# Patient Record
Sex: MALE | Race: WHITE | NOT HISPANIC OR LATINO | Employment: OTHER | URBAN - METROPOLITAN AREA
[De-identification: names, ages, dates, MRNs, and addresses within clinical notes are randomized per-mention and may not be internally consistent; named-entity substitution may affect disease eponyms.]

---

## 2017-02-15 DIAGNOSIS — I73.9 PVD (PERIPHERAL VASCULAR DISEASE): ICD-10-CM

## 2017-02-16 DIAGNOSIS — I77.9 CAROTID DISEASE, BILATERAL: Primary | ICD-10-CM

## 2017-02-16 RX ORDER — CILOSTAZOL 100 MG/1
TABLET ORAL
Qty: 60 TABLET | Refills: 11 | Status: SHIPPED | OUTPATIENT
Start: 2017-02-16 | End: 2017-03-28 | Stop reason: SDUPTHER

## 2017-02-16 RX ORDER — CILOSTAZOL 100 MG/1
100 TABLET ORAL 2 TIMES DAILY
Qty: 60 TABLET | Refills: 11 | Status: SHIPPED | OUTPATIENT
Start: 2017-02-16 | End: 2018-06-11 | Stop reason: CLARIF

## 2017-03-28 ENCOUNTER — OFFICE VISIT (OUTPATIENT)
Dept: INTERNAL MEDICINE | Facility: CLINIC | Age: 62
End: 2017-03-28
Payer: MEDICARE

## 2017-03-28 VITALS
RESPIRATION RATE: 20 BRPM | TEMPERATURE: 97 F | HEART RATE: 72 BPM | BODY MASS INDEX: 25.52 KG/M2 | HEIGHT: 72 IN | DIASTOLIC BLOOD PRESSURE: 86 MMHG | WEIGHT: 188.38 LBS | SYSTOLIC BLOOD PRESSURE: 122 MMHG

## 2017-03-28 DIAGNOSIS — E78.5 HYPERLIPIDEMIA, UNSPECIFIED HYPERLIPIDEMIA TYPE: ICD-10-CM

## 2017-03-28 DIAGNOSIS — J44.9 CHRONIC OBSTRUCTIVE PULMONARY DISEASE, UNSPECIFIED COPD TYPE: ICD-10-CM

## 2017-03-28 DIAGNOSIS — I25.10 CORONARY ARTERY DISEASE INVOLVING NATIVE CORONARY ARTERY WITHOUT ANGINA PECTORIS, UNSPECIFIED WHETHER NATIVE OR TRANSPLANTED HEART: ICD-10-CM

## 2017-03-28 DIAGNOSIS — I73.9 PAD (PERIPHERAL ARTERY DISEASE): ICD-10-CM

## 2017-03-28 DIAGNOSIS — N40.0 BENIGN NON-NODULAR PROSTATIC HYPERPLASIA WITHOUT LOWER URINARY TRACT SYMPTOMS: ICD-10-CM

## 2017-03-28 DIAGNOSIS — M54.50 CHRONIC RIGHT-SIDED LOW BACK PAIN WITHOUT SCIATICA: Primary | ICD-10-CM

## 2017-03-28 DIAGNOSIS — G89.29 CHRONIC RIGHT-SIDED LOW BACK PAIN WITHOUT SCIATICA: Primary | ICD-10-CM

## 2017-03-28 DIAGNOSIS — I77.9 BILATERAL CAROTID ARTERY DISEASE: ICD-10-CM

## 2017-03-28 DIAGNOSIS — I10 ESSENTIAL HYPERTENSION: ICD-10-CM

## 2017-03-28 PROCEDURE — 3074F SYST BP LT 130 MM HG: CPT | Mod: S$GLB,,, | Performed by: INTERNAL MEDICINE

## 2017-03-28 PROCEDURE — 1160F RVW MEDS BY RX/DR IN RCRD: CPT | Mod: S$GLB,,, | Performed by: INTERNAL MEDICINE

## 2017-03-28 PROCEDURE — 99499 UNLISTED E&M SERVICE: CPT | Mod: S$PBB,,, | Performed by: INTERNAL MEDICINE

## 2017-03-28 PROCEDURE — 99214 OFFICE O/P EST MOD 30 MIN: CPT | Mod: S$GLB,,, | Performed by: INTERNAL MEDICINE

## 2017-03-28 PROCEDURE — 3079F DIAST BP 80-89 MM HG: CPT | Mod: S$GLB,,, | Performed by: INTERNAL MEDICINE

## 2017-03-28 PROCEDURE — 99999 PR PBB SHADOW E&M-EST. PATIENT-LVL III: CPT | Mod: PBBFAC,,, | Performed by: INTERNAL MEDICINE

## 2017-03-28 RX ORDER — OXYCODONE AND ACETAMINOPHEN 5; 325 MG/1; MG/1
1 TABLET ORAL EVERY 4 HOURS PRN
Qty: 30 TABLET | Refills: 0 | Status: SHIPPED | OUTPATIENT
Start: 2017-03-28 | End: 2017-05-22 | Stop reason: SDUPTHER

## 2017-03-28 RX ORDER — IRBESARTAN 75 MG/1
75 TABLET ORAL DAILY
COMMUNITY
Start: 2017-01-05 | End: 2017-10-10 | Stop reason: SDUPTHER

## 2017-03-28 RX ORDER — PREDNISONE 20 MG/1
TABLET ORAL
Qty: 14 TABLET | Refills: 0 | Status: SHIPPED | OUTPATIENT
Start: 2017-03-28 | End: 2017-05-22 | Stop reason: ALTCHOICE

## 2017-03-28 NOTE — MR AVS SNAPSHOT
[facility] - Internal Medicine  [address] [place], [place] [address], [address]  [place] [address]  Phone: [phone]  Fax: [phone]                  Nimesh Nunn   3/28/2017 10:15 AM   Office Visit    Description:  Male : 1955   Provider:  Nishant Perez MD   Department:  [facility] - Internal Medicine           Reason for Visit     Back Pain           Diagnoses this Visit        Comments    Chronic right-sided low back pain without sciatica    -  Primary     Coronary artery disease involving native coronary artery without angina pectoris, unspecified whether native or transplanted heart         PAD (peripheral artery disease)         Bilateral carotid artery disease         Essential hypertension         Hyperlipidemia, unspecified hyperlipidemia type         Benign non-nodular prostatic hyperplasia without lower urinary tract symptoms         Chronic obstructive pulmonary disease, unspecified COPD type                To Do List           Future Appointments        Provider Department Dept Phone    2017 10:00 AM VASCULAR, CARDIOLOGY [facility] - Vascular Cardiology [phone]    2017 8:40 AM MD [facility]-Interventional Card [phone]      Goals (5 Years of Data)     None      Follow-Up and Disposition     Return in about 2 months (around 2017).       These Medications        Disp Refills Start End    predniSONE (DELTASONE) 20 MG tablet 14 tablet 0 3/28/2017     2 tabs po qd x 7 days    [facility]: [facility] [address] Ph #: [phone]       oxycodone-acetaminophen (PERCOCET) 5-325 mg per tablet 30 tablet 0 3/28/2017     Take 1 tablet by mouth every 4 (four) hours as needed for Pain. - Oral    Pharmacy: [facility] [address] Ph #: [phone]         [facility]     [facility] On Call Nurse Care Line -  Assistance  Registered nurses in the [facility] provide clinical advisement, health  education, appointment booking, and other advisory services.  Call for this free service at [phone].             Medications           Message regarding Medications     Verify the changes and/or additions to your medication regime listed below are the same as discussed with your clinician today.  If any of these changes or additions are incorrect, please notify your healthcare provider.        START taking these NEW medications        Refills    predniSONE (DELTASONE) 20 MG tablet 0    Si tabs po qd x 7 days    Class: Normal    oxycodone-acetaminophen (PERCOCET) 5-325 mg per tablet 0    Sig: Take 1 tablet by mouth every 4 (four) hours as needed for Pain.    Class: Normal    Route: Oral      STOP taking these medications     pramipexole (MIRAPEX) 0.125 MG tablet Take 1 tablet (0.125 mg total) by mouth 3 (three) times daily.           Verify that the below list of medications is an accurate representation of the medications you are currently taking.  If none reported, the list may be blank. If incorrect, please contact your healthcare provider. Carry this list with you in case of emergency.           Current Medications     aspirin (ECOTRIN) 81 MG EC tablet Take 81 mg by mouth once daily.    atorvastatin (LIPITOR) 80 MG tablet Take 1 tablet (80 mg total) by mouth once daily.    cilostazol (PLETAL) 100 MG Tab Take 1 tablet (100 mg total) by mouth 2 (two) times daily.    clopidogrel (PLAVIX) 75 mg tablet Take 1 tablet (75 mg total) by mouth once daily.    finasteride (PROSCAR) 5 mg tablet Take 1 tablet (5 mg total) by mouth once daily.    irbesartan (AVAPRO) 75 MG tablet Take 75 mg by mouth once daily.     metoprolol succinate (TOPROL-XL) 50 MG 24 hr tablet Take 1 tablet (50 mg total) by mouth once daily.    nitroGLYCERIN (NITROSTAT) 0.4 MG SL tablet Place 1 tablet (0.4 mg total) under the tongue every 5 (five) minutes as needed for Chest pain.    PROAIR HFA 90 mcg/actuation inhaler Inhale 2 puffs into the  lungs as needed.     tiotropium bromide (SPIRIVA RESPIMAT) 1.25 mcg/actuation Mist Inhale 2.5 mcg into the lungs once daily.    oxycodone-acetaminophen (PERCOCET) 5-325 mg per tablet Take 1 tablet by mouth every 4 (four) hours as needed for Pain.    predniSONE (DELTASONE) 20 MG tablet 2 tabs po qd x 7 days           Clinical Reference Information           Your Vitals Were     BP Pulse Temp Resp Height Weight    122/86 72 97.3 °F (36.3 °C) (Oral) 20 6' (1.829 m) 85.5 kg (188 lb 6.1 oz)    BMI                25.55 kg/m2          Blood Pressure          Most Recent Value    BP  122/86      Allergies as of 3/28/2017     No Known Allergies      Immunizations Administered on Date of Encounter - 3/28/2017     None      Language Assistance Services     ATTENTION: Language assistance services are available, free of charge. Please call [phone].      ATENCIÓN: Si habla camelia, tiene a shultz disposición servicios gratuitos de asistencia lingüística. Llame al [phone].     SERENA Ý: N?u b?n nói Ti?ng Vi?t, có các d?ch v? h? tr? ngôn ng? mi?n phí dành cho b?n. G?i s? [phone].         [facility] complies with applicable Federal civil rights laws and does not discriminate on the basis of race, color, national origin, age, disability, or sex.

## 2017-03-28 NOTE — PROGRESS NOTES
Subjective:       Patient ID: Nimesh Nunn is a 61 y.o. male.    Chief Complaint: Back Pain (c/o pinch nerve on his back)    HPI  Pt with R LBP x 3 months.  Had radicular pain x 3 days, but this resolved.  Pt denies incontinence, F/C.  States his R leg does feel a little weak.  No CP, SOB, claudication.  Nocturia x 2.  Review of Systems   All other systems reviewed and are negative.      Objective:      Physical Exam   Constitutional: He appears well-developed. No distress.   HENT:   Head: Normocephalic.   Eyes: EOM are normal. No scleral icterus.   Neck: Normal range of motion. No tracheal deviation present.   Cardiovascular: Normal rate, regular rhythm, normal heart sounds and intact distal pulses.    Pulses:       Femoral pulses are 2+ on the right side, and 2+ on the left side.       Popliteal pulses are 1+ on the right side, and 1+ on the left side.        Dorsalis pedis pulses are 1+ on the right side, and 0 on the left side.        Posterior tibial pulses are 1+ on the right side, and 0 on the left side.   Pulmonary/Chest: Effort normal. No respiratory distress.   Poor BSs   Abdominal: He exhibits no distension.   Musculoskeletal: Normal range of motion. He exhibits no edema or tenderness.   R SLR at 30 deg  No pain on r hip rotation   Neurological: He is alert.   Skin: Skin is warm and dry. No rash noted. He is not diaphoretic. No erythema.   Psychiatric: He has a normal mood and affect. His behavior is normal.   Vitals reviewed.      Assessment:       1. Chronic right-sided low back pain without sciatica    2. Coronary artery disease involving native coronary artery without angina pectoris, unspecified whether native or transplanted heart    3. PAD (peripheral artery disease)    4. Bilateral carotid artery disease    5. Essential hypertension    6. Hyperlipidemia, unspecified hyperlipidemia type    7. Benign non-nodular prostatic hyperplasia without lower urinary tract symptoms    8. Chronic obstructive  pulmonary disease, unspecified COPD type        Plan:       Nimesh was seen today for back pain.    Diagnoses and all orders for this visit:    Chronic right-sided low back pain without sciatica  -     predniSONE (DELTASONE) 20 MG tablet; 2 tabs po qd x 7 days  -     oxycodone-acetaminophen (PERCOCET) 5-325 mg per tablet; Take 1 tablet by mouth every 4 (four) hours as needed for Pain.    Coronary artery disease involving native coronary artery without angina pectoris, unspecified whether native or transplanted heart   Quiescent    PAD (peripheral artery disease)   Stable    Bilateral carotid artery disease    Essential hypertension   Well-cont    Hyperlipidemia, unspecified hyperlipidemia type   Cont rx    Benign non-nodular prostatic hyperplasia without lower urinary tract symptoms   Stable    Chronic obstructive pulmonary disease, unspecified COPD type   Cont rx    Return in about 2 months (around 5/28/2017).

## 2017-05-09 ENCOUNTER — CLINICAL SUPPORT (OUTPATIENT)
Dept: CARDIOLOGY | Facility: CLINIC | Age: 62
End: 2017-05-09
Payer: MEDICARE

## 2017-05-09 DIAGNOSIS — I77.9 CAROTID DISEASE, BILATERAL: ICD-10-CM

## 2017-05-09 LAB — INTERNAL CAROTID STENOSIS: NORMAL

## 2017-05-09 PROCEDURE — 93880 EXTRACRANIAL BILAT STUDY: CPT | Mod: S$GLB,,, | Performed by: INTERNAL MEDICINE

## 2017-05-17 ENCOUNTER — LAB VISIT (OUTPATIENT)
Dept: LAB | Facility: HOSPITAL | Age: 62
End: 2017-05-17
Attending: INTERNAL MEDICINE
Payer: MEDICARE

## 2017-05-17 DIAGNOSIS — E78.5 HYPERLIPIDEMIA, UNSPECIFIED HYPERLIPIDEMIA TYPE: ICD-10-CM

## 2017-05-17 DIAGNOSIS — I25.10 CORONARY ARTERY DISEASE INVOLVING NATIVE CORONARY ARTERY WITHOUT ANGINA PECTORIS, UNSPECIFIED WHETHER NATIVE OR TRANSPLANTED HEART: ICD-10-CM

## 2017-05-17 LAB
ALBUMIN SERPL BCP-MCNC: 4.2 G/DL
ALP SERPL-CCNC: 74 IU/L
ALT SERPL W/O P-5'-P-CCNC: 30 IU/L
ANION GAP SERPL CALC-SCNC: 12 MMOL/L
AST SERPL-CCNC: 37 IU/L
BASOPHILS # BLD AUTO: 0.02 K/UL
BASOPHILS NFR BLD: 0.5 %
BILIRUB SERPL-MCNC: 0.5 MG/DL
BUN SERPL-MCNC: 18 MG/DL
CALCIUM SERPL-MCNC: 9.2 MG/DL
CHLORIDE SERPL-SCNC: 103 MMOL/L
CHOLEST/HDLC SERPL: 2.1 {RATIO}
CO2 SERPL-SCNC: 28 MMOL/L
CREAT SERPL-MCNC: 1.02 MG/DL
DIFFERENTIAL METHOD: ABNORMAL
EOSINOPHIL # BLD AUTO: 0.3 K/UL
EOSINOPHIL NFR BLD: 6.3 %
ERYTHROCYTE [DISTWIDTH] IN BLOOD BY AUTOMATED COUNT: 14.6 %
EST. GFR  (AFRICAN AMERICAN): >60 ML/MIN/1.73 M^2
EST. GFR  (NON AFRICAN AMERICAN): >60 ML/MIN/1.73 M^2
GLUCOSE SERPL-MCNC: 83 MG/DL
HCT VFR BLD AUTO: 41.5 %
HDL/CHOLESTEROL RATIO: 46.6 %
HDLC SERPL-MCNC: 146 MG/DL
HDLC SERPL-MCNC: 68 MG/DL
HGB BLD-MCNC: 13.9 G/DL
LDLC SERPL CALC-MCNC: 51.8 MG/DL
LYMPHOCYTES # BLD AUTO: 1.3 K/UL
LYMPHOCYTES NFR BLD: 29.9 %
MCH RBC QN AUTO: 31.4 PG
MCHC RBC AUTO-ENTMCNC: 33.5 %
MCV RBC AUTO: 94 FL
MONOCYTES # BLD AUTO: 0.6 K/UL
MONOCYTES NFR BLD: 13.2 %
NEUTROPHILS # BLD AUTO: 2.2 K/UL
NEUTROPHILS NFR BLD: 49.9 %
NONHDLC SERPL-MCNC: 78 MG/DL
PLATELET # BLD AUTO: 217 K/UL
PMV BLD AUTO: 9.4 FL
POTASSIUM SERPL-SCNC: 4.8 MMOL/L
PROT SERPL-MCNC: 7.2 G/DL
RBC # BLD AUTO: 4.43 M/UL
SODIUM SERPL-SCNC: 143 MMOL/L
TRIGL SERPL-MCNC: 131 MG/DL
WBC # BLD AUTO: 4.32 K/UL

## 2017-05-17 PROCEDURE — 80053 COMPREHEN METABOLIC PANEL: CPT | Mod: PO

## 2017-05-17 PROCEDURE — 85025 COMPLETE CBC W/AUTO DIFF WBC: CPT | Mod: PO

## 2017-05-17 PROCEDURE — 80061 LIPID PANEL: CPT

## 2017-05-17 PROCEDURE — 36415 COLL VENOUS BLD VENIPUNCTURE: CPT | Mod: PO

## 2017-05-22 ENCOUNTER — OFFICE VISIT (OUTPATIENT)
Dept: INTERNAL MEDICINE | Facility: CLINIC | Age: 62
End: 2017-05-22
Payer: MEDICARE

## 2017-05-22 VITALS
SYSTOLIC BLOOD PRESSURE: 110 MMHG | DIASTOLIC BLOOD PRESSURE: 84 MMHG | TEMPERATURE: 98 F | HEART RATE: 100 BPM | RESPIRATION RATE: 12 BRPM | HEIGHT: 72 IN | BODY MASS INDEX: 25.96 KG/M2 | WEIGHT: 191.69 LBS

## 2017-05-22 DIAGNOSIS — N40.0 BENIGN NON-NODULAR PROSTATIC HYPERPLASIA WITHOUT LOWER URINARY TRACT SYMPTOMS: ICD-10-CM

## 2017-05-22 DIAGNOSIS — M17.0 PRIMARY OSTEOARTHRITIS OF BOTH KNEES: ICD-10-CM

## 2017-05-22 DIAGNOSIS — J44.9 CHRONIC OBSTRUCTIVE PULMONARY DISEASE, UNSPECIFIED COPD TYPE: ICD-10-CM

## 2017-05-22 DIAGNOSIS — M54.41 CHRONIC RIGHT-SIDED LOW BACK PAIN WITH RIGHT-SIDED SCIATICA: Primary | ICD-10-CM

## 2017-05-22 DIAGNOSIS — I77.9 BILATERAL CAROTID ARTERY DISEASE: ICD-10-CM

## 2017-05-22 DIAGNOSIS — G89.29 CHRONIC RIGHT-SIDED LOW BACK PAIN WITH RIGHT-SIDED SCIATICA: Primary | ICD-10-CM

## 2017-05-22 DIAGNOSIS — I25.10 CORONARY ARTERY DISEASE INVOLVING NATIVE CORONARY ARTERY WITHOUT ANGINA PECTORIS, UNSPECIFIED WHETHER NATIVE OR TRANSPLANTED HEART: ICD-10-CM

## 2017-05-22 DIAGNOSIS — I73.9 PAD (PERIPHERAL ARTERY DISEASE): ICD-10-CM

## 2017-05-22 DIAGNOSIS — G89.29 CHRONIC RIGHT-SIDED LOW BACK PAIN WITHOUT SCIATICA: ICD-10-CM

## 2017-05-22 DIAGNOSIS — M54.50 CHRONIC RIGHT-SIDED LOW BACK PAIN WITHOUT SCIATICA: ICD-10-CM

## 2017-05-22 DIAGNOSIS — E78.5 HYPERLIPIDEMIA, UNSPECIFIED HYPERLIPIDEMIA TYPE: ICD-10-CM

## 2017-05-22 DIAGNOSIS — I10 ESSENTIAL HYPERTENSION: ICD-10-CM

## 2017-05-22 PROCEDURE — 99499 UNLISTED E&M SERVICE: CPT | Mod: S$PBB,,, | Performed by: INTERNAL MEDICINE

## 2017-05-22 PROCEDURE — 3074F SYST BP LT 130 MM HG: CPT | Mod: S$GLB,,, | Performed by: INTERNAL MEDICINE

## 2017-05-22 PROCEDURE — 99999 PR PBB SHADOW E&M-EST. PATIENT-LVL III: CPT | Mod: PBBFAC,,, | Performed by: INTERNAL MEDICINE

## 2017-05-22 PROCEDURE — 1160F RVW MEDS BY RX/DR IN RCRD: CPT | Mod: S$GLB,,, | Performed by: INTERNAL MEDICINE

## 2017-05-22 PROCEDURE — 3079F DIAST BP 80-89 MM HG: CPT | Mod: S$GLB,,, | Performed by: INTERNAL MEDICINE

## 2017-05-22 PROCEDURE — 99214 OFFICE O/P EST MOD 30 MIN: CPT | Mod: S$GLB,,, | Performed by: INTERNAL MEDICINE

## 2017-05-22 RX ORDER — PREDNISONE 20 MG/1
TABLET ORAL
Qty: 14 TABLET | Refills: 0 | Status: SHIPPED | OUTPATIENT
Start: 2017-05-22 | End: 2017-06-22 | Stop reason: ALTCHOICE

## 2017-05-22 RX ORDER — OXYCODONE AND ACETAMINOPHEN 5; 325 MG/1; MG/1
1 TABLET ORAL EVERY 4 HOURS PRN
Qty: 30 TABLET | Refills: 0 | Status: SHIPPED | OUTPATIENT
Start: 2017-05-22 | End: 2017-06-22 | Stop reason: ALTCHOICE

## 2017-05-22 RX ORDER — GABAPENTIN 300 MG/1
300 CAPSULE ORAL 3 TIMES DAILY
Qty: 90 CAPSULE | Refills: 3 | Status: SHIPPED | OUTPATIENT
Start: 2017-05-22 | End: 2017-08-10

## 2017-05-22 NOTE — PROGRESS NOTES
Subjective:       Patient ID: Nimesh Nunn is a 61 y.o. male.    Chief Complaint: Follow-up; Results; and Low-back Pain (x3 weeks)    HPI  Checkup.  Labs reviewed.  Pt c/o recurrence of R LBP rad to knee.  He had only a 2 week respite with a 1 week course of prednisone.  Pt's least comfortable position is sitting upright.  No incontinence.  Also c/o bilat knee pain, L shoulder pain.  Nocturia x 1-2.  SOB at baseline.  No CP.  No claudication.  Review of Systems   All other systems reviewed and are negative.      Objective:      Physical Exam   Constitutional: He appears well-developed. No distress.   HENT:   Head: Normocephalic.   Eyes: EOM are normal. No scleral icterus.   Neck: Normal range of motion. No tracheal deviation present.   Cardiovascular: Normal rate, regular rhythm, normal heart sounds and intact distal pulses.    Pulses:       Femoral pulses are 1+ on the right side, and 1+ on the left side.       Popliteal pulses are 1+ on the right side, and 1+ on the left side.        Dorsalis pedis pulses are 0 on the right side, and 0 on the left side.        Posterior tibial pulses are 0 on the right side, and 0 on the left side.   Pulmonary/Chest: Effort normal. No respiratory distress.   Poor BSs   Abdominal: Soft. Bowel sounds are normal. He exhibits no distension. There is no tenderness. There is no guarding.   Musculoskeletal: Normal range of motion. He exhibits tenderness (R SI joint). He exhibits no edema.   No pain on R hip rotation  Crepitation knees   Neurological: He is alert.   R SLR + at 30 deg  - L SLR  4/5 strength LLE m groups   Skin: Skin is warm and dry. No rash noted. He is not diaphoretic. No erythema.   Psychiatric: He has a normal mood and affect. His behavior is normal.   Vitals reviewed.      Assessment:       1. Chronic right-sided low back pain with right-sided sciatica    2. Coronary artery disease involving native coronary artery without angina pectoris, unspecified whether native  or transplanted heart    3. PAD (peripheral artery disease)    4. Bilateral carotid artery disease    5. Essential hypertension    6. Hyperlipidemia, unspecified hyperlipidemia type    7. Benign non-nodular prostatic hyperplasia without lower urinary tract symptoms    8. Chronic obstructive pulmonary disease, unspecified COPD type    9. Chronic right-sided low back pain without sciatica    10. Primary osteoarthritis of both knees        Plan:       Nimesh was seen today for follow-up, results and low-back pain.    Diagnoses and all orders for this visit:    Chronic right-sided low back pain with right-sided sciatica  -     gabapentin (NEURONTIN) 300 MG capsule; Take 1 capsule (300 mg total) by mouth 3 (three) times daily.    Coronary artery disease involving native coronary artery without angina pectoris, unspecified whether native or transplanted heart   Quiescent    PAD (peripheral artery disease)   Cont rx    Bilateral carotid artery disease   Await U/S    Essential hypertension   Well-cont    Hyperlipidemia, unspecified hyperlipidemia type   Well-cont    Benign non-nodular prostatic hyperplasia without lower urinary tract symptoms   Cont rx    Chronic obstructive pulmonary disease, unspecified COPD type   Stable    Chronic right-sided low back pain without sciatica  -     predniSONE (DELTASONE) 20 MG tablet; 2 tabs po qd x 7 days  -     oxycodone-acetaminophen (PERCOCET) 5-325 mg per tablet; Take 1 tablet by mouth every 4 (four) hours as needed for Pain.  -     Ambulatory Referral to Physical/Occupational Therapy    Primary osteoarthritis of both knees      Return in about 1 month (around 6/22/2017).

## 2017-05-23 ENCOUNTER — OFFICE VISIT (OUTPATIENT)
Dept: CARDIOLOGY | Facility: CLINIC | Age: 62
End: 2017-05-23
Payer: MEDICARE

## 2017-05-23 VITALS
WEIGHT: 193.31 LBS | HEART RATE: 101 BPM | HEIGHT: 72 IN | SYSTOLIC BLOOD PRESSURE: 152 MMHG | DIASTOLIC BLOOD PRESSURE: 87 MMHG | BODY MASS INDEX: 26.18 KG/M2 | OXYGEN SATURATION: 91 %

## 2017-05-23 DIAGNOSIS — G45.9 TRANSIENT CEREBRAL ISCHEMIA, UNSPECIFIED TYPE: ICD-10-CM

## 2017-05-23 DIAGNOSIS — I77.9 BILATERAL CAROTID ARTERY DISEASE: ICD-10-CM

## 2017-05-23 DIAGNOSIS — I25.10 CORONARY ARTERY DISEASE INVOLVING NATIVE CORONARY ARTERY WITHOUT ANGINA PECTORIS, UNSPECIFIED WHETHER NATIVE OR TRANSPLANTED HEART: ICD-10-CM

## 2017-05-23 DIAGNOSIS — I73.9 CLAUDICATION: Primary | ICD-10-CM

## 2017-05-23 PROCEDURE — 99499 UNLISTED E&M SERVICE: CPT | Mod: S$PBB,,, | Performed by: INTERNAL MEDICINE

## 2017-05-23 PROCEDURE — 3079F DIAST BP 80-89 MM HG: CPT | Mod: S$GLB,,, | Performed by: INTERNAL MEDICINE

## 2017-05-23 PROCEDURE — 3074F SYST BP LT 130 MM HG: CPT | Mod: S$GLB,,, | Performed by: INTERNAL MEDICINE

## 2017-05-23 PROCEDURE — 99999 PR PBB SHADOW E&M-EST. PATIENT-LVL IV: CPT | Mod: PBBFAC,,, | Performed by: INTERNAL MEDICINE

## 2017-05-23 PROCEDURE — 1160F RVW MEDS BY RX/DR IN RCRD: CPT | Mod: S$GLB,,, | Performed by: INTERNAL MEDICINE

## 2017-05-23 PROCEDURE — 99214 OFFICE O/P EST MOD 30 MIN: CPT | Mod: S$GLB,,, | Performed by: INTERNAL MEDICINE

## 2017-05-23 NOTE — PROGRESS NOTES
Subjective:    Patient ID:  Nimesh Nunn is a 61 y.o. male who presents for follow-up of carotid artery disease.    HPI    Patient is a 61 year old male with PMH of cerebrovascular disease, hypertension, hyperlipidemia, former smoker, hepatitis C, CAD s/p PCI (RCA and LAD), PAD (failed revascularization of left EFFIE x 2 ()), and carotid artery disease with known  of left CCA.  We are following him for his carotid artery disease, he has known  of left CCA, and on US 20-39% AMARA stenosis, unchanged from previous years.  Denies any recent TIA, or stroke like symptoms.  Denies dizziness.  Denies arm claudication.  States his left leg worse than right, but does not feel it significantly limits his lifestyle.  No foot ulcer or pain at rest.  No chest pain.  Stable SOB with moderate activity which he attributes to his COPD.  On asp/plavix.      TEST DESCRIPTION   RIGHT  The right Distal Common Carotid Artery is visualized.   The right carotid bulb artery is visualized, associated with dense calcified plaque.   The right Proximal Internal Carotid Artery has 20 - 39% stenosis, associated with dense calcified plaque.   There is acceleration in the right external carotid artery.   The right vertebral artery is visualized, associated with anterograde flow.   The right ICA/CCA ratio is: 1.15  LEFT  The left Distal Common Carotid Artery is occluded, associated with occlusion.   The left carotid bulb artery is visualized.   The left Mid Internal Carotid Artery has 0 - 19% stenosis.   The left external carotid artery is visualized, associated with retrograde flow.   The left vertebral artery is visualized, associated with anterograde flow.       CONCLUSIONS   There is 20 - 39% right Internal Carotid stenosis.  Known  of Left CCA    Review of Systems   Constitution: Negative for chills, fever, malaise/fatigue, weight gain and weight loss.   HENT: Negative for headaches, nosebleeds and odynophagia.    Eyes: Negative  for blurred vision, double vision, vision loss in left eye, vision loss in right eye and visual disturbance.   Cardiovascular: Positive for claudication. Negative for chest pain, cyanosis, dyspnea on exertion, irregular heartbeat, leg swelling, near-syncope, orthopnea and palpitations.   Respiratory: Negative for cough, shortness of breath and sputum production.    Hematologic/Lymphatic: Negative for bleeding problem. Does not bruise/bleed easily.   Skin: Negative for poor wound healing and rash.   Musculoskeletal: Positive for arthritis. Negative for falls, joint swelling, muscle weakness and myalgias.   Gastrointestinal: Negative for abdominal pain, constipation, diarrhea, hematemesis, hematochezia, nausea and vomiting.   Genitourinary: Negative for dysuria and hematuria.         BP (!) 152/87 (BP Location: Right arm, Patient Position: Sitting, BP Method: Automatic)   Pulse 101   Ht 6' (1.829 m)   Wt 87.7 kg (193 lb 5.5 oz)   SpO2 (!) 91%   BMI 26.22 kg/m²     Objective:    Physical Exam   Constitutional: He is oriented to person, place, and time. He appears well-developed and well-nourished.   HENT:   Head: Normocephalic and atraumatic.   Eyes: Conjunctivae and EOM are normal. No scleral icterus.   Neck: No JVD present.   Cardiovascular: Normal rate, regular rhythm, S1 normal and S2 normal.   No extrasystoles are present. PMI is not displaced.  Exam reveals no S3 and no S4.    No murmur heard.  Pulses:       Carotid pulses are 2+ on the right side, and 2+ on the left side.       Radial pulses are 1+ on the right side, and 1+ on the left side.        Femoral pulses are 2+ on the right side, and 2+ on the left side.       Dorsalis pedis pulses are 1+ on the right side, and 0 on the left side.        Posterior tibial pulses are 1+ on the right side, and 0 on the left side.   No carotid bruit   Pulmonary/Chest: Effort normal and breath sounds normal.   Abdominal: Soft. Bowel sounds are normal.   Musculoskeletal:  He exhibits no edema or tenderness.   Neurological: He is alert and oriented to person, place, and time.   Skin: Skin is warm and dry. No erythema.   Psychiatric: He has a normal mood and affect. His behavior is normal.     Current Outpatient Prescriptions on File Prior to Visit   Medication Sig Dispense Refill    aspirin (ECOTRIN) 81 MG EC tablet Take 81 mg by mouth once daily.      atorvastatin (LIPITOR) 80 MG tablet Take 1 tablet (80 mg total) by mouth once daily. 30 tablet 11    cilostazol (PLETAL) 100 MG Tab Take 1 tablet (100 mg total) by mouth 2 (two) times daily. (Patient taking differently: Take 100 mg by mouth once daily. ) 60 tablet 11    clopidogrel (PLAVIX) 75 mg tablet Take 1 tablet (75 mg total) by mouth once daily. 90 tablet 4    finasteride (PROSCAR) 5 mg tablet Take 1 tablet (5 mg total) by mouth once daily. 90 tablet 3    gabapentin (NEURONTIN) 300 MG capsule Take 1 capsule (300 mg total) by mouth 3 (three) times daily. 90 capsule 3    irbesartan (AVAPRO) 75 MG tablet Take 75 mg by mouth once daily.       metoprolol succinate (TOPROL-XL) 50 MG 24 hr tablet Take 1 tablet (50 mg total) by mouth once daily. 30 tablet 11    oxycodone-acetaminophen (PERCOCET) 5-325 mg per tablet Take 1 tablet by mouth every 4 (four) hours as needed for Pain. 30 tablet 0    predniSONE (DELTASONE) 20 MG tablet 2 tabs po qd x 7 days 14 tablet 0    PROAIR HFA 90 mcg/actuation inhaler Inhale 2 puffs into the lungs as needed.       tiotropium bromide (SPIRIVA RESPIMAT) 1.25 mcg/actuation Mist Inhale 2.5 mcg into the lungs once daily. 3 g 3    nitroGLYCERIN (NITROSTAT) 0.4 MG SL tablet Place 1 tablet (0.4 mg total) under the tongue every 5 (five) minutes as needed for Chest pain. 90 tablet 1     No current facility-administered medications on file prior to visit.      Assessment:     Patient Active Problem List   Diagnosis    Bilateral carotid artery disease - known  of left CCA, right carotid stenosis  unchanged.  Continue asp/plavix, high intensity statin.  F/u in 1 year with carotid US.    HTN (hypertension)    Hyperlipidemia    Claudication - PAD with Tivoli III claudication; continue pletal, DAPT, continue walking program.    Subclavian stenosis - BP difference between arms, denies arm claudication    CAD (coronary artery disease)denies angina    PAD (peripheral artery disease)       Plan:       -follow up in 1 year with carotid ultrasound        Lisa Spencer MD  Interventional Cardiology    I have personally taken the history and examined this patient. I have discussed and agree with the resident's findings and plan as documented in the resident's note.  Mekhi Martin

## 2017-05-31 ENCOUNTER — CLINICAL SUPPORT (OUTPATIENT)
Dept: REHABILITATION | Facility: HOSPITAL | Age: 62
End: 2017-05-31
Attending: INTERNAL MEDICINE
Payer: MEDICARE

## 2017-05-31 DIAGNOSIS — M62.9 HAMSTRING TIGHTNESS OF BOTH LOWER EXTREMITIES: ICD-10-CM

## 2017-05-31 DIAGNOSIS — R29.898 HIP TIGHTNESS: ICD-10-CM

## 2017-05-31 DIAGNOSIS — R29.898 WEAKNESS OF BOTH LOWER EXTREMITIES: ICD-10-CM

## 2017-05-31 DIAGNOSIS — M53.86 DECREASED RANGE OF MOTION OF LUMBAR SPINE: ICD-10-CM

## 2017-05-31 PROCEDURE — G8979 MOBILITY GOAL STATUS: HCPCS | Mod: CJ

## 2017-05-31 PROCEDURE — 97162 PT EVAL MOD COMPLEX 30 MIN: CPT

## 2017-05-31 PROCEDURE — 97110 THERAPEUTIC EXERCISES: CPT

## 2017-05-31 PROCEDURE — G8978 MOBILITY CURRENT STATUS: HCPCS | Mod: CK

## 2017-05-31 NOTE — PLAN OF CARE
TIME RECORD    Date: 06/01/2017    Start Time:  8:00  Stop Time:  9:00    PROCEDURES:    TIMED  Procedure Min.   TE 10                     UNTIMED  Procedure Min.   PT eval 50         Total Timed Minutes:  10  Total Timed Units:  1  Total Untimed Units:  1  Charges Billed/# of units:  2    OUTPATIENT PHYSICAL THERAPY   PATIENT EVALUATION  Onset Date: Chronic  Primary Diagnosis:   1. Decreased range of motion of lumbar spine     2. Weakness of both lower extremities     3. Hamstring tightness of both lower extremities     4. Hip tightness       Treatment Diagnosis: decreased lumbar ROM, hip tightness, hamstring tightness, LE weakness, poor core stabilization  Past Medical History:   Diagnosis Date    Arthritis     Blood transfusion     COPD (chronic obstructive pulmonary disease)     Coronary artery disease     Encounter for blood transfusion     Hyperlipidemia 11/7/2013    Hypertension     Occlusion and stenosis of carotid artery without mention of cerebral infarction 1/12/2014    Screening for colon cancer 6/1/2015    Syncope 1/13/2014     Precautions: Standard  Prior Therapy: PT for previously for back  Medications: Nimesh Nunn has a current medication list which includes the following prescription(s): aspirin, atorvastatin, cilostazol, clopidogrel, finasteride, gabapentin, irbesartan, metoprolol succinate, nitroglycerin, oxycodone-acetaminophen, prednisone, proair hfa, and tiotropium bromide.  Nutrition:  Normal  History of Present Illness: Exacerbation of low back pain on 12/25/16  Prior Level of Function: Independent  Social History: Lives by himself  Place of Residence (Steps/Adaptations): mobile home with 4 steps handrails on one side  Functional Deficits Leading to Referral/Nature of Injury: Difficulty with ascending/descending steps, transfers from low surfaces, vehicle transfers, recreational activities, bed mobility, performing usual household duties  Patient Therapy Goals: get  better[    Subjective     Nimesh Nunn states he has been having trouble with his back for years and has had 2 back surgeries in the past, that resulted in some of the bones in his low back being fused. He had a exacerbation of back pain when he woke up this past Chirstmas day. Since that time he is having a little bit of trouble getting up/down the steps in front of his home, having some increased pain with getting out of bed and rolling in bed. He sometimes has increased pain and trouble with transfers from low surfaces, depending on how he moves. He also has difficulty vehicle transfers in his mid sized truck and he is not able to drive long distances due to pain. He does get a little bit of pain with cleaning/cooking and he has difficulty getting something up from the floor. His back pain has stopped him from fishing his preferred recreational activity.        Pain:  Location: back  and R leg to mid thigh   Description: Burning and Sharp  Activities Which Increase Pain: Standing, Bending, Walking, Lifting and Getting out of bed/chair  Activities Which Decrease Pain: pain medication, injection and hot bath  Pain Scale: 6/10 at best 7/10 now  9/10 at worst    Objective     Posture: sacral sitting with R LE extended, decreased lumbar lordosis in standing, increased thoracic kyphosis in standing  Palpation: TTP R SI  Sensation: light touch diminished L ankle  DTRs: +1 knees, absent achilles  Range of Motion/Strength:   Lumbar AROM: Pain/Dysfunction with Movement:   Flexion 55* Pain R side if I go further   Extension 5* Pain R side of low back/no reversal of lumbar curve approximately L3-L5   Right side bending 7* Pain R side of low back/no movement below approximately L3   Left side bending  8* No movement below approximately L3     L/E MMT Right Left Pain/Dysfunction with Movement   Hip Flexion 3-/5 3+/5    Hip Extension 3-/5 3/5    Hip Abduction 3/5 4/5    Knee Flexion 3+/5 4-/5    Knee Extension 3-/5 4/5  Pulling in back   Ankle DF 3+/5 3+/5    Ankle PF 5/5 5/5      Flexibility: SLR L 54*, R 45*  Gait: Without AD  Analysis: Assistance none, decreased pelvic rotation bilateral, wide CHENCHO  Bed Mobility:Independent  Transfers: Independent, sometimes uses walker to push up  Special Tests: SHERRILL positive, hip 90/90 L-45*, R -45*  Other: FOTO lumbar 44, 40%<60%  Treatment: Patient was educated on the plan of care and treatment options. He is in agreement with the plan of care. Patient performed therapeutic exercise 1:1 with PT x 10 minutes of seated HS stretch, TAs, and bridging.    Assessment       Initial Assessment (Pertinent finding, problem list and factors affecting outcome): Mr. Nunn is a 61 year old male, who presents to the clinic with complaints of a recent exacerbation of his chronic low back pain. Due to his limited AROM of his lumbar spine and hamstring tightness he has difficulty maintaining correct posture with ADLs. His LE weakness and poor core stabilization limit his ability to use correct body mechanics with ADLs, ascend/descend steps with a reciprocal gait pattern, and perform transfers without UE leverage. When ambulating he has a wide CHENCHO and decreased pelvis rotation. He tested positive with SHERRILL for SI involvement. His current score on FOTO lumbar places him in the 40%<60% impaired, limited, or restricted category. He would benefit from physical therapy to improve his strength, ROM, flexibility, and core stability to improve his functional mobility.  History  Co-morbidities and personal factors that may impact the plan of care Examination  Body Structures and Functions, activity limitations and participation restrictions that may impact the plan of care Clinical Presentation   Decision Making/ Complexity Score   Co-morbidities:   Previous lumbar surgery, PAD, OA              Personal Factors:   None Body Regions:B LE, lumbar spine    Body Systems: Musculoskeletal - decreased ROM lumbar spine,  tight HS, LE weakness, poor core stabilization; Neuromuscular - increased reliance on UE leverage for transfers, abnormal gait pattern, poor posture; Cardiovascular - N/A; Integumentary - N/A      Activity limitations/Participation Restrictions: Patient's COPD may limit his ability to fully participate in therapy sessions.         evolving with changing clinical characteristics Moderate complexity    FOTO lumbar 44, 40%<60%       Rehab Potiential: fair    Short Term Goals (4 Weeks):   1. This patient will be independent with a basic HEP.  2. This patient will increase lumbar AROM to WFL with no increase in symptoms in order to be able to use correct body mechanics with ADLs.   3. This patient will increase B LE strength by 1 grade in order to be able to perform vehicle transfers with no increase in symptoms.   4. This patient will have a pain rating of 6/10 at worst with ADLs.  5. Patient able to score greater than or equal to 64 on the FOTO Lumbar Survey placing patient in 20%<40% impaired, limited, or restricted category demonstrating overall decreased low back pain with functional activities.   Long Term Goals (8 Weeks):   1. This patient will be independent with an updated HEP.  2. This patient will increase B SLR 20* in order to be able to use correct body mechanics when picking up light objects from the floor.    3. This patient will increase B LE strength to 5/5 in order to be able to ascend/descending steps reciprocally with no increase in symptoms.   4. This patient will have a pain rating of 4/10 at worst with ADLs.  5. Patient able to score greater than or equal to 80 on the FOTO Lumbar Survey placing patient in 20%<40% impaired, limited, or restricted category demonstrating overall decreased low back pain with functional activities.     Plan     Certification Period: 05/31/17 to 07/31/17  Recommended Treatment Plan: 1-2 times per week for 8 weeks: Group Therapy, Manual Therapy, Moist Heat/ Ice,  Neuromuscular Re-ed, Patient Education, Therapeutic Exercise and Other modalities prn. Dry needling with manual therapy techniques to decrease pain, inflammation and swelling, increase circulation and promote healing process  Other Recommendations: None      Therapist: Melissa Sutherland, PT    I CERTIFY THE NEED FOR THESE SERVICES FURNISHED UNDER THIS PLAN OF TREATMENT AND WHILE UNDER MY CARE    Physician's comments: ________________________________________________________________________________________________________________________________________________      Physician's Name: ___________________________________

## 2017-06-01 PROBLEM — R29.898 HIP TIGHTNESS: Status: ACTIVE | Noted: 2017-06-01

## 2017-06-01 PROBLEM — M53.86 DECREASED RANGE OF MOTION OF LUMBAR SPINE: Status: ACTIVE | Noted: 2017-06-01

## 2017-06-01 PROBLEM — M62.9 HAMSTRING TIGHTNESS OF BOTH LOWER EXTREMITIES: Status: ACTIVE | Noted: 2017-06-01

## 2017-06-01 PROBLEM — R29.898 WEAKNESS OF BOTH LOWER EXTREMITIES: Status: ACTIVE | Noted: 2017-06-01

## 2017-06-06 ENCOUNTER — CLINICAL SUPPORT (OUTPATIENT)
Dept: REHABILITATION | Facility: HOSPITAL | Age: 62
End: 2017-06-06
Attending: INTERNAL MEDICINE
Payer: MEDICARE

## 2017-06-06 DIAGNOSIS — R29.898 HIP TIGHTNESS: ICD-10-CM

## 2017-06-06 DIAGNOSIS — M53.86 DECREASED RANGE OF MOTION OF LUMBAR SPINE: ICD-10-CM

## 2017-06-06 DIAGNOSIS — R29.898 WEAKNESS OF BOTH LOWER EXTREMITIES: ICD-10-CM

## 2017-06-06 DIAGNOSIS — M62.9 HAMSTRING TIGHTNESS OF BOTH LOWER EXTREMITIES: ICD-10-CM

## 2017-06-06 PROCEDURE — 97110 THERAPEUTIC EXERCISES: CPT

## 2017-06-06 NOTE — PROGRESS NOTES
"TIME RECORD    Date:  06/06/2017    Start Time:  9:00  Stop Time:  9:55    PROCEDURES:    TIMED  Procedure Min.   TE 55                     UNTIMED  Procedure Min.             Total Timed Minutes:  55  Total Timed Units:  4  Total Untimed Units:  0  Charges Billed/# of units:  4 (TE-4)      Progress/Current Status    Subjective:     Patient ID: Nimesh Nunn is a 61 y.o. male.  Diagnosis:   1. Decreased range of motion of lumbar spine     2. Weakness of both lower extremities     3. Hamstring tightness of both lower extremities     4. Hip tightness       Pain: 6 /10  Patient reports having pain in his right low back.  Patient reports he has a lot of pain in his right knee with knee flexion activities.     Objective:     Patient was educated and performed therapeutic exercises as per log below 1:1 with PTA x 55 minutes to improve ROM, flexibility, strength and posture.  Patient declined cold pack at the end of her session.     Date  06/06/17   VISIT 2/12   G CODE VISIT 2/10   POC EXP. DATE 07/31/17   VISIT AMOUNT    MEDICARE TOTAL $127.92    $235.50   FACE-TO-FACE 06/30/17       Bike  --   TABLE:    HSS w/ strap 10 x 10"   TA's 10 x 3"   Bridge w/TA 1 x 10   LTR w/TA 1 x 10   Marching w/TA 1 x 10   SLR 1 x 10   Hip abduction - supine 1 x 10 RTB   Hip adduction - supine 10 x 3"   Hip extension --   SAQ --   SEATED:    LAQs --   HS curls --   Seated Hip Flex w/TA 1 x 10   STANDING:    Hip Abd --   Hip Flex --   Hip Ext --   HS Curls --   Step Ups --   Heel raises 1 x 10   CP declined       Initials GWA 1/6       Assessment:     Patient requires cues to keep his exercises in a pain free range.  Patient requires cues for core stabilization with his exercises.  Patient attempted to perform piriformis stretch, SAQ, LAQ, standing hip abduction and hip extension but stopped due to pain in his right knee and low back.    Patient Education/Response:     Patient was issued a written copy of today's exercises for his HEP and " instructed to perform twice a day.  Patient verbalized understanding instructions.     Plans and Goals:     Continue with POC and progress toward PT goals as tolerated.    Short Term Goals (4 Weeks):   1. This patient will be independent with a basic HEP.  2. This patient will increase lumbar AROM to WFL with no increase in symptoms in order to be able to use correct body mechanics with ADLs.   3. This patient will increase B LE strength by 1 grade in order to be able to perform vehicle transfers with no increase in symptoms.   4. This patient will have a pain rating of 6/10 at worst with ADLs.  5. Patient able to score greater than or equal to 64 on the FOTO Lumbar Survey placing patient in 20%<40% impaired, limited, or restricted category demonstrating overall decreased low back pain with functional activities.     Long Term Goals (8 Weeks):   1. This patient will be independent with an updated HEP.  2. This patient will increase B SLR 20* in order to be able to use correct body mechanics when picking up light objects from the floor.    3. This patient will increase B LE strength to 5/5 in order to be able to ascend/descending steps reciprocally with no increase in symptoms.   4. This patient will have a pain rating of 4/10 at worst with ADLs.  5. Patient able to score greater than or equal to 80 on the FOTO Lumbar Survey placing patient in 20%<40% impaired, limited, or restricted category demonstrating overall decreased low back pain with functional activities.

## 2017-06-08 ENCOUNTER — CLINICAL SUPPORT (OUTPATIENT)
Dept: REHABILITATION | Facility: HOSPITAL | Age: 62
End: 2017-06-08
Attending: INTERNAL MEDICINE
Payer: MEDICARE

## 2017-06-08 DIAGNOSIS — R29.898 WEAKNESS OF BOTH LOWER EXTREMITIES: ICD-10-CM

## 2017-06-08 DIAGNOSIS — M62.9 HAMSTRING TIGHTNESS OF BOTH LOWER EXTREMITIES: ICD-10-CM

## 2017-06-08 DIAGNOSIS — M53.86 DECREASED RANGE OF MOTION OF LUMBAR SPINE: ICD-10-CM

## 2017-06-08 DIAGNOSIS — R29.898 HIP TIGHTNESS: ICD-10-CM

## 2017-06-08 PROCEDURE — 97110 THERAPEUTIC EXERCISES: CPT

## 2017-06-08 NOTE — PROGRESS NOTES
"TIME RECORD    Date:  06/08/2017    Start Time:  10:00  Stop Time:  10:30    PROCEDURES:    TIMED  Procedure Min.   TE 30                     UNTIMED  Procedure Min.             Total Timed Minutes:  30  Total Timed Units:  2  Total Untimed Units:  0  Charges Billed/# of units:  2 (TE-2)      Progress/Current Status    Subjective:     Patient ID: Nimesh Nunn is a 61 y.o. male.  Diagnosis:   1. Decreased range of motion of lumbar spine     2. Weakness of both lower extremities     3. Hamstring tightness of both lower extremities     4. Hip tightness       Pain: 6 /10  Patient reports he continues with back pain level about the same.    Objective:     Patient was educated and performed therapeutic exercises as per log below, along with new exercises added and today's progressions, 1:1 with PTA x 30 minutes to improve ROM, flexibility, strength and posture.  Patient declined cold pack at the end of her session.     Date  06/08/17 06/06/17   VISIT 3/12 2/12   G CODE VISIT 3/10 2/10   POC EXP. DATE 07/31/17 07/31/17   VISIT AMOUNT    MEDICARE TOTAL $63.96    $299.46 $127.92    $235.50   FACE-TO-FACE 06/30/17 06/30/17        Bike  -- --   TABLE:     HSS w/ strap 10 x 10" 10 x 10"   TA's 10 x 3" 10 x 3"   Bridge w/TA 1 x 15 1 x 10   LTR w/TA 1 x 15 1 x 10   Marching w/TA 1 x 15 1 x 10   SLR 1 x 15 1 x 10   Hip abduction - supine 1 x 15 RTB 1 x 10 RTB   Hip adduction - supine 15 x 3" 10 x 3"   Hip extension -- --   SAQ 1 x 15 --   SEATED:     LAQs 1 x 10 --   HS curls -- --   Seated Hip Flex w/TA 1 x 15 1 x 10   STANDING:     Hip Abd -- --   Hip Flex -- --   Hip Ext -- --   HS Curls -- --   Step Ups -- --   Heel raises 1 x 10 1 x 10   CP declined declined        Initials GWA 2/6 GWA 1/6       Assessment:     Patient continues to require cues for core stabilization with his exercises.  Patient completed his therapy along with new exercises added and today's progressions with no increase in symptoms prior to leaving the " clinic.    Patient Education/Response:     Patient was issued a written copy of today's new exercises for his HEP and instructed to perform twice a day.  Patient verbalized understanding instructions.     Plans and Goals:     Continue with POC and progress toward PT goals as tolerated.    Short Term Goals (4 Weeks):   1. This patient will be independent with a basic HEP.  2. This patient will increase lumbar AROM to WFL with no increase in symptoms in order to be able to use correct body mechanics with ADLs.   3. This patient will increase B LE strength by 1 grade in order to be able to perform vehicle transfers with no increase in symptoms.   4. This patient will have a pain rating of 6/10 at worst with ADLs.  5. Patient able to score greater than or equal to 64 on the FOTO Lumbar Survey placing patient in 20%<40% impaired, limited, or restricted category demonstrating overall decreased low back pain with functional activities.     Long Term Goals (8 Weeks):   1. This patient will be independent with an updated HEP.  2. This patient will increase B SLR 20* in order to be able to use correct body mechanics when picking up light objects from the floor.    3. This patient will increase B LE strength to 5/5 in order to be able to ascend/descending steps reciprocally with no increase in symptoms.   4. This patient will have a pain rating of 4/10 at worst with ADLs.  5. Patient able to score greater than or equal to 80 on the FOTO Lumbar Survey placing patient in 20%<40% impaired, limited, or restricted category demonstrating overall decreased low back pain with functional activities.

## 2017-06-10 ENCOUNTER — PATIENT MESSAGE (OUTPATIENT)
Dept: INTERNAL MEDICINE | Facility: CLINIC | Age: 62
End: 2017-06-10

## 2017-06-10 DIAGNOSIS — I25.10 CORONARY ARTERY DISEASE INVOLVING NATIVE CORONARY ARTERY WITHOUT ANGINA PECTORIS, UNSPECIFIED WHETHER NATIVE OR TRANSPLANTED HEART: ICD-10-CM

## 2017-06-10 DIAGNOSIS — I73.9 PAD (PERIPHERAL ARTERY DISEASE): ICD-10-CM

## 2017-06-10 DIAGNOSIS — I77.9 RIGHT-SIDED CAROTID ARTERY DISEASE: ICD-10-CM

## 2017-06-10 RX ORDER — CLOPIDOGREL BISULFATE 75 MG/1
TABLET ORAL
Qty: 90 TABLET | Refills: 4 | Status: CANCELLED | OUTPATIENT
Start: 2017-06-10

## 2017-06-12 RX ORDER — CLOPIDOGREL BISULFATE 75 MG/1
75 TABLET ORAL DAILY
Qty: 90 TABLET | Refills: 4 | Status: SHIPPED | OUTPATIENT
Start: 2017-06-12 | End: 2018-07-02 | Stop reason: SDUPTHER

## 2017-06-13 ENCOUNTER — CLINICAL SUPPORT (OUTPATIENT)
Dept: REHABILITATION | Facility: HOSPITAL | Age: 62
End: 2017-06-13
Attending: INTERNAL MEDICINE
Payer: MEDICARE

## 2017-06-13 DIAGNOSIS — M62.9 HAMSTRING TIGHTNESS OF BOTH LOWER EXTREMITIES: ICD-10-CM

## 2017-06-13 DIAGNOSIS — R29.898 HIP TIGHTNESS: ICD-10-CM

## 2017-06-13 DIAGNOSIS — R29.898 WEAKNESS OF BOTH LOWER EXTREMITIES: ICD-10-CM

## 2017-06-13 DIAGNOSIS — M53.86 DECREASED RANGE OF MOTION OF LUMBAR SPINE: ICD-10-CM

## 2017-06-13 PROCEDURE — 97110 THERAPEUTIC EXERCISES: CPT

## 2017-06-13 NOTE — PROGRESS NOTES
"TIME RECORD    Date:  06/13/2017    Start Time:  9:00  Stop Time:  9:35    PROCEDURES:    TIMED  Procedure Min.   TE 35                     UNTIMED  Procedure Min.             Total Timed Minutes:  35  Total Timed Units:  2  Total Untimed Units:  0  Charges Billed/# of units:  2 (TE-2)      Progress/Current Status    Subjective:     Patient ID: Nimesh Nunn is a 61 y.o. male.  Diagnosis:   1. Decreased range of motion of lumbar spine     2. Weakness of both lower extremities     3. Hamstring tightness of both lower extremities     4. Hip tightness       Pain: 7 /10  Patient reports his back pain fluctuates, some days are better than others.    Objective:     Patient was educated and performed therapeutic exercises as per log below, along with new exercises added and today's progressions, 1:1 with PTA x 35 minutes to improve ROM, flexibility, strength and posture.  Patient declined cold pack at the end of her session.     Date  06/13/17 06/08/17 06/06/17   VISIT 4/12 3/12 2/12   G CODE VISIT 4/10 3/10 2/10   POC EXP. DATE 07/31/17 07/31/17 07/31/17   VISIT AMOUNT    MEDICARE TOTAL $63.96    $363.42 $63.96    $299.46 $127.92    $235.50   FACE-TO-FACE 06/30/17 06/30/17 06/30/17         Bike  -- -- --   TABLE:      HSS w/ strap 10 x 10" 10 x 10" 10 x 10"   TA's 10 x 5" 10 x 3" 10 x 3"   Bridge w/TA 2 x 10 1 x 15 1 x 10   LTR w/TA 2 x 10 1 x 15 1 x 10   Marching w/TA 2 x 10 1 x 15 1 x 10   SLR 2 x 10 1 x 15 1 x 10   Hip abduction - supine 2 x 10 RTB 1 x 15 RTB 1 x 10 RTB   Hip adduction - supine 20 x 3" 15 x 3" 10 x 3"   Hip extension -- -- --   SAQ 2 x 10 1 x 15 --   SEATED:      LAQs 2 x 10 1 x 10 --   HS curls 1 x 15 RTB -- --   Seated Hip Flex w/TA 2 x 10 L LE 1 x 15 1 x 10   Back arch/ relax 10 x 5"     STANDING:      Hip Abd -- -- --   Hip Flex -- -- --   Hip Ext -- -- --   HS Curls -- -- --   Step Ups -- -- --   Heel raises 1 x 15 1 x 10 1 x 10   CP declined declined declined         Initials GWA 3/6 GWA 2/6 " GWA 1/6       Assessment:     Patient continues to require cues for core stabilization with his exercises.  Patient was not able to perform seated hip flexion on his right hip today due to complaint of increased pain.     Patient Education/Response:     Patient was issued a written copy of today's new exercises for his HEP and instructed to perform twice a day.  Patient verbalized understanding instructions.     Plans and Goals:     Continue with POC and progress toward PT goals as tolerated.    Short Term Goals (4 Weeks):   1. This patient will be independent with a basic HEP.  2. This patient will increase lumbar AROM to WFL with no increase in symptoms in order to be able to use correct body mechanics with ADLs.   3. This patient will increase B LE strength by 1 grade in order to be able to perform vehicle transfers with no increase in symptoms.   4. This patient will have a pain rating of 6/10 at worst with ADLs.  5. Patient able to score greater than or equal to 64 on the FOTO Lumbar Survey placing patient in 20%<40% impaired, limited, or restricted category demonstrating overall decreased low back pain with functional activities.     Long Term Goals (8 Weeks):   1. This patient will be independent with an updated HEP.  2. This patient will increase B SLR 20* in order to be able to use correct body mechanics when picking up light objects from the floor.    3. This patient will increase B LE strength to 5/5 in order to be able to ascend/descending steps reciprocally with no increase in symptoms.   4. This patient will have a pain rating of 4/10 at worst with ADLs.  5. Patient able to score greater than or equal to 80 on the FOTO Lumbar Survey placing patient in 20%<40% impaired, limited, or restricted category demonstrating overall decreased low back pain with functional activities.

## 2017-06-14 ENCOUNTER — PATIENT MESSAGE (OUTPATIENT)
Dept: INTERNAL MEDICINE | Facility: CLINIC | Age: 62
End: 2017-06-14

## 2017-06-14 DIAGNOSIS — I10 ESSENTIAL HYPERTENSION: ICD-10-CM

## 2017-06-14 RX ORDER — METOPROLOL SUCCINATE 50 MG/1
50 TABLET, EXTENDED RELEASE ORAL DAILY
Qty: 30 TABLET | Refills: 11 | Status: SHIPPED | OUTPATIENT
Start: 2017-06-14 | End: 2018-06-30 | Stop reason: SDUPTHER

## 2017-06-15 ENCOUNTER — PATIENT MESSAGE (OUTPATIENT)
Dept: CARDIOLOGY | Facility: CLINIC | Age: 62
End: 2017-06-15

## 2017-06-15 ENCOUNTER — CLINICAL SUPPORT (OUTPATIENT)
Dept: REHABILITATION | Facility: HOSPITAL | Age: 62
End: 2017-06-15
Attending: INTERNAL MEDICINE
Payer: MEDICARE

## 2017-06-15 DIAGNOSIS — R29.898 WEAKNESS OF BOTH LOWER EXTREMITIES: ICD-10-CM

## 2017-06-15 DIAGNOSIS — R29.898 HIP TIGHTNESS: ICD-10-CM

## 2017-06-15 DIAGNOSIS — M62.9 HAMSTRING TIGHTNESS OF BOTH LOWER EXTREMITIES: ICD-10-CM

## 2017-06-15 DIAGNOSIS — M53.86 DECREASED RANGE OF MOTION OF LUMBAR SPINE: ICD-10-CM

## 2017-06-15 PROCEDURE — 97110 THERAPEUTIC EXERCISES: CPT

## 2017-06-15 RX ORDER — METOPROLOL SUCCINATE 50 MG/1
TABLET, EXTENDED RELEASE ORAL
Qty: 30 TABLET | Refills: 11 | Status: SHIPPED | OUTPATIENT
Start: 2017-06-15 | End: 2017-06-22 | Stop reason: SDUPTHER

## 2017-06-15 NOTE — PROGRESS NOTES
"TIME RECORD    Date:  06/15/2017    Start Time:  9:00  Stop Time:  9:55    PROCEDURES:    TIMED  Procedure Min.   TE 25   TE sup 30NC                 UNTIMED  Procedure Min.             Total Timed Minutes:  35  Total Timed Units:  2  Total Untimed Units:  0  Charges Billed/# of units:  2 (TE-2)      Progress/Current Status    Subjective:     Patient ID: Nimesh Nunn is a 61 y.o. male.  Diagnosis:   1. Decreased range of motion of lumbar spine     2. Weakness of both lower extremities     3. Hamstring tightness of both lower extremities     4. Hip tightness       Pain: 7 /10  He reports having pain in the right side of his back, it stays in the same spot.    Objective:     Patient was educated and performed therapeutic exercises as per log below, along with today's progressions, supervised by PT x 30 minutes and 1:1 with PT x 25 minutes to improve ROM, flexibility, strength and posture.  Patient declined cold pack at the end of her session.     Date  06/15/17 06/13/17 06/08/17 06/06/17   VISIT 5/12 4/12 3/12 2/12   G CODE VISIT 5/10 4/10 3/10 2/10   POC EXP. DATE 07/31/17 07/31/17 07/31/17 07/31/17   VISIT AMOUNT    MEDICARE TOTAL $63.96    $427.38 $63.96    $363.42 $63.96    $299.46 $127.92    $235.50   FACE-TO-FACE 06/30/17 06/30/17 06/30/17 06/30/17          Bike  -- -- -- --   TABLE:       HSS w/ strap 10 x 10" 10 x 10" 10 x 10" 10 x 10"   TA's 10 x 5" 10 x 5" 10 x 3" 10 x 3"   Bridge w/TA 1 x 25 2 x 10 1 x 15 1 x 10   LTR w/TA 2 x 10 2 x 10 1 x 15 1 x 10   Marching w/TA 1 x 25 2 x 10 1 x 15 1 x 10   SLR 1 x 25 2 x 10 1 x 15 1 x 10   Hip abduction - supine 2 x 10 GTB 2 x 10 RTB 1 x 15 RTB 1 x 10 RTB   Hip adduction - supine 25 x 3" 20 x 3" 15 x 3" 10 x 3"   Hip extension -- -- -- --   SAQ 2 x 10 x 1# 2 x 10 1 x 15 --   SEATED:       LAQs 1 x 25 2 x 10 1 x 10 --   HS curls 2 x 10 RTB 1 x 15 RTB -- --   Seated Hip Flex w/TA 1 x 25  2 x 10 L LE 1 x 15 1 x 10   Back arch/ relax oot 10 x 5"     STANDING:     "   Hip Abd -- -- -- --   Hip Flex -- -- -- --   Hip Ext 1 x 10 -- -- --   HS Curls -- -- -- --   Step Ups -- -- -- --   Heel raises oot 1 x 15 1 x 10 1 x 10   CP declined declined declined declined          Initials DG GWA 3/6 GWA 2/6 GWA 1/6     FOTO Lumbar Survey 50, 40%<60%  Assessment:     Patient continues to require cues for core stabilization with all of his exercises and to stay in a pain free range with his exercises. He was able to perform all of today's progressions with no increase in symptoms prior to leaving the clinic. His current score on FOTO Lumbar places him in the 40%<60% impaired, limited, or restricted category.    Patient Education/Response:     Patient was instructed to continue to perform his HEP twice a day.  Patient verbalized understanding instructions.     Plans and Goals:     Continue with POC and progress toward PT goals as tolerated.    Short Term Goals (4 Weeks):   1. This patient will be independent with a basic HEP.  2. This patient will increase lumbar AROM to WFL with no increase in symptoms in order to be able to use correct body mechanics with ADLs.   3. This patient will increase B LE strength by 1 grade in order to be able to perform vehicle transfers with no increase in symptoms.   4. This patient will have a pain rating of 6/10 at worst with ADLs.  5. Patient able to score greater than or equal to 64 on the FOTO Lumbar Survey placing patient in 20%<40% impaired, limited, or restricted category demonstrating overall decreased low back pain with functional activities.     Long Term Goals (8 Weeks):   1. This patient will be independent with an updated HEP.  2. This patient will increase B SLR 20* in order to be able to use correct body mechanics when picking up light objects from the floor.    3. This patient will increase B LE strength to 5/5 in order to be able to ascend/descending steps reciprocally with no increase in symptoms.   4. This patient will have a pain rating of  4/10 at worst with ADLs.  5. Patient able to score greater than or equal to 80 on the FOTO Lumbar Survey placing patient in 20%<40% impaired, limited, or restricted category demonstrating overall decreased low back pain with functional activities.

## 2017-06-20 ENCOUNTER — CLINICAL SUPPORT (OUTPATIENT)
Dept: REHABILITATION | Facility: HOSPITAL | Age: 62
End: 2017-06-20
Attending: INTERNAL MEDICINE
Payer: MEDICARE

## 2017-06-20 DIAGNOSIS — R29.898 WEAKNESS OF BOTH LOWER EXTREMITIES: ICD-10-CM

## 2017-06-20 DIAGNOSIS — M53.86 DECREASED RANGE OF MOTION OF LUMBAR SPINE: ICD-10-CM

## 2017-06-20 DIAGNOSIS — R29.898 HIP TIGHTNESS: ICD-10-CM

## 2017-06-20 DIAGNOSIS — M62.9 HAMSTRING TIGHTNESS OF BOTH LOWER EXTREMITIES: ICD-10-CM

## 2017-06-20 PROCEDURE — 97110 THERAPEUTIC EXERCISES: CPT

## 2017-06-20 NOTE — PROGRESS NOTES
"TIME RECORD    Date:  06/20/2017    Start Time:  9:00  Stop Time:  9:50    PROCEDURES:    TIMED  Procedure Min.   TE 25   TE sup 25 NC                 UNTIMED  Procedure Min.             Total Timed Minutes:  25  Total Timed Units:  2  Total Untimed Units:  0  Charges Billed/# of units:  2 f(TE-2)      Progress/Current Status    Subjective:     Patient ID: Nimesh Nunn is a 61 y.o. male.  Diagnosis:   1. Decreased range of motion of lumbar spine     2. Weakness of both lower extremities     3. Hamstring tightness of both lower extremities     4. Hip tightness       Pain: 7 /10  Patient reports having pain and tightness in his low back today.    Objective:     Patient was educated and performed therapeutic exercises as per log below, along with new exercise added and today's progressions, 1:1 with PTA x 25 minutes and supervised by PTA x 25 minutes to improve ROM, flexibility, strength and posture.  Patient declined cold pack at the end of her session.     Date  06/20/17 06/15/17 06/13/17 06/08/17 06/06/17   VISIT 6/12 5/12 4/12 3/12 2/12   G CODE VISIT 6/10 5/10 4/10 3/10 2/10   POC EXP. DATE 07/31/17 07/31/17 07/31/17 07/31/17 07/31/17   VISIT AMOUNT    MEDICARE TOTAL $63.96    $491.34 $63.96    $427.38 $63.96    $363.42 $63.96    $299.46 $127.92    $235.50   FACE-TO-FACE 06/30/17 06/30/17 06/30/17 06/30/17 06/30/17           Bike  -- -- -- -- --   TABLE:        HSS w/ strap 10 x 10" 10 x 10" 10 x 10" 10 x 10" 10 x 10"   TA's 10 x 5" 10 x 5" 10 x 5" 10 x 3" 10 x 3"   Bridge w/TA 3 x 10 1 x 25 2 x 10 1 x 15 1 x 10   LTR w/TA 1 x 25 2 x 10 2 x 10 1 x 15 1 x 10   Marching w/TA 1 x 25 1 x 25 2 x 10 1 x 15 1 x 10   SLR 3 x 10 1 x 25 2 x 10 1 x 15 1 x 10   Hip abduction - supine 1 x 25 GTB 2 x 10 GTB 2 x 10 RTB 1 x 15 RTB 1 x 10 RTB   Hip adduction - supine 25 x 3" 25 x 3" 20 x 3" 15 x 3" 10 x 3"   SAQ 1 x 25 x 1# 2 x 10 x 1# 2 x 10 1 x 15 --   SEATED:        LAQs 2 x 10 x 1# 1 x 25 2 x 10 1 x 10 --   HS curls 1 x 25 " "RTB 2 x 10 RTB 1 x 15 RTB -- --   Seated Hip Flex w/TA 2 x 10 x 1# 1 x 25  2 x 10 L LE 1 x 15 1 x 10   Back arch/ relax 10 x 5" oot 10 x 5"     STANDING:        Hip Abd 1 x 10 -- -- -- --   Hip Flex -- -- -- -- --   Hip Ext 1 x 10 1 x 10 -- -- --   HS Curls -- -- -- -- --   Step Ups -- -- -- -- --   Heel raises 1 x 15 oot 1 x 15 1 x 10 1 x 10   CP declined declined declined declined declined           Initials GWA 1/6 DG GWA 3/6 GWA 2/6 GWA 1/6       Assessment:     Patient continues to require cues for core stabilization with all of his exercises and to stay in a pain free range with his exercises.  Patient required cues for posture with standing exercises.  Patient completed his therapy along with new exercise added and today's progressions with no increase in symptoms prior to leaving the clinic.    Patient Education/Response:     Patient was instructed to continue to perform his HEP twice a day.  Patient verbalized understanding instructions.     Plans and Goals:     Continue with POC and progress toward PT goals as tolerated.    Short Term Goals (4 Weeks):   1. This patient will be independent with a basic HEP.  2. This patient will increase lumbar AROM to WFL with no increase in symptoms in order to be able to use correct body mechanics with ADLs.   3. This patient will increase B LE strength by 1 grade in order to be able to perform vehicle transfers with no increase in symptoms.   4. This patient will have a pain rating of 6/10 at worst with ADLs.  5. Patient able to score greater than or equal to 64 on the FOTO Lumbar Survey placing patient in 20%<40% impaired, limited, or restricted category demonstrating overall decreased low back pain with functional activities.     Long Term Goals (8 Weeks):   1. This patient will be independent with an updated HEP.  2. This patient will increase B SLR 20* in order to be able to use correct body mechanics when picking up light objects from the floor.    3. This patient " will increase B LE strength to 5/5 in order to be able to ascend/descending steps reciprocally with no increase in symptoms.   4. This patient will have a pain rating of 4/10 at worst with ADLs.  5. Patient able to score greater than or equal to 80 on the FOTO Lumbar Survey placing patient in 20%<40% impaired, limited, or restricted category demonstrating overall decreased low back pain with functional activities.

## 2017-06-22 ENCOUNTER — OFFICE VISIT (OUTPATIENT)
Dept: INTERNAL MEDICINE | Facility: CLINIC | Age: 62
End: 2017-06-22
Payer: MEDICARE

## 2017-06-22 ENCOUNTER — CLINICAL SUPPORT (OUTPATIENT)
Dept: REHABILITATION | Facility: HOSPITAL | Age: 62
End: 2017-06-22
Attending: INTERNAL MEDICINE
Payer: MEDICARE

## 2017-06-22 VITALS
HEIGHT: 72 IN | DIASTOLIC BLOOD PRESSURE: 88 MMHG | RESPIRATION RATE: 16 BRPM | WEIGHT: 190.5 LBS | BODY MASS INDEX: 25.8 KG/M2 | TEMPERATURE: 98 F | SYSTOLIC BLOOD PRESSURE: 136 MMHG | HEART RATE: 80 BPM

## 2017-06-22 DIAGNOSIS — M17.0 PRIMARY OSTEOARTHRITIS OF BOTH KNEES: ICD-10-CM

## 2017-06-22 DIAGNOSIS — M54.50 CHRONIC RIGHT-SIDED LOW BACK PAIN WITHOUT SCIATICA: ICD-10-CM

## 2017-06-22 DIAGNOSIS — I77.9 BILATERAL CAROTID ARTERY DISEASE: ICD-10-CM

## 2017-06-22 DIAGNOSIS — R29.898 WEAKNESS OF BOTH LOWER EXTREMITIES: ICD-10-CM

## 2017-06-22 DIAGNOSIS — R29.898 HIP TIGHTNESS: ICD-10-CM

## 2017-06-22 DIAGNOSIS — I10 ESSENTIAL HYPERTENSION: ICD-10-CM

## 2017-06-22 DIAGNOSIS — J44.9 CHRONIC OBSTRUCTIVE PULMONARY DISEASE, UNSPECIFIED COPD TYPE: ICD-10-CM

## 2017-06-22 DIAGNOSIS — M53.86 DECREASED RANGE OF MOTION OF LUMBAR SPINE: ICD-10-CM

## 2017-06-22 DIAGNOSIS — N40.0 BENIGN NON-NODULAR PROSTATIC HYPERPLASIA WITHOUT LOWER URINARY TRACT SYMPTOMS: ICD-10-CM

## 2017-06-22 DIAGNOSIS — I73.9 PAD (PERIPHERAL ARTERY DISEASE): ICD-10-CM

## 2017-06-22 DIAGNOSIS — G89.29 CHRONIC RIGHT-SIDED LOW BACK PAIN WITHOUT SCIATICA: ICD-10-CM

## 2017-06-22 DIAGNOSIS — I25.10 CORONARY ARTERY DISEASE INVOLVING NATIVE CORONARY ARTERY WITHOUT ANGINA PECTORIS, UNSPECIFIED WHETHER NATIVE OR TRANSPLANTED HEART: ICD-10-CM

## 2017-06-22 DIAGNOSIS — E78.5 HYPERLIPIDEMIA, UNSPECIFIED HYPERLIPIDEMIA TYPE: ICD-10-CM

## 2017-06-22 DIAGNOSIS — M62.9 HAMSTRING TIGHTNESS OF BOTH LOWER EXTREMITIES: ICD-10-CM

## 2017-06-22 DIAGNOSIS — M54.41 CHRONIC RIGHT-SIDED LOW BACK PAIN WITH RIGHT-SIDED SCIATICA: Primary | ICD-10-CM

## 2017-06-22 DIAGNOSIS — G89.29 CHRONIC RIGHT-SIDED LOW BACK PAIN WITH RIGHT-SIDED SCIATICA: Primary | ICD-10-CM

## 2017-06-22 PROCEDURE — 99499 UNLISTED E&M SERVICE: CPT | Mod: S$PBB,,, | Performed by: INTERNAL MEDICINE

## 2017-06-22 PROCEDURE — 99215 OFFICE O/P EST HI 40 MIN: CPT | Mod: S$GLB,,, | Performed by: INTERNAL MEDICINE

## 2017-06-22 PROCEDURE — 97110 THERAPEUTIC EXERCISES: CPT

## 2017-06-22 PROCEDURE — 99999 PR PBB SHADOW E&M-EST. PATIENT-LVL III: CPT | Mod: PBBFAC,,, | Performed by: INTERNAL MEDICINE

## 2017-06-22 RX ORDER — OXYCODONE AND ACETAMINOPHEN 5; 325 MG/1; MG/1
1 TABLET ORAL EVERY 4 HOURS PRN
Qty: 50 TABLET | Refills: 0 | Status: SHIPPED | OUTPATIENT
Start: 2017-06-22 | End: 2017-07-05 | Stop reason: SDUPTHER

## 2017-06-22 NOTE — PROGRESS NOTES
"TIME RECORD    Date:  06/22/2017    Start Time:  8:00  Stop Time:  8:44    PROCEDURES:    TIMED  Procedure Min.   TE 44                     UNTIMED  Procedure Min.             Total Timed Minutes:  44  Total Timed Units:  3  Total Untimed Units:  0  Charges Billed/# of units:  3(TE-3)      Progress/Current Status    Subjective:     Patient ID: Nimesh Nunn is a 61 y.o. male.  Diagnosis:   1. Decreased range of motion of lumbar spine     2. Weakness of both lower extremities     3. Hamstring tightness of both lower extremities     4. Hip tightness       Pain: 7 /10  Patient reports having some pain in his back today, he has been doing good with his HEP.    Objective:     Patient was educated and performed therapeutic exercises as per log below, along with new exercise added and today's reassessment, 1:1 with PT x 44 minutes to improve ROM, flexibility, strength and posture.  Patient declined cold pack at the end of her session.     Date  06/22/17 06/20/17 06/15/17 06/13/17 06/08/17 06/06/17   VISIT 7/12 6/12 5/12 4/12 3/12 2/12   G CODE VISIT 7/10 6/10 5/10 4/10 3/10 2/10   POC EXP. DATE 07/31/17 07/31/17 07/31/17 07/31/17 07/31/17 07/31/17   VISIT AMOUNT    MEDICARE TOTAL $95.94    $587.28 $63.96    $491.34 $63.96    $427.38 $63.96    $363.42 $63.96    $299.46 $127.92    $235.50   FACE-TO-FACE 06/30/17 06/30/17 06/30/17 06/30/17 06/30/17 06/30/17            Bike  -- -- -- -- -- --   TABLE:         HSS w/ strap 10 x 10" 10 x 10" 10 x 10" 10 x 10" 10 x 10" 10 x 10"   TA's 10 x 5" 10 x 5" 10 x 5" 10 x 5" 10 x 3" 10 x 3"   Bridge w/TA 3  x10 3 x 10 1 x 25 2 x 10 1 x 15 1 x 10   LTR w/TA 1 x 25 1 x 25 2 x 10 2 x 10 1 x 15 1 x 10   Marching w/TA 1 x 25 1 x 25 1 x 25 2 x 10 1 x 15 1 x 10   SLR 3 x 10 3 x 10 1 x 25 2 x 10 1 x 15 1 x 10   Hip abduction - supine 1 x 30 GTB 1 x 25 GTB 2 x 10 GTB 2 x 10 RTB 1 x 15 RTB 1 x 10 RTB   Hip adduction - supine 25 x 3" 25 x 3" 25 x 3" 20 x 3" 15 x 3" 10 x 3"   SAQ 3 x 10 x 1# 1 x " "25 x 1# 2 x 10 x 1# 2 x 10 1 x 15 --   SEATED:         LAQs 3 x 10 x 1# 2 x 10 x 1# 1 x 25 2 x 10 1 x 10 --   HS curls   1 x 25 RTB 2 x 10 RTB 1 x 15 RTB -- --   Seated Hip Flex w/TA 3 x 10 x 1# 2 x 10 x 1# 1 x 25  2 x 10 L LE 1 x 15 1 x 10   Back arch/ relax oot 10 x 5" oot 10 x 5"     STANDING:         Hip Abd 1 x 10 1 x 10 -- -- -- --   Hip Flex -- -- -- -- -- --   Hip Ext 1 x 10 1 x 10 1 x 10 -- -- --   HS Curls -- -- -- -- -- --   Step Ups -- -- -- -- -- --   Heel raises 1 x 15 1 x 15 oot 1 x 15 1 x 10 1 x 10   CP declined declined declined declined declined declined            Initials DG GWA 1/6 DG GWA 3/6 GWA 2/6 GWA 1/6     Lumbar AROM: Pain/Dysfunction with Movement:   Flexion 65* Pain on way up   Extension 8*    Right side bending 5* "Pain if I go further"   Left side bending 15*        L/E MMT Right Left Pain/Dysfunction with Movement   Hip Flexion 3+/5 3/5    Hip Extension 3-/5 3/5    Hip Abduction 4/5 4+/5    Knee Flexion 4-/5 4/5    Knee Extension 4+/5 4+/5    Ankle DF 5/5 5/5    Ankle PF 5/5 5/5      SLR L 55*, R 60*  Assessment:     Patient continues to require cues to maintain his core stabilization with all exercises and to stay in a pain free range with seated hip flexion. He is able to demonstrate an increase in lumbar AROM and LE strength compared to his initial evaluation. His most recent score on FOTO Lumbar Survey places him in the 40%<60% impaired, limited, or restricted category. He is slowly progressing towards his goals and would benefit from continued physical therapy.     Patient Education/Response:     Patient was instructed to continue to perform his HEP twice a day.  Patient verbalized understanding instructions.     Plans and Goals:     Continue with POC and progress toward PT goals as tolerated.    Short Term Goals (4 Weeks):   1. This patient will be independent with a basic HEP.  2. This patient will increase lumbar AROM to WFL with no increase in symptoms in order to be able to " use correct body mechanics with ADLs.   3. This patient will increase B LE strength by 1 grade in order to be able to perform vehicle transfers with no increase in symptoms.   4. This patient will have a pain rating of 6/10 at worst with ADLs.  5. Patient able to score greater than or equal to 64 on the FOTO Lumbar Survey placing patient in 20%<40% impaired, limited, or restricted category demonstrating overall decreased low back pain with functional activities.     Long Term Goals (8 Weeks):   1. This patient will be independent with an updated HEP.  2. This patient will increase B SLR 20* in order to be able to use correct body mechanics when picking up light objects from the floor.    3. This patient will increase B LE strength to 5/5 in order to be able to ascend/descending steps reciprocally with no increase in symptoms.   4. This patient will have a pain rating of 4/10 at worst with ADLs.  5. Patient able to score greater than or equal to 80 on the FOTO Lumbar Survey placing patient in 20%<40% impaired, limited, or restricted category demonstrating overall decreased low back pain with functional activities.

## 2017-06-22 NOTE — PROGRESS NOTES
Subjective:       Patient ID: Nimesh Nunn is a 61 y.o. male.    Chief Complaint: Follow-up and Back Pain    HPI  Recheck s/p PT.  Pt's LBP is unchanged, although he feels more flexible.  His walking distance w/o pain is unchanged. His knee pain is unchanged. He denies claudication.  No new weakness.  No CP, SOB.  Nocturia x 1-2.  Review of Systems   All other systems reviewed and are negative.      Objective:      Physical Exam   Constitutional: He appears well-developed. No distress.   HENT:   Head: Normocephalic.   Eyes: EOM are normal. No scleral icterus.   Neck: Normal range of motion. No tracheal deviation present.   Cardiovascular: Normal rate, regular rhythm, normal heart sounds and intact distal pulses.    Pulses:       Femoral pulses are 1+ on the right side, and 0 on the left side.       Popliteal pulses are 1+ on the right side, and 1+ on the left side.        Dorsalis pedis pulses are 1+ on the right side, and 1+ on the left side.        Posterior tibial pulses are 0 on the right side, and 0 on the left side.   Pulmonary/Chest: Effort normal. No respiratory distress.   Poor BSs   Abdominal: He exhibits no distension.   Musculoskeletal: He exhibits no edema or tenderness.   Neurological: He is alert. He exhibits abnormal muscle tone (4/5 strenght LLE muscle groups).   R SLR at 45deg   Skin: Skin is warm and dry. No rash noted. He is not diaphoretic. No erythema.   Psychiatric: He has a normal mood and affect. His behavior is normal.   Vitals reviewed.      Assessment:       1. Chronic right-sided low back pain with right-sided sciatica    2. Essential hypertension    3. Coronary artery disease involving native coronary artery without angina pectoris, unspecified whether native or transplanted heart    4. PAD (peripheral artery disease)    5. Bilateral carotid artery disease    6. Hyperlipidemia, unspecified hyperlipidemia type    7. Benign non-nodular prostatic hyperplasia without lower urinary tract  symptoms    8. Chronic obstructive pulmonary disease, unspecified COPD type    9. Primary osteoarthritis of both knees    10. Chronic right-sided low back pain without sciatica        Plan:       Nimesh was seen today for follow-up and back pain.    Diagnoses and all orders for this visit:    Chronic right-sided low back pain with right-sided sciatica  -     MRI Lumbar Spine Without Contrast; Future  -     oxycodone-acetaminophen (PERCOCET) 5-325 mg per tablet; Take 1 tablet by mouth every 4 (four) hours as needed for Pain.    Essential hypertension   Well-cont    Coronary artery disease involving native coronary artery without angina pectoris, unspecified whether native or transplanted heart   Quiescent    PAD (peripheral artery disease)   Stable    Bilateral carotid artery disease    Hyperlipidemia, unspecified hyperlipidemia type   Well-cont    Benign non-nodular prostatic hyperplasia without lower urinary tract symptoms   Stable    Chronic obstructive pulmonary disease, unspecified COPD type   Stable    Primary osteoarthritis of both knees   Stable        Return in about 6 months (around 12/22/2017).

## 2017-06-26 ENCOUNTER — HOSPITAL ENCOUNTER (OUTPATIENT)
Dept: RADIOLOGY | Facility: HOSPITAL | Age: 62
Discharge: HOME | End: 2017-06-26
Attending: INTERNAL MEDICINE
Payer: MEDICARE

## 2017-06-26 DIAGNOSIS — G89.29 CHRONIC RIGHT-SIDED LOW BACK PAIN WITH RIGHT-SIDED SCIATICA: ICD-10-CM

## 2017-06-26 DIAGNOSIS — M54.41 CHRONIC RIGHT-SIDED LOW BACK PAIN WITH RIGHT-SIDED SCIATICA: ICD-10-CM

## 2017-06-26 PROCEDURE — 72148 MRI LUMBAR SPINE W/O DYE: CPT | Mod: TC

## 2017-06-26 PROCEDURE — 72148 MRI LUMBAR SPINE W/O DYE: CPT | Mod: 26,,, | Performed by: RADIOLOGY

## 2017-06-27 ENCOUNTER — PATIENT MESSAGE (OUTPATIENT)
Dept: INTERNAL MEDICINE | Facility: CLINIC | Age: 62
End: 2017-06-27

## 2017-06-27 DIAGNOSIS — E78.5 DYSLIPIDEMIA: ICD-10-CM

## 2017-06-27 RX ORDER — ATORVASTATIN CALCIUM 80 MG/1
80 TABLET, FILM COATED ORAL DAILY
Qty: 30 TABLET | Refills: 11 | Status: SHIPPED | OUTPATIENT
Start: 2017-06-27 | End: 2018-07-12 | Stop reason: SDUPTHER

## 2017-06-27 RX ORDER — ATORVASTATIN CALCIUM 80 MG/1
TABLET, FILM COATED ORAL
Qty: 30 TABLET | Refills: 11 | Status: CANCELLED | OUTPATIENT
Start: 2017-06-27

## 2017-06-29 ENCOUNTER — PATIENT MESSAGE (OUTPATIENT)
Dept: INTERNAL MEDICINE | Facility: CLINIC | Age: 62
End: 2017-06-29

## 2017-06-29 DIAGNOSIS — M54.31 RIGHT SIDED SCIATICA: Primary | ICD-10-CM

## 2017-07-05 DIAGNOSIS — G89.29 CHRONIC RIGHT-SIDED LOW BACK PAIN WITH RIGHT-SIDED SCIATICA: ICD-10-CM

## 2017-07-05 DIAGNOSIS — M54.41 CHRONIC RIGHT-SIDED LOW BACK PAIN WITH RIGHT-SIDED SCIATICA: ICD-10-CM

## 2017-07-05 RX ORDER — OXYCODONE AND ACETAMINOPHEN 5; 325 MG/1; MG/1
1 TABLET ORAL EVERY 4 HOURS PRN
Qty: 50 TABLET | Refills: 0 | Status: SHIPPED | OUTPATIENT
Start: 2017-07-05 | End: 2017-08-10

## 2017-07-13 ENCOUNTER — TELEPHONE (OUTPATIENT)
Dept: PAIN MEDICINE | Facility: CLINIC | Age: 62
End: 2017-07-13

## 2017-07-13 ENCOUNTER — OFFICE VISIT (OUTPATIENT)
Dept: PAIN MEDICINE | Facility: CLINIC | Age: 62
End: 2017-07-13
Payer: MEDICARE

## 2017-07-13 VITALS
SYSTOLIC BLOOD PRESSURE: 146 MMHG | HEART RATE: 95 BPM | DIASTOLIC BLOOD PRESSURE: 88 MMHG | WEIGHT: 186.81 LBS | BODY MASS INDEX: 25.3 KG/M2 | RESPIRATION RATE: 20 BRPM | HEIGHT: 72 IN

## 2017-07-13 DIAGNOSIS — M47.816 LUMBAR FACET ARTHROPATHY: ICD-10-CM

## 2017-07-13 DIAGNOSIS — M51.36 DDD (DEGENERATIVE DISC DISEASE), LUMBAR: ICD-10-CM

## 2017-07-13 DIAGNOSIS — M47.816 LUMBAR SPONDYLOSIS: Primary | ICD-10-CM

## 2017-07-13 DIAGNOSIS — Z79.01 CHRONIC ANTICOAGULATION: ICD-10-CM

## 2017-07-13 DIAGNOSIS — Z98.890 HISTORY OF LUMBAR SURGERY: ICD-10-CM

## 2017-07-13 PROCEDURE — 99204 OFFICE O/P NEW MOD 45 MIN: CPT | Mod: S$GLB,,, | Performed by: ANESTHESIOLOGY

## 2017-07-13 PROCEDURE — 99999 PR PBB SHADOW E&M-EST. PATIENT-LVL III: CPT | Mod: PBBFAC,,, | Performed by: ANESTHESIOLOGY

## 2017-07-13 PROCEDURE — 99499 UNLISTED E&M SERVICE: CPT | Mod: S$PBB,,, | Performed by: ANESTHESIOLOGY

## 2017-07-13 RX ORDER — TIZANIDINE HYDROCHLORIDE 4 MG/1
1 CAPSULE, GELATIN COATED ORAL NIGHTLY PRN
Qty: 30 CAPSULE | Refills: 2 | Status: SHIPPED | OUTPATIENT
Start: 2017-07-13 | End: 2017-08-10

## 2017-07-13 RX ORDER — SODIUM CHLORIDE 9 MG/ML
500 INJECTION, SOLUTION INTRAVENOUS CONTINUOUS
Status: CANCELLED | OUTPATIENT
Start: 2017-07-13

## 2017-07-13 NOTE — PROGRESS NOTES
Chronic Pain - New Consult    Referring Physician: Nishant Perez MD      Chief Complaint   Patient presents with    Low-back Pain        SUBJECTIVE:    Nimesh Nunn is a 60 y/o male with hx of CAD and carotid artery stenosis who presents to the clinic for the evaluation of low back pain. The pain started 7 months ago and symptoms have been persistent. No trauma or inciting event. He has a history of lumbar surgery x 2 (most recent surgery 10 years ago). The pain is located in the right lower lumbar paraspinal area and is non-radiating.  He reports initially having a radicular component on the right although this has resolved. The back pain is described as aching, burning and sharp and is rated as 8/10. The pain is rated with a score of  7/10 on the BEST day and a score of 9/10 on the WORST day.  Symptoms interfere with daily activity and sleeping. The pain is exacerbated by sitting, standing, walking, back extension, twisting, getting out of bed/chair and activity.  The pain is mitigated by medications and rest. The patient reports 5 hours of uninterrupted sleep per night.    Patient denies night fever/night sweats, urinary incontinence, bowel incontinence and significant weight loss. He reports subjective weakness in the legs.    Physical Therapy/Home Exercise: yes      Pain Disability Index Review:  Last 3 PDI Scores 7/13/2017   Pain Disability Index (PDI) 52       Pain Medications:    - Gabapentin 300 mg TID  - Anti-Coagulants: Aspirin and Plavix ( Clopidogrel) 75 mg daily     report:  Reviewed    Pain Procedures: None recently    Imaging:     Lumbar MRI (6/26/2017):    Postsurgical changes from a right-sided laminotomy and L4-5.  There is grade 1 anterolisthesis of L3 on L4 and grade 1 retrolisthesis of L5 on S1. The vertebral body heights are well maintained, with no fracture.  No marrow signal abnormality suspicious for an infiltrative process.  Disc space narrowing is noted at L4-5 and L5-S1.    The  conus is normal in appearance, and terminates at the L1 level.  The adjacent soft tissue structures show no significant abnormalities.      L1-L2: No significant spinal canal or neuroforaminal narrowing.    L2-L3: Mild bilateral facet arthropathy.  No significant spinal canal or neuroforaminal narrowing.    L3-L4: Broad-based disc bulge and moderate bilateral facet arthropathy, resulting in mild spinal canal stenosis and mild left-sided neuroforaminal narrowing.    L4-L5: Moderate facet arthropathy.  No significant spinal canal or neuroforaminal narrowing.    L5-S1: Broad-based disc bulge and bilateral facet arthropathy, resulting in moderate bilateral neuroforaminal narrowing.  No significant spinal canal stenosis.    Past Medical History:   Diagnosis Date    Arthritis     Blood transfusion     COPD (chronic obstructive pulmonary disease)     Coronary artery disease     Encounter for blood transfusion     Hyperlipidemia 11/7/2013    Hypertension     Occlusion and stenosis of carotid artery without mention of cerebral infarction 1/12/2014    Screening for colon cancer 6/1/2015    Syncope 1/13/2014     Past Surgical History:   Procedure Laterality Date    BACK SURGERY      CORONARY ANGIOPLASTY      SKIN BIOPSY       Social History     Social History    Marital status:      Spouse name: N/A    Number of children: N/A    Years of education: N/A     Occupational History    Not on file.     Social History Main Topics    Smoking status: Former Smoker     Quit date: 10/11/2011    Smokeless tobacco: Former User     Quit date: 11/18/2011    Alcohol use 1.8 oz/week     3 Cans of beer per week      Comment: 3 cans beer every day or every other day    Drug use: No    Sexual activity: Not on file     Other Topics Concern    Not on file     Social History Narrative    No narrative on file     Family History   Problem Relation Age of Onset    Heart disease Father     Hypertension Father     Heart  attack Father     Heart failure Father     No Known Problems Mother     No Known Problems Sister     No Known Problems Brother     No Known Problems Maternal Grandmother     No Known Problems Maternal Grandfather     No Known Problems Paternal Grandmother     No Known Problems Paternal Grandfather     No Known Problems Maternal Aunt     No Known Problems Maternal Uncle     No Known Problems Paternal Aunt     No Known Problems Paternal Uncle     Anemia Neg Hx     Arrhythmia Neg Hx     Asthma Neg Hx     Clotting disorder Neg Hx     Fainting Neg Hx     Hyperlipidemia Neg Hx        Review of patient's allergies indicates:  No Known Allergies    Current Outpatient Prescriptions   Medication Sig    aspirin (ECOTRIN) 81 MG EC tablet Take 81 mg by mouth once daily.    atorvastatin (LIPITOR) 80 MG tablet Take 1 tablet (80 mg total) by mouth once daily.    cilostazol (PLETAL) 100 MG Tab Take 1 tablet (100 mg total) by mouth 2 (two) times daily. (Patient taking differently: Take 100 mg by mouth once daily. )    clopidogrel (PLAVIX) 75 mg tablet Take 1 tablet (75 mg total) by mouth once daily.    finasteride (PROSCAR) 5 mg tablet Take 1 tablet (5 mg total) by mouth once daily.    gabapentin (NEURONTIN) 300 MG capsule Take 1 capsule (300 mg total) by mouth 3 (three) times daily.    irbesartan (AVAPRO) 75 MG tablet Take 75 mg by mouth once daily.     metoprolol succinate (TOPROL-XL) 50 MG 24 hr tablet Take 1 tablet (50 mg total) by mouth once daily.    nitroGLYCERIN (NITROSTAT) 0.4 MG SL tablet Place 1 tablet (0.4 mg total) under the tongue every 5 (five) minutes as needed for Chest pain.    oxycodone-acetaminophen (PERCOCET) 5-325 mg per tablet Take 1 tablet by mouth every 4 (four) hours as needed for Pain.    PROAIR HFA 90 mcg/actuation inhaler Inhale 2 puffs into the lungs as needed.     tiotropium bromide (SPIRIVA RESPIMAT) 1.25 mcg/actuation Mist Inhale 2.5 mcg into the lungs once daily.     tizanidine 4 mg Cap Take 1 capsule by mouth nightly as needed.     No current facility-administered medications for this visit.        REVIEW OF SYSTEMS:  GENERAL: No weight loss, malaise or fevers.  HEENT: Negative for frequent or significant headaches.  NECK: Negative for lumps or significant neck swelling.  RESPIRATORY: + COPD  CARDIOVASCULAR: Negative for chest pain or palpitations. + CAD  GI: No blood in stools or black stools or change in bowel habits.  : Negative for kidney stones, urinary tract infections, or incontinence.  MUSCULOSKELETAL: See HPI  SKIN: Negative for rash or itching.  PSYCH: + sleep disturbance  HEMATOLOGY/LYMPHOLOGY: + chronic Plavix therapy  NEURO:  No history of syncope, seizures or tremors.    OBJECTIVE:    BP (!) 146/88 (BP Location: Right arm, Patient Position: Sitting, BP Method: Manual)   Pulse 95   Resp 20   Ht 6' (1.829 m)   Wt 84.7 kg (186 lb 12.8 oz)   BMI 25.33 kg/m²     PHYSICAL EXAMINATION:  GENERAL: Well appearing, in no acute distress.  PSYCH:  Mood and affect is appropriate.  Awake, alert, and oriented x 3.  SKIN: Skin color, texture, turgor normal, no rashes or lesions  HEENT: Normocephalic, atraumatic.  EOM intact.  CV: Radial pulses are 2+.  RESP:  Respirations are unlabored.  GI: Abdomen soft and non-tender.  MSK:  No atrophy or tone abnormalities are noted.      Neck: No obvious deformity or signs of trauma.  Normal cervical spine range of motion.    Back: Straight leg raising in the supine positions is positive for low back pain on the right, no radicular pain. Tendnerness to palpation over the lumbar paraspinous muscles and facets on the right.  Positive for pain with facet loading and back extension/rotation. Decreased range of motion on flexion and extension.    Buttocks:  No pain to palpation over the PSIS. + SHERRILL on the left    Extremities:  Peripheral joint ROM is full and pain free without obvious instability or laxity in all four extremities. No edema  or skin discolorations noted.     Gait:  Gait is normal.    NEUR:  Bilateral lower extremity coordination and muscle stretch reflexes are physiologic and symmetric. No loss of sensation is noted.     Strength testing:    Right hip flexion: 5/5  Left hip flexion: 5/5  Right knee extension: 5/5  Left knee extension: 5/5  Right knee flexion: 5/5  Left knee flexion: 5/5  Right ankle dorsiflexion: 5/5  Left ankle dorsiflexion: 5/5      ASSESSMENT: 61 y.o. male with chronic axial low back pain, consistent with     1. Lumbar spondylosis    2. DDD (degenerative disc disease), lumbar    3. History of lumbar surgery    4. Chronic anticoagulation        PLAN:     - I have stressed the importance of physical activity and a home exercise plan to help with pain and improve health.  - Continue Physical therapy.  - Rx Tizanidine 4 mg QHS as needed.  - Schedule for a Diagnostic/Therapeutic Medial branch block at Right L2,3,4, and L5 to help with axial low back pain and progress with a home exercise program. Patient will need clearance to hold Plavix prior to procedure.  - If positive response will plan for RFA.  - RTC after procedure.    The above plan and management options were discussed at length with patient. Patient is in agreement with the above and verbalized understanding. It will be communicated with the referring physician via electronic record, fax, or mail.    Rodrigo Jiang III  07/13/2017

## 2017-07-13 NOTE — LETTER
July 13, 2017      Nishant Perez MD  [address]  [address]  [place] [address]           [facility]  [address]  [place] [address]  Phone: [phone]  Fax: [phone]          Patient: Nimesh Nunn   MR Number: [id number]   YOB: 1955   Date of Visit: 7/13/2017       Dear Dr. Nishant Perez:    Thank you for referring Nimesh Nunn to me for evaluation. Attached you will find relevant portions of my assessment and plan of care.    If you have questions, please do not hesitate to call me. I look forward to following Nimesh Nunn along with you.    Sincerely,    Rodrigo Jiang III, MD    Enclosure  CC:  No Recipients    If you would like to receive this communication electronically, please contact [email or web address] or [phone] to request more information on [employer] access.    For providers and/or their staff who would like to refer a patient to [facility], please contact us through our one-stop-shop provider referral line, [facility], at [phone].    If you feel you have received this communication in error or would no longer like to receive these types of communications, please e-mail [email or web address]

## 2017-07-13 NOTE — TELEPHONE ENCOUNTER
Called and advised patient we received record of last office visit from Dr. Angel and he may stop his Plavix for 7 days prior to his procedure but he should continue his aspirin.  He verbalized understanding.  Advised that I have scheduled his procedure for 7/24/17 at [facility] and he should arrive at the [facility] at 11:00am for a 1:00pm procedure.  He states he is well familiar with this area and will be there as directed.

## 2017-07-21 ENCOUNTER — TELEPHONE (OUTPATIENT)
Dept: PAIN MEDICINE | Facility: CLINIC | Age: 62
End: 2017-07-21

## 2017-07-21 NOTE — TELEPHONE ENCOUNTER
Called patient and reviewed instructions for procedure on Monday.  Advised to let us know if he becomes ill, starts running a fever, or starts any new medication.  He is aware of where the Short Stay Cardiac Unit is.  I confirmed with him that he is to arrive at 11:00am.  He stopped Plavix on Monday, 7/17/17 and continued aspirin as instructed.

## 2017-07-24 ENCOUNTER — HOSPITAL ENCOUNTER (OUTPATIENT)
Facility: HOSPITAL | Age: 62
Discharge: HOME | End: 2017-07-24
Attending: ANESTHESIOLOGY | Admitting: ANESTHESIOLOGY
Payer: MEDICARE

## 2017-07-24 VITALS
BODY MASS INDEX: 25.73 KG/M2 | WEIGHT: 190 LBS | OXYGEN SATURATION: 94 % | HEART RATE: 79 BPM | SYSTOLIC BLOOD PRESSURE: 130 MMHG | HEIGHT: 72 IN | RESPIRATION RATE: 18 BRPM | DIASTOLIC BLOOD PRESSURE: 72 MMHG | TEMPERATURE: 98 F

## 2017-07-24 DIAGNOSIS — M47.816 LUMBAR FACET ARTHROPATHY: ICD-10-CM

## 2017-07-24 DIAGNOSIS — M47.816 LUMBAR SPONDYLOSIS: Primary | ICD-10-CM

## 2017-07-24 DIAGNOSIS — M51.36 DEGENERATIVE DISC DISEASE, LUMBAR: ICD-10-CM

## 2017-07-24 DIAGNOSIS — M12.88 OTHER SPECIFIC ARTHROPATHIES, NOT ELSEWHERE CLASSIFIED, OTHER SPECIFIED SITE: ICD-10-CM

## 2017-07-24 PROCEDURE — 63600175 PHARM REV CODE 636 W HCPCS

## 2017-07-24 PROCEDURE — 99152 MOD SED SAME PHYS/QHP 5/>YRS: CPT

## 2017-07-24 PROCEDURE — 64494 INJ PARAVERT F JNT L/S 2 LEV: CPT | Mod: RT,,, | Performed by: ANESTHESIOLOGY

## 2017-07-24 PROCEDURE — 64495 INJ PARAVERT F JNT L/S 3 LEV: CPT | Mod: RT,,, | Performed by: ANESTHESIOLOGY

## 2017-07-24 PROCEDURE — 99152 MOD SED SAME PHYS/QHP 5/>YRS: CPT | Mod: ,,, | Performed by: ANESTHESIOLOGY

## 2017-07-24 PROCEDURE — 64493 INJ PARAVERT F JNT L/S 1 LEV: CPT | Mod: RT,,, | Performed by: ANESTHESIOLOGY

## 2017-07-24 PROCEDURE — 25000003 PHARM REV CODE 250

## 2017-07-24 PROCEDURE — 64493 INJ PARAVERT F JNT L/S 1 LEV: CPT | Mod: RT

## 2017-07-24 RX ORDER — SODIUM CHLORIDE 9 MG/ML
500 INJECTION, SOLUTION INTRAVENOUS CONTINUOUS
Status: DISCONTINUED | OUTPATIENT
Start: 2017-07-24 | End: 2017-07-24 | Stop reason: HOSPADM

## 2017-07-24 NOTE — NURSING
Received via stretcher from procedure.  Report received from Aury RODAS.  Awake and alert. No c/o pain.  Hernandez.  resp even and unlabored  Breath sounds clear.  Bands aids to lower back clean dry and intact.  Tolerating fluids. Eating sandwich upon arrival to room.  Will continue to monitor.

## 2017-07-24 NOTE — NURSING
VSS.  Iv d/c'd with cath tip intact.  No drainage noted to bandaids on back. VSS.  Tolerated fluids and sandwich.  Ambulates without difficulty.  trf via wheelchair for d/c to home.

## 2017-07-24 NOTE — DISCHARGE SUMMARY
Discharge Note  Short Stay      SUMMARY     Admit Date: 7/24/2017    Attending Physician: Rodrigo Jiang III      Discharge Physician: Rodrigo Jiang III      Discharge Date: 7/24/2017     Final Diagnosis:   1. Lumbar spondylosis    2. Degenerative disc disease, lumbar    3. Lumbar facet arthropathy    4. Other specific arthropathies, not elsewhere classified, other specified site        Disposition: Home or self care    Patient Instructions:   Current Discharge Medication List      CONTINUE these medications which have NOT CHANGED    Details   aspirin (ECOTRIN) 81 MG EC tablet Take 81 mg by mouth once daily.      atorvastatin (LIPITOR) 80 MG tablet Take 1 tablet (80 mg total) by mouth once daily.  Qty: 30 tablet, Refills: 11    Associated Diagnoses: Dyslipidemia      cilostazol (PLETAL) 100 MG Tab Take 1 tablet (100 mg total) by mouth 2 (two) times daily.  Qty: 60 tablet, Refills: 11    Associated Diagnoses: PVD (peripheral vascular disease)      clopidogrel (PLAVIX) 75 mg tablet Take 1 tablet (75 mg total) by mouth once daily.  Qty: 90 tablet, Refills: 4    Associated Diagnoses: Coronary artery disease involving native coronary artery without angina pectoris, unspecified whether native or transplanted heart; PAD (peripheral artery disease); Right-sided carotid artery disease      finasteride (PROSCAR) 5 mg tablet Take 1 tablet (5 mg total) by mouth once daily.  Qty: 90 tablet, Refills: 3    Associated Diagnoses: Benign non-nodular prostatic hyperplasia without lower urinary tract symptoms      gabapentin (NEURONTIN) 300 MG capsule Take 1 capsule (300 mg total) by mouth 3 (three) times daily.  Qty: 90 capsule, Refills: 3    Associated Diagnoses: Chronic right-sided low back pain with right-sided sciatica      irbesartan (AVAPRO) 75 MG tablet Take 75 mg by mouth once daily.       metoprolol succinate (TOPROL-XL) 50 MG 24 hr tablet Take 1 tablet (50 mg total) by mouth once daily.  Qty: 30 tablet,  Refills: 11    Associated Diagnoses: Essential hypertension      oxycodone-acetaminophen (PERCOCET) 5-325 mg per tablet Take 1 tablet by mouth every 4 (four) hours as needed for Pain.  Qty: 50 tablet, Refills: 0    Associated Diagnoses: Chronic right-sided low back pain with right-sided sciatica      tizanidine 4 mg Cap Take 1 capsule by mouth nightly as needed.  Qty: 30 capsule, Refills: 2    Associated Diagnoses: Lumbar spondylosis; DDD (degenerative disc disease), lumbar      nitroGLYCERIN (NITROSTAT) 0.4 MG SL tablet Place 1 tablet (0.4 mg total) under the tongue every 5 (five) minutes as needed for Chest pain.  Qty: 90 tablet, Refills: 1      PROAIR HFA 90 mcg/actuation inhaler Inhale 2 puffs into the lungs as needed.          STOP taking these medications       tiotropium bromide (SPIRIVA RESPIMAT) 1.25 mcg/actuation Mist Comments:   Reason for Stopping:                   Discharge Diagnosis: Same as Pre and Post Procedure  Condition on Discharge: Stable.  Diet on Discharge: Same as before.  Activity: as per instruction sheet.  Discharge to: Home with a responsible adult.  Follow up: as per Discharge instructions.

## 2017-07-24 NOTE — PROGRESS NOTES
Pt is AAOx4 and complains of chronic pain to lower back 6 out of 10.  He states that he drank coffee with cream and sugar at 0800 with his pills.  Notified Aury RODAS.  VSS.  Admit questions completed.  Will continue to monitor.

## 2017-07-24 NOTE — OP NOTE
"Patient Name: Nimesh Nunn  MRN: [id number]    INFORMED CONSENT: The procedure, risks, benefits and options were discussed with patient. There are no contraindications to the procedure. The patient expressed understanding and agreed to proceed. The personnel performing the procedure was discussed. I verify that I personally obtained the patient's consent prior to the start of the procedure and the signed consent can be found on the patient's chart.    PROCEDURE: RIGHT L2, L3, L4 MEDIAL BRANCH NERVE AND L5 DORSAL RAMUS BLOCK     Procedure Date: 7/24/2017  Surgeon(s) and Role:     * Rodrigo Jiang III, MD - Primary  Anesthesia: Local MAC  Procedure(s) (LRB):  BLOCK-NERVE-MEDIAL BRANCH-LUMBAR Right Lumbar L2, L3, L4, L5 (Right)    Pre Procedure diagnosis: Lumbar facet arthropathy [M12.88]  Post-Procedure diagnosis: Lumbar facet arthropathy [M12.88]    Sedation: Yes - Fentanyl 50 mcg and Midazolam 2 mg    DESCRIPTION OF PROCEDURE:The patient was brought to the procedure room. IV access was obtained prior to the procedure. The patient was positioned prone on the fluoroscopy table. Continuous hemodynamic monitoring was initiated including blood pressure, EKG, and pulse oximetry. The area of the lumbar spine was prepped with Chlorhexidine three times and draped into a sterile field. Fluoroscopy was used to identify the location of the right side L2, L3, L4 medial branch nerves and L5 dorsal ramus at the junctions of the superior articular process and the transverse processes of L3, L4, L5, and the sacral ala respectively. Skin anesthesia was achieved using 8 mL of Lidocaine 1% over the injection sites. A 22 gauge, 3 1/2" spinal needle was slowly inserted at each level using AP and oblique fluoroscopic imaging. Negative aspiration for blood or CSF was confirmed. 1 mL of Bupivacaine 0.5% and Depo-Medrol 10 mg/mL was injected at each site. The needles were removed and bleeding was nil. A sterile dressing was applied. " No specimens collected. Nimesh was taken back to the recovery room for further observation.     Blood loss: Nil

## 2017-07-24 NOTE — INTERVAL H&P NOTE
The patient has been examined and the H&P has been reviewed:    I concur with the findings and no changes have occurred since H&P was written.    Anesthesia/Surgery risks, benefits and alternative options discussed and understood by patient/family.          Active Hospital Problems    Diagnosis  POA    Lumbar facet arthropathy [M12.88]  Yes      Resolved Hospital Problems    Diagnosis Date Resolved POA   No resolved problems to display.

## 2017-07-24 NOTE — DISCHARGE INSTRUCTIONS

## 2017-07-25 ENCOUNTER — PATIENT MESSAGE (OUTPATIENT)
Dept: PAIN MEDICINE | Facility: CLINIC | Age: 62
End: 2017-07-25

## 2017-08-10 ENCOUNTER — OFFICE VISIT (OUTPATIENT)
Dept: PAIN MEDICINE | Facility: CLINIC | Age: 62
End: 2017-08-10
Payer: MEDICARE

## 2017-08-10 ENCOUNTER — HOSPITAL ENCOUNTER (OUTPATIENT)
Dept: RADIOLOGY | Facility: HOSPITAL | Age: 62
Discharge: HOME | End: 2017-08-10
Attending: ANESTHESIOLOGY
Payer: MEDICARE

## 2017-08-10 VITALS
SYSTOLIC BLOOD PRESSURE: 110 MMHG | DIASTOLIC BLOOD PRESSURE: 70 MMHG | BODY MASS INDEX: 25.03 KG/M2 | WEIGHT: 184.81 LBS | HEART RATE: 78 BPM | RESPIRATION RATE: 20 BRPM | HEIGHT: 72 IN

## 2017-08-10 DIAGNOSIS — M43.17 SPONDYLOLISTHESIS OF LUMBOSACRAL REGION: ICD-10-CM

## 2017-08-10 DIAGNOSIS — M47.816 LUMBAR SPONDYLOSIS: ICD-10-CM

## 2017-08-10 DIAGNOSIS — M51.36 DDD (DEGENERATIVE DISC DISEASE), LUMBAR: Primary | ICD-10-CM

## 2017-08-10 DIAGNOSIS — M19.049 HAND ARTHRITIS: ICD-10-CM

## 2017-08-10 DIAGNOSIS — M51.36 DEGENERATIVE DISC DISEASE, LUMBAR: Primary | ICD-10-CM

## 2017-08-10 DIAGNOSIS — M51.36 DDD (DEGENERATIVE DISC DISEASE), LUMBAR: ICD-10-CM

## 2017-08-10 DIAGNOSIS — Z98.890 HISTORY OF LUMBAR SURGERY: ICD-10-CM

## 2017-08-10 DIAGNOSIS — Z79.01 CHRONIC ANTICOAGULATION: ICD-10-CM

## 2017-08-10 PROCEDURE — 99499 UNLISTED E&M SERVICE: CPT | Mod: S$PBB,,, | Performed by: ANESTHESIOLOGY

## 2017-08-10 PROCEDURE — 72120 X-RAY BEND ONLY L-S SPINE: CPT | Mod: TC,PO

## 2017-08-10 PROCEDURE — 3078F DIAST BP <80 MM HG: CPT | Mod: S$GLB,,, | Performed by: ANESTHESIOLOGY

## 2017-08-10 PROCEDURE — 3074F SYST BP LT 130 MM HG: CPT | Mod: S$GLB,,, | Performed by: ANESTHESIOLOGY

## 2017-08-10 PROCEDURE — 99999 PR PBB SHADOW E&M-EST. PATIENT-LVL III: CPT | Mod: PBBFAC,,, | Performed by: ANESTHESIOLOGY

## 2017-08-10 PROCEDURE — 99214 OFFICE O/P EST MOD 30 MIN: CPT | Mod: S$GLB,,, | Performed by: ANESTHESIOLOGY

## 2017-08-10 RX ORDER — DULOXETIN HYDROCHLORIDE 30 MG/1
30 CAPSULE, DELAYED RELEASE ORAL DAILY
Qty: 60 CAPSULE | Refills: 11 | Status: SHIPPED | OUTPATIENT
Start: 2017-08-10 | End: 2017-10-11 | Stop reason: ALTCHOICE

## 2017-08-10 RX ORDER — SODIUM CHLORIDE 9 MG/ML
500 INJECTION, SOLUTION INTRAVENOUS CONTINUOUS
Status: CANCELLED | OUTPATIENT
Start: 2017-08-10

## 2017-08-10 RX ORDER — DICLOFENAC SODIUM 10 MG/G
2 GEL TOPICAL 4 TIMES DAILY
Qty: 3 TUBE | Refills: 5 | Status: SHIPPED | OUTPATIENT
Start: 2017-08-10 | End: 2017-12-08

## 2017-08-10 NOTE — PROGRESS NOTES
Chronic patient Established Note (Follow up visit)      SUBJECTIVE:    Nimesh Nunn presents to the clinic for a follow-up appointment for low back. Since the last visit, Nimesh Nunn states the pain has been persistent. Current pain intensity is 7/10. He reports 30-40% improvement of his low back pain after lumbar MBB. He did not have the sharp pain in his back for 4-5 days after the procedure. The pain remains along the right lower lumbar paraspinal area. The pain is exacerbated by sitting, standing, walking, back extension, twisting, getting out of bed/chair and activity.      He also complains today of chronic pain of the bilateral hands and wrists which he attributes to arthritis.    Pain Disability Index Review:  Last 3 PDI Scores 8/10/2017 7/13/2017   Pain Disability Index (PDI) 50 52       Pain Medications:    Aspirin 81mg, 1 tablet daily  Plavix 75mg, 1 tablet daily      Opioid Contract: no     report:  Reviewed and consistent with medication use as prescribed.    Pain Procedures:   BLOCK-NERVE-MEDIAL BRANCH-LUMBAR Right Lumbar L2, L3, L4, L5  (07/24/2017)    Physical Therapy/Home Exercise: No    Imaging:     MRI OF THE LUMBAR SPINE WITHOUT CONTRAST (6/22/17):    Technique:  Multiplanar multisequence MR images of the lumbar spine obtained without contrast.     Comparison: None.     Findings:    Postsurgical changes from a right-sided laminotomy and L4-5.  There is grade 1 anterolisthesis of L3 on L4 and grade 1 retrolisthesis of L5 on S1. The vertebral body heights are well maintained, with no fracture.  No marrow signal abnormality suspicious for an infiltrative process.  Disc space narrowing is noted at L4-5 and L5-S1.    The conus is normal in appearance, and terminates at the L1 level.  The adjacent soft tissue structures show no significant abnormalities.      L1-L2: No significant spinal canal or neuroforaminal narrowing.    L2-L3: Mild bilateral facet arthropathy.  No significant spinal  canal or neuroforaminal narrowing.    L3-L4: Broad-based disc bulge and moderate bilateral facet arthropathy, resulting in mild spinal canal stenosis and mild left-sided neuroforaminal narrowing.    L4-L5: Moderate facet arthropathy.  No significant spinal canal or neuroforaminal narrowing.    L5-S1: Broad-based disc bulge and bilateral facet arthropathy, resulting in moderate bilateral neuroforaminal narrowing.  No significant spinal canal stenosis.    Allergies: Review of patient's allergies indicates:  No Known Allergies    Current Medications:   Current Outpatient Prescriptions   Medication Sig Dispense Refill    aspirin (ECOTRIN) 81 MG EC tablet Take 81 mg by mouth once daily.      atorvastatin (LIPITOR) 80 MG tablet Take 1 tablet (80 mg total) by mouth once daily. 30 tablet 11    cilostazol (PLETAL) 100 MG Tab Take 1 tablet (100 mg total) by mouth 2 (two) times daily. (Patient taking differently: Take 100 mg by mouth once daily. ) 60 tablet 11    clopidogrel (PLAVIX) 75 mg tablet Take 1 tablet (75 mg total) by mouth once daily. 90 tablet 4    finasteride (PROSCAR) 5 mg tablet Take 1 tablet (5 mg total) by mouth once daily. 90 tablet 3    irbesartan (AVAPRO) 75 MG tablet Take 75 mg by mouth once daily.       metoprolol succinate (TOPROL-XL) 50 MG 24 hr tablet Take 1 tablet (50 mg total) by mouth once daily. 30 tablet 11    nitroGLYCERIN (NITROSTAT) 0.4 MG SL tablet Place 1 tablet (0.4 mg total) under the tongue every 5 (five) minutes as needed for Chest pain. 90 tablet 1    PROAIR HFA 90 mcg/actuation inhaler Inhale 2 puffs into the lungs as needed.       diclofenac sodium 1 % Gel Apply 2 g topically 4 (four) times daily. 3 Tube 5    duloxetine (CYMBALTA) 30 MG capsule Take 1 capsule (30 mg total) by mouth once daily. Take 1 cap daily x 3 days. After 3 days increase to 1 cap BID. 60 capsule 11     No current facility-administered medications for this visit.        REVIEW OF SYSTEMS:    GENERAL: No  weight loss, malaise or fevers.  HEENT: Negative for frequent or significant headaches.  NECK: Negative for lumps or significant neck swelling.  RESPIRATORY: + COPD  CARDIOVASCULAR: Negative for chest pain or palpitations. + CAD  GI: No blood in stools or black stools or change in bowel habits.  : Negative for kidney stones, urinary tract infections, or incontinence.  MUSCULOSKELETAL: See HPI  SKIN: Negative for rash or itching.  PSYCH: + sleep disturbance  HEMATOLOGY/LYMPHOLOGY: + chronic Plavix therapy  NEURO:  No history of syncope, seizures or tremors.    Past Medical History:  Past Medical History:   Diagnosis Date    Arthritis     Blood transfusion     COPD (chronic obstructive pulmonary disease)     Coronary artery disease     Encounter for blood transfusion     Hyperlipidemia 11/7/2013    Hypertension     Occlusion and stenosis of carotid artery without mention of cerebral infarction 1/12/2014    Screening for colon cancer 6/1/2015    Syncope 1/13/2014       Past Surgical History:  Past Surgical History:   Procedure Laterality Date    BACK SURGERY      carotid endarectomy      CORONARY ANGIOPLASTY      SKIN BIOPSY         Family History:  Family History   Problem Relation Age of Onset    Heart disease Father     Hypertension Father     Heart attack Father     Heart failure Father     No Known Problems Mother     No Known Problems Sister     No Known Problems Brother     No Known Problems Maternal Grandmother     No Known Problems Maternal Grandfather     No Known Problems Paternal Grandmother     No Known Problems Paternal Grandfather     No Known Problems Maternal Aunt     No Known Problems Maternal Uncle     No Known Problems Paternal Aunt     No Known Problems Paternal Uncle     Anemia Neg Hx     Arrhythmia Neg Hx     Asthma Neg Hx     Clotting disorder Neg Hx     Fainting Neg Hx     Hyperlipidemia Neg Hx        Social History:  Social History     Social History     Marital status:      Spouse name: N/A    Number of children: N/A    Years of education: N/A     Social History Main Topics    Smoking status: Former Smoker     Quit date: 10/11/2011    Smokeless tobacco: Former User     Quit date: 11/18/2011    Alcohol use 1.8 oz/week     3 Cans of beer per week      Comment: 3 cans beer every day or every other day    Drug use: No    Sexual activity: Not Asked     Other Topics Concern    None     Social History Narrative    None       OBJECTIVE:    /70 (BP Location: Right arm, Patient Position: Sitting, BP Method: Large (Manual))   Pulse 78   Resp 20   Ht 6' (1.829 m)   Wt 83.8 kg (184 lb 12.8 oz)   BMI 25.06 kg/m²     PHYSICAL EXAMINATION:    General appearance: Well appearing, in no acute distress, alert and oriented x3.  Psych:  Mood and affect appropriate.  Skin: Skin color, texture, turgor normal, no rashes or lesions, in both upper and lower body.  Head/face:  Atraumatic, normocephalic.   Cor: Radial pulses 2+  Pulm: Breathing unlabored.  GI: Abdomen soft and non-tender.  Back:  Tenderness to palpation over the lumbar spine and paraspinous muscles on the right. Decreased range of motion on flexion and extension with pain reproduction.  Extremities: Peripheral joint ROM is full and pain free without obvious instability or laxity in all four extremities. No deformities, edema, or skin discoloration.   Musculoskeletal:  No atrophy or tone abnormalities are noted.  Neuro: Bilateral upper and lower extremity strength is normal and symmetric.  No loss of sensation is noted.  Gait: Antalgic.      ASSESSMENT: 61 y.o.  male with chronic right-sided low back pain, consistent with discogenic pain. Also a component of facet arthropathy.     1. DDD (degenerative disc disease), lumbar    2. Lumbar spondylosis    3. Spondylolisthesis of lumbosacral region    4. History of lumbar surgery    5. Chronic anticoagulation    6. Hand arthritis        PLAN:     - I have  stressed the importance of physical activity and a home exercise plan to help with pain and improve health.  - Order Flexion-Extension X-rays of the Lumbar spine to rule out any instability.  - Start Cymbalta 30mg gradually to twice a day to help with patient's pain.   - Start Voltaren gel 1% for arthritis of hands.  - Schedule for a Right Transforaminal epidural steroid injection at L4 and L5 to help with his pain and progress with a home exercise plan. Patient will need to hold Plavix x 7 days prior to procedure.  - RTC after procedure.  - Counseled patient regarding the importance of activity modification and physical therapy.    The above plan and management options were discussed at length with patient. Patient is in agreement with the above and verbalized understanding.    Rodrigo Jiang III  08/10/2017

## 2017-08-17 RX ORDER — SODIUM CHLORIDE 9 MG/ML
500 INJECTION, SOLUTION INTRAVENOUS CONTINUOUS
Status: CANCELLED | OUTPATIENT
Start: 2017-08-17

## 2017-08-21 ENCOUNTER — HOSPITAL ENCOUNTER (OUTPATIENT)
Facility: HOSPITAL | Age: 62
Discharge: HOME | End: 2017-08-21
Attending: ANESTHESIOLOGY | Admitting: ANESTHESIOLOGY
Payer: MEDICARE

## 2017-08-21 VITALS
SYSTOLIC BLOOD PRESSURE: 136 MMHG | TEMPERATURE: 99 F | DIASTOLIC BLOOD PRESSURE: 84 MMHG | HEART RATE: 95 BPM | WEIGHT: 186 LBS | BODY MASS INDEX: 25.19 KG/M2 | HEIGHT: 72 IN | RESPIRATION RATE: 18 BRPM | OXYGEN SATURATION: 93 %

## 2017-08-21 DIAGNOSIS — M51.36 DEGENERATIVE DISC DISEASE, LUMBAR: Primary | ICD-10-CM

## 2017-08-21 DIAGNOSIS — M47.816 LUMBAR SPONDYLOSIS: ICD-10-CM

## 2017-08-21 DIAGNOSIS — I71.40 ABDOMINAL AORTIC ANEURYSM (AAA) WITHOUT RUPTURE: Primary | ICD-10-CM

## 2017-08-21 PROCEDURE — 64484 NJX AA&/STRD TFRM EPI L/S EA: CPT | Mod: RT

## 2017-08-21 PROCEDURE — 64483 NJX AA&/STRD TFRM EPI L/S 1: CPT | Mod: RT,,, | Performed by: ANESTHESIOLOGY

## 2017-08-21 PROCEDURE — 99152 MOD SED SAME PHYS/QHP 5/>YRS: CPT

## 2017-08-21 PROCEDURE — 25000003 PHARM REV CODE 250

## 2017-08-21 PROCEDURE — 99152 MOD SED SAME PHYS/QHP 5/>YRS: CPT | Mod: ,,, | Performed by: ANESTHESIOLOGY

## 2017-08-21 PROCEDURE — 64484 NJX AA&/STRD TFRM EPI L/S EA: CPT | Mod: RT,,, | Performed by: ANESTHESIOLOGY

## 2017-08-21 PROCEDURE — 63600175 PHARM REV CODE 636 W HCPCS

## 2017-08-21 RX ORDER — SODIUM CHLORIDE 9 MG/ML
500 INJECTION, SOLUTION INTRAVENOUS CONTINUOUS
Status: DISCONTINUED | OUTPATIENT
Start: 2017-08-21 | End: 2017-08-21 | Stop reason: HOSPADM

## 2017-08-21 NOTE — PROGRESS NOTES
Pt is AAOx3 and in no apparent distress.  Provided a copy of discharge instructions.  Teaching performed.  Pt verbalized understanding and denied any questions.  PIV d/c catheter tip intact.  2x2 applied and no active bleeding noted.  Escorted pt to front of hospital for  by family member.

## 2017-08-21 NOTE — INTERVAL H&P NOTE
The patient has been examined and the H&P has been reviewed:    I concur with the findings and no changes have occurred since H&P was written.    Anesthesia/Surgery risks, benefits and alternative options discussed and understood by patient/family.          Active Hospital Problems    Diagnosis  POA    Degenerative disc disease, lumbar [M51.36]  Yes      Resolved Hospital Problems    Diagnosis Date Resolved POA   No resolved problems to display.

## 2017-08-21 NOTE — DISCHARGE SUMMARY
Discharge Note  Short Stay      SUMMARY     Admit Date: 8/21/2017    Attending Physician: Rodrigo Jiang III      Discharge Physician: Rodrigo Jiang III      Discharge Date: 8/21/2017     Final Diagnosis:   1. Degenerative disc disease, lumbar    2. Lumbar spondylosis        Disposition: Home or self care    Patient Instructions:   Current Discharge Medication List      CONTINUE these medications which have NOT CHANGED    Details   aspirin (ECOTRIN) 81 MG EC tablet Take 81 mg by mouth once daily.      atorvastatin (LIPITOR) 80 MG tablet Take 1 tablet (80 mg total) by mouth once daily.  Qty: 30 tablet, Refills: 11    Associated Diagnoses: Dyslipidemia      cilostazol (PLETAL) 100 MG Tab Take 1 tablet (100 mg total) by mouth 2 (two) times daily.  Qty: 60 tablet, Refills: 11    Associated Diagnoses: PVD (peripheral vascular disease)      clopidogrel (PLAVIX) 75 mg tablet Take 1 tablet (75 mg total) by mouth once daily.  Qty: 90 tablet, Refills: 4    Associated Diagnoses: Coronary artery disease involving native coronary artery without angina pectoris, unspecified whether native or transplanted heart; PAD (peripheral artery disease); Right-sided carotid artery disease      duloxetine (CYMBALTA) 30 MG capsule Take 1 capsule (30 mg total) by mouth once daily. Take 1 cap daily x 3 days. After 3 days increase to 1 cap BID.  Qty: 60 capsule, Refills: 11    Associated Diagnoses: DDD (degenerative disc disease), lumbar; Lumbar spondylosis; Spondylolisthesis of lumbosacral region; History of lumbar surgery      finasteride (PROSCAR) 5 mg tablet Take 1 tablet (5 mg total) by mouth once daily.  Qty: 90 tablet, Refills: 3    Associated Diagnoses: Benign non-nodular prostatic hyperplasia without lower urinary tract symptoms      irbesartan (AVAPRO) 75 MG tablet Take 75 mg by mouth once daily.       metoprolol succinate (TOPROL-XL) 50 MG 24 hr tablet Take 1 tablet (50 mg total) by mouth once daily.  Qty: 30 tablet,  Refills: 11    Associated Diagnoses: Essential hypertension      PROAIR HFA 90 mcg/actuation inhaler Inhale 2 puffs into the lungs as needed.       diclofenac sodium 1 % Gel Apply 2 g topically 4 (four) times daily.  Qty: 3 Tube, Refills: 5    Associated Diagnoses: Hand arthritis      nitroGLYCERIN (NITROSTAT) 0.4 MG SL tablet Place 1 tablet (0.4 mg total) under the tongue every 5 (five) minutes as needed for Chest pain.  Qty: 90 tablet, Refills: 1                 Discharge Diagnosis: Same as Pre and Post Procedure  Condition on Discharge: Stable.  Diet on Discharge: Same as before.  Activity: as per instruction sheet.  Discharge to: Home with a responsible adult.  Follow up: as per Discharge instructions.

## 2017-08-21 NOTE — DISCHARGE INSTRUCTIONS

## 2017-08-21 NOTE — PROGRESS NOTES
Pt returned to room AAOx4 and denies pain.  VSS.  Band aid to lower back is c/d/i and soft.  Will continue to monitor.

## 2017-08-21 NOTE — OP NOTE
Patient Name: Nimesh Nunn  MRN: [id number]    INFORMED CONSENT: The procedure, risks, benefits and options were discussed with patient. There are no contraindications to the procedure. The patient expressed understanding and agreed to proceed. The personnel performing the procedure was discussed. I verify that I personally obtained the patient's consent prior to the start of the procedure and the signed consent can be found on the patient's chart.    PROCEDURE: Right L4 and L5 TRANSFORAMINAL EPIDURAL STEROID INJECTION    Procedure Date: 8/21/2017  Surgeon(s) and Role:     * Rodrigo Jiang III, MD - Primary  Anesthesia: Local MAC  Procedure(s) (LRB):  INJECTION-STEROID-EPIDURAL-TRANSFORAMINAL Right L4 & L5 (Right)    Pre Procedure diagnosis: Degenerative disc disease, lumbar [M51.36]  Post-Procedure diagnosis: Degenerative disc disease, lumbar [M51.36]    Sedation: Yes - Fentanyl 50 mcg and Midazolam 2 mg    DESCRIPTION OF PROCEDURE: The patient was brought to the procedure room. IV access was obtained prior to the procedure. The patient was positioned prone on the fluoroscopy table. Continuous hemodynamic monitoring was initiated including blood pressure, EKG, and pulse oximetry.  The skin was prepped with chlorhexidine three times and draped in a sterile fashion. Skin anesthesia was achieved using 5 mL of lidocaine 1% over the respective injection sites.     An oblique fluoroscopic view was obtained, with the superior articular process of the inferior vertebral body aligned with the pedicle. The tip of a 23-gauge 3.5-inch Quincke-type spinal needle was advanced toward the 6 oclock position of the pedicle under intermittent fluoroscopic guidance. Confirmation of proper needle position was made with AP, oblique, and lateral fluoroscopic views. Negative aspiration for blood or CSF was confirmed. 0.5 mL of Omnipaque 300 was injected at each level. Fluoroscopic imaging revealed a clear outline of the spinal  nerve with proximal spread of agent through the neural foramen into the anterior epidural space. 2.5 mL of Lidocaine 0.5% and 40 mg Depo-Medrol was injected at each level. The needles were removed and bleeding was nil.  A sterile dressing was applied. Nimesh was taken back to the recovery room for further observation.     Blood Loss: Nil

## 2017-08-28 DIAGNOSIS — N40.0 BENIGN NON-NODULAR PROSTATIC HYPERPLASIA WITHOUT LOWER URINARY TRACT SYMPTOMS: ICD-10-CM

## 2017-08-28 RX ORDER — FINASTERIDE 5 MG/1
5 TABLET, FILM COATED ORAL DAILY
Qty: 90 TABLET | Refills: 3 | Status: SHIPPED | OUTPATIENT
Start: 2017-08-28 | End: 2017-12-08 | Stop reason: SDUPTHER

## 2017-09-07 ENCOUNTER — OFFICE VISIT (OUTPATIENT)
Dept: PAIN MEDICINE | Facility: CLINIC | Age: 62
End: 2017-09-07
Payer: MEDICARE

## 2017-09-07 VITALS
HEART RATE: 70 BPM | WEIGHT: 185 LBS | SYSTOLIC BLOOD PRESSURE: 120 MMHG | BODY MASS INDEX: 25.06 KG/M2 | RESPIRATION RATE: 18 BRPM | HEIGHT: 72 IN | DIASTOLIC BLOOD PRESSURE: 78 MMHG

## 2017-09-07 DIAGNOSIS — M47.816 LUMBAR SPONDYLOSIS: ICD-10-CM

## 2017-09-07 DIAGNOSIS — Z79.01 CHRONIC ANTICOAGULATION: ICD-10-CM

## 2017-09-07 DIAGNOSIS — M51.36 DEGENERATIVE DISC DISEASE, LUMBAR: Primary | ICD-10-CM

## 2017-09-07 DIAGNOSIS — Z98.890 HISTORY OF LUMBAR SURGERY: ICD-10-CM

## 2017-09-07 PROCEDURE — 3078F DIAST BP <80 MM HG: CPT | Mod: S$GLB,,, | Performed by: ANESTHESIOLOGY

## 2017-09-07 PROCEDURE — 3074F SYST BP LT 130 MM HG: CPT | Mod: S$GLB,,, | Performed by: ANESTHESIOLOGY

## 2017-09-07 PROCEDURE — 99213 OFFICE O/P EST LOW 20 MIN: CPT | Mod: S$GLB,,, | Performed by: ANESTHESIOLOGY

## 2017-09-07 PROCEDURE — 99499 UNLISTED E&M SERVICE: CPT | Mod: S$PBB,,, | Performed by: ANESTHESIOLOGY

## 2017-09-07 PROCEDURE — 99999 PR PBB SHADOW E&M-EST. PATIENT-LVL III: CPT | Mod: PBBFAC,,, | Performed by: ANESTHESIOLOGY

## 2017-09-07 PROCEDURE — 3008F BODY MASS INDEX DOCD: CPT | Mod: S$GLB,,, | Performed by: ANESTHESIOLOGY

## 2017-09-07 RX ORDER — TIZANIDINE 4 MG/1
4 TABLET ORAL NIGHTLY PRN
Refills: 2 | COMMUNITY
Start: 2017-07-13 | End: 2017-10-11

## 2017-09-07 NOTE — PROGRESS NOTES
Chronic patient Established Note (Follow up visit)      SUBJECTIVE:    Nimesh Nunn presents to the clinic for a follow-up appointment for low back pain. Since the last visit, Nimesh Nunn reports mild improvement. Current pain intensity is 5/10. He reports 25% improvement of his low back pain after right L4 and L5 transforaminal SABINE. The pain is located along the right lower lumbar paraspinal area. The pain is exacerbated by sitting, standing, walking, back extension, twisting, getting out of bed/chair and activity. His cardiologist is aware of his AAA.      Pain Disability Index Review:  Last 3 PDI Scores 9/7/2017 8/10/2017 7/13/2017   Pain Disability Index (PDI) 36 50 52       Pain Medications:    - Cymbalta 30 mg BID  - Aspirin 81mg, 1 tablet daily  - Plavix 75mg, 1 tablet daily  - Voltaren gel 1% as needed    Opioid Contract: no     report:  Reviewed and consistent with medication use as prescribed.    Pain Procedures:   Right L4 and L5 TESI (8/21/2017)  BLOCK-NERVE-MEDIAL BRANCH-LUMBAR Right Lumbar L2, L3, L4, L5  (07/24/2017)    Physical Therapy/Home Exercise: Tried in the past    Imaging:     MRI OF THE LUMBAR SPINE WITHOUT CONTRAST (6/22/17):    Technique:  Multiplanar multisequence MR images of the lumbar spine obtained without contrast.     Comparison: None.     Findings:    Postsurgical changes from a right-sided laminotomy and L4-5.  There is grade 1 anterolisthesis of L3 on L4 and grade 1 retrolisthesis of L5 on S1. The vertebral body heights are well maintained, with no fracture.  No marrow signal abnormality suspicious for an infiltrative process.  Disc space narrowing is noted at L4-5 and L5-S1.    The conus is normal in appearance, and terminates at the L1 level.  The adjacent soft tissue structures show no significant abnormalities.      L1-L2: No significant spinal canal or neuroforaminal narrowing.    L2-L3: Mild bilateral facet arthropathy.  No significant spinal canal or  neuroforaminal narrowing.    L3-L4: Broad-based disc bulge and moderate bilateral facet arthropathy, resulting in mild spinal canal stenosis and mild left-sided neuroforaminal narrowing.    L4-L5: Moderate facet arthropathy.  No significant spinal canal or neuroforaminal narrowing.    L5-S1: Broad-based disc bulge and bilateral facet arthropathy, resulting in moderate bilateral neuroforaminal narrowing.  No significant spinal canal stenosis.    Allergies: Review of patient's allergies indicates:  No Known Allergies    Current Medications:   Current Outpatient Prescriptions   Medication Sig Dispense Refill    aspirin (ECOTRIN) 81 MG EC tablet Take 81 mg by mouth once daily.      atorvastatin (LIPITOR) 80 MG tablet Take 1 tablet (80 mg total) by mouth once daily. 30 tablet 11    cilostazol (PLETAL) 100 MG Tab Take 1 tablet (100 mg total) by mouth 2 (two) times daily. (Patient taking differently: Take 100 mg by mouth once daily. ) 60 tablet 11    clopidogrel (PLAVIX) 75 mg tablet Take 1 tablet (75 mg total) by mouth once daily. 90 tablet 4    duloxetine (CYMBALTA) 30 MG capsule Take 1 capsule (30 mg total) by mouth once daily. Take 1 cap daily x 3 days. After 3 days increase to 1 cap BID. 60 capsule 11    finasteride (PROSCAR) 5 mg tablet Take 1 tablet (5 mg total) by mouth once daily. 90 tablet 3    irbesartan (AVAPRO) 75 MG tablet Take 75 mg by mouth once daily.       metoprolol succinate (TOPROL-XL) 50 MG 24 hr tablet Take 1 tablet (50 mg total) by mouth once daily. 30 tablet 11    PROAIR HFA 90 mcg/actuation inhaler Inhale 2 puffs into the lungs as needed.       diclofenac sodium 1 % Gel Apply 2 g topically 4 (four) times daily. 3 Tube 5    nitroGLYCERIN (NITROSTAT) 0.4 MG SL tablet Place 1 tablet (0.4 mg total) under the tongue every 5 (five) minutes as needed for Chest pain. 90 tablet 1    tizanidine (ZANAFLEX) 4 MG tablet Take 4 mg by mouth nightly as needed.  2     No current facility-administered  medications for this visit.        REVIEW OF SYSTEMS:    GENERAL: No weight loss, malaise or fevers.  HEENT: Negative for frequent or significant headaches.  NECK: Negative for lumps or significant neck swelling.  RESPIRATORY: + COPD  CARDIOVASCULAR: Negative for chest pain or palpitations. + CAD  GI: No blood in stools or black stools or change in bowel habits.  : Negative for kidney stones, urinary tract infections, or incontinence.  MUSCULOSKELETAL: See HPI  SKIN: Negative for rash or itching.  PSYCH: + sleep disturbance  HEMATOLOGY/LYMPHOLOGY: + chronic Plavix therapy  NEURO:  No history of syncope, seizures or tremors.    Past Medical History:  Past Medical History:   Diagnosis Date    Arthritis     Blood transfusion     COPD (chronic obstructive pulmonary disease)     Coronary artery disease     Encounter for blood transfusion     Hyperlipidemia 11/7/2013    Hypertension     Occlusion and stenosis of carotid artery without mention of cerebral infarction 1/12/2014    Screening for colon cancer 6/1/2015    Syncope 1/13/2014       Past Surgical History:  Past Surgical History:   Procedure Laterality Date    BACK SURGERY      carotid endarectomy      CORONARY ANGIOPLASTY      SKIN BIOPSY         Family History:  Family History   Problem Relation Age of Onset    Heart disease Father     Hypertension Father     Heart attack Father     Heart failure Father     No Known Problems Mother     No Known Problems Sister     No Known Problems Brother     No Known Problems Maternal Grandmother     No Known Problems Maternal Grandfather     No Known Problems Paternal Grandmother     No Known Problems Paternal Grandfather     No Known Problems Maternal Aunt     No Known Problems Maternal Uncle     No Known Problems Paternal Aunt     No Known Problems Paternal Uncle     Anemia Neg Hx     Arrhythmia Neg Hx     Asthma Neg Hx     Clotting disorder Neg Hx     Fainting Neg Hx     Hyperlipidemia  Neg Hx        Social History:  Social History     Social History    Marital status:      Spouse name: N/A    Number of children: N/A    Years of education: N/A     Social History Main Topics    Smoking status: Former Smoker     Quit date: 10/11/2011    Smokeless tobacco: Former User     Quit date: 11/18/2011    Alcohol use 1.8 oz/week     3 Cans of beer per week      Comment: 3 cans beer every day or every other day    Drug use: No    Sexual activity: Not Asked     Other Topics Concern    None     Social History Narrative    None       OBJECTIVE:    /78 (BP Location: Right arm, Patient Position: Sitting, BP Method: Large (Manual))   Pulse 70   Resp 18   Ht 6' (1.829 m)   Wt 83.9 kg (185 lb)   BMI 25.09 kg/m²     PHYSICAL EXAMINATION:    General appearance: Well appearing, in no acute distress, alert and oriented x3.  Psych:  Mood and affect appropriate.  Skin: Skin color, texture, turgor normal, no rashes or lesions, in both upper and lower body.  Head/face:  Atraumatic, normocephalic.   Cor: Radial pulses 2+  Pulm: Breathing unlabored.  GI: Abdomen soft and non-tender.  Back:  No tenderness to palpation over the lumbar spine and paraspinous muscles. Decreased range of motion on flexion and extension with pain reproduction.  Extremities: Peripheral joint ROM is full and pain free without obvious instability or laxity in all four extremities. No deformities, edema, or skin discoloration.   Musculoskeletal:  No atrophy or tone abnormalities are noted.  Neuro: Bilateral upper and lower extremity strength is normal and symmetric.  No loss of sensation is noted.  Gait: Antalgic.      ASSESSMENT: 61 y.o.  male with chronic right-sided low back pain, consistent with discogenic pain. Also a component of facet arthropathy.     1. Degenerative disc disease, lumbar    2. Lumbar spondylosis    3. History of lumbar surgery    4. Chronic anticoagulation        PLAN:     - I have stressed the  importance of physical activity and a home exercise plan to help with pain and improve health.  - Continue Cymbalta 30mg twice a day.   - Continue Voltaren gel 1% for arthritis of hands.  - I believe the patient would benefit from repeating Right Transforaminal epidural steroid injection at L4 and L5. The patient is not interested at this time due to cost.  - He will contact office if he elects to proceed with procedure.    The above plan and management options were discussed at length with patient. Patient is in agreement with the above and verbalized understanding.    Rodrigo Jiang III  09/07/2017

## 2017-09-29 ENCOUNTER — PATIENT MESSAGE (OUTPATIENT)
Dept: CARDIOLOGY | Facility: CLINIC | Age: 62
End: 2017-09-29

## 2017-10-04 DIAGNOSIS — I71.40 ABDOMINAL AORTIC ANEURYSM (AAA) WITHOUT RUPTURE: Primary | ICD-10-CM

## 2017-10-09 ENCOUNTER — CLINICAL SUPPORT (OUTPATIENT)
Dept: CARDIOLOGY | Facility: CLINIC | Age: 62
End: 2017-10-09
Payer: MEDICARE

## 2017-10-09 DIAGNOSIS — I71.40 ABDOMINAL AORTIC ANEURYSM (AAA) WITHOUT RUPTURE: ICD-10-CM

## 2017-10-09 LAB — VASCULAR ABDOMINAL AORTIC ANEURYSM (AAA): 3.3

## 2017-10-09 PROCEDURE — 93978 VASCULAR STUDY: CPT | Mod: S$GLB,,, | Performed by: INTERNAL MEDICINE

## 2017-10-10 ENCOUNTER — PATIENT MESSAGE (OUTPATIENT)
Dept: INTERNAL MEDICINE | Facility: CLINIC | Age: 62
End: 2017-10-10

## 2017-10-10 RX ORDER — IRBESARTAN 75 MG/1
75 TABLET ORAL DAILY
Qty: 90 TABLET | Refills: 3 | Status: ON HOLD | OUTPATIENT
Start: 2017-10-10 | End: 2019-03-25

## 2017-10-11 ENCOUNTER — OFFICE VISIT (OUTPATIENT)
Dept: INTERNAL MEDICINE | Facility: CLINIC | Age: 62
End: 2017-10-11
Payer: MEDICARE

## 2017-10-11 ENCOUNTER — HOSPITAL ENCOUNTER (OUTPATIENT)
Dept: RADIOLOGY | Facility: HOSPITAL | Age: 62
Discharge: HOME | End: 2017-10-11
Attending: INTERNAL MEDICINE
Payer: MEDICARE

## 2017-10-11 VITALS
DIASTOLIC BLOOD PRESSURE: 88 MMHG | TEMPERATURE: 97 F | SYSTOLIC BLOOD PRESSURE: 133 MMHG | RESPIRATION RATE: 16 BRPM | BODY MASS INDEX: 25.35 KG/M2 | HEART RATE: 104 BPM | WEIGHT: 187.19 LBS | HEIGHT: 72 IN

## 2017-10-11 DIAGNOSIS — M19.032 PRIMARY OSTEOARTHRITIS OF LEFT WRIST: ICD-10-CM

## 2017-10-11 DIAGNOSIS — Z23 NEED FOR PROPHYLACTIC VACCINATION AND INOCULATION AGAINST INFLUENZA: ICD-10-CM

## 2017-10-11 DIAGNOSIS — I71.40 ABDOMINAL AORTIC ANEURYSM (AAA) WITHOUT RUPTURE: ICD-10-CM

## 2017-10-11 DIAGNOSIS — M65.331 TRIGGER MIDDLE FINGER OF RIGHT HAND: Primary | ICD-10-CM

## 2017-10-11 PROBLEM — M53.86 DECREASED RANGE OF MOTION OF LUMBAR SPINE: Status: RESOLVED | Noted: 2017-06-01 | Resolved: 2017-10-11

## 2017-10-11 PROCEDURE — 90686 IIV4 VACC NO PRSV 0.5 ML IM: CPT | Mod: S$GLB,,, | Performed by: INTERNAL MEDICINE

## 2017-10-11 PROCEDURE — 99999 PR PBB SHADOW E&M-EST. PATIENT-LVL III: CPT | Mod: PBBFAC,,, | Performed by: INTERNAL MEDICINE

## 2017-10-11 PROCEDURE — 90471 IMMUNIZATION ADMIN: CPT | Mod: S$GLB,,, | Performed by: INTERNAL MEDICINE

## 2017-10-11 PROCEDURE — 20550 NJX 1 TENDON SHEATH/LIGAMENT: CPT | Mod: RT,S$GLB,, | Performed by: INTERNAL MEDICINE

## 2017-10-11 PROCEDURE — 99213 OFFICE O/P EST LOW 20 MIN: CPT | Mod: 25,S$GLB,, | Performed by: INTERNAL MEDICINE

## 2017-10-11 PROCEDURE — 99499 UNLISTED E&M SERVICE: CPT | Mod: S$PBB,,, | Performed by: INTERNAL MEDICINE

## 2017-10-11 PROCEDURE — 73100 X-RAY EXAM OF WRIST: CPT | Mod: TC,PO,LT

## 2017-10-11 RX ORDER — MELOXICAM 15 MG/1
15 TABLET ORAL DAILY
Qty: 90 TABLET | Refills: 3 | Status: SHIPPED | OUTPATIENT
Start: 2017-10-11 | End: 2018-07-20 | Stop reason: SDUPTHER

## 2017-10-11 NOTE — PROGRESS NOTES
Subjective:       Patient ID: Nimesh Nunn is a 61 y.o. male.    Chief Complaint: Wrist Pain (left); Hand Pain (trigger finger to right hand); Back Pain; and Flu Vaccine    HPI  Pt c/o chronic, worsening pain in L wrist.  Describes a numbness, but denies numbness in fingers.  Also c/o 2-3 weeks of painful, catching R 3rd finger.  LBP w/o change.  Was seen to have a 3.6 cm AAA on a back Xray.  Review of Systems    Objective:      Physical Exam   Constitutional: He appears well-developed. No distress.   HENT:   Head: Normocephalic.   Eyes: EOM are normal. No scleral icterus.   Neck: Normal range of motion. No tracheal deviation present.   Cardiovascular: Normal rate, regular rhythm and intact distal pulses.    Pulmonary/Chest: Effort normal. No respiratory distress.   Abdominal: He exhibits no distension.   Musculoskeletal: He exhibits no edema.   L wrist tender over distal radius with crepitation  R 3rd finger catching   Neurological: He is alert.   -Phalen's and Tinel's signs   Skin: Skin is warm and dry. No rash noted. He is not diaphoretic. No erythema.   Psychiatric: He has a normal mood and affect. His behavior is normal.   Vitals reviewed.      Assessment:       1. Trigger middle finger of right hand    2. Primary osteoarthritis of left wrist    3. Abdominal aortic aneurysm (AAA) without rupture    4. Need for prophylactic vaccination and inoculation against influenza        Plan:       Nimesh was seen today for wrist pain, hand pain, back pain and flu vaccine.    Diagnoses and all orders for this visit:    Trigger middle finger of right hand   Injected 1 cc Kenalog into R 3rd finger flexor tendon sheath NDC # 6700-0101-15    Primary osteoarthritis of left wrist  -     meloxicam (MOBIC) 15 MG tablet; Take 1 tablet (15 mg total) by mouth once daily.  -     X-Ray Wrist 2 View Left; Future   Wrist splint    Abdominal aortic aneurysm (AAA) without rupture   Await U/S report    Need for prophylactic vaccination  and inoculation against influenza  -     Influenza - Quadrivalent (3 years & older) (PF)      Return if symptoms worsen or fail to improve.

## 2017-10-13 ENCOUNTER — HOSPITAL ENCOUNTER (EMERGENCY)
Facility: HOSPITAL | Age: 62
Discharge: HOME | End: 2017-10-13
Attending: EMERGENCY MEDICINE
Payer: MEDICARE

## 2017-10-13 VITALS
TEMPERATURE: 98 F | DIASTOLIC BLOOD PRESSURE: 90 MMHG | RESPIRATION RATE: 18 BRPM | SYSTOLIC BLOOD PRESSURE: 178 MMHG | HEART RATE: 97 BPM | OXYGEN SATURATION: 98 %

## 2017-10-13 DIAGNOSIS — S01.01XA LACERATION OF SCALP, INITIAL ENCOUNTER: ICD-10-CM
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
## 2017-10-13 DIAGNOSIS — H11.31 CONJUNCTIVAL HEMORRHAGE OF RIGHT EYE: ICD-10-CM
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
## 2017-10-13 DIAGNOSIS — S00.03XA HEMATOMA OF SCALP, INITIAL ENCOUNTER: Primary | ICD-10-CM

## 2017-10-13 LAB
ALBUMIN SERPL BCP-MCNC: 3.4 G/DL
ALLENS TEST: NORMAL
ALP SERPL-CCNC: 80 U/L
ALT SERPL W/O P-5'-P-CCNC: 33 U/L
ANION GAP SERPL CALC-SCNC: 18 MMOL/L
AST SERPL-CCNC: 35 U/L
BASOPHILS # BLD AUTO: 0.01 K/UL
BASOPHILS NFR BLD: 0.1 %
BILIRUB SERPL-MCNC: 0.3 MG/DL
BUN SERPL-MCNC: 20 MG/DL
CALCIUM SERPL-MCNC: 9.3 MG/DL
CHLORIDE SERPL-SCNC: 106 MMOL/L
CO2 SERPL-SCNC: 14 MMOL/L
CREAT SERPL-MCNC: 1.2 MG/DL
DIFFERENTIAL METHOD: ABNORMAL
EOSINOPHIL # BLD AUTO: 0.2 K/UL
EOSINOPHIL NFR BLD: 1.5 %
ERYTHROCYTE [DISTWIDTH] IN BLOOD BY AUTOMATED COUNT: 14.5 %
EST. GFR  (AFRICAN AMERICAN): >60 ML/MIN/1.73 M^2
EST. GFR  (NON AFRICAN AMERICAN): >60 ML/MIN/1.73 M^2
GLUCOSE SERPL-MCNC: 123 MG/DL
HCT VFR BLD AUTO: 39.6 %
HGB BLD-MCNC: 13.3 G/DL
INR PPP: 0.9
LACTATE SERPL-SCNC: 3.8 MMOL/L
LDH SERPL L TO P-CCNC: 1.36 MMOL/L (ref 0.5–2.2)
LYMPHOCYTES # BLD AUTO: 1.1 K/UL
LYMPHOCYTES NFR BLD: 11.5 %
MCH RBC QN AUTO: 31.8 PG
MCHC RBC AUTO-ENTMCNC: 33.6 G/DL
MCV RBC AUTO: 95 FL
MONOCYTES # BLD AUTO: 0.8 K/UL
MONOCYTES NFR BLD: 8.3 %
NEUTROPHILS # BLD AUTO: 7.7 K/UL
NEUTROPHILS NFR BLD: 78.2 %
PLATELET # BLD AUTO: 199 K/UL
PMV BLD AUTO: 9.1 FL
POTASSIUM SERPL-SCNC: 4.1 MMOL/L
PROT SERPL-MCNC: 7.1 G/DL
PROTHROMBIN TIME: 9.6 SEC
RBC # BLD AUTO: 4.18 M/UL
SAMPLE: NORMAL
SITE: NORMAL
SODIUM SERPL-SCNC: 138 MMOL/L
WBC # BLD AUTO: 9.8 K/UL

## 2017-10-13 PROCEDURE — 99900035 HC TECH TIME PER 15 MIN (STAT)

## 2017-10-13 PROCEDURE — 83605 ASSAY OF LACTIC ACID: CPT

## 2017-10-13 PROCEDURE — 85025 COMPLETE CBC W/AUTO DIFF WBC: CPT

## 2017-10-13 PROCEDURE — 63600175 PHARM REV CODE 636 W HCPCS: Performed by: GENERAL PRACTICE

## 2017-10-13 PROCEDURE — 12002 RPR S/N/AX/GEN/TRNK2.6-7.5CM: CPT | Mod: ,,, | Performed by: EMERGENCY MEDICINE

## 2017-10-13 PROCEDURE — 85610 PROTHROMBIN TIME: CPT

## 2017-10-13 PROCEDURE — 80053 COMPREHEN METABOLIC PANEL: CPT

## 2017-10-13 PROCEDURE — 96361 HYDRATE IV INFUSION ADD-ON: CPT

## 2017-10-13 PROCEDURE — 90715 TDAP VACCINE 7 YRS/> IM: CPT | Performed by: GENERAL PRACTICE

## 2017-10-13 PROCEDURE — 12032 INTMD RPR S/A/T/EXT 2.6-7.5: CPT

## 2017-10-13 PROCEDURE — 12052 INTMD RPR FACE/MM 2.6-5.0 CM: CPT

## 2017-10-13 PROCEDURE — 25000003 PHARM REV CODE 250: Performed by: GENERAL PRACTICE

## 2017-10-13 PROCEDURE — 96360 HYDRATION IV INFUSION INIT: CPT

## 2017-10-13 PROCEDURE — 90471 IMMUNIZATION ADMIN: CPT | Performed by: GENERAL PRACTICE

## 2017-10-13 PROCEDURE — 99284 EMERGENCY DEPT VISIT MOD MDM: CPT | Mod: 25,,, | Performed by: EMERGENCY MEDICINE

## 2017-10-13 PROCEDURE — 99285 EMERGENCY DEPT VISIT HI MDM: CPT | Mod: 25

## 2017-10-13 RX ORDER — ERYTHROMYCIN 5 MG/G
OINTMENT OPHTHALMIC
Qty: 1 TUBE | Refills: 0 | Status: SHIPPED | OUTPATIENT
Start: 2017-10-13 | End: 2017-12-08

## 2017-10-13 RX ORDER — HYDROCODONE BITARTRATE AND ACETAMINOPHEN 5; 325 MG/1; MG/1
1 TABLET ORAL EVERY 4 HOURS PRN
Qty: 10 TABLET | Refills: 0 | Status: SHIPPED | OUTPATIENT
Start: 2017-10-13 | End: 2017-10-17 | Stop reason: SDUPTHER

## 2017-10-13 RX ORDER — HYDROCODONE BITARTRATE AND ACETAMINOPHEN 5; 325 MG/1; MG/1
1 TABLET ORAL
Status: COMPLETED | OUTPATIENT
Start: 2017-10-13 | End: 2017-10-13

## 2017-10-13 RX ORDER — LIDOCAINE HYDROCHLORIDE 10 MG/ML
1 INJECTION, SOLUTION EPIDURAL; INFILTRATION; INTRACAUDAL; PERINEURAL ONCE
Status: COMPLETED | OUTPATIENT
Start: 2017-10-13 | End: 2017-10-13

## 2017-10-13 RX ADMIN — SODIUM CHLORIDE 1000 ML: 0.9 INJECTION, SOLUTION INTRAVENOUS at 04:10

## 2017-10-13 RX ADMIN — BACITRACIN ZINC NEOMYCIN SULFATE POLYMYXIN B SULFATE 15 G: 400; 3.5; 5 OINTMENT TOPICAL at 06:10

## 2017-10-13 RX ADMIN — CLOSTRIDIUM TETANI TOXOID ANTIGEN (FORMALDEHYDE INACTIVATED), CORYNEBACTERIUM DIPHTHERIAE TOXOID ANTIGEN (FORMALDEHYDE INACTIVATED), BORDETELLA PERTUSSIS TOXOID ANTIGEN (GLUTARALDEHYDE INACTIVATED), BORDETELLA PERTUSSIS FILAMENTOUS HEMAGGLUTININ ANTIGEN (FORMALDEHYDE INACTIVATED), BORDETELLA PERTUSSIS PERTACTIN ANTIGEN, AND BORDETELLA PERTUSSIS FIMBRIAE 2/3 ANTIGEN 0.5 ML: 5; 2; 2.5; 5; 3; 5 INJECTION, SUSPENSION INTRAMUSCULAR at 02:10

## 2017-10-13 RX ADMIN — SODIUM CHLORIDE 1000 ML: 0.9 INJECTION, SOLUTION INTRAVENOUS at 03:10

## 2017-10-13 RX ADMIN — HYDROCODONE BITARTRATE AND ACETAMINOPHEN 1 TABLET: 5; 325 TABLET ORAL at 04:10

## 2017-10-13 RX ADMIN — LIDOCAINE HYDROCHLORIDE 10 MG: 10 INJECTION, SOLUTION EPIDURAL; INFILTRATION; INTRACAUDAL; PERINEURAL at 04:10
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
## 2017-10-13 NOTE — ED NOTES
The patient is awake, alert and cooperative with a calm affect, patient is aware of environment. Airway is open and patent, respirations are spontaneous, normal respiratory effort and rate noted, skin warm and dry, moves all extremities well, appearance: no apparent distress noted. Side rails x 2. Call bell within reach. Will continue to monitor.

## 2017-10-16 ENCOUNTER — OFFICE VISIT (OUTPATIENT)
Dept: OPHTHALMOLOGY | Facility: CLINIC | Age: 62
End: 2017-10-16
Payer: MEDICARE

## 2017-10-16 ENCOUNTER — PATIENT MESSAGE (OUTPATIENT)
Dept: INTERNAL MEDICINE | Facility: CLINIC | Age: 62
End: 2017-10-16

## 2017-10-16 DIAGNOSIS — H11.31 SUBCONJUNCTIVAL HEMORRHAGE OF RIGHT EYE: ICD-10-CM

## 2017-10-16 DIAGNOSIS — R07.9 CHEST PAIN, UNSPECIFIED TYPE: Primary | ICD-10-CM

## 2017-10-16 DIAGNOSIS — T14.90XA: Primary | ICD-10-CM

## 2017-10-16 DIAGNOSIS — S00.10XA: ICD-10-CM

## 2017-10-16 PROCEDURE — 92285 EXTERNAL OCULAR PHOTOGRAPHY: CPT | Mod: S$GLB,,, | Performed by: OPHTHALMOLOGY

## 2017-10-16 PROCEDURE — 99999 PR PBB SHADOW E&M-EST. PATIENT-LVL III: CPT | Mod: PBBFAC,,, | Performed by: OPHTHALMOLOGY

## 2017-10-16 PROCEDURE — 92004 COMPRE OPH EXAM NEW PT 1/>: CPT | Mod: S$GLB,,, | Performed by: OPHTHALMOLOGY
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
## 2017-10-17 RX ORDER — HYDROCODONE BITARTRATE AND ACETAMINOPHEN 5; 325 MG/1; MG/1
1 TABLET ORAL EVERY 4 HOURS PRN
Qty: 20 TABLET | Refills: 0 | Status: SHIPPED | OUTPATIENT
Start: 2017-10-17 | End: 2017-10-20 | Stop reason: ALTCHOICE

## 2017-10-20 ENCOUNTER — OFFICE VISIT (OUTPATIENT)
Dept: INTERNAL MEDICINE | Facility: CLINIC | Age: 62
End: 2017-10-20
Payer: MEDICARE

## 2017-10-20 VITALS
DIASTOLIC BLOOD PRESSURE: 88 MMHG | BODY MASS INDEX: 25.95 KG/M2 | TEMPERATURE: 97 F | WEIGHT: 191.56 LBS | SYSTOLIC BLOOD PRESSURE: 138 MMHG | RESPIRATION RATE: 16 BRPM | HEIGHT: 72 IN | HEART RATE: 96 BPM

## 2017-10-20 DIAGNOSIS — S01.01XD LACERATION OF SCALP, SUBSEQUENT ENCOUNTER: Primary | ICD-10-CM

## 2017-10-20 DIAGNOSIS — I10 ESSENTIAL HYPERTENSION: ICD-10-CM

## 2017-10-20 PROCEDURE — 99499 UNLISTED E&M SERVICE: CPT | Mod: S$PBB,,, | Performed by: INTERNAL MEDICINE

## 2017-10-20 PROCEDURE — 99024 POSTOP FOLLOW-UP VISIT: CPT | Mod: S$GLB,,, | Performed by: INTERNAL MEDICINE

## 2017-10-20 PROCEDURE — 99999 PR PBB SHADOW E&M-EST. PATIENT-LVL III: CPT | Mod: PBBFAC,,, | Performed by: INTERNAL MEDICINE

## 2017-10-20 PROCEDURE — 99213 OFFICE O/P EST LOW 20 MIN: CPT | Mod: S$GLB,,, | Performed by: INTERNAL MEDICINE
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
## 2017-12-08 ENCOUNTER — OFFICE VISIT (OUTPATIENT)
Dept: INTERNAL MEDICINE | Facility: CLINIC | Age: 62
End: 2017-12-08
Payer: MEDICARE

## 2017-12-08 VITALS
HEART RATE: 88 BPM | BODY MASS INDEX: 25.62 KG/M2 | WEIGHT: 188.94 LBS | RESPIRATION RATE: 16 BRPM | TEMPERATURE: 97 F | SYSTOLIC BLOOD PRESSURE: 160 MMHG | DIASTOLIC BLOOD PRESSURE: 88 MMHG

## 2017-12-08 DIAGNOSIS — I25.10 CORONARY ARTERY DISEASE INVOLVING NATIVE CORONARY ARTERY WITHOUT ANGINA PECTORIS, UNSPECIFIED WHETHER NATIVE OR TRANSPLANTED HEART: ICD-10-CM

## 2017-12-08 DIAGNOSIS — J44.9 CHRONIC OBSTRUCTIVE PULMONARY DISEASE, UNSPECIFIED COPD TYPE: ICD-10-CM

## 2017-12-08 DIAGNOSIS — R73.9 HYPERGLYCEMIA: ICD-10-CM

## 2017-12-08 DIAGNOSIS — N40.0 BENIGN NON-NODULAR PROSTATIC HYPERPLASIA WITHOUT LOWER URINARY TRACT SYMPTOMS: ICD-10-CM

## 2017-12-08 DIAGNOSIS — I71.40 ABDOMINAL AORTIC ANEURYSM (AAA) WITHOUT RUPTURE: ICD-10-CM

## 2017-12-08 DIAGNOSIS — I73.9 PAD (PERIPHERAL ARTERY DISEASE): ICD-10-CM

## 2017-12-08 DIAGNOSIS — N40.0 BENIGN PROSTATIC HYPERPLASIA WITHOUT LOWER URINARY TRACT SYMPTOMS: ICD-10-CM

## 2017-12-08 DIAGNOSIS — E78.00 PURE HYPERCHOLESTEROLEMIA: ICD-10-CM

## 2017-12-08 DIAGNOSIS — I10 ESSENTIAL HYPERTENSION: Primary | ICD-10-CM

## 2017-12-08 PROCEDURE — 99214 OFFICE O/P EST MOD 30 MIN: CPT | Mod: S$GLB,,, | Performed by: INTERNAL MEDICINE

## 2017-12-08 PROCEDURE — 99499 UNLISTED E&M SERVICE: CPT | Mod: S$PBB,,, | Performed by: INTERNAL MEDICINE

## 2017-12-08 PROCEDURE — 99999 PR PBB SHADOW E&M-EST. PATIENT-LVL III: CPT | Mod: PBBFAC,,, | Performed by: INTERNAL MEDICINE

## 2017-12-08 RX ORDER — FINASTERIDE 5 MG/1
5 TABLET, FILM COATED ORAL DAILY
Qty: 90 TABLET | Refills: 3 | Status: SHIPPED | OUTPATIENT
Start: 2017-12-08 | End: 2018-12-20 | Stop reason: SDUPTHER

## 2017-12-08 NOTE — PROGRESS NOTES
Subjective:       Patient ID: Nimesh Nunn is a 62 y.o. male.    Chief Complaint: Follow-up    HPI   Checkup.  SOB at baseline, worse in AM, has an occasional cough.  No CP, no claudication.  No abd pain.  C/O new painless mass near R great toe.  Review of Systems   All other systems reviewed and are negative.      Objective:      Physical Exam   Constitutional: He appears well-developed. No distress.   HENT:   Head: Normocephalic.   Eyes: EOM are normal. No scleral icterus.   Neck: Normal range of motion. No tracheal deviation present.   Cardiovascular: Normal rate, regular rhythm, normal heart sounds and intact distal pulses.    Pulmonary/Chest: Effort normal. No respiratory distress. He has no wheezes.   Poor BSs   Abdominal: Soft. Bowel sounds are normal. He exhibits no distension and no mass. There is no tenderness.   Musculoskeletal: Normal range of motion. He exhibits no edema.   Synovial cyst L 1st MTP   Neurological: He is alert.   Skin: Skin is warm and dry. No rash noted. He is not diaphoretic. No erythema.   Psychiatric: He has a normal mood and affect. His behavior is normal.   Vitals reviewed.      Assessment:       1. Essential hypertension    2. Benign non-nodular prostatic hyperplasia without lower urinary tract symptoms    3. Coronary artery disease involving native coronary artery without angina pectoris, unspecified whether native or transplanted heart    4. PAD (peripheral artery disease)    5. Chronic obstructive pulmonary disease, unspecified COPD type    6. Abdominal aortic aneurysm (AAA) without rupture    7. Benign prostatic hyperplasia without lower urinary tract symptoms    8. Hyperglycemia    9. Pure hypercholesterolemia        Plan:       Nimesh was seen today for follow-up.    Diagnoses and all orders for this visit:    Essential hypertension   Raise metoprolol reed to 100 mg qd    Benign non-nodular prostatic hyperplasia without lower urinary tract symptoms  -     finasteride  (PROSCAR) 5 mg tablet; Take 1 tablet (5 mg total) by mouth once daily.  -     Prostate Specific Antigen, Diagnostic; Future    Coronary artery disease involving native coronary artery without angina pectoris, unspecified whether native or transplanted heart  -     CBC auto differential; Future  -     Comprehensive metabolic panel; Future    PAD (peripheral artery disease)   Quiescent    Chronic obstructive pulmonary disease, unspecified COPD type   Stable    Abdominal aortic aneurysm (AAA) without rupture    Benign prostatic hyperplasia without lower urinary tract symptoms   Stable    Hyperglycemia  -     Hemoglobin A1c; Future    Pure hypercholesterolemia  -     Lipid panel; Future      Return in about 6 months (around 6/8/2018).

## 2017-12-11 ENCOUNTER — LAB VISIT (OUTPATIENT)
Dept: LAB | Facility: HOSPITAL | Age: 62
End: 2017-12-11
Attending: INTERNAL MEDICINE
Payer: MEDICARE

## 2017-12-11 DIAGNOSIS — I25.10 CORONARY ARTERY DISEASE INVOLVING NATIVE CORONARY ARTERY WITHOUT ANGINA PECTORIS, UNSPECIFIED WHETHER NATIVE OR TRANSPLANTED HEART: ICD-10-CM

## 2017-12-11 DIAGNOSIS — E78.00 PURE HYPERCHOLESTEROLEMIA: ICD-10-CM

## 2017-12-11 DIAGNOSIS — N40.0 BENIGN NON-NODULAR PROSTATIC HYPERPLASIA WITHOUT LOWER URINARY TRACT SYMPTOMS: ICD-10-CM

## 2017-12-11 DIAGNOSIS — R73.9 HYPERGLYCEMIA: ICD-10-CM

## 2017-12-11 LAB
ALBUMIN SERPL BCP-MCNC: 4.4 G/DL
ALP SERPL-CCNC: 77 U/L
ALT SERPL W/O P-5'-P-CCNC: 43 U/L
ANION GAP SERPL CALC-SCNC: 11 MMOL/L
AST SERPL-CCNC: 44 U/L
BASOPHILS # BLD AUTO: 0.03 K/UL
BASOPHILS NFR BLD: 0.5 %
BILIRUB SERPL-MCNC: 0.7 MG/DL
BUN SERPL-MCNC: 23 MG/DL
CALCIUM SERPL-MCNC: 9.7 MG/DL
CHLORIDE SERPL-SCNC: 98 MMOL/L
CHOLEST SERPL-MCNC: 196 MG/DL
CHOLEST/HDLC SERPL: 2.3 {RATIO}
CO2 SERPL-SCNC: 30 MMOL/L
COMPLEXED PSA SERPL-MCNC: 0.97 NG/ML
CREAT SERPL-MCNC: 0.97 MG/DL
DIFFERENTIAL METHOD: ABNORMAL
EOSINOPHIL # BLD AUTO: 0.3 K/UL
EOSINOPHIL NFR BLD: 5.5 %
ERYTHROCYTE [DISTWIDTH] IN BLOOD BY AUTOMATED COUNT: 13.1 %
EST. GFR  (AFRICAN AMERICAN): >60 ML/MIN/1.73 M^2
EST. GFR  (NON AFRICAN AMERICAN): >60 ML/MIN/1.73 M^2
ESTIMATED AVG GLUCOSE: 94 MG/DL
GLUCOSE SERPL-MCNC: 96 MG/DL
HBA1C MFR BLD HPLC: 4.9 %
HCT VFR BLD AUTO: 43.2 %
HDLC SERPL-MCNC: 85 MG/DL
HDLC SERPL: 43.4 %
HGB BLD-MCNC: 14.2 G/DL
LDLC SERPL CALC-MCNC: 94.2 MG/DL
LYMPHOCYTES # BLD AUTO: 1.1 K/UL
LYMPHOCYTES NFR BLD: 17.3 %
MCH RBC QN AUTO: 31.4 PG
MCHC RBC AUTO-ENTMCNC: 32.9 G/DL
MCV RBC AUTO: 96 FL
MONOCYTES # BLD AUTO: 0.7 K/UL
MONOCYTES NFR BLD: 11.7 %
NEUTROPHILS # BLD AUTO: 4 K/UL
NEUTROPHILS NFR BLD: 64.7 %
NONHDLC SERPL-MCNC: 111 MG/DL
PLATELET # BLD AUTO: 208 K/UL
PMV BLD AUTO: 9.3 FL
POTASSIUM SERPL-SCNC: 5.1 MMOL/L
PROT SERPL-MCNC: 7.6 G/DL
RBC # BLD AUTO: 4.52 M/UL
SODIUM SERPL-SCNC: 139 MMOL/L
TRIGL SERPL-MCNC: 84 MG/DL
WBC # BLD AUTO: 6.23 K/UL

## 2017-12-11 PROCEDURE — 80053 COMPREHEN METABOLIC PANEL: CPT | Mod: PO

## 2017-12-11 PROCEDURE — 80061 LIPID PANEL: CPT

## 2017-12-11 PROCEDURE — 85025 COMPLETE CBC W/AUTO DIFF WBC: CPT | Mod: PO

## 2017-12-11 PROCEDURE — 83036 HEMOGLOBIN GLYCOSYLATED A1C: CPT | Mod: PO

## 2017-12-11 PROCEDURE — 36415 COLL VENOUS BLD VENIPUNCTURE: CPT | Mod: PO

## 2017-12-11 PROCEDURE — 84153 ASSAY OF PSA TOTAL: CPT

## 2018-01-03 ENCOUNTER — PATIENT MESSAGE (OUTPATIENT)
Dept: CARDIOLOGY | Facility: CLINIC | Age: 63
End: 2018-01-03

## 2018-02-28 ENCOUNTER — PATIENT MESSAGE (OUTPATIENT)
Dept: CARDIOLOGY | Facility: CLINIC | Age: 63
End: 2018-02-28

## 2018-02-28 ENCOUNTER — DOCUMENTATION ONLY (OUTPATIENT)
Dept: CARDIOLOGY | Facility: CLINIC | Age: 63
End: 2018-02-28

## 2018-02-28 NOTE — PROGRESS NOTES
Patient sent message in basket message. Left foot swollen, purple, blue and cold to touch. Called patient discussed situation. Foot not very painful. Recommended he present to ED for evaluation. All questions answered.

## 2018-03-01 ENCOUNTER — HOSPITAL ENCOUNTER (EMERGENCY)
Facility: HOSPITAL | Age: 63
Discharge: HOME | End: 2018-03-01
Attending: EMERGENCY MEDICINE
Payer: MEDICARE

## 2018-03-01 VITALS
SYSTOLIC BLOOD PRESSURE: 106 MMHG | HEART RATE: 87 BPM | BODY MASS INDEX: 25.73 KG/M2 | TEMPERATURE: 97 F | OXYGEN SATURATION: 95 % | HEIGHT: 72 IN | WEIGHT: 190 LBS | RESPIRATION RATE: 17 BRPM | DIASTOLIC BLOOD PRESSURE: 71 MMHG

## 2018-03-01 DIAGNOSIS — R58 ECCHYMOSIS: Primary | ICD-10-CM

## 2018-03-01 DIAGNOSIS — I71.40 ABDOMINAL AORTIC ANEURYSM (AAA) WITHOUT RUPTURE: ICD-10-CM

## 2018-03-01 DIAGNOSIS — I73.9 PERIPHERAL VASCULAR DISEASE: ICD-10-CM

## 2018-03-01 PROCEDURE — 99283 EMERGENCY DEPT VISIT LOW MDM: CPT

## 2018-03-01 NOTE — ED PROVIDER NOTES
Encounter Date: 3/1/2018       History     Chief Complaint   Patient presents with    Foot Pain     Few days ago my foot started hurting and  turning purple and it is painful i know i have a blockage in my left leg and they tried to stent it but couldnt.     This patient is a 62-year-old male.  Presents to the emergency department complaining of a purple discoloration of his foot.  It is not painful, no fevers, has had some slight swelling.  He is taking Plavix, does not remember bumping or injuring the foot, did not fall or twist his ankle.  He does have other areas of ecchymosis, particularly on his left arm, there is a large patch of ecchymosis.  He said that whenever he bumps do something, he developed an area of bleeding.    He also has a history of peripheral vascular disease.  He said that they attempted to place a stent into his left leg, but were unsuccessful.  I do not have a record of that procedure and the computer that I can locate.  He does have an ultrasound that showed VIV on the right. 67 on the left .32          Review of patient's allergies indicates:  No Known Allergies  Past Medical History:   Diagnosis Date    Arthritis     Blood transfusion     COPD (chronic obstructive pulmonary disease)     Coronary artery disease     Encounter for blood transfusion     Hyperlipidemia 11/7/2013    Hypertension     Occlusion and stenosis of carotid artery without mention of cerebral infarction 1/12/2014    Screening for colon cancer 6/1/2015    Syncope 1/13/2014     Past Surgical History:   Procedure Laterality Date    BACK SURGERY      carotid endarectomy      CORONARY ANGIOPLASTY      SKIN BIOPSY       Family History   Problem Relation Age of Onset    Heart disease Father     Hypertension Father     Heart attack Father     Heart failure Father     No Known Problems Mother     No Known Problems Sister     No Known Problems Brother     No Known Problems Maternal Grandmother     No  Known Problems Maternal Grandfather     No Known Problems Paternal Grandmother     No Known Problems Paternal Grandfather     No Known Problems Maternal Aunt     No Known Problems Maternal Uncle     No Known Problems Paternal Aunt     No Known Problems Paternal Uncle     Anemia Neg Hx     Arrhythmia Neg Hx     Asthma Neg Hx     Clotting disorder Neg Hx     Fainting Neg Hx     Hyperlipidemia Neg Hx      Social History   Substance Use Topics    Smoking status: Former Smoker     Quit date: 10/11/2011    Smokeless tobacco: Former User     Quit date: 11/18/2011    Alcohol use 1.8 oz/week     3 Cans of beer per week      Comment: 3 cans beer every day or every other day     Review of Systems   Constitutional: Negative for chills and fever.   Musculoskeletal: Negative for arthralgias and myalgias.   Hematological: Bruises/bleeds easily.       Physical Exam     Initial Vitals [03/01/18 0946]   BP Pulse Resp Temp SpO2   135/71 96 15 97.4 °F (36.3 °C) 95 %      MAP       92.33         Physical Exam    Nursing note and vitals reviewed.  Constitutional: He appears well-developed and well-nourished. He is not diaphoretic. No distress.   Musculoskeletal: He exhibits edema. He exhibits no tenderness.   On the left foot, there appears to be some dependent ecchymosis.  On the medial aspect of the foot, just below the medial malleolus, there is some subcutaneous ecchymosis that is purple in discoloration, so visible at the base of the third fourth and fifth digits, and the dorsal aspect of the foot.  There is minimal swelling.  There is no tenderness.  I cannot feel pulses.  The foot is not cool to touch.  The toes themselves have normal capillary refill, there is no necrosis of the toes.   Skin: Skin is warm and dry.   Ecchymosis as noted above         ED Course   Procedures  Labs Reviewed - No data to display     Imaging Results          US Lower Extremity Arteries Bilateral (Final result)  Result time 03/01/18  12:31:14   Procedure changed from US Lower Extrem Arteries Bilat with VIV (xpd)     Final result by RADHA Ni Sr., MD (03/01/18 12:31:14)                 Impression:          1. Severe peripheral vascular disease in the right lower extremity. The distal aspect of the right posterior tibial artery is occluded.  2. Moderate peripheral vascular disease in the left lower extremity.  3. The left external iliac artery is occluded.  4. The distal aspect of the aorta has an aneurysm that measures 3.2 cm in AP dimension.      Electronically signed by: RADHA NI MD  Date:     03/01/18  Time:    12:31              Narrative:    Arterial ultrasound examination of the lower extremities with color flow and spectral Doppler imaging    History:     Swelling of the lower extremities; ecchymosis of the lower extremities    Technique: Multiple static images are submitted for interpretation with color flow and spectral Doppler imaging.    Findings:     The following pertains to the right lower extremity. The common femoral, profunda femoral, superficial femoral, and peroneal arteries have monophasic arterial waveforms. The common femoral artery has a peak systolic velocity of 240 cm/s. The popliteal, anterior tibial, and dorsalis pedis arteries have biphasic arterial waveforms. The distal aspect of the posterior tibial artery is occluded.    The following pertains to the left lower extremity. The common femoral, profunda femoral, superficial femoral, popliteal, posterior tibial, peroneal, anterior tibial, and dorsalis pedis arteries have monophasic arterial waveforms.    The distal aspect of the aorta has an aneurysm that measures 3.2 cm in AP dimension. The left external iliac artery is occluded.                               Medical Decision Making:   Initial Assessment:   It looks like the patient has some subcutaneous ecchymosis, this does not look like discoloration due to necrosis.  I suspect that he probably bumped his  foot into something, or had some subcutaneous hemorrhage, causing the ecchymosis.  He has other areas of senile purpura on his arms.  However, he does have peripheral vascular disease, and I do not feel pulses in the foot.  We will obtain an ultrasound arterial study.    12:36 - radiologist interpretation of the arterial ultrasound noted.  He has moderate disease on the left side, and severe disease on the right side.  He does however have flow distally, monophasic and decreased, but there is flow.  He also has a distal abdominal aortic aneurysm demonstrated by ultrasound.  I will refer him to vascular surgery.  He should also follow-up with his primary care physician, Dr. Perez.                      Clinical Impression:   The primary encounter diagnosis was Ecchymosis. Diagnoses of Peripheral vascular disease and Abdominal aortic aneurysm (AAA) without rupture were also pertinent to this visit.    Disposition:   Disposition: Discharged  Condition: Stable                        Demetrius Becerra MD  03/01/18 3396

## 2018-03-01 NOTE — ED NOTES
Pt stated he has issues with his blood he takes plavix. Left foot has minimal red/purple  Discoloration with numbness that is intermittent. Dopplar used to top of left foot no pulse noted. Lateral meadilus + but week (both sides). He started he called his cardiologist and was instructed to come to the ER for an ultrasound.

## 2018-03-01 NOTE — DISCHARGE INSTRUCTIONS
Return for fevers, spreading redness on the leg or foot, increased swelling, pain, or any other problems

## 2018-03-01 NOTE — ED NOTES
Pt foot has minimal discoloration( dark brown)  to top of foot closest to toes at this time. Pt ambulates steady and without difficultly.

## 2018-03-05 ENCOUNTER — OFFICE VISIT (OUTPATIENT)
Dept: INTERNAL MEDICINE | Facility: CLINIC | Age: 63
End: 2018-03-05
Payer: MEDICARE

## 2018-03-05 ENCOUNTER — TELEPHONE (OUTPATIENT)
Dept: INTERNAL MEDICINE | Facility: CLINIC | Age: 63
End: 2018-03-05

## 2018-03-05 VITALS
HEART RATE: 80 BPM | BODY MASS INDEX: 24.95 KG/M2 | WEIGHT: 184.19 LBS | TEMPERATURE: 97 F | DIASTOLIC BLOOD PRESSURE: 70 MMHG | RESPIRATION RATE: 20 BRPM | SYSTOLIC BLOOD PRESSURE: 134 MMHG | HEIGHT: 72 IN

## 2018-03-05 DIAGNOSIS — R07.89 CHEST WALL PAIN: ICD-10-CM

## 2018-03-05 DIAGNOSIS — J44.9 CHRONIC OBSTRUCTIVE PULMONARY DISEASE, UNSPECIFIED COPD TYPE: ICD-10-CM

## 2018-03-05 DIAGNOSIS — I73.9 PAD (PERIPHERAL ARTERY DISEASE): Primary | ICD-10-CM

## 2018-03-05 DIAGNOSIS — I71.40 ABDOMINAL AORTIC ANEURYSM (AAA) WITHOUT RUPTURE: ICD-10-CM

## 2018-03-05 DIAGNOSIS — N52.9 IMPOTENCE: ICD-10-CM

## 2018-03-05 DIAGNOSIS — I25.10 CORONARY ARTERY DISEASE INVOLVING NATIVE CORONARY ARTERY WITHOUT ANGINA PECTORIS, UNSPECIFIED WHETHER NATIVE OR TRANSPLANTED HEART: ICD-10-CM

## 2018-03-05 DIAGNOSIS — I10 ESSENTIAL HYPERTENSION: ICD-10-CM

## 2018-03-05 DIAGNOSIS — E78.00 PURE HYPERCHOLESTEROLEMIA: ICD-10-CM

## 2018-03-05 DIAGNOSIS — N40.0 BENIGN NON-NODULAR PROSTATIC HYPERPLASIA WITHOUT LOWER URINARY TRACT SYMPTOMS: ICD-10-CM

## 2018-03-05 DIAGNOSIS — D69.2 SENILE PURPURA: ICD-10-CM

## 2018-03-05 PROBLEM — R29.898 HIP TIGHTNESS: Status: RESOLVED | Noted: 2017-06-01 | Resolved: 2018-03-05

## 2018-03-05 PROBLEM — M62.9 HAMSTRING TIGHTNESS OF BOTH LOWER EXTREMITIES: Status: RESOLVED | Noted: 2017-06-01 | Resolved: 2018-03-05

## 2018-03-05 PROCEDURE — 99499 UNLISTED E&M SERVICE: CPT | Mod: S$GLB,,, | Performed by: INTERNAL MEDICINE

## 2018-03-05 PROCEDURE — 3075F SYST BP GE 130 - 139MM HG: CPT | Mod: S$GLB,,, | Performed by: INTERNAL MEDICINE

## 2018-03-05 PROCEDURE — 3078F DIAST BP <80 MM HG: CPT | Mod: S$GLB,,, | Performed by: INTERNAL MEDICINE

## 2018-03-05 PROCEDURE — 99999 PR PBB SHADOW E&M-EST. PATIENT-LVL V: CPT | Mod: PBBFAC,,, | Performed by: INTERNAL MEDICINE

## 2018-03-05 PROCEDURE — 99214 OFFICE O/P EST MOD 30 MIN: CPT | Mod: S$GLB,,, | Performed by: INTERNAL MEDICINE

## 2018-03-05 RX ORDER — SILDENAFIL 100 MG/1
100 TABLET, FILM COATED ORAL DAILY PRN
Qty: 12 TABLET | Refills: 6 | Status: SHIPPED | OUTPATIENT
Start: 2018-03-05 | End: 2018-06-11

## 2018-03-05 NOTE — TELEPHONE ENCOUNTER
----- Message from Alma Rosa Yarbrough sent at 3/5/2018  3:26 PM CST -----  Contact: Harleen Palacios [phone]  Pharmacy would like to speak with you about getting an authorization on a prescription sildenafil (VIAGRA) 100 MG tablet. Change to 20 MG to get a cheaper price. Please call and advise.

## 2018-03-05 NOTE — PROGRESS NOTES
Subjective:       Patient ID: Nimesh Nunn is a 62 y.o. male.    Chief Complaint: Chest Pain (mild x 2 days); Follow-up (recent E.R visit); and Results    HPI  Recheck from ER.  Records reviewed. Severe PVD noted, as well as a distal AAA. Pt now with less purpura in his L foot than last week.  Pt denies claudication, but does c/o qhs leg cramps.  Having some L sided CP with inspiration.  Hasn't used NTG in years.  Denies SOB.  C/O impotence.  No abd pain.  Review of Systems   Constitutional: Negative for activity change and unexpected weight change.   HENT: Negative for hearing loss, rhinorrhea and trouble swallowing.    Eyes: Negative for discharge and visual disturbance.   Respiratory: Negative for chest tightness and wheezing.    Cardiovascular: Negative for chest pain and palpitations.   Gastrointestinal: Negative for blood in stool, constipation, diarrhea and vomiting.   Endocrine: Negative for polydipsia and polyuria.   Genitourinary: Negative for difficulty urinating, hematuria and urgency.   Musculoskeletal: Negative for arthralgias, joint swelling and neck pain.   Neurological: Negative for weakness and headaches.   Psychiatric/Behavioral: Negative for confusion and dysphoric mood.   All other systems reviewed and are negative.      Objective:      Physical Exam   Constitutional: He appears well-developed. No distress.   HENT:   Head: Normocephalic.   Eyes: EOM are normal. No scleral icterus.   Neck: Normal range of motion. No tracheal deviation present.   Cardiovascular: Normal rate, regular rhythm and normal heart sounds.    Pulmonary/Chest: Effort normal. No respiratory distress. He has wheezes (scattered). He exhibits tenderness (L chest wall).   Abdominal: He exhibits no distension.   Musculoskeletal: Normal range of motion. He exhibits no edema.   Neurological: He is alert.   Skin: Skin is warm and dry. No rash noted. He is not diaphoretic. No erythema.   Purpura arms, feet   Psychiatric: He has a  normal mood and affect. His behavior is normal.   Vitals reviewed.      Assessment:       1. PAD (peripheral artery disease)    2. Abdominal aortic aneurysm (AAA) without rupture    3. Essential hypertension    4. Chronic obstructive pulmonary disease, unspecified COPD type    5. Benign non-nodular prostatic hyperplasia without lower urinary tract symptoms    6. Impotence    7. Pure hypercholesterolemia    8. Chest wall pain    9. Senile purpura    10. Coronary artery disease involving native coronary artery without angina pectoris, unspecified whether native or transplanted heart        Plan:       Nimesh was seen today for chest pain, follow-up and results.    Diagnoses and all orders for this visit:    PAD (peripheral artery disease)  -     Ambulatory referral to Vascular Surgery    Abdominal aortic aneurysm (AAA) without rupture   Observe    Essential hypertension   Well-cont    Chronic obstructive pulmonary disease, unspecified COPD type   Stable    Benign non-nodular prostatic hyperplasia without lower urinary tract symptoms   Cont rx    Impotence  -     sildenafil (VIAGRA) 100 MG tablet; Take 1 tablet (100 mg total) by mouth daily as needed for Erectile Dysfunction.    Pure hypercholesterolemia   Cont rx    Chest wall pain   Reassurance    Senile purpura   Observe    Coronary artery disease involving native coronary artery without angina pectoris, unspecified whether native or transplanted heart   quiescent    Follow-up if symptoms worsen or fail to improve.

## 2018-03-08 DIAGNOSIS — I73.9 PAD (PERIPHERAL ARTERY DISEASE): Primary | ICD-10-CM

## 2018-03-22 ENCOUNTER — OFFICE VISIT (OUTPATIENT)
Dept: VASCULAR SURGERY | Facility: CLINIC | Age: 63
End: 2018-03-22
Payer: MEDICARE

## 2018-03-22 VITALS
WEIGHT: 183 LBS | SYSTOLIC BLOOD PRESSURE: 148 MMHG | HEART RATE: 81 BPM | HEIGHT: 72 IN | BODY MASS INDEX: 24.79 KG/M2 | TEMPERATURE: 98 F | DIASTOLIC BLOOD PRESSURE: 82 MMHG

## 2018-03-22 DIAGNOSIS — I10 ESSENTIAL HYPERTENSION: ICD-10-CM

## 2018-03-22 DIAGNOSIS — I73.9 PAD (PERIPHERAL ARTERY DISEASE): Primary | ICD-10-CM

## 2018-03-22 PROCEDURE — 3077F SYST BP >= 140 MM HG: CPT | Mod: CPTII,S$GLB,, | Performed by: SURGERY

## 2018-03-22 PROCEDURE — 3079F DIAST BP 80-89 MM HG: CPT | Mod: CPTII,S$GLB,, | Performed by: SURGERY

## 2018-03-22 PROCEDURE — 99999 PR PBB SHADOW E&M-EST. PATIENT-LVL III: CPT | Mod: PBBFAC,,, | Performed by: SURGERY

## 2018-03-22 PROCEDURE — 99499 UNLISTED E&M SERVICE: CPT | Mod: S$GLB,,, | Performed by: SURGERY

## 2018-03-22 PROCEDURE — 99205 OFFICE O/P NEW HI 60 MIN: CPT | Mod: S$GLB,,, | Performed by: SURGERY

## 2018-03-22 NOTE — LETTER
March 22, 2018      Nishant Perez MD  [address]  [address]  [place] [address]           [facility] - Vascular Surgery  [address]  [place] [address]  Phone: [phone]  Fax: [phone]          Patient: Nimesh Nunn   MR Number: [id number]   YOB: 1955   Date of Visit: 3/22/2018       Dear Dr. Nishant Perez:    Thank you for referring Nimesh Nunn to me for evaluation. Attached you will find relevant portions of my assessment and plan of care.    If you have questions, please do not hesitate to call me. I look forward to following Nimesh Nunn along with you.    Sincerely,    Nick Martinez MD    Enclosure  CC:  No Recipients    If you would like to receive this communication electronically, please contact [email or web address] or [phone] to request more information on [facility] access.    For providers and/or their staff who would like to refer a patient to [facility], please contact us through our one-stop-shop provider referral line, [facility], at [phone].    If you feel you have received this communication in error or would no longer like to receive these types of communications, please e-mail [email or web address]

## 2018-03-22 NOTE — PROGRESS NOTES
Nimesh Edouard  03/22/2018    HPI:  Patient is a 62 y.o. male with a h/o HTN, COPD (not on home O2), spine disease (on disability now), hyperilipidemia, known L carotid occlusion, known L subclavian stenosis, ED (on viagra), former tobacco use who is here today at advice of his PMD, Dr Nishant Perez  I saw him last in 2014 for  L thigh leg pain - after ambulating ~ at the time ambulating ~ 1/2 - 1 block.  Now, you can walk ~ 10 mins or ~ 1 block L > R calf claudication  No ischemic rest pain.      Has had several treatments in past by Dr Jl Martin.    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    Past Medical History:   Diagnosis Date    Abdominal aortic aneurysm (AAA) without rupture 3/5/2018    Arthritis     Blood transfusion     COPD (chronic obstructive pulmonary disease)     Coronary artery disease     Encounter for blood transfusion     Hyperlipidemia 11/7/2013    Hypertension     Occlusion and stenosis of carotid artery without mention of cerebral infarction 1/12/2014    Screening for colon cancer 6/1/2015    Syncope 1/13/2014     Past Surgical History:   Procedure Laterality Date    BACK SURGERY      carotid endarectomy      CORONARY ANGIOPLASTY      SKIN BIOPSY       Family History   Problem Relation Age of Onset    Heart disease Father     Hypertension Father     Heart attack Father     Heart failure Father     No Known Problems Mother     No Known Problems Sister     No Known Problems Brother     No Known Problems Maternal Grandmother     No Known Problems Maternal Grandfather     No Known Problems Paternal Grandmother     No Known Problems Paternal  Grandfather     No Known Problems Maternal Aunt     No Known Problems Maternal Uncle     No Known Problems Paternal Aunt     No Known Problems Paternal Uncle     Anemia Neg Hx     Arrhythmia Neg Hx     Asthma Neg Hx     Clotting disorder Neg Hx     Fainting Neg Hx     Hyperlipidemia Neg Hx      Social History     Social History    Marital status:      Spouse name: N/A    Number of children: N/A    Years of education: N/A     Occupational History    Not on file.     Social History Main Topics    Smoking status: Former Smoker     Quit date: 10/11/2011    Smokeless tobacco: Former User     Quit date: 11/18/2011    Alcohol use 1.8 oz/week     3 Cans of beer per week      Comment: 3 cans beer every day or every other day    Drug use: No    Sexual activity: Not on file     Other Topics Concern    Not on file     Social History Narrative    No narrative on file       Current Outpatient Prescriptions:     aspirin (ECOTRIN) 81 MG EC tablet, Take 81 mg by mouth once daily., Disp: , Rfl:     atorvastatin (LIPITOR) 80 MG tablet, Take 1 tablet (80 mg total) by mouth once daily., Disp: 30 tablet, Rfl: 11    cilostazol (PLETAL) 100 MG Tab, Take 1 tablet (100 mg total) by mouth 2 (two) times daily. (Patient taking differently: Take 100 mg by mouth once daily. ), Disp: 60 tablet, Rfl: 11    clopidogrel (PLAVIX) 75 mg tablet, Take 1 tablet (75 mg total) by mouth once daily., Disp: 90 tablet, Rfl: 4    finasteride (PROSCAR) 5 mg tablet, Take 1 tablet (5 mg total) by mouth once daily., Disp: 90 tablet, Rfl: 3    irbesartan (AVAPRO) 75 MG tablet, Take 1 tablet (75 mg total) by mouth once daily., Disp: 90 tablet, Rfl: 3    meloxicam (MOBIC) 15 MG tablet, Take 1 tablet (15 mg total) by mouth once daily., Disp: 90 tablet, Rfl: 3    metoprolol succinate (TOPROL-XL) 50 MG 24 hr tablet, Take 1 tablet (50 mg total) by mouth once daily., Disp: 30 tablet, Rfl: 11    PROAIR HFA 90 mcg/actuation inhaler,  Inhale 2 puffs into the lungs as needed. , Disp: , Rfl:     sildenafil (VIAGRA) 100 MG tablet, Take 1 tablet (100 mg total) by mouth daily as needed for Erectile Dysfunction., Disp: 12 tablet, Rfl: 6    nitroGLYCERIN (NITROSTAT) 0.4 MG SL tablet, Place 1 tablet (0.4 mg total) under the tongue every 5 (five) minutes as needed for Chest pain., Disp: 90 tablet, Rfl: 1    REVIEW OF SYSTEMS:  General: negative; ENT: negative; Allergy and Immunology: negative; Hematological and Lymphatic: Negative; Endocrine: negative; Respiratory: no cough, shortness of breath, or wheezing; Cardiovascular: no chest pain or dyspnea on exertion; Gastrointestinal: no abdominal pain/back, change in bowel habits, or bloody stools; Genito-Urinary: no dysuria, trouble voiding, or hematuria; Musculoskeletal:  Positive for leg pain - cramping in nature - with activity; Neurological: no TIA or stroke symptoms    PHYSICAL EXAM:   Right Arm BP - Sittin/82 (18 1328)  Left Arm BP - Sittin/88 (18 1328)  Pulse: 81  Temp: 97.7 °F (36.5 °C)    General appearance:  Alert, well-appearing, and in no distress.  Oriented to person, place, and time   Neurological: Normal speech, no focal findings noted; CN II - XII grossly intact           Musculoskeletal: Digits/nail without cyanosis/clubbing.  Normal muscle strength/tone.                 Neck: Supple, no significant adenopathy; thyroid is not enlarged                  No carotid bruits can be auscultated                Chest:  Clear to auscultation, no wheezes, rales or rhonchi, symmetric air entry     No use of accessory muscles             Cardiac: Normal rate and regular rhythm, S1 and S2 normal; PMI non-displaced          Abdomen: Soft, nontender, nondistended, no masses or organomegaly     No rebound tenderness noted; bowel sounds normal     Pulsatile aortic mass is not palpable.      Extremities:   Weak 1+ R femoral, non-palpable L femoral pulse     Pedal pulses are  absent     Tissue loss: none     No pre-tibial edema     No cyanosis    LAB RESULTS:  Lab Results   Component Value Date    K 5.1 12/11/2017    K 4.1 10/13/2017    K 4.8 05/17/2017    CREATININE 0.97 12/11/2017    CREATININE 1.2 10/13/2017    CREATININE 1.02 05/17/2017     Lab Results   Component Value Date    WBC 6.23 12/11/2017    WBC 9.80 10/13/2017    WBC 4.32 05/17/2017    HCT 43.2 12/11/2017    HCT 39.6 (L) 10/13/2017    HCT 41.5 05/17/2017     12/11/2017     10/13/2017     05/17/2017     Lab Results   Component Value Date    HGBA1C 4.9 12/11/2017    HGBA1C 5.7 01/12/2014     IMAGING:    Leg arterial u/s March 2018:  1. Severe peripheral vascular disease in the right lower extremity. The distal aspect of the right posterior tibial artery is occluded.  2. Moderate peripheral vascular disease in the left lower extremity.  3. The left external iliac artery is occluded.  4. The distal aspect of the aorta has an aneurysm that measures 3.2 cm in AP dimension.    Aortic u/s: 3.3 cm    Previous:  2014:  PVRs/ABIs  R 0.63: biphasic waveforms  L 0.32: weak biphasic      Carotid u/s:R ICA 40-59% stenosis:  cm/s  L CCA: chronic occluded - ECA fills ICA retrograde. No L ICA stenosis  Normal antegrade vert flow     AAA u/s: 2.4 cm aorta  L EIA has dampened flow, consistent with L EFFIE      Previous:  MRI/MRA brain Jan 2014: Stenosis of R ICA and  L CCA     IMP/PLAN:  62 y.o. male with h/o HTN, COPD (not on home O2), spine disease (on disability now), hyperilipidemia, known L carotid occlusion, known L subclavian stenosis, ED (on viagra), former tobacco use   He has stable [place] chronic limb ischemia class III: stable disabling claudication L thigh.      Has known L EFFIE/EIA : now ambulating 1/2 - 1 block prior to claudication symptoms.  No rest pain  Angio from March 2014: ?evidence for AAA; L EFFIE  - filled L CFA thru [facility].  Seeing Dr VALE Martin for his CAD as well as  BP management and Dr Onofre.    For now, I would cont full medical management / pletal  Long d/w patient to avoid any cuts to L foot; any ulcerations will not heal as he has L EFFIE/EIA CTOs.    Cont ASA, plavix, statin; pletal  * he is more interested in viagra related Qs than PAD, it seems     Small AAA : 3.3 cm; re-assured that this does not need treatment until it is 5.5 cm or if there is rapid growth.  His aorta was 2.4 cm in 2014; re-check in 1 yr w AAA u/s  Check PVRs/ABIs, and bilateral leg arterial u/s in 3 months    Nick Martinez MD FACS  Vascular/Endovascular Surgery

## 2018-03-23 DIAGNOSIS — I73.9 PVD (PERIPHERAL VASCULAR DISEASE): ICD-10-CM

## 2018-03-26 RX ORDER — CILOSTAZOL 100 MG/1
TABLET ORAL
Qty: 60 TABLET | Refills: 11 | Status: ON HOLD | OUTPATIENT
Start: 2018-03-26 | End: 2019-05-13 | Stop reason: SDUPTHER

## 2018-04-03 ENCOUNTER — PATIENT MESSAGE (OUTPATIENT)
Dept: CARDIOLOGY | Facility: CLINIC | Age: 63
End: 2018-04-03

## 2018-04-03 DIAGNOSIS — I71.40 ABDOMINAL AORTIC ANEURYSM (AAA) WITHOUT RUPTURE: Primary | ICD-10-CM

## 2018-04-09 DIAGNOSIS — N40.0 BENIGN NON-NODULAR PROSTATIC HYPERPLASIA WITHOUT LOWER URINARY TRACT SYMPTOMS: ICD-10-CM

## 2018-04-09 RX ORDER — FINASTERIDE 5 MG/1
5 TABLET, FILM COATED ORAL DAILY
Qty: 90 TABLET | Refills: 3 | Status: CANCELLED | OUTPATIENT
Start: 2018-04-09 | End: 2019-04-09

## 2018-05-24 ENCOUNTER — PATIENT MESSAGE (OUTPATIENT)
Dept: CARDIOLOGY | Facility: CLINIC | Age: 63
End: 2018-05-24

## 2018-05-24 DIAGNOSIS — I65.23 ATHEROSCLEROSIS OF BOTH CAROTID ARTERIES: Primary | ICD-10-CM

## 2018-06-09 ENCOUNTER — HOSPITAL ENCOUNTER (EMERGENCY)
Facility: HOSPITAL | Age: 63
Discharge: HOME | End: 2018-06-10
Attending: FAMILY MEDICINE
Payer: MEDICARE

## 2018-06-09 DIAGNOSIS — J98.59 MEDIASTINAL MASS: Primary | ICD-10-CM

## 2018-06-09 DIAGNOSIS — R07.9 CHEST PAIN: ICD-10-CM

## 2018-06-09 LAB
ALBUMIN SERPL BCP-MCNC: 4.7 G/DL
ALP SERPL-CCNC: 72 U/L
ALT SERPL W/O P-5'-P-CCNC: 26 U/L
ANION GAP SERPL CALC-SCNC: 14 MMOL/L
AST SERPL-CCNC: 41 U/L
BASOPHILS # BLD AUTO: 0.04 K/UL
BASOPHILS NFR BLD: 0.6 %
BILIRUB SERPL-MCNC: 0.7 MG/DL
BUN SERPL-MCNC: 45 MG/DL
CALCIUM SERPL-MCNC: 9.4 MG/DL
CHLORIDE SERPL-SCNC: 101 MMOL/L
CO2 SERPL-SCNC: 27 MMOL/L
CREAT SERPL-MCNC: 1.58 MG/DL
DIFFERENTIAL METHOD: ABNORMAL
EOSINOPHIL # BLD AUTO: 0.5 K/UL
EOSINOPHIL NFR BLD: 8.3 %
ERYTHROCYTE [DISTWIDTH] IN BLOOD BY AUTOMATED COUNT: 14.8 %
EST. GFR  (AFRICAN AMERICAN): 52.7 ML/MIN/1.73 M^2
EST. GFR  (NON AFRICAN AMERICAN): 45.5 ML/MIN/1.73 M^2
GLUCOSE SERPL-MCNC: 92 MG/DL
HCT VFR BLD AUTO: 40.6 %
HGB BLD-MCNC: 13.5 G/DL
LYMPHOCYTES # BLD AUTO: 1.5 K/UL
LYMPHOCYTES NFR BLD: 23.1 %
MCH RBC QN AUTO: 30.8 PG
MCHC RBC AUTO-ENTMCNC: 33.3 G/DL
MCV RBC AUTO: 93 FL
MONOCYTES # BLD AUTO: 0.7 K/UL
MONOCYTES NFR BLD: 10.5 %
NEUTROPHILS # BLD AUTO: 3.6 K/UL
NEUTROPHILS NFR BLD: 57.3 %
NT-PROBNP: 64 PG/ML
PLATELET # BLD AUTO: 234 K/UL
PMV BLD AUTO: 8.8 FL
POTASSIUM SERPL-SCNC: 4.3 MMOL/L
PROT SERPL-MCNC: 7.6 G/DL
RBC # BLD AUTO: 4.39 M/UL
SODIUM SERPL-SCNC: 142 MMOL/L
TROPONIN I SERPL DL<=0.01 NG/ML-MCNC: <0.012 NG/ML
WBC # BLD AUTO: 6.27 K/UL

## 2018-06-09 PROCEDURE — 93010 ELECTROCARDIOGRAM REPORT: CPT | Mod: ,,, | Performed by: INTERNAL MEDICINE

## 2018-06-09 PROCEDURE — 94760 N-INVAS EAR/PLS OXIMETRY 1: CPT

## 2018-06-09 PROCEDURE — 25000003 PHARM REV CODE 250: Performed by: FAMILY MEDICINE

## 2018-06-09 PROCEDURE — 93005 ELECTROCARDIOGRAM TRACING: CPT

## 2018-06-09 PROCEDURE — 96365 THER/PROPH/DIAG IV INF INIT: CPT

## 2018-06-09 PROCEDURE — 27000221 HC OXYGEN, UP TO 24 HOURS

## 2018-06-09 PROCEDURE — 85379 FIBRIN DEGRADATION QUANT: CPT

## 2018-06-09 PROCEDURE — 99285 EMERGENCY DEPT VISIT HI MDM: CPT | Mod: 25

## 2018-06-09 PROCEDURE — 85610 PROTHROMBIN TIME: CPT

## 2018-06-09 PROCEDURE — 80053 COMPREHEN METABOLIC PANEL: CPT

## 2018-06-09 PROCEDURE — 96361 HYDRATE IV INFUSION ADD-ON: CPT

## 2018-06-09 PROCEDURE — 83880 ASSAY OF NATRIURETIC PEPTIDE: CPT

## 2018-06-09 PROCEDURE — 85025 COMPLETE CBC W/AUTO DIFF WBC: CPT

## 2018-06-09 PROCEDURE — 84484 ASSAY OF TROPONIN QUANT: CPT

## 2018-06-09 RX ADMIN — SODIUM CHLORIDE 500 ML: 0.9 INJECTION, SOLUTION INTRAVENOUS at 10:06

## 2018-06-10 VITALS
HEART RATE: 87 BPM | TEMPERATURE: 98 F | WEIGHT: 190 LBS | RESPIRATION RATE: 20 BRPM | HEIGHT: 72 IN | BODY MASS INDEX: 25.73 KG/M2 | DIASTOLIC BLOOD PRESSURE: 79 MMHG | SYSTOLIC BLOOD PRESSURE: 154 MMHG | OXYGEN SATURATION: 100 %

## 2018-06-10 LAB
BILIRUB UR QL STRIP: NEGATIVE
CLARITY UR REFRACT.AUTO: CLEAR
COLOR UR AUTO: YELLOW
D DIMER PPP FEU-MCNC: 1996 NG/ML
GLUCOSE UR QL STRIP: NEGATIVE
HGB UR QL STRIP: NEGATIVE
INR PPP: 1
KETONES UR QL STRIP: ABNORMAL
LACTATE SERPL-SCNC: 2 MMOL/L
LEUKOCYTE ESTERASE UR QL STRIP: NEGATIVE
NITRITE UR QL STRIP: NEGATIVE
PH UR STRIP: 5 [PH] (ref 5–8)
PROT UR QL STRIP: ABNORMAL
PROTHROMBIN TIME: 10.6 SEC
SP GR UR STRIP: 1.02 (ref 1–1.03)
TROPONIN I SERPL DL<=0.01 NG/ML-MCNC: <0.012 NG/ML
URN SPEC COLLECT METH UR: ABNORMAL
UROBILINOGEN UR STRIP-ACNC: NEGATIVE EU/DL

## 2018-06-10 PROCEDURE — 63600175 PHARM REV CODE 636 W HCPCS: Performed by: FAMILY MEDICINE

## 2018-06-10 PROCEDURE — 25000003 PHARM REV CODE 250: Performed by: FAMILY MEDICINE

## 2018-06-10 PROCEDURE — 25500020 PHARM REV CODE 255: Performed by: FAMILY MEDICINE

## 2018-06-10 PROCEDURE — 84484 ASSAY OF TROPONIN QUANT: CPT

## 2018-06-10 PROCEDURE — 83605 ASSAY OF LACTIC ACID: CPT

## 2018-06-10 PROCEDURE — 81003 URINALYSIS AUTO W/O SCOPE: CPT

## 2018-06-10 PROCEDURE — 87040 BLOOD CULTURE FOR BACTERIA: CPT | Mod: 59

## 2018-06-10 RX ORDER — AMOXICILLIN AND CLAVULANATE POTASSIUM 875; 125 MG/1; MG/1
1 TABLET, FILM COATED ORAL 2 TIMES DAILY
Qty: 14 TABLET | Refills: 0 | Status: SHIPPED | OUTPATIENT
Start: 2018-06-10 | End: 2018-07-18 | Stop reason: ALTCHOICE

## 2018-06-10 RX ADMIN — SODIUM CHLORIDE 500 ML: 0.9 INJECTION, SOLUTION INTRAVENOUS at 12:06

## 2018-06-10 RX ADMIN — SODIUM CHLORIDE 1000 ML: 0.9 INJECTION, SOLUTION INTRAVENOUS at 02:06

## 2018-06-10 RX ADMIN — PIPERACILLIN SODIUM AND TAZOBACTAM SODIUM 4.5 G: 4; .5 INJECTION, POWDER, FOR SOLUTION INTRAVENOUS at 02:06

## 2018-06-10 RX ADMIN — IOHEXOL 100 ML: 350 INJECTION, SOLUTION INTRAVENOUS at 01:06

## 2018-06-10 NOTE — ED PROVIDER NOTES
"Encounter Date: 6/9/2018       History     Chief Complaint   Patient presents with    Chest Pain     substernal chest pain that started this AM, described as "pressure" with nausea; pain is non radiating and worsens with deep inspiration     Patient complains of retrosternal chest tightness since this morning.  As the tightness was not getting better he came to the ER for evaluation.  Denies any acute respiratory distress. No cough, cold of fever.  Patient has significant coronary artery disease with history of stent placement.  Patient notifies that As he had significant carotid stenosis he could not go for cardiac surgery.      The history is provided by the patient.     Review of patient's allergies indicates:  No Known Allergies  Past Medical History:   Diagnosis Date    Abdominal aortic aneurysm (AAA) without rupture 3/5/2018    Arthritis     Blood transfusion     COPD (chronic obstructive pulmonary disease)     Coronary artery disease     Encounter for blood transfusion     Hyperlipidemia 11/7/2013    Hypertension     Occlusion and stenosis of carotid artery without mention of cerebral infarction 1/12/2014    Screening for colon cancer 6/1/2015    Syncope 1/13/2014     Past Surgical History:   Procedure Laterality Date    BACK SURGERY      carotid endarectomy      CORONARY ANGIOPLASTY      SKIN BIOPSY       Family History   Problem Relation Age of Onset    Heart disease Father     Hypertension Father     Heart attack Father     Heart failure Father     No Known Problems Mother     No Known Problems Sister     No Known Problems Brother     No Known Problems Maternal Grandmother     No Known Problems Maternal Grandfather     No Known Problems Paternal Grandmother     No Known Problems Paternal Grandfather     No Known Problems Maternal Aunt     No Known Problems Maternal Uncle     No Known Problems Paternal Aunt     No Known Problems Paternal Uncle     Anemia Neg Hx     " Arrhythmia Neg Hx     Asthma Neg Hx     Clotting disorder Neg Hx     Fainting Neg Hx     Hyperlipidemia Neg Hx      Social History   Substance Use Topics    Smoking status: Former Smoker     Quit date: 10/11/2011    Smokeless tobacco: Former User     Quit date: 11/18/2011    Alcohol use 1.8 oz/week     3 Cans of beer per week      Comment: 3 cans beer every day or every other day     Review of Systems   Constitutional: Negative for activity change, appetite change and fever.   HENT: Negative for congestion, ear discharge, ear pain, rhinorrhea, sinus pressure and sore throat.    Eyes: Negative for pain, discharge, itching and visual disturbance.   Respiratory: Positive for chest tightness. Negative for cough, shortness of breath and wheezing.    Cardiovascular: Negative for chest pain and palpitations.   Gastrointestinal: Negative for abdominal distention, abdominal pain, diarrhea, nausea and vomiting.   Genitourinary: Negative for dysuria, flank pain and frequency.   Musculoskeletal: Negative for back pain, neck pain and neck stiffness.   Skin: Negative for rash.   Neurological: Negative for dizziness, speech difficulty, weakness, light-headedness, numbness and headaches.   Psychiatric/Behavioral: Negative for confusion and sleep disturbance. The patient is not nervous/anxious.    All other systems reviewed and are negative.      Physical Exam     Initial Vitals [06/09/18 2126]   BP Pulse Resp Temp SpO2   112/73 82 20 97.8 °F (36.6 °C) (!) 94 %      MAP       86         Physical Exam    Nursing note and vitals reviewed.  Constitutional: He appears well-developed and well-nourished.   HENT:   Head: Normocephalic.   Right Ear: External ear normal.   Left Ear: External ear normal.   Nose: Nose normal.   Mouth/Throat: Oropharynx is clear and moist.   Eyes: Conjunctivae and EOM are normal. Pupils are equal, round, and reactive to light.   Neck: Normal range of motion. Neck supple.   Cardiovascular: Normal rate,  regular rhythm, normal heart sounds and intact distal pulses.   Pulmonary/Chest: Breath sounds normal. No respiratory distress. He has no wheezes. He has no rales. He exhibits no tenderness.   Abdominal: Bowel sounds are normal. He exhibits no distension. There is no tenderness. There is no guarding.   Musculoskeletal: Normal range of motion.   Neurological: He is alert and oriented to person, place, and time. He has normal strength and normal reflexes. He displays normal reflexes. No cranial nerve deficit.   Skin: Skin is warm. Capillary refill takes less than 2 seconds. No erythema.   Psychiatric: He has a normal mood and affect.         ED Course   Procedures  Labs Reviewed   CBC W/ AUTO DIFFERENTIAL   COMPREHENSIVE METABOLIC PANEL   PROTIME-INR   TROPONIN I   URINALYSIS     EKG Readings: (Independently Interpreted)   Rhythm: Normal Sinus Rhythm. Heart Rate: 71. Ectopy: No Ectopy. Conduction: Normal. ST Segments: Normal ST Segments. T Waves: Normal. Clinical Impression: Normal Sinus Rhythm       No orders to display        Medical Decision Making:   Initial Assessment:   62-year-old male presents to ER with mild pain in his left chest.  No shortness of breath no cough.  No fever.  He had previous history of coronary artery disease and could not undergo surgery because of his peripheral vascular disease and carotid stenosis.  Differential Diagnosis:   Angina, pneumonia, pleurisy, musculoskeletal chest pain, pulmonary embolism,  Clinical Tests:   Lab Tests: Reviewed and Ordered  ED Management:  Patient runs a low blood pressure in his left arm.  And his D-dimer was elevated hence a CT of chest was done to rule out pulmonary embolism.  Patient was negative on PE but had a large mass in his left hilar area.  Which is compromising the bronchus segment with air entry and circulation.  Patient blood pressure is normal in his right hand.  He has been notified about the findings and we discuss these findings with  - CT surgeon at [facility].  He thinks these are chronic symptoms and can follow up as an outpatient with CT or pulmonary.  Other:   I have discussed this case with another health care provider.       <> Summary of the Discussion: CT surgeon on call  advise him to follow up with outpatient appointment.  Patient is advised to follow up with his primary care physician Dr. Perez and make an appointment with either CT surgery are pulmonary for  further evaluation of mass in his lung.                      Clinical Impression:   The primary encounter diagnosis was Mediastinal mass. A diagnosis of Chest pain was also pertinent to this visit.      Disposition:   Disposition: Discharged  Condition: Dorinda Kwon MD  06/10/18 0705

## 2018-06-10 NOTE — ED NOTES
Spoke with Darya at [facility] for transfer to [facility].

## 2018-06-11 ENCOUNTER — PES CALL (OUTPATIENT)
Dept: ADMINISTRATIVE | Facility: CLINIC | Age: 63
End: 2018-06-11

## 2018-06-11 ENCOUNTER — OFFICE VISIT (OUTPATIENT)
Dept: INTERNAL MEDICINE | Facility: CLINIC | Age: 63
End: 2018-06-11
Payer: MEDICARE

## 2018-06-11 VITALS
BODY MASS INDEX: 25.75 KG/M2 | HEART RATE: 100 BPM | WEIGHT: 190.13 LBS | RESPIRATION RATE: 12 BRPM | SYSTOLIC BLOOD PRESSURE: 124 MMHG | TEMPERATURE: 98 F | DIASTOLIC BLOOD PRESSURE: 80 MMHG | HEIGHT: 72 IN

## 2018-06-11 DIAGNOSIS — J44.9 CHRONIC OBSTRUCTIVE PULMONARY DISEASE, UNSPECIFIED COPD TYPE: ICD-10-CM

## 2018-06-11 DIAGNOSIS — I25.10 CORONARY ARTERY DISEASE INVOLVING NATIVE CORONARY ARTERY WITHOUT ANGINA PECTORIS, UNSPECIFIED WHETHER NATIVE OR TRANSPLANTED HEART: ICD-10-CM

## 2018-06-11 DIAGNOSIS — R91.8 MASS OF LOWER LOBE OF LEFT LUNG: Primary | ICD-10-CM

## 2018-06-11 DIAGNOSIS — M47.816 LUMBAR FACET ARTHROPATHY: ICD-10-CM

## 2018-06-11 DIAGNOSIS — I10 ESSENTIAL HYPERTENSION: ICD-10-CM

## 2018-06-11 PROCEDURE — 99214 OFFICE O/P EST MOD 30 MIN: CPT | Mod: S$GLB,,, | Performed by: INTERNAL MEDICINE

## 2018-06-11 PROCEDURE — 3074F SYST BP LT 130 MM HG: CPT | Mod: CPTII,S$GLB,, | Performed by: INTERNAL MEDICINE

## 2018-06-11 PROCEDURE — 3079F DIAST BP 80-89 MM HG: CPT | Mod: CPTII,S$GLB,, | Performed by: INTERNAL MEDICINE

## 2018-06-11 PROCEDURE — 3008F BODY MASS INDEX DOCD: CPT | Mod: CPTII,S$GLB,, | Performed by: INTERNAL MEDICINE

## 2018-06-11 PROCEDURE — 99999 PR PBB SHADOW E&M-EST. PATIENT-LVL IV: CPT | Mod: PBBFAC,,, | Performed by: INTERNAL MEDICINE

## 2018-06-11 PROCEDURE — 99499 UNLISTED E&M SERVICE: CPT | Mod: S$GLB,,, | Performed by: INTERNAL MEDICINE

## 2018-06-11 RX ORDER — ISOSORBIDE MONONITRATE 60 MG/1
60 TABLET, EXTENDED RELEASE ORAL DAILY
Qty: 90 TABLET | Refills: 3 | Status: SHIPPED | OUTPATIENT
Start: 2018-06-11 | End: 2018-11-12

## 2018-06-11 RX ORDER — ALBUTEROL SULFATE 90 UG/1
2 AEROSOL, METERED RESPIRATORY (INHALATION)
Qty: 18 G | Refills: 6 | Status: ON HOLD | OUTPATIENT
Start: 2018-06-11 | End: 2019-05-13 | Stop reason: SDUPTHER

## 2018-06-11 NOTE — PROGRESS NOTES
Subjective:       Patient ID: Nimesh Nunn is a 62 y.o. male.    Chief Complaint: Follow-up    HPI  Pt went to the ER yesterday with [facility] and was found to have a large LLL mass on a CT angiogram of his lungs.  Pt has a chronic cough nad has POWER.  Denies hemoptysis.  No weight loss.  Review of Systems   All other systems reviewed and are negative.      Objective:      Physical Exam   Constitutional: He appears well-developed. No distress.   HENT:   Head: Normocephalic.   Eyes: EOM are normal. No scleral icterus.   Neck: Normal range of motion. No tracheal deviation present.   Cardiovascular: Normal rate, regular rhythm and normal heart sounds.    Pulmonary/Chest: Effort normal. No respiratory distress.   Poor BSs   Abdominal: He exhibits no distension.   Musculoskeletal: Normal range of motion. He exhibits no edema.   Neurological: He is alert.   Skin: Skin is warm and dry. No rash noted. He is not diaphoretic. No erythema.   Psychiatric: He has a normal mood and affect. His behavior is normal.   Vitals reviewed.      Assessment:       1. Mass of lower lobe of left lung    2. Chronic obstructive pulmonary disease, unspecified COPD type    3. Essential hypertension    4. Lumbar facet arthropathy    5. Coronary artery disease involving native coronary artery without angina pectoris, unspecified whether native or transplanted heart        Plan:       Nimesh was seen today for follow-up.    Diagnoses and all orders for this visit:    Mass of lower lobe of left lung  -     Ambulatory referral to Cardiothoracic Surgery    Chronic obstructive pulmonary disease, unspecified COPD type  -     PROAIR HFA 90 mcg/actuation inhaler; Inhale 2 puffs into the lungs as needed.    Essential hypertension   Well-cont    Lumbar facet arthropathy   Cont rx    Coronary artery disease involving native coronary artery without angina pectoris, unspecified whether native or transplanted heart   Imdur 60 mg qd    Other orders  -      isosorbide mononitrate (IMDUR) 60 MG 24 hr tablet; Take 1 tablet (60 mg total) by mouth once daily.      Follow-up if symptoms worsen or fail to improve.

## 2018-06-12 ENCOUNTER — TELEPHONE (OUTPATIENT)
Dept: INTERNAL MEDICINE | Facility: CLINIC | Age: 63
End: 2018-06-12

## 2018-06-12 ENCOUNTER — PATIENT MESSAGE (OUTPATIENT)
Dept: INTERNAL MEDICINE | Facility: CLINIC | Age: 63
End: 2018-06-12

## 2018-06-12 ENCOUNTER — TELEPHONE (OUTPATIENT)
Dept: CARDIOTHORACIC SURGERY | Facility: CLINIC | Age: 63
End: 2018-06-12

## 2018-06-12 DIAGNOSIS — R91.1 PULMONARY NODULE: Primary | ICD-10-CM

## 2018-06-12 NOTE — TELEPHONE ENCOUNTER
Received referral from Dr. Perez for pt with newly diagnosed left lower lung mass that was seen on CT 6/10/2018.  Spoke with pt and arranged for pt to come in for PFT's before seeing Dr. Poole on 6/13.  Agreeable with dates and appointment times.

## 2018-06-12 NOTE — TELEPHONE ENCOUNTER
----- Message from Fernanda Rodas sent at 6/12/2018 10:33 AM CDT -----  Contact: Pt  Pt is calling to schedule biopsy and can be reached at [phone].    Thank you

## 2018-06-13 ENCOUNTER — HOSPITAL ENCOUNTER (OUTPATIENT)
Dept: PULMONOLOGY | Facility: CLINIC | Age: 63
Discharge: HOME | End: 2018-06-13
Payer: MEDICARE

## 2018-06-13 ENCOUNTER — OFFICE VISIT (OUTPATIENT)
Dept: CARDIOTHORACIC SURGERY | Facility: CLINIC | Age: 63
End: 2018-06-13
Payer: MEDICARE

## 2018-06-13 VITALS
SYSTOLIC BLOOD PRESSURE: 149 MMHG | BODY MASS INDEX: 25.87 KG/M2 | DIASTOLIC BLOOD PRESSURE: 80 MMHG | HEIGHT: 72 IN | OXYGEN SATURATION: 91 % | WEIGHT: 191 LBS | HEART RATE: 111 BPM

## 2018-06-13 DIAGNOSIS — R91.1 PULMONARY NODULE: ICD-10-CM

## 2018-06-13 DIAGNOSIS — R91.8 LUNG MASS: Primary | ICD-10-CM

## 2018-06-13 DIAGNOSIS — R91.8 MASS OF LOWER LOBE OF LEFT LUNG: Primary | ICD-10-CM

## 2018-06-13 DIAGNOSIS — Z87.891 FORMER SMOKER: ICD-10-CM

## 2018-06-13 LAB
POST FEV1 FVC: 0.59
POST FEV1: 1.95
POST FVC: 3.31
PRE FEV1 FVC: 60
PRE FEV1: 1.86
PRE FVC: 3.08
PREDICTED FEV1 FVC: 79
PREDICTED FEV1: 3.62
PREDICTED FVC: 4.52

## 2018-06-13 PROCEDURE — 94729 DIFFUSING CAPACITY: CPT | Mod: S$GLB,,, | Performed by: INTERNAL MEDICINE

## 2018-06-13 PROCEDURE — 99204 OFFICE O/P NEW MOD 45 MIN: CPT | Mod: S$GLB,,, | Performed by: THORACIC SURGERY (CARDIOTHORACIC VASCULAR SURGERY)

## 2018-06-13 PROCEDURE — 3008F BODY MASS INDEX DOCD: CPT | Mod: CPTII,S$GLB,, | Performed by: THORACIC SURGERY (CARDIOTHORACIC VASCULAR SURGERY)

## 2018-06-13 PROCEDURE — 3079F DIAST BP 80-89 MM HG: CPT | Mod: CPTII,S$GLB,, | Performed by: THORACIC SURGERY (CARDIOTHORACIC VASCULAR SURGERY)

## 2018-06-13 PROCEDURE — 99999 PR PBB SHADOW E&M-EST. PATIENT-LVL III: CPT | Mod: PBBFAC,,, | Performed by: THORACIC SURGERY (CARDIOTHORACIC VASCULAR SURGERY)

## 2018-06-13 PROCEDURE — 94010 BREATHING CAPACITY TEST: CPT | Mod: S$GLB,,, | Performed by: INTERNAL MEDICINE

## 2018-06-13 PROCEDURE — 3077F SYST BP >= 140 MM HG: CPT | Mod: CPTII,S$GLB,, | Performed by: THORACIC SURGERY (CARDIOTHORACIC VASCULAR SURGERY)

## 2018-06-13 PROCEDURE — 94727 GAS DIL/WSHOT DETER LNG VOL: CPT | Mod: S$GLB,,, | Performed by: INTERNAL MEDICINE

## 2018-06-13 RX ORDER — CLOPIDOGREL BISULFATE 75 MG/1
75 TABLET ORAL DAILY
COMMUNITY
End: 2018-07-02

## 2018-06-13 NOTE — PROGRESS NOTES
History & Physical    SUBJECTIVE:     History of Present Illness:    Patient is a 62 y.o. male former smoker with MI s/p stent x 4 (most recently 4 years ago), AAA, chronic occulusion of left CCA, PAD, PVD, COPD, HTN, HLD and left hilar and lower lobe lung mass recently found on Chest CTA for w/u of substernal chest pain in the ER. Only w/u to date is a CTA. Poor functional status. C/o POWER with walking 1 isle in [facility]. Exercise tolerance is also limited by leg pain (known PAD/PVD). He has a morning productive cough with clear sputum. Denies hemoptysis. Follows with cardiology and vascular. Denies fever, chills, CP, SOB, nausea, vomiting, appetite or weight changes. On Plavix and pletal.     Former smoker. 70 pack years. Drinks 6 pack of beer most days of the week.   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 14 tablet 0    aspirin (ECOTRIN) 81 MG EC tablet Take 81 mg by mouth once daily.      atorvastatin (LIPITOR) 80 MG tablet Take 1 tablet (80 mg total) by mouth once daily. 30 tablet 11    cilostazol (PLETAL) 100 MG Tab TAKE 1 TABLET BY MOUTH TWICE DAILY 60 tablet 11    clopidogrel (PLAVIX) 75 mg tablet Take 75 mg by mouth once daily.      finasteride (PROSCAR) 5 mg tablet Take 1 tablet (5 mg total) by mouth once daily. 90 tablet 3    irbesartan (AVAPRO) 75 MG tablet Take 1 tablet (75 mg total) by mouth once daily. 90 tablet 3    isosorbide mononitrate (IMDUR) 60 MG 24 hr tablet Take 1 tablet (60 mg total) by mouth once daily. 90 tablet 3    meloxicam (MOBIC) 15 MG tablet Take 1 tablet (15 mg total) by mouth once daily. 90 tablet 3    metoprolol  succinate (TOPROL-XL) 50 MG 24 hr tablet Take 1 tablet (50 mg total) by mouth once daily. 30 tablet 11    PROAIR HFA 90 mcg/actuation inhaler Inhale 2 puffs into the lungs as needed. 18 g 6    nitroGLYCERIN (NITROSTAT) 0.4 MG SL tablet Place 1 tablet (0.4 mg total) under the tongue every 5 (five) minutes as needed for Chest pain. 90 tablet 1     No current facility-administered medications for this visit.        Past Medical History:   Diagnosis Date    Abdominal aortic aneurysm (AAA) without rupture 3/5/2018    Arthritis     Blood transfusion     COPD (chronic obstructive pulmonary disease)     Coronary artery disease     Encounter for blood transfusion     Hyperlipidemia 11/7/2013    Hypertension     Occlusion and stenosis of carotid artery without mention of cerebral infarction 1/12/2014    Screening for colon cancer 6/1/2015    Syncope 1/13/2014     Past Surgical History:   Procedure Laterality Date    BACK SURGERY      carotid endarectomy      CORONARY ANGIOPLASTY      SKIN BIOPSY       Family History   Problem Relation Age of Onset    Heart disease Father     Hypertension Father     Heart attack Father     Heart failure Father     No Known Problems Mother     No Known Problems Sister     No Known Problems Brother     No Known Problems Maternal Grandmother     No Known Problems Maternal Grandfather     No Known Problems Paternal Grandmother     No Known Problems Paternal Grandfather     No Known Problems Maternal Aunt     No Known Problems Maternal Uncle     No Known Problems Paternal Aunt     No Known Problems Paternal Uncle     Anemia Neg Hx     Arrhythmia Neg Hx     Asthma Neg Hx     Clotting disorder Neg Hx     Fainting Neg Hx     Hyperlipidemia Neg Hx      Social History   Substance Use Topics    Smoking status: Former Smoker     Quit date: 10/11/2011    Smokeless tobacco: Former User     Quit date: 11/18/2011    Alcohol use 1.8 oz/week     3 Cans of beer per week       Comment: 3 cans beer every day or every other day        Review of Systems:  Review of Systems   Constitutional: Negative for activity change, appetite change, fatigue and fever.   HENT: Negative for congestion.    Eyes: Negative for pain.   Respiratory: Negative for shortness of breath.    Cardiovascular: Negative for chest pain, palpitations and leg swelling.        +leg pain with exertion    Gastrointestinal: Negative for abdominal pain, nausea and vomiting.   Genitourinary: Negative for difficulty urinating.   Musculoskeletal: Positive for arthralgias and back pain.   Skin: Negative for color change and rash.   Neurological: Negative for dizziness.   Psychiatric/Behavioral: Negative for agitation. The patient is not nervous/anxious.        OBJECTIVE:     Vital Signs (Most Recent)  Pulse: (!) 102 (06/13/18 0924)  BP: (!) 149/80 (06/13/18 0924)  SpO2: (!) 91 % (06/13/18 0924)  6' (1.829 m)  86.6 kg (191 lb)     Physical Exam:  Physical Exam   Constitutional: He is oriented to person, place, and time. He appears well-developed and well-nourished.   HENT:   Head: Normocephalic and atraumatic.   Poor dentition    Eyes: EOM are normal.   Neck: Neck supple. No tracheal deviation present.   Cardiovascular: Normal rate and regular rhythm.    Pulmonary/Chest: Effort normal and breath sounds normal. He has no wheezes. He has no rales.   Abdominal: Soft.   Musculoskeletal: Normal range of motion.   Lymphadenopathy:     He has no cervical adenopathy.   Neurological: He is alert and oriented to person, place, and time.   Skin: Skin is warm and dry.   Psychiatric: He has a normal mood and affect. Thought content normal.   Vitals reviewed.        Carotid US 5/19/17:   There is 20 - 39% right Internal Carotid stenosis.  Known  of Left CCA    Chest CTA 6/10/18:  1. No pulmonary embolus.  2.  4.6 x 6.2 x 7.4 cm masslike opacity in the superior segment of the left lower lobe posterior to the left hilum accounting for the  radiographic abnormality highly suspicious for bronchogenic cancer.  The mass a abruptly occludes the superior segmental bronchus and pulmonary artery.  Left hilar adenopathy.  Recommend a bronchoscopy with biopsy.    PFTs:  FEV1 - 1.86L  52%  DLCO - 24.4   93%      ASSESSMENT/PLAN:     Patient is a 62 y.o. male former smoker with MI s/p stent x 4 (most recently 4 years ago), AAA, chronic occulusion of left CCA, PAD, PVD, COPD, HTN, HLD and left hilar and lower lobe lung mass highly suspicious for primary lung cancer.     PLAN:Plan     Will schedule CT guided biopsy and PET scan to further w/u newly found lung mass.   Will need to hold both pletal and plavix for biopsy   Based on the location of the mass, surgically this would likely require a pneumonectomy. Also concern for the mass relationship to the descending thoracic aorta. If no other sites of disease would still require more extensive work-up given his comorbidities and PFTs.    Will bring the patient back to clinic once the biopsy and PET completed to develop future treatment plan.    ATTENDING ATTESTATION:    I evaluated the patient and I agree with the assessment and plan.  I have a very strong suspicion that the patient may have a locoregionally advanced primary lung cancer.  A tissue diagnosis is essential.  I will arrange a core needle biopsy and a PET scan.  If the biopsy is positive for a primary lung cancer, a brain MRI will also be needed.  The CT scan findings suggest the potential need for left pneumonectomy.  I have concerns about the patient's baseline lung function and his other medical co-morbidities including CAD and CVD.  I will see him in the office once the needle biopsy and PET scan have been completed.  I will also present his case at multidisciplinary lung conference.

## 2018-06-13 NOTE — LETTER
[facility] - Thoracic Surgery  [address]  [facility] [address]  Phone: [phone]  Fax: [phone] June 13, 2018        Nishant Perez MD  [address] [place] [address]  [address]  [facility] [address]    Patient: Nimesh Nunn   MR Number: [id number]   YOB: 1955   Date of Visit: 6/13/2018       Dear Dr. Perez:    Thank you for referring Nimesh Nunn to me for evaluation. Below are the relevant portions of my assessment and plan of care.  He presents with a moderate sized left lower lobe lung mass and hilar adenopathy.  Of note, he has a long smoking history and quit in 2012. I have reviewed his medical records.    I recommend a core needle biopsy and PET scan.  If the biopsy is positive for a primary lung cancer, a brain MRI will also be needed.  The CT scan findings suggest the potential need for left pneumonectomy.  I do not see a plane between the mass and the descending thoracic aorta. I have concerns about the patient's baseline lung function and his other medical co-morbidities including CAD and CVD. I will see him in the office once the needle biopsy and PET scan have been completed.  I will also present his case at multidisciplinary lung conference.    If you have questions, please do not hesitate to call me. I look forward to following Nimesh along with you.    Sincerely,      Alberto Poole MD, IRINEO, FACS  Department of Surgery  Section of Cardiothoracic Surgery

## 2018-06-14 DIAGNOSIS — R91.8 MASS OF LOWER LOBE OF LEFT LUNG: Primary | ICD-10-CM

## 2018-06-15 ENCOUNTER — HOSPITAL ENCOUNTER (OUTPATIENT)
Dept: RADIOLOGY | Facility: HOSPITAL | Age: 63
Discharge: HOME | End: 2018-06-15
Attending: THORACIC SURGERY (CARDIOTHORACIC VASCULAR SURGERY)
Payer: MEDICARE

## 2018-06-15 DIAGNOSIS — R91.8 LUNG MASS: ICD-10-CM

## 2018-06-15 DIAGNOSIS — Z87.891 FORMER SMOKER: ICD-10-CM

## 2018-06-15 PROCEDURE — 78815 PET IMAGE W/CT SKULL-THIGH: CPT | Mod: 26,PI,, | Performed by: RADIOLOGY

## 2018-06-15 PROCEDURE — A9552 F18 FDG: HCPCS

## 2018-06-15 PROCEDURE — 78815 PET IMAGE W/CT SKULL-THIGH: CPT | Mod: TC,PI

## 2018-06-16 LAB
BACTERIA BLD CULT: NORMAL
BACTERIA BLD CULT: NORMAL

## 2018-06-20 ENCOUNTER — DOCUMENTATION ONLY (OUTPATIENT)
Dept: CARDIOTHORACIC SURGERY | Facility: HOSPITAL | Age: 63
End: 2018-06-20

## 2018-06-20 ENCOUNTER — TELEPHONE (OUTPATIENT)
Dept: CARDIOTHORACIC SURGERY | Facility: CLINIC | Age: 63
End: 2018-06-20

## 2018-06-20 DIAGNOSIS — R91.8 LUNG MASS: Primary | ICD-10-CM

## 2018-06-20 DIAGNOSIS — R93.7 ABNORMAL X-RAY OF THORACIC SPINE: ICD-10-CM

## 2018-06-20 DIAGNOSIS — R91.1 LUNG NODULE: Primary | ICD-10-CM

## 2018-06-20 NOTE — PATIENT CARE CONFERENCE
[facility]      THORACIC MULTIDISCIPLINARY TUMOR BOARD  PATIENT REVIEW FORM  ________________________________________________________________________    CLINIC #: [id number]  DATE: 06/20/2018    DIAGNOSIS: Lung Mass      PRESENTER: Dr. Poole     PATIENT SUMMARY: 61 yo male former smoker with PMH of MI s/p stent x 4 (most recently 4 years ago, AAA, chronic occulusion of left CCA, PAD, PVD, COPD, HTN, HLD, and left lower lobe mass recently found on CTA for w/u of substernal chest pain. 70 pack year history. Recent PET scan concerning for metastatic disease with uptake in left hilum, and L1 vertebrae.      BOARD RECOMMENDATIONS: Recommend EBUS for diagnosis and mediastinal staging. If positive for malignancy, patient is not a surgical candidate as tumor abuts descending thoracic aorta and there does not appear to be a tissue plane.  In addition, there appears to be perivascular soft tissue around the upper lobe PA branches in addition to the primary tumor site within the   left lower lobe vessels.  Lastly, there is some PET suggestion that there is a lumbar spine metastasis. Recommend concurrent chemoradiation. Will order brain MRI and thoracic spine MRI for continued staging.     CONSULT NEEDED:     [] Surgery    [] Hem/Onc    [] Rad/Onc    [] Dietary                 [] Social Service    [] Psychology       [x] Pulmonology    Clinical Stage: Tumor- T3  Node(s)- N2  Metastasis- M1b    Pathologic Stage: Tumor  Node(s)  Metastasis     # of nodes removed  stations sampled     Estrogen receptor (ER)  Progesterone receptor (AL)  Ki 67      EGFR status   EML/ALK  Thyroid transcription factor (TTF)  CK7      Other     GROUP STAGE:     [] O    [] 1A    [] IB    [] IIA    [] IIB     [] IIIA     [] IIIB     [] IIIC    [x] IV                               [] Local recurrence     [] Regional recurrence     [] Distant recurrence                   [] NSCLC     [] SCLC     Tumor type-     Unstageable:      [] Yes     []  No  Metastatic site(s):         [x] [facility] Treatment Guidelines reviewed and care planned is consistent with guidelines.         (i.e., NCCN, NCI, PD, ACO, AUA, etc.)    PRESENTATION AT CANCER CONFERENCE:         [] Prospective    [] Retrospective     [] Follow-Up          [] Eligible for clinical trial

## 2018-06-21 ENCOUNTER — OFFICE VISIT (OUTPATIENT)
Dept: PULMONOLOGY | Facility: CLINIC | Age: 63
End: 2018-06-21
Payer: MEDICARE

## 2018-06-21 ENCOUNTER — TELEPHONE (OUTPATIENT)
Dept: PULMONOLOGY | Facility: CLINIC | Age: 63
End: 2018-06-21

## 2018-06-21 VITALS
HEIGHT: 72 IN | DIASTOLIC BLOOD PRESSURE: 68 MMHG | WEIGHT: 190.94 LBS | HEART RATE: 93 BPM | SYSTOLIC BLOOD PRESSURE: 112 MMHG | BODY MASS INDEX: 25.86 KG/M2 | OXYGEN SATURATION: 93 %

## 2018-06-21 DIAGNOSIS — R91.8 LUNG MASS: Primary | ICD-10-CM

## 2018-06-21 DIAGNOSIS — J44.9 CHRONIC OBSTRUCTIVE PULMONARY DISEASE, UNSPECIFIED COPD TYPE: Primary | ICD-10-CM

## 2018-06-21 DIAGNOSIS — R91.8 MASS OF LOWER LOBE OF LEFT LUNG: ICD-10-CM

## 2018-06-21 PROCEDURE — 3074F SYST BP LT 130 MM HG: CPT | Mod: CPTII,S$GLB,, | Performed by: INTERNAL MEDICINE

## 2018-06-21 PROCEDURE — 3078F DIAST BP <80 MM HG: CPT | Mod: CPTII,S$GLB,, | Performed by: INTERNAL MEDICINE

## 2018-06-21 PROCEDURE — 3008F BODY MASS INDEX DOCD: CPT | Mod: CPTII,S$GLB,, | Performed by: INTERNAL MEDICINE

## 2018-06-21 PROCEDURE — 99499 UNLISTED E&M SERVICE: CPT | Mod: S$GLB,,, | Performed by: INTERNAL MEDICINE

## 2018-06-21 PROCEDURE — 99204 OFFICE O/P NEW MOD 45 MIN: CPT | Mod: S$GLB,,, | Performed by: INTERNAL MEDICINE

## 2018-06-21 PROCEDURE — 99999 PR PBB SHADOW E&M-EST. PATIENT-LVL III: CPT | Mod: PBBFAC,,, | Performed by: INTERNAL MEDICINE

## 2018-06-21 RX ORDER — FLUTICASONE PROPIONATE 50 MCG
2 SPRAY, SUSPENSION (ML) NASAL DAILY
Qty: 16 G | Refills: 6 | Status: SHIPPED | OUTPATIENT
Start: 2018-06-21 | End: 2018-07-21

## 2018-06-21 NOTE — LETTER
June 21, 2018      Alberto Poole MD  [address]  [facility] [address]           [facility]  [address]  [facility] [address]  Phone: [phone]          Patient: Nimesh Nunn   MR Number: [id number]   YOB: 1955   Date of Visit: 6/21/2018       Dear Dr. Alberto Poole:    Thank you for referring Nimesh Nunn to me for evaluation. Attached you will find relevant portions of my assessment and plan of care.    If you have questions, please do not hesitate to call me. I look forward to following Nimesh Nunn along with you.    Sincerely,    Liana Serrano MD    Enclosure  CC:  No Recipients    If you would like to receive this communication electronically, please contact [email or web address] or [phone] to request more information on e[employer] access.    For providers and/or their staff who would like to refer a patient to [facility], please contact us through our one-stop-shop provider referral line, [facility], at [phone].    If you feel you have received this communication in error or would no longer like to receive these types of communications, please e-mail [email or web address]

## 2018-06-21 NOTE — PROGRESS NOTES
Subjective:       Patient ID: Nimesh Nunn is a 62 y.o. male.    Chief Complaint: Ebus consult    62 year old with a history of CAD, PVD who presented to ED with chest pressure and chest tightness. CXR revealed suspicious lung mass. Presented at our Multi-D conference and decsion for diagnostic and staging bronchoscopy.  May need MRI of the L spine. No new back pain.  On ASA and Plavix.  Last PCI was three three to four years ago.    Quit smoking 8 years ago.  Review of Systems   Constitutional: Negative for weight loss.   HENT: Negative for trouble swallowing.    Eyes: Negative for itching.   Respiratory: Positive for hemoptysis and dyspnea on extertion (improves with ventolin). Negative for chest tightness and wheezing.    Cardiovascular: Negative for chest pain and leg swelling.   Genitourinary: Negative for difficulty urinating.   Endocrine: Negative for cold intolerance and heat intolerance.    Musculoskeletal: Negative for arthralgias.   Gastrointestinal: Negative for acid reflux.   Neurological: Negative for headaches.   Hematological: Negative for adenopathy.   Psychiatric/Behavioral: Negative for confusion.       Past medical and surgical history reviewed.  Social and family history reviewed.  Allergies and medications reviewed.  On Plavix  Objective:       Vitals:    06/21/18 1444   BP: 112/68   BP Location: Right arm   Patient Position: Sitting   Pulse: 93   SpO2: (!) 93%   Weight: 86.6 kg (190 lb 14.7 oz)   Height: 6' (1.829 m)     Physical Exam   Constitutional: He is oriented to person, place, and time. He appears well-developed and well-nourished. No distress.   HENT:   Head: Normocephalic.   Right Ear: External ear normal.   Left Ear: External ear normal.   Nose: Nose normal.   Mouth/Throat: Oropharynx is clear and moist.   Neck: Normal range of motion. Neck supple. No tracheal deviation present. No thyromegaly present.   Cardiovascular: Normal rate, regular rhythm, normal heart sounds and  intact distal pulses.    Pulmonary/Chest: Normal expansion and symmetric chest wall expansion. He has no wheezes. He has no rales. He exhibits no tenderness.   Abdominal: Soft. Bowel sounds are normal. He exhibits no distension and no mass. There is no hepatosplenomegaly. There is no tenderness.   Musculoskeletal: Normal range of motion.   Lymphadenopathy: No supraclavicular adenopathy is present.     He has no cervical adenopathy.   Neurological: He is alert and oriented to person, place, and time. No cranial nerve deficit.   Psychiatric: He has a normal mood and affect.     Personal Diagnostic Review  PET/CT of chest performed on   revealed LLL hypermetabolic mass with prevascular lymph nodes.  Pulmonary function tests: FEV1: 1.86  (51 % predicted), FVC:  3.08 (68 % predicted), FEV1/FVC:  60, DLCO:  (100 % predicted), Moderate obstruction, normal diffusion.  No flowsheet data found.      Assessment:       1. Chronic obstructive pulmonary disease, unspecified COPD type    2. Mass of lower lobe of left lung        Outpatient Encounter Prescriptions as of 6/21/2018   Medication Sig Dispense Refill    aspirin (ECOTRIN) 81 MG EC tablet Take 81 mg by mouth once daily.      atorvastatin (LIPITOR) 80 MG tablet Take 1 tablet (80 mg total) by mouth once daily. 30 tablet 11    cilostazol (PLETAL) 100 MG Tab TAKE 1 TABLET BY MOUTH TWICE DAILY 60 tablet 11    clopidogrel (PLAVIX) 75 mg tablet Take 75 mg by mouth once daily.      finasteride (PROSCAR) 5 mg tablet Take 1 tablet (5 mg total) by mouth once daily. 90 tablet 3    irbesartan (AVAPRO) 75 MG tablet Take 1 tablet (75 mg total) by mouth once daily. 90 tablet 3    isosorbide mononitrate (IMDUR) 60 MG 24 hr tablet Take 1 tablet (60 mg total) by mouth once daily. 90 tablet 3    meloxicam (MOBIC) 15 MG tablet Take 1 tablet (15 mg total) by mouth once daily. 90 tablet 3    metoprolol succinate (TOPROL-XL) 50 MG 24 hr tablet Take 1 tablet (50 mg total) by mouth once  daily. 30 tablet 11    PROAIR HFA 90 mcg/actuation inhaler Inhale 2 puffs into the lungs as needed. 18 g 6    amoxicillin-clavulanate 875-125mg (AUGMENTIN) 875-125 mg per tablet Take 1 tablet by mouth 2 (two) times daily. 14 tablet 0    fluticasone (FLONASE) 50 mcg/actuation nasal spray 2 sprays (100 mcg total) by Each Nare route once daily. 16 g 6    nitroGLYCERIN (NITROSTAT) 0.4 MG SL tablet Place 1 tablet (0.4 mg total) under the tongue every 5 (five) minutes as needed for Chest pain. 90 tablet 1    umeclidinium-vilanterol (ANORO ELLIPTA) 62.5-25 mcg/actuation DsDv Inhale 1 puff into the lungs once daily. Controller 1 each 5     No facility-administered encounter medications on file as of 6/21/2018.      No orders of the defined types were placed in this encounter.    Plan:     Problem List Items Addressed This Visit     Chronic obstructive pulmonary disease - Primary    Relevant Medications    umeclidinium-vilanterol (ANORO ELLIPTA) 62.5-25 mcg/actuation DsDv    Mass of lower lobe of left lung    Current Assessment & Plan     Mediastinal staging and diagnostic bronchoscopy with EBUS June 26  Patient oriented to procedure and patient verbalized an understanding.  All questions were answered to patient's satisfaction.  Written consent was signed and will be placed on chart.

## 2018-06-21 NOTE — ASSESSMENT & PLAN NOTE
Mediastinal staging and diagnostic bronchoscopy with EBUS June 26  Patient oriented to procedure and patient verbalized an understanding.  All questions were answered to patient's satisfaction.  Written consent was signed and will be placed on chart.

## 2018-06-21 NOTE — PATIENT INSTRUCTIONS
Diagnosing and Staging Lung Cancer     A bronchoscope provides a direct view of the windpipe and bronchial tubes.     If your healthcare provider thinks you may have lung cancer, he or she will most likely order a number of tests. These tests can diagnose lung cancer and reveal the type of cancer, where its located, and if, or how much, it has spread. Test results may also help your healthcare provider plan treatment.  What do the tests show?  Each test result for lung cancer offers a new piece of information. Taken as a whole, the results reveal precise details about your cancer and how advanced it is. For instance, do you have non-small-cell or small-cell lung cancer? How large is the tumor? Where is the tumor located? Are the lymph nodes involved? Has the cancer spread? With these details, you and your healthcare provider can start to plan treatment.  Biopsy  In a biopsy, a small sample of tissue is removed. It can be taken from a tumor in the lung or from other parts of your body. That sample is then studied under a microscope to learn more about your cancer. The following tests can be done to obtain a biopsy.  · During bronchoscopy, a thin, lighted, flexible tube (bronchoscope) goes into the nose and down the windpipe. The healthcare provider then has a direct view of the windpipe and bronchial tubes. Tissue samples may be taken. The walls of the windpipe and bronchial tubes may also be brushed and rinsed. This loosens cells. The cells can then be removed and studied in a lab.  · For an endobronchial ultrasound (EBUS), a bronchoscope is used with ultrasound (image made from sound waves) to look at the lymph nodes and other structures between the lungs. In a similar test known as endoscopic esophageal ultrasound, a scope is passed down the esophagus to look at these structures.   · In a fine-needle aspiration (FNA), a very thin needle is used to remove cells from a tumor. This test is done under local  anesthesia to help prevent pain. During the FNA, you may have a CT scan. This helps the healthcare provider position the needle exactly where it needs to be.  Imaging tests  Pictures from different imaging tests can provide details about your lungs and any tumors they may have. These pictures can also show a tumors location and size. A chest X-ray is one of the most common imaging tests.  Other imaging tests will likely be done. A CT scan is a computer-enhanced X-ray image. A PET scan (positron emission tomography) uses a slightly radioactive liquid (tracer) to find areas where cancer cells are in the body. A PET scan is often done along with a CT scan. It can detect a tumor that may not appear on a chest X-ray. Less often, an MRI may be done. This test uses strong magnets and computers to help form a highly detailed image. You may also have a bone scan or other imaging tests to learn whether the cancer has spread. Your healthcare provider will tell you how to prepare for any tests.  Staging of lung cancer  Staging is a process to measure how advanced the cancer is. Stage 1 is the least advanced. Stage 4 is the most advanced. The following three factors are considered:  · The tumor. How large is it? Has it reached other nearby structures?  · The nearby lymph nodes. Have they been affected? If so, which lymph nodes--the lymph nodes near the tumor, or the lymph nodes in the center of the chest (mediastinal lymph nodes)? If the mediastinal lymph nodes are affected, are both sides affected, or are they only affected on the same side as the tumor?   · Metastasis. Has the cancer spread to other parts of the body?  When planning treatment for small cell lung cancer, healthcare providers are usually more concerned about whether radiation therapy can be used to treat the cancer. These cancers are typically just divided into limited stage disease (which can be treated with radiation) and extensive stage disease (which has  spread too far to be treated with radiation).    Date Last Reviewed: 1/3/2016  © 7320-7273 [employer], [employer]. [address] [place] [address], [place] [address]. All rights reserved. This information is not intended as a substitute for professional medical care. Always follow your healthcare professional's instructions.

## 2018-06-26 ENCOUNTER — SURGERY (OUTPATIENT)
Age: 63
End: 2018-06-26

## 2018-06-26 ENCOUNTER — HOSPITAL ENCOUNTER (OUTPATIENT)
Facility: HOSPITAL | Age: 63
Discharge: HOME | End: 2018-06-26
Attending: INTERNAL MEDICINE | Admitting: INTERNAL MEDICINE
Payer: MEDICARE

## 2018-06-26 ENCOUNTER — ANESTHESIA EVENT (OUTPATIENT)
Dept: SURGERY | Facility: HOSPITAL | Age: 63
End: 2018-06-26
Payer: MEDICARE

## 2018-06-26 ENCOUNTER — ANESTHESIA (OUTPATIENT)
Dept: SURGERY | Facility: HOSPITAL | Age: 63
End: 2018-06-26
Payer: MEDICARE

## 2018-06-26 VITALS
BODY MASS INDEX: 25.86 KG/M2 | DIASTOLIC BLOOD PRESSURE: 67 MMHG | HEIGHT: 72 IN | TEMPERATURE: 98 F | SYSTOLIC BLOOD PRESSURE: 129 MMHG | RESPIRATION RATE: 17 BRPM | OXYGEN SATURATION: 97 % | HEART RATE: 82 BPM | WEIGHT: 190.94 LBS

## 2018-06-26 DIAGNOSIS — R91.8 MASS OF LOWER LOBE OF LEFT LUNG: ICD-10-CM

## 2018-06-26 DIAGNOSIS — J44.9 CHRONIC OBSTRUCTIVE PULMONARY DISEASE, UNSPECIFIED COPD TYPE: ICD-10-CM

## 2018-06-26 PROCEDURE — 71000015 HC POSTOP RECOV 1ST HR: Performed by: INTERNAL MEDICINE

## 2018-06-26 PROCEDURE — 37000009 HC ANESTHESIA EA ADD 15 MINS: Performed by: INTERNAL MEDICINE

## 2018-06-26 PROCEDURE — 31654 BRONCH EBUS IVNTJ PERPH LES: CPT | Mod: ,,, | Performed by: INTERNAL MEDICINE

## 2018-06-26 PROCEDURE — 88305 TISSUE EXAM BY PATHOLOGIST: CPT | Mod: 59 | Performed by: PATHOLOGY

## 2018-06-26 PROCEDURE — 88305 TISSUE EXAM BY PATHOLOGIST: CPT | Mod: 26,,, | Performed by: PATHOLOGY

## 2018-06-26 PROCEDURE — 36000706: Performed by: INTERNAL MEDICINE

## 2018-06-26 PROCEDURE — 88172 CYTP DX EVAL FNA 1ST EA SITE: CPT | Mod: 26,,, | Performed by: PATHOLOGY

## 2018-06-26 PROCEDURE — D9220A PRA ANESTHESIA: Mod: CRNA,ICN,, | Performed by: NURSE ANESTHETIST, CERTIFIED REGISTERED

## 2018-06-26 PROCEDURE — 71000044 HC DOSC ROUTINE RECOVERY FIRST HOUR: Performed by: INTERNAL MEDICINE

## 2018-06-26 PROCEDURE — 25000003 PHARM REV CODE 250: Performed by: NURSE ANESTHETIST, CERTIFIED REGISTERED

## 2018-06-26 PROCEDURE — 25000003 PHARM REV CODE 250: Performed by: INTERNAL MEDICINE

## 2018-06-26 PROCEDURE — 37000008 HC ANESTHESIA 1ST 15 MINUTES: Performed by: INTERNAL MEDICINE

## 2018-06-26 PROCEDURE — 63600175 PHARM REV CODE 636 W HCPCS: Performed by: NURSE ANESTHETIST, CERTIFIED REGISTERED

## 2018-06-26 PROCEDURE — 31653 BRONCH EBUS SAMPLNG 3/> NODE: CPT | Mod: ,,, | Performed by: INTERNAL MEDICINE

## 2018-06-26 PROCEDURE — 88173 CYTOPATH EVAL FNA REPORT: CPT | Mod: 26,,, | Performed by: PATHOLOGY

## 2018-06-26 PROCEDURE — 31629 BRONCHOSCOPY/NEEDLE BX EACH: CPT | Mod: 59,LT,, | Performed by: INTERNAL MEDICINE

## 2018-06-26 PROCEDURE — D9220A PRA ANESTHESIA: Mod: ANES,ICN,, | Performed by: ANESTHESIOLOGY

## 2018-06-26 PROCEDURE — 88342 IMHCHEM/IMCYTCHM 1ST ANTB: CPT | Mod: 26,,, | Performed by: PATHOLOGY

## 2018-06-26 PROCEDURE — 88341 IMHCHEM/IMCYTCHM EA ADD ANTB: CPT | Mod: 26,,, | Performed by: PATHOLOGY

## 2018-06-26 PROCEDURE — 27201423 OPTIME MED/SURG SUP & DEVICES STERILE SUPPLY: Performed by: INTERNAL MEDICINE

## 2018-06-26 PROCEDURE — 36000707: Performed by: INTERNAL MEDICINE

## 2018-06-26 PROCEDURE — 27200651 HC AIRWAY, LMA: Performed by: NURSE ANESTHETIST, CERTIFIED REGISTERED

## 2018-06-26 PROCEDURE — 27800903 OPTIME MED/SURG SUP & DEVICES OTHER IMPLANTS: Performed by: INTERNAL MEDICINE

## 2018-06-26 RX ORDER — GLYCOPYRROLATE 0.2 MG/ML
INJECTION INTRAMUSCULAR; INTRAVENOUS
Status: DISCONTINUED | OUTPATIENT
Start: 2018-06-26 | End: 2018-06-26

## 2018-06-26 RX ORDER — MIDAZOLAM HYDROCHLORIDE 1 MG/ML
INJECTION, SOLUTION INTRAMUSCULAR; INTRAVENOUS
Status: DISCONTINUED | OUTPATIENT
Start: 2018-06-26 | End: 2018-06-26

## 2018-06-26 RX ORDER — PHENYLEPHRINE HYDROCHLORIDE 10 MG/ML
INJECTION INTRAVENOUS
Status: DISCONTINUED | OUTPATIENT
Start: 2018-06-26 | End: 2018-06-26

## 2018-06-26 RX ORDER — SODIUM CHLORIDE 9 MG/ML
INJECTION, SOLUTION INTRAVENOUS CONTINUOUS
Status: DISCONTINUED | OUTPATIENT
Start: 2018-06-26 | End: 2018-06-26

## 2018-06-26 RX ORDER — SODIUM CHLORIDE 9 MG/ML
INJECTION, SOLUTION INTRAVENOUS CONTINUOUS
Status: DISCONTINUED | OUTPATIENT
Start: 2018-06-26 | End: 2018-06-26 | Stop reason: HOSPADM

## 2018-06-26 RX ORDER — LIDOCAINE HCL/PF 100 MG/5ML
SYRINGE (ML) INTRAVENOUS
Status: DISCONTINUED | OUTPATIENT
Start: 2018-06-26 | End: 2018-06-26

## 2018-06-26 RX ORDER — PROPOFOL 10 MG/ML
VIAL (ML) INTRAVENOUS CONTINUOUS PRN
Status: DISCONTINUED | OUTPATIENT
Start: 2018-06-26 | End: 2018-06-26

## 2018-06-26 RX ORDER — SODIUM CHLORIDE 0.9 % (FLUSH) 0.9 %
3 SYRINGE (ML) INJECTION
Status: DISCONTINUED | OUTPATIENT
Start: 2018-06-26 | End: 2018-06-26 | Stop reason: HOSPADM

## 2018-06-26 RX ORDER — LIDOCAINE HYDROCHLORIDE 10 MG/ML
INJECTION, SOLUTION EPIDURAL; INFILTRATION; INTRACAUDAL; PERINEURAL
Status: DISCONTINUED | OUTPATIENT
Start: 2018-06-26 | End: 2018-06-26 | Stop reason: HOSPADM

## 2018-06-26 RX ORDER — LIDOCAINE HYDROCHLORIDE 10 MG/ML
1 INJECTION, SOLUTION EPIDURAL; INFILTRATION; INTRACAUDAL; PERINEURAL ONCE
Status: COMPLETED | OUTPATIENT
Start: 2018-06-26 | End: 2018-06-26

## 2018-06-26 RX ORDER — LIDOCAINE HYDROCHLORIDE 10 MG/ML
1 INJECTION, SOLUTION EPIDURAL; INFILTRATION; INTRACAUDAL; PERINEURAL ONCE
Status: DISCONTINUED | OUTPATIENT
Start: 2018-06-26 | End: 2018-06-26 | Stop reason: HOSPADM

## 2018-06-26 RX ORDER — FENTANYL CITRATE 50 UG/ML
25 INJECTION, SOLUTION INTRAMUSCULAR; INTRAVENOUS EVERY 5 MIN PRN
Status: DISCONTINUED | OUTPATIENT
Start: 2018-06-26 | End: 2018-06-26 | Stop reason: HOSPADM

## 2018-06-26 RX ORDER — PROPOFOL 10 MG/ML
VIAL (ML) INTRAVENOUS
Status: DISCONTINUED | OUTPATIENT
Start: 2018-06-26 | End: 2018-06-26

## 2018-06-26 RX ADMIN — PHENYLEPHRINE HYDROCHLORIDE 100 MCG: 10 INJECTION INTRAVENOUS at 07:06

## 2018-06-26 RX ADMIN — PROPOFOL 80 MG: 10 INJECTION, EMULSION INTRAVENOUS at 07:06

## 2018-06-26 RX ADMIN — LIDOCAINE HYDROCHLORIDE 60 MG: 20 INJECTION, SOLUTION INTRAVENOUS at 07:06

## 2018-06-26 RX ADMIN — GLYCOPYRROLATE 0.2 MG: 0.2 INJECTION, SOLUTION INTRAMUSCULAR; INTRAVENOUS at 07:06

## 2018-06-26 RX ADMIN — MIDAZOLAM HYDROCHLORIDE 2 MG: 1 INJECTION, SOLUTION INTRAMUSCULAR; INTRAVENOUS at 07:06

## 2018-06-26 RX ADMIN — PHENYLEPHRINE HYDROCHLORIDE 100 MCG: 10 INJECTION INTRAVENOUS at 08:06

## 2018-06-26 RX ADMIN — SODIUM CHLORIDE: 0.9 INJECTION, SOLUTION INTRAVENOUS at 06:06

## 2018-06-26 RX ADMIN — LIDOCAINE HYDROCHLORIDE 0.1 MG: 10 INJECTION, SOLUTION EPIDURAL; INFILTRATION; INTRACAUDAL; PERINEURAL at 06:06

## 2018-06-26 RX ADMIN — PROPOFOL 200 MCG/KG/MIN: 10 INJECTION, EMULSION INTRAVENOUS at 07:06

## 2018-06-26 RX ADMIN — LIDOCAINE HYDROCHLORIDE 6 ML: 10 INJECTION, SOLUTION EPIDURAL; INFILTRATION; INTRACAUDAL; PERINEURAL at 07:06

## 2018-06-26 NOTE — INTERVAL H&P NOTE
The patient has been examined and the H&P has been reviewed:    I concur with the findings and no changes have occurred since H&P was written.    Anesthesia/Surgery risks, benefits and alternative options discussed and understood by patient/family.          Active Hospital Problems    Diagnosis  POA    *Mass of lower lobe of left lung [R91.8]  Yes    Chronic obstructive pulmonary disease [J44.9]  Yes      Resolved Hospital Problems    Diagnosis Date Resolved POA   No resolved problems to display.     Diagnostic and staging EBUS today  I have explained the risks, benefits and alternatives of the procedure in detail.  The patient voices understanding and all questions have been answered.  The patient agrees to proceed as planned.

## 2018-06-26 NOTE — DISCHARGE SUMMARY
[facility]  Pulmonology  Discharge Summary      Patient Name: Nimesh Nunn  MRN: [id number]  Admission Date: 6/26/2018  Hospital Length of Stay: 0 days  Discharge Date and Time:  06/26/2018 10:35 AM  Attending Physician: No att. providers found   Discharging Provider: Liana Serrano MD  Primary Care Provider: Nishant Perez MD    HPI: Lung mass, need for bronchoscopy    Procedure(s) (LRB):  ULTRASOUND, ENDOBRONCHIAL (N/A)  BRONCHOSCOPY,FIBEROPTIC (N/A)    Indwelling Lines/Drains at Time of Discharge:   Lines/Drains/Airways          No matching active lines, drains, or airways          Hospital Course: Bronchoscopy and EBUS complete        Significant Labs:  All pertinent labs within the past 24 hours have been reviewed.    Significant Imaging:  I have reviewed all pertinent imaging results/findings within the past 24 hours.    Pending Diagnostic Studies:     None        Final Active Diagnoses:    Diagnosis Date Noted POA    PRINCIPAL PROBLEM:  Mass of lower lobe of left lung [R91.8] 06/11/2018 Yes    Chronic obstructive pulmonary disease [J44.9] 05/16/2016 Yes      Problems Resolved During this Admission:    Diagnosis Date Noted Date Resolved POA       Discharged Condition: stable    Disposition: Home or Self Care    Follow Up:  Follow-up Information     Call in 1 week to follow up.               Patient Instructions:     Diet general       Medications:  Reconciled Home Medications:      Medication List      CONTINUE taking these medications    aspirin 81 MG EC tablet  Commonly known as:  ECOTRIN  Take 81 mg by mouth once daily.     atorvastatin 80 MG tablet  Commonly known as:  LIPITOR  Take 1 tablet (80 mg total) by mouth once daily.     cilostazol 100 MG Tab  Commonly known as:  PLETAL  TAKE 1 TABLET BY MOUTH TWICE DAILY     clopidogrel 75 mg tablet  Commonly known as:  PLAVIX  Take 75 mg by mouth once daily.     finasteride 5 mg tablet  Commonly known as:  PROSCAR  Take 1 tablet (5 mg  total) by mouth once daily.     fluticasone 50 mcg/actuation nasal spray  Commonly known as:  FLONASE  2 sprays (100 mcg total) by Each Nare route once daily.     irbesartan 75 MG tablet  Commonly known as:  AVAPRO  Take 1 tablet (75 mg total) by mouth once daily.     isosorbide mononitrate 60 MG 24 hr tablet  Commonly known as:  IMDUR  Take 1 tablet (60 mg total) by mouth once daily.     meloxicam 15 MG tablet  Commonly known as:  MOBIC  Take 1 tablet (15 mg total) by mouth once daily.     metoprolol succinate 50 MG 24 hr tablet  Commonly known as:  TOPROL-XL  Take 1 tablet (50 mg total) by mouth once daily.     nitroGLYCERIN 0.4 MG SL tablet  Commonly known as:  NITROSTAT  Place 1 tablet (0.4 mg total) under the tongue every 5 (five) minutes as needed for Chest pain.     PROAIR HFA 90 mcg/actuation inhaler  Generic drug:  albuterol  Inhale 2 puffs into the lungs as needed.     umeclidinium-vilanterol 62.5-25 mcg/actuation Dsdv  Commonly known as:  ANORO ELLIPTA  Inhale 1 puff into the lungs once daily. Controller        ASK your doctor about these medications    amoxicillin-clavulanate 875-125mg 875-125 mg per tablet  Commonly known as:  AUGMENTIN  Take 1 tablet by mouth 2 (two) times daily.            Liana Serrano MD  Pulmonology  [facility]

## 2018-06-26 NOTE — ANESTHESIA POSTPROCEDURE EVALUATION
Anesthesia Post Evaluation    Patient: Nimesh Nunn    Procedure(s) Performed: Procedure(s) (LRB):  ULTRASOUND, ENDOBRONCHIAL (N/A)  BRONCHOSCOPY,FIBEROPTIC (N/A)    Final Anesthesia Type: general  Patient location during evaluation: PACU  Patient participation: Yes- Able to Participate  Level of consciousness: awake and alert  Post-procedure vital signs: reviewed and stable  Pain management: adequate  Airway patency: patent  PONV status at discharge: No PONV  Anesthetic complications: no      Cardiovascular status: hemodynamically stable and blood pressure returned to baseline  Respiratory status: unassisted and spontaneous ventilation  Hydration status: euvolemic  Follow-up not needed.        Visit Vitals  /67   Pulse 82   Temp 36.4 °C (97.5 °F) (Temporal)   Resp 17   Ht 6' (1.829 m)   Wt 86.6 kg (190 lb 14.7 oz)   SpO2 97%   BMI 25.89 kg/m²       Pain/Jamila Score: Pain Assessment Performed: Yes (6/26/2018  9:45 AM)  Presence of Pain: denies (6/26/2018  9:45 AM)  Jamila Score: 10 (6/26/2018  9:45 AM)

## 2018-06-26 NOTE — TRANSFER OF CARE
Anesthesia Transfer of Care Note    Patient: Nimesh Nunn    Procedure(s) Performed: Procedure(s) (LRB):  ULTRASOUND, ENDOBRONCHIAL (N/A)  BRONCHOSCOPY,FIBEROPTIC (N/A)    Patient location: PACU    Anesthesia Type: general    Transport from OR: Transported from OR on 6-10 L/min O2 by face mask with adequate spontaneous ventilation    Post pain: adequate analgesia    Post assessment: no apparent anesthetic complications    Post vital signs: stable    Level of consciousness: sedated    Nausea/Vomiting: no nausea/vomiting    Complications: none    Transfer of care protocol was followed      Last vitals:   Visit Vitals  /78   Pulse 81   Temp 36.4 °C (97.5 °F) (Temporal)   Resp 18   Ht 6' (1.829 m)   Wt 86.6 kg (190 lb 14.7 oz)   SpO2 100%   BMI 25.89 kg/m²

## 2018-06-26 NOTE — ANESTHESIA RELEASE NOTE
Anesthesia Release from PACU Note    Anesthesia Release from PACU note    Patient: Nimesh Nunn    Procedure(s) Performed: Procedure(s) (LRB):  ULTRASOUND, ENDOBRONCHIAL (N/A)  BRONCHOSCOPY,FIBEROPTIC (N/A)    Anesthesia type: general    Post pain: Adequate analgesia    Post assessment: no apparent anesthetic complications, tolerated procedure well and no evidence of recall    Last Vitals:   Vitals:    06/26/18 0945   BP: 129/67   Pulse: 82   Resp: 17   Temp:    SpO2: 97%       Post vital signs: stable    Level of consciousness: awake, alert  and oriented    Nausea/Vomiting: no nausea/no vomiting    Complications: none    Airway Patency: patent    Respiratory: unassisted    Cardiovascular: stable and blood pressure at baseline    Hydration: euvolemic

## 2018-06-26 NOTE — ANESTHESIA PREPROCEDURE EVALUATION
06/26/2018  Nimesh Nunn is a 62 y.o., male.    Anesthesia Evaluation         Review of Systems  Anesthesia Hx:  No problems with previous Anesthesia   Social:  Former Smoker, Social Alcohol Use    Cardiovascular:   Hypertension CAD   PVD hyperlipidemia ECG has been reviewed. Stress Test 2016 no indication of ischemia  Plavix   Pulmonary:   COPD Lung Mass   Musculoskeletal:   Arthritis     Neurological:   TIA,        Physical Exam  General:  Well nourished, Large Beard    Airway/Jaw/Neck:  Airway Findings: Mouth Opening: Normal Tongue: Normal  General Airway Assessment: Adult  Mallampati: III  Improves to II with phonation.  TM Distance: Normal, at least 6 cm  Jaw/Neck Findings:     Neck ROM: Normal ROM      Dental:  Dental Findings: In tact   Chest/Lungs:  Chest/Lungs Findings: Clear to auscultation, Normal Respiratory Rate     Heart/Vascular:  Heart Findings: Rate: Normal  Rhythm: Regular Rhythm  Sounds: Normal        Mental Status:  Mental Status Findings:  Cooperative, Alert and Oriented         Anesthesia Plan  Type of Anesthesia, risks & benefits discussed:  Anesthesia Type:  general  Patient's Preference:   Intra-op Monitoring Plan: standard ASA monitors  Intra-op Monitoring Plan Comments:   Post Op Pain Control Plan: multimodal analgesia, per primary service following discharge from PACU and IV/PO Opioids PRN  Post Op Pain Control Plan Comments:   Induction:   IV  Beta Blocker:  Patient is not currently on a Beta-Blocker (No further documentation required).       Informed Consent: Patient understands risks and agrees with Anesthesia plan.  Questions answered. Anesthesia consent signed with patient.  ASA Score: 3     Day of Surgery Review of History & Physical:            Ready For Surgery From Anesthesia Perspective.

## 2018-06-26 NOTE — DISCHARGE INSTRUCTIONS
Endoscopic Diagnosis of Chest, Lung Problems  Youve been told you need an endoscopic procedure to diagnose a problem in your chest or lung. This procedure allows your healthcare provider to view the airway of your lungs and take a tissue sample (biopsy) or treat a lung condition, if needed.     With bronchoscopy, a flexible scope allows the healthcare provider to view and biopsy the airway.   Bronchoscopy  A bronchoscopy allows the healthcare provider to look directly into the airways in your lungs. This is done using a bronchoscope. A bronchoscope is a thin, flexible, hollow, lighted tube that lets the doctor see inside the lung. Tools can be passed down the middle of the scope.  Transbronchial biopsy  Transbronchial biopsy (TBB) is a procedure used mainly to take samples of tissue near the airway. This is done using a bronchoscope and tiny forceps. The forceps are passed through the scope into the airway, and a sample is taken.  Endobronchial ultrasound  Endobronchial ultrasound (EBUS) is a type of bronchoscopy. With EBUS, the lungs and the space between the lungs (mediastinum) are looked at using a flexible bronchoscope and ultrasound (images created using sound waves). Ultrasound guides the healthcare provider and allows him or her to see through the airway walls.  Preparing for the procedure  Before your procedure, do the following:  · Follow your healthcare providers instructions about eating and drinking.  · Tell your healthcare provider about the medicines you take. You may need to stop taking some of them before the procedure, especially aspirin, Coumadin, or other blood thinners.  · Talk with your healthcare provider about any allergies and health problems you have.  · Tell your healthcare provider if you are pregnant.  During the procedure  You will get medicine through an intravenous (IV) line to help you relax and sleep during the procedure. You may also receive local anesthesia (numbing medicine)  with a needle. Then a special spray is used to numb your throat and nose or mouth. This is to help keep you comfortable and prevent coughing during the procedure.  After the procedure  You are sent to the recovery room until the sedation wears off. This takes about 1 to 2 hours. Once you are fully awake, you can go home. You will need an adult family member or friend to drive you home. Your throat will be sore for a day or two. At first, there may be a small amount of blood in your sputum. This is normal. It should go away after the second day.  Risks and complications  · Bleeding  · Infection  · Injury to vocal cords  · Pneumothorax (collapsed lung)   When to call your healthcare provider  · Large amounts of blood in sputum  · Blood in sputum after 2 days  · Shortness of breath  · Chest pain  · Fever of 100.4ºF (38°C) or higher, or as directed by your healthcare provider  · Hoarseness that wont go away   Date Last Reviewed: 11/1/2016  © 7361-6327 [employer]. [address] [place] [address], [place] [address]. All rights reserved. This information is not intended as a substitute for professional medical care. Always follow your healthcare professional's instructions.

## 2018-06-27 ENCOUNTER — HOSPITAL ENCOUNTER (OUTPATIENT)
Dept: RADIOLOGY | Facility: HOSPITAL | Age: 63
Discharge: HOME | End: 2018-06-27
Attending: PHYSICIAN ASSISTANT
Payer: MEDICARE

## 2018-06-27 ENCOUNTER — HOSPITAL ENCOUNTER (OUTPATIENT)
Dept: RADIOLOGY | Facility: HOSPITAL | Age: 63
Discharge: HOME | End: 2018-06-27
Attending: THORACIC SURGERY (CARDIOTHORACIC VASCULAR SURGERY)
Payer: MEDICARE

## 2018-06-27 DIAGNOSIS — R91.1 LUNG NODULE: ICD-10-CM

## 2018-06-27 DIAGNOSIS — R91.8 LUNG MASS: ICD-10-CM

## 2018-06-27 PROCEDURE — 70553 MRI BRAIN STEM W/O & W/DYE: CPT | Mod: TC

## 2018-06-27 PROCEDURE — 25500020 PHARM REV CODE 255: Performed by: PHYSICIAN ASSISTANT

## 2018-06-27 PROCEDURE — A9585 GADOBUTROL INJECTION: HCPCS | Performed by: PHYSICIAN ASSISTANT

## 2018-06-27 PROCEDURE — 70553 MRI BRAIN STEM W/O & W/DYE: CPT | Mod: 26,,, | Performed by: RADIOLOGY

## 2018-06-27 RX ORDER — GADOBUTROL 604.72 MG/ML
9 INJECTION INTRAVENOUS
Status: COMPLETED | OUTPATIENT
Start: 2018-06-27 | End: 2018-06-27

## 2018-06-27 RX ADMIN — GADOBUTROL 9 ML: 604.72 INJECTION INTRAVENOUS at 02:06

## 2018-06-30 DIAGNOSIS — I10 ESSENTIAL HYPERTENSION: ICD-10-CM

## 2018-07-02 ENCOUNTER — PATIENT MESSAGE (OUTPATIENT)
Dept: INTERNAL MEDICINE | Facility: CLINIC | Age: 63
End: 2018-07-02

## 2018-07-02 ENCOUNTER — PATIENT MESSAGE (OUTPATIENT)
Dept: RADIATION ONCOLOGY | Facility: CLINIC | Age: 63
End: 2018-07-02

## 2018-07-02 DIAGNOSIS — C80.1 ADENOCARCINOMA: Primary | ICD-10-CM

## 2018-07-02 DIAGNOSIS — I73.9 PAD (PERIPHERAL ARTERY DISEASE): ICD-10-CM

## 2018-07-02 DIAGNOSIS — I25.10 CORONARY ARTERY DISEASE INVOLVING NATIVE CORONARY ARTERY WITHOUT ANGINA PECTORIS, UNSPECIFIED WHETHER NATIVE OR TRANSPLANTED HEART: ICD-10-CM

## 2018-07-02 DIAGNOSIS — I77.9 RIGHT-SIDED CAROTID ARTERY DISEASE: ICD-10-CM

## 2018-07-02 RX ORDER — METOPROLOL SUCCINATE 50 MG/1
50 TABLET, EXTENDED RELEASE ORAL DAILY
Qty: 90 TABLET | Refills: 3 | Status: SHIPPED | OUTPATIENT
Start: 2018-07-02 | End: 2018-07-18 | Stop reason: SDUPTHER

## 2018-07-02 RX ORDER — CLOPIDOGREL BISULFATE 75 MG/1
TABLET ORAL
Qty: 90 TABLET | Refills: 4 | Status: ON HOLD | OUTPATIENT
Start: 2018-07-02 | End: 2019-05-13 | Stop reason: SDUPTHER

## 2018-07-03 ENCOUNTER — PATIENT MESSAGE (OUTPATIENT)
Dept: PULMONOLOGY | Facility: CLINIC | Age: 63
End: 2018-07-03

## 2018-07-03 ENCOUNTER — TELEPHONE (OUTPATIENT)
Dept: PULMONOLOGY | Facility: CLINIC | Age: 63
End: 2018-07-03

## 2018-07-03 RX ORDER — METOPROLOL SUCCINATE 50 MG/1
TABLET, EXTENDED RELEASE ORAL
Qty: 30 TABLET | Refills: 11 | Status: SHIPPED | OUTPATIENT
Start: 2018-07-03 | End: 2018-07-18

## 2018-07-03 NOTE — TELEPHONE ENCOUNTER
Sent results via [facility] per patient request and left a voicemail to contact us with further questions.      ----- Message from DIANNA Boateng sent at 7/2/2018 11:10 AM CDT -----  Have you spoken to this quang about pathology? I'm working on getting him an oncologist. I'm happy to discuss path with him as well if you'd like.

## 2018-07-06 ENCOUNTER — INITIAL CONSULT (OUTPATIENT)
Dept: RADIATION ONCOLOGY | Facility: CLINIC | Age: 63
End: 2018-07-06
Payer: MEDICARE

## 2018-07-06 VITALS
HEART RATE: 85 BPM | BODY MASS INDEX: 25.48 KG/M2 | RESPIRATION RATE: 19 BRPM | SYSTOLIC BLOOD PRESSURE: 126 MMHG | HEIGHT: 72 IN | DIASTOLIC BLOOD PRESSURE: 68 MMHG | TEMPERATURE: 98 F | WEIGHT: 188.13 LBS

## 2018-07-06 DIAGNOSIS — C34.92 ADENOCARCINOMA OF LEFT LUNG, STAGE 2: ICD-10-CM

## 2018-07-06 PROCEDURE — 99999 PR PBB SHADOW E&M-EST. PATIENT-LVL IV: CPT | Mod: PBBFAC,,, | Performed by: RADIOLOGY

## 2018-07-06 PROCEDURE — 3008F BODY MASS INDEX DOCD: CPT | Mod: CPTII,S$GLB,, | Performed by: RADIOLOGY

## 2018-07-06 PROCEDURE — 3074F SYST BP LT 130 MM HG: CPT | Mod: CPTII,S$GLB,, | Performed by: RADIOLOGY

## 2018-07-06 PROCEDURE — 99205 OFFICE O/P NEW HI 60 MIN: CPT | Mod: S$GLB,,, | Performed by: RADIOLOGY

## 2018-07-06 PROCEDURE — 3078F DIAST BP <80 MM HG: CPT | Mod: CPTII,S$GLB,, | Performed by: RADIOLOGY

## 2018-07-06 NOTE — PROGRESS NOTES
REFERRING PHYSICIAN: Dr. 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      HISTORY OF PRESENT ILLNESS:   Mr. Nunn is a 62 y.o. male former smoker with MI s/p stent x 4 (most recently 4 years ago), AAA, chronic occulusion of left CCA, PAD, PVD, COPD, HTN, HLD and left hilar and lower lobe lung mass recently found on Chest CTA for w/u of substernal chest pain in the ER.  C/o POWER with walking 1 isle in [facility]. Exercise tolerance is also limited by leg pain (known PAD/PVD). He has a morning productive cough with clear sputum. Denies hemoptysis. Follows with cardiology and vascular. Denies fever, chills, CP, SOB, nausea, vomiting, appetite or weight changes. On Plavix and pletal.   He had a PET/CT showing the following:  A mid posteromedial left lung mass SUV max 13.76.  A left hilar metastatic lymph node SUV max 3.26.  An L1 vertebral metastasis SUV max 3.96.  There is a right upper lobe lung nodule low-grade possibly metastatic as well.    MRI brain was negative.    He underwent bronchoscopy/EBUS with biopsies with Dr. Serrano on 18.  An 11L LN was positive for non small cell carcinoma.  The left lower lobe tumor was positive for poorly differentiated adenoCA.  TTF1 was positive.  Molecular studies are pending.      He is not a surgical candidate, and has been referred for nonsurgical management.  He does not have an appt with med onc.    Today, he says he feels ok. He quit smoking in .  Weight is stable, appetite good.  No pain.  Chronic SOB due to COPD.  Possible occasional blood tinged sputum.  He lives [place] [facility] and is asking about treatment options closer to home or assistance.  He is on disability.  Has chronic back pain, nothing new.  He has not had an L spine MRI yet.    ECO  ECOG SCORE         REVIEW OF SYSTEMS:   As above.  In addition, patient denies headaches, visual problems, dizziness, chest pain, nausea, vomiting,  diarrhea, or any new bony pains.  Patient also denies easy bruising, skin rashes, or numbness or tingling.      PAST MEDICAL HISTORY:  Past Medical History:   Diagnosis Date    Abdominal aortic aneurysm (AAA) without rupture 3/5/2018    Arthritis     Blood transfusion     COPD (chronic obstructive pulmonary disease)     Coronary artery disease     Encounter for blood transfusion     Hyperlipidemia 11/7/2013    Hypertension     Occlusion and stenosis of carotid artery without mention of cerebral infarction 1/12/2014    Screening for colon cancer 6/1/2015    Syncope 1/13/2014       PAST SURGICAL HISTORY:  Past Surgical History:   Procedure Laterality Date    BACK SURGERY      carotid endarectomy      CORONARY ANGIOPLASTY      ENDOBRONCHIAL ULTRASOUND N/A 6/26/2018    Procedure: ULTRASOUND, ENDOBRONCHIAL;  Surgeon: Liana Serrano MD;  Location[facility] OR [address] [place] [address];  Service: Cardiothoracic;  Laterality: N/A;    SKIN BIOPSY         ALLERGIES:   Review of patient's allergies indicates:  No Known Allergies    MEDICATIONS:  Current Outpatient Prescriptions   Medication Sig    amoxicillin-clavulanate 875-125mg (AUGMENTIN) 875-125 mg per tablet Take 1 tablet by mouth 2 (two) times daily.    aspirin (ECOTRIN) 81 MG EC tablet Take 81 mg by mouth once daily.    atorvastatin (LIPITOR) 80 MG tablet Take 1 tablet (80 mg total) by mouth once daily.    cilostazol (PLETAL) 100 MG Tab TAKE 1 TABLET BY MOUTH TWICE DAILY    clopidogrel (PLAVIX) 75 mg tablet TAKE 1 TABLET BY MOUTH EVERY DAY    finasteride (PROSCAR) 5 mg tablet Take 1 tablet (5 mg total) by mouth once daily.    fluticasone (FLONASE) 50 mcg/actuation nasal spray 2 sprays (100 mcg total) by Each Nare route once daily.    irbesartan (AVAPRO) 75 MG tablet Take 1 tablet (75 mg total) by mouth once daily.    isosorbide mononitrate (IMDUR) 60 MG 24 hr tablet Take 1 tablet (60 mg total) by mouth once daily.    meloxicam (MOBIC) 15 MG tablet Take 1 tablet (15  mg total) by mouth once daily.    metoprolol succinate (TOPROL-XL) 50 MG 24 hr tablet TAKE 1 TABLET BY MOUTH EVERY DAY    metoprolol succinate (TOPROL-XL) 50 MG 24 hr tablet Take 1 tablet (50 mg total) by mouth once daily.    PROAIR HFA 90 mcg/actuation inhaler Inhale 2 puffs into the lungs as needed.    umeclidinium-vilanterol (ANORO ELLIPTA) 62.5-25 mcg/actuation DsDv Inhale 1 puff into the lungs once daily. Controller    nitroGLYCERIN (NITROSTAT) 0.4 MG SL tablet Place 1 tablet (0.4 mg total) under the tongue every 5 (five) minutes as needed for Chest pain.     No current facility-administered medications for this visit.        SOCIAL HISTORY:  Social History     Social History    Marital status:      Spouse name: N/A    Number of children: N/A    Years of education: N/A     Occupational History    Not on file.     Social History Main Topics    Smoking status: Former Smoker     Quit date: 10/11/2011    Smokeless tobacco: Former User     Quit date: 11/18/2011    Alcohol use 1.8 oz/week     3 Cans of beer per week      Comment: 3 cans beer every day or every other day    Drug use: Yes     Types: Marijuana    Sexual activity: Not on file     Other Topics Concern    Not on file     Social History Narrative    No narrative on file       FAMILY HISTORY:  Family History   Problem Relation Age of Onset    Heart disease Father     Hypertension Father     Heart attack Father     Heart failure Father     No Known Problems Mother     No Known Problems Sister     No Known Problems Brother     No Known Problems Maternal Grandmother     No Known Problems Maternal Grandfather     No Known Problems Paternal Grandmother     No Known Problems Paternal Grandfather     No Known Problems Maternal Aunt     No Known Problems Maternal Uncle     No Known Problems Paternal Aunt     No Known Problems Paternal Uncle     Anemia Neg Hx     Arrhythmia Neg Hx     Asthma Neg Hx     Clotting disorder Neg  Hx     Fainting Neg Hx     Hyperlipidemia Neg Hx          PHYSICAL EXAMINATION:  /68 (BP Location: Right arm, Patient Position: Sitting)   Pulse 85   Temp 97.6 °F (36.4 °C) (Oral)   Resp 19   Ht 6' (1.829 m)   Wt 85.3 kg (188 lb 1.6 oz)   BMI 25.51 kg/m²   GENERAL: Patient is alert and oriented, in no acute distress.  HEENT:Extraocular muscles are intact.  Oropharynx is clear without lesions.    LYMPH: There is no cervical or supraclavicular lymphadenopathy palpated. No axillary LAD.    HEART: Regular rate and rhythm.  LUNGS: Clear to auscultation bilaterally.  ABDOMEN:Soft, nontender, nondistended, without hepatosplenomegaly.  Normoactive bowel sounds.  EXTREMITIES: No clubbing, cyanosis, or edema.  MSK: Spine is nontender.  NEUROLOGICAL: Cranial nerve II through XII grossly intact.  Sensation is intact.  Strength is 5 out of 5 in the upper and lower extremities bilaterally.  Gait is normal.    ASSESSMENT:  [id number] vs M1 (? L1 met) adenocarcinoma of the left lung, stage IIIA vs IV.    PLAN:  I will order an L spine MRI to be done in [place] for staging.  Referred to med onc for consideration of definitive chemoradiotherapy.  Will refer to Karen in  for any options with transportation or lodging.  We discussed treatment at [facility] which may be quicker for him but I don't think they have chemo services there, making logistics difficult.  I will follow up with him in ~2 weeks, after his scans and consultation, to coordinate a treatment plan.    I spent approximately 60 minutes reviewing the available records and evaluating the patient, out of which over 50% of the time was spent face to face with the patient in counseling and coordinating this patient's care.    Distress Screening Results: Psychosocial Distress screening score of Distress Score: 6 noted and reviewed. No intervention indicated.

## 2018-07-06 NOTE — LETTER
July 6, 2018      Clemente Chen MD  [address] [facility] [address]           [facility]  [address]  [facility] [address]  Phone: [phone]          Patient: Nimesh Nunn   MR Number: [id number]   YOB: 1955   Date of Visit: 7/6/2018       Dear Dr. Clemente Chen:    Thank you for referring Nimesh Nunn to me for evaluation. Attached you will find relevant portions of my assessment and plan of care.    If you have questions, please do not hesitate to call me. I look forward to following Nimesh Nunn along with you.    Sincerely,    Jam Perea MD    Enclosure  CC:  No Recipients    If you would like to receive this communication electronically, please contact [email or web address] or [phone] to request more information on [facility] access.    For providers and/or their staff who would like to refer a patient to [facility], please contact us through our one-stop-shop provider referral line, [facility], at [phone].    If you feel you have received this communication in error or would no longer like to receive these types of communications, please e-mail [email or web address]

## 2018-07-10 ENCOUNTER — HOSPITAL ENCOUNTER (OUTPATIENT)
Dept: RADIOLOGY | Facility: HOSPITAL | Age: 63
Discharge: HOME | End: 2018-07-10
Attending: RADIOLOGY
Payer: MEDICARE

## 2018-07-10 DIAGNOSIS — C34.92 ADENOCARCINOMA OF LEFT LUNG, STAGE 2: ICD-10-CM

## 2018-07-10 PROCEDURE — A9585 GADOBUTROL INJECTION: HCPCS | Mod: PO | Performed by: RADIOLOGY

## 2018-07-10 PROCEDURE — 25500020 PHARM REV CODE 255: Mod: PO | Performed by: RADIOLOGY

## 2018-07-10 PROCEDURE — 72158 MRI LUMBAR SPINE W/O & W/DYE: CPT | Mod: TC,PO

## 2018-07-10 RX ORDER — GADOBUTROL 604.72 MG/ML
10 INJECTION INTRAVENOUS
Status: COMPLETED | OUTPATIENT
Start: 2018-07-10 | End: 2018-07-10

## 2018-07-10 RX ADMIN — GADOBUTROL 10 ML: 604.72 INJECTION INTRAVENOUS at 09:07

## 2018-07-11 ENCOUNTER — TELEPHONE (OUTPATIENT)
Dept: CARDIOTHORACIC SURGERY | Facility: CLINIC | Age: 63
End: 2018-07-11

## 2018-07-11 NOTE — TELEPHONE ENCOUNTER
Called and confirmed scheduling appointment to see Dr. Infante on 7/18.  Pt verbalized agreement with date and time and will have transportation provided. Appointment slip mailed.

## 2018-07-12 ENCOUNTER — PATIENT MESSAGE (OUTPATIENT)
Dept: RADIATION ONCOLOGY | Facility: CLINIC | Age: 63
End: 2018-07-12

## 2018-07-12 DIAGNOSIS — E78.5 DYSLIPIDEMIA: ICD-10-CM

## 2018-07-12 RX ORDER — ATORVASTATIN CALCIUM 80 MG/1
TABLET, FILM COATED ORAL
Qty: 30 TABLET | Refills: 11 | Status: SHIPPED | OUTPATIENT
Start: 2018-07-12 | End: 2019-04-01 | Stop reason: SDUPTHER

## 2018-07-18 ENCOUNTER — INITIAL CONSULT (OUTPATIENT)
Dept: HEMATOLOGY/ONCOLOGY | Facility: CLINIC | Age: 63
End: 2018-07-18
Payer: MEDICARE

## 2018-07-18 ENCOUNTER — LAB VISIT (OUTPATIENT)
Dept: LAB | Facility: HOSPITAL | Age: 63
End: 2018-07-18
Attending: INTERNAL MEDICINE
Payer: MEDICARE

## 2018-07-18 VITALS
WEIGHT: 190.5 LBS | RESPIRATION RATE: 16 BRPM | HEIGHT: 72 IN | DIASTOLIC BLOOD PRESSURE: 73 MMHG | OXYGEN SATURATION: 97 % | TEMPERATURE: 98 F | SYSTOLIC BLOOD PRESSURE: 137 MMHG | HEART RATE: 84 BPM | BODY MASS INDEX: 25.8 KG/M2

## 2018-07-18 DIAGNOSIS — C79.51 LUNG CANCER METASTATIC TO BONE: ICD-10-CM

## 2018-07-18 DIAGNOSIS — C34.92 PRIMARY MALIGNANT NEOPLASM OF LEFT LUNG: ICD-10-CM

## 2018-07-18 DIAGNOSIS — C34.90 LUNG CANCER METASTATIC TO BONE: ICD-10-CM

## 2018-07-18 LAB
ALBUMIN SERPL BCP-MCNC: 4 G/DL
ALP SERPL-CCNC: 85 U/L
ALT SERPL W/O P-5'-P-CCNC: 34 U/L
ANION GAP SERPL CALC-SCNC: 8 MMOL/L
AST SERPL-CCNC: 31 U/L
BILIRUB SERPL-MCNC: 0.9 MG/DL
BUN SERPL-MCNC: 37 MG/DL
CALCIUM SERPL-MCNC: 9.7 MG/DL
CHLORIDE SERPL-SCNC: 102 MMOL/L
CO2 SERPL-SCNC: 27 MMOL/L
CREAT SERPL-MCNC: 1.3 MG/DL
ERYTHROCYTE [DISTWIDTH] IN BLOOD BY AUTOMATED COUNT: 14.1 %
EST. GFR  (AFRICAN AMERICAN): >60 ML/MIN/1.73 M^2
EST. GFR  (NON AFRICAN AMERICAN): 57.7 ML/MIN/1.73 M^2
GLUCOSE SERPL-MCNC: 111 MG/DL
HCT VFR BLD AUTO: 40.6 %
HGB BLD-MCNC: 13.7 G/DL
IMM GRANULOCYTES # BLD AUTO: 0.03 K/UL
MAGNESIUM SERPL-MCNC: 3.4 MG/DL
MCH RBC QN AUTO: 31.7 PG
MCHC RBC AUTO-ENTMCNC: 33.7 G/DL
MCV RBC AUTO: 94 FL
NEUTROPHILS # BLD AUTO: 4.3 K/UL
PLATELET # BLD AUTO: 178 K/UL
PMV BLD AUTO: 8.8 FL
POTASSIUM SERPL-SCNC: 4.6 MMOL/L
PROT SERPL-MCNC: 7.6 G/DL
RBC # BLD AUTO: 4.32 M/UL
SODIUM SERPL-SCNC: 137 MMOL/L
WBC # BLD AUTO: 6.58 K/UL

## 2018-07-18 PROCEDURE — 80053 COMPREHEN METABOLIC PANEL: CPT

## 2018-07-18 PROCEDURE — 3075F SYST BP GE 130 - 139MM HG: CPT | Mod: CPTII,S$GLB,, | Performed by: INTERNAL MEDICINE

## 2018-07-18 PROCEDURE — 85027 COMPLETE CBC AUTOMATED: CPT

## 2018-07-18 PROCEDURE — 83735 ASSAY OF MAGNESIUM: CPT

## 2018-07-18 PROCEDURE — 99205 OFFICE O/P NEW HI 60 MIN: CPT | Mod: 25,S$GLB,, | Performed by: INTERNAL MEDICINE

## 2018-07-18 PROCEDURE — 3078F DIAST BP <80 MM HG: CPT | Mod: CPTII,S$GLB,, | Performed by: INTERNAL MEDICINE

## 2018-07-18 PROCEDURE — 99499 UNLISTED E&M SERVICE: CPT | Mod: S$GLB,,, | Performed by: INTERNAL MEDICINE

## 2018-07-18 PROCEDURE — 3008F BODY MASS INDEX DOCD: CPT | Mod: CPTII,S$GLB,, | Performed by: INTERNAL MEDICINE

## 2018-07-18 PROCEDURE — 36415 COLL VENOUS BLD VENIPUNCTURE: CPT

## 2018-07-18 PROCEDURE — 99999 PR PBB SHADOW E&M-EST. PATIENT-LVL IV: CPT | Mod: PBBFAC,,, | Performed by: INTERNAL MEDICINE

## 2018-07-18 PROCEDURE — 96372 THER/PROPH/DIAG INJ SC/IM: CPT | Mod: S$GLB,,, | Performed by: INTERNAL MEDICINE

## 2018-07-18 RX ORDER — ONDANSETRON 8 MG/1
8 TABLET, ORALLY DISINTEGRATING ORAL EVERY 12 HOURS PRN
Qty: 30 TABLET | Refills: 1 | Status: ON HOLD | OUTPATIENT
Start: 2018-07-18 | End: 2019-05-13 | Stop reason: SDUPTHER

## 2018-07-18 RX ORDER — FOLIC ACID 0.4 MG
400 TABLET ORAL DAILY
Qty: 100 TABLET | Refills: 3 | Status: ON HOLD | OUTPATIENT
Start: 2018-07-18 | End: 2019-03-26 | Stop reason: HOSPADM

## 2018-07-18 RX ORDER — SODIUM CHLORIDE 0.9 % (FLUSH) 0.9 %
10 SYRINGE (ML) INJECTION
Status: CANCELLED | OUTPATIENT
Start: 2018-07-30

## 2018-07-18 RX ORDER — CYANOCOBALAMIN 1000 UG/ML
1000 INJECTION, SOLUTION INTRAMUSCULAR; SUBCUTANEOUS
Status: COMPLETED | OUTPATIENT
Start: 2018-07-18 | End: 2018-07-18

## 2018-07-18 RX ORDER — DEXAMETHASONE 4 MG/1
4 TABLET ORAL EVERY 12 HOURS
Qty: 24 TABLET | Refills: 1 | Status: SHIPPED | OUTPATIENT
Start: 2018-07-18 | End: 2018-11-12

## 2018-07-18 RX ORDER — HEPARIN 100 UNIT/ML
500 SYRINGE INTRAVENOUS
Status: CANCELLED | OUTPATIENT
Start: 2018-07-30

## 2018-07-18 RX ORDER — HEPARIN 100 UNIT/ML
500 SYRINGE INTRAVENOUS
Status: CANCELLED | OUTPATIENT
Start: 2018-08-09

## 2018-07-18 RX ORDER — SODIUM CHLORIDE 0.9 % (FLUSH) 0.9 %
10 SYRINGE (ML) INJECTION
Status: CANCELLED | OUTPATIENT
Start: 2018-08-09

## 2018-07-18 RX ADMIN — CYANOCOBALAMIN 1000 MCG: 1000 INJECTION, SOLUTION INTRAMUSCULAR; SUBCUTANEOUS at 03:07

## 2018-07-18 NOTE — Clinical Note
Schedule chemo to coincide with radiation He will get either carboplatin or cisplatin and Alimta every 3 weeks I am awaiting renal function from labs today B12 today Cell free DNA See back with labs and cycle to 3 weeks after cycle 1 He lives in [place] so everything can be done in 1 day

## 2018-07-18 NOTE — PROGRESS NOTES
PATIENT: Nimesh Nunn  MRN: [id number]  DATE: 7/18/2018      Diagnosis:   1. Primary malignant neoplasm of left lung    2. Lung cancer metastatic to bone        Chief Complaint: Lung Cancer      Oncologic History:      Oncologic History Non-small cell lung cancer, left diagnosed 06/2018  Metastatic disease to bone at presentation    Oncologic Treatment     Pathology Poorly differentiated adenocarcinoma, EGFR wild type, No ALK, ROS-1 rearrangements, PD-L1 TPS 90%          Subjective:    Interval History: Mr. Nunn is a 62 y.o. male who is seen as an initial visit for lung cancer.  His history dates to 06/2018 when he sought medical attention for chest pain.  A CT of the chest was performed showing a left lower lobe mass measuring 7.4 cm.  There were also enlarged left hilar lymph nodes. He underwent PET-CT which showed uptake in this mass and also in L1.  He underwent bronchoscopy and biopsy with pathology showing poorly differentiated adenocarcinoma.  Molecular studies showed EGFR wild-type and he did not harbor any ALK or ROS-1 rearrangements.  PD L1 tumor proportions core was 90%.    He states that he has ongoing back pain which has been present for years and he has undergone several back surgeries for this.  He has occasional shortness of breath along with cough including hemoptysis.  His hemoptysis is present mainly in the morning and has been present for the last 2 weeks.  He denies any weight loss. He does have occasional headaches and occasional abdominal pain. He is without other complaints.    Past Medical History:   Past Medical History:   Diagnosis Date    Abdominal aortic aneurysm (AAA) without rupture 3/5/2018    Arthritis     Blood transfusion     COPD (chronic obstructive pulmonary disease)     Coronary artery disease     Encounter for blood transfusion     Hyperlipidemia 11/7/2013    Hypertension     Lung cancer metastatic to bone 7/18/2018    Occlusion and stenosis of carotid  artery without mention of cerebral infarction 1/12/2014    Primary malignant neoplasm of left lung 7/18/2018    Screening for colon cancer 6/1/2015    Syncope 1/13/2014       Past Surgical HIstory:   Past Surgical History:   Procedure Laterality Date    BACK SURGERY      carotid endarectomy      CORONARY ANGIOPLASTY      CORONARY ARTERY BYPASS GRAFT      ENDOBRONCHIAL ULTRASOUND N/A 6/26/2018    Procedure: ULTRASOUND, ENDOBRONCHIAL;  Surgeon: Liana Serrano MD;  Location: [facility] OR [address] [place] [address];  Service: Cardiothoracic;  Laterality: N/A;    SKIN BIOPSY         Family History:   Family History   Problem Relation Age of Onset    Heart disease Father     Hypertension Father     Heart attack Father     Heart failure Father     Cancer Mother     Breast cancer Mother     No Known Problems Sister     No Known Problems Brother     No Known Problems Maternal Grandmother     No Known Problems Maternal Grandfather     No Known Problems Paternal Grandmother     No Known Problems Paternal Grandfather     No Known Problems Maternal Aunt     No Known Problems Maternal Uncle     No Known Problems Paternal Aunt     No Known Problems Paternal Uncle     Anemia Neg Hx     Arrhythmia Neg Hx     Asthma Neg Hx     Clotting disorder Neg Hx     Fainting Neg Hx     Hyperlipidemia Neg Hx        Social History:  reports that he quit smoking about 6 years ago. He has a 60.00 pack-year smoking history. He quit smokeless tobacco use about 6 years ago. He reports that he drinks about 1.8 oz of alcohol per week . He reports that he uses drugs, including Marijuana.    Allergies:  Review of patient's allergies indicates:  No Known Allergies    Medications:  Current Outpatient Prescriptions   Medication Sig Dispense Refill    aspirin (ECOTRIN) 81 MG EC tablet Take 81 mg by mouth once daily.      atorvastatin (LIPITOR) 80 MG tablet TAKE 1 TABLET BY MOUTH EVERY DAY 30 tablet 11    cilostazol (PLETAL) 100 MG Tab TAKE 1 TABLET  BY MOUTH TWICE DAILY 60 tablet 11    clopidogrel (PLAVIX) 75 mg tablet TAKE 1 TABLET BY MOUTH EVERY DAY 90 tablet 4    finasteride (PROSCAR) 5 mg tablet Take 1 tablet (5 mg total) by mouth once daily. 90 tablet 3    fluticasone (FLONASE) 50 mcg/actuation nasal spray 2 sprays (100 mcg total) by Each Nare route once daily. 16 g 6    INV metoprolol tartrate (LOPRESSOR) 50 MG Tab Take 50 mg by mouth 2 (two) times daily. FOR INVESTIGATIONAL USE ONLY      irbesartan (AVAPRO) 75 MG tablet Take 1 tablet (75 mg total) by mouth once daily. 90 tablet 3    isosorbide mononitrate (IMDUR) 60 MG 24 hr tablet Take 1 tablet (60 mg total) by mouth once daily. 90 tablet 3    meloxicam (MOBIC) 15 MG tablet Take 1 tablet (15 mg total) by mouth once daily. 90 tablet 3    nitroGLYCERIN (NITROSTAT) 0.4 MG SL tablet Place 1 tablet (0.4 mg total) under the tongue every 5 (five) minutes as needed for Chest pain. 90 tablet 1    PROAIR HFA 90 mcg/actuation inhaler Inhale 2 puffs into the lungs as needed. 18 g 6    umeclidinium-vilanterol (ANORO ELLIPTA) 62.5-25 mcg/actuation DsDv Inhale 1 puff into the lungs once daily. Controller 1 each 5    dexamethasone (DECADRON) 4 MG Tab Take 1 tablet (4 mg total) by mouth every 12 (twelve) hours. Take 1 tab twice daily starting the day prior to chemotherapy for 3 days. 24 tablet 1    folic acid (FOLVITE) 400 MCG tablet Take 1 tablet (400 mcg total) by mouth once daily. 100 tablet 3    ondansetron (ZOFRAN-ODT) 8 MG TbDL Take 1 tablet (8 mg total) by mouth every 12 (twelve) hours as needed. 30 tablet 1     Current Facility-Administered Medications   Medication Dose Route Frequency Provider Last Rate Last Dose    cyanocobalamin injection 1,000 mcg  1,000 mcg Intramuscular 1 time in Clinic/HOD Demetrius Infante DO, FACP           Review of Systems   Constitutional: Negative for appetite change, chills, fatigue, fever and unexpected weight change.   HENT: Negative for dental problem, sinus  pressure and sneezing.    Eyes: Negative for visual disturbance.   Respiratory: Positive for cough and shortness of breath. Negative for choking and chest tightness.    Cardiovascular: Positive for chest pain. Negative for leg swelling.   Gastrointestinal: Positive for abdominal pain. Negative for blood in stool, constipation, diarrhea and nausea.   Genitourinary: Positive for frequency. Negative for difficulty urinating and dysuria.   Musculoskeletal: Positive for back pain. Negative for arthralgias.   Skin: Negative for rash and wound.   Neurological: Positive for headaches. Negative for dizziness and light-headedness.   Hematological: Negative for adenopathy. Does not bruise/bleed easily.   Psychiatric/Behavioral: Negative for sleep disturbance. The patient is nervous/anxious.        ECOG Performance Status:   ECOG SCORE    1 - Restricted in strenuous activity-ambulatory and able to carry out work of a light nature         Objective:      Vitals:   Vitals:    07/18/18 0812   BP: 137/73   Pulse: 84   Resp: 16   Temp: 97.7 °F (36.5 °C)   TempSrc: Oral   SpO2: 97%   Weight: 86.4 kg (190 lb 7.6 oz)   Height: 6' (1.829 m)     BMI: Body mass index is 25.83 kg/m².    Physical Exam   Constitutional: He is oriented to person, place, and time. He appears well-developed and well-nourished.   HENT:   Head: Normocephalic and atraumatic.   Eyes: Pupils are equal, round, and reactive to light.   Neck: Normal range of motion. Neck supple.   Cardiovascular: Normal rate and regular rhythm.    Pulmonary/Chest: Effort normal. No respiratory distress. He has wheezes.   Abdominal: Soft. He exhibits no distension. There is tenderness.   Musculoskeletal: He exhibits no edema or tenderness.   Lymphadenopathy:     He has no cervical adenopathy.   Neurological: He is alert and oriented to person, place, and time. No cranial nerve deficit.   Skin: Skin is warm and dry.   Psychiatric: He has a normal mood and affect. His behavior is normal.        Laboratory Data:  No visits with results within 1 Week(s) from this visit.   Latest known visit with results is:   Admission on 06/09/2018, Discharged on 06/10/2018   Component Date Value Ref Range Status    WBC 06/09/2018 6.27  3.90 - 12.70 K/uL Final    RBC 06/09/2018 4.39* 4.60 - 6.20 M/uL Final    Hemoglobin 06/09/2018 13.5* 14.0 - 18.0 g/dL Final    Hematocrit 06/09/2018 40.6  40.0 - 54.0 % Final    MCV 06/09/2018 93  82 - 98 fL Final    MCH 06/09/2018 30.8  27.0 - 31.0 pg Final    MCHC 06/09/2018 33.3  32.0 - 36.0 g/dL Final    RDW 06/09/2018 14.8* 11.5 - 14.5 % Final    Platelets 06/09/2018 234  150 - 350 K/uL Final    MPV 06/09/2018 8.8* 9.2 - 12.9 fL Final    Gran # (ANC) 06/09/2018 3.6  1.8 - 7.7 K/uL Final    Lymph # 06/09/2018 1.5  1.0 - 4.8 K/uL Final    Mono # 06/09/2018 0.7  0.3 - 1.0 K/uL Final    Eos # 06/09/2018 0.5  0.0 - 0.5 K/uL Final    Baso # 06/09/2018 0.04  0.00 - 0.20 K/uL Final    Gran% 06/09/2018 57.3  38.0 - 73.0 % Final    Lymph% 06/09/2018 23.1  18.0 - 48.0 % Final    Mono% 06/09/2018 10.5  4.0 - 15.0 % Final    Eosinophil% 06/09/2018 8.3* 0.0 - 8.0 % Final    Basophil% 06/09/2018 0.6  0.0 - 1.9 % Final    Differential Method 06/09/2018 Automated   Final    Sodium 06/09/2018 142  136 - 145 mmol/L Final    Potassium 06/09/2018 4.3  3.5 - 5.1 mmol/L Final    Chloride 06/09/2018 101  95 - 110 mmol/L Final    CO2 06/09/2018 27  23 - 29 mmol/L Final    Glucose 06/09/2018 92  70 - 110 mg/dL Final    BUN, Bld 06/09/2018 45* 2 - 20 mg/dL Final    Creatinine 06/09/2018 1.58* 0.50 - 1.40 mg/dL Final    Calcium 06/09/2018 9.4  8.7 - 10.5 mg/dL Final    Total Protein 06/09/2018 7.6  6.0 - 8.4 g/dL Final    Albumin 06/09/2018 4.7  3.5 - 5.2 g/dL Final    Total Bilirubin 06/09/2018 0.7  0.1 - 1.0 mg/dL Final    Comment: For infants and newborns, interpretation of results should be based  on gestational age, weight and in agreement with  clinical  observations.  Premature Infant recommended reference ranges:  Up to 24 hours.............<8.0 mg/dL  Up to 48 hours............<12.0 mg/dL  3-5 days..................<15.0 mg/dL  6-29 days.................<15.0 mg/dL      Alkaline Phosphatase 06/09/2018 72  38 - 126 U/L Final    AST 06/09/2018 41  15 - 46 U/L Final    ALT 06/09/2018 26  10 - 44 U/L Final    Anion Gap 06/09/2018 14  8 - 16 mmol/L Final    eGFR if  06/09/2018 52.7* >60 mL/min/1.73 m^2 Final    eGFR if non African American 06/09/2018 45.5* >60 mL/min/1.73 m^2 Final    Comment: Calculation used to obtain the estimated glomerular filtration  rate (eGFR) is the CKD-EPI equation.       Prothrombin Time 06/09/2018 10.6  9.0 - 12.5 sec Final    INR 06/09/2018 1.0  0.8 - 1.2 Final    Comment: Coumadin Therapy:  2.0 - 3.0 for INR for all indicators except mechanical heart valves  and antiphospholipid syndromes which should use 2.5 - 3.5.      Troponin I 06/09/2018 <0.012  0.012 - 0.034 ng/mL Final    Specimen UA 06/10/2018 Urine, Clean Catch   Final    Color, UA 06/10/2018 Yellow  Yellow, Straw, Michelle Final    Appearance, UA 06/10/2018 Clear  Clear Final    pH, UA 06/10/2018 5.0  5.0 - 8.0 Final    Specific Gravity, UA 06/10/2018 1.020  1.005 - 1.030 Final    Protein, UA 06/10/2018 Trace* Negative Final    Comment: Recommend a 24 hour urine protein or a urine   protein/creatinine ratio if globulin induced proteinuria is  clinically suspected.      Glucose, UA 06/10/2018 Negative  Negative Final    Ketones, UA 06/10/2018 1+* Negative Final    Bilirubin (UA) 06/10/2018 Negative  Negative Final    Occult Blood UA 06/10/2018 Negative  Negative Final    Nitrite, UA 06/10/2018 Negative  Negative Final    Urobilinogen, UA 06/10/2018 Negative  <2.0 EU/dL Final    Leukocytes, UA 06/10/2018 Negative  Negative Final    NT-proBNP 06/09/2018 64  5 - 900 pg/mL Final    D-Dimer 06/09/2018 1996* <230 ng/mL Final    Comment:  Note: Reference cutoff assigned to aid in exclusion of  DVT/PE in ER patients. Patients with clinically significant   DIC typically have much higher levels.      Troponin I 06/10/2018 <0.012  0.012 - 0.034 ng/mL Final    Blood Culture, Routine 06/10/2018 No growth after 5 days.   Final    Blood Culture, Routine 06/10/2018 No growth after 5 days.   Final    Lactate (Lactic Acid) 06/10/2018 2.0  0.5 - 2.2 mmol/L Final         Imaging:   PET-CT 06/15/2018  EXAMINATION:  NM PET CT ROUTINE    CLINICAL HISTORY:  Lung Mass;  Other nonspecific abnormal finding of lung field    FINDINGS:  Patient was administered 13.05 millicuries of FDG intravenously.    There is a mid posteromedial left lung mass SUV max 13.76.    There is a left hilar metastatic lymph node SUV max 3.26.    There is L1 vertebral metastasis SUV max 3.96.    There is a right upper lobe lung nodule low-grade possibly metastatic as well.    There is physiologic intracranial, head, and neck activity.  Heart mediastinum show nothing unusual.  There are coronary calcifications.  There is physiologic liver, spleen, GI and  activity.  There is a small gallstone in the gallbladder.  There is aortic plaque and DJD.  The adrenal glands are normal.  There is diverticulosis.   Impression       See above    Left lung malignancy with metastatic disease involving the left hilum, L1 vertebral body, and possibly right upper lobe lung metastasis.    Index lesions:    Primary lung mass SUV max 13.76.    Left hilar lymph node SUV max 3.26.    L1 vertebral body SUV max 3.96.            Assessment:       1. Primary malignant neoplasm of left lung    2. Lung cancer metastatic to bone           Plan:     Had a long discussion with Mr. Nunn concerning his disease he has a non-small cell lung cancer, adenocarcinoma, T3 N1 M1b.  He was seen by radiation oncology and also discussed in our multidisciplinary thoracic tumor board.  Since he has what is felt to be  oligometastatic disease and he has preserved performance status the discussion revolved around whether or not to offer him definitive chemo radiation versus systemic therapy alone.  His PD L1 is 90% and therefore he would be a candidate for upfront pembrolizumab single agent.  This was discussed the patient in detail and he wishes to be aggressive and would opt for concurrent chemo radiation.  I have recommended initiation either carboplatin or cisplatin depending his renal function along with pemetrexed.  I have discussed the rationale, alternatives and potential side effects of this treatment with him.  I have given him written information on this medication.  He is agreeable to proceed and has signed an informed consent.  He will receive a B12 injection today.  Will start on folic acid today.  I will send a cell free DNA analysis on him as I am missing several molecular studies.  He will need to be on folic acid for at least 1 week prior to initiation of chemotherapy.  He will need subsequent radiation to his spine.  Additionally, it is noted he has a right upper lobe nodule which we will also need to monitor through the course of his treatment.  Multiple questions were answered and he is agreeable with this plan.      Demetrius Infante DO, FACP  Hematology & Oncology  [facility] [place] [address]  ph. [phone]  Fax. [phone]    60 minutes were spent in coordination of patient's care, record review and counseling.  More than 50% of the time was face-to-face.

## 2018-07-18 NOTE — PATIENT INSTRUCTIONS
Cisplatin injection  What is this medicine?  CISPLATIN (SIS miko tin) is a chemotherapy drug. It targets fast dividing cells, like cancer cells, and causes these cells to die. This medicine is used to treat many types of cancer like bladder, ovarian, and testicular cancers.  How should I use this medicine?  This drug is given as an infusion into a vein. It is administered in a hospital or clinic by a specially trained health care professional.  Talk to your pediatrician regarding the use of this medicine in children. Special care may be needed.  What side effects may I notice from receiving this medicine?  Side effects that you should report to your doctor or health care professional as soon as possible:  · allergic reactions like skin rash, itching or hives, swelling of the face, lips, or tongue  · signs of infection - fever or chills, cough, sore throat, pain or difficulty passing urine  · signs of decreased platelets or bleeding - bruising, pinpoint red spots on the skin, black, tarry stools, nosebleeds  · signs of decreased red blood cells - unusually weak or tired, fainting spells, lightheadedness  · breathing problems  · changes in hearing  · gout pain  · low blood counts - This drug may decrease the number of white blood cells, red blood cells and platelets. You may be at increased risk for infections and bleeding.  · nausea and vomiting  · pain, swelling, redness or irritation at the injection site  · pain, tingling, numbness in the hands or feet  · problems with balance, movement  · trouble passing urine or change in the amount of urine  Side effects that usually do not require medical attention (report to your doctor or health care professional if they continue or are bothersome):  · changes in vision  · loss of appetite  · metallic taste in the mouth or changes in taste  What may interact with this medicine?  · dofetilide  · foscarnet  · medicines for seizures  · medicines to increase blood counts like  filgrastim, pegfilgrastim, sargramostim  · probenecid  · pyridoxine used with altretamine  · rituximab  · some antibiotics like amikacin, gentamicin, neomycin, polymyxin B, streptomycin, tobramycin  · sulfinpyrazone  · vaccines  · zalcitabine  Talk to your doctor or health care professional before taking any of these medicines:  · acetaminophen  · aspirin  · ibuprofen  · ketoprofen  · naproxen  What if I miss a dose?  It is important not to miss a dose. Call your doctor or health care professional if you are unable to keep an appointment.  Where should I keep my medicine?  This drug is given in a hospital or clinic and will not be stored at home.  What should I tell my health care provider before I take this medicine?  They need to know if you have any of these conditions:  · blood disorders  · hearing problems  · kidney disease  · recent or ongoing radiation therapy  · an unusual or allergic reaction to cisplatin, carboplatin, other chemotherapy, other medicines, foods, dyes, or preservatives  · pregnant or trying to get pregnant  · breast-feeding  What should I watch for while using this medicine?  Your condition will be monitored carefully while you are receiving this medicine. You will need important blood work done while you are taking this medicine.  This drug may make you feel generally unwell. This is not uncommon, as chemotherapy can affect healthy cells as well as cancer cells. Report any side effects. Continue your course of treatment even though you feel ill unless your doctor tells you to stop.  In some cases, you may be given additional medicines to help with side effects. Follow all directions for their use.  Call your doctor or health care professional for advice if you get a fever, chills or sore throat, or other symptoms of a cold or flu. Do not treat yourself. This drug decreases your body's ability to fight infections. Try to avoid being around people who are sick.  This medicine may increase  your risk to bruise or bleed. Call your doctor or health care professional if you notice any unusual bleeding.  Be careful brushing and flossing your teeth or using a toothpick because you may get an infection or bleed more easily. If you have any dental work done, tell your dentist you are receiving this medicine.  Avoid taking products that contain aspirin, acetaminophen, ibuprofen, naproxen, or ketoprofen unless instructed by your doctor. These medicines may hide a fever.  Do not become pregnant while taking this medicine. Women should inform their doctor if they wish to become pregnant or think they might be pregnant. There is a potential for serious side effects to an unborn child. Talk to your health care professional or pharmacist for more information. Do not breast-feed an infant while taking this medicine.  Drink fluids as directed while you are taking this medicine. This will help protect your kidneys.  Call your doctor or health care professional if you get diarrhea. Do not treat yourself.  NOTE:This sheet is a summary. It may not cover all possible information. If you have questions about this medicine, talk to your doctor, pharmacist, or health care provider. Copyright© 2017 [employer]        Carboplatin injection  What is this medicine?  CARBOPLATIN (BEATA bradley miko tin) is a chemotherapy drug. It targets fast dividing cells, like cancer cells, and causes these cells to die. This medicine is used to treat ovarian cancer and many other cancers.  How should I use this medicine?  This drug is usually given as an infusion into a vein. It is administered in a hospital or clinic by a specially trained health care professional.  Talk to your pediatrician regarding the use of this medicine in children. Special care may be needed.  What side effects may I notice from receiving this medicine?  Side effects that you should report to your doctor or health care professional as soon as possible:  · allergic reactions  like skin rash, itching or hives, swelling of the face, lips, or tongue  · signs of infection - fever or chills, cough, sore throat, pain or difficulty passing urine  · signs of decreased platelets or bleeding - bruising, pinpoint red spots on the skin, black, tarry stools, nosebleeds  · signs of decreased red blood cells - unusually weak or tired, fainting spells, lightheadedness  · breathing problems  · changes in hearing  · changes in vision  · chest pain  · high blood pressure  · low blood counts - This drug may decrease the number of white blood cells, red blood cells and platelets. You may be at increased risk for infections and bleeding.  · nausea and vomiting  · pain, swelling, redness or irritation at the injection site  · pain, tingling, numbness in the hands or feet  · problems with balance, talking, walking  · trouble passing urine or change in the amount of urine  Side effects that usually do not require medical attention (report to your doctor or health care professional if they continue or are bothersome):  · hair loss  · loss of appetite  · metallic taste in the mouth or changes in taste  What may interact with this medicine?  · medicines for seizures  · medicines to increase blood counts like filgrastim, pegfilgrastim, sargramostim  · some antibiotics like amikacin, gentamicin, neomycin, streptomycin, tobramycin  · vaccines  Talk to your doctor or health care professional before taking any of these medicines:  · acetaminophen  · aspirin  · ibuprofen  · ketoprofen  · naproxen  What if I miss a dose?  It is important not to miss a dose. Call your doctor or health care professional if you are unable to keep an appointment.  Where should I keep my medicine?  This drug is given in a hospital or clinic and will not be stored at home.  What should I tell my health care provider before I take this medicine?  They need to know if you have any of these conditions:  · blood disorders  · hearing  problems  · kidney disease  · recent or ongoing radiation therapy  · an unusual or allergic reaction to carboplatin, cisplatin, other chemotherapy, other medicines, foods, dyes, or preservatives  · pregnant or trying to get pregnant  · breast-feeding  What should I watch for while using this medicine?  Your condition will be monitored carefully while you are receiving this medicine. You will need important blood work done while you are taking this medicine.  This drug may make you feel generally unwell. This is not uncommon, as chemotherapy can affect healthy cells as well as cancer cells. Report any side effects. Continue your course of treatment even though you feel ill unless your doctor tells you to stop.  In some cases, you may be given additional medicines to help with side effects. Follow all directions for their use.  Call your doctor or health care professional for advice if you get a fever, chills or sore throat, or other symptoms of a cold or flu. Do not treat yourself. This drug decreases your body's ability to fight infections. Try to avoid being around people who are sick.  This medicine may increase your risk to bruise or bleed. Call your doctor or health care professional if you notice any unusual bleeding.  Be careful brushing and flossing your teeth or using a toothpick because you may get an infection or bleed more easily. If you have any dental work done, tell your dentist you are receiving this medicine.  Avoid taking products that contain aspirin, acetaminophen, ibuprofen, naproxen, or ketoprofen unless instructed by your doctor. These medicines may hide a fever.  Do not become pregnant while taking this medicine. Women should inform their doctor if they wish to become pregnant or think they might be pregnant. There is a potential for serious side effects to an unborn child. Talk to your health care professional or pharmacist for more information. Do not breast-feed an infant while taking this  medicine.  NOTE:This sheet is a summary. It may not cover all possible information. If you have questions about this medicine, talk to your doctor, pharmacist, or health care provider. Copyright© 2017 [employer]        Pemetrexed injection  What is this medicine?  PEMETREXED (PEM e TREX ed) is a chemotherapy drug. This medicine affects cells that are rapidly growing, such as cancer cells and cells in your mouth and stomach. It is usually used to treat lung cancers like non-small cell lung cancer and mesothelioma. It may also be used to treat other cancers.  How should I use this medicine?  This drug is given as an infusion into a vein. It is administered in a hospital or clinic by a specially trained health care professional.  Talk to your pediatrician regarding the use of this medicine in children. Special care may be needed.  What side effects may I notice from receiving this medicine?  Side effects that you should report to your doctor or health care professional as soon as possible:  · allergic reactions like skin rash, itching or hives, swelling of the face, lips, or tongue  · low blood counts - this medicine may decrease the number of white blood cells, red blood cells and platelets. You may be at increased risk for infections and bleeding.  · signs of infection - fever or chills, cough, sore throat, pain or difficulty passing urine  · signs of decreased platelets or bleeding - bruising, pinpoint red spots on the skin, black, tarry stools, blood in the urine  · signs of decreased red blood cells - unusually weak or tired, fainting spells, lightheadedness  · breathing problems, like a dry cough  · changes in emotions or moods  · chest pain  · confusion  · diarrhea  · high blood pressure  · mouth or throat sores or ulcers  · pain, swelling, warmth in the leg  · pain on swallowing  · swelling of the ankles, feet, hands  · trouble passing urine or change in the amount of urine  · vomiting  · yellowing of the  eyes or skin  Side effects that usually do not require medical attention (report to your doctor or health care professional if they continue or are bothersome):  · hair loss  · loss of appetite  · nausea  · stomach upset  What may interact with this medicine?  · aspirin and aspirin-like medicines  · medicines to increase blood counts like filgrastim, pegfilgrastim, sargramostim  · methotrexate  · NSAIDS, medicines for pain and inflammation, like ibuprofen or naproxen  · probenecid  · pyrimethamine  · vaccines  Talk to your doctor or health care professional before taking any of these medicines:  · acetaminophen  · aspirin  · ibuprofen  · ketoprofen  · naproxen  What if I miss a dose?  It is important not to miss your dose. Call your doctor or health care professional if you are unable to keep an appointment.  Where should I keep my medicine?  This drug is given in a hospital or clinic and will not be stored at home.  What should I tell my health care provider before I take this medicine?  They need to know if you have any of these conditions:  · if you frequently drink alcohol containing beverages  · infection (especially a virus infection such as chickenpox, cold sores, or herpes)  · kidney disease  · liver disease  · low blood counts, like low platelets, red bloods, or white blood cells  · an unusual or allergic reaction to pemetrexed, mannitol, other medicines, foods, dyes, or preservatives  · pregnant or trying to get pregnant  · breast-feeding  What should I watch for while using this medicine?  Visit your doctor for checks on your progress. This drug may make you feel generally unwell. This is not uncommon, as chemotherapy can affect healthy cells as well as cancer cells. Report any side effects. Continue your course of treatment even though you feel ill unless your doctor tells you to stop.  In some cases, you may be given additional medicines to help with side effects. Follow all directions for their  use.  Call your doctor or health care professional for advice if you get a fever, chills or sore throat, or other symptoms of a cold or flu. Do not treat yourself. This drug decreases your body's ability to fight infections. Try to avoid being around people who are sick.  This medicine may increase your risk to bruise or bleed. Call your doctor or health care professional if you notice any unusual bleeding.  Be careful brushing and flossing your teeth or using a toothpick because you may get an infection or bleed more easily. If you have any dental work done, tell your dentist you are receiving this medicine.  Avoid taking products that contain aspirin, acetaminophen, ibuprofen, naproxen, or ketoprofen unless instructed by your doctor. These medicines may hide a fever.  Call your doctor or health care professional if you get diarrhea or mouth sores. Do not treat yourself.  To protect your kidneys, drink water or other fluids as directed while you are taking this medicine.  Men and women must use effective birth control while taking this medicine. You may also need to continue using effective birth control for a time after stopping this medicine. Do not become pregnant while taking this medicine. Tell your doctor right away if you think that you or your partner might be pregnant. There is a potential for serious side effects to an unborn child. Talk to your health care professional or pharmacist for more information. Do not breast-feed an infant while taking this medicine. This medicine may lower sperm counts.  NOTE:This sheet is a summary. It may not cover all possible information. If you have questions about this medicine, talk to your doctor, pharmacist, or health care provider. Copyright© 2017 [employer]

## 2018-07-18 NOTE — LETTER
July 18, 2018      Jam Perea MD  [address]  [facility] [address]           [facility]  [address]  [facility] [address]  Phone: [phone]          Patient: Nimesh Nunn   MR Number: [id number]   YOB: 1955   Date of Visit: 7/18/2018       Dear Dr. Jam Perea:    Thank you for referring Nimesh Nunn to me for evaluation. Attached you will find relevant portions of my assessment and plan of care.    If you have questions, please do not hesitate to call me. I look forward to following Nimesh Nunn along with you.    Sincerely,    Demetrius Infante, , FACP    Enclosure  CC:  No Recipients    If you would like to receive this communication electronically, please contact [email or web address] or [phone] to request more information on [employer] access.    For providers and/or their staff who would like to refer a patient to [facility], please contact us through our one-stop-shop provider referral line, [facility], at [phone].    If you feel you have received this communication in error or would no longer like to receive these types of communications, please e-mail [email or web address]

## 2018-07-20 ENCOUNTER — OFFICE VISIT (OUTPATIENT)
Dept: RADIATION ONCOLOGY | Facility: CLINIC | Age: 63
End: 2018-07-20
Payer: MEDICARE

## 2018-07-20 VITALS
WEIGHT: 192.88 LBS | BODY MASS INDEX: 26.12 KG/M2 | HEART RATE: 87 BPM | RESPIRATION RATE: 18 BRPM | SYSTOLIC BLOOD PRESSURE: 138 MMHG | HEIGHT: 72 IN | DIASTOLIC BLOOD PRESSURE: 85 MMHG | TEMPERATURE: 98 F

## 2018-07-20 DIAGNOSIS — M19.032 PRIMARY OSTEOARTHRITIS OF LEFT WRIST: ICD-10-CM

## 2018-07-20 PROCEDURE — 99999 PR PBB SHADOW E&M-EST. PATIENT-LVL III: CPT | Mod: PBBFAC,,, | Performed by: RADIOLOGY

## 2018-07-20 PROCEDURE — 99215 OFFICE O/P EST HI 40 MIN: CPT | Mod: S$GLB,,, | Performed by: RADIOLOGY

## 2018-07-20 PROCEDURE — 3075F SYST BP GE 130 - 139MM HG: CPT | Mod: CPTII,S$GLB,, | Performed by: RADIOLOGY

## 2018-07-20 PROCEDURE — 3008F BODY MASS INDEX DOCD: CPT | Mod: CPTII,S$GLB,, | Performed by: RADIOLOGY

## 2018-07-20 PROCEDURE — 3079F DIAST BP 80-89 MM HG: CPT | Mod: CPTII,S$GLB,, | Performed by: RADIOLOGY

## 2018-07-20 RX ORDER — MELOXICAM 15 MG/1
15 TABLET ORAL DAILY
Qty: 90 TABLET | Refills: 3 | Status: ON HOLD | OUTPATIENT
Start: 2018-07-20 | End: 2018-08-17 | Stop reason: HOSPADM

## 2018-07-20 NOTE — PROGRESS NOTES
"REFERRING PHYSICIAN: Dr. Chen     DIAGNOSIS: [id number] oligometastatic adenocarcinoma of the left lung    INTERVAL HISTORY: Mr. Nunn returns in follow up after seeing Dr. Infante in consultation and after undergoing MRI of the L spine.  MRI showed a new focus of abnormal marrow signal and enhancement involving the posterior, inferior aspect of the L1 vertebral body.  The demonstrates slight epidural component and is concerning for a metastatic deposit.  There is no spinal canal compromise in relation to this finding.    I discussed the case with Dr. Infante.  The patient has oligometastatic NSCLC (single bone met) and wishes to be aggressive.  The plan is for definitive chemoradiation and likely SRS to his L1 met.  He is here to coordinate a plan of care.   He feels the same.  Has "all over pain" presumably from his arthritis.  Doesn't take anything for it, says ibuprofen and tylenol don't help.  No new pain.  Has a rx for Mobic in Epic dating back to 10/2017 but says he doesn't remember if it helps and doesn't have any at home.      PHYSICAL EXAMINATION:  VITAL SIGNS: /85 (BP Location: Right arm, Patient Position: Sitting)   Pulse 87   Temp 97.8 °F (36.6 °C) (Oral)   Resp 18   Ht 6' (1.829 m)   Wt 87.5 kg (192 lb 14.4 oz)   BMI 26.16 kg/m²   GENERAL: Patient is alert and oriented, in no acute distress.  HEENT: Extraocular muscles are intact.  Oropharynx is clear without lesions.    LYMPH: There is no cervical or supraclavicular adenopathy palpated.    EXTREMITIES: No clubbing, cyanosis, edema.  MSK: mild right lumbar paraspinal tenderness.   SKIN: two areas of bruising over right iliac crest and RUQ.  Denies any recent trauma.  NEUROLOGIC: Cranial nerves II through XII are grossly intact.  Sensation is intact.  Strength is 5 out of 5 in the upper and lower extremities bilaterally. Gait is normal.    ASSESSMENT:   [id number] adenocarcinoma of the left lung with solitary L1 vertebral body met.  Stage " JOHN,    PLAN:   I had a long discussion with the patient regarding treatment options, mainly either up front immunotherapy vs SRS to the spine and definitive chemoradiation.  The patient wishes to pursue the more aggressive and potentially curable course for his presumed oligometastatic disease.  We discussed the rationale for treatment, logistics, risks v benefits, and side effects and complications of both spine SRS and lung chemoradation.  We discussed the small but real risk of paralysis with spine SRS.  We discussed esophagitis and pneumonitis, as well as dermatitis, as potential lung side effects, among others.  The patient expressed understanding and wishes to proceed.  He has a friend who can offer him a place to stay a couple of times per week in [place] to minimize his transportation issues.  Consent form was signed and simulation scheduled.    I gave him a new rx for Mobic for his arthritic pain.  40 minutes were spent in follow up, of which >50% was spent in face to face counseling.

## 2018-07-25 ENCOUNTER — HOSPITAL ENCOUNTER (OUTPATIENT)
Dept: RADIATION THERAPY | Facility: HOSPITAL | Age: 63
Discharge: HOME | End: 2018-07-25
Attending: RADIOLOGY
Payer: MEDICARE

## 2018-07-25 PROCEDURE — 77290 THER RAD SIMULAJ FIELD CPLX: CPT | Mod: TC | Performed by: RADIOLOGY

## 2018-07-25 PROCEDURE — 77290 THER RAD SIMULAJ FIELD CPLX: CPT | Mod: 26,,, | Performed by: RADIOLOGY

## 2018-07-25 PROCEDURE — 77014 HC CT GUIDANCE RADIATION THERAPY FLDS PLACEMENT: CPT | Mod: TC | Performed by: RADIOLOGY

## 2018-07-25 PROCEDURE — 77334 RADIATION TREATMENT AID(S): CPT | Mod: TC | Performed by: RADIOLOGY

## 2018-07-25 PROCEDURE — 77263 THER RADIOLOGY TX PLNG CPLX: CPT | Mod: ,,, | Performed by: RADIOLOGY

## 2018-07-25 PROCEDURE — 77334 RADIATION TREATMENT AID(S): CPT | Mod: 26,,, | Performed by: RADIOLOGY

## 2018-07-27 PROCEDURE — 77301 RADIOTHERAPY DOSE PLAN IMRT: CPT | Mod: 26,,, | Performed by: RADIOLOGY

## 2018-07-27 PROCEDURE — 77301 RADIOTHERAPY DOSE PLAN IMRT: CPT | Mod: TC | Performed by: RADIOLOGY

## 2018-07-30 PROCEDURE — 77470 SPECIAL RADIATION TREATMENT: CPT | Mod: 59,TC | Performed by: RADIOLOGY

## 2018-07-30 PROCEDURE — 77300 RADIATION THERAPY DOSE PLAN: CPT | Mod: 26,,, | Performed by: RADIOLOGY

## 2018-07-30 PROCEDURE — 77300 RADIATION THERAPY DOSE PLAN: CPT | Mod: TC | Performed by: RADIOLOGY

## 2018-07-30 PROCEDURE — 77338 DESIGN MLC DEVICE FOR IMRT: CPT | Mod: 26,,, | Performed by: RADIOLOGY

## 2018-07-30 PROCEDURE — 77470 SPECIAL RADIATION TREATMENT: CPT | Mod: 26,59,, | Performed by: RADIOLOGY

## 2018-07-30 PROCEDURE — 77338 DESIGN MLC DEVICE FOR IMRT: CPT | Mod: TC | Performed by: RADIOLOGY

## 2018-07-30 PROCEDURE — 77370 RADIATION PHYSICS CONSULT: CPT | Performed by: RADIOLOGY

## 2018-08-01 ENCOUNTER — PATIENT MESSAGE (OUTPATIENT)
Dept: HEMATOLOGY/ONCOLOGY | Facility: CLINIC | Age: 63
End: 2018-08-01

## 2018-08-01 ENCOUNTER — HOSPITAL ENCOUNTER (OUTPATIENT)
Dept: RADIATION THERAPY | Facility: HOSPITAL | Age: 63
Discharge: HOME | End: 2018-08-01
Attending: RADIOLOGY
Payer: MEDICARE

## 2018-08-02 DIAGNOSIS — C34.92 ADENOCARCINOMA OF LEFT LUNG, STAGE 2: Primary | ICD-10-CM

## 2018-08-02 PROCEDURE — 77014 HC CT GUIDANCE RADIATION THERAPY FLDS PLACEMENT: CPT | Mod: TC | Performed by: RADIOLOGY

## 2018-08-02 PROCEDURE — 77372 SRS LINEAR BASED: CPT | Performed by: RADIOLOGY

## 2018-08-02 RX ORDER — BENZONATATE 100 MG/1
100 CAPSULE ORAL 3 TIMES DAILY PRN
Qty: 90 CAPSULE | Refills: 0 | Status: SHIPPED | OUTPATIENT
Start: 2018-08-02 | End: 2018-08-12

## 2018-08-03 ENCOUNTER — TELEPHONE (OUTPATIENT)
Dept: HEMATOLOGY/ONCOLOGY | Facility: CLINIC | Age: 63
End: 2018-08-03

## 2018-08-03 ENCOUNTER — TELEPHONE (OUTPATIENT)
Dept: RADIATION ONCOLOGY | Facility: CLINIC | Age: 63
End: 2018-08-03

## 2018-08-03 NOTE — TELEPHONE ENCOUNTER
Called and spoke with patient and assisted with scheduling his chemo and labs to start on Wednesday

## 2018-08-03 NOTE — TELEPHONE ENCOUNTER
----- Message from MiguelA ngel Hollingsworth RN sent at 8/3/2018  9:29 AM CDT -----  Regarding: FW: Plan for lung radiation done  He will need a MG as well  ----- Message -----  From: Glendy Izaguirre  Sent: 8/3/2018   9:25 AM  To: Miguel Angel Hollingsworth RN  Subject: RE: Plan for lung radiation done                 I believe he is to get Pemetrexed and cisplatin    ----- Message -----  From: Miguel Angel Hollingsworth RN  Sent: 8/3/2018   9:18 AM  To: Glendy Izaguirre  Subject: FW: Plan for lung radiation done                 Yes he will need labs CBC, CMP, I will check to see what treatment he is to receive to see if he needs more   ----- Message -----  From: Glendy Izaguirre  Sent: 8/3/2018   9:17 AM  To: Naresh Romo [facility]  Subject: FW: Plan for lung radiation done                 I am thinking we should start Wednesday since Dr. Infante is here on Wednesdays and the patient will eventually need a f/u.   Does he need labs ?    ----- Message -----  From: Kristi Watters RN  Sent: 8/3/2018   8:58 AM  To: Glendy Izaguirre  Subject: RE: Plan for lung radiation done                 Glendy  We are ready for radiation. I spoke with the machines and this Tuesday, 8/7  Or Wednesday 8/8 are good days to start.  Let me know which one you book so I can let the [employer] machine know to pt the pt on.  Kristi  ----- Message -----  From: Glendy Izaguirre  Sent: 8/3/2018   8:34 AM  To: Kristi Watters RN  Subject: RE: Plan for lung radiation done                 Good morning, Dr. Infante'a nurse is off today but yesterday she was messaging someone in regards to when ya'll will start radiation on his lungs as he is needing to get his chemo the same day. Do you know what day next week he will begin radiation to his lungs?    ----- Message -----  From: Jessica He  Sent: 8/3/2018   8:33 AM  To: Glendy Izaguirre  Subject: FW: Plan for lung radiation done                 Rerouting this message Naresh knowlesThanks!  ----- Message -----  From: Kristi Watters RN  Sent:  8/3/2018   8:16 AM  To: Jessica He  Subject: Plan for lung radiation done                     Radiation is planned for Mr Nunn's lung. I see chemo is approved. Let us know when you schedule chemo so we can set up radiation.  Thanks  Kristi

## 2018-08-03 NOTE — TELEPHONE ENCOUNTER
----- Message from Glendy Izaguirre sent at 8/3/2018  9:40 AM CDT -----  Regarding: FW: Plan for lung radiation done  I spoke with the patient we got him scheduled on Wednesday 8am for labs and chemo at 9am. He asked that someone call him regarding his radiation.     ----- Message -----  From: Miguel Angel Hollingsworth RN  Sent: 8/3/2018   9:29 AM  To: Glendy Izaguirre  Subject: FW: Plan for lung radiation done                 He will need a MG as well  ----- Message -----  From: Glendy Izaguirre  Sent: 8/3/2018   9:25 AM  To: Miguel Angel Hollingsworth RN  Subject: RE: Plan for lung radiation done                 I believe he is to get Pemetrexed and cisplatin    ----- Message -----  From: Miguel Angel Hollingsworth RN  Sent: 8/3/2018   9:18 AM  To: Glendy Izaguirre  Subject: FW: Plan for lung radiation done                 Yes he will need labs CBC, CMP, I will check to see what treatment he is to receive to see if he needs more   ----- Message -----  From: Glendy Izaguirre  Sent: 8/3/2018   9:17 AM  To: Naresh Romo [facility]  Subject: FW: Plan for lung radiation done                 I am thinking we should start Wednesday since Dr. Infante is here on Wednesdays and the patient will eventually need a f/u.   Does he need labs ?    ----- Message -----  From: Kristi Watters RN  Sent: 8/3/2018   8:58 AM  To: Glendy Izaguirre  Subject: RE: Plan for lung radiation done                 Glendy  We are ready for radiation. I spoke with the machines and this Tuesday, 8/7  Or Wednesday 8/8 are good days to start.  Let me know which one you book so I can let the [employer] machine know to pt the pt on.  Kristi  ----- Message -----  From: Glendy Izaguirre  Sent: 8/3/2018   8:34 AM  To: Kristi Watters RN  Subject: RE: Plan for lung radiation done                 Good morning, Dr. Infante'a nurse is off today but yesterday she was messaging someone in regards to when ya'll will start radiation on his lungs as he is needing to get his chemo the same day. Do you know what day next  week he will begin radiation to his lungs?    ----- Message -----  From: Jessica He  Sent: 8/3/2018   8:33 AM  To: Glendy Izaguirre  Subject: FW: Plan for lung radiation done                 Rerouting this message Naresh ramires.Thanks!  ----- Message -----  From: Kristi Watters RN  Sent: 8/3/2018   8:16 AM  To: Jessica He  Subject: Plan for lung radiation done                     Radiation is planned for Mr Nunn's lung. I see chemo is approved. Let us know when you schedule chemo so we can set up radiation.  Thanks  Kristi

## 2018-08-03 NOTE — TELEPHONE ENCOUNTER
----- Message from Glendy Izaguirre sent at 8/3/2018  9:40 AM CDT -----  Regarding: FW: Plan for lung radiation done  I spoke with the patient we got him scheduled on Wednesday 8am for labs and chemo at 9am. He asked that someone call him regarding his radiation.     ----- Message -----  From: Miguel Angel Hollingsworth RN  Sent: 8/3/2018   9:29 AM  To: Glendy Izaguirre  Subject: FW: Plan for lung radiation done                 He will need a MG as well  ----- Message -----  From: Glendy Izaguirre  Sent: 8/3/2018   9:25 AM  To: Miguel Angel Hollingsworth RN  Subject: RE: Plan for lung radiation done                 I believe he is to get Pemetrexed and cisplatin    ----- Message -----  From: Miguel Angel Hollingsworth RN  Sent: 8/3/2018   9:18 AM  To: Glendy Izaguirre  Subject: FW: Plan for lung radiation done                 Yes he will need labs CBC, CMP, I will check to see what treatment he is to receive to see if he needs more   ----- Message -----  From: Glendy Izaguirre  Sent: 8/3/2018   9:17 AM  To: Naresh Romo [facility]  Subject: FW: Plan for lung radiation done                 I am thinking we should start Wednesday since Dr. Infante is here on Wednesdays and the patient will eventually need a f/u.   Does he need labs ?    ----- Message -----  From: Kristi Watters RN  Sent: 8/3/2018   8:58 AM  To: Glendy Izaguirre  Subject: RE: Plan for lung radiation done                 Glendy  We are ready for radiation. I spoke with the machines and this Tuesday, 8/7  Or Wednesday 8/8 are good days to start.  Let me know which one you book so I can let the [employer] machine know to pt the pt on.  Kristi  ----- Message -----  From: Glendy Izaguirre  Sent: 8/3/2018   8:34 AM  To: Kristi Watters RN  Subject: RE: Plan for lung radiation done                 Good morning, Dr. Infante'a nurse is off today but yesterday she was messaging someone in regards to when ya'll will start radiation on his lungs as he is needing to get his chemo the same day. Do you know what day next  week he will begin radiation to his lungs?    ----- Message -----  From: Jessica He  Sent: 8/3/2018   8:33 AM  To: Glendy Izaguirre  Subject: FW: Plan for lung radiation done                 Rerouting this message Naresh ramires.Thanks!  ----- Message -----  From: Kristi Watters RN  Sent: 8/3/2018   8:16 AM  To: Jessica He  Subject: Plan for lung radiation done                     Radiation is planned for Mr Nunn's lung. I see chemo is approved. Let us know when you schedule chemo so we can set up radiation.  Thanks  Kristi

## 2018-08-03 NOTE — TELEPHONE ENCOUNTER
Spoke with pt and he states he will go to Radiation after chemo next Wednesday 8/8. Verbalized understanding. ----- Message from Glendy Izaguirre sent at 8/3/2018  9:40 AM CDT -----  Regarding: FW: Plan for lung radiation done  I spoke with the patient we got him scheduled on Wednesday 8am for labs and chemo at 9am. He asked that someone call him regarding his radiation.     ----- Message -----  From: Miguel Angel Hollingsworth RN  Sent: 8/3/2018   9:29 AM  To: Glendy Izaguirre  Subject: FW: Plan for lung radiation done                 He will need a MG as well  ----- Message -----  From: Glendy Izaguirre  Sent: 8/3/2018   9:25 AM  To: Miguel Angel Hollingsworth RN  Subject: RE: Plan for lung radiation done                 I believe he is to get Pemetrexed and cisplatin    ----- Message -----  From: Miguel Angel Hollingsworth RN  Sent: 8/3/2018   9:18 AM  To: Glendy Izaguirre  Subject: FW: Plan for lung radiation done                 Yes he will need labs CBC, CMP, I will check to see what treatment he is to receive to see if he needs more   ----- Message -----  From: Glendy Izaguirre  Sent: 8/3/2018   9:17 AM  To: Naresh Romo [facility]  Subject: FW: Plan for lung radiation done                 I am thinking we should start Wednesday since Dr. Infante is here on Wednesdays and the patient will eventually need a f/u.   Does he need labs ?    ----- Message -----  From: Kristi Watters RN  Sent: 8/3/2018   8:58 AM  To: Glendy Izaguirre  Subject: RE: Plan for lung radiation done                 Glendy  We are ready for radiation. I spoke with the machines and this Tuesday, 8/7  Or Wednesday 8/8 are good days to start.  Let me know which one you book so I can let the [employer] machine know to pt the pt on.  Kristi  ----- Message -----  From: Glendy Izaguirre  Sent: 8/3/2018   8:34 AM  To: Kristi Morvant, RN  Subject: RE: Plan for lung radiation done                 Good morning, Dr. Infante'a nurse is off today but yesterday she was messaging someone in regards to when  ya'll will start radiation on his lungs as he is needing to get his chemo the same day. Do you know what day next week he will begin radiation to his lungs?    ----- Message -----  From: Jessica He  Sent: 8/3/2018   8:33 AM  To: Glendy Izaguirre  Subject: FW: Plan for lung radiation done                 Rerouting this message Naresh ramires.Thanks!  ----- Message -----  From: Kristi Watters RN  Sent: 8/3/2018   8:16 AM  To: Jessica He  Subject: Plan for lung radiation done                     Radiation is planned for Mr Nunn's lung. I see chemo is approved. Let us know when you schedule chemo so we can set up radiation.  Thanks  Kristi

## 2018-08-06 PROCEDURE — 77300 RADIATION THERAPY DOSE PLAN: CPT | Mod: 26,,, | Performed by: RADIOLOGY

## 2018-08-06 PROCEDURE — 77301 RADIOTHERAPY DOSE PLAN IMRT: CPT | Mod: 26,,, | Performed by: RADIOLOGY

## 2018-08-06 PROCEDURE — 77301 RADIOTHERAPY DOSE PLAN IMRT: CPT | Mod: TC | Performed by: RADIOLOGY

## 2018-08-06 PROCEDURE — 77300 RADIATION THERAPY DOSE PLAN: CPT | Mod: TC | Performed by: RADIOLOGY

## 2018-08-06 PROCEDURE — 77470 SPECIAL RADIATION TREATMENT: CPT | Mod: 59,TC | Performed by: RADIOLOGY

## 2018-08-06 PROCEDURE — 77338 DESIGN MLC DEVICE FOR IMRT: CPT | Mod: TC | Performed by: RADIOLOGY

## 2018-08-06 PROCEDURE — 77338 DESIGN MLC DEVICE FOR IMRT: CPT | Mod: 26,,, | Performed by: RADIOLOGY

## 2018-08-07 ENCOUNTER — CLINICAL SUPPORT (OUTPATIENT)
Dept: CARDIOLOGY | Facility: CLINIC | Age: 63
End: 2018-08-07
Attending: INTERNAL MEDICINE
Payer: MEDICARE

## 2018-08-07 ENCOUNTER — OFFICE VISIT (OUTPATIENT)
Dept: CARDIOLOGY | Facility: CLINIC | Age: 63
End: 2018-08-07
Payer: MEDICARE

## 2018-08-07 VITALS
OXYGEN SATURATION: 96 % | BODY MASS INDEX: 26.43 KG/M2 | DIASTOLIC BLOOD PRESSURE: 90 MMHG | SYSTOLIC BLOOD PRESSURE: 185 MMHG | HEIGHT: 72 IN | HEART RATE: 76 BPM | WEIGHT: 195.13 LBS

## 2018-08-07 DIAGNOSIS — I71.40 ABDOMINAL AORTIC ANEURYSM (AAA) WITHOUT RUPTURE: Primary | ICD-10-CM

## 2018-08-07 DIAGNOSIS — I77.9 BILATERAL CAROTID ARTERY DISEASE: ICD-10-CM

## 2018-08-07 DIAGNOSIS — I73.9 PAD (PERIPHERAL ARTERY DISEASE): ICD-10-CM

## 2018-08-07 DIAGNOSIS — I25.10 CORONARY ARTERY DISEASE INVOLVING NATIVE CORONARY ARTERY WITHOUT ANGINA PECTORIS, UNSPECIFIED WHETHER NATIVE OR TRANSPLANTED HEART: ICD-10-CM

## 2018-08-07 DIAGNOSIS — I65.23 ATHEROSCLEROSIS OF BOTH CAROTID ARTERIES: ICD-10-CM

## 2018-08-07 DIAGNOSIS — I10 ESSENTIAL HYPERTENSION: ICD-10-CM

## 2018-08-07 DIAGNOSIS — E78.5 HYPERLIPIDEMIA, UNSPECIFIED HYPERLIPIDEMIA TYPE: ICD-10-CM

## 2018-08-07 DIAGNOSIS — I65.23 BILATERAL CAROTID ARTERY STENOSIS: ICD-10-CM

## 2018-08-07 DIAGNOSIS — I71.40 ABDOMINAL AORTIC ANEURYSM (AAA) WITHOUT RUPTURE: ICD-10-CM

## 2018-08-07 LAB
AORTIC ATHEROMA: YES
INTERNAL CAROTID STENOSIS: ABNORMAL
VASCULAR ABDOMINAL AORTIC ANEURYSM (AAA): 3.51

## 2018-08-07 PROCEDURE — 3008F BODY MASS INDEX DOCD: CPT | Mod: CPTII,S$GLB,, | Performed by: INTERNAL MEDICINE

## 2018-08-07 PROCEDURE — 99215 OFFICE O/P EST HI 40 MIN: CPT | Mod: S$GLB,,, | Performed by: INTERNAL MEDICINE

## 2018-08-07 PROCEDURE — 3078F DIAST BP <80 MM HG: CPT | Mod: CPTII,S$GLB,, | Performed by: INTERNAL MEDICINE

## 2018-08-07 PROCEDURE — 93880 EXTRACRANIAL BILAT STUDY: CPT | Mod: S$GLB,,, | Performed by: INTERNAL MEDICINE

## 2018-08-07 PROCEDURE — 99999 PR PBB SHADOW E&M-EST. PATIENT-LVL III: CPT | Mod: PBBFAC,,, | Performed by: INTERNAL MEDICINE

## 2018-08-07 PROCEDURE — 93978 VASCULAR STUDY: CPT | Mod: S$GLB,,, | Performed by: INTERNAL MEDICINE

## 2018-08-07 PROCEDURE — 3077F SYST BP >= 140 MM HG: CPT | Mod: CPTII,S$GLB,, | Performed by: INTERNAL MEDICINE

## 2018-08-08 ENCOUNTER — INFUSION (OUTPATIENT)
Dept: INFUSION THERAPY | Facility: HOSPITAL | Age: 63
End: 2018-08-08
Attending: INTERNAL MEDICINE
Payer: MEDICARE

## 2018-08-08 VITALS
BODY MASS INDEX: 26.43 KG/M2 | HEIGHT: 72 IN | HEART RATE: 86 BPM | RESPIRATION RATE: 18 BRPM | SYSTOLIC BLOOD PRESSURE: 129 MMHG | TEMPERATURE: 98 F | DIASTOLIC BLOOD PRESSURE: 79 MMHG | WEIGHT: 195.13 LBS

## 2018-08-08 DIAGNOSIS — C34.92 PRIMARY MALIGNANT NEOPLASM OF LEFT LUNG: Primary | ICD-10-CM

## 2018-08-08 PROCEDURE — G6002 STEREOSCOPIC X-RAY GUIDANCE: HCPCS | Mod: 26,,, | Performed by: RADIOLOGY

## 2018-08-08 PROCEDURE — A4216 STERILE WATER/SALINE, 10 ML: HCPCS | Performed by: INTERNAL MEDICINE

## 2018-08-08 PROCEDURE — 77417 THER RADIOLOGY PORT IMAGE(S): CPT | Performed by: RADIOLOGY

## 2018-08-08 PROCEDURE — 96367 TX/PROPH/DG ADDL SEQ IV INF: CPT

## 2018-08-08 PROCEDURE — 96411 CHEMO IV PUSH ADDL DRUG: CPT

## 2018-08-08 PROCEDURE — 77386 HC IMRT, COMPLEX: CPT | Performed by: RADIOLOGY

## 2018-08-08 PROCEDURE — 63600175 PHARM REV CODE 636 W HCPCS: Mod: JG | Performed by: INTERNAL MEDICINE

## 2018-08-08 PROCEDURE — 96413 CHEMO IV INFUSION 1 HR: CPT

## 2018-08-08 PROCEDURE — 96361 HYDRATE IV INFUSION ADD-ON: CPT

## 2018-08-08 PROCEDURE — 25000003 PHARM REV CODE 250: Performed by: INTERNAL MEDICINE

## 2018-08-08 RX ORDER — HEPARIN 100 UNIT/ML
500 SYRINGE INTRAVENOUS
Status: DISCONTINUED | OUTPATIENT
Start: 2018-08-08 | End: 2018-08-08 | Stop reason: HOSPADM

## 2018-08-08 RX ORDER — SODIUM CHLORIDE 0.9 % (FLUSH) 0.9 %
10 SYRINGE (ML) INJECTION
Status: DISCONTINUED | OUTPATIENT
Start: 2018-08-08 | End: 2018-08-08 | Stop reason: HOSPADM

## 2018-08-08 RX ADMIN — MAGNESIUM SULFATE HEPTAHYDRATE: 500 INJECTION, SOLUTION INTRAMUSCULAR; INTRAVENOUS at 12:08

## 2018-08-08 RX ADMIN — CISPLATIN 158 MG: 100 INJECTION, SOLUTION INTRAVENOUS at 12:08

## 2018-08-08 RX ADMIN — SODIUM CHLORIDE 1050 MG: 9 INJECTION, SOLUTION INTRAVENOUS at 11:08

## 2018-08-08 RX ADMIN — SODIUM CHLORIDE 150 MG: 0.9 INJECTION, SOLUTION INTRAVENOUS at 11:08

## 2018-08-08 RX ADMIN — DEXAMETHASONE SODIUM PHOSPHATE 10 MG: 4 INJECTION, SOLUTION INTRA-ARTICULAR; INTRALESIONAL; INTRAMUSCULAR; INTRAVENOUS; SOFT TISSUE at 10:08

## 2018-08-08 RX ADMIN — Medication 10 ML: at 01:08

## 2018-08-08 RX ADMIN — MAGNESIUM SULFATE HEPTAHYDRATE: 500 INJECTION, SOLUTION INTRAMUSCULAR; INTRAVENOUS at 09:08

## 2018-08-08 RX ADMIN — PALONOSETRON HYDROCHLORIDE 0.25 MG: 0.25 INJECTION, SOLUTION INTRAVENOUS at 10:08

## 2018-08-08 NOTE — PLAN OF CARE
Problem: Patient Care Overview  Goal: Plan of Care Review  Outcome: Ongoing (interventions implemented as appropriate)  Pt tolerated D1C1 Alimta/cisplatin without complications. (K 5.3- removed from fluids per MD orders). VSS. No s/s of reaction. Education and handouts given to patient on new medication. Instructed to contact MD with any questions. PIV removed and AVS given to patient.

## 2018-08-09 ENCOUNTER — DOCUMENTATION ONLY (OUTPATIENT)
Dept: RADIATION ONCOLOGY | Facility: CLINIC | Age: 63
End: 2018-08-09

## 2018-08-09 PROCEDURE — G6002 STEREOSCOPIC X-RAY GUIDANCE: HCPCS | Mod: 26,,, | Performed by: RADIOLOGY

## 2018-08-09 PROCEDURE — 77386 HC IMRT, COMPLEX: CPT | Performed by: RADIOLOGY

## 2018-08-09 NOTE — PLAN OF CARE
Problem: Patient Care Overview  Goal: Plan of Care Review  Outcome: Ongoing (interventions implemented as appropriate)  First day of XRT to Left lung. Nursing education done and handout given.

## 2018-08-10 PROCEDURE — 77386 HC IMRT, COMPLEX: CPT | Performed by: RADIOLOGY

## 2018-08-10 PROCEDURE — G6002 STEREOSCOPIC X-RAY GUIDANCE: HCPCS | Mod: 26,,, | Performed by: RADIOLOGY

## 2018-08-13 PROCEDURE — 77386 HC IMRT, COMPLEX: CPT | Performed by: RADIOLOGY

## 2018-08-13 PROCEDURE — G6002 STEREOSCOPIC X-RAY GUIDANCE: HCPCS | Mod: 26,,, | Performed by: RADIOLOGY

## 2018-08-14 PROCEDURE — 77386 HC IMRT, COMPLEX: CPT | Performed by: RADIOLOGY

## 2018-08-14 PROCEDURE — G6002 STEREOSCOPIC X-RAY GUIDANCE: HCPCS | Mod: 26,,, | Performed by: RADIOLOGY

## 2018-08-15 ENCOUNTER — HOSPITAL ENCOUNTER (INPATIENT)
Facility: HOSPITAL | Age: 63
LOS: 2 days | Discharge: HOME | DRG: 377 | End: 2018-08-17
Attending: EMERGENCY MEDICINE | Admitting: INTERNAL MEDICINE
Payer: MEDICARE

## 2018-08-15 ENCOUNTER — DOCUMENTATION ONLY (OUTPATIENT)
Dept: RADIATION ONCOLOGY | Facility: CLINIC | Age: 63
End: 2018-08-15

## 2018-08-15 DIAGNOSIS — E86.0 DEHYDRATION: ICD-10-CM

## 2018-08-15 DIAGNOSIS — R19.5 GUAIAC POSITIVE STOOLS: Primary | ICD-10-CM

## 2018-08-15 DIAGNOSIS — C34.90 LUNG CANCER METASTATIC TO BONE: ICD-10-CM

## 2018-08-15 DIAGNOSIS — R06.02 SHORTNESS OF BREATH: ICD-10-CM

## 2018-08-15 DIAGNOSIS — C79.51 LUNG CANCER METASTATIC TO BONE: ICD-10-CM

## 2018-08-15 DIAGNOSIS — K92.1 MELENA: ICD-10-CM

## 2018-08-15 PROBLEM — R11.2 NAUSEA & VOMITING: Status: ACTIVE | Noted: 2018-08-15

## 2018-08-15 PROBLEM — D61.810 ANTINEOPLASTIC CHEMOTHERAPY INDUCED PANCYTOPENIA: Status: ACTIVE | Noted: 2018-08-15

## 2018-08-15 PROBLEM — I95.9 HYPOTENSION: Status: ACTIVE | Noted: 2018-08-15

## 2018-08-15 PROBLEM — N18.9 CKD (CHRONIC KIDNEY DISEASE): Status: ACTIVE | Noted: 2018-08-15

## 2018-08-15 PROBLEM — T45.1X5A ANTINEOPLASTIC CHEMOTHERAPY INDUCED PANCYTOPENIA: Status: ACTIVE | Noted: 2018-08-15

## 2018-08-15 LAB
ABO + RH BLD: NORMAL
ALBUMIN SERPL BCP-MCNC: 3 G/DL
ALP SERPL-CCNC: 81 U/L
ALT SERPL W/O P-5'-P-CCNC: 16 U/L
ANION GAP SERPL CALC-SCNC: 7 MMOL/L
APTT BLDCRRT: <21 SEC
AST SERPL-CCNC: 18 U/L
BASOPHILS # BLD AUTO: 0 K/UL
BASOPHILS # BLD AUTO: ABNORMAL K/UL
BASOPHILS NFR BLD: 0 %
BASOPHILS NFR BLD: 0 %
BILIRUB SERPL-MCNC: 0.5 MG/DL
BILIRUB UR QL STRIP: NEGATIVE
BLD GP AB SCN CELLS X3 SERPL QL: NORMAL
BUN SERPL-MCNC: 46 MG/DL
CALCIUM SERPL-MCNC: 8.1 MG/DL
CHLORIDE SERPL-SCNC: 93 MMOL/L
CLARITY UR REFRACT.AUTO: ABNORMAL
CO2 SERPL-SCNC: 29 MMOL/L
COLOR UR AUTO: YELLOW
CREAT SERPL-MCNC: 1.4 MG/DL
DIFFERENTIAL METHOD: ABNORMAL
DIFFERENTIAL METHOD: ABNORMAL
EOSINOPHIL # BLD AUTO: 0.1 K/UL
EOSINOPHIL # BLD AUTO: ABNORMAL K/UL
EOSINOPHIL NFR BLD: 5 %
EOSINOPHIL NFR BLD: 5.1 %
ERYTHROCYTE [DISTWIDTH] IN BLOOD BY AUTOMATED COUNT: 12.9 %
ERYTHROCYTE [DISTWIDTH] IN BLOOD BY AUTOMATED COUNT: 13 %
EST. GFR  (AFRICAN AMERICAN): >60 ML/MIN/1.73 M^2
EST. GFR  (NON AFRICAN AMERICAN): 52.7 ML/MIN/1.73 M^2
GLUCOSE SERPL-MCNC: 106 MG/DL
GLUCOSE UR QL STRIP: ABNORMAL
HCT VFR BLD AUTO: 23.5 %
HCT VFR BLD AUTO: 26.8 %
HGB BLD-MCNC: 8 G/DL
HGB BLD-MCNC: 9.3 G/DL
HGB UR QL STRIP: ABNORMAL
HYPOCHROMIA BLD QL SMEAR: ABNORMAL
IMM GRANULOCYTES # BLD AUTO: 0.01 K/UL
IMM GRANULOCYTES # BLD AUTO: ABNORMAL K/UL
IMM GRANULOCYTES NFR BLD AUTO: 0.4 %
IMM GRANULOCYTES NFR BLD AUTO: ABNORMAL %
INR PPP: 0.9
KETONES UR QL STRIP: NEGATIVE
LACTATE SERPL-SCNC: 1.7 MMOL/L
LEUKOCYTE ESTERASE UR QL STRIP: NEGATIVE
LIPASE SERPL-CCNC: 28 U/L
LYMPHOCYTES # BLD AUTO: 0.2 K/UL
LYMPHOCYTES # BLD AUTO: ABNORMAL K/UL
LYMPHOCYTES NFR BLD: 13 %
LYMPHOCYTES NFR BLD: 6.5 %
MCH RBC QN AUTO: 31.7 PG
MCH RBC QN AUTO: 32.4 PG
MCHC RBC AUTO-ENTMCNC: 34 G/DL
MCHC RBC AUTO-ENTMCNC: 34.7 G/DL
MCV RBC AUTO: 93 FL
MCV RBC AUTO: 93 FL
MONOCYTES # BLD AUTO: 0 K/UL
MONOCYTES # BLD AUTO: ABNORMAL K/UL
MONOCYTES NFR BLD: 1 %
MONOCYTES NFR BLD: 1.5 %
NEUTROPHILS # BLD AUTO: 2.4 K/UL
NEUTROPHILS # BLD AUTO: ABNORMAL K/UL
NEUTROPHILS NFR BLD: 81 %
NEUTROPHILS NFR BLD: 86.5 %
NITRITE UR QL STRIP: NEGATIVE
NRBC BLD-RTO: 0 /100 WBC
NRBC BLD-RTO: 0 /100 WBC
PH UR STRIP: 5 [PH] (ref 5–8)
PLATELET # BLD AUTO: 104 K/UL
PLATELET # BLD AUTO: 95 K/UL
PMV BLD AUTO: 10.7 FL
PMV BLD AUTO: 9 FL
POTASSIUM SERPL-SCNC: 3.8 MMOL/L
PROCALCITONIN SERPL IA-MCNC: 0.1 NG/ML
PROT SERPL-MCNC: 5.7 G/DL
PROT UR QL STRIP: NEGATIVE
PROTHROMBIN TIME: 9.4 SEC
RBC # BLD AUTO: 2.52 M/UL
RBC # BLD AUTO: 2.87 M/UL
SODIUM SERPL-SCNC: 129 MMOL/L
SP GR UR STRIP: 1.02 (ref 1–1.03)
URN SPEC COLLECT METH UR: ABNORMAL
UROBILINOGEN UR STRIP-ACNC: 1 EU/DL
WBC # BLD AUTO: 1.67 K/UL
WBC # BLD AUTO: 2.75 K/UL

## 2018-08-15 PROCEDURE — 77386 HC IMRT, COMPLEX: CPT | Performed by: RADIOLOGY

## 2018-08-15 PROCEDURE — 99222 1ST HOSP IP/OBS MODERATE 55: CPT | Mod: AI,GC,, | Performed by: INTERNAL MEDICINE

## 2018-08-15 PROCEDURE — 83605 ASSAY OF LACTIC ACID: CPT

## 2018-08-15 PROCEDURE — 77417 THER RADIOLOGY PORT IMAGE(S): CPT | Performed by: RADIOLOGY

## 2018-08-15 PROCEDURE — C9113 INJ PANTOPRAZOLE SODIUM, VIA: HCPCS | Performed by: STUDENT IN AN ORGANIZED HEALTH CARE EDUCATION/TRAINING PROGRAM

## 2018-08-15 PROCEDURE — 25000003 PHARM REV CODE 250: Performed by: STUDENT IN AN ORGANIZED HEALTH CARE EDUCATION/TRAINING PROGRAM

## 2018-08-15 PROCEDURE — 83690 ASSAY OF LIPASE: CPT

## 2018-08-15 PROCEDURE — 63600175 PHARM REV CODE 636 W HCPCS: Performed by: STUDENT IN AN ORGANIZED HEALTH CARE EDUCATION/TRAINING PROGRAM

## 2018-08-15 PROCEDURE — 99285 EMERGENCY DEPT VISIT HI MDM: CPT | Mod: 25

## 2018-08-15 PROCEDURE — C9113 INJ PANTOPRAZOLE SODIUM, VIA: HCPCS | Performed by: EMERGENCY MEDICINE

## 2018-08-15 PROCEDURE — 80053 COMPREHEN METABOLIC PANEL: CPT

## 2018-08-15 PROCEDURE — 85027 COMPLETE CBC AUTOMATED: CPT

## 2018-08-15 PROCEDURE — 87040 BLOOD CULTURE FOR BACTERIA: CPT | Mod: 59

## 2018-08-15 PROCEDURE — 96361 HYDRATE IV INFUSION ADD-ON: CPT

## 2018-08-15 PROCEDURE — 94761 N-INVAS EAR/PLS OXIMETRY MLT: CPT

## 2018-08-15 PROCEDURE — 20600001 HC STEP DOWN PRIVATE ROOM

## 2018-08-15 PROCEDURE — 25000003 PHARM REV CODE 250: Performed by: PHYSICIAN ASSISTANT

## 2018-08-15 PROCEDURE — 81003 URINALYSIS AUTO W/O SCOPE: CPT

## 2018-08-15 PROCEDURE — 25000242 PHARM REV CODE 250 ALT 637 W/ HCPCS: Performed by: PHYSICIAN ASSISTANT

## 2018-08-15 PROCEDURE — 94640 AIRWAY INHALATION TREATMENT: CPT

## 2018-08-15 PROCEDURE — G6002 STEREOSCOPIC X-RAY GUIDANCE: HCPCS | Mod: 26,,, | Performed by: RADIOLOGY

## 2018-08-15 PROCEDURE — 85610 PROTHROMBIN TIME: CPT

## 2018-08-15 PROCEDURE — 25000003 PHARM REV CODE 250: Performed by: EMERGENCY MEDICINE

## 2018-08-15 PROCEDURE — 96375 TX/PRO/DX INJ NEW DRUG ADDON: CPT

## 2018-08-15 PROCEDURE — 77336 RADIATION PHYSICS CONSULT: CPT | Performed by: RADIOLOGY

## 2018-08-15 PROCEDURE — 96374 THER/PROPH/DIAG INJ IV PUSH: CPT

## 2018-08-15 PROCEDURE — 63600175 PHARM REV CODE 636 W HCPCS: Performed by: PHYSICIAN ASSISTANT

## 2018-08-15 PROCEDURE — 63600175 PHARM REV CODE 636 W HCPCS: Performed by: EMERGENCY MEDICINE

## 2018-08-15 PROCEDURE — 85730 THROMBOPLASTIN TIME PARTIAL: CPT

## 2018-08-15 PROCEDURE — 99285 EMERGENCY DEPT VISIT HI MDM: CPT | Mod: ,,, | Performed by: PHYSICIAN ASSISTANT

## 2018-08-15 PROCEDURE — 25000242 PHARM REV CODE 250 ALT 637 W/ HCPCS

## 2018-08-15 PROCEDURE — 84145 PROCALCITONIN (PCT): CPT

## 2018-08-15 PROCEDURE — 85025 COMPLETE CBC W/AUTO DIFF WBC: CPT

## 2018-08-15 PROCEDURE — 86901 BLOOD TYPING SEROLOGIC RH(D): CPT

## 2018-08-15 PROCEDURE — 85007 BL SMEAR W/DIFF WBC COUNT: CPT

## 2018-08-15 RX ORDER — OXYCODONE HYDROCHLORIDE 5 MG/1
5 TABLET ORAL EVERY 4 HOURS PRN
Status: DISCONTINUED | OUTPATIENT
Start: 2018-08-15 | End: 2018-08-17 | Stop reason: HOSPADM

## 2018-08-15 RX ORDER — FINASTERIDE 5 MG/1
5 TABLET, FILM COATED ORAL DAILY
Status: DISCONTINUED | OUTPATIENT
Start: 2018-08-16 | End: 2018-08-17 | Stop reason: HOSPADM

## 2018-08-15 RX ORDER — IPRATROPIUM BROMIDE AND ALBUTEROL SULFATE 2.5; .5 MG/3ML; MG/3ML
3 SOLUTION RESPIRATORY (INHALATION)
Status: COMPLETED | OUTPATIENT
Start: 2018-08-15 | End: 2018-08-15

## 2018-08-15 RX ORDER — SODIUM CHLORIDE 9 MG/ML
INJECTION, SOLUTION INTRAVENOUS CONTINUOUS
Status: ACTIVE | OUTPATIENT
Start: 2018-08-15 | End: 2018-08-16

## 2018-08-15 RX ORDER — FOLIC ACID 1 MG/1
1000 TABLET ORAL DAILY
Status: DISCONTINUED | OUTPATIENT
Start: 2018-08-15 | End: 2018-08-17 | Stop reason: HOSPADM

## 2018-08-15 RX ORDER — ONDANSETRON 8 MG/1
8 TABLET, ORALLY DISINTEGRATING ORAL EVERY 8 HOURS PRN
Status: DISCONTINUED | OUTPATIENT
Start: 2018-08-15 | End: 2018-08-17 | Stop reason: HOSPADM

## 2018-08-15 RX ORDER — ATORVASTATIN CALCIUM 20 MG/1
80 TABLET, FILM COATED ORAL DAILY
Status: DISCONTINUED | OUTPATIENT
Start: 2018-08-16 | End: 2018-08-17 | Stop reason: HOSPADM

## 2018-08-15 RX ORDER — IBUPROFEN 200 MG
24 TABLET ORAL
Status: DISCONTINUED | OUTPATIENT
Start: 2018-08-15 | End: 2018-08-17 | Stop reason: HOSPADM

## 2018-08-15 RX ORDER — FLUTICASONE FUROATE AND VILANTEROL 200; 25 UG/1; UG/1
1 POWDER RESPIRATORY (INHALATION) DAILY
Status: DISCONTINUED | OUTPATIENT
Start: 2018-08-16 | End: 2018-08-17 | Stop reason: HOSPADM

## 2018-08-15 RX ORDER — SODIUM CHLORIDE 0.9 % (FLUSH) 0.9 %
5 SYRINGE (ML) INJECTION
Status: DISCONTINUED | OUTPATIENT
Start: 2018-08-15 | End: 2018-08-17 | Stop reason: HOSPADM

## 2018-08-15 RX ORDER — GLUCAGON 1 MG
1 KIT INJECTION
Status: DISCONTINUED | OUTPATIENT
Start: 2018-08-15 | End: 2018-08-17 | Stop reason: HOSPADM

## 2018-08-15 RX ORDER — ONDANSETRON 2 MG/ML
8 INJECTION INTRAMUSCULAR; INTRAVENOUS
Status: COMPLETED | OUTPATIENT
Start: 2018-08-15 | End: 2018-08-15

## 2018-08-15 RX ORDER — PROMETHAZINE HYDROCHLORIDE 25 MG/1
25 TABLET ORAL EVERY 6 HOURS PRN
Status: DISCONTINUED | OUTPATIENT
Start: 2018-08-15 | End: 2018-08-17 | Stop reason: HOSPADM

## 2018-08-15 RX ORDER — IBUPROFEN 200 MG
16 TABLET ORAL
Status: DISCONTINUED | OUTPATIENT
Start: 2018-08-15 | End: 2018-08-17 | Stop reason: HOSPADM

## 2018-08-15 RX ORDER — IPRATROPIUM BROMIDE AND ALBUTEROL SULFATE 2.5; .5 MG/3ML; MG/3ML
SOLUTION RESPIRATORY (INHALATION)
Status: COMPLETED
Start: 2018-08-15 | End: 2018-08-15

## 2018-08-15 RX ORDER — PANTOPRAZOLE SODIUM 40 MG/10ML
80 INJECTION, POWDER, LYOPHILIZED, FOR SOLUTION INTRAVENOUS
Status: DISCONTINUED | OUTPATIENT
Start: 2018-08-15 | End: 2018-08-15

## 2018-08-15 RX ORDER — IPRATROPIUM BROMIDE AND ALBUTEROL SULFATE 2.5; .5 MG/3ML; MG/3ML
3 SOLUTION RESPIRATORY (INHALATION) EVERY 4 HOURS PRN
Status: DISCONTINUED | OUTPATIENT
Start: 2018-08-15 | End: 2018-08-17 | Stop reason: HOSPADM

## 2018-08-15 RX ORDER — PANTOPRAZOLE SODIUM 40 MG/10ML
40 INJECTION, POWDER, LYOPHILIZED, FOR SOLUTION INTRAVENOUS EVERY 12 HOURS
Status: DISCONTINUED | OUTPATIENT
Start: 2018-08-15 | End: 2018-08-15

## 2018-08-15 RX ADMIN — IPRATROPIUM BROMIDE AND ALBUTEROL SULFATE 3 ML: .5; 3 SOLUTION RESPIRATORY (INHALATION) at 11:08

## 2018-08-15 RX ADMIN — SODIUM CHLORIDE 1000 ML: 0.9 INJECTION, SOLUTION INTRAVENOUS at 10:08

## 2018-08-15 RX ADMIN — ONDANSETRON 8 MG: 2 INJECTION, SOLUTION INTRAMUSCULAR; INTRAVENOUS at 11:08

## 2018-08-15 RX ADMIN — DEXTROSE 40 MG: 50 INJECTION, SOLUTION INTRAVENOUS at 09:08

## 2018-08-15 RX ADMIN — OXYCODONE HYDROCHLORIDE 5 MG: 5 TABLET ORAL at 06:08

## 2018-08-15 RX ADMIN — OXYCODONE HYDROCHLORIDE 5 MG: 5 TABLET ORAL at 11:08

## 2018-08-15 RX ADMIN — FOLIC ACID 1000 MCG: 1 TABLET ORAL at 03:08

## 2018-08-15 RX ADMIN — SODIUM CHLORIDE: 0.9 INJECTION, SOLUTION INTRAVENOUS at 03:08

## 2018-08-15 RX ADMIN — DEXTROSE 80 MG: 50 INJECTION, SOLUTION INTRAVENOUS at 12:08

## 2018-08-15 NOTE — ASSESSMENT & PLAN NOTE
- related to n/v with associated hypotension noted  - BP improved with 1L NS in the ED  - nausea improved with zofran in ED  - continue IVFs at 100ml/hr for today  - encourage po intake  - consult dietary  - zofran and phenergan prn

## 2018-08-15 NOTE — ED NOTES
Pt returned from x-ray; replaced on continuous cardiac and pulse ox monitoring with blood pressure to cycle every 15 minutes.  NSR noted; VS stable.  Bed locked in lowest position; side rails up and locked x 2; call light, bedside table, and personal belongings within reach.  Pt updated on wait for lab and radiology results.  Reminded of need for urine specimen; urinal provided.  Pt reporting that he is still nauseated; will medicate.  Pt denies additional needs or complaints at this time; will continue to monitor pt.

## 2018-08-15 NOTE — ED NOTES
Pt resting quietly on stretcher; remains on continuous cardiac and pulse ox monitoring with non-invasive blood pressure to cycle every 30 minutes.  VS stable; NSR noted.  Pt medicated as documented; updated on wait for lab results. Bed locked in lowest position; side rails up and locked x 2; call light, bedside table, and personal belongings within reach.  Pt denies needs or complaints at this time; will continue to monitor pt.

## 2018-08-15 NOTE — H&P
[facility]  Hematology/Oncology  H&P    Patient Name: Nimesh Nunn  MRN: [id number]  Admission Date: 8/15/2018  Code Status: Full Code   Attending Provider: Montrell Ivory, *  Primary Care Physician: Nishant Perez MD  Principal Problem:Dehydration    Subjective:     HPI: Patient is a 63yo M with multiple medical co-morbidities including CVA, HTN, HLD, CAD (s/p stents on DAPT with ASA and Plavix), PAD (failed revascularization), and NSCLC (undergoing concurrent chemoRT with cis/alimta) who presented from Radiation Oncology for hypotension and n/v. Patient reports mild n/v yesterday but was able to tolerate breakfast today. However, after his XRT he became nauseated and had an episode of non-bloody emesis. During that time, he also had a dark BM and was noted to be hypotensive with SBP in the 90s. He noted associated symptom of light-headedness and fatigue. After arrival to the ED, he was given IVFs and zofran with resolution n/v and hypotension. He remains with some fatigue. On further history, he has been having dark stools for about a week, usually once a day associated with abdominal cramping. LBM today with dark stools but rectal exam by ED showed brown stool. Guaiac positive. He reports taking ASA, Plavix, and Mobic, but he is not on a PPI.  Heme/Onc asked to admit for dehydration and to work up potential GI bleed.      Oncology Treatment Plan:   OP NSCLC PEMETREXED + CISPLATIN    Medications:  Continuous Infusions:  Scheduled Meds:   [START ON 8/16/2018] atorvastatin  80 mg Oral Daily    [START ON 8/16/2018] finasteride  5 mg Oral Daily    folic acid  1,000 mcg Oral Daily     PRN Meds:ondansetron     Review of patient's allergies indicates:  No Known Allergies     Past Medical History:   Diagnosis Date    Abdominal aortic aneurysm (AAA) without rupture 3/5/2018    Arthritis     Blood transfusion     COPD (chronic obstructive pulmonary disease)     Coronary artery disease      Encounter for blood transfusion     Hyperlipidemia 2013    Hypertension     Lung cancer metastatic to bone 2018    Occlusion and stenosis of carotid artery without mention of cerebral infarction 2014    Primary malignant neoplasm of left lung 2018    Screening for colon cancer 2015    Syncope 2014     Past Surgical History:   Procedure Laterality Date    BACK SURGERY      carotid endarectomy      CORONARY ANGIOPLASTY      CORONARY ARTERY BYPASS GRAFT      SKIN BIOPSY       Family History     Problem Relation (Age of Onset)    Breast cancer Mother    Cancer Mother    Heart attack Father    Heart disease Father    Heart failure Father    Hypertension Father    No Known Problems Sister, Brother, Maternal Grandmother, Maternal Grandfather, Paternal Grandmother, Paternal Grandfather, Maternal Aunt, Maternal Uncle, Paternal Aunt, Paternal Uncle        Tobacco Use    Smoking status: Former Smoker     Packs/day: 1.50     Years: 40.00     Pack years: 60.00     Last attempt to quit: 10/11/2011     Years since quittin.8    Smokeless tobacco: Never Used   Substance and Sexual Activity    Alcohol use: Yes     Alcohol/week: 1.8 oz     Types: 3 Cans of beer per week     Comment: 3 cans beer every day or every other day    Drug use: Yes     Types: Marijuana    Sexual activity: Not on file       Review of Systems   Constitutional: Positive for appetite change and fatigue. Negative for chills and fever.   HENT: Negative for sore throat and trouble swallowing.    Respiratory: Negative for cough and shortness of breath.    Cardiovascular: Negative for chest pain and palpitations.   Gastrointestinal: Positive for abdominal pain (cramps with BMs), nausea and vomiting. Negative for constipation and diarrhea.        +dark stools   Genitourinary: Negative for dysuria and hematuria.   Musculoskeletal: Negative for arthralgias and myalgias.   Skin: Negative for rash and wound.    Neurological: Positive for light-headedness. Negative for seizures.   Psychiatric/Behavioral: Negative for agitation and confusion.     Objective:     Vital Signs (Most Recent):  Temp: 98 °F (36.7 °C) (08/15/18 1202)  Pulse: 83 (08/15/18 1317)  Resp: 17 (08/15/18 1317)  BP: 136/70 (08/15/18 1317)  SpO2: 99 % (08/15/18 1317) Vital Signs (24h Range):  Temp:  [97.1 °F (36.2 °C)-98 °F (36.7 °C)] 98 °F (36.7 °C)  Pulse:  [] 83  Resp:  [13-21] 17  SpO2:  [91 %-100 %] 99 %  BP: (103-136)/(56-70) 136/70     Weight: 83.9 kg (185 lb)  Body mass index is 25.09 kg/m².  Body surface area is 2.06 meters squared.      Intake/Output Summary (Last 24 hours) at 8/15/2018 1401  Last data filed at 8/15/2018 1233  Gross per 24 hour   Intake 1100 ml   Output --   Net 1100 ml       Physical Exam   Constitutional: He is oriented to person, place, and time. He appears well-developed and well-nourished. No distress.   HENT:   Head: Normocephalic and atraumatic.   Right Ear: External ear normal.   Left Ear: External ear normal.   Eyes: EOM are normal. Pupils are equal, round, and reactive to light. Right eye exhibits no discharge. Left eye exhibits no discharge.   Neck: Normal range of motion. Neck supple. No tracheal deviation present.   Cardiovascular: Normal rate and regular rhythm. Exam reveals no friction rub.   Pulmonary/Chest: Effort normal and breath sounds normal. No respiratory distress.   Abdominal: Soft. Bowel sounds are normal. He exhibits no distension. There is no tenderness. There is no guarding.   Musculoskeletal: Normal range of motion. He exhibits no edema or deformity.   Neurological: He is alert and oriented to person, place, and time.   Skin: Skin is warm and dry. He is not diaphoretic.   Psychiatric: He has a normal mood and affect. His behavior is normal.       Significant Labs:   CBC:   Recent Labs   Lab  08/15/18   1045   WBC  2.75*   HGB  9.3*   HCT  26.8*   PLT  95*    and CMP:   Recent Labs   Lab   08/15/18   1045   NA  129*   K  3.8   CL  93*   CO2  29   GLU  106   BUN  46*   CREATININE  1.4   CALCIUM  8.1*   PROT  5.7*   ALBUMIN  3.0*   BILITOT  0.5   ALKPHOS  81   AST  18   ALT  16   ANIONGAP  7*   EGFRNONAA  52.7*       Diagnostic Results:  I have reviewed all pertinent imaging results/findings within the past 24 hours.     CXR 8/15/18:  Continued demonstration of a mass opacity within the medial aspect of the superior segment of the left lower lobe, which evidently represents a known adenocarcinoma of the lung according to information obtained from the electronic medical record.  No significant detrimental change in the appearance of the chest since 06/09/2018 is appreciated.    Assessment/Plan:     * Dehydration    - related to n/v with associated hypotension noted  - BP improved with 1L NS in the ED  - nausea improved with zofran in ED  - continue IVFs today especially NPO status as outlined below  - encourage po intake once cleared by GI  - consult dietary, appreciate recs  - zofran and phenergan prn        Guaiac positive stools    - hgb dropped from 13 to 9.3 today  - h/o dark stools and guaiac positive examination in ED  - was taking ASA, Plavix, and Mobic at home   - given loading dose of IV protonix in the ED  - will continue IV protonix bid  - continue IVFs    - monitor H/H and transfuse pRBCs as indicated  - continue NPO  - GI consulted, appreciate recs        Adenocarcinoma of left lung     - followed by Dr. Infante in clinic  - dx's 06/2018 with oligometastatic disease  - discussed in multidisciplinary tumor board with plans to provide definitive treatment with chemoRT   - C1D1 of cis/alimta given 8/8/18  - XRT per radiation oncology  - will need to follow up with primary oncologist after discharge   - continue folic acid        Antineoplastic chemotherapy induced pancytopenia    - WBC 2.75, ANC 2378, Hgb 9.3, PLT 93  - due to chemotherapy   - unclear if anemia solely due to chemotherapy or if  it is compounded by an undiagnosed GIB  - hold antiplatelet therapy/NSAIDs and ask for GI evaluation as above  - continue to monitor counts and transfuse for hgb <7, plt <50 (plt <10 once bleed is ruled out)        Coronary artery disease involving native coronary artery without angina pectoris    - hold DAPT in setting of possible GIB  - hold ACEi/ARB, beta blocker, imdur in setting of hypotension/GIB  - continue statin  - restart medical management once cleared by GI and BP improves        Chronic obstructive pulmonary disease    - continue inhaled corticosteroids (breo while inpatient)  - duonebs prn        Benign non-nodular prostatic hyperplasia without lower urinary tract symptoms    - continue finasteride        CKD    - creatinine 1.4 today (baseline creatinine 1.1-1.6 in 2018)  - continue IVFs  - strict I/Os  - avoid nephrotoxic drugs, renally dose meds  - continue to monitor        HTN (hypertension)    - hold BP meds given presenting hypotension/possible GIB  - restart as tolerated  - improved, will continue to monitor        DISPO: pending clinical stability and GI evaluation    Jagdish Jacob MD  Hematology/Oncology  [facility]      Attending Addendum:  The patient was seen, examined, and discussed on rounds with the team.  I agree with the assessment and plan as outlined for Nimesh Nunn.  Patient of [facility] with newly diagnosed lung cancer on treatment with cisplatin/pemetrexed with concurrent radiation admitted yesterday with symptomatic anemia and found to have GI bleed.  Hemodynamically stable and hemoglobin has been stable overnight.  Plans for EGD today.    Demetrius Infante DO, FACP  Hematology & Oncology  [address] [place] [address]  ph. [phone]  Fax. [phone]

## 2018-08-15 NOTE — ASSESSMENT & PLAN NOTE
- followed by Dr. Infante in clinic  - dx's 06/2018 with oligometastatic disease  - discussed in multidisciplinary tumor board with plans to provide definitive treatment with chemoRT  - C1D1 of cis/alimta given 8/8/18  - XRT per radiation oncology  - will need to follow up with primary oncologist after discharge   - continue folic acid

## 2018-08-15 NOTE — HPI
Patient is a 63yo M with multiple medical co-morbidities including CVA, HTN, HLD, CAD (s/p stents on DAPT with ASA and Plavix), PAD (failed revascularization), and NSCLC (undergoing concurrent chemoRT with cis/alimta) who presented from Radiation Oncology for hypotension and n/v. Patient reports mild n/v yesterday but was able to tolerate breakfast today. However, after his XRT he became nauseated and had an episode of non-bloody emesis. During that time, he also had a dark BM and was noted to be hypotensive with SBP in the 90s. He noted associated symptom of light-headedness and fatigue. After arrival to the ED, he was given IVFs and zofran with resolution n/v and hypotension. He remains with some fatigue. On further history, he has been having dark stools for about a week, usually once a day associated with abdominal cramping. LBM today with dark stools but rectal exam by ED showed brown stool. Guaiac positive. He reports taking ASA, Plavix, and Mobic, but he is not on a PPI.  Heme/Onc asked to admit for dehydration and to work up potential GI bleed.

## 2018-08-15 NOTE — ED PROVIDER NOTES
Encounter Date: 8/15/2018    SCRIBE #1 NOTE: I, Danna Isaac, am scribing for, and in the presence of,  Dr. Ivory. I have scribed the following portions of the note - the APC attestation.       History     Chief Complaint   Patient presents with    Hypotension     Pt brought from radiation oncology for low BP-90's, nausea and wt loss.  Pt had 11lb wt loss in a week.  Pt on chemo & radiation for lung cancer     62-year-old male with hypertension, CAD, recently diagnosed lung adenocarcinoma presents from radiation clinic for hypotension.  Patient presented to clinic with BP 94/57 and was actively vomiting therefore sent to the ED.  Patient complaining of generalized weakness for several days and nausea vomiting since yesterday.  Attempted therapy with home Zofran without relief.  Reports associated abdominal cramping, worsening shortness of breath, cough productive of mucus and black stools over the past month.  Reports approximately 5 black stools over the past week.  Denies diarrhea, fever, chest pain, headache, numbness or focal weakness. Last chemotherapy 8/8.  Taking aspirin and Plavix.          Review of patient's allergies indicates:  No Known Allergies  Past Medical History:   Diagnosis Date    Abdominal aortic aneurysm (AAA) without rupture 3/5/2018    Arthritis     Blood transfusion     COPD (chronic obstructive pulmonary disease)     Coronary artery disease     Encounter for blood transfusion     Hyperlipidemia 11/7/2013    Hypertension     Lung cancer metastatic to bone 7/18/2018    Occlusion and stenosis of carotid artery without mention of cerebral infarction 1/12/2014    Primary malignant neoplasm of left lung 7/18/2018    Screening for colon cancer 6/1/2015    Syncope 1/13/2014     Past Surgical History:   Procedure Laterality Date    BACK SURGERY      carotid endarectomy      CORONARY ANGIOPLASTY      CORONARY ARTERY BYPASS GRAFT      SKIN BIOPSY       Social History      Tobacco Use    Smoking status: Former Smoker     Packs/day: 1.50     Years: 40.00     Pack years: 60.00     Last attempt to quit: 10/11/2011     Years since quittin.8    Smokeless tobacco: Never Used   Substance Use Topics    Alcohol use: Yes     Alcohol/week: 1.8 oz     Types: 3 Cans of beer per week     Comment: 3 cans beer every day or every other day    Drug use: Yes     Types: Marijuana     Review of Systems   Constitutional: Positive for fatigue. Negative for appetite change, chills and fever.   HENT: Positive for congestion. Negative for sore throat.    Respiratory: Positive for cough and shortness of breath. Negative for chest tightness.    Cardiovascular: Negative for chest pain, palpitations and leg swelling.   Gastrointestinal: Positive for abdominal pain, nausea and vomiting. Negative for anal bleeding, blood in stool, constipation, diarrhea and rectal pain.   Genitourinary: Negative for difficulty urinating, dysuria, flank pain, frequency, hematuria and urgency.   Musculoskeletal: Negative for back pain, gait problem and joint swelling.   Skin: Negative for color change.   Neurological: Positive for weakness. Negative for seizures, syncope, speech difficulty, light-headedness, numbness and headaches.   Hematological: Bruises/bleeds easily.       Physical Exam     Initial Vitals [08/15/18 0919]   BP Pulse Resp Temp SpO2   103/63 107 16 97.1 °F (36.2 °C) 97 %      MAP       --         Physical Exam    Nursing note and vitals reviewed.  Constitutional: He is not diaphoretic. He appears ill. No distress.   HENT:   Head: Normocephalic and atraumatic.   Mouth/Throat: Abnormal dentition.   Eyes: EOM are normal. Pupils are equal, round, and reactive to light.   Neck: Normal range of motion. Neck supple.   Cardiovascular: Normal rate, regular rhythm, normal heart sounds and intact distal pulses. Exam reveals no gallop and no friction rub.    No murmur heard.  Pulmonary/Chest: No respiratory distress.  He has wheezes. He has rhonchi. He has no rales. He exhibits no tenderness.   Abdominal: Soft. Bowel sounds are normal. He exhibits no distension and no mass. There is tenderness. There is no rebound and no guarding.   Mild diffuse tenderness to palpation.   Genitourinary: Rectal exam shows guaiac positive stool. Rectal exam shows no external hemorrhoid, no fissure and anal tone normal. Guaiac positive stool. : Acceptable.  Genitourinary Comments: RN Angelito present as chaperone for rectal exam. Brown stool noted in rectal vault, guaiac positive   Musculoskeletal: Normal range of motion. He exhibits no edema or tenderness.   Neurological: He is alert and oriented to person, place, and time.   Skin: Skin is warm and dry.   Psychiatric: He has a normal mood and affect.         ED Course   Procedures  Labs Reviewed   CBC W/ AUTO DIFFERENTIAL - Abnormal; Notable for the following components:       Result Value    WBC 2.75 (*)     RBC 2.87 (*)     Hemoglobin 9.3 (*)     Hematocrit 26.8 (*)     MCH 32.4 (*)     Platelets 95 (*)     MPV 9.0 (*)     Lymph # 0.2 (*)     Mono # 0.0 (*)     Gran% 86.5 (*)     Lymph% 6.5 (*)     Mono% 1.5 (*)     All other components within normal limits    Narrative:     red used as gold top   COMPREHENSIVE METABOLIC PANEL - Abnormal; Notable for the following components:    Sodium 129 (*)     Chloride 93 (*)     BUN, Bld 46 (*)     Calcium 8.1 (*)     Total Protein 5.7 (*)     Albumin 3.0 (*)     Anion Gap 7 (*)     eGFR if non  52.7 (*)     All other components within normal limits    Narrative:     red used as gold top   URINALYSIS, REFLEX TO URINE CULTURE - Abnormal; Notable for the following components:    Appearance, UA Hazy (*)     Glucose, UA 1+ (*)     Occult Blood UA Trace (*)     All other components within normal limits    Narrative:     Preferred Collection Type->Urine, Clean Catch   APTT    Narrative:     red used as gold top   LIPASE    Narrative:      red used as gold top   PROTIME-INR    Narrative:     red used as gold top   LACTIC ACID, PLASMA    Narrative:     red used as gold top   PROCALCITONIN    Narrative:     red used as gold top   TYPE & SCREEN          Imaging Results          X-Ray Chest PA And Lateral (Final result)  Result time 08/15/18 11:29:39    Final result by Yohannes Singh MD (08/15/18 11:29:39)                 Impression:      Continued demonstration of a mass opacity within the medial aspect of the superior segment of the left lower lobe, which evidently represents a known adenocarcinoma of the lung according to information obtained from the electronic medical record.  No significant detrimental change in the appearance of the chest since 06/09/2018 is appreciated.      Electronically signed by: Yohannes Singh MD  Date:    08/15/2018  Time:    11:29             Narrative:    EXAMINATION:  XR CHEST PA AND LATERAL    TECHNIQUE:  Two views of the chest were obtained, with PA and lateral projections submitted.    COMPARISON:  Comparison is made to the most recent prior chest radiograph, dated 06/09/2018, with reference also made to a thoracic CT exam of 06/10/18 and a PET/CT study of 06/15/2018.    FINDINGS:  Once again identified is a mass opacity in the medial aspect of the superior segment of the left lower lobe, posterior to the left hilum.  This has shown little interval change since the most recent prior chest radiograph, allowing for some differences in patient positioning.  Heart size is normal, as is the appearance of the pulmonary vascularity.  Lung zones elsewhere are clear, with no significant airspace consolidation or volume loss evident.  No pleural fluid.  No pneumothorax.                                 Medical Decision Making:   History:   Old Medical Records: I decided to obtain old medical records.  Clinical Tests:   Lab Tests: Ordered and Reviewed  Radiological Study: Ordered and Reviewed  Medical Tests: Ordered and  Reviewed  Other:   I have discussed this case with another health care provider.       APC / Resident Notes:   62-year-old male with lung adenocarcinoma on active chemo and radiation presents from clinic for hypertension nausea and vomiting. Tachycardic on ED arrival at 107, borderline hypotensive at 103/63. Afebrile.  Abdomen is soft with normoactive bowel sounds, diffusely tender to palpation. Brown stool in rectal vault, guaiac positive. Wheezes and rhonchi heard in all lung fields.  Ddx includes dehydration, gastroenteritis secondary to chemotherapy, electrolyte derangement, GI bleed, pneumonia, colitis.  Will check labs, give 1 L fluids, Zofran, Protonix, do chest x-ray and reassess.    Labs notable for pancytopenia, hyponatremia, elevated BUN/creatinine.  Chest x-ray stable from previous. Patient seen and evaluated by Oncology team, will admit to their service for dehydration and anemia.  I discussed this patient with my supervising physician.    Tanvi Alonzo PA-C         Scribe Attestation:   Scribe #1: I performed the above scribed service and the documentation accurately describes the services I performed. I attest to the accuracy of the note.    Attending Attestation:     Physician Attestation Statement for NP/PA:   I discussed this assessment and plan of this patient with the NP/PA, but I did not personally examine the patient. The face to face encounter was performed by the NP/PA.                     Clinical Impression:   The primary encounter diagnosis was Guaiac positive stools. Diagnoses of Shortness of breath, Lung cancer metastatic to bone, Dehydration, and Melena were also pertinent to this visit.      Disposition:   Disposition: Admitted  Condition: Serious                        Tanvi Alonzo PA-C  08/15/18 2976       Montrell Ivory MD  08/19/18 1107

## 2018-08-15 NOTE — ED TRIAGE NOTES
Pt was undergoing radiation for lung and bone cancer today. Pt became hypotensive and weak. Pt states he also vomited this morning and has been having black stools.

## 2018-08-15 NOTE — ED NOTES
Pt resting quietly on stretcher; remains on continuous cardiac and pulse ox monitoring with non-invasive blood pressure to cycle every 30 minutes.  VS stable; NSR noted. Bed locked in lowest position; side rails up and locked x 2; call light, bedside table, and personal belongings within reach.  Pt updated on wait for provider to discuss results and plan of care; denies needs or complaints at this time; will continue to monitor pt.

## 2018-08-15 NOTE — ED NOTES
Pt leaves ED via stretcher,escorted by transport tech. Pt is stable and has all personal belongings.

## 2018-08-15 NOTE — ASSESSMENT & PLAN NOTE
- WBC 2.75, ANC 2378, Hgb 9.3, PLT 93  - due to chemotherapy   - unclear if anemia solely due to chemotherapy or if it is compounded by an undiagnosed GIB  - hold antiplatelet therapy/NSAIDs and GI evaluation as above  - continue to monitor counts and transfuse for hgb <7, plt <50 (plt <10 once bleed is ruled out)

## 2018-08-15 NOTE — ED NOTES
Pt resting quietly on stretcher; remains on continuous cardiac and pulse ox monitoring with non-invasive blood pressure to cycle every 30 minutes.  VS stable; NSR noted. Bed locked in lowest position; side rails up and locked x 2; call light, bedside table, and personal belongings within reach.  Pt awaiting bed assignment; requesting to eat; updated on NPO status.  Pt denies needs or complaints at this time; will continue to monitor pt.

## 2018-08-15 NOTE — HPI
Nimesh Nunn is a 62 y.o. male with multiple medical co-morbidities including CVA, HTN, HLD, CAD (s/p stents on DAPT with ASA and Plavix), PAD (failed revascularization), and NSCLC (undergoing concurrent chemoRT with cis/alimta) who presented from Radiation Oncology for hypotension, nausea vomiting and reported a week history of dark stools. In the ED, Hgb was found to have 7.9 which was down from baseline 13. Denies any hematemesis. Last emesis yesterday after eating. No abdominal pain. Mild dysphagia to solids after radiation. No abdominal pain. GI consulted for further evaluation.

## 2018-08-15 NOTE — ED NOTES
Pt resting quietly on stretcher; remains on continuous cardiac and pulse ox monitoring with non-invasive blood pressure to cycle every 15 minutes.  VS stable; NSR noted.  Pt medicated as documented; IVF hung.  Bed locked in lowest position; side rails up and locked x 2; call light, bedside table, and personal belongings within reach.  Pt awaiting bed assignment; denies needs or complaints at this time; will continue to monitor pt.

## 2018-08-15 NOTE — ASSESSMENT & PLAN NOTE
- hold BP meds given presenting hypotension  - restart as tolerated  - improved, will continue to monitor

## 2018-08-15 NOTE — SUBJECTIVE & OBJECTIVE
Oncology Treatment Plan:   OP NSCLC PEMETREXED + CISPLATIN    Medications:  Continuous Infusions:  Scheduled Meds:   [START ON 2018] atorvastatin  80 mg Oral Daily    [START ON 2018] finasteride  5 mg Oral Daily    folic acid  1,000 mcg Oral Daily     PRN Meds:ondansetron     Review of patient's allergies indicates:  No Known Allergies     Past Medical History:   Diagnosis Date    Abdominal aortic aneurysm (AAA) without rupture 3/5/2018    Arthritis     Blood transfusion     COPD (chronic obstructive pulmonary disease)     Coronary artery disease     Encounter for blood transfusion     Hyperlipidemia 2013    Hypertension     Lung cancer metastatic to bone 2018    Occlusion and stenosis of carotid artery without mention of cerebral infarction 2014    Primary malignant neoplasm of left lung 2018    Screening for colon cancer 2015    Syncope 2014     Past Surgical History:   Procedure Laterality Date    BACK SURGERY      carotid endarectomy      CORONARY ANGIOPLASTY      CORONARY ARTERY BYPASS GRAFT      SKIN BIOPSY       Family History     Problem Relation (Age of Onset)    Breast cancer Mother    Cancer Mother    Heart attack Father    Heart disease Father    Heart failure Father    Hypertension Father    No Known Problems Sister, Brother, Maternal Grandmother, Maternal Grandfather, Paternal Grandmother, Paternal Grandfather, Maternal Aunt, Maternal Uncle, Paternal Aunt, Paternal Uncle        Tobacco Use    Smoking status: Former Smoker     Packs/day: 1.50     Years: 40.00     Pack years: 60.00     Last attempt to quit: 10/11/2011     Years since quittin.8    Smokeless tobacco: Never Used   Substance and Sexual Activity    Alcohol use: Yes     Alcohol/week: 1.8 oz     Types: 3 Cans of beer per week     Comment: 3 cans beer every day or every other day    Drug use: Yes     Types: Marijuana    Sexual activity: Not on file       Review of Systems    Constitutional: Positive for appetite change and fatigue. Negative for chills and fever.   HENT: Negative for sore throat and trouble swallowing.    Respiratory: Negative for cough and shortness of breath.    Cardiovascular: Negative for chest pain and palpitations.   Gastrointestinal: Positive for abdominal pain (cramps with BMs), nausea and vomiting. Negative for constipation and diarrhea.        +dark stools   Genitourinary: Negative for dysuria and hematuria.   Musculoskeletal: Negative for arthralgias and myalgias.   Skin: Negative for rash and wound.   Neurological: Positive for light-headedness. Negative for seizures.   Psychiatric/Behavioral: Negative for agitation and confusion.     Objective:     Vital Signs (Most Recent):  Temp: 98 °F (36.7 °C) (08/15/18 1202)  Pulse: 83 (08/15/18 1317)  Resp: 17 (08/15/18 1317)  BP: 136/70 (08/15/18 1317)  SpO2: 99 % (08/15/18 1317) Vital Signs (24h Range):  Temp:  [97.1 °F (36.2 °C)-98 °F (36.7 °C)] 98 °F (36.7 °C)  Pulse:  [] 83  Resp:  [13-21] 17  SpO2:  [91 %-100 %] 99 %  BP: (103-136)/(56-70) 136/70     Weight: 83.9 kg (185 lb)  Body mass index is 25.09 kg/m².  Body surface area is 2.06 meters squared.      Intake/Output Summary (Last 24 hours) at 8/15/2018 1401  Last data filed at 8/15/2018 1233  Gross per 24 hour   Intake 1100 ml   Output --   Net 1100 ml       Physical Exam   Constitutional: He is oriented to person, place, and time. He appears well-developed and well-nourished. No distress.   HENT:   Head: Normocephalic and atraumatic.   Right Ear: External ear normal.   Left Ear: External ear normal.   Eyes: EOM are normal. Pupils are equal, round, and reactive to light. Right eye exhibits no discharge. Left eye exhibits no discharge.   Neck: Normal range of motion. Neck supple. No tracheal deviation present.   Cardiovascular: Normal rate and regular rhythm. Exam reveals no friction rub.   Pulmonary/Chest: Effort normal and breath sounds normal. No  respiratory distress.   Abdominal: Soft. Bowel sounds are normal. He exhibits no distension. There is no tenderness. There is no guarding.   Musculoskeletal: Normal range of motion. He exhibits no edema or deformity.   Neurological: He is alert and oriented to person, place, and time.   Skin: Skin is warm and dry. He is not diaphoretic.   Psychiatric: He has a normal mood and affect. His behavior is normal.       Significant Labs:   CBC:   Recent Labs   Lab  08/15/18   1045   WBC  2.75*   HGB  9.3*   HCT  26.8*   PLT  95*    and CMP:   Recent Labs   Lab  08/15/18   1045   NA  129*   K  3.8   CL  93*   CO2  29   GLU  106   BUN  46*   CREATININE  1.4   CALCIUM  8.1*   PROT  5.7*   ALBUMIN  3.0*   BILITOT  0.5   ALKPHOS  81   AST  18   ALT  16   ANIONGAP  7*   EGFRNONAA  52.7*       Diagnostic Results:  I have reviewed all pertinent imaging results/findings within the past 24 hours.     CXR 8/15/18:  Continued demonstration of a mass opacity within the medial aspect of the superior segment of the left lower lobe, which evidently represents a known adenocarcinoma of the lung according to information obtained from the electronic medical record.  No significant detrimental change in the appearance of the chest since 06/09/2018 is appreciated.

## 2018-08-15 NOTE — ED NOTES
Pt's first and last name and birthday confirmed.   LOC: The patient is awake, alert and aware of environment with a depressed affect, the patient is oriented x 3 and speaking appropriately.  APPEARANCE: Patient resting comfortably and in no acute distress, patient is clean and well groomed.  SKIN: The skin is warm and dry, patient has normal skin turgor and moist mucus membranes, skin intact,  brusing noted on the upper and lower extremities. Pt is pale.   MUSKULOSKELETAL: Patient moving all extremities well, no obvious swelling or deformities noted. Pt reports generalized weakness.   RESPIRATORY: Airway is open and patent, respirations are spontaneous, patient has a normal effort and rate. Breath sounds are coarse bilaterally,. CARDIAC: Normal heart sounds. No peripheral edema.  ABDOMEN: Soft and non tender to palpation, no distention noted. Bowel sounds present. Pt reports mild nausea.   NEURO: No neuro deficits, hand grasp equal, no drift noted, no facial droop noted. Speech is clear.

## 2018-08-15 NOTE — ASSESSMENT & PLAN NOTE
- hgb dropped from 13 to 9.3 today  - h/o dark stools and guaiac positive examination in ED  - was taking ASA, Plavix, and Mobic at home   - given loading dose of IV protonix in the ED  - will continue IV protonix bid  - continue IVFs as above  - monitor H/H and transfuse pRBCs as indicated  - continue NPO  - GI consulted, appreciate recs

## 2018-08-16 ENCOUNTER — ANESTHESIA (OUTPATIENT)
Dept: ENDOSCOPY | Facility: HOSPITAL | Age: 63
DRG: 377 | End: 2018-08-16
Payer: MEDICARE

## 2018-08-16 ENCOUNTER — DOCUMENTATION ONLY (OUTPATIENT)
Dept: RADIATION ONCOLOGY | Facility: CLINIC | Age: 63
End: 2018-08-16

## 2018-08-16 ENCOUNTER — ANESTHESIA EVENT (OUTPATIENT)
Dept: ENDOSCOPY | Facility: HOSPITAL | Age: 63
DRG: 377 | End: 2018-08-16
Payer: MEDICARE

## 2018-08-16 DIAGNOSIS — K25.9 GASTRIC ULCER, UNSPECIFIED CHRONICITY, UNSPECIFIED WHETHER GASTRIC ULCER HEMORRHAGE OR PERFORATION PRESENT: ICD-10-CM

## 2018-08-16 PROBLEM — R06.02 SHORTNESS OF BREATH: Status: ACTIVE | Noted: 2018-08-16

## 2018-08-16 PROBLEM — D62 ACUTE BLOOD LOSS ANEMIA: Status: ACTIVE | Noted: 2018-08-16

## 2018-08-16 LAB
ALBUMIN SERPL BCP-MCNC: 2.6 G/DL
ALP SERPL-CCNC: 74 U/L
ALT SERPL W/O P-5'-P-CCNC: 17 U/L
ANION GAP SERPL CALC-SCNC: 9 MMOL/L
AST SERPL-CCNC: 18 U/L
BASOPHILS # BLD AUTO: 0 K/UL
BASOPHILS # BLD AUTO: 0 K/UL
BASOPHILS NFR BLD: 0 %
BASOPHILS NFR BLD: 0 %
BILIRUB SERPL-MCNC: 0.6 MG/DL
BUN SERPL-MCNC: 28 MG/DL
CALCIUM SERPL-MCNC: 7.5 MG/DL
CHLORIDE SERPL-SCNC: 97 MMOL/L
CO2 SERPL-SCNC: 24 MMOL/L
CREAT SERPL-MCNC: 1.2 MG/DL
DIFFERENTIAL METHOD: ABNORMAL
DIFFERENTIAL METHOD: ABNORMAL
EOSINOPHIL # BLD AUTO: 0.1 K/UL
EOSINOPHIL # BLD AUTO: 0.1 K/UL
EOSINOPHIL NFR BLD: 10.3 %
EOSINOPHIL NFR BLD: 7.8 %
ERYTHROCYTE [DISTWIDTH] IN BLOOD BY AUTOMATED COUNT: 12.9 %
ERYTHROCYTE [DISTWIDTH] IN BLOOD BY AUTOMATED COUNT: 13 %
EST. GFR  (AFRICAN AMERICAN): >60 ML/MIN/1.73 M^2
EST. GFR  (NON AFRICAN AMERICAN): >60 ML/MIN/1.73 M^2
ESTIMATED AVG GLUCOSE: 103 MG/DL
FERRITIN SERPL-MCNC: 322 NG/ML
FOLATE SERPL-MCNC: 11.4 NG/ML
GLUCOSE SERPL-MCNC: 88 MG/DL
HBA1C MFR BLD HPLC: 5.2 %
HCT VFR BLD AUTO: 23.8 %
HCT VFR BLD AUTO: 26 %
HGB BLD-MCNC: 7.9 G/DL
HGB BLD-MCNC: 8.6 G/DL
HYPOCHROMIA BLD QL SMEAR: ABNORMAL
IMM GRANULOCYTES # BLD AUTO: 0.01 K/UL
IMM GRANULOCYTES # BLD AUTO: 0.02 K/UL
IMM GRANULOCYTES NFR BLD AUTO: 0.7 %
IMM GRANULOCYTES NFR BLD AUTO: 1.3 %
IRON SERPL-MCNC: 27 UG/DL
LYMPHOCYTES # BLD AUTO: 0.2 K/UL
LYMPHOCYTES # BLD AUTO: 0.3 K/UL
LYMPHOCYTES NFR BLD: 13.2 %
LYMPHOCYTES NFR BLD: 16.2 %
MAGNESIUM SERPL-MCNC: 1.5 MG/DL
MCH RBC QN AUTO: 31.1 PG
MCH RBC QN AUTO: 31.4 PG
MCHC RBC AUTO-ENTMCNC: 33.1 G/DL
MCHC RBC AUTO-ENTMCNC: 33.2 G/DL
MCV RBC AUTO: 94 FL
MCV RBC AUTO: 95 FL
MONOCYTES # BLD AUTO: 0.1 K/UL
MONOCYTES # BLD AUTO: 0.1 K/UL
MONOCYTES NFR BLD: 3.2 %
MONOCYTES NFR BLD: 5.9 %
NEUTROPHILS # BLD AUTO: 1 K/UL
NEUTROPHILS # BLD AUTO: 1.1 K/UL
NEUTROPHILS NFR BLD: 69.9 %
NEUTROPHILS NFR BLD: 71.5 %
NRBC BLD-RTO: 0 /100 WBC
NRBC BLD-RTO: 0 /100 WBC
PHOSPHATE SERPL-MCNC: 2.9 MG/DL
PLATELET # BLD AUTO: 79 K/UL
PLATELET # BLD AUTO: 88 K/UL
PLATELET BLD QL SMEAR: ABNORMAL
PMV BLD AUTO: 9.1 FL
PMV BLD AUTO: 9.3 FL
POTASSIUM SERPL-SCNC: 3.5 MMOL/L
PROT SERPL-MCNC: 5 G/DL
RBC # BLD AUTO: 2.54 M/UL
RBC # BLD AUTO: 2.74 M/UL
SATURATED IRON: 8 %
SODIUM SERPL-SCNC: 130 MMOL/L
TOTAL IRON BINDING CAPACITY: 342 UG/DL
TRANSFERRIN SERPL-MCNC: 231 MG/DL
TSH SERPL DL<=0.005 MIU/L-ACNC: 1.24 UIU/ML
VIT B12 SERPL-MCNC: 458 PG/ML
WBC # BLD AUTO: 1.36 K/UL
WBC # BLD AUTO: 1.54 K/UL

## 2018-08-16 PROCEDURE — 43239 EGD BIOPSY SINGLE/MULTIPLE: CPT | Mod: ,,, | Performed by: INTERNAL MEDICINE

## 2018-08-16 PROCEDURE — 43239 EGD BIOPSY SINGLE/MULTIPLE: CPT | Performed by: INTERNAL MEDICINE

## 2018-08-16 PROCEDURE — 82728 ASSAY OF FERRITIN: CPT

## 2018-08-16 PROCEDURE — D9220A PRA ANESTHESIA: Mod: ANES,,, | Performed by: ANESTHESIOLOGY

## 2018-08-16 PROCEDURE — 25000242 PHARM REV CODE 250 ALT 637 W/ HCPCS: Performed by: STUDENT IN AN ORGANIZED HEALTH CARE EDUCATION/TRAINING PROGRAM

## 2018-08-16 PROCEDURE — 80053 COMPREHEN METABOLIC PANEL: CPT

## 2018-08-16 PROCEDURE — G8987 SELF CARE CURRENT STATUS: HCPCS | Mod: CH

## 2018-08-16 PROCEDURE — 36415 COLL VENOUS BLD VENIPUNCTURE: CPT

## 2018-08-16 PROCEDURE — 63600175 PHARM REV CODE 636 W HCPCS: Performed by: NURSE ANESTHETIST, CERTIFIED REGISTERED

## 2018-08-16 PROCEDURE — 82607 VITAMIN B-12: CPT

## 2018-08-16 PROCEDURE — 37000009 HC ANESTHESIA EA ADD 15 MINS: Performed by: INTERNAL MEDICINE

## 2018-08-16 PROCEDURE — 77386 HC IMRT, COMPLEX: CPT | Performed by: RADIOLOGY

## 2018-08-16 PROCEDURE — C9113 INJ PANTOPRAZOLE SODIUM, VIA: HCPCS | Performed by: STUDENT IN AN ORGANIZED HEALTH CARE EDUCATION/TRAINING PROGRAM

## 2018-08-16 PROCEDURE — 83540 ASSAY OF IRON: CPT

## 2018-08-16 PROCEDURE — G6002 STEREOSCOPIC X-RAY GUIDANCE: HCPCS | Mod: 26,,, | Performed by: RADIOLOGY

## 2018-08-16 PROCEDURE — 37000008 HC ANESTHESIA 1ST 15 MINUTES: Performed by: INTERNAL MEDICINE

## 2018-08-16 PROCEDURE — 84100 ASSAY OF PHOSPHORUS: CPT

## 2018-08-16 PROCEDURE — 83735 ASSAY OF MAGNESIUM: CPT

## 2018-08-16 PROCEDURE — 27201012 HC FORCEPS, HOT/COLD, DISP: Performed by: INTERNAL MEDICINE

## 2018-08-16 PROCEDURE — D9220A PRA ANESTHESIA: Mod: CRNA,,, | Performed by: NURSE ANESTHETIST, CERTIFIED REGISTERED

## 2018-08-16 PROCEDURE — 82746 ASSAY OF FOLIC ACID SERUM: CPT

## 2018-08-16 PROCEDURE — 97802 MEDICAL NUTRITION INDIV IN: CPT

## 2018-08-16 PROCEDURE — 97161 PT EVAL LOW COMPLEX 20 MIN: CPT

## 2018-08-16 PROCEDURE — 25000003 PHARM REV CODE 250: Performed by: NURSE ANESTHETIST, CERTIFIED REGISTERED

## 2018-08-16 PROCEDURE — 63600175 PHARM REV CODE 636 W HCPCS: Performed by: STUDENT IN AN ORGANIZED HEALTH CARE EDUCATION/TRAINING PROGRAM

## 2018-08-16 PROCEDURE — 84443 ASSAY THYROID STIM HORMONE: CPT

## 2018-08-16 PROCEDURE — 97165 OT EVAL LOW COMPLEX 30 MIN: CPT

## 2018-08-16 PROCEDURE — 20600001 HC STEP DOWN PRIVATE ROOM

## 2018-08-16 PROCEDURE — 99223 1ST HOSP IP/OBS HIGH 75: CPT | Mod: GC,,, | Performed by: INTERNAL MEDICINE

## 2018-08-16 PROCEDURE — 83036 HEMOGLOBIN GLYCOSYLATED A1C: CPT

## 2018-08-16 PROCEDURE — 88305 TISSUE EXAM BY PATHOLOGIST: CPT | Performed by: PATHOLOGY

## 2018-08-16 PROCEDURE — 88305 TISSUE EXAM BY PATHOLOGIST: CPT | Mod: 26,,, | Performed by: PATHOLOGY

## 2018-08-16 PROCEDURE — 25000003 PHARM REV CODE 250: Performed by: STUDENT IN AN ORGANIZED HEALTH CARE EDUCATION/TRAINING PROGRAM

## 2018-08-16 PROCEDURE — 85025 COMPLETE CBC W/AUTO DIFF WBC: CPT | Mod: 91

## 2018-08-16 PROCEDURE — 0DB68ZX EXCISION OF STOMACH, VIA NATURAL OR ARTIFICIAL OPENING ENDOSCOPIC, DIAGNOSTIC: ICD-10-PCS | Performed by: INTERNAL MEDICINE

## 2018-08-16 PROCEDURE — 77387 GUIDANCE FOR RADJ TX DLVR: CPT | Mod: TC | Performed by: RADIOLOGY

## 2018-08-16 RX ORDER — POTASSIUM CHLORIDE 750 MG/1
40 CAPSULE, EXTENDED RELEASE ORAL ONCE
Status: COMPLETED | OUTPATIENT
Start: 2018-08-16 | End: 2018-08-16

## 2018-08-16 RX ORDER — SODIUM CHLORIDE 0.9 % (FLUSH) 0.9 %
3 SYRINGE (ML) INJECTION
Status: DISCONTINUED | OUTPATIENT
Start: 2018-08-16 | End: 2018-08-16 | Stop reason: HOSPADM

## 2018-08-16 RX ORDER — MAGNESIUM SULFATE HEPTAHYDRATE 40 MG/ML
2 INJECTION, SOLUTION INTRAVENOUS
Status: COMPLETED | OUTPATIENT
Start: 2018-08-16 | End: 2018-08-16

## 2018-08-16 RX ORDER — PANTOPRAZOLE SODIUM 40 MG/1
40 TABLET, DELAYED RELEASE ORAL
Qty: 90 TABLET | Refills: 3 | Status: SHIPPED | OUTPATIENT
Start: 2018-08-16 | End: 2018-08-17 | Stop reason: HOSPADM

## 2018-08-16 RX ORDER — LIDOCAINE HCL/PF 100 MG/5ML
SYRINGE (ML) INTRAVENOUS
Status: DISCONTINUED | OUTPATIENT
Start: 2018-08-16 | End: 2018-08-16

## 2018-08-16 RX ORDER — DIPHENHYDRAMINE HYDROCHLORIDE 50 MG/ML
25 INJECTION INTRAMUSCULAR; INTRAVENOUS EVERY 6 HOURS PRN
Status: DISCONTINUED | OUTPATIENT
Start: 2018-08-16 | End: 2018-08-16 | Stop reason: HOSPADM

## 2018-08-16 RX ORDER — SODIUM CHLORIDE 9 MG/ML
INJECTION, SOLUTION INTRAVENOUS CONTINUOUS PRN
Status: DISCONTINUED | OUTPATIENT
Start: 2018-08-16 | End: 2018-08-16

## 2018-08-16 RX ORDER — PROPOFOL 10 MG/ML
VIAL (ML) INTRAVENOUS
Status: DISCONTINUED | OUTPATIENT
Start: 2018-08-16 | End: 2018-08-16

## 2018-08-16 RX ADMIN — DEXTROSE 40 MG: 50 INJECTION, SOLUTION INTRAVENOUS at 08:08

## 2018-08-16 RX ADMIN — ATORVASTATIN CALCIUM 80 MG: 20 TABLET, FILM COATED ORAL at 09:08

## 2018-08-16 RX ADMIN — PROPOFOL 20 MG: 10 INJECTION, EMULSION INTRAVENOUS at 02:08

## 2018-08-16 RX ADMIN — FINASTERIDE 5 MG: 5 TABLET, FILM COATED ORAL at 09:08

## 2018-08-16 RX ADMIN — OXYCODONE HYDROCHLORIDE 5 MG: 5 TABLET ORAL at 07:08

## 2018-08-16 RX ADMIN — ONDANSETRON 8 MG: 8 TABLET, ORALLY DISINTEGRATING ORAL at 10:08

## 2018-08-16 RX ADMIN — LIDOCAINE HYDROCHLORIDE 100 MG: 20 INJECTION, SOLUTION INTRAVENOUS at 02:08

## 2018-08-16 RX ADMIN — OXYCODONE HYDROCHLORIDE 5 MG: 5 TABLET ORAL at 10:08

## 2018-08-16 RX ADMIN — PROPOFOL 100 MG: 10 INJECTION, EMULSION INTRAVENOUS at 02:08

## 2018-08-16 RX ADMIN — DEXTROSE 40 MG: 50 INJECTION, SOLUTION INTRAVENOUS at 09:08

## 2018-08-16 RX ADMIN — FLUTICASONE FUROATE AND VILANTEROL TRIFENATATE 1 PUFF: 200; 25 POWDER RESPIRATORY (INHALATION) at 01:08

## 2018-08-16 RX ADMIN — MAGNESIUM SULFATE IN WATER 2 G: 40 INJECTION, SOLUTION INTRAVENOUS at 01:08

## 2018-08-16 RX ADMIN — MAGNESIUM SULFATE IN WATER 2 G: 40 INJECTION, SOLUTION INTRAVENOUS at 06:08

## 2018-08-16 RX ADMIN — FOLIC ACID 1000 MCG: 1 TABLET ORAL at 09:08

## 2018-08-16 RX ADMIN — POTASSIUM CHLORIDE 40 MEQ: 750 CAPSULE, EXTENDED RELEASE ORAL at 09:08

## 2018-08-16 RX ADMIN — SODIUM CHLORIDE: 0.9 INJECTION, SOLUTION INTRAVENOUS at 02:08

## 2018-08-16 NOTE — CONSULTS
[facility]  Gastroenterology  Consult Note    Patient Name: Nimesh Nunn  MRN: [id number]  Admission Date: 8/15/2018  Hospital Length of Stay: 1 days  Code Status: Full Code   Attending Provider: Demetrius Infante DO F*   Consulting Provider: Aimee Zavaleta MD  Primary Care Physician: Nishant Perez MD  Principal Problem:Dehydration    Inpatient consult to Gastroenterology  Consult performed by: Aimee Zavaleta MD  Consult ordered by: Jagdish Jacob MD  Reason for consult: anemia        Subjective:     HPI:  Nimesh Nunn is a 62 y.o. male with multiple medical co-morbidities including CVA, HTN, HLD, CAD (s/p stents on DAPT with ASA and Plavix), PAD (failed revascularization), and NSCLC (undergoing concurrent chemoRT with cis/alimta) who presented from Radiation [facility]cology for hypotension, nausea vomiting and reported a week history of dark stools. In the ED, Hgb was found to have 7.9 which was down from baseline 13. Denies any hematemesis. Last emesis yesterday after eating. No abdominal pain. Mild dysphagia to solids after radiation. No abdominal pain. GI consulted for further evaluation. No prior EGD. Last colon 2015. Prior polyps.         atorvastatin  80 mg Oral Daily    finasteride  5 mg Oral Daily    fluticasone-vilanterol  1 puff Inhalation Daily    folic acid  1,000 mcg Oral Daily    magnesium sulfate IVPB  2 g Intravenous Q2H    pantoprozole (PROTONIX) 40 mg/100 mL D5W IVPB  40 mg Intravenous Q12H     Review of patient's allergies indicates:  No Known Allergies     Past Medical History:   Diagnosis Date    Abdominal aortic aneurysm (AAA) without rupture 3/5/2018    Arthritis     Blood transfusion     COPD (chronic obstructive pulmonary disease)     Coronary artery disease     Encounter for blood transfusion     Hyperlipidemia 11/7/2013    Hypertension     Lung cancer metastatic to bone 7/18/2018    Occlusion and stenosis of carotid artery without  mention of cerebral infarction 2014    Primary malignant neoplasm of left lung 2018    Screening for colon cancer 2015    Syncope 2014     Past Surgical History:   Procedure Laterality Date    BACK SURGERY      carotid endarectomy      CORONARY ANGIOPLASTY      CORONARY ARTERY BYPASS GRAFT      SKIN BIOPSY       Family History     Problem Relation (Age of Onset)    Breast cancer Mother    Cancer Mother    Heart attack Father    Heart disease Father    Heart failure Father    Hypertension Father    No Known Problems Sister, Brother, Maternal Grandmother, Maternal Grandfather, Paternal Grandmother, Paternal Grandfather, Maternal Aunt, Maternal Uncle, Paternal Aunt, Paternal Uncle        Tobacco Use    Smoking status: Former Smoker     Packs/day: 1.50     Years: 40.00     Pack years: 60.00     Last attempt to quit: 10/11/2011     Years since quittin.8    Smokeless tobacco: Never Used   Substance and Sexual Activity    Alcohol use: Yes     Alcohol/week: 1.8 oz     Types: 3 Cans of beer per week     Comment: 3 cans beer every day or every other day    Drug use: Yes     Types: Marijuana    Sexual activity: Not on file       Review of Systems   Constitutional: Positive for activity change, appetite change and fatigue.   HENT: Positive for trouble swallowing. Negative for facial swelling and sore throat.    Eyes: Negative for discharge and itching.   Respiratory: Positive for shortness of breath. Negative for stridor.    Cardiovascular: Positive for leg swelling. Negative for palpitations.   Gastrointestinal: Positive for blood in stool (dark stools), nausea and vomiting. Negative for abdominal distention, abdominal pain, anal bleeding, constipation, diarrhea and rectal pain.   Endocrine: Negative for polyphagia and polyuria.   Genitourinary: Negative for difficulty urinating and dysuria.   Musculoskeletal: Negative for arthralgias.   Skin: Negative for color change and pallor.    Allergic/Immunologic: Positive for immunocompromised state.   Neurological: Positive for weakness.     Objective:     Vital Signs (Most Recent):  Temp: 98.5 °F (36.9 °C) (08/16/18 0802)  Pulse: 78 (08/16/18 0802)  Resp: 17 (08/16/18 0802)  BP: 124/75 (08/16/18 0802)  SpO2: (!) 92 % (08/16/18 0802) Vital Signs (24h Range):  Temp:  [98 °F (36.7 °C)-98.5 °F (36.9 °C)] 98.5 °F (36.9 °C)  Pulse:  [69-95] 78  Resp:  [13-22] 17  SpO2:  [91 %-100 %] 92 %  BP: (114-153)/(56-87) 124/75     Weight: 83.9 kg (184 lb 15.5 oz)  Body mass index is 25.09 kg/m².  Body surface area is 2.06 meters squared.      Intake/Output Summary (Last 24 hours) at 8/16/2018 1124  Last data filed at 8/16/2018 0637  Gross per 24 hour   Intake 2755 ml   Output 1375 ml   Net 1380 ml       Physical Exam   Constitutional: He is oriented to person, place, and time. He appears well-developed.   HENT:   Head: Normocephalic and atraumatic.   Eyes: No scleral icterus.   Neck: Neck supple.   Cardiovascular: Normal rate. Exam reveals no gallop and no friction rub.   Pulmonary/Chest: Effort normal and breath sounds normal. No respiratory distress.   Abdominal: Soft. Bowel sounds are normal. He exhibits no distension and no mass. There is no tenderness. There is no rebound and no guarding. No hernia.   Musculoskeletal: He exhibits no edema or tenderness.   Neurological: He is alert and oriented to person, place, and time.   Skin: Skin is warm.   Psychiatric: He has a normal mood and affect.   Vitals reviewed.    Lab Results   Component Value Date    WBC 1.54 (LL) 08/16/2018    HGB 7.9 (L) 08/16/2018    HCT 23.8 (L) 08/16/2018    MCV 94 08/16/2018    PLT 79 (L) 08/16/2018     Lab Results   Component Value Date    INR 0.9 08/15/2018     Lab Results   Component Value Date     (L) 08/16/2018    K 3.5 08/16/2018    CREATININE 1.2 08/16/2018     Lab Results   Component Value Date    ALBUMIN 2.6 (L) 08/16/2018    ALT 17 08/16/2018    AST 18 08/16/2018    GGT 81  (H) 03/25/2015    ALKPHOS 74 08/16/2018    BILITOT 0.6 08/16/2018     No results found for: AFP  Lab Results   Component Value Date    LIPASE 28 08/15/2018     No results found for: TACROLIMUS    Imaging:  Reviewed and as noted in HPI.         Assessment/Plan:     Acute blood loss anemia    Nimehs Nunn is a 62 y.o. male with anemia and dark stools.   Takes aspirin, plavix and Mobic at home with no PPI.   Dysphagia - ? Radiation esophagitis.   DDx esophagitis, ulcer, avm    Plan:  Protonix IV BID  Intravascular resuscitation/support with IVFs   Serial H/H's and pRBCs transfusion as indicated  Maintain IV access with 2 large bore IVs  NPO  Plan for EGD today   Please notify GI team if there is significant change in patient's clinical status              Thank you for your consult. I will follow-up with patient. Please contact us if you have any additional questions.    Aimee Zavaleta MD  Gastroenterology  [facility]

## 2018-08-16 NOTE — TREATMENT PLAN
GI Progress note    EGD done - please see full report for details.     Clean based gastric ulcer. Biopsied.     Recommend:  Continue PPI PO once daily.   Repeat EGD in 8-12 weeks.  Resume diet and advance as tolerated  Please call GI with questions/updates.    Aimee Zavaleta MD  Gastroenterology & Hepatology Fellow  Pager: [phone]

## 2018-08-16 NOTE — [employer] PATIENT INSTRUCTIONS
Discharge Summary/Instructions after an Endoscopic Procedure  Patient Name: Nimesh Nunn  Patient MRN: [id number]  Patient YOB: 1955 Thursday, August 16, 2018  Yohannes Osuna MD  RESTRICTIONS:  During your procedure today, you received medications for sedation.  These   medications may affect your judgment, balance and coordination.  Therefore,   for 24 hours, you have the following restrictions:   - DO NOT drive a car, operate machinery, make legal/financial decisions,   sign important papers or drink alcohol.    ACTIVITY:  Today: no heavy lifting, straining or running due to procedural   sedation/anesthesia.  The following day: return to full activity including work.  DIET:  Eat and drink normally unless instructed otherwise.     TREATMENT FOR COMMON SIDE EFFECTS:  - Mild abdominal pain, nausea, belching, bloating or excessive gas:  rest,   eat lightly and use a heating pad.  - Sore Throat: treat with throat lozenges and/or gargle with warm salt   water.  - Because air was used during the procedure, expelling large amounts of air   from your rectum or belching is normal.  - If a bowel prep was taken, you may not have a bowel movement for 1-3 days.    This is normal.  SYMPTOMS TO WATCH FOR AND REPORT TO YOUR PHYSICIAN:  1. Abdominal pain or bloating, other than gas cramps.  2. Chest pain.  3. Back pain.  4. Signs of infection such as: chills or fever occurring within 24 hours   after the procedure.  5. Rectal bleeding, which would show as bright red, maroon, or black stools.   (A tablespoon of blood from the rectum is not serious, especially if   hemorrhoids are present.)  6. Vomiting.  7. Weakness or dizziness.  GO DIRECTLY TO THE NEAREST EMERGENCY ROOM IF YOU HAVE ANY OF THE FOLLOWING:      Difficulty breathing              Chills and/or fever over 101 F   Persistent vomiting and/or vomiting blood   Severe abdominal pain   Severe chest pain   Black, tarry stools   Bleeding- more than one  tablespoon   Any other symptom or condition that you feel may need urgent attention  Your doctor recommends these additional instructions:  If any biopsies were taken, your doctors clinic will contact you in 1 to 2   weeks with any results.  - Await pathology results.   - Return patient to hospital anders for ongoing care.   - Clear liquid diet.   - Continue present medications. Can change IV PPI to oral  For questions, problems or results please call your physician - Yohannes Osuna MD at Work:  [phone].  [facility], EMERGENCY ROOM PHONE NUMBER: [phone]  IF A COMPLICATION OR EMERGENCY SITUATION ARISES AND YOU ARE UNABLE TO REACH   YOUR PHYSICIAN - GO DIRECTLY TO THE EMERGENCY ROOM.  Yohannes Osuna MD  8/16/2018 2:49:42 PM  This report has been verified and signed electronically.  [employer]

## 2018-08-16 NOTE — PLAN OF CARE
Problem: Patient Care Overview  Goal: Plan of Care Review  Outcome: Ongoing (interventions implemented as appropriate)  Day 6 of XRT to the left lung. Nauseated with stomach cramps. BP 94/51/ States he is short winded, tired and weak. Chemo next Thursday. Pt brought to ER per Dr. Perea.

## 2018-08-16 NOTE — PT/OT/SLP EVAL
"Physical Therapy Evaluation and Discharge Note    Patient Name:  Nimesh Nunn   MRN:  [id number]    Recommendations:     Discharge Recommendations:  home   Discharge Equipment Recommendations: none   Barriers to discharge: stairs to enter home, but pt reporting no difficulty with ascending/descending stairs    Assessment:     Nimesh Nunn is a 62 y.o. male admitted with a medical diagnosis of Dehydration. Pt able to perform functional mobility without physical assist or use of DME. Ambulated greater than household distance with no SOB or LOB noted. At this time, patient is functioning at their prior level of function and does not require further acute PT services.     Recent Surgery: Procedure(s) (LRB):  ESOPHAGOGASTRODUODENOSCOPY (EGD) (N/A)      Plan:     During this hospitalization, patient does not require further acute PT services.  Please re-consult if situation changes.      Subjective     Chief Complaint: none noted   Patient/Family Comments/goals: "I'm hoping to go home today."  Pain/Comfort:  · Pain Rating 1: 0/10    Patients cultural, spiritual, Amish conflicts given the current situation: none noted     Living Environment:  Pt lives alone in a 1SH with 4 RADHA, R handrail. Pt reports that PTA he was (I) with ambulation and ADLs, driving.  Prior to admission, patients level of function was indep.  Equipment used at home: cane, straight, rollator(not using DME PTA).  Upon discharge, patient will have assistance from family.    Objective:     Communicated with RN prior to session.  Patient found supine upon PT entry to room found with: (no lines connected during session)     General Precautions: Standard, fall   Orthopedic Precautions:N/A   Braces: N/A     Exams:  · Cognitive Exam:  Patient is oriented to Person, Place, Time and Situation  · Sensation:    · -       Intact  · RLE ROM: WFL  · RLE Strength: WFL  · LLE ROM: WFL  · LLE Strength: WFL    Functional Mobility:  · Bed Mobility:   "   · Supine to Sit: modified independence  · Sit to Supine: modified independence  · Transfers:     · Sit to Stand:  independence with no AD  · Gait: ~200 ft. with supervision and no AD  · Decreased gail   · Pt reporting weakness during gait, but attributing to NPO status    AM-PAC 6 CLICK MOBILITY  Total Score:24       Therapeutic Activities and Exercises:   Pt educated on role of PT and PT POC including plan to d/c IP PT services at this time. Pt instructed to contact the medical team if therapy needs arise during hospital admission. Pt verbalized understanding.   Pt educated on the importance of OOB activity and encouraged to continue daily ambulation with staff or family assistance as needed. Pt v/u.     AM-PAC 6 CLICK MOBILITY  Total Score:24     Patient left supine with all lines intact, call button in reach and RN notified.    GOALS:   Multidisciplinary Problems     Physical Therapy Goals     Not on file          Multidisciplinary Problems (Resolved)        Problem: Physical Therapy Goal    Goal Priority Disciplines Outcome Goal Variances Interventions   Physical Therapy Goal   (Resolved)     PT, PT/OT Outcome(s) achieved                     History:     Past Medical History:   Diagnosis Date    Abdominal aortic aneurysm (AAA) without rupture 3/5/2018    Arthritis     Blood transfusion     COPD (chronic obstructive pulmonary disease)     Coronary artery disease     Encounter for blood transfusion     Hyperlipidemia 11/7/2013    Hypertension     Lung cancer metastatic to bone 7/18/2018    Occlusion and stenosis of carotid artery without mention of cerebral infarction 1/12/2014    Primary malignant neoplasm of left lung 7/18/2018    Screening for colon cancer 6/1/2015    Syncope 1/13/2014       Past Surgical History:   Procedure Laterality Date    BACK SURGERY      carotid endarectomy      CORONARY ANGIOPLASTY      CORONARY ARTERY BYPASS GRAFT      SKIN BIOPSY         Clinical Decision  Making:     History  Co-morbidities and personal factors that may impact the plan of care Examination  Body Structures and Functions, activity limitations and participation restrictions that may impact the plan of care Clinical Presentation   Decision Making/ Complexity Score   Co-morbidities:   [x] Time since onset of injury / illness / exacerbation  [] Status of current condition  []Patient's cognitive status and safety concerns    [x] Multiple Medical Problems (see med hx)  Personal Factors:   [] Patient's age  [] Prior Level of function   [x] Patient's home situation (environment and family support)  [] Patient's level of motivation  [] Expected progression of patient      HISTORY:(criteria)    [] 70269 - no personal factors/history    [] 86462 - has 1-2 personal factor/comorbidity     [x] 53604 - has >3 personal factor/comorbidity     Body Regions:  [] Objective examination findings  [] Head     []  Neck  [] Trunk   [] Upper Extremity  [] Lower Extremity    Body Systems:  [] For communication ability, affect, cognition, language, and learning style: the assessment of the ability to make needs known, consciousness, orientation (person, place, and time), expected emotional /behavioral responses, and learning preferences (eg, learning barriers, education  needs)  [x] For the neuromuscular system: a general assessment of gross coordinated movement (eg, balance, gait, locomotion, transfers, and transitions) and motor function  (motor control and motor learning)  [x] For the musculoskeletal system: the assessment of gross symmetry, gross range of motion, gross strength, height, and weight  [] For the integumentary system: the assessment of pliability(texture), presence of scar formation, skin color, and skin integrity  [] For cardiovascular/pulmonary system: the assessment of heart rate, respiratory rate, blood pressure, and edema     Activity limitations:    [] Patient's cognitive status and saf ety concerns           [] Status of current condition      [] Weight bearing restriction  [] Cardiopulmunary Restriction    Participation Restrictions:   [] Goals and goal agreement with the patient     [] Rehab potential (prognosis) and probable outcome      Examination of Body System: (criteria)    [x] 73126 - addressing 1-2 elements    [] 45209 - addressing a total of 3 or more elements     [] 05076 -  Addressing a total of 4 or more elements         Clinical Presentation: (criteria)  Stable - 05056     On examination of body system using standardized tests and measures patient presents with 1-2 elements from any of the following: body structures and functions, activity limitations, and/or participation restrictions.  Leading to a clinical presentation that is considered stable and/or uncomplicated                              Clinical Decision Making  (Eval Complexity):  Low- 48315     Time Tracking:     PT Received On: 08/16/18  PT Start Time: 1034     PT Stop Time: 1048  PT Total Time (min): 14 min     Billable Minutes: Evaluation 14  (co-eval with OT)    Becki Narayanan, PT, DPT   8/16/2018  [phone]

## 2018-08-16 NOTE — NURSING TRANSFER
Nursing Transfer Note      8/16/2018     Transfer 823    Transfer via stretcher    Transported by PCT Crystal    Chart send with patient: yes    Patient reassessed at: 8/16/2018 at 1526

## 2018-08-16 NOTE — ANESTHESIA PREPROCEDURE EVALUATION
08/16/2018  Nimesh Nunn is a 62 y.o., male.    Anesthesia Evaluation    I have reviewed the Patient Summary Reports.    I have reviewed the Nursing Notes.   I have reviewed the Medications.     Review of Systems  Anesthesia Hx:  No problems with previous Anesthesia  History of prior surgery of interest to airway management or planning: Previous anesthesia: General Denies Family Hx of Anesthesia complications.   Denies Personal Hx of Anesthesia complications.   Social:  Former Smoker, Social Alcohol Use Quit smoking cigarettes 8 years ago.  Smoked 40 years.  Currently smokes marijuana.   Hematology/Oncology:         -- Anemia: Current/Recent Cancer. Oncology Comments: Lung CA with bone mets    EENT/Dental:EENT/Dental Normal   Cardiovascular:  Cardiovascular Normal Exercise tolerance: good Hypertension CAD    PVD hyperlipidemia ECG has been reviewed. Stress Test 2016 no indication of ischemia  Plavix   Pulmonary:  Pulmonary Normal COPD, mild Lung CA   Renal/:   Chronic Renal Disease, CRI    Hepatic/GI:  Hepatic/GI Normal    Musculoskeletal:   Arthritis   Spine Disorders: lumbar    Neurological:   TIA, Neuromuscular Disease,   Chronic Pain Syndrome   Endocrine:  Endocrine Normal    Dermatological:  Skin Normal    Psych:  Psychiatric Normal           Physical Exam  General:  Well nourished    Airway/Jaw/Neck:  Airway Findings: Mouth Opening: Small, but > 3cm Tongue: Normal  General Airway Assessment: Adult, Average  Mallampati: III  Improves to II with phonation.  TM Distance: 4 - 6 cm        Eyes/Ears/Nose:  EYES/EARS/NOSE FINDINGS: Normal   Dental:  Dental Findings: Edentulous   Chest/Lungs:  Chest/Lungs Findings: Normal Respiratory Rate, Decreased Breath Sounds Bilateral     Heart/Vascular:  Heart Findings: Rate: Normal  Rhythm: Regular Rhythm  Sounds: Normal  Heart murmur: negative Vascular Findings:  Normal    Abdomen:  Abdomen Findings: Normal    Musculoskeletal:  Musculoskeletal Findings: Normal   Skin:  Skin Findings: Normal    Mental Status:  Mental Status Findings:  Cooperative, Alert and Oriented         Anesthesia Plan  Type of Anesthesia, risks & benefits discussed:  Anesthesia Type:  general  Patient's Preference:   Intra-op Monitoring Plan: standard ASA monitors  Intra-op Monitoring Plan Comments:   Post Op Pain Control Plan:   Post Op Pain Control Plan Comments:   Induction:   IV  Beta Blocker:  Patient is on a Beta-Blocker and has received one dose within the past 24 hours (No further documentation required).       Informed Consent: Patient understands risks and agrees with Anesthesia plan.  Questions answered. Anesthesia consent signed with patient.  ASA Score: 3     Day of Surgery Review of History & Physical:            Ready For Surgery From Anesthesia Perspective.

## 2018-08-16 NOTE — TRANSFER OF CARE
Anesthesia Transfer of Care Note    Patient: Nimesh Nunn    Procedure(s) Performed: Procedure(s) (LRB):  ESOPHAGOGASTRODUODENOSCOPY (EGD) (N/A)    Patient location: OPS    Anesthesia Type: general    Transport from OR: Transported from OR on room air with adequate spontaneous ventilation    Post pain: adequate analgesia    Post assessment: no apparent anesthetic complications    Post vital signs: stable    Level of consciousness: awake    Nausea/Vomiting: no nausea/vomiting    Complications: none    Transfer of care protocol was followed      Last vitals:   Visit Vitals  /76 (BP Location: Left arm, Patient Position: Lying)   Pulse 79   Temp 36.7 °C (98.1 °F) (Temporal)   Resp 20   Ht 6' (1.829 m)   Wt 83.9 kg (184 lb 15.5 oz)   SpO2 98%   BMI 25.09 kg/m²

## 2018-08-16 NOTE — PROGRESS NOTES
[facility]  Hematology/Oncology  Progress Note    Patient Name: Nimesh Nunn  Admission Date: 8/15/2018  Hospital Length of Stay: 1 days  Code Status: Full Code     Subjective:     HPI:  Patient is a 61yo M with multiple medical co-morbidities including CVA, HTN, HLD, CAD (s/p stents on DAPT with ASA and Plavix), PAD (failed revascularization), and NSCLC (undergoing concurrent chemoRT with cis/alimta) who presented from Radiation Oncology for hypotension and n/v. Patient reports mild n/v yesterday but was able to tolerate breakfast today. However, after his XRT he became nauseated and had an episode of non-bloody emesis. During that time, he also had a dark BM and was noted to be hypotensive with SBP in the 90s. He noted associated symptom of light-headedness and fatigue. After arrival to the ED, he was given IVFs and zofran with resolution n/v and hypotension. He remains with some fatigue. On further history, he has been having dark stools for about a week, usually once a day associated with abdominal cramping. LBM today with dark stools but rectal exam by ED showed brown stool. Guaiac positive. He reports taking ASA, Plavix, and Mobic, but he is not on a PPI.  Heme/Onc asked to admit for dehydration and to work up potential GI bleed.     INTERVAL HISTORY:  Patient seen and examined this AM. Patient reports NAEON; remained AFVSS. No more BMs overnight; LBM yesterday AM at Radiation Oncology clinic. Notes improvement in fatigue and resolution of light-headedness. No further episodes of n/v or abdominal pain. Notes mild SOB. Denies chest pain, cough, palpitations, dysuria. No other complaints.    Oncology Treatment Plan:   OP NSCLC PEMETREXED + CISPLATIN    Medications:  Continuous Infusions:   sodium chloride 0.9% 100 mL/hr at 08/15/18 1504     Scheduled Meds:   atorvastatin  80 mg Oral Daily    finasteride  5 mg Oral Daily    fluticasone-vilanterol  1 puff Inhalation Daily    folic  acid  1,000 mcg Oral Daily    magnesium sulfate IVPB  2 g Intravenous Q2H    pantoprozole (PROTONIX) 40 mg/100 mL D5W IVPB  40 mg Intravenous Q12H    potassium chloride  40 mEq Oral Once     PRN Meds:albuterol-ipratropium, dextrose 50%, dextrose 50%, glucagon (human recombinant), glucose, glucose, ondansetron, oxyCODONE, promethazine, sodium chloride 0.9%     Review of patient's allergies indicates:  No Known Allergies     Past Medical History:   Diagnosis Date    Abdominal aortic aneurysm (AAA) without rupture 3/5/2018    Arthritis     Blood transfusion     COPD (chronic obstructive pulmonary disease)     Coronary artery disease     Encounter for blood transfusion     Hyperlipidemia 2013    Hypertension     Lung cancer metastatic to bone 2018    Occlusion and stenosis of carotid artery without mention of cerebral infarction 2014    Primary malignant neoplasm of left lung 2018    Screening for colon cancer 2015    Syncope 2014     Past Surgical History:   Procedure Laterality Date    BACK SURGERY      carotid endarectomy      CORONARY ANGIOPLASTY      CORONARY ARTERY BYPASS GRAFT      SKIN BIOPSY       Family History     Problem Relation (Age of Onset)    Breast cancer Mother    Cancer Mother    Heart attack Father    Heart disease Father    Heart failure Father    Hypertension Father    No Known Problems Sister, Brother, Maternal Grandmother, Maternal Grandfather, Paternal Grandmother, Paternal Grandfather, Maternal Aunt, Maternal Uncle, Paternal Aunt, Paternal Uncle        Tobacco Use    Smoking status: Former Smoker     Packs/day: 1.50     Years: 40.00     Pack years: 60.00     Last attempt to quit: 10/11/2011     Years since quittin.8    Smokeless tobacco: Never Used   Substance and Sexual Activity    Alcohol use: Yes     Alcohol/week: 1.8 oz     Types: 3 Cans of beer per week     Comment: 3 cans beer every day or every other day    Drug use: Yes      Types: Marijuana    Sexual activity: Not on file       Review of Systems   Constitutional: Positive for fatigue. Negative for chills and fever.   HENT: Negative for sore throat and trouble swallowing.    Respiratory: Positive for wheezing. Negative for cough.    Cardiovascular: Negative for chest pain and palpitations.   Gastrointestinal: Positive for abdominal pain (cramps with BMs). Negative for constipation, diarrhea and vomiting.        +dark stools   Genitourinary: Negative for dysuria and hematuria.   Musculoskeletal: Negative for arthralgias and myalgias.   Skin: Negative for rash and wound.   Neurological: Negative for seizures and light-headedness.   Psychiatric/Behavioral: Negative for agitation and confusion.     Objective:     Vital Signs (Most Recent):  Temp: 98.3 °F (36.8 °C) (08/16/18 0317)  Pulse: 85 (08/16/18 0317)  Resp: 18 (08/16/18 0317)  BP: 137/74 (08/16/18 0317)  SpO2: 96 % (08/16/18 0317) Vital Signs (24h Range):  Temp:  [97.1 °F (36.2 °C)-98.4 °F (36.9 °C)] 98.3 °F (36.8 °C)  Pulse:  [] 85  Resp:  [13-22] 18  SpO2:  [91 %-100 %] 96 %  BP: (103-153)/(56-87) 137/74     Weight: 83.9 kg (184 lb 15.5 oz)  Body mass index is 25.09 kg/m².  Body surface area is 2.06 meters squared.      Intake/Output Summary (Last 24 hours) at 8/16/2018 0719  Last data filed at 8/16/2018 0637  Gross per 24 hour   Intake 2755 ml   Output 1375 ml   Net 1380 ml       Physical Exam   Constitutional: He is oriented to person, place, and time. He appears well-developed and well-nourished. No distress.   HENT:   Head: Normocephalic and atraumatic.   Right Ear: External ear normal.   Left Ear: External ear normal.   Eyes: EOM are normal. Pupils are equal, round, and reactive to light. Right eye exhibits no discharge. Left eye exhibits no discharge.   Neck: Normal range of motion. Neck supple. No tracheal deviation present.   Cardiovascular: Normal rate and regular rhythm. Exam reveals no friction rub.    Pulmonary/Chest: Effort normal. No respiratory distress. He has wheezes.   Abdominal: Soft. Bowel sounds are normal. He exhibits no distension. There is no tenderness. There is no guarding.   Musculoskeletal: Normal range of motion. He exhibits no edema or deformity.   Neurological: He is alert and oriented to person, place, and time.   Skin: Skin is warm and dry. He is not diaphoretic.   Psychiatric: He has a normal mood and affect. His behavior is normal.       Significant Labs:   CBC:   Recent Labs   Lab  08/15/18   1045  08/15/18   2211  08/16/18   0317   WBC  2.75*  1.67*  1.54*   HGB  9.3*  8.0*  7.9*   HCT  26.8*  23.5*  23.8*   PLT  95*  104*  79*    and CMP:   Recent Labs   Lab  08/15/18   1045  08/16/18   0317   NA  129*  130*   K  3.8  3.5   CL  93*  97   CO2  29  24   GLU  106  88   BUN  46*  28*   CREATININE  1.4  1.2   CALCIUM  8.1*  7.5*   PROT  5.7*  5.0*   ALBUMIN  3.0*  2.6*   BILITOT  0.5  0.6   ALKPHOS  81  74   AST  18  18   ALT  16  17   ANIONGAP  7*  9   EGFRNONAA  52.7*  >60.0       Diagnostic Results:  I have reviewed all pertinent imaging results/findings within the past 24 hours.     CXR 8/15/18:  Continued demonstration of a mass opacity within the medial aspect of the superior segment of the left lower lobe, which evidently represents a known adenocarcinoma of the lung according to information obtained from the electronic medical record.  No significant detrimental change in the appearance of the chest since 06/09/2018 is appreciated.    Assessment/Plan:     * Dehydration    - related to n/v with associated hypotension noted  - BP improved with 1L NS in the ED  - nausea improved with zofran in ED  - continue IVFs at 100ml/hr   - encourage po intake once cleared by GI  - consult dietary  - zofran and phenergan prn        Guaiac positive stools    - hgb dropped from 13 to 9.3 on presentation  - hgb 7.9 today  - h/o dark stools and guaiac positive examination in ED  - was taking ASA,  Plavix, and Mobic at home   - given loading dose of IV protonix in the ED  - continue IV protonix bid  - continue IVFs as above  - monitor H/H and transfuse pRBCs as indicated  - continue NPO  - GI consulted, appreciate recs        Adenocarcinoma of left lung, stage 2    - followed by Dr. Infante in clinic  - dx's 06/2018 with oligometastatic disease  - discussed in multidisciplinary tumor board with plans to provide definitive treatment with chemoRT  - C1D1 of cis/alimta given 8/8/18  - XRT per radiation oncology  - will need to follow up with primary oncologist after discharge   - continue folic acid        CKD (chronic kidney disease)    - creatinine 1.4 on presentation (baseline creatinine 1.1-1.6 in 2018)  - creatinine 1.2 today  - continue IVFs  - strict I/Os  - avoid nephrotoxic drugs, renally dose meds  - continue to monitor        Antineoplastic chemotherapy induced pancytopenia    - WBC 2.75, ANC 2378, Hgb 9.3, PLT 93 on presentation  - WBC 1.54, ANC 1101, Hgb 7.9, PLT 79 today  - due to chemotherapy   - unclear if anemia solely due to chemotherapy or if it is compounded by an undiagnosed GIB  - hold antiplatelet therapy/NSAIDs and GI evaluation as above  - continue to monitor counts and transfuse for hgb <7, plt <50 (plt <10 once bleed is ruled out)        Coronary artery disease involving native coronary artery without angina pectoris    - hold DAPT in setting of possible GIB  - hold ACEi/ARB, beta blocker, imdur in setting of hypotension/GIB  - continue statin  - restart medical management once cleared by GI and as BP tolerates        Chronic obstructive pulmonary disease    - continue inhaled corticosteroids (breo while inpatient)  - duonebs prn        Benign non-nodular prostatic hyperplasia without lower urinary tract symptoms    - continue finasteride        Hyperlipidemia    - continue home lipitor        HTN (hypertension)    - hold BP meds given presenting hypotension  - restart as tolerated  -  improved, will continue to monitor          DISPO: pending GI recommendations    Jagdish Jacob MD  Hematology/Oncology  [facility]    Attending Addendum:  The patient was seen, examined, and discussed on rounds with the team.  I agree with the assessment and plan as outlined for Nimesh Nunn.  See H&P.    Demetrius Infante DO, FACP  Hematology & Oncology  [facility] [place] [address]  ph. [phone]  Fax. [phone]

## 2018-08-16 NOTE — ANESTHESIA POSTPROCEDURE EVALUATION
Anesthesia Post Evaluation    Patient: Nimesh Nunn    Procedure(s) Performed: Procedure(s) (LRB):  ESOPHAGOGASTRODUODENOSCOPY (EGD) (N/A)    Final Anesthesia Type: general  Patient location during evaluation: PACU  Patient participation: Yes- Able to Participate  Level of consciousness: awake and alert and oriented  Post-procedure vital signs: reviewed and stable  Pain management: adequate  Airway patency: patent  PONV status at discharge: No PONV  Anesthetic complications: no      Cardiovascular status: hemodynamically stable  Respiratory status: unassisted, spontaneous ventilation and room air  Hydration status: euvolemic  Follow-up not needed.        Visit Vitals  BP (!) 147/85 (BP Location: Right arm, Patient Position: Lying)   Pulse 75   Temp 36.9 °C (98.5 °F) (Oral)   Resp 17   Ht 6' (1.829 m)   Wt 83.9 kg (184 lb 15.5 oz)   SpO2 (!) 94%   BMI 25.09 kg/m²       Pain/Jamila Score: Pain Assessment Performed: Yes (8/16/2018  5:00 PM)  Presence of Pain: denies (8/16/2018  5:00 PM)  Pain Rating Prior to Med Admin: 6 (8/16/2018 10:01 AM)  Pain Rating Post Med Admin: 2 (8/15/2018 11:47 PM)  Jamila Score: 10 (8/16/2018  3:15 PM)

## 2018-08-16 NOTE — CONSULTS
[facility]  Adult Nutrition  Consult Note    SUMMARY     Recommendations    Recommendation/Intervention:   1. Adv as medically able to Regular chopped diet.   2.Encourage PO intake >/= 75% EEN/EPN   3. If PO intake is <50% encourage HVP w/ each meal, small frequent meal, snacks, ONS, cold/non aromatic foods.  4.RD to follow  Goals: nutrient intake >/= 85% EEN/EPN   Nutrition Goal Status: new  Communication of RD Recs: reviewed with physician    Reason for Assessment    Reason for Assessment: consult  Diagnosis: (Dehydration)  Relevant Medical History: COPD, HDL, HTN, Lung Cancer  Interdisciplinary Rounds: attended  General Information Comments: pt states poor po following his 1 chemo treatment. endorses N/V Day 1-3 s/p chemo, but now resolved. able to tolerate soft foods (grits, yogurt, soup. pt c/o difficulty swallowing w/o choking and is missing most teeth. Edu pt on eating strategies while on chemo and provided handout for high kcal/high pro foods as well as coping with taste changes.  Nutrition Discharge Planning: Regular diet    Nutrition Risk Screen    Nutrition Risk Screen: no indicators present    Nutrition/Diet History    Patient Reported Diet/Restrictions/Preferences: general  Typical Food/Fluid Intake: Adequate prior to chemo  Food Preferences: none  Do you have any cultural, spiritual, Evangelical conflicts, given your current situation?: no  Food Allergies: NKFA  Factors Affecting Nutritional Intake: nausea/vomiting, NPO    Anthropometrics    Temp: 98.3 °F (36.8 °C)  Height: 6' (182.9 cm)  Height (inches): 72 in  Weight Method: Bed Scale  Weight: 83.9 kg (184 lb 15.5 oz)  Weight (lb): 184.97 lb  Ideal Body Weight (IBW), Male: 178 lb  % Ideal Body Weight, Male (lb): 103.92 lb  BMI (Calculated): 25.1  BMI Grade: 25 - 29.9 - overweight  Weight Loss: unintentional  Usual Body Weight (UBW), k.2 kg  Weight Change Amount: 5 lb 1.1 oz  % Usual Body Weight: 97.54  % Weight Change From  Usual Weight: -2.67 %       Lab/Procedures/Meds    Pertinent Labs Reviewed: reviewed  Pertinent Labs Comments: WBC-1.54, H/H-7.9/23.8, Ferritin-322, Mg-1.5, Na-130, BUN-28, CA-7.5, Alb-2.6  Pertinent Medications Reviewed: reviewed  Pertinent Medications Comments: IVF (100ml/hr), statin, folic acid, Mg, pantoprazole    Physical Findings/Assessment    Overall Physical Appearance: nourished, shortness of breath  Oral/Mouth Cavity: tooth/teeth missing  Skin: intact    Estimated/Assessed Needs    Weight Used For Calorie Calculations: 83.9 kg (184 lb 15.5 oz)  Energy Calorie Requirements (kcal): 2097-2517kcal/d  Energy Need Method: Kcal/kg(25-30kcal/kg)  Protein Requirements: 101-118g/d 1.2-1.4g/kg  Weight Used For Protein Calculations: 83.9 kg (184 lb 15.5 oz)     Fluid Need Method: RDA Method(1ml/per kcal or per MD)  RDA Method (mL): 2097         Nutrition Prescription Ordered    Current Diet Order: NPO    Evaluation of Received Nutrient/Fluid Intake    IV Fluid (mL): 2400  I/O: +630ml since admit  Energy Calories Required: not meeting needs  Protein Required: not meeting needs  Fluid Required: not meeting needs  Comments: LBM:8/15/18  % Intake of Estimated Energy Needs: 0 - 25 %  % Meal Intake: NPO    Nutrition Risk    Level of Risk/Frequency of Follow-up: high     Assessment and Plan    Nutrition Problem  Inadequate energy intake    Related to (etiology):   NPO w/ no other means of nutritional intake    Signs and Symptoms (as evidenced by):   PO intake 0% since chemo treatment  N/V- now resolved    Interventions/Recommendations (treatment strategy):  1. Adv as medically able to Regular chopped diet.   2.Encourage PO intake >/= 75% EEN/EPN   3. If PO intake is <50% encourage HVP w/ each meal, small frequent meal, snacks, ONS, cold/non aromatic foods.  4.RD to follow    Nutrition Diagnosis Status:   New      Monitor and Evaluation    Food and Nutrient Intake: energy intake, food and beverage intake  Food and Nutrient  Administration: diet order  Physical Activity and Function: nutrition-related ADLs and IADLs  Anthropometric Measurements: weight, weight change  Biochemical Data, Medical Tests and Procedures: (All labs)  Nutrition-Focused Physical Findings: overall appearance     Nutrition Follow-Up    RD Follow-up?: Yes

## 2018-08-16 NOTE — ASSESSMENT & PLAN NOTE
- WBC 2.75, ANC 2378, Hgb 9.3, PLT 93 on presentation  - WBC 1.54, ANC 1101, Hgb 7.9, PLT 79 today  - due to chemotherapy   - unclear if anemia solely due to chemotherapy or if it is compounded by an undiagnosed GIB  - hold antiplatelet therapy/NSAIDs and GI evaluation as above  - continue to monitor counts and transfuse for hgb <7, plt <50 (plt <10 once bleed is ruled out)

## 2018-08-16 NOTE — PLAN OF CARE
Problem: Physical Therapy Goal  Goal: Physical Therapy Goal  Outcome: Outcome(s) achieved Date Met: 08/16/18  PT evaluation complete. No goals established as pt is baseline with mobility and has no acute PT needs at this time. D/C from PT services.    Becki Narayanan, PT, DPT   8/16/2018  [phone]

## 2018-08-16 NOTE — PLAN OF CARE
Problem: Patient Care Overview  Goal: Plan of Care Review  Outcome: Ongoing (interventions implemented as appropriate)  Plan of care reviewed with patient . Patient admitted to room 823. Admission completed. Medication for pain given and documented. Fluids infusing per order. Patients bed in low locked position call light within reach, patient oriented to room.  Report given to PM nurse. Patient left in care

## 2018-08-16 NOTE — PLAN OF CARE
Problem: Patient Care Overview  Goal: Plan of Care Review  Outcome: Ongoing (interventions implemented as appropriate)  Patient remained free from falls throughout shift, call bell within reach. Patient admitted late afternoon yesterday for dehydration and possible GI bleed. Patient NPO overnight. Awaiting GI consult. Protonix BID. No BM's overnight. IVF infusing as ordered. Vitals stable, will continue to monitor.

## 2018-08-16 NOTE — PT/OT/SLP EVAL
Occupational Therapy   Evaluation and Discharge Note    Name: Nimesh Nunn  MRN: [id number]  Admitting Diagnosis:  Dehydration      Recommendations:     Discharge Recommendations: home  Discharge Equipment Recommendations:  none  Barriers to discharge:  None    History:     Occupational Profile:  Living Environment: Pt lives alone; 1SH with 4STE ( R railing); bathroom contains tub-shower combo with no DME.   Previous level of function: PTA, pt reports being independent with ADL and functional mobility.   Roles and Routines: Drives, community dweller  Equipment Owned:  cane, straight, rollator(Does not use DME)    Past Medical History:   Diagnosis Date    Abdominal aortic aneurysm (AAA) without rupture 3/5/2018    Arthritis     Blood transfusion     COPD (chronic obstructive pulmonary disease)     Coronary artery disease     Encounter for blood transfusion     Hyperlipidemia 11/7/2013    Hypertension     Lung cancer metastatic to bone 7/18/2018    Occlusion and stenosis of carotid artery without mention of cerebral infarction 1/12/2014    Primary malignant neoplasm of left lung 7/18/2018    Screening for colon cancer 6/1/2015    Syncope 1/13/2014       Past Surgical History:   Procedure Laterality Date    BACK SURGERY      carotid endarectomy      CORONARY ANGIOPLASTY      CORONARY ARTERY BYPASS GRAFT      SKIN BIOPSY         Subjective     Chief Complaint: No complaints  Patient/Family stated goals: Return home  Communicated with: RN prior to session.  Pt agreeable to participate in evaluation; no family present     Pain/Comfort:  · Pain Rating 1: 0/10  · Pain Rating Post-Intervention 2: 0/10    Objective:     Patient found with: peripheral IV    General Precautions: Standard,     Orthopedic Precautions:N/A   Braces: N/A     Occupational Performance:    Bed Mobility:    · Patient completed Rolling/Turning to Right with independence  · Patient completed Supine to Sit with independence  · Patient  completed Sit to Supine with independence    Functional Mobility/Transfers:  · Patient completed Sit <> Stand Transfer with independence  with  no assistive device   · Functional Mobility: Pt completed functional mobility house hold distance with supervision and no AD. He tolerated well with no LOB or SOB.     Activities of Daily Living:  · Upper Body Dressing: independence to eliz gown like jacket while standing  · Lower Body Dressing: independence to eliz B socks while seated EOB    Cognitive/Visual Perceptual:  Cognitive/Psychosocial Skills:     -       Oriented to: Person, Place, Time and Situation   -       Follows Commands/attention:Follows multistep  commands  -       Communication: clear/fluent  -       Memory: No Deficits noted  -       Safety awareness/insight to disability: intact   -       Mood/Affect/Coping skills/emotional control: Appropriate to situation  Visual/Perceptual:      -Intact     Physical Exam:  Postural examination/scapula alignment:    -       Rounded shoulders  Skin integrity: Visible skin intact  Edema:  None noted  Sensation:    -       Intact   Motor Planning:    -       Intact  Dominant hand:    -       RUE  Upper Extremity Range of Motion:     -       Right Upper Extremity: WFL   -       Left Upper Extremity: WFL   Upper Extremity Strength:    -       Right Upper Extremity: WFL  -       Left Upper Extremity: WFL    Strength:    -       Right Upper Extremity: WFL   -       Left Upper Extremity: WFL   Fine Motor Coordination:    -       Intact  Gross motor coordination:   WFL    Patient left supine with all lines intact, call button in reach and RN notified    AMPA 6 Click:  AMPA Total Score: 24    Treatment & Education:  -Pt edu on OT role/POC  -Importance of OOB activity with staff assistance ( UIC during each meal)  -Safety during functional t/f and mobility  - White board updated  - All questions/concerns answered within OT scope of practice  Education:    Assessment:  "    Nimesh Nunn is a 62 y.o. male with a medical diagnosis of Dehydration. At this time, patient is functioning at their prior level of function and does not require further acute OT services. Pt d/c home with no therapy or DME needs.    Clinical Decision Makin.  OT Low:  "Pt evaluation falls under low complexity for evaluation coding due to performance deficits noted in 1-3 areas as stated above and 0 co-morbities affecting current functional status. Data obtained from problem focused assessments. No modifications or assistance was required for completion of evaluation. Only brief occupational profile and history review completed."     Plan:     During this hospitalization, patient does not require further acute OT services.  Please re-consult if situation changes.    · Plan of Care Reviewed with: patient    This Plan of care has been discussed with the patient who was involved in its development and understands and is in agreement with the identified goals and treatment plan    GOALS:   Multidisciplinary Problems     Occupational Therapy Goals     Not on file          Multidisciplinary Problems (Resolved)        Problem: Occupational Therapy Goal    Goal Priority Disciplines Outcome Interventions   Occupational Therapy Goal   (Resolved)     OT, PT/OT Outcome(s) achieved    Description:  Pt is currently performing ADLs, functional mobility & t/fs at baseline and displays age-appropriate strength, endurance & balance. OT services are not recommended at this time.                     Time Tracking:     OT Date of Treatment: 18  OT Start Time: 1033  OT Stop Time: 1049  OT Total Time (min): 16 min    Billable Minutes:Evaluation 16    Renae santamaria OT  2018    "

## 2018-08-16 NOTE — ASSESSMENT & PLAN NOTE
- related to n/v with associated hypotension noted  - BP improved with 1L NS in the ED  - nausea improved with zofran in ED  - continue IVFs at 100ml/hr   - encourage po intake once cleared by GI  - consult dietary  - zofran and phenergan prn

## 2018-08-16 NOTE — PLAN OF CARE
Problem: Occupational Therapy Goal  Goal: Occupational Therapy Goal  Pt is currently performing ADLs, functional mobility & t/fs at baseline and displays age-appropriate strength, endurance & balance. OT services are not recommended at this time.   Outcome: Outcome(s) achieved Date Met: 08/16/18  Pt at functional baseline at this time. D/c home with no therapy needs.   Renae santamaria, OT  8/16/2018

## 2018-08-16 NOTE — SUBJECTIVE & OBJECTIVE
 atorvastatin  80 mg Oral Daily    finasteride  5 mg Oral Daily    fluticasone-vilanterol  1 puff Inhalation Daily    folic acid  1,000 mcg Oral Daily    magnesium sulfate IVPB  2 g Intravenous Q2H    pantoprozole (PROTONIX) 40 mg/100 mL D5W IVPB  40 mg Intravenous Q12H     Review of patient's allergies indicates:  No Known Allergies     Past Medical History:   Diagnosis Date    Abdominal aortic aneurysm (AAA) without rupture 3/5/2018    Arthritis     Blood transfusion     COPD (chronic obstructive pulmonary disease)     Coronary artery disease     Encounter for blood transfusion     Hyperlipidemia 2013    Hypertension     Lung cancer metastatic to bone 2018    Occlusion and stenosis of carotid artery without mention of cerebral infarction 2014    Primary malignant neoplasm of left lung 2018    Screening for colon cancer 2015    Syncope 2014     Past Surgical History:   Procedure Laterality Date    BACK SURGERY      carotid endarectomy      CORONARY ANGIOPLASTY      CORONARY ARTERY BYPASS GRAFT      SKIN BIOPSY       Family History     Problem Relation (Age of Onset)    Breast cancer Mother    Cancer Mother    Heart attack Father    Heart disease Father    Heart failure Father    Hypertension Father    No Known Problems Sister, Brother, Maternal Grandmother, Maternal Grandfather, Paternal Grandmother, Paternal Grandfather, Maternal Aunt, Maternal Uncle, Paternal Aunt, Paternal Uncle        Tobacco Use    Smoking status: Former Smoker     Packs/day: 1.50     Years: 40.00     Pack years: 60.00     Last attempt to quit: 10/11/2011     Years since quittin.8    Smokeless tobacco: Never Used   Substance and Sexual Activity    Alcohol use: Yes     Alcohol/week: 1.8 oz     Types: 3 Cans of beer per week     Comment: 3 cans beer every day or every other day    Drug use: Yes     Types: Marijuana    Sexual activity: Not on file       Review of Systems    Constitutional: Positive for activity change, appetite change and fatigue.   HENT: Positive for trouble swallowing. Negative for facial swelling and sore throat.    Eyes: Negative for discharge and itching.   Respiratory: Positive for shortness of breath. Negative for stridor.    Cardiovascular: Positive for leg swelling. Negative for palpitations.   Gastrointestinal: Positive for blood in stool (dark stools), nausea and vomiting. Negative for abdominal distention, abdominal pain, anal bleeding, constipation, diarrhea and rectal pain.   Endocrine: Negative for polyphagia and polyuria.   Genitourinary: Negative for difficulty urinating and dysuria.   Musculoskeletal: Negative for arthralgias.   Skin: Negative for color change and pallor.   Allergic/Immunologic: Positive for immunocompromised state.   Neurological: Positive for weakness.     Objective:     Vital Signs (Most Recent):  Temp: 98.5 °F (36.9 °C) (08/16/18 0802)  Pulse: 78 (08/16/18 0802)  Resp: 17 (08/16/18 0802)  BP: 124/75 (08/16/18 0802)  SpO2: (!) 92 % (08/16/18 0802) Vital Signs (24h Range):  Temp:  [98 °F (36.7 °C)-98.5 °F (36.9 °C)] 98.5 °F (36.9 °C)  Pulse:  [69-95] 78  Resp:  [13-22] 17  SpO2:  [91 %-100 %] 92 %  BP: (114-153)/(56-87) 124/75     Weight: 83.9 kg (184 lb 15.5 oz)  Body mass index is 25.09 kg/m².  Body surface area is 2.06 meters squared.      Intake/Output Summary (Last 24 hours) at 8/16/2018 1124  Last data filed at 8/16/2018 0637  Gross per 24 hour   Intake 2755 ml   Output 1375 ml   Net 1380 ml       Physical Exam   Constitutional: He is oriented to person, place, and time. He appears well-developed.   HENT:   Head: Normocephalic and atraumatic.   Eyes: No scleral icterus.   Neck: Neck supple.   Cardiovascular: Normal rate. Exam reveals no gallop and no friction rub.   Pulmonary/Chest: Effort normal and breath sounds normal. No respiratory distress.   Abdominal: Soft. Bowel sounds are normal. He exhibits no distension and no  mass. There is no tenderness. There is no rebound and no guarding. No hernia.   Musculoskeletal: He exhibits no edema or tenderness.   Neurological: He is alert and oriented to person, place, and time.   Skin: Skin is warm.   Psychiatric: He has a normal mood and affect.   Vitals reviewed.    Lab Results   Component Value Date    WBC 1.54 (LL) 08/16/2018    HGB 7.9 (L) 08/16/2018    HCT 23.8 (L) 08/16/2018    MCV 94 08/16/2018    PLT 79 (L) 08/16/2018     Lab Results   Component Value Date    INR 0.9 08/15/2018     Lab Results   Component Value Date     (L) 08/16/2018    K 3.5 08/16/2018    CREATININE 1.2 08/16/2018     Lab Results   Component Value Date    ALBUMIN 2.6 (L) 08/16/2018    ALT 17 08/16/2018    AST 18 08/16/2018    GGT 81 (H) 03/25/2015    ALKPHOS 74 08/16/2018    BILITOT 0.6 08/16/2018     No results found for: AFP  Lab Results   Component Value Date    LIPASE 28 08/15/2018     No results found for: TACROLIMUS    Imaging:  Reviewed and as noted in HPI.

## 2018-08-16 NOTE — SUBJECTIVE & OBJECTIVE
INTERVAL HISTORY:  Patient seen and examined this AM. Patient reports NAEON; remained AFVSS. No more BMs overnight; LBM yesterday AM at Radiation Oncology clinic. Notes improvement in fatigue and resolution of light-headedness. No further episodes of n/v or abdominal pain. Notes mild SOB. Denies chest pain, cough, palpitations, dysuria. No other complaints.    Oncology Treatment Plan:   OP NSCLC PEMETREXED + CISPLATIN    Medications:  Continuous Infusions:   sodium chloride 0.9% 100 mL/hr at 08/15/18 1504     Scheduled Meds:   atorvastatin  80 mg Oral Daily    finasteride  5 mg Oral Daily    fluticasone-vilanterol  1 puff Inhalation Daily    folic acid  1,000 mcg Oral Daily    magnesium sulfate IVPB  2 g Intravenous Q2H    pantoprozole (PROTONIX) 40 mg/100 mL D5W IVPB  40 mg Intravenous Q12H    potassium chloride  40 mEq Oral Once     PRN Meds:albuterol-ipratropium, dextrose 50%, dextrose 50%, glucagon (human recombinant), glucose, glucose, ondansetron, oxyCODONE, promethazine, sodium chloride 0.9%     Review of patient's allergies indicates:  No Known Allergies     Past Medical History:   Diagnosis Date    Abdominal aortic aneurysm (AAA) without rupture 3/5/2018    Arthritis     Blood transfusion     COPD (chronic obstructive pulmonary disease)     Coronary artery disease     Encounter for blood transfusion     Hyperlipidemia 11/7/2013    Hypertension     Lung cancer metastatic to bone 7/18/2018    Occlusion and stenosis of carotid artery without mention of cerebral infarction 1/12/2014    Primary malignant neoplasm of left lung 7/18/2018    Screening for colon cancer 6/1/2015    Syncope 1/13/2014     Past Surgical History:   Procedure Laterality Date    BACK SURGERY      carotid endarectomy      CORONARY ANGIOPLASTY      CORONARY ARTERY BYPASS GRAFT      SKIN BIOPSY       Family History     Problem Relation (Age of Onset)    Breast cancer Mother    Cancer Mother    Heart attack Father     Heart disease Father    Heart failure Father    Hypertension Father    No Known Problems Sister, Brother, Maternal Grandmother, Maternal Grandfather, Paternal Grandmother, Paternal Grandfather, Maternal Aunt, Maternal Uncle, Paternal Aunt, Paternal Uncle        Tobacco Use    Smoking status: Former Smoker     Packs/day: 1.50     Years: 40.00     Pack years: 60.00     Last attempt to quit: 10/11/2011     Years since quittin.8    Smokeless tobacco: Never Used   Substance and Sexual Activity    Alcohol use: Yes     Alcohol/week: 1.8 oz     Types: 3 Cans of beer per week     Comment: 3 cans beer every day or every other day    Drug use: Yes     Types: Marijuana    Sexual activity: Not on file       Review of Systems   Constitutional: Positive for fatigue. Negative for chills and fever.   HENT: Negative for sore throat and trouble swallowing.    Respiratory: Positive for wheezing. Negative for cough.    Cardiovascular: Negative for chest pain and palpitations.   Gastrointestinal: Positive for abdominal pain (cramps with BMs). Negative for constipation, diarrhea and vomiting.        +dark stools   Genitourinary: Negative for dysuria and hematuria.   Musculoskeletal: Negative for arthralgias and myalgias.   Skin: Negative for rash and wound.   Neurological: Negative for seizures and light-headedness.   Psychiatric/Behavioral: Negative for agitation and confusion.     Objective:     Vital Signs (Most Recent):  Temp: 98.3 °F (36.8 °C) (18)  Pulse: 85 (18)  Resp: 18 (18)  BP: 137/74 (18)  SpO2: 96 % (18) Vital Signs (24h Range):  Temp:  [97.1 °F (36.2 °C)-98.4 °F (36.9 °C)] 98.3 °F (36.8 °C)  Pulse:  [] 85  Resp:  [13-22] 18  SpO2:  [91 %-100 %] 96 %  BP: (103-153)/(56-87) 137/74     Weight: 83.9 kg (184 lb 15.5 oz)  Body mass index is 25.09 kg/m².  Body surface area is 2.06 meters squared.      Intake/Output Summary (Last 24 hours) at 2018 0719  Last  data filed at 8/16/2018 0637  Gross per 24 hour   Intake 2755 ml   Output 1375 ml   Net 1380 ml       Physical Exam   Constitutional: He is oriented to person, place, and time. He appears well-developed and well-nourished. No distress.   HENT:   Head: Normocephalic and atraumatic.   Right Ear: External ear normal.   Left Ear: External ear normal.   Eyes: EOM are normal. Pupils are equal, round, and reactive to light. Right eye exhibits no discharge. Left eye exhibits no discharge.   Neck: Normal range of motion. Neck supple. No tracheal deviation present.   Cardiovascular: Normal rate and regular rhythm. Exam reveals no friction rub.   Pulmonary/Chest: Effort normal. No respiratory distress. He has wheezes.   Abdominal: Soft. Bowel sounds are normal. He exhibits no distension. There is no tenderness. There is no guarding.   Musculoskeletal: Normal range of motion. He exhibits no edema or deformity.   Neurological: He is alert and oriented to person, place, and time.   Skin: Skin is warm and dry. He is not diaphoretic.   Psychiatric: He has a normal mood and affect. His behavior is normal.       Significant Labs:   CBC:   Recent Labs   Lab  08/15/18   1045  08/15/18   2211  08/16/18   0317   WBC  2.75*  1.67*  1.54*   HGB  9.3*  8.0*  7.9*   HCT  26.8*  23.5*  23.8*   PLT  95*  104*  79*    and CMP:   Recent Labs   Lab  08/15/18   1045  08/16/18   0317   NA  129*  130*   K  3.8  3.5   CL  93*  97   CO2  29  24   GLU  106  88   BUN  46*  28*   CREATININE  1.4  1.2   CALCIUM  8.1*  7.5*   PROT  5.7*  5.0*   ALBUMIN  3.0*  2.6*   BILITOT  0.5  0.6   ALKPHOS  81  74   AST  18  18   ALT  16  17   ANIONGAP  7*  9   EGFRNONAA  52.7*  >60.0       Diagnostic Results:  I have reviewed all pertinent imaging results/findings within the past 24 hours.     CXR 8/15/18:  Continued demonstration of a mass opacity within the medial aspect of the superior segment of the left lower lobe, which evidently represents a known  adenocarcinoma of the lung according to information obtained from the electronic medical record.  No significant detrimental change in the appearance of the chest since 06/09/2018 is appreciated.

## 2018-08-16 NOTE — ASSESSMENT & PLAN NOTE
Nimesh Nunn is a 62 y.o. male with anemia and dark stools.   Takes aspirin, plavix and Mobic at home with no PPI.   DDx esophagitis, ulcer, avm    Plan:  Protonix IV BID  Intravascular resuscitation/support with IVFs   Serial H/H's and pRBCs transfusion as indicated  Maintain IV access with 2 large bore IVs  NPO  Plan for EGD today   Please notify GI team if there is significant change in patient's clinical status

## 2018-08-16 NOTE — PLAN OF CARE
Problem: Patient Care Overview  Goal: Plan of Care Review    Recommendations     Recommendation/Intervention:   1. Adv as medically able to Regular chopped diet.   2.Encourage PO intake >/= 75% EEN/EPN   3. If PO intake is <50% encourage HVP w/ each meal, small frequent meal, snacks, ONS, cold/non aromatic foods.  4.RD to follow  Goals: nutrient intake >/= 85% EEN/EPN   Nutrition Goal Status: new  Communication of RD Recs: reviewed with physician    Assessment and Plan     Nutrition Problem  Inadequate energy intake     Related to (etiology):   NPO w/ no other means of nutritional intake     Signs and Symptoms (as evidenced by):   PO intake 0% since chemo treatment  N/V- now resolved     Interventions/Recommendations (treatment strategy):  1. Adv as medically able to Regular chopped diet.   2.Encourage PO intake >/= 75% EEN/EPN   3. If PO intake is <50% encourage HVP w/ each meal, small frequent meal, snacks, ONS, cold/non aromatic foods.  4.RD to follow     Nutrition Diagnosis Status:   New

## 2018-08-16 NOTE — PLAN OF CARE
Problem: Patient Care Overview  Goal: Plan of Care Review  Pt involved in plan of care and communicating needs throughout shift.  Up in room independently. Pt is NPO for EGD, voiding without difficulty.  PRN meds admin for back pain.  All VSS; no acute events so far this shift.  Pt remaining free from falls or injury throughout shift; bed in lowest position; call light within reach.  Pt instructed to call for assistance as needed.  Q1H rounding done on pt.

## 2018-08-16 NOTE — ASSESSMENT & PLAN NOTE
- creatinine 1.4 on presentation (baseline creatinine 1.1-1.6 in 2018)  - creatinine 1.2 today  - continue IVFs  - strict I/Os  - avoid nephrotoxic drugs, renally dose meds  - continue to monitor

## 2018-08-16 NOTE — ASSESSMENT & PLAN NOTE
- hgb dropped from 13 to 9.3 on presentation  - hgb 7.9 today  - h/o dark stools and guaiac positive examination in ED  - was taking ASA, Plavix, and Mobic at home   - given loading dose of IV protonix in the ED  - continue IV protonix bid  - continue IVFs as above  - monitor H/H and transfuse pRBCs as indicated  - continue NPO  - GI consulted, appreciate recs

## 2018-08-17 ENCOUNTER — DOCUMENTATION ONLY (OUTPATIENT)
Dept: RADIATION ONCOLOGY | Facility: CLINIC | Age: 63
End: 2018-08-17

## 2018-08-17 VITALS
DIASTOLIC BLOOD PRESSURE: 72 MMHG | RESPIRATION RATE: 18 BRPM | WEIGHT: 187.38 LBS | TEMPERATURE: 97 F | SYSTOLIC BLOOD PRESSURE: 123 MMHG | OXYGEN SATURATION: 98 % | HEART RATE: 81 BPM | HEIGHT: 72 IN | BODY MASS INDEX: 25.38 KG/M2

## 2018-08-17 LAB
ALBUMIN SERPL BCP-MCNC: 2.7 G/DL
ALP SERPL-CCNC: 85 U/L
ALT SERPL W/O P-5'-P-CCNC: 22 U/L
ANION GAP SERPL CALC-SCNC: 7 MMOL/L
AST SERPL-CCNC: 21 U/L
BASOPHILS # BLD AUTO: 0 K/UL
BASOPHILS NFR BLD: 0 %
BILIRUB SERPL-MCNC: 0.4 MG/DL
BUN SERPL-MCNC: 22 MG/DL
CALCIUM SERPL-MCNC: 7.9 MG/DL
CHLORIDE SERPL-SCNC: 100 MMOL/L
CO2 SERPL-SCNC: 25 MMOL/L
CREAT SERPL-MCNC: 1.2 MG/DL
DIFFERENTIAL METHOD: ABNORMAL
EOSINOPHIL # BLD AUTO: 0.2 K/UL
EOSINOPHIL NFR BLD: 11.5 %
ERYTHROCYTE [DISTWIDTH] IN BLOOD BY AUTOMATED COUNT: 12.8 %
EST. GFR  (AFRICAN AMERICAN): >60 ML/MIN/1.73 M^2
EST. GFR  (NON AFRICAN AMERICAN): >60 ML/MIN/1.73 M^2
GLUCOSE SERPL-MCNC: 109 MG/DL
HCT VFR BLD AUTO: 27.1 %
HGB BLD-MCNC: 9.2 G/DL
IMM GRANULOCYTES # BLD AUTO: 0.01 K/UL
IMM GRANULOCYTES NFR BLD AUTO: 0.8 %
LYMPHOCYTES # BLD AUTO: 0.2 K/UL
LYMPHOCYTES NFR BLD: 16.8 %
MAGNESIUM SERPL-MCNC: 2.4 MG/DL
MCH RBC QN AUTO: 31.6 PG
MCHC RBC AUTO-ENTMCNC: 33.9 G/DL
MCV RBC AUTO: 93 FL
MONOCYTES # BLD AUTO: 0.1 K/UL
MONOCYTES NFR BLD: 6.9 %
NEUTROPHILS # BLD AUTO: 0.8 K/UL
NEUTROPHILS NFR BLD: 64 %
NRBC BLD-RTO: 0 /100 WBC
PHOSPHATE SERPL-MCNC: 2.4 MG/DL
PLATELET # BLD AUTO: 88 K/UL
PMV BLD AUTO: 9.7 FL
POTASSIUM SERPL-SCNC: 4 MMOL/L
PROT SERPL-MCNC: 5.7 G/DL
RBC # BLD AUTO: 2.91 M/UL
SODIUM SERPL-SCNC: 132 MMOL/L
WBC # BLD AUTO: 1.31 K/UL

## 2018-08-17 PROCEDURE — 99239 HOSP IP/OBS DSCHRG MGMT >30: CPT | Mod: GC,,, | Performed by: INTERNAL MEDICINE

## 2018-08-17 PROCEDURE — G6002 STEREOSCOPIC X-RAY GUIDANCE: HCPCS | Mod: 26,,, | Performed by: RADIOLOGY

## 2018-08-17 PROCEDURE — 80053 COMPREHEN METABOLIC PANEL: CPT

## 2018-08-17 PROCEDURE — 25000003 PHARM REV CODE 250: Performed by: STUDENT IN AN ORGANIZED HEALTH CARE EDUCATION/TRAINING PROGRAM

## 2018-08-17 PROCEDURE — 77387 GUIDANCE FOR RADJ TX DLVR: CPT | Mod: TC | Performed by: RADIOLOGY

## 2018-08-17 PROCEDURE — 84100 ASSAY OF PHOSPHORUS: CPT

## 2018-08-17 PROCEDURE — 77386 HC IMRT, COMPLEX: CPT | Performed by: RADIOLOGY

## 2018-08-17 PROCEDURE — 85025 COMPLETE CBC W/AUTO DIFF WBC: CPT

## 2018-08-17 PROCEDURE — 83735 ASSAY OF MAGNESIUM: CPT

## 2018-08-17 PROCEDURE — 36415 COLL VENOUS BLD VENIPUNCTURE: CPT

## 2018-08-17 RX ORDER — METOPROLOL TARTRATE 50 MG/1
50 TABLET ORAL 2 TIMES DAILY
COMMUNITY
End: 2018-11-12

## 2018-08-17 RX ORDER — PANTOPRAZOLE SODIUM 40 MG/1
40 TABLET, DELAYED RELEASE ORAL 2 TIMES DAILY
Qty: 90 TABLET | Refills: 3 | Status: CANCELLED | OUTPATIENT
Start: 2018-08-17

## 2018-08-17 RX ORDER — PANTOPRAZOLE SODIUM 40 MG/1
40 TABLET, DELAYED RELEASE ORAL 2 TIMES DAILY
Status: DISCONTINUED | OUTPATIENT
Start: 2018-08-17 | End: 2018-08-17 | Stop reason: HOSPADM

## 2018-08-17 RX ORDER — PANTOPRAZOLE SODIUM 40 MG/1
40 TABLET, DELAYED RELEASE ORAL 2 TIMES DAILY
Qty: 60 TABLET | Refills: 0 | Status: SHIPPED | OUTPATIENT
Start: 2018-08-17 | End: 2018-09-05 | Stop reason: SDUPTHER

## 2018-08-17 RX ORDER — PANTOPRAZOLE SODIUM 40 MG/1
40 TABLET, DELAYED RELEASE ORAL 2 TIMES DAILY
Qty: 60 TABLET | Refills: 0 | Status: SHIPPED | OUTPATIENT
Start: 2018-08-17 | End: 2018-08-17

## 2018-08-17 RX ADMIN — PANTOPRAZOLE SODIUM 40 MG: 40 TABLET, DELAYED RELEASE ORAL at 09:08

## 2018-08-17 RX ADMIN — FOLIC ACID 1000 MCG: 1 TABLET ORAL at 08:08

## 2018-08-17 RX ADMIN — Medication 10 ML: at 09:08

## 2018-08-17 RX ADMIN — Medication 10 ML: at 02:08

## 2018-08-17 RX ADMIN — FINASTERIDE 5 MG: 5 TABLET, FILM COATED ORAL at 08:08

## 2018-08-17 RX ADMIN — ATORVASTATIN CALCIUM 80 MG: 20 TABLET, FILM COATED ORAL at 08:08

## 2018-08-17 NOTE — NURSING
ROADTEST  O2- Pt on room air sating   Activity-Pt ambulates independently  Devices- Pt not being sent home on any devices.   Tolerating-Pt tolerating PO diet and medication.  Elimination-Pt voiding and having bowel movements independently.  Self Care- Pt able to do personal hygiene independently  Teaching- Pt instructed on when to take home meds.     Pt's peripheral IV removed. Cath tip intact. Pt tolerated well. AVS and prescriptions given to pt. All questions answered. Pt verbalized understanding. Pt awaiting transport at this time.

## 2018-08-17 NOTE — PROGRESS NOTES
This Oncology Social Worker is covering today in the absence of patient's primary Oncology Social Worker, Beatris Carrasquillo LCSW. Attended rounds on patient this morning with Dr. Vasquez and the Medical Oncology team. Patient is confirmed for discharge home today after his radiation treatment this afternoon. MDs not ordering anything for patient's discharge that requires Oncology Social Worker's intervention. Patient stated agreement with discharge plans. Patient will be returning home via family car. No discharge needs indicated at this time.

## 2018-08-17 NOTE — DISCHARGE SUMMARY
[facility]  Hematology/Oncology  Discharge Summary      Patient Name: Nimesh Nunn  MRN: [id number]  Admission Date: 8/15/2018  Hospital Length of Stay: 2 days  Discharge Date and Time:  08/17/2018 3:07 PM  Attending Physician: Desirae Vasquez MD   Discharging Provider: Antonino Smith MD  Primary Care Provider: Nishant Perez MD    HPI: Patient is a 63yo M with multiple medical co-morbidities including CVA, HTN, HLD, CAD (s/p stents on DAPT with ASA and Plavix), PAD (failed revascularization), and NSCLC (undergoing concurrent chemoRT with cis/alimta) who presented from Radiation Oncology for hypotension and n/v. Patient reports mild n/v yesterday but was able to tolerate breakfast today. However, after his XRT he became nauseated and had an episode of non-bloody emesis. During that time, he also had a dark BM and was noted to be hypotensive with SBP in the 90s. He noted associated symptom of light-headedness and fatigue. After arrival to the ED, he was given IVFs and zofran with resolution n/v and hypotension. He remains with some fatigue. On further history, he has been having dark stools for about a week, usually once a day associated with abdominal cramping. LBM today with dark stools but rectal exam by ED showed brown stool. Guaiac positive. He reports taking ASA, Plavix, and Mobic, but he is not on a PPI.  Heme/Onc asked to admit for dehydration and to work up potential GI bleed.     Procedure(s) (LRB):  ESOPHAGOGASTRODUODENOSCOPY (EGD) (N/A)     Hospital Course:   - Pt presented to the ED post-radiation therapy, C/O hypotension, blood in stools, nausea and vomiting. During his ED visits he was given 1 L fluids, Zofran, Protonix, and chest x-ray was done which showed no changes from previous studies. He got admitted to Oncology service for anemia. In addition, post admission pt Hb level dropped from 13 to 9.3, GI were consulted and they reccommended to stop pt ASA, Plavix, anti-platelet  therapy (for CAD) and recommended an EGD and IV protonix.  - EGD results showed LA Grade A reflux esophagitis, and Gastric erosions without bleeding.  - Pt is stable with good condition. His latest Hb level 9.2, and he got switched from IV Protonix to PO.  - 8/17/2018 he is for discharge.      Consults:   Consults (From admission, onward)        Status Ordering Provider     Inpatient consult to Gastroenterology  Once     Provider:  (Not yet assigned)    Completed MYLENE IBARRA     Inpatient consult to Hematology/Oncology  Once     Provider:  (Not yet assigned)    Completed THONY CHO     Inpatient consult to Registered Dietitian/Nutritionist  Once     Provider:  (Not yet assigned)    Completed MYLENE IBARRA     Inpatient consult to Social Work/Case Management  Once     Provider:  (Not yet assigned)    Completed SIMONE DOLAN     Inpatient consult to Spiritual Care  Once     Provider:  (Not yet assigned)    Completed SIMONE DOLAN          Significant Diagnostic Studies: Labs:   BMP:   Recent Labs   Lab  08/16/18 0317 08/17/18   0359   GLU  88  109   NA  130*  132*   K  3.5  4.0   CL  97  100   CO2  24  25   BUN  28*  22   CREATININE  1.2  1.2   CALCIUM  7.5*  7.9*   MG  1.5*  2.4   , CMP   Recent Labs   Lab  08/16/18 0317 08/17/18   0359   NA  130*  132*   K  3.5  4.0   CL  97  100   CO2  24  25   GLU  88  109   BUN  28*  22   CREATININE  1.2  1.2   CALCIUM  7.5*  7.9*   PROT  5.0*  5.7*   ALBUMIN  2.6*  2.7*   BILITOT  0.6  0.4   ALKPHOS  74  85   AST  18  21   ALT  17  22   ANIONGAP  9  7*   ESTGFRAFRICA  >60.0  >60.0   EGFRNONAA  >60.0  >60.0    and CBC   Recent Labs   Lab  08/16/18 0317 08/16/18 2028 08/17/18   0359   WBC  1.54*  1.36*  1.31*   HGB  7.9*  8.6*  9.2*   HCT  23.8*  26.0*  27.1*   PLT  79*  88*  88*     Microbiology:   Blood Culture   Lab Results   Component Value Date    LABBLOO No Growth to date 08/15/2018    LABBLOO No Growth to date 08/15/2018    LABBLOO No Growth  to date 08/15/2018       Pending Diagnostic Studies:     Procedure Component Value Units Date/Time    CBC auto differential [[id number]] Collected:  08/17/18 0400    Order Status:  Sent Lab Status:  No result     Specimen:  Blood     Comprehensive metabolic panel [[id number]] Collected:  08/17/18 0400    Order Status:  Sent Lab Status:  No result     Specimen:  Blood     Magnesium [[id number]] Collected:  08/17/18 0400    Order Status:  Sent Lab Status:  No result     Specimen:  Blood     Phosphorus [[id number]] Collected:  08/17/18 0400    Order Status:  Sent Lab Status:  No result     Specimen:  Blood         Final Active Diagnoses:    Diagnosis Date Noted POA    PRINCIPAL PROBLEM:  Dehydration [E86.0] 08/15/2018 Unknown    Acute blood loss anemia [D62] 08/16/2018 Unknown    Shortness of breath [R06.02] 08/16/2018 Yes    Antineoplastic chemotherapy induced pancytopenia [D61.810, T45.1X5A] 08/15/2018 Unknown    Guaiac positive stools [R19.5] 08/15/2018 Unknown    CKD (chronic kidney disease) [N18.9] 08/15/2018 Yes    Adenocarcinoma of left lung, stage 2 [C34.92] 07/06/2018 Yes    Coronary artery disease involving native coronary artery without angina pectoris [I25.10] 11/16/2016 Yes    Chronic obstructive pulmonary disease [J44.9] 05/16/2016 Yes    Benign non-nodular prostatic hyperplasia without lower urinary tract symptoms [N40.0] 04/15/2016 Yes    PAD (peripheral artery disease) [I73.9] 02/18/2014 Yes    HTN (hypertension) [I10] 11/07/2013 Yes    Hyperlipidemia [E78.5] 11/07/2013 Yes      Problems Resolved During this Admission:      Discharged Condition: good    Disposition: Home or Self Care    Follow Up:  Follow-up Information     Demetrius Infante DO, FACP.    Specialty:  Hematology and Oncology  Why:  as scheduled by clinic  Contact information:  [address]  [facility] [id number] [phone]                 Patient Instructions:      Notify your health care provider if you experience any  of the following:  temperature >100.4     Notify your health care provider if you experience any of the following:  persistent nausea and vomiting or diarrhea     Notify your health care provider if you experience any of the following:  severe uncontrolled pain     Notify your health care provider if you experience any of the following:  redness, tenderness, or signs of infection (pain, swelling, redness, odor or green/yellow discharge around incision site)     Notify your health care provider if you experience any of the following:  difficulty breathing or increased cough     Notify your health care provider if you experience any of the following:  severe persistent headache     Notify your health care provider if you experience any of the following:  worsening rash     Notify your health care provider if you experience any of the following:  persistent dizziness, light-headedness, or visual disturbances     Notify your health care provider if you experience any of the following:  increased confusion or weakness     Medications:  Reconciled Home Medications:      Medication List      START taking these medications    pantoprazole 40 MG tablet  Commonly known as:  PROTONIX  Take 1 tablet (40 mg total) by mouth 2 (two) times daily.        CONTINUE taking these medications    aspirin 81 MG EC tablet  Commonly known as:  ECOTRIN  Take 81 mg by mouth once daily.     atorvastatin 80 MG tablet  Commonly known as:  LIPITOR  TAKE 1 TABLET BY MOUTH EVERY DAY     cilostazol 100 MG Tab  Commonly known as:  PLETAL  TAKE 1 TABLET BY MOUTH TWICE DAILY     clopidogrel 75 mg tablet  Commonly known as:  PLAVIX  TAKE 1 TABLET BY MOUTH EVERY DAY     dexamethasone 4 MG Tab  Commonly known as:  DECADRON  Take 1 tablet (4 mg total) by mouth every 12 (twelve) hours. Take 1 tab twice daily starting the day prior to chemotherapy for 3 days.     finasteride 5 mg tablet  Commonly known as:  PROSCAR  Take 1 tablet (5 mg total) by mouth once  daily.     folic acid 400 MCG tablet  Commonly known as:  FOLVITE  Take 1 tablet (400 mcg total) by mouth once daily.     irbesartan 75 MG tablet  Commonly known as:  AVAPRO  Take 1 tablet (75 mg total) by mouth once daily.     isosorbide mononitrate 60 MG 24 hr tablet  Commonly known as:  IMDUR  Take 1 tablet (60 mg total) by mouth once daily.     metoprolol tartrate 50 MG tablet  Commonly known as:  LOPRESSOR  Take 50 mg by mouth 2 (two) times daily.     nitroGLYCERIN 0.4 MG SL tablet  Commonly known as:  NITROSTAT  Place 1 tablet (0.4 mg total) under the tongue every 5 (five) minutes as needed for Chest pain.     ondansetron 8 MG Tbdl  Commonly known as:  ZOFRAN-ODT  Take 1 tablet (8 mg total) by mouth every 12 (twelve) hours as needed.     PROAIR HFA 90 mcg/actuation inhaler  Generic drug:  albuterol  Inhale 2 puffs into the lungs as needed.     umeclidinium-vilanterol 62.5-25 mcg/actuation Dsdv  Commonly known as:  ANORO ELLIPTA  Inhale 1 puff into the lungs once daily. Controller        STOP taking these medications    meloxicam 15 MG tablet  Commonly known as:  YASMIN Smith MD  Hematology/Oncology  [facility]

## 2018-08-17 NOTE — PLAN OF CARE
Problem: Patient Care Overview  Goal: Plan of Care Review  Outcome: Ongoing (interventions implemented as appropriate)  Mr. Nunn is AAO x 4. Calm and cooperative with plan of care. No bowel movements reported during this shift. Oxycodone IR 5mg administered once for lower back pain. Bed in lowest position, wheels locked, rails up x 2, call bell within reach.

## 2018-08-20 ENCOUNTER — PATIENT MESSAGE (OUTPATIENT)
Dept: GASTROENTEROLOGY | Facility: HOSPITAL | Age: 63
End: 2018-08-20

## 2018-08-20 ENCOUNTER — PATIENT MESSAGE (OUTPATIENT)
Dept: INTERNAL MEDICINE | Facility: CLINIC | Age: 63
End: 2018-08-20

## 2018-08-20 ENCOUNTER — TELEPHONE (OUTPATIENT)
Dept: PHARMACY | Facility: CLINIC | Age: 63
End: 2018-08-20

## 2018-08-20 LAB
BACTERIA BLD CULT: NORMAL
BACTERIA BLD CULT: NORMAL

## 2018-08-20 PROCEDURE — 77386 HC IMRT, COMPLEX: CPT | Performed by: RADIOLOGY

## 2018-08-20 PROCEDURE — G6002 STEREOSCOPIC X-RAY GUIDANCE: HCPCS | Mod: 26,,, | Performed by: RADIOLOGY

## 2018-08-20 RX ORDER — AMOXICILLIN 500 MG/1
1000 CAPSULE ORAL 2 TIMES DAILY
Qty: 56 CAPSULE | Refills: 0 | Status: SHIPPED | OUTPATIENT
Start: 2018-08-20 | End: 2018-09-03

## 2018-08-20 RX ORDER — CLARITHROMYCIN 500 MG/1
500 TABLET, FILM COATED ORAL 2 TIMES DAILY
Qty: 28 TABLET | Refills: 0 | Status: SHIPPED | OUTPATIENT
Start: 2018-08-20 | End: 2018-09-03

## 2018-08-21 PROCEDURE — 77386 HC IMRT, COMPLEX: CPT | Performed by: RADIOLOGY

## 2018-08-21 PROCEDURE — G6002 STEREOSCOPIC X-RAY GUIDANCE: HCPCS | Mod: 26,,, | Performed by: RADIOLOGY

## 2018-08-22 PROCEDURE — 77336 RADIATION PHYSICS CONSULT: CPT | Performed by: RADIOLOGY

## 2018-08-22 PROCEDURE — 77386 HC IMRT, COMPLEX: CPT | Performed by: RADIOLOGY

## 2018-08-22 PROCEDURE — 77417 THER RADIOLOGY PORT IMAGE(S): CPT | Performed by: RADIOLOGY

## 2018-08-22 PROCEDURE — G6002 STEREOSCOPIC X-RAY GUIDANCE: HCPCS | Mod: 26,,, | Performed by: RADIOLOGY

## 2018-08-23 ENCOUNTER — DOCUMENTATION ONLY (OUTPATIENT)
Dept: RADIATION ONCOLOGY | Facility: CLINIC | Age: 63
End: 2018-08-23

## 2018-08-23 PROCEDURE — G6002 STEREOSCOPIC X-RAY GUIDANCE: HCPCS | Mod: 26,,, | Performed by: RADIOLOGY

## 2018-08-23 PROCEDURE — 77386 HC IMRT, COMPLEX: CPT | Performed by: RADIOLOGY

## 2018-08-23 NOTE — PLAN OF CARE
Problem: Patient Care Overview  Goal: Plan of Care Review  Outcome: Ongoing (interventions implemented as appropriate)  DAY 10 of XRT to bone. C/o gas and cramps. Slight headache but taking nothing for it. Denies trouble swallowing and eating. Denies Diarrhea and constipation. Body aches all over.

## 2018-08-24 PROCEDURE — G6002 STEREOSCOPIC X-RAY GUIDANCE: HCPCS | Mod: 26,,, | Performed by: RADIOLOGY

## 2018-08-24 PROCEDURE — 77386 HC IMRT, COMPLEX: CPT | Performed by: RADIOLOGY

## 2018-08-27 PROCEDURE — G6002 STEREOSCOPIC X-RAY GUIDANCE: HCPCS | Mod: 26,,, | Performed by: RADIOLOGY

## 2018-08-27 PROCEDURE — 77386 HC IMRT, COMPLEX: CPT | Performed by: RADIOLOGY

## 2018-08-27 NOTE — PHYSICIAN QUERY
PT Name: Nimesh Nunn  MR #: [id number]     Physician Query Form - Etiology of Condition Clarification      Rafa Lubin RN CDS    Contact Information: [phone]    This form is a permanent document in the medical record.     Query Date: August 27, 2018    By submitting this query, we are merely seeking further clarification of documentation.  Please utilize your independent clinical judgment when addressing the question(s) below.     The medical record contains the following:    Findings Supporting Clinical Information Location in Medical Record   Melena Diagnoses of Shortness of breath, Lung cancer metastatic to bone, Dehydration, and Melena were also pertinent to this visit.    undiagnosed GIB   - hold antiplatelet therapy/NSAIDs and GI evaluation as above   - continue to monitor counts and transfuse for hgb <7, plt <50 (plt <10 once bleed is ruled out)     One non-bleeding superficial gastric ulcer with no stigmata of   bleeding was found in the prepyloric region of the stomach. The   lesion was 10 mm in largest dimension. Biopsies were taken with a   cold forceps for histology.     reflux esophagitis.   - Non-bleeding gastric ulcer with no stigmata of   bleeding. Biopsied.   - Gastric erosions without bleeding  Multiple non-bleeding dispersed erosions were found in the gastric   fundus and in the gastric body. There were no stigmata of recent   bleeding.    8/15 ED Prov note        8/16 Hematology and Oncology PN            8/16 Upper GI endoscopy        Please document your best medical opinion regarding the etiology of __Melena_____ for which the primary focus of treatment is/was directed.         [ x ] Acute gastric Ulcer due to Drugs (antiplatelet therapy/NSAIDs)    [x  ] Reflux esophagitis    [  ] Other GI bleeding (Specify)_________________________              [    ]  Clinically Undetermined    Please document in your progress notes daily for the duration of treatment, until resolved, and include  in your discharge summary.

## 2018-08-28 PROCEDURE — G6002 STEREOSCOPIC X-RAY GUIDANCE: HCPCS | Mod: 26,,, | Performed by: RADIOLOGY

## 2018-08-28 PROCEDURE — 77386 HC IMRT, COMPLEX: CPT | Performed by: RADIOLOGY

## 2018-08-29 ENCOUNTER — INFUSION (OUTPATIENT)
Dept: INFUSION THERAPY | Facility: HOSPITAL | Age: 63
End: 2018-08-29
Attending: INTERNAL MEDICINE
Payer: MEDICARE

## 2018-08-29 ENCOUNTER — OFFICE VISIT (OUTPATIENT)
Dept: HEMATOLOGY/ONCOLOGY | Facility: CLINIC | Age: 63
End: 2018-08-29
Payer: MEDICARE

## 2018-08-29 VITALS
HEART RATE: 116 BPM | TEMPERATURE: 98 F | SYSTOLIC BLOOD PRESSURE: 90 MMHG | OXYGEN SATURATION: 95 % | DIASTOLIC BLOOD PRESSURE: 54 MMHG | HEIGHT: 72 IN | WEIGHT: 182.13 LBS | RESPIRATION RATE: 16 BRPM | BODY MASS INDEX: 24.67 KG/M2

## 2018-08-29 VITALS
TEMPERATURE: 99 F | SYSTOLIC BLOOD PRESSURE: 109 MMHG | DIASTOLIC BLOOD PRESSURE: 59 MMHG | HEART RATE: 102 BPM | RESPIRATION RATE: 18 BRPM

## 2018-08-29 DIAGNOSIS — C34.90 LUNG CANCER METASTATIC TO BONE: ICD-10-CM

## 2018-08-29 DIAGNOSIS — C34.92 PRIMARY MALIGNANT NEOPLASM OF LEFT LUNG: Primary | ICD-10-CM

## 2018-08-29 DIAGNOSIS — C79.51 LUNG CANCER METASTATIC TO BONE: ICD-10-CM

## 2018-08-29 DIAGNOSIS — E83.42 HYPOMAGNESEMIA: ICD-10-CM

## 2018-08-29 DIAGNOSIS — E86.0 DEHYDRATION: ICD-10-CM

## 2018-08-29 DIAGNOSIS — C34.92 PRIMARY MALIGNANT NEOPLASM OF LEFT LUNG: ICD-10-CM

## 2018-08-29 DIAGNOSIS — E86.0 DEHYDRATION: Primary | ICD-10-CM

## 2018-08-29 PROCEDURE — 3008F BODY MASS INDEX DOCD: CPT | Mod: CPTII,S$GLB,, | Performed by: INTERNAL MEDICINE

## 2018-08-29 PROCEDURE — 3074F SYST BP LT 130 MM HG: CPT | Mod: CPTII,S$GLB,, | Performed by: INTERNAL MEDICINE

## 2018-08-29 PROCEDURE — 77417 THER RADIOLOGY PORT IMAGE(S): CPT | Performed by: RADIOLOGY

## 2018-08-29 PROCEDURE — 99999 PR PBB SHADOW E&M-EST. PATIENT-LVL III: CPT | Mod: PBBFAC,,, | Performed by: INTERNAL MEDICINE

## 2018-08-29 PROCEDURE — 77386 HC IMRT, COMPLEX: CPT | Performed by: RADIOLOGY

## 2018-08-29 PROCEDURE — 99214 OFFICE O/P EST MOD 30 MIN: CPT | Mod: S$GLB,,, | Performed by: INTERNAL MEDICINE

## 2018-08-29 PROCEDURE — G6002 STEREOSCOPIC X-RAY GUIDANCE: HCPCS | Mod: 26,,, | Performed by: RADIOLOGY

## 2018-08-29 PROCEDURE — 96365 THER/PROPH/DIAG IV INF INIT: CPT

## 2018-08-29 PROCEDURE — 96361 HYDRATE IV INFUSION ADD-ON: CPT

## 2018-08-29 PROCEDURE — 3078F DIAST BP <80 MM HG: CPT | Mod: CPTII,S$GLB,, | Performed by: INTERNAL MEDICINE

## 2018-08-29 PROCEDURE — 25000003 PHARM REV CODE 250: Performed by: INTERNAL MEDICINE

## 2018-08-29 PROCEDURE — 77336 RADIATION PHYSICS CONSULT: CPT | Performed by: RADIOLOGY

## 2018-08-29 RX ORDER — LORAZEPAM/0.9% SODIUM CHLORIDE 100MG/0.1L
2 PLASTIC BAG, INJECTION (ML) INTRAVENOUS
Status: DISCONTINUED | OUTPATIENT
Start: 2018-08-29 | End: 2018-08-29 | Stop reason: HOSPADM

## 2018-08-29 RX ORDER — LORAZEPAM/0.9% SODIUM CHLORIDE 100MG/0.1L
2 PLASTIC BAG, INJECTION (ML) INTRAVENOUS
Status: CANCELLED | OUTPATIENT
Start: 2018-08-29 | End: 2018-08-29

## 2018-08-29 RX ADMIN — SODIUM CHLORIDE: 0.9 INJECTION, SOLUTION INTRAVENOUS at 10:08

## 2018-08-29 RX ADMIN — MAGNESIUM SULFATE HEPTAHYDRATE 2 G: 40 INJECTION, SOLUTION INTRAVENOUS at 10:08

## 2018-08-29 NOTE — Clinical Note
Hold chemotherapy todayGive 1 L normal saline along with 2 g magnesium todayCheck CBC, CMP following IV fluidsMay be able to get back on chemo schedule depending on labs for later this weekSee me in 3 weeks

## 2018-08-29 NOTE — PLAN OF CARE
Problem: Patient Care Overview  Goal: Plan of Care Review  Outcome: Ongoing (interventions implemented as appropriate)  Pt received 1L NS and 2g IV Mag. Chemo canceled per Dr. Peralta. Pt with low BP and increased creatinine. BP up after treatment. Pt to have labs redrawn this afternoon. VSS and NAD. Pt instructed to call MD with any concerns. Pt discharged independently to lab appt.

## 2018-08-29 NOTE — PROGRESS NOTES
PATIENT: Nimesh Nunn  MRN: [id number]  DATE: 8/29/2018      Diagnosis:   1. Primary malignant neoplasm of left lung    2. Lung cancer metastatic to bone    3. Hypomagnesemia    4. Dehydration        Chief Complaint: Lung Cancer      Oncologic History:      Oncologic History Non-small cell lung cancer, left diagnosed 06/2018  Metastatic disease to bone at presentation    Oncologic Treatment Stereotactic radiation to spinal lesion  Cisplatin/pemetrexed with concurrent radiation 08/08/2018    Pathology Poorly differentiated adenocarcinoma, EGFR wild type, No ALK, ROS-1 rearrangements, PD-L1 TPS 90%          Subjective:    Interval History: Mr. Nunn is a 62 y.o. male who is seen in follow-up for lung cancer.  Since I had seen him last he completed radiation to his spinal lesion and was also started on concurrent chemotherapy and radiation on 08/08/2018 with cisplatin and pemetrexed.  He was hospitalized following this with possible GI bleeding and had an EGD which revealed gastric ulcers which were not bleeding and esophagitis.  His hemoglobin had remained stable.  He states that he generally feels well but has noted some lower extremity weakness periodically which requires him to sit down.  This is associated with pain in his legs.  This resolved spontaneously.  He denies any numbness, bowel or bladder dysfunction.  He has no other new complaints.    His history dates to 06/2018 when he sought medical attention for chest pain.  A CT of the chest was performed showing a left lower lobe mass measuring 7.4 cm.  There were also enlarged left hilar lymph nodes. He underwent PET-CT which showed uptake in this mass and also in L1.  He underwent bronchoscopy and biopsy with pathology showing poorly differentiated adenocarcinoma.  Molecular studies showed EGFR wild-type and he did not harbor any ALK or ROS-1 rearrangements.  PD L1 tumor proportions core was 90%.  He underwent radiation to the spinal lesion in  08/2018 and started on concurrent chemo and radiation with cisplatin and pemetrexed in 08/2018.    Past Medical History:   Past Medical History:   Diagnosis Date    Abdominal aortic aneurysm (AAA) without rupture 3/5/2018    Arthritis     Blood transfusion     COPD (chronic obstructive pulmonary disease)     Coronary artery disease     Dehydration 8/15/2018    Encounter for blood transfusion     Hyperlipidemia 11/7/2013    Hypertension     Lung cancer metastatic to bone 7/18/2018    Occlusion and stenosis of carotid artery without mention of cerebral infarction 1/12/2014    Primary malignant neoplasm of left lung 7/18/2018    Screening for colon cancer 6/1/2015    Syncope 1/13/2014       Past Surgical HIstory:   Past Surgical History:   Procedure Laterality Date    BACK SURGERY      carotid endarectomy      CORONARY ANGIOPLASTY      CORONARY ARTERY BYPASS GRAFT      SKIN BIOPSY         Family History:   Family History   Problem Relation Age of Onset    Heart disease Father     Hypertension Father     Heart attack Father     Heart failure Father     Cancer Mother     Breast cancer Mother     No Known Problems Sister     No Known Problems Brother     No Known Problems Maternal Grandmother     No Known Problems Maternal Grandfather     No Known Problems Paternal Grandmother     No Known Problems Paternal Grandfather     No Known Problems Maternal Aunt     No Known Problems Maternal Uncle     No Known Problems Paternal Aunt     No Known Problems Paternal Uncle     Anemia Neg Hx     Arrhythmia Neg Hx     Asthma Neg Hx     Clotting disorder Neg Hx     Fainting Neg Hx     Hyperlipidemia Neg Hx        Social History:  reports that he quit smoking about 6 years ago. He has a 60.00 pack-year smoking history. he has never used smokeless tobacco. He reports that he drinks about 1.8 oz of alcohol per week. He reports that he uses drugs. Drug: Marijuana.    Allergies:  Review of patient's  allergies indicates:  No Known Allergies    Medications:  Current Outpatient Medications   Medication Sig Dispense Refill    amoxicillin (AMOXIL) 500 MG capsule Take 2 capsules (1,000 mg total) by mouth 2 (two) times daily. for 14 days 56 capsule 0    aspirin (ECOTRIN) 81 MG EC tablet Take 81 mg by mouth once daily.      atorvastatin (LIPITOR) 80 MG tablet TAKE 1 TABLET BY MOUTH EVERY DAY 30 tablet 11    cilostazol (PLETAL) 100 MG Tab TAKE 1 TABLET BY MOUTH TWICE DAILY 60 tablet 11    clarithromycin (BIAXIN) 500 MG tablet Take 1 tablet (500 mg total) by mouth 2 (two) times daily. for 14 days 28 tablet 0    clopidogrel (PLAVIX) 75 mg tablet TAKE 1 TABLET BY MOUTH EVERY DAY 90 tablet 4    dexamethasone (DECADRON) 4 MG Tab Take 1 tablet (4 mg total) by mouth every 12 (twelve) hours. Take 1 tab twice daily starting the day prior to chemotherapy for 3 days. 24 tablet 1    finasteride (PROSCAR) 5 mg tablet Take 1 tablet (5 mg total) by mouth once daily. 90 tablet 3    folic acid (FOLVITE) 400 MCG tablet Take 1 tablet (400 mcg total) by mouth once daily. 100 tablet 3    irbesartan (AVAPRO) 75 MG tablet Take 1 tablet (75 mg total) by mouth once daily. 90 tablet 3    isosorbide mononitrate (IMDUR) 60 MG 24 hr tablet Take 1 tablet (60 mg total) by mouth once daily. 90 tablet 3    metoprolol tartrate (LOPRESSOR) 50 MG tablet Take 50 mg by mouth 2 (two) times daily.      nitroGLYCERIN (NITROSTAT) 0.4 MG SL tablet Place 1 tablet (0.4 mg total) under the tongue every 5 (five) minutes as needed for Chest pain. 90 tablet 1    ondansetron (ZOFRAN-ODT) 8 MG TbDL Take 1 tablet (8 mg total) by mouth every 12 (twelve) hours as needed. 30 tablet 1    pantoprazole (PROTONIX) 40 MG tablet Take 1 tablet (40 mg total) by mouth 2 (two) times daily. 60 tablet 0    PROAIR HFA 90 mcg/actuation inhaler Inhale 2 puffs into the lungs as needed. 18 g 6    umeclidinium-vilanterol (ANORO ELLIPTA) 62.5-25 mcg/actuation DsDv Inhale 1  puff into the lungs once daily. Controller 1 each 5     No current facility-administered medications for this visit.        Review of Systems   Constitutional: Negative for appetite change, chills, fatigue, fever and unexpected weight change.   HENT: Negative for dental problem, sinus pressure and sneezing.    Eyes: Negative for visual disturbance.   Respiratory: Positive for cough and shortness of breath. Negative for choking and chest tightness.    Cardiovascular: Positive for chest pain. Negative for leg swelling.   Gastrointestinal: Negative for abdominal pain, blood in stool, constipation, diarrhea and nausea.   Genitourinary: Positive for frequency. Negative for difficulty urinating and dysuria.   Musculoskeletal: Positive for back pain. Negative for arthralgias.   Skin: Negative for rash and wound.   Neurological: Positive for weakness. Negative for dizziness, light-headedness and headaches.   Hematological: Negative for adenopathy. Does not bruise/bleed easily.   Psychiatric/Behavioral: Negative for sleep disturbance. The patient is nervous/anxious.        ECOG Performance Status:   ECOG SCORE    1 - Restricted in strenuous activity-ambulatory and able to carry out work of a light nature         Objective:      Vitals:   Vitals:    08/29/18 0943   BP: (!) 90/54   Pulse: (!) 116   Resp: 16   Temp: 97.6 °F (36.4 °C)   SpO2: 95%   Weight: 82.6 kg (182 lb 1.6 oz)   Height: 6' (1.829 m)     BMI: Body mass index is 24.7 kg/m².    Physical Exam   Constitutional: He is oriented to person, place, and time. He appears well-developed and well-nourished.   HENT:   Head: Normocephalic and atraumatic.   Eyes: Pupils are equal, round, and reactive to light.   Neck: Normal range of motion. Neck supple.   Cardiovascular: Normal rate and regular rhythm.   Pulmonary/Chest: Effort normal. No respiratory distress. He has no wheezes.   Abdominal: Soft. He exhibits no distension. There is no tenderness.   Musculoskeletal: He  exhibits no edema or tenderness.   Lymphadenopathy:     He has no cervical adenopathy.   Neurological: He is alert and oriented to person, place, and time. No cranial nerve deficit or sensory deficit. He exhibits normal muscle tone. Coordination normal.   Skin: Skin is warm and dry.   Psychiatric: He has a normal mood and affect. His behavior is normal.       Laboratory Data:  Lab Visit on 08/29/2018   Component Date Value Ref Range Status    WBC 08/29/2018 2.84* 3.90 - 12.70 K/uL Final    RBC 08/29/2018 2.63* 4.60 - 6.20 M/uL Final    Hemoglobin 08/29/2018 8.1* 14.0 - 18.0 g/dL Final    Hematocrit 08/29/2018 24.9* 40.0 - 54.0 % Final    MCV 08/29/2018 95  82 - 98 fL Final    MCH 08/29/2018 30.8  27.0 - 31.0 pg Final    MCHC 08/29/2018 32.5  32.0 - 36.0 g/dL Final    RDW 08/29/2018 14.1  11.5 - 14.5 % Final    Platelets 08/29/2018 267  150 - 350 K/uL Final    MPV 08/29/2018 7.9* 9.2 - 12.9 fL Final    Gran # (ANC) 08/29/2018 2.4  1.8 - 7.7 K/uL Final    Comment: The ANC is based on a white cell differential from an   automated cell counter. It has not been microscopically   reviewed for the presence of abnormal cells. Clinical   correlation is required.      Immature Grans (Abs) 08/29/2018 0.04  0.00 - 0.04 K/uL Final    Comment: Mild elevation in immature granulocytes is non specific and   can be seen in a variety of conditions including stress response,   acute inflammation, trauma and pregnancy. Correlation with other   laboratory and clinical findings is essential.      Magnesium 08/29/2018 1.4* 1.6 - 2.6 mg/dL Final    Sodium 08/29/2018 135* 136 - 145 mmol/L Final    Potassium 08/29/2018 4.7  3.5 - 5.1 mmol/L Final    Chloride 08/29/2018 99  95 - 110 mmol/L Final    CO2 08/29/2018 23  23 - 29 mmol/L Final    Glucose 08/29/2018 162* 70 - 110 mg/dL Final    BUN, Bld 08/29/2018 29* 8 - 23 mg/dL Final    Creatinine 08/29/2018 1.5* 0.5 - 1.4 mg/dL Final    Calcium 08/29/2018 9.7  8.7 - 10.5  mg/dL Final    Total Protein 08/29/2018 7.3  6.0 - 8.4 g/dL Final    Albumin 08/29/2018 3.4* 3.5 - 5.2 g/dL Final    Total Bilirubin 08/29/2018 0.4  0.1 - 1.0 mg/dL Final    Comment: For infants and newborns, interpretation of results should be based  on gestational age, weight and in agreement with clinical  observations.  Premature Infant recommended reference ranges:  Up to 24 hours.............<8.0 mg/dL  Up to 48 hours............<12.0 mg/dL  3-5 days..................<15.0 mg/dL  6-29 days.................<15.0 mg/dL      Alkaline Phosphatase 08/29/2018 135  55 - 135 U/L Final    AST 08/29/2018 15  10 - 40 U/L Final    ALT 08/29/2018 14  10 - 44 U/L Final    Anion Gap 08/29/2018 13  8 - 16 mmol/L Final    eGFR if  08/29/2018 56.1* >60 mL/min/1.73 m^2 Final    eGFR if non African American 08/29/2018 48.5* >60 mL/min/1.73 m^2 Final    Comment: Calculation used to obtain the estimated glomerular filtration  rate (eGFR) is the CKD-EPI equation.            Imaging:   PET-CT 06/15/2018  EXAMINATION:  NM PET CT ROUTINE    CLINICAL HISTORY:  Lung Mass;  Other nonspecific abnormal finding of lung field    FINDINGS:  Patient was administered 13.05 millicuries of FDG intravenously.    There is a mid posteromedial left lung mass SUV max 13.76.    There is a left hilar metastatic lymph node SUV max 3.26.    There is L1 vertebral metastasis SUV max 3.96.    There is a right upper lobe lung nodule low-grade possibly metastatic as well.    There is physiologic intracranial, head, and neck activity.  Heart mediastinum show nothing unusual.  There are coronary calcifications.  There is physiologic liver, spleen, GI and  activity.  There is a small gallstone in the gallbladder.  There is aortic plaque and DJD.  The adrenal glands are normal.  There is diverticulosis.   Impression       See above    Left lung malignancy with metastatic disease involving the left hilum, L1 vertebral body, and possibly  right upper lobe lung metastasis.    Index lesions:    Primary lung mass SUV max 13.76.    Left hilar lymph node SUV max 3.26.    L1 vertebral body SUV max 3.96.            Assessment:       1. Primary malignant neoplasm of left lung    2. Lung cancer metastatic to bone    3. Hypomagnesemia    4. Dehydration           Plan:     Mr. Nunn labs have been reviewed and his creatinine has risen and magnesium decline.  He does appear dry.  We will hold chemotherapy today and give IV fluids and magnesium replacement.  We will check lab work on him following this.  If his kidney function has returned to baseline then we will get him back on the schedule for cisplatin and pemetrexed.  If, however, kidney function has not improved we may need to switch treatment.  I will also discuss with Dr. Perea some of his other symptoms of weakness.      Demetrius Infante DO, FACP  Hematology & Oncology  [facility] [place] [address]  ph. [phone]  Fax. [phone]    25 minutes were spent in coordination of patient's care, record review and counseling.  More than 50% of the time was face-to-face.

## 2018-08-30 ENCOUNTER — TELEPHONE (OUTPATIENT)
Dept: HEMATOLOGY/ONCOLOGY | Facility: CLINIC | Age: 63
End: 2018-08-30

## 2018-08-30 PROCEDURE — 77386 HC IMRT, COMPLEX: CPT | Performed by: RADIOLOGY

## 2018-08-30 PROCEDURE — G6002 STEREOSCOPIC X-RAY GUIDANCE: HCPCS | Mod: 26,,, | Performed by: RADIOLOGY

## 2018-08-30 NOTE — TELEPHONE ENCOUNTER
----- Message from Demetrius Infante DO, FACP sent at 8/30/2018  8:30 AM CDT -----  He should be ok to get back on chemo schedule.  Will need labs within a few days following and may need IVF.

## 2018-08-31 ENCOUNTER — INFUSION (OUTPATIENT)
Dept: INFUSION THERAPY | Facility: HOSPITAL | Age: 63
End: 2018-08-31
Attending: INTERNAL MEDICINE
Payer: MEDICARE

## 2018-08-31 ENCOUNTER — HOSPITAL ENCOUNTER (EMERGENCY)
Facility: HOSPITAL | Age: 63
Discharge: LEFT BEFORE DISCHARGE | End: 2018-09-01
Attending: EMERGENCY MEDICINE
Payer: MEDICARE

## 2018-08-31 VITALS
HEIGHT: 72 IN | TEMPERATURE: 98 F | HEART RATE: 107 BPM | BODY MASS INDEX: 24.67 KG/M2 | DIASTOLIC BLOOD PRESSURE: 69 MMHG | SYSTOLIC BLOOD PRESSURE: 114 MMHG | WEIGHT: 182.13 LBS | RESPIRATION RATE: 16 BRPM

## 2018-08-31 DIAGNOSIS — D64.9 ANEMIA, UNSPECIFIED TYPE: Primary | ICD-10-CM

## 2018-08-31 DIAGNOSIS — R07.9 CHEST PAIN: ICD-10-CM

## 2018-08-31 DIAGNOSIS — C34.92 PRIMARY MALIGNANT NEOPLASM OF LEFT LUNG: Primary | ICD-10-CM

## 2018-08-31 PROCEDURE — 80053 COMPREHEN METABOLIC PANEL: CPT

## 2018-08-31 PROCEDURE — G6002 STEREOSCOPIC X-RAY GUIDANCE: HCPCS | Mod: 26,,, | Performed by: RADIOLOGY

## 2018-08-31 PROCEDURE — 25000003 PHARM REV CODE 250: Performed by: EMERGENCY MEDICINE

## 2018-08-31 PROCEDURE — 85025 COMPLETE CBC W/AUTO DIFF WBC: CPT

## 2018-08-31 PROCEDURE — 99285 EMERGENCY DEPT VISIT HI MDM: CPT | Mod: 25

## 2018-08-31 PROCEDURE — 93010 ELECTROCARDIOGRAM REPORT: CPT | Mod: ,,, | Performed by: INTERNAL MEDICINE

## 2018-08-31 PROCEDURE — 96411 CHEMO IV PUSH ADDL DRUG: CPT

## 2018-08-31 PROCEDURE — 96413 CHEMO IV INFUSION 1 HR: CPT

## 2018-08-31 PROCEDURE — 94760 N-INVAS EAR/PLS OXIMETRY 1: CPT

## 2018-08-31 PROCEDURE — 27000221 HC OXYGEN, UP TO 24 HOURS

## 2018-08-31 PROCEDURE — 77386 HC IMRT, COMPLEX: CPT | Performed by: RADIOLOGY

## 2018-08-31 PROCEDURE — 93005 ELECTROCARDIOGRAM TRACING: CPT

## 2018-08-31 PROCEDURE — 84484 ASSAY OF TROPONIN QUANT: CPT

## 2018-08-31 PROCEDURE — 83880 ASSAY OF NATRIURETIC PEPTIDE: CPT

## 2018-08-31 PROCEDURE — 96367 TX/PROPH/DG ADDL SEQ IV INF: CPT

## 2018-08-31 PROCEDURE — 63600175 PHARM REV CODE 636 W HCPCS: Mod: JG | Performed by: INTERNAL MEDICINE

## 2018-08-31 PROCEDURE — 96366 THER/PROPH/DIAG IV INF ADDON: CPT

## 2018-08-31 PROCEDURE — 25000003 PHARM REV CODE 250: Performed by: INTERNAL MEDICINE

## 2018-08-31 RX ORDER — SODIUM CHLORIDE 0.9 % (FLUSH) 0.9 %
10 SYRINGE (ML) INJECTION
Status: CANCELLED | OUTPATIENT
Start: 2018-08-31

## 2018-08-31 RX ORDER — ASPIRIN 325 MG
325 TABLET ORAL
Status: COMPLETED | OUTPATIENT
Start: 2018-08-31 | End: 2018-08-31

## 2018-08-31 RX ORDER — HEPARIN 100 UNIT/ML
500 SYRINGE INTRAVENOUS
Status: CANCELLED | OUTPATIENT
Start: 2018-09-01

## 2018-08-31 RX ORDER — HEPARIN 100 UNIT/ML
500 SYRINGE INTRAVENOUS
Status: CANCELLED | OUTPATIENT
Start: 2018-08-31

## 2018-08-31 RX ORDER — SODIUM CHLORIDE 0.9 % (FLUSH) 0.9 %
10 SYRINGE (ML) INJECTION
Status: CANCELLED | OUTPATIENT
Start: 2018-09-01

## 2018-08-31 RX ADMIN — SODIUM CHLORIDE 150 MG: 0.9 INJECTION, SOLUTION INTRAVENOUS at 12:08

## 2018-08-31 RX ADMIN — SODIUM CHLORIDE 1050 MG: 9 INJECTION, SOLUTION INTRAVENOUS at 12:08

## 2018-08-31 RX ADMIN — MAGNESIUM SULFATE HEPTAHYDRATE: 500 INJECTION, SOLUTION INTRAMUSCULAR; INTRAVENOUS at 02:08

## 2018-08-31 RX ADMIN — NITROGLYCERIN 1 INCH: 20 OINTMENT TOPICAL at 11:08

## 2018-08-31 RX ADMIN — CISPLATIN 158 MG: 1 INJECTION INTRAVENOUS at 01:08

## 2018-08-31 RX ADMIN — ASPIRIN 325 MG ORAL TABLET 325 MG: 325 PILL ORAL at 11:08

## 2018-08-31 RX ADMIN — PALONOSETRON HYDROCHLORIDE 0.25 MG: 0.25 INJECTION, SOLUTION INTRAVENOUS at 11:08

## 2018-08-31 RX ADMIN — DEXAMETHASONE SODIUM PHOSPHATE 10 MG: 4 INJECTION, SOLUTION INTRA-ARTICULAR; INTRALESIONAL; INTRAMUSCULAR; INTRAVENOUS; SOFT TISSUE at 11:08

## 2018-08-31 RX ADMIN — MAGNESIUM SULFATE: 500 INJECTION, SOLUTION INTRAMUSCULAR; INTRAVENOUS at 10:08

## 2018-08-31 NOTE — PLAN OF CARE
Problem: Chemotherapy Effects (Adult)  Goal: Signs and Symptoms of Listed Potential Problems Will be Absent, Minimized or Managed (Chemotherapy Effects)  Signs and symptoms of listed potential problems will be absent, minimized or managed by discharge/transition of care (reference Chemotherapy Effects (Adult) CPG).   Outcome: Ongoing (interventions implemented as appropriate)  Patient here for Alimta/Cisplatin and IVF with 1 gram Mg.  Assessment complete and labs reviewed.  Patient endorses taking home Decadron and Folate.  VSS.  No needs expressed at this time.  Will continue to monitor.

## 2018-09-01 VITALS
WEIGHT: 180 LBS | SYSTOLIC BLOOD PRESSURE: 109 MMHG | TEMPERATURE: 98 F | BODY MASS INDEX: 24.41 KG/M2 | DIASTOLIC BLOOD PRESSURE: 68 MMHG | HEART RATE: 101 BPM | OXYGEN SATURATION: 97 % | RESPIRATION RATE: 16 BRPM

## 2018-09-01 LAB
ALBUMIN SERPL BCP-MCNC: 3.4 G/DL
ALP SERPL-CCNC: 86 U/L
ALT SERPL W/O P-5'-P-CCNC: 19 U/L
ANION GAP SERPL CALC-SCNC: 12 MMOL/L
AST SERPL-CCNC: 22 U/L
BASOPHILS # BLD AUTO: 0 K/UL
BASOPHILS NFR BLD: 0 %
BILIRUB SERPL-MCNC: 0.1 MG/DL
BUN SERPL-MCNC: 23 MG/DL
CALCIUM SERPL-MCNC: 7.5 MG/DL
CHLORIDE SERPL-SCNC: 101 MMOL/L
CO2 SERPL-SCNC: 23 MMOL/L
CREAT SERPL-MCNC: 1.02 MG/DL
DIFFERENTIAL METHOD: ABNORMAL
EOSINOPHIL # BLD AUTO: 0 K/UL
EOSINOPHIL NFR BLD: 0 %
ERYTHROCYTE [DISTWIDTH] IN BLOOD BY AUTOMATED COUNT: 14.8 %
EST. GFR  (AFRICAN AMERICAN): >60 ML/MIN/1.73 M^2
EST. GFR  (NON AFRICAN AMERICAN): >60 ML/MIN/1.73 M^2
GLUCOSE SERPL-MCNC: 203 MG/DL
HCT VFR BLD AUTO: 22.4 %
HGB BLD-MCNC: 7.4 G/DL
LYMPHOCYTES # BLD AUTO: 0.1 K/UL
LYMPHOCYTES NFR BLD: 2.4 %
MCH RBC QN AUTO: 31.8 PG
MCHC RBC AUTO-ENTMCNC: 33 G/DL
MCV RBC AUTO: 96 FL
MONOCYTES # BLD AUTO: 0.4 K/UL
MONOCYTES NFR BLD: 9.5 %
NEUTROPHILS # BLD AUTO: 3.9 K/UL
NEUTROPHILS NFR BLD: 86.6 %
NT-PROBNP: 1310 PG/ML
PLATELET # BLD AUTO: 254 K/UL
PMV BLD AUTO: 8 FL
POTASSIUM SERPL-SCNC: 3.3 MMOL/L
PROT SERPL-MCNC: 6.2 G/DL
RBC # BLD AUTO: 2.33 M/UL
SODIUM SERPL-SCNC: 136 MMOL/L
TROPONIN I SERPL DL<=0.01 NG/ML-MCNC: 0.01 NG/ML
WBC # BLD AUTO: 4.52 K/UL

## 2018-09-01 RX ORDER — NITROGLYCERIN 0.4 MG/1
0.4 TABLET SUBLINGUAL EVERY 5 MIN PRN
Qty: 90 TABLET | Refills: 1 | Status: ON HOLD | OUTPATIENT
Start: 2018-09-01 | End: 2019-05-06

## 2018-09-01 NOTE — DISCHARGE INSTRUCTIONS
Although you are leaving against medical advice please return if you wish to complete your workup and admission or have any other concerns. Please follow up with your doctor and cardiologist as soon as possible.

## 2018-09-01 NOTE — ED PROVIDER NOTES
"Encounter Date: 8/31/2018       History     Chief Complaint   Patient presents with    Chest Pain     intermittent midsternal chest pain x "a couple of days." hx of stent placement x 3. Pt states that he recently finished chemo. Denies radiation.    Shortness of Breath     Pt is a 61 yo man with a h/o lung CA with mets to the spine, MI x 3 s/p stenting, GI bleed (EGD 8/18 EGD results showed LA Grade A reflux esophagitis, and Gastric erosions without bleeding)., Left CEA, COPD, HTN, back surgery who comes to the ED with chest pain. Pain is 10/10 and described as a tightness since 4 pm. Pain increases with exertion. He denies shortness of breath, hemoptysis or cough. No abdominal pain or back pain. Pt is currently undergoing chemotherapy and RT.            Review of patient's allergies indicates:  No Known Allergies  Past Medical History:   Diagnosis Date    Abdominal aortic aneurysm (AAA) without rupture 3/5/2018    Arthritis     Blood transfusion     COPD (chronic obstructive pulmonary disease)     Coronary artery disease     Dehydration 8/15/2018    Encounter for blood transfusion     Hyperlipidemia 11/7/2013    Hypertension     Lung cancer metastatic to bone 7/18/2018    Occlusion and stenosis of carotid artery without mention of cerebral infarction 1/12/2014    Primary malignant neoplasm of left lung 7/18/2018    Screening for colon cancer 6/1/2015    Syncope 1/13/2014     Past Surgical History:   Procedure Laterality Date    BACK SURGERY      carotid endarectomy      CORONARY ANGIOPLASTY      CORONARY ARTERY BYPASS GRAFT      SKIN BIOPSY       Family History   Problem Relation Age of Onset    Heart disease Father     Hypertension Father     Heart attack Father     Heart failure Father     Cancer Mother     Breast cancer Mother     No Known Problems Sister     No Known Problems Brother     No Known Problems Maternal Grandmother     No Known Problems Maternal Grandfather     No " Known Problems Paternal Grandmother     No Known Problems Paternal Grandfather     No Known Problems Maternal Aunt     No Known Problems Maternal Uncle     No Known Problems Paternal Aunt     No Known Problems Paternal Uncle     Anemia Neg Hx     Arrhythmia Neg Hx     Asthma Neg Hx     Clotting disorder Neg Hx     Fainting Neg Hx     Hyperlipidemia Neg Hx      Social History     Tobacco Use    Smoking status: Former Smoker     Packs/day: 1.50     Years: 40.00     Pack years: 60.00     Last attempt to quit: 10/11/2011     Years since quittin.8    Smokeless tobacco: Never Used   Substance Use Topics    Alcohol use: Yes     Alcohol/week: 1.8 oz     Types: 3 Cans of beer per week     Comment: 3 cans beer every day or every other day    Drug use: Yes     Types: Marijuana     Review of Systems   Constitutional: Negative for fever.   HENT: Negative for congestion and sore throat.    Respiratory: Positive for chest tightness. Negative for apnea, cough, choking, shortness of breath and stridor.    Cardiovascular: Positive for chest pain. Negative for palpitations and leg swelling.   Gastrointestinal: Negative for abdominal distention, abdominal pain, nausea and vomiting.   Genitourinary: Negative for dysuria.   Musculoskeletal: Negative for back pain, neck pain and neck stiffness.   Skin: Negative for rash.   Neurological: Negative for weakness.   Hematological: Does not bruise/bleed easily.   All other systems reviewed and are negative.      Physical Exam     Initial Vitals [18 2309]   BP Pulse Resp Temp SpO2   (!) 143/88 (!) 112 19 97.7 °F (36.5 °C) 100 %      MAP       --         Physical Exam    ED Course   Procedures  Labs Reviewed   COMPREHENSIVE METABOLIC PANEL - Abnormal; Notable for the following components:       Result Value    Potassium 3.3 (*)     Glucose 203 (*)     BUN, Bld 23 (*)     Calcium 7.5 (*)     Albumin 3.4 (*)     All other components within normal limits   CBC W/ AUTO  DIFFERENTIAL   TROPONIN I   NT-PRO NATRIURETIC PEPTIDE     EKG Readings: (Independently Interpreted)   Initial Reading: No STEMI. Rhythm: Sinus Tachycardia. Heart Rate: 116. Ectopy: No Ectopy. ST Segments: Non-Specific ST Segment Depression. T Waves: Normal. Axis: Normal.       Imaging Results    None       X-Rays:   Independently Interpreted Readings:   Chest X-Ray: Normal heart size.  No infiltrates.         Pt now pain free after NTG. Pt refused guiac test for low H/H as well as admission to hospital for chest pain workup. Risks including death were discussed and understood by patient. Pt chooses to sign out AMA.                    Clinical Impression:   The encounter diagnosis was Chest pain.                             Bassem Royal MD  09/01/18 0144

## 2018-09-01 NOTE — ED TRIAGE NOTES
"intermittent midsternal chest pain x "a couple of days." hx of stent placement x 3. Pt states that he recently finished chemo. Denies radiation of pain.  "

## 2018-09-04 ENCOUNTER — DOCUMENTATION ONLY (OUTPATIENT)
Dept: RADIATION ONCOLOGY | Facility: CLINIC | Age: 63
End: 2018-09-04

## 2018-09-04 ENCOUNTER — HOSPITAL ENCOUNTER (OUTPATIENT)
Dept: RADIATION THERAPY | Facility: HOSPITAL | Age: 63
Discharge: HOME | End: 2018-09-04
Attending: RADIOLOGY
Payer: MEDICARE

## 2018-09-04 PROCEDURE — G6002 STEREOSCOPIC X-RAY GUIDANCE: HCPCS | Mod: 26,,, | Performed by: RADIOLOGY

## 2018-09-04 PROCEDURE — 77386 HC IMRT, COMPLEX: CPT | Performed by: RADIOLOGY

## 2018-09-04 NOTE — PLAN OF CARE
Problem: Patient Care Overview  Goal: Plan of Care Review  Outcome: Ongoing (interventions implemented as appropriate)  Day 19 of XRT to  Left lung. Feeling good. Denies headache and N&V. Chemo tomorrow.

## 2018-09-05 ENCOUNTER — INFUSION (OUTPATIENT)
Dept: INFUSION THERAPY | Facility: HOSPITAL | Age: 63
End: 2018-09-05
Attending: INTERNAL MEDICINE
Payer: MEDICARE

## 2018-09-05 VITALS
SYSTOLIC BLOOD PRESSURE: 99 MMHG | RESPIRATION RATE: 18 BRPM | DIASTOLIC BLOOD PRESSURE: 57 MMHG | HEART RATE: 117 BPM | TEMPERATURE: 98 F

## 2018-09-05 DIAGNOSIS — C34.92 PRIMARY MALIGNANT NEOPLASM OF LEFT LUNG: Primary | ICD-10-CM

## 2018-09-05 DIAGNOSIS — C79.51 LUNG CANCER METASTATIC TO BONE: ICD-10-CM

## 2018-09-05 DIAGNOSIS — C34.92 PRIMARY MALIGNANT NEOPLASM OF LEFT LUNG: ICD-10-CM

## 2018-09-05 DIAGNOSIS — C34.90 LUNG CANCER METASTATIC TO BONE: ICD-10-CM

## 2018-09-05 PROCEDURE — 96360 HYDRATION IV INFUSION INIT: CPT

## 2018-09-05 PROCEDURE — 25000003 PHARM REV CODE 250: Performed by: INTERNAL MEDICINE

## 2018-09-05 PROCEDURE — G6002 STEREOSCOPIC X-RAY GUIDANCE: HCPCS | Mod: 26,,, | Performed by: RADIOLOGY

## 2018-09-05 PROCEDURE — 77386 HC IMRT, COMPLEX: CPT | Performed by: RADIOLOGY

## 2018-09-05 PROCEDURE — 96361 HYDRATE IV INFUSION ADD-ON: CPT

## 2018-09-05 RX ORDER — SODIUM CHLORIDE 0.9 % (FLUSH) 0.9 %
10 SYRINGE (ML) INJECTION
Status: CANCELLED | OUTPATIENT
Start: 2018-09-21

## 2018-09-05 RX ORDER — FAMOTIDINE 10 MG/ML
20 INJECTION INTRAVENOUS
Status: CANCELLED
Start: 2018-09-21 | End: 2018-09-21

## 2018-09-05 RX ORDER — SODIUM CHLORIDE 0.9 % (FLUSH) 0.9 %
10 SYRINGE (ML) INJECTION
Status: CANCELLED | OUTPATIENT
Start: 2018-09-22

## 2018-09-05 RX ORDER — HEPARIN 100 UNIT/ML
500 SYRINGE INTRAVENOUS
Status: DISCONTINUED | OUTPATIENT
Start: 2018-09-05 | End: 2018-09-05 | Stop reason: HOSPADM

## 2018-09-05 RX ORDER — HEPARIN 100 UNIT/ML
500 SYRINGE INTRAVENOUS
Status: CANCELLED | OUTPATIENT
Start: 2018-09-21

## 2018-09-05 RX ORDER — CYANOCOBALAMIN 1000 UG/ML
1000 INJECTION, SOLUTION INTRAMUSCULAR; SUBCUTANEOUS
Status: CANCELLED | OUTPATIENT
Start: 2018-09-21

## 2018-09-05 RX ORDER — PANTOPRAZOLE SODIUM 40 MG/1
40 TABLET, DELAYED RELEASE ORAL 2 TIMES DAILY
Qty: 60 TABLET | Refills: 0 | Status: SHIPPED | OUTPATIENT
Start: 2018-09-05 | End: 2018-11-12 | Stop reason: ALTCHOICE

## 2018-09-05 RX ORDER — HEPARIN 100 UNIT/ML
500 SYRINGE INTRAVENOUS
Status: CANCELLED | OUTPATIENT
Start: 2018-09-22

## 2018-09-05 RX ORDER — SODIUM CHLORIDE 0.9 % (FLUSH) 0.9 %
10 SYRINGE (ML) INJECTION
Status: DISCONTINUED | OUTPATIENT
Start: 2018-09-05 | End: 2018-09-05 | Stop reason: HOSPADM

## 2018-09-05 RX ADMIN — SODIUM CHLORIDE: 0.9 INJECTION, SOLUTION INTRAVENOUS at 09:09

## 2018-09-05 NOTE — TELEPHONE ENCOUNTER
Per chemo infusion nurse: He is c/o of indigestion/heartburn has not tried anything at home. W#ould like something- he states he can not eat or drink anyhing due to pain with swallowing    Will ask for something for throat pain and indigestion.

## 2018-09-05 NOTE — PLAN OF CARE
Problem: Patient Care Overview  Goal: Plan of Care Review  Outcome: Ongoing (interventions implemented as appropriate)  Pt tolerated 1L IVF without adverse effects. VSS. Verbalized understanding of RTC date. DC ambulating independently. Pt verbalized understanding of picking up protonix/Otoe at pharmacy & how to take.

## 2018-09-05 NOTE — PLAN OF CARE
Problem: Chemotherapy Effects (Adult)  Goal: Signs and Symptoms of Listed Potential Problems Will be Absent, Minimized or Managed (Chemotherapy Effects)  Signs and symptoms of listed potential problems will be absent, minimized or managed by discharge/transition of care (reference Chemotherapy Effects (Adult) CPG).   Outcome: Ongoing (interventions implemented as appropriate)  Labs , hx, and medications reviewed. Assessment completed. Pt c/o of pain 8/10 with swallowing & heartburn (radiation esophigitis- don Connell RN) Meds called in to [facility] & pending auth on pepcid IVP.  Discussed plan of care with patient. Patient in agreement. VSS.  Chair reclined and warm blanket and snack offered. Will cont to monitor

## 2018-09-06 PROCEDURE — 77386 HC IMRT, COMPLEX: CPT | Performed by: RADIOLOGY

## 2018-09-06 PROCEDURE — 77417 THER RADIOLOGY PORT IMAGE(S): CPT | Performed by: RADIOLOGY

## 2018-09-06 PROCEDURE — G6002 STEREOSCOPIC X-RAY GUIDANCE: HCPCS | Mod: 26,,, | Performed by: RADIOLOGY

## 2018-09-07 PROCEDURE — 77386 HC IMRT, COMPLEX: CPT | Performed by: RADIOLOGY

## 2018-09-07 PROCEDURE — G6002 STEREOSCOPIC X-RAY GUIDANCE: HCPCS | Mod: 26,,, | Performed by: RADIOLOGY

## 2018-09-07 PROCEDURE — 77336 RADIATION PHYSICS CONSULT: CPT | Performed by: RADIOLOGY

## 2018-09-10 PROCEDURE — 77386 HC IMRT, COMPLEX: CPT | Performed by: RADIOLOGY

## 2018-09-10 PROCEDURE — G6002 STEREOSCOPIC X-RAY GUIDANCE: HCPCS | Mod: 26,,, | Performed by: RADIOLOGY

## 2018-09-11 ENCOUNTER — DOCUMENTATION ONLY (OUTPATIENT)
Dept: RADIATION ONCOLOGY | Facility: CLINIC | Age: 63
End: 2018-09-11

## 2018-09-11 ENCOUNTER — OFFICE VISIT (OUTPATIENT)
Dept: HEMATOLOGY/ONCOLOGY | Facility: CLINIC | Age: 63
End: 2018-09-11
Payer: MEDICARE

## 2018-09-11 ENCOUNTER — INFUSION (OUTPATIENT)
Dept: INFUSION THERAPY | Facility: HOSPITAL | Age: 63
End: 2018-09-11
Attending: INTERNAL MEDICINE
Payer: MEDICARE

## 2018-09-11 VITALS
OXYGEN SATURATION: 97 % | HEIGHT: 72 IN | DIASTOLIC BLOOD PRESSURE: 68 MMHG | TEMPERATURE: 98 F | BODY MASS INDEX: 23.32 KG/M2 | WEIGHT: 172.19 LBS | RESPIRATION RATE: 20 BRPM | SYSTOLIC BLOOD PRESSURE: 118 MMHG | HEART RATE: 96 BPM

## 2018-09-11 VITALS
TEMPERATURE: 98 F | SYSTOLIC BLOOD PRESSURE: 102 MMHG | DIASTOLIC BLOOD PRESSURE: 66 MMHG | RESPIRATION RATE: 18 BRPM | HEART RATE: 85 BPM

## 2018-09-11 DIAGNOSIS — T45.1X5A CHEMOTHERAPY INDUCED NEUTROPENIA: ICD-10-CM

## 2018-09-11 DIAGNOSIS — D69.59 CHEMOTHERAPY-INDUCED THROMBOCYTOPENIA: ICD-10-CM

## 2018-09-11 DIAGNOSIS — C34.92 PRIMARY MALIGNANT NEOPLASM OF LEFT LUNG: ICD-10-CM

## 2018-09-11 DIAGNOSIS — E53.8 VITAMIN B12 DEFICIENCY: ICD-10-CM

## 2018-09-11 DIAGNOSIS — R13.19 ESOPHAGEAL DYSPHAGIA: ICD-10-CM

## 2018-09-11 DIAGNOSIS — T45.1X5A CHEMOTHERAPY-INDUCED THROMBOCYTOPENIA: ICD-10-CM

## 2018-09-11 DIAGNOSIS — D63.0 ANEMIA IN NEOPLASTIC DISEASE: ICD-10-CM

## 2018-09-11 DIAGNOSIS — C34.90 LUNG CANCER METASTATIC TO BONE: ICD-10-CM

## 2018-09-11 DIAGNOSIS — R13.10 ODYNOPHAGIA: ICD-10-CM

## 2018-09-11 DIAGNOSIS — D70.1 CHEMOTHERAPY INDUCED NEUTROPENIA: ICD-10-CM

## 2018-09-11 DIAGNOSIS — E86.0 DEHYDRATION: Primary | ICD-10-CM

## 2018-09-11 DIAGNOSIS — C79.51 LUNG CANCER METASTATIC TO BONE: ICD-10-CM

## 2018-09-11 PROCEDURE — 3074F SYST BP LT 130 MM HG: CPT | Mod: CPTII,,, | Performed by: INTERNAL MEDICINE

## 2018-09-11 PROCEDURE — 99499 UNLISTED E&M SERVICE: CPT | Mod: S$GLB,,, | Performed by: NURSE PRACTITIONER

## 2018-09-11 PROCEDURE — 77386 HC IMRT, COMPLEX: CPT | Performed by: RADIOLOGY

## 2018-09-11 PROCEDURE — 99215 OFFICE O/P EST HI 40 MIN: CPT | Mod: S$PBB,,, | Performed by: INTERNAL MEDICINE

## 2018-09-11 PROCEDURE — 96361 HYDRATE IV INFUSION ADD-ON: CPT

## 2018-09-11 PROCEDURE — 99999 PR PBB SHADOW E&M-EST. PATIENT-LVL IV: CPT | Mod: PBBFAC,,,

## 2018-09-11 PROCEDURE — 3008F BODY MASS INDEX DOCD: CPT | Mod: CPTII,,, | Performed by: INTERNAL MEDICINE

## 2018-09-11 PROCEDURE — 3078F DIAST BP <80 MM HG: CPT | Mod: CPTII,,, | Performed by: INTERNAL MEDICINE

## 2018-09-11 PROCEDURE — 96360 HYDRATION IV INFUSION INIT: CPT

## 2018-09-11 PROCEDURE — 99214 OFFICE O/P EST MOD 30 MIN: CPT | Mod: PBBFAC,25

## 2018-09-11 PROCEDURE — G6002 STEREOSCOPIC X-RAY GUIDANCE: HCPCS | Mod: 26,,, | Performed by: RADIOLOGY

## 2018-09-11 PROCEDURE — 25000003 PHARM REV CODE 250: Performed by: NURSE PRACTITIONER

## 2018-09-11 RX ORDER — OXYCODONE AND ACETAMINOPHEN 5; 325 MG/1; MG/1
1-2 TABLET ORAL EVERY 6 HOURS PRN
Qty: 120 TABLET | Refills: 0 | Status: SHIPPED | OUTPATIENT
Start: 2018-09-11 | End: 2018-10-17

## 2018-09-11 RX ADMIN — SODIUM CHLORIDE: 0.9 INJECTION, SOLUTION INTRAVENOUS at 04:09

## 2018-09-11 RX ADMIN — SODIUM CHLORIDE: 0.9 INJECTION, SOLUTION INTRAVENOUS at 05:09

## 2018-09-11 NOTE — PLAN OF CARE
Problem: Patient Care Overview  Goal: Plan of Care Review  Outcome: Ongoing (interventions implemented as appropriate)  Day 24 of XRT to the lung. Denies headache & N&V. Cough worse. Diarrhea yesterday. Pain to the esophagus and states he has not eaten in 4 days. Weight down 9 pounds this week. /70, , T 97.6, R18, O2 96% room air. Pt transported to urgent care via wheel chair and checked into labs.

## 2018-09-11 NOTE — PLAN OF CARE
Problem: Patient Care Overview  Goal: Plan of Care Review  Outcome: Ongoing (interventions implemented as appropriate)  Pt tolerated 2L IVF without complication. Instructed pt to notify MD of worsening symptoms. PIV removed. VSS; NAD. Discharging unassisted.

## 2018-09-11 NOTE — PROGRESS NOTES
PATIENT: Nimesh Nunn  MRN: [id number]  DATE: 9/12/2018      Diagnosis:   1. Dehydration    2. Anemia in neoplastic disease    3. Chemotherapy induced neutropenia    4. Chemotherapy-induced thrombocytopenia    5. Primary malignant neoplasm of left lung    6. Lung cancer metastatic to bone    7. Odynophagia    8. Esophageal dysphagia    9. Vitamin B12 deficiency        Chief Complaint: Follow-up      Oncologic History:      Oncologic History Non-small cell lung cancer, left diagnosed 06/2018  Metastatic disease to bone at presentation    Oncologic Treatment Stereotactic radiation to spinal lesion  Cisplatin/pemetrexed with concurrent radiation 08/08/2018    Pathology Poorly differentiated adenocarcinoma, EGFR wild type, No ALK, ROS-1 rearrangements, PD-L1 TPS 90%          Subjective:    Interval History: Mr. Nunn is a 62 y.o. male  with lung cancer being seen in urgent care. He was sent from Dr. Perea's office to receive IVF's. Patient reports feeling weak and not being able to eat for 4 days. He has difficulty swallowing due to pain. He has lost 8 lbs since he was here last. He has severe throat and chest pain when attempting to swallow food. He has no chest pain at this time.  He also reports diarrhea yesterday. No blood or black tarry stools noted. No hemoptysis. He continues to have SOB on exertion which is no worse than usual. No dizziness. No swelling. No palpitations. No exertional CP. No bleeding or easy bruising.   He does not feel he needs hospital admission.   He has not been taking Sauk solution and received a prescription today from Dr. Perea. He has not taken anything for the diarrhea.   He denies h/o CHF.     Of note, he went to the ED on 8/31/18 with chest pain 10/10. He was pain free after NTG. Pt refused guiac test for low H/H as well as admission to hospital for chest pain workup.  Pt choose to sign out AMA.     Of note,  he completed radiation to his spinal lesion and was also started on  concurrent chemotherapy and radiation on 08/08/2018 with cisplatin and pemetrexed.  He was hospitalized following this with possible GI bleeding and had an EGD which revealed gastric ulcers which were not bleeding and esophagitis.  His hemoglobin had remained stable.      His history dates to 06/2018 when he sought medical attention for chest pain.  A CT of the chest was performed showing a left lower lobe mass measuring 7.4 cm.  There were also enlarged left hilar lymph nodes. He underwent PET-CT which showed uptake in this mass and also in L1.  He underwent bronchoscopy and biopsy with pathology showing poorly differentiated adenocarcinoma.  Molecular studies showed EGFR wild-type and he did not harbor any ALK or ROS-1 rearrangements.  PD L1 tumor proportions core was 90%.  He underwent radiation to the spinal lesion in 08/2018 and started on concurrent chemo and radiation with cisplatin and pemetrexed in 08/2018.    Past Medical History:   Past Medical History:   Diagnosis Date    Abdominal aortic aneurysm (AAA) without rupture 3/5/2018    Arthritis     Blood transfusion     COPD (chronic obstructive pulmonary disease)     Coronary artery disease     Dehydration 8/15/2018    Encounter for blood transfusion     Hyperlipidemia 11/7/2013    Hypertension     Lung cancer metastatic to bone 7/18/2018    Occlusion and stenosis of carotid artery without mention of cerebral infarction 1/12/2014    Primary malignant neoplasm of left lung 7/18/2018    Screening for colon cancer 6/1/2015    Syncope 1/13/2014       Past Surgical HIstory:   Past Surgical History:   Procedure Laterality Date    ARCH & 4 VESSEL STUDY Bilateral 5/27/2014    Performed by [facility] Martin [facility] at [facility] CATH LAB    BACK SURGERY      BRONCHOSCOPY,FIBEROPTIC N/A 6/26/2018    Performed by Liana Serrano MD at [facility] OR 2ND FLR    carotid endarectomy      CATHETERIZATION, HEART, LEFT Left 11/8/2013    Performed by [facility]hi Martin MD at  [facility] CATH LAB    COLONOSCOPY N/A 6/1/2015    Per[facility] by [facility] at [facility] ENDO    CORONARY ANGIOPLASTY      CORONARY ARTERY BYPASS GRAFT      ENDOBRONCHIAL ULTRASOUND N/A 6/26/2018    Procedure: ULTRASOUND, ENDOBRONCHIAL;  Surgeon: Liana Serrano MD;  Location: [facility] OR [address] [place] [address];  Service: Cardiothoracic;  Laterality: N/A;    ESOPHAGOGASTRODUODENOSCOPY N/A 8/16/2018    Procedure: ESOPHAGOGASTRODUODENOSCOPY (EGD);  Surgeon: Yohannes Osuna MD;  Location: [facility] (2ND FLR);  Service: Endoscopy;  Laterality: N/A;    ESOPHAGOGASTRODUODENOSCOPY (EGD) N/A 8/16/2018    Performed by Y[facility]annes [facility]una MD at [facility] (2ND FLR)    HEART CATH-LEFT N/A 5/27/2014    Performed by [facility]hi Martin MD at [facility] CATH LAB    INSERTION, STENT, CORONARY ARTERY N/A 12/20/2013    Performed by Mekhi Martin MD at [facility] CATH LAB    PTA, PERIPHERAL VESSEL Left 3/11/2014    Performed by Mekhi Martin MD at [facility] CATH LAB    PTA, PERIPHERAL VESSEL N/A 2/18/2014    Performed by Mekhi Martin MD at [facility] CATH LAB    SKIN BIOPSY      ULTRASOUND, ENDOBRONCHIAL N/A 6/26/2018    [facility]       Family History:   Family History   Problem Relation Age of Onset    Heart disease Father     Hypertension Father     Heart attack Father     Heart failure Father     Cancer Mother     Breast cancer Mother     No Known Problems Sister     No Known Problems Brother     No Known Problems Maternal Grandmother     No Known Problems Maternal Grandfather     No Known Problems Paternal Grandmother     No Known Problems Paternal Grandfather     No Known Problems Maternal Aunt     No Known Problems Maternal Uncle     No Known Problems Paternal Aunt     No Known Problems Paternal Uncle     Anemia Neg Hx     Arrhythmia Neg Hx     Asthma Neg Hx     Clotting disorder Neg Hx     Fainting Neg Hx     Hyperlipidemia Neg Hx        Social History:  reports that he quit smoking about 6  years ago. He has a 60.00 pack-year smoking history. he has never used smokeless tobacco. He reports that he drinks about 1.8 oz of alcohol per week. He reports that he uses drugs. Drug: Marijuana.    Allergies:  Review of patient's allergies indicates:  No Known Allergies    Medications:  Current Outpatient Medications   Medication Sig Dispense Refill    aspirin (ECOTRIN) 81 MG EC tablet Take 81 mg by mouth once daily.      atorvastatin (LIPITOR) 80 MG tablet TAKE 1 TABLET BY MOUTH EVERY DAY 30 tablet 11    cilostazol (PLETAL) 100 MG Tab TAKE 1 TABLET BY MOUTH TWICE DAILY 60 tablet 11    clopidogrel (PLAVIX) 75 mg tablet TAKE 1 TABLET BY MOUTH EVERY DAY 90 tablet 4    dexamethasone (DECADRON) 4 MG Tab Take 1 tablet (4 mg total) by mouth every 12 (twelve) hours. Take 1 tab twice daily starting the day prior to chemotherapy for 3 days. 24 tablet 1    finasteride (PROSCAR) 5 mg tablet Take 1 tablet (5 mg total) by mouth once daily. 90 tablet 3    folic acid (FOLVITE) 400 MCG tablet Take 1 tablet (400 mcg total) by mouth once daily. 100 tablet 3    irbesartan (AVAPRO) 75 MG tablet Take 1 tablet (75 mg total) by mouth once daily. 90 tablet 3    isosorbide mononitrate (IMDUR) 60 MG 24 hr tablet Take 1 tablet (60 mg total) by mouth once daily. 90 tablet 3    metoprolol tartrate (LOPRESSOR) 50 MG tablet Take 50 mg by mouth 2 (two) times daily.      nitroGLYCERIN (NITROSTAT) 0.4 MG SL tablet Place 1 tablet (0.4 mg total) under the tongue every 5 (five) minutes as needed for Chest pain. 90 tablet 1    ondansetron (ZOFRAN-ODT) 8 MG TbDL Take 1 tablet (8 mg total) by mouth every 12 (twelve) hours as needed. 30 tablet 1    oxyCODONE-acetaminophen (PERCOCET) 5-325 mg per tablet Take 1-2 tablets by mouth every 6 (six) hours as needed for Pain. 120 tablet 0    pantoprazole (PROTONIX) 40 MG tablet Take 1 tablet (40 mg total) by mouth 2 (two) times daily. 60 tablet 0    PROAIR HFA 90 mcg/actuation inhaler Inhale 2  puffs into the lungs as needed. 18 g 6    umeclidinium-vilanterol (ANORO ELLIPTA) 62.5-25 mcg/actuation DsDv Inhale 1 puff into the lungs once daily. Controller 1 each 5    magic mouthwash diphen/antac/lidoc/nysta Take 2 teaspoonful (10 ml) by mouth 5 minutes before meals and every night at bedtime 480 mL 1     No current facility-administered medications for this visit.        Review of Systems   Constitutional: Positive for appetite change, fatigue and unexpected weight change. Negative for chills and fever.   HENT: Negative for dental problem, sinus pressure and sneezing.    Eyes: Negative for visual disturbance.   Respiratory: Positive for cough (no worse than usual) and shortness of breath (mild and no worse than usual. ). Negative for choking and chest tightness.    Cardiovascular: Positive for chest pain (with swallowing food/liquids). Negative for leg swelling.   Gastrointestinal: Positive for diarrhea. Negative for abdominal pain, blood in stool, constipation and nausea.   Genitourinary: Negative for difficulty urinating, dysuria and frequency.   Musculoskeletal: Positive for back pain. Negative for arthralgias.   Skin: Negative for rash and wound.   Neurological: Positive for weakness. Negative for dizziness, light-headedness and headaches.   Hematological: Negative for adenopathy. Does not bruise/bleed easily.   Psychiatric/Behavioral: Negative for sleep disturbance. The patient is nervous/anxious.        ECOG Performance Status:   ECOG SCORE    1 - Restricted in strenuous activity-ambulatory and able to carry out work of a light nature         Objective:      Vitals:   Vitals:    09/11/18 1543   BP: 118/68   BP Location: Right arm   Patient Position: Sitting   Pulse: 96   Resp: 20   Temp: 97.5 °F (36.4 °C)   TempSrc: Oral   SpO2: 97%   Weight: 78.1 kg (172 lb 2.9 oz)   Height: 6' (1.829 m)     BMI: Body mass index is 23.35 kg/m².    Physical Exam   Constitutional: He is oriented to person, place, and  time. He appears well-developed.   presents alone in    HENT:   Head: Normocephalic and atraumatic.   Eyes: Pupils are equal, round, and reactive to light.   Neck: Normal range of motion. Neck supple.   Cardiovascular: Normal rate and regular rhythm.   Pulmonary/Chest: Effort normal. No respiratory distress. He has no wheezes.   Abdominal: Soft. He exhibits no distension. There is no tenderness.   Musculoskeletal: He exhibits no edema or tenderness.   Lymphadenopathy:     He has no cervical adenopathy.   Neurological: He is alert and oriented to person, place, and time. No cranial nerve deficit or sensory deficit. He exhibits normal muscle tone. Coordination normal.   Skin: Skin is warm and dry. There is pallor.   Poor turgor   Psychiatric: He has a normal mood and affect. His behavior is normal.       Laboratory Data:  Lab Visit on 09/11/2018   Component Date Value Ref Range Status    WBC 09/11/2018 2.38* 3.90 - 12.70 K/uL Final    RBC 09/11/2018 2.81* 4.60 - 6.20 M/uL Final    Hemoglobin 09/11/2018 8.7* 14.0 - 18.0 g/dL Final    Hematocrit 09/11/2018 26.2* 40.0 - 54.0 % Final    MCV 09/11/2018 93  82 - 98 fL Final    MCH 09/11/2018 31.0  27.0 - 31.0 pg Final    MCHC 09/11/2018 33.2  32.0 - 36.0 g/dL Final    RDW 09/11/2018 14.1  11.5 - 14.5 % Final    Platelets 09/11/2018 41* 150 - 350 K/uL Final    MPV 09/11/2018 9.8  9.2 - 12.9 fL Final    Gran # (ANC) 09/11/2018 1.7* 1.8 - 7.7 K/uL Final    Comment: The ANC is based on a white cell differential from an   automated cell counter. It has not been microscopically   reviewed for the presence of abnormal cells. Clinical   correlation is required.      Immature Grans (Abs) 09/11/2018 0.01  0.00 - 0.04 K/uL Final    Comment: Mild elevation in immature granulocytes is non specific and   can be seen in a variety of conditions including stress response,   acute inflammation, trauma and pregnancy. Correlation with other   laboratory and clinical findings is  essential.      Magnesium 09/11/2018 1.5* 1.6 - 2.6 mg/dL Final    Sodium 09/11/2018 133* 136 - 145 mmol/L Final    Potassium 09/11/2018 3.8  3.5 - 5.1 mmol/L Final    Chloride 09/11/2018 93* 95 - 110 mmol/L Final    CO2 09/11/2018 29  23 - 29 mmol/L Final    Glucose 09/11/2018 96  70 - 110 mg/dL Final    BUN, Bld 09/11/2018 20  8 - 23 mg/dL Final    Creatinine 09/11/2018 1.1  0.5 - 1.4 mg/dL Final    Calcium 09/11/2018 8.8  8.7 - 10.5 mg/dL Final    Total Protein 09/11/2018 6.8  6.0 - 8.4 g/dL Final    Albumin 09/11/2018 3.0* 3.5 - 5.2 g/dL Final    Total Bilirubin 09/11/2018 0.6  0.1 - 1.0 mg/dL Final    Comment: For infants and newborns, interpretation of results should be based  on gestational age, weight and in agreement with clinical  observations.  Premature Infant recommended reference ranges:  Up to 24 hours.............<8.0 mg/dL  Up to 48 hours............<12.0 mg/dL  3-5 days..................<15.0 mg/dL  6-29 days.................<15.0 mg/dL      Alkaline Phosphatase 09/11/2018 119  55 - 135 U/L Final    AST 09/11/2018 18  10 - 40 U/L Final    ALT 09/11/2018 18  10 - 44 U/L Final    Anion Gap 09/11/2018 11  8 - 16 mmol/L Final    eGFR if African American 09/11/2018 >60.0  >60 mL/min/1.73 m^2 Final    eGFR if non African American 09/11/2018 >60.0  >60 mL/min/1.73 m^2 Final    Comment: Calculation used to obtain the estimated glomerular filtration  rate (eGFR) is the CKD-EPI equation.            Imaging:   PET-CT 06/15/2018  EXAMINATION:  NM PET CT ROUTINE    CLINICAL HISTORY:  Lung Mass;  Other nonspecific abnormal finding of lung field    FINDINGS:  Patient was administered 13.05 millicuries of FDG intravenously.    There is a mid posteromedial left lung mass SUV max 13.76.    There is a left hilar metastatic lymph node SUV max 3.26.    There is L1 vertebral metastasis SUV max 3.96.    There is a right upper lobe lung nodule low-grade possibly metastatic as well.    There is  physiologic intracranial, head, and neck activity.  Heart mediastinum show nothing unusual.  There are coronary calcifications.  There is physiologic liver, spleen, GI and  activity.  There is a small gallstone in the gallbladder.  There is aortic plaque and DJD.  The adrenal glands are normal.  There is diverticulosis.   Impression       See above    Left lung malignancy with metastatic disease involving the left hilum, L1 vertebral body, and possibly right upper lobe lung metastasis.    Index lesions:    Primary lung mass SUV max 13.76.    Left hilar lymph node SUV max 3.26.    L1 vertebral body SUV max 3.96.            Assessment:       1. Dehydration    2. Anemia in neoplastic disease    3. Chemotherapy induced neutropenia    4. Chemotherapy-induced thrombocytopenia    5. Primary malignant neoplasm of left lung    6. Lung cancer metastatic to bone    7. Odynophagia    8. Esophageal dysphagia    9. Vitamin B12 deficiency           Plan:       --Patient currently receiving chemo and XRT presents to urgent care with dehydration. He is pancytopenic as well which is likely related to chemotherapy. Plt 41,000.  No active bleeding noted. --His last B12 inject was 8 weeks ago. He is due next week. He is taking Folic acid as prescribed.   --Plan for 2 liters NS IV today over 2 hours.   --Dukes solution for radiation esophagitis. Continue Protonix.   --Imodium for diarrhea.   --Hold blood thinners until platelets recover.   --RTC in 2-3  days to urgent care with a CBC, CMP, Mg, vit B12 level and possible IV hydration. He may need a B12 injection on this day if pancytopenia not improved.   --Keep follow up with Dr. Infante next week as scheduled.   --Patient understands to go to ED for any chest pain, bleeding and/or any other new symptoms or worsening of his symptoms.        Patient is in agreement with the proposed treatment plan. All questions were answered to the patient's satisfaction. Pt knows to call clinic if  anything is needed before the next clinic visit. Discussed case with Dr. Infante who agrees with above.       Stephani Sims, GARDENIAP-C  Hematology & Oncology  [facility] [place] [address]  ph. [phone]  Fax. [phone]     60 minutes were spent in coordination of patient's care, record review and counseling. More than 50% of the time was face-to-face.        Distress Screening Results: Psychosocial Distress screening score of Distress Score: 8 noted and reviewed. No intervention indicated.

## 2018-09-12 ENCOUNTER — TELEPHONE (OUTPATIENT)
Dept: HEMATOLOGY/ONCOLOGY | Facility: CLINIC | Age: 63
End: 2018-09-12

## 2018-09-12 PROCEDURE — 77386 HC IMRT, COMPLEX: CPT | Performed by: RADIOLOGY

## 2018-09-12 PROCEDURE — G6002 STEREOSCOPIC X-RAY GUIDANCE: HCPCS | Mod: 26,,, | Performed by: RADIOLOGY

## 2018-09-12 NOTE — TELEPHONE ENCOUNTER
----- Message from Stephani Sims NP sent at 9/11/2018  4:31 PM CDT -----  RTC in 2-3  days to urgent care with a CBC, CMP, Mg, vit B12 level and possible IV hydration.

## 2018-09-13 PROCEDURE — 77386 HC IMRT, COMPLEX: CPT | Performed by: RADIOLOGY

## 2018-09-13 PROCEDURE — 77336 RADIATION PHYSICS CONSULT: CPT | Performed by: RADIOLOGY

## 2018-09-13 PROCEDURE — 77417 THER RADIOLOGY PORT IMAGE(S): CPT | Performed by: RADIOLOGY

## 2018-09-13 PROCEDURE — G6002 STEREOSCOPIC X-RAY GUIDANCE: HCPCS | Mod: 26,,, | Performed by: RADIOLOGY

## 2018-09-14 ENCOUNTER — OFFICE VISIT (OUTPATIENT)
Dept: HEMATOLOGY/ONCOLOGY | Facility: CLINIC | Age: 63
End: 2018-09-14
Payer: MEDICARE

## 2018-09-14 VITALS
BODY MASS INDEX: 24.07 KG/M2 | OXYGEN SATURATION: 96 % | HEART RATE: 79 BPM | WEIGHT: 177.69 LBS | TEMPERATURE: 98 F | HEIGHT: 72 IN | DIASTOLIC BLOOD PRESSURE: 60 MMHG | RESPIRATION RATE: 20 BRPM | SYSTOLIC BLOOD PRESSURE: 118 MMHG

## 2018-09-14 DIAGNOSIS — T45.1X5A ANTINEOPLASTIC CHEMOTHERAPY INDUCED PANCYTOPENIA: ICD-10-CM

## 2018-09-14 DIAGNOSIS — D61.810 ANTINEOPLASTIC CHEMOTHERAPY INDUCED PANCYTOPENIA: ICD-10-CM

## 2018-09-14 DIAGNOSIS — C79.51 LUNG CANCER METASTATIC TO BONE: ICD-10-CM

## 2018-09-14 DIAGNOSIS — C34.92 ADENOCARCINOMA OF LEFT LUNG, STAGE 2: Primary | ICD-10-CM

## 2018-09-14 DIAGNOSIS — C34.90 LUNG CANCER METASTATIC TO BONE: ICD-10-CM

## 2018-09-14 DIAGNOSIS — E86.0 DEHYDRATION: ICD-10-CM

## 2018-09-14 PROCEDURE — 99499 UNLISTED E&M SERVICE: CPT | Mod: S$GLB,,, | Performed by: NURSE PRACTITIONER

## 2018-09-14 PROCEDURE — G6002 STEREOSCOPIC X-RAY GUIDANCE: HCPCS | Mod: 26,,, | Performed by: RADIOLOGY

## 2018-09-14 PROCEDURE — 3074F SYST BP LT 130 MM HG: CPT | Mod: CPTII,,, | Performed by: INTERNAL MEDICINE

## 2018-09-14 PROCEDURE — 3078F DIAST BP <80 MM HG: CPT | Mod: CPTII,,, | Performed by: INTERNAL MEDICINE

## 2018-09-14 PROCEDURE — 77386 HC IMRT, COMPLEX: CPT | Performed by: RADIOLOGY

## 2018-09-14 PROCEDURE — 3008F BODY MASS INDEX DOCD: CPT | Mod: CPTII,,, | Performed by: INTERNAL MEDICINE

## 2018-09-14 PROCEDURE — 99214 OFFICE O/P EST MOD 30 MIN: CPT | Mod: S$PBB,,, | Performed by: INTERNAL MEDICINE

## 2018-09-14 PROCEDURE — 99999 PR PBB SHADOW E&M-EST. PATIENT-LVL IV: CPT | Mod: PBBFAC,,,

## 2018-09-14 PROCEDURE — 99214 OFFICE O/P EST MOD 30 MIN: CPT | Mod: PBBFAC

## 2018-09-14 NOTE — PROGRESS NOTES
PATIENT: Nimesh Nunn  MRN: [id number]  DATE: 9/14/2018      Diagnosis:   1. Adenocarcinoma of left lung, stage 2    2. Lung cancer metastatic to bone    3. Antineoplastic chemotherapy induced pancytopenia    4. Dehydration        Chief Complaint: Follow-up      Oncologic History:      Oncologic History Non-small cell lung cancer, left diagnosed 06/2018  Metastatic disease to bone at presentation    Oncologic Treatment Stereotactic radiation to spinal lesion  Cisplatin/pemetrexed with concurrent radiation 08/08/2018    Pathology Poorly differentiated adenocarcinoma, EGFR wild type, No ALK, ROS-1 rearrangements, PD-L1 TPS 90%          Subjective:    Interval History: Mr. Nunn is a 62 y.o. male with lung cancer being seen in urgent care today for follow up. He presented to urgent care on 9/11/18, at the request of Dr. Perea, with dehydration, esophageal dysphagia, and anemia. His plts were noted to be 41,000 as well. He received supportive care to include IV hydration. See progress note for full plan.     Today, he feels good. He states that he is feeling better. No longer feels weak. He is able to eat regular diet now since he has been using Duke's solution and is now taking Percocet as prescribed by Dr. Perea. He is requiring 4 tablets daily with good relief. His appetite is good and he gained 5 lbs since he was here last. He is breathing well. Mild SOB on exertion which is no worse than usual. He is able to walk in today without difficulty.   He denies fever, chills, nausea, vomiting, diarrhea, constipation.  No blood or black tarry stools noted. No hemoptysis.  No dizziness. No swelling. No CP/palpitations. No exertional CP. No bleeding or easy bruising. He has easy bruising but reports that it does not seem any worse. He is on blood thinners and did not stop them as instructed last visit.     Of note, his last treatment was 8/31/18 with Alimta/Cisplatin and it was delayed a couple of days due to  dehydration. Later that night, he presented to the ED on 8/31/18 with chest pain 10/10. He was pain free after NTG. Pt refused guiac test for low H/H as well as admission to hospital for chest pain workup.  Pt choose to sign out AMA.     Of note,  he completed radiation to his spinal lesion and was also started on concurrent chemotherapy and radiation on 08/08/2018 with cisplatin and pemetrexed.  He was hospitalized following this with possible GI bleeding and had an EGD which revealed gastric ulcers which were not bleeding and esophagitis.  His hemoglobin had remained stable.      His history dates to 06/2018 when he sought medical attention for chest pain.  A CT of the chest was performed showing a left lower lobe mass measuring 7.4 cm.  There were also enlarged left hilar lymph nodes. He underwent PET-CT which showed uptake in this mass and also in L1.  He underwent bronchoscopy and biopsy with pathology showing poorly differentiated adenocarcinoma.  Molecular studies showed EGFR wild-type and he did not harbor any ALK or ROS-1 rearrangements.  PD L1 tumor proportions core was 90%.  He underwent radiation to the spinal lesion in 08/2018 and started on concurrent chemo and radiation with cisplatin and pemetrexed in 08/2018.    Past Medical History:   Past Medical History:   Diagnosis Date    Abdominal aortic aneurysm (AAA) without rupture 3/5/2018    Arthritis     Blood transfusion     COPD (chronic obstructive pulmonary disease)     Coronary artery disease     Dehydration 8/15/2018    Encounter for blood transfusion     Hyperlipidemia 11/7/2013    Hypertension     Lung cancer metastatic to bone 7/18/2018    Occlusion and stenosis of carotid artery without mention of cerebral infarction 1/12/2014    Primary malignant neoplasm of left lung 7/18/2018    Screening for colon cancer 6/1/2015    Syncope 1/13/2014       Past Surgical HIstory:   Past Surgical History:   Procedure Laterality Date    ARCH & 4  VESSEL STUDY Bilateral 5/27/2014    Performed by [facility]hi Martin [facility] at [facility] CATH LAB    BACK SURGERY      BRONCHOSCOPY,FIBEROPTIC N/A 6/26/2018    [facility]    carotid endarectomy      CATHETERIZATION, HEART, LEFT Left 11/8/2013    Performed by Mekhi Martin MD at [facility] CATH LAB    COLONOSCOPY N/A 6/1/2015    Performed by [facility]derrama [facility] at [facility] ENDO    CORONARY ANGIOPLASTY      CORONARY ARTERY BYPASS GRAFT      ENDOBRONCHIAL ULTRASOUND N/A 6/26/2018    Procedure: ULTRASOUND, ENDOBRONCHIAL;  Surgeon: Liana Serrano MD;  [facility];  Service: Cardiothoracic;  Laterality: N/A;    ESOPHAGOGASTRODUODENOSCOPY N/A 8/16/2018    Procedure: ESOPHAGOGASTRODUODENOSCOPY (EGD);  Surgeon: Yohannes Osuna MD;  Location: [facility] (2ND FLR);  Service: Endoscopy;  Laterality: N/A;    ESOPHAGOGASTRODUODENOSCOPY (EGD) N/A 8/16/2018    Performed by [facility]es [facility]una [facility] at [facility] (2ND FLR)    HEART CATH-LEFT N/A 5/27/2014    Performed by Mekhi Martin MD at [facility] CATH LAB    INSERTION, STENT, CORONARY ARTERY N/A 12/20/2013    Performed by Mekhi Martin MD at [facility] CATH LAB    PTA, PERIPHERAL VESSEL Left 3/11/2014    Performed by Mekhi Martin MD at [facility] CATH LAB    PTA, PERIPHERAL VESSEL N/A 2/18/2014    Performed by Mekhi Martin MD at [facility] CATH LAB    SKIN BIOPSY      ULTRASOUND, ENDOBRONCHIAL N/A 6/26/2018    [facility]       Family History:   Family History   Problem Relation Age of Onset    Heart disease Father     Hypertension Father     Heart attack Father     Heart failure Father     Cancer Mother     Breast cancer Mother     No Known Problems Sister     No Known Problems Brother     No Known Problems Maternal Grandmother     No Known Problems Maternal Grandfather     No Known Problems Paternal Grandmother     No Known Problems Paternal Grandfather     No Known Problems Maternal Aunt      No Known Problems Maternal Uncle     No Known Problems Paternal Aunt     No Known Problems Paternal Uncle     Anemia Neg Hx     Arrhythmia Neg Hx     Asthma Neg Hx     Clotting disorder Neg Hx     Fainting Neg Hx     Hyperlipidemia Neg Hx        Social History:  reports that he quit smoking about 6 years ago. He has a 60.00 pack-year smoking history. he has never used smokeless tobacco. He reports that he drinks about 1.8 oz of alcohol per week. He reports that he uses drugs. Drug: Marijuana.    Allergies:  Review of patient's allergies indicates:  No Known Allergies    Medications:  Current Outpatient Medications   Medication Sig Dispense Refill    aspirin (ECOTRIN) 81 MG EC tablet Take 81 mg by mouth once daily.      atorvastatin (LIPITOR) 80 MG tablet TAKE 1 TABLET BY MOUTH EVERY DAY 30 tablet 11    cilostazol (PLETAL) 100 MG Tab TAKE 1 TABLET BY MOUTH TWICE DAILY 60 tablet 11    clopidogrel (PLAVIX) 75 mg tablet TAKE 1 TABLET BY MOUTH EVERY DAY 90 tablet 4    dexamethasone (DECADRON) 4 MG Tab Take 1 tablet (4 mg total) by mouth every 12 (twelve) hours. Take 1 tab twice daily starting the day prior to chemotherapy for 3 days. 24 tablet 1    finasteride (PROSCAR) 5 mg tablet Take 1 tablet (5 mg total) by mouth once daily. 90 tablet 3    folic acid (FOLVITE) 400 MCG tablet Take 1 tablet (400 mcg total) by mouth once daily. 100 tablet 3    irbesartan (AVAPRO) 75 MG tablet Take 1 tablet (75 mg total) by mouth once daily. 90 tablet 3    isosorbide mononitrate (IMDUR) 60 MG 24 hr tablet Take 1 tablet (60 mg total) by mouth once daily. 90 tablet 3    magic mouthwash diphen/antac/lidoc/nysta Take 2 teaspoonful (10 ml) by mouth 5 minutes before meals and every night at bedtime 480 mL 1    metoprolol tartrate (LOPRESSOR) 50 MG tablet Take 50 mg by mouth 2 (two) times daily.      nitroGLYCERIN (NITROSTAT) 0.4 MG SL tablet Place 1 tablet (0.4 mg total) under the tongue every 5 (five) minutes as needed  for Chest pain. 90 tablet 1    ondansetron (ZOFRAN-ODT) 8 MG TbDL Take 1 tablet (8 mg total) by mouth every 12 (twelve) hours as needed. 30 tablet 1    oxyCODONE-acetaminophen (PERCOCET) 5-325 mg per tablet Take 1-2 tablets by mouth every 6 (six) hours as needed for Pain. 120 tablet 0    pantoprazole (PROTONIX) 40 MG tablet Take 1 tablet (40 mg total) by mouth 2 (two) times daily. 60 tablet 0    PROAIR HFA 90 mcg/actuation inhaler Inhale 2 puffs into the lungs as needed. 18 g 6    umeclidinium-vilanterol (ANORO ELLIPTA) 62.5-25 mcg/actuation DsDv Inhale 1 puff into the lungs once daily. Controller 1 each 5     No current facility-administered medications for this visit.        Review of Systems   Constitutional: Positive for appetite change (improved), fatigue (improved) and unexpected weight change (gained 5 lbs.). Negative for chills and fever.   HENT: Negative for dental problem, sinus pressure and sneezing.    Eyes: Negative for visual disturbance.   Respiratory: Positive for cough (no worse than usual) and shortness of breath (mild and no worse than usual. ). Negative for choking and chest tightness.    Cardiovascular: Positive for chest pain (with swallowing food/liquids but significantly improved with dukes and percocet). Negative for leg swelling.   Gastrointestinal: Negative for abdominal pain, blood in stool, constipation and nausea.   Genitourinary: Negative for difficulty urinating, dysuria and frequency.   Musculoskeletal: Negative for arthralgias and back pain.   Skin: Negative for rash and wound.   Neurological: Negative for dizziness, weakness, light-headedness and headaches.   Hematological: Negative for adenopathy. Does not bruise/bleed easily.   Psychiatric/Behavioral: Negative for sleep disturbance. The patient is not nervous/anxious.        ECOG Performance Status:   ECOG SCORE    1 - Restricted in strenuous activity-ambulatory and able to carry out work of a light nature         Objective:       Vitals:   Vitals:    09/14/18 1301 09/14/18 1303   BP: (!) 91/55 118/60   BP Location: Left arm    Patient Position: Sitting    Pulse: 79    Resp: 20    Temp: 97.8 °F (36.6 °C)    TempSrc: Oral    SpO2: 96%    Weight: 80.6 kg (177 lb 11.1 oz)    Height: 6' (1.829 m)      BMI: Body mass index is 24.1 kg/m².    Physical Exam   Constitutional: He is oriented to person, place, and time. He appears well-developed.   Appears well. Presents alone. Walked into clinic without difficulty.    HENT:   Head: Normocephalic and atraumatic.   Eyes: Pupils are equal, round, and reactive to light.   Neck: Normal range of motion. Neck supple.   Cardiovascular: Normal rate and regular rhythm.   No murmur heard.  Pulmonary/Chest: Effort normal. No respiratory distress. He has no wheezes.   Abdominal: Soft. He exhibits no distension. There is no tenderness.   Musculoskeletal: Normal range of motion. He exhibits no edema or tenderness.   Lymphadenopathy:     He has no cervical adenopathy.   Neurological: He is alert and oriented to person, place, and time. No cranial nerve deficit or sensory deficit. He exhibits normal muscle tone. Coordination normal.   Skin: Skin is warm and dry. There is pallor.   Small Flat bruise to arm   Psychiatric: He has a normal mood and affect. His behavior is normal.   Nursing note and vitals reviewed.      Laboratory Data:  Lab Visit on 09/14/2018   Component Date Value Ref Range Status    WBC 09/14/2018 2.60* 3.90 - 12.70 K/uL Final    RBC 09/14/2018 2.26* 4.60 - 6.20 M/uL Final    Hemoglobin 09/14/2018 7.2* 14.0 - 18.0 g/dL Final    Hematocrit 09/14/2018 21.1* 40.0 - 54.0 % Final    MCV 09/14/2018 93  82 - 98 fL Final    MCH 09/14/2018 31.9* 27.0 - 31.0 pg Final    MCHC 09/14/2018 34.1  32.0 - 36.0 g/dL Final    RDW 09/14/2018 14.6* 11.5 - 14.5 % Final    Platelets 09/14/2018 60* 150 - 350 K/uL Final    MPV 09/14/2018 9.8  9.2 - 12.9 fL Final    Gran # (ANC) 09/14/2018 1.3* 1.8 - 7.7 K/uL  Final    Comment: The ANC is based on a white cell differential from an   automated cell counter. It has not been microscopically   reviewed for the presence of abnormal cells. Clinical   correlation is required.      Immature Grans (Abs) 09/14/2018 0.03  0.00 - 0.04 K/uL Final    Comment: Mild elevation in immature granulocytes is non specific and   can be seen in a variety of conditions including stress response,   acute inflammation, trauma and pregnancy. Correlation with other   laboratory and clinical findings is essential.      Sodium 09/14/2018 134* 136 - 145 mmol/L Final    Potassium 09/14/2018 3.8  3.5 - 5.1 mmol/L Final    Chloride 09/14/2018 99  95 - 110 mmol/L Final    CO2 09/14/2018 26  23 - 29 mmol/L Final    Glucose 09/14/2018 94  70 - 110 mg/dL Final    BUN, Bld 09/14/2018 19  8 - 23 mg/dL Final    Creatinine 09/14/2018 1.3  0.5 - 1.4 mg/dL Final    Calcium 09/14/2018 8.9  8.7 - 10.5 mg/dL Final    Total Protein 09/14/2018 6.2  6.0 - 8.4 g/dL Final    Albumin 09/14/2018 2.8* 3.5 - 5.2 g/dL Final    Total Bilirubin 09/14/2018 0.4  0.1 - 1.0 mg/dL Final    Comment: For infants and newborns, interpretation of results should be based  on gestational age, weight and in agreement with clinical  observations.  Premature Infant recommended reference ranges:  Up to 24 hours.............<8.0 mg/dL  Up to 48 hours............<12.0 mg/dL  3-5 days..................<15.0 mg/dL  6-29 days.................<15.0 mg/dL      Alkaline Phosphatase 09/14/2018 111  55 - 135 U/L Final    AST 09/14/2018 16  10 - 40 U/L Final    ALT 09/14/2018 13  10 - 44 U/L Final    Anion Gap 09/14/2018 9  8 - 16 mmol/L Final    eGFR if African American 09/14/2018 >60.0  >60 mL/min/1.73 m^2 Final    eGFR if non African American 09/14/2018 57.7* >60 mL/min/1.73 m^2 Final    Comment: Calculation used to obtain the estimated glomerular filtration  rate (eGFR) is the CKD-EPI equation.       Magnesium 09/14/2018 1.6  1.6 -  2.6 mg/dL Final    Vitamin B-12 09/14/2018 818  210 - 950 pg/mL Final   Lab Visit on 09/11/2018   Component Date Value Ref Range Status    WBC 09/11/2018 2.38* 3.90 - 12.70 K/uL Final    RBC 09/11/2018 2.81* 4.60 - 6.20 M/uL Final    Hemoglobin 09/11/2018 8.7* 14.0 - 18.0 g/dL Final    Hematocrit 09/11/2018 26.2* 40.0 - 54.0 % Final    MCV 09/11/2018 93  82 - 98 fL Final    MCH 09/11/2018 31.0  27.0 - 31.0 pg Final    MCHC 09/11/2018 33.2  32.0 - 36.0 g/dL Final    RDW 09/11/2018 14.1  11.5 - 14.5 % Final    Platelets 09/11/2018 41* 150 - 350 K/uL Final    MPV 09/11/2018 9.8  9.2 - 12.9 fL Final    Gran # (ANC) 09/11/2018 1.7* 1.8 - 7.7 K/uL Final    Comment: The ANC is based on a white cell differential from an   automated cell counter. It has not been microscopically   reviewed for the presence of abnormal cells. Clinical   correlation is required.      Immature Grans (Abs) 09/11/2018 0.01  0.00 - 0.04 K/uL Final    Comment: Mild elevation in immature granulocytes is non specific and   can be seen in a variety of conditions including stress response,   acute inflammation, trauma and pregnancy. Correlation with other   laboratory and clinical findings is essential.      Magnesium 09/11/2018 1.5* 1.6 - 2.6 mg/dL Final    Sodium 09/11/2018 133* 136 - 145 mmol/L Final    Potassium 09/11/2018 3.8  3.5 - 5.1 mmol/L Final    Chloride 09/11/2018 93* 95 - 110 mmol/L Final    CO2 09/11/2018 29  23 - 29 mmol/L Final    Glucose 09/11/2018 96  70 - 110 mg/dL Final    BUN, Bld 09/11/2018 20  8 - 23 mg/dL Final    Creatinine 09/11/2018 1.1  0.5 - 1.4 mg/dL Final    Calcium 09/11/2018 8.8  8.7 - 10.5 mg/dL Final    Total Protein 09/11/2018 6.8  6.0 - 8.4 g/dL Final    Albumin 09/11/2018 3.0* 3.5 - 5.2 g/dL Final    Total Bilirubin 09/11/2018 0.6  0.1 - 1.0 mg/dL Final    Comment: For infants and newborns, interpretation of results should be based  on gestational age, weight and in agreement with  clinical  observations.  Premature Infant recommended reference ranges:  Up to 24 hours.............<8.0 mg/dL  Up to 48 hours............<12.0 mg/dL  3-5 days..................<15.0 mg/dL  6-29 days.................<15.0 mg/dL      Alkaline Phosphatase 09/11/2018 119  55 - 135 U/L Final    AST 09/11/2018 18  10 - 40 U/L Final    ALT 09/11/2018 18  10 - 44 U/L Final    Anion Gap 09/11/2018 11  8 - 16 mmol/L Final    eGFR if African American 09/11/2018 >60.0  >60 mL/min/1.73 m^2 Final    eGFR if non African American 09/11/2018 >60.0  >60 mL/min/1.73 m^2 Final    Comment: Calculation used to obtain the estimated glomerular filtration  rate (eGFR) is the CKD-EPI equation.            Imaging:   PET-CT 06/15/2018  EXAMINATION:  NM PET CT ROUTINE    CLINICAL HISTORY:  Lung Mass;  Other nonspecific abnormal finding of lung field    FINDINGS:  Patient was administered 13.05 millicuries of FDG intravenously.    There is a mid posteromedial left lung mass SUV max 13.76.    There is a left hilar metastatic lymph node SUV max 3.26.    There is L1 vertebral metastasis SUV max 3.96.    There is a right upper lobe lung nodule low-grade possibly metastatic as well.    There is physiologic intracranial, head, and neck activity.  Heart mediastinum show nothing unusual.  There are coronary calcifications.  There is physiologic liver, spleen, GI and  activity.  There is a small gallstone in the gallbladder.  There is aortic plaque and DJD.  The adrenal glands are normal.  There is diverticulosis.   Impression       See above    Left lung malignancy with metastatic disease involving the left hilum, L1 vertebral body, and possibly right upper lobe lung metastasis.    Index lesions:    Primary lung mass SUV max 13.76.    Left hilar lymph node SUV max 3.26.    L1 vertebral body SUV max 3.96.            Assessment:       1. Adenocarcinoma of left lung, stage 2    2. Lung cancer metastatic to bone    3. Antineoplastic chemotherapy  induced pancytopenia    4. Dehydration           Plan:       --Patient currently receiving chemo and XRT presented to urgent care 9/11/18 with dehydration dysphagia and pancytopenia. Here today for follow up feeling significantly better. Labs reviewed.   --ANC 1300. No infectious signs/symptoms. Discussed Neutropenic precautions.   --Anemia parameters low/stable. Patient is asymptomatic and does not want a blood transfusion. .  --Platelets improved. Bleeding precautions discussed.  No active bleeding noted.  --Slight bump in creatinine but remains normal. Encouraged IV hydration today but patient refusing at this time as he does not feel he needs it. Discussed the importance of daily oral hydration.   -- Vit B12 normal. His last B12 injection was 8 weeks ago. He is due next week. He is taking Folic acid as prescribed.    --Continue Percocet, Lares solution for radiation esophagitis. Continue Protonix.   --Keep follow up with Dr. Infante next week as scheduled. Will need a Vit B12 injection.    --Patient understands to go to ED for any fever, chills, n/v, chest pain, bleeding and/or any other new symptoms or worsening of his symptoms.      Patient is in agreement with the proposed treatment plan. All questions were answered to the patient's satisfaction. Pt knows to call clinic if anything is needed before the next clinic visit.      DANELLE Hurtado-SANA  Hematology & Oncology  [address] [place] [id number]  ph. [phone]  Fax. [phone]     40 minutes were spent in coordination of patient's care, record review and counseling. More than 50% of the time was face-to-face.      Distress Screening Results: Psychosocial Distress screening score of Distress Score: 5 noted and reviewed. No intervention indicated.

## 2018-09-17 PROCEDURE — G6002 STEREOSCOPIC X-RAY GUIDANCE: HCPCS | Mod: 26,,, | Performed by: RADIOLOGY

## 2018-09-17 PROCEDURE — 77386 HC IMRT, COMPLEX: CPT | Performed by: RADIOLOGY

## 2018-09-18 PROCEDURE — G6002 STEREOSCOPIC X-RAY GUIDANCE: HCPCS | Mod: 26,,, | Performed by: RADIOLOGY

## 2018-09-18 PROCEDURE — 77386 HC IMRT, COMPLEX: CPT | Performed by: RADIOLOGY

## 2018-09-19 ENCOUNTER — DOCUMENTATION ONLY (OUTPATIENT)
Dept: RADIATION ONCOLOGY | Facility: CLINIC | Age: 63
End: 2018-09-19

## 2018-09-19 ENCOUNTER — TELEPHONE (OUTPATIENT)
Dept: HEMATOLOGY/ONCOLOGY | Facility: CLINIC | Age: 63
End: 2018-09-19

## 2018-09-19 ENCOUNTER — OFFICE VISIT (OUTPATIENT)
Dept: HEMATOLOGY/ONCOLOGY | Facility: CLINIC | Age: 63
End: 2018-09-19
Payer: MEDICARE

## 2018-09-19 VITALS
TEMPERATURE: 98 F | RESPIRATION RATE: 19 BRPM | WEIGHT: 174.63 LBS | OXYGEN SATURATION: 99 % | SYSTOLIC BLOOD PRESSURE: 100 MMHG | DIASTOLIC BLOOD PRESSURE: 59 MMHG | HEART RATE: 106 BPM | HEIGHT: 72 IN | BODY MASS INDEX: 23.65 KG/M2

## 2018-09-19 DIAGNOSIS — C34.92 PRIMARY MALIGNANT NEOPLASM OF LEFT LUNG: Primary | ICD-10-CM

## 2018-09-19 DIAGNOSIS — D49.1 NEOPLASM OF LUNG: ICD-10-CM

## 2018-09-19 DIAGNOSIS — C79.51 LUNG CANCER METASTATIC TO BONE: ICD-10-CM

## 2018-09-19 DIAGNOSIS — D69.6 THROMBOCYTOPENIA: ICD-10-CM

## 2018-09-19 DIAGNOSIS — C34.90 LUNG CANCER METASTATIC TO BONE: ICD-10-CM

## 2018-09-19 PROCEDURE — 99999 PR PBB SHADOW E&M-EST. PATIENT-LVL IV: CPT | Mod: PBBFAC,,, | Performed by: INTERNAL MEDICINE

## 2018-09-19 PROCEDURE — 3008F BODY MASS INDEX DOCD: CPT | Mod: CPTII,,, | Performed by: INTERNAL MEDICINE

## 2018-09-19 PROCEDURE — 77386 HC IMRT, COMPLEX: CPT | Performed by: RADIOLOGY

## 2018-09-19 PROCEDURE — G6002 STEREOSCOPIC X-RAY GUIDANCE: HCPCS | Mod: 26,,, | Performed by: RADIOLOGY

## 2018-09-19 PROCEDURE — 77336 RADIATION PHYSICS CONSULT: CPT | Performed by: RADIOLOGY

## 2018-09-19 PROCEDURE — 3078F DIAST BP <80 MM HG: CPT | Mod: CPTII,,, | Performed by: INTERNAL MEDICINE

## 2018-09-19 PROCEDURE — 3074F SYST BP LT 130 MM HG: CPT | Mod: CPTII,,, | Performed by: INTERNAL MEDICINE

## 2018-09-19 PROCEDURE — 99214 OFFICE O/P EST MOD 30 MIN: CPT | Mod: PBBFAC | Performed by: INTERNAL MEDICINE

## 2018-09-19 PROCEDURE — 99214 OFFICE O/P EST MOD 30 MIN: CPT | Mod: S$PBB,,, | Performed by: INTERNAL MEDICINE

## 2018-09-19 NOTE — PLAN OF CARE
Problem: Patient Care Overview  Goal: Plan of Care Review  Outcome: Outcome(s) achieved Date Met: 09/19/18  Last day of XRT to left lung. Having trouble swallowing. Using [facility]'s solution and pain meds. Denies headache and N&V. Redness to right side of neck.

## 2018-09-19 NOTE — TELEPHONE ENCOUNTER
Called patient to see if he would like to do the CT closer to home. Left message for patient to call the office number was provided.      ----- Message from Demetrius Infante DO, FACP sent at 9/19/2018 10:11 AM CDT -----  CT of the chest in 4 weeks and see me then  Need CBC, CMP, magnesium at that time

## 2018-09-19 NOTE — PROGRESS NOTES
PATIENT: Nimesh Nunn  MRN: [id number]  DATE: 9/19/2018      Diagnosis:   1. Primary malignant neoplasm of left lung    2. Lung cancer metastatic to bone    3. Thrombocytopenia        Chief Complaint: No chief complaint on file.      Oncologic History:      Oncologic History Non-small cell lung cancer, left diagnosed 06/2018  Metastatic disease to bone at presentation    Oncologic Treatment Stereotactic radiation to spinal lesion  Cisplatin/pemetrexed with concurrent radiation 08/08/2018 (completed 09/2018)    Pathology Poorly differentiated adenocarcinoma, EGFR wild type, No ALK, ROS-1 rearrangements, PD-L1 TPS 90%          Subjective:    Interval History: Mr. Nunn is a 62 y.o. male who is seen in follow-up for lung cancer.  He states he has generally been feeling well.  He still has some difficulty with swallowing.  He completed radiation today.  No other new complaints.    His history dates to 06/2018 when he sought medical attention for chest pain.  A CT of the chest was performed showing a left lower lobe mass measuring 7.4 cm.  There were also enlarged left hilar lymph nodes. He underwent PET-CT which showed uptake in this mass and also in L1.  He underwent bronchoscopy and biopsy with pathology showing poorly differentiated adenocarcinoma.  Molecular studies showed EGFR wild-type and he did not harbor any ALK or ROS-1 rearrangements.  PD L1 tumor proportions core was 90%.  He underwent radiation to the spinal lesion in 08/2018 and started on concurrent chemo and radiation with cisplatin and pemetrexed in 08/2018.  He completed treatment on 09/19/2018.  He was only able to receive 2 cycles of chemotherapy secondary to cytopenias.    Past Medical History:   Past Medical History:   Diagnosis Date    Abdominal aortic aneurysm (AAA) without rupture 3/5/2018    Arthritis     Blood transfusion     COPD (chronic obstructive pulmonary disease)     Coronary artery disease     Dehydration 8/15/2018     Encounter for blood transfusion     Hyperlipidemia 11/7/2013    Hypertension     Lung cancer metastatic to bone 7/18/2018    Occlusion and stenosis of carotid artery without mention of cerebral infarction 1/12/2014    Primary malignant neoplasm of left lung 7/18/2018    Screening for colon cancer 6/1/2015    Syncope 1/13/2014    Thrombocytopenia 9/19/2018       Past Surgical HIstory:   Past Surgical History:   Procedure Laterality Date    ARCH & 4 VESSEL STUDY Bilateral 5/27/2014    [facility] at [facility] CATH LAB    BACK SURGERY      BRONCHOSCOPY,FIBEROPTIC N/A 6/26/2018    Performed by Liana Serrano MD at [facility] OR [address] [place] [address]    carotid endarectomy      CATHETERIZATION, HEART, LEFT Left 11/8/2013    Per[facility] by [facility] at [facility] CATH LAB    COLONOSCOPY N/A 6/1/2015    Per[facility] at [facility] ENDO    CORONARY ANGIOPLASTY      CORONARY ARTERY BYPASS GRAFT      ENDOBRONCHIAL ULTRASOUND N/A 6/26/2018    Procedure: ULTRASOUND, ENDOBRONCHIAL;  Surgeon: Liana Serrano MD;  Location: [facility] OR [address] [place] [address];  Service: Cardiothoracic;  Laterality: N/A;    ESOPHAGOGASTRODUODENOSCOPY N/A 8/16/2018    Procedure: ESOPHAGOGASTRODUODENOSCOPY (EGD);  Surgeon: Yohannes Osuna MD;  Location: [facility] ([address] [place] [address]);  Service: Endoscopy;  Laterality: N/A;    ESOPHAGOGASTRODUODENOSCOPY (EGD) N/A 8/16/2018    [facility]    HEART CATH-LEFT N/A 5/27/2014    Per[facility] CATH LAB    INSERTION, STENT, CORONARY ARTERY N/A 12/20/2013    Performed by [facility]hi Martin [facility] at [facility] CATH LAB    PTA, PERIPHERAL VESSEL Left 3/11/2014    Performed by [facility]hi Martin MD [facility] CATH LAB    PTA, PERIPHERAL VESSEL N/A 2/18/2014    Performed by [facility]hi Martin MD at [facility] CATH LAB    SKIN BIOPSY      ULTRASOUND, ENDOBRONCHIAL N/A 6/26/2018    Performed by L[facility]errano MD at [facility] OR [address] [place] [address]       Family History:   Family  History   Problem Relation Age of Onset    Heart disease Father     Hypertension Father     Heart attack Father     Heart failure Father     Cancer Mother     Breast cancer Mother     No Known Problems Sister     No Known Problems Brother     No Known Problems Maternal Grandmother     No Known Problems Maternal Grandfather     No Known Problems Paternal Grandmother     No Known Problems Paternal Grandfather     No Known Problems Maternal Aunt     No Known Problems Maternal Uncle     No Known Problems Paternal Aunt     No Known Problems Paternal Uncle     Anemia Neg Hx     Arrhythmia Neg Hx     Asthma Neg Hx     Clotting disorder Neg Hx     Fainting Neg Hx     Hyperlipidemia Neg Hx        Social History:  reports that he quit smoking about 6 years ago. He has a 60.00 pack-year smoking history. he has never used smokeless tobacco. He reports that he drinks about 1.8 oz of alcohol per week. He reports that he uses drugs. Drug: Marijuana.    Allergies:  Review of patient's allergies indicates:  No Known Allergies    Medications:  Current Outpatient Medications   Medication Sig Dispense Refill    aspirin (ECOTRIN) 81 MG EC tablet Take 81 mg by mouth once daily.      atorvastatin (LIPITOR) 80 MG tablet TAKE 1 TABLET BY MOUTH EVERY DAY 30 tablet 11    cilostazol (PLETAL) 100 MG Tab TAKE 1 TABLET BY MOUTH TWICE DAILY 60 tablet 11    clopidogrel (PLAVIX) 75 mg tablet TAKE 1 TABLET BY MOUTH EVERY DAY 90 tablet 4    dexamethasone (DECADRON) 4 MG Tab Take 1 tablet (4 mg total) by mouth every 12 (twelve) hours. Take 1 tab twice daily starting the day prior to chemotherapy for 3 days. 24 tablet 1    finasteride (PROSCAR) 5 mg tablet Take 1 tablet (5 mg total) by mouth once daily. 90 tablet 3    folic acid (FOLVITE) 400 MCG tablet Take 1 tablet (400 mcg total) by mouth once daily. 100 tablet 3    irbesartan (AVAPRO) 75 MG tablet Take 1 tablet (75 mg total) by mouth once daily. 90 tablet 3     isosorbide mononitrate (IMDUR) 60 MG 24 hr tablet Take 1 tablet (60 mg total) by mouth once daily. 90 tablet 3    magic mouthwash diphen/antac/lidoc/nysta Take 2 teaspoonful (10 ml) by mouth 5 minutes before meals and every night at bedtime 480 mL 1    metoprolol tartrate (LOPRESSOR) 50 MG tablet Take 50 mg by mouth 2 (two) times daily.      nitroGLYCERIN (NITROSTAT) 0.4 MG SL tablet Place 1 tablet (0.4 mg total) under the tongue every 5 (five) minutes as needed for Chest pain. 90 tablet 1    ondansetron (ZOFRAN-ODT) 8 MG TbDL Take 1 tablet (8 mg total) by mouth every 12 (twelve) hours as needed. 30 tablet 1    oxyCODONE-acetaminophen (PERCOCET) 5-325 mg per tablet Take 1-2 tablets by mouth every 6 (six) hours as needed for Pain. 120 tablet 0    pantoprazole (PROTONIX) 40 MG tablet Take 1 tablet (40 mg total) by mouth 2 (two) times daily. 60 tablet 0    PROAIR HFA 90 mcg/actuation inhaler Inhale 2 puffs into the lungs as needed. 18 g 6    umeclidinium-vilanterol (ANORO ELLIPTA) 62.5-25 mcg/actuation DsDv Inhale 1 puff into the lungs once daily. Controller 1 each 5     No current facility-administered medications for this visit.        Review of Systems   Constitutional: Negative for appetite change, chills, fatigue, fever and unexpected weight change.   HENT: Negative for dental problem, sinus pressure and sneezing.    Eyes: Negative for visual disturbance.   Respiratory: Positive for cough. Negative for choking, chest tightness and shortness of breath.    Cardiovascular: Negative for chest pain and leg swelling.   Gastrointestinal: Negative for abdominal pain, blood in stool, constipation, diarrhea and nausea.   Genitourinary: Positive for frequency. Negative for difficulty urinating and dysuria.   Musculoskeletal: Positive for back pain. Negative for arthralgias.   Skin: Negative for rash and wound.   Neurological: Positive for weakness. Negative for dizziness, light-headedness and headaches.    Hematological: Negative for adenopathy. Does not bruise/bleed easily.   Psychiatric/Behavioral: Negative for sleep disturbance. The patient is nervous/anxious.        ECOG Performance Status:   ECOG SCORE    1 - Restricted in strenuous activity-ambulatory and able to carry out work of a light nature         Objective:      Vitals:   Vitals:    09/19/18 0925   BP: (!) 100/59   Pulse: 106   Resp: 19   Temp: 98.1 °F (36.7 °C)   SpO2: 99%   Weight: 79.2 kg (174 lb 9.7 oz)   Height: 6' (1.829 m)     BMI: Body mass index is 23.68 kg/m².    Physical Exam   Constitutional: He is oriented to person, place, and time. He appears well-developed and well-nourished.   HENT:   Head: Normocephalic and atraumatic.   Eyes: Pupils are equal, round, and reactive to light.   Neck: Normal range of motion. Neck supple.   Cardiovascular: Normal rate and regular rhythm.   Pulmonary/Chest: Effort normal. No respiratory distress. He has no wheezes.   Abdominal: Soft. He exhibits no distension. There is no tenderness.   Musculoskeletal: He exhibits no edema or tenderness.   Lymphadenopathy:     He has no cervical adenopathy.   Neurological: He is alert and oriented to person, place, and time. No cranial nerve deficit or sensory deficit. He exhibits normal muscle tone. Coordination normal.   Skin: Skin is warm and dry.   Psychiatric: He has a normal mood and affect. His behavior is normal.       Laboratory Data:  Lab Visit on 09/14/2018   Component Date Value Ref Range Status    WBC 09/14/2018 2.60* 3.90 - 12.70 K/uL Final    RBC 09/14/2018 2.26* 4.60 - 6.20 M/uL Final    Hemoglobin 09/14/2018 7.2* 14.0 - 18.0 g/dL Final    Hematocrit 09/14/2018 21.1* 40.0 - 54.0 % Final    MCV 09/14/2018 93  82 - 98 fL Final    MCH 09/14/2018 31.9* 27.0 - 31.0 pg Final    MCHC 09/14/2018 34.1  32.0 - 36.0 g/dL Final    RDW 09/14/2018 14.6* 11.5 - 14.5 % Final    Platelets 09/14/2018 60* 150 - 350 K/uL Final    MPV 09/14/2018 9.8  9.2 - 12.9 fL  Final    Gran # (ANC) 09/14/2018 1.3* 1.8 - 7.7 K/uL Final    Comment: The ANC is based on a white cell differential from an   automated cell counter. It has not been microscopically   reviewed for the presence of abnormal cells. Clinical   correlation is required.      Immature Grans (Abs) 09/14/2018 0.03  0.00 - 0.04 K/uL Final    Comment: Mild elevation in immature granulocytes is non specific and   can be seen in a variety of conditions including stress response,   acute inflammation, trauma and pregnancy. Correlation with other   laboratory and clinical findings is essential.      Sodium 09/14/2018 134* 136 - 145 mmol/L Final    Potassium 09/14/2018 3.8  3.5 - 5.1 mmol/L Final    Chloride 09/14/2018 99  95 - 110 mmol/L Final    CO2 09/14/2018 26  23 - 29 mmol/L Final    Glucose 09/14/2018 94  70 - 110 mg/dL Final    BUN, Bld 09/14/2018 19  8 - 23 mg/dL Final    Creatinine 09/14/2018 1.3  0.5 - 1.4 mg/dL Final    Calcium 09/14/2018 8.9  8.7 - 10.5 mg/dL Final    Total Protein 09/14/2018 6.2  6.0 - 8.4 g/dL Final    Albumin 09/14/2018 2.8* 3.5 - 5.2 g/dL Final    Total Bilirubin 09/14/2018 0.4  0.1 - 1.0 mg/dL Final    Comment: For infants and newborns, interpretation of results should be based  on gestational age, weight and in agreement with clinical  observations.  Premature Infant recommended reference ranges:  Up to 24 hours.............<8.0 mg/dL  Up to 48 hours............<12.0 mg/dL  3-5 days..................<15.0 mg/dL  6-29 days.................<15.0 mg/dL      Alkaline Phosphatase 09/14/2018 111  55 - 135 U/L Final    AST 09/14/2018 16  10 - 40 U/L Final    ALT 09/14/2018 13  10 - 44 U/L Final    Anion Gap 09/14/2018 9  8 - 16 mmol/L Final    eGFR if African American 09/14/2018 >60.0  >60 mL/min/1.73 m^2 Final    eGFR if non African American 09/14/2018 57.7* >60 mL/min/1.73 m^2 Final    Comment: Calculation used to obtain the estimated glomerular filtration  rate (eGFR) is the  CKD-EPI equation.       Magnesium 09/14/2018 1.6  1.6 - 2.6 mg/dL Final    Vitamin B-12 09/14/2018 818  210 - 950 pg/mL Final         Imaging:   PET-CT 06/15/2018  EXAMINATION:  NM PET CT ROUTINE    CLINICAL HISTORY:  Lung Mass;  Other nonspecific abnormal finding of lung field    FINDINGS:  Patient was administered 13.05 millicuries of FDG intravenously.    There is a mid posteromedial left lung mass SUV max 13.76.    There is a left hilar metastatic lymph node SUV max 3.26.    There is L1 vertebral metastasis SUV max 3.96.    There is a right upper lobe lung nodule low-grade possibly metastatic as well.    There is physiologic intracranial, head, and neck activity.  Heart mediastinum show nothing unusual.  There are coronary calcifications.  There is physiologic liver, spleen, GI and  activity.  There is a small gallstone in the gallbladder.  There is aortic plaque and DJD.  The adrenal glands are normal.  There is diverticulosis.   Impression       See above    Left lung malignancy with metastatic disease involving the left hilum, L1 vertebral body, and possibly right upper lobe lung metastasis.    Index lesions:    Primary lung mass SUV max 13.76.    Left hilar lymph node SUV max 3.26.    L1 vertebral body SUV max 3.96.            Assessment:       1. Primary malignant neoplasm of left lung    2. Lung cancer metastatic to bone    3. Thrombocytopenia           Plan:   Mr. Nunn is doing well from a clinical perspective and has completed therapy.  Review of his lab work does show ongoing cytopenias and will hold off on giving him any further chemotherapy at this time.  I will plan to check a CT of the chest in 1 more month and he may be a candidate for treatment with durvalumab if there is no progressive disease.  I have given him written information on this regimen.  See me again in 4 weeks and report any new symptoms in the interim.  All questions were answered and he is agreeable with this  plan.      Demetrius Infante DO, FACP  Hematology & Oncology  [address] [place] [address]  ph. [phone]  Fax. [phone]    25 minutes were spent in coordination of patient's care, record review and counseling.  More than 50% of the time was face-to-face.

## 2018-09-27 ENCOUNTER — TELEPHONE (OUTPATIENT)
Dept: RADIATION ONCOLOGY | Facility: CLINIC | Age: 63
End: 2018-09-27

## 2018-09-27 NOTE — TELEPHONE ENCOUNTER
Left message to see how pt was doing 1 week after XRT. Left message about follow up appointment and mailed appointment slip.----- Message from Kristi Watters RN sent at 2018  2:41 PM CDT -----  Regardin week phone call  Pt needs a 1 week phone call and 4 week follow up appointment.

## 2018-10-16 ENCOUNTER — HOSPITAL ENCOUNTER (OUTPATIENT)
Dept: RADIOLOGY | Facility: HOSPITAL | Age: 63
Discharge: HOME | End: 2018-10-16
Attending: INTERNAL MEDICINE
Payer: MEDICARE

## 2018-10-16 DIAGNOSIS — D49.1 NEOPLASM OF LUNG: ICD-10-CM

## 2018-10-16 PROCEDURE — 71250 CT THORAX DX C-: CPT | Mod: TC,PO

## 2018-10-17 ENCOUNTER — LAB VISIT (OUTPATIENT)
Dept: LAB | Facility: HOSPITAL | Age: 63
End: 2018-10-17
Attending: INTERNAL MEDICINE
Payer: MEDICARE

## 2018-10-17 ENCOUNTER — OFFICE VISIT (OUTPATIENT)
Dept: RADIATION ONCOLOGY | Facility: CLINIC | Age: 63
End: 2018-10-17
Payer: MEDICARE

## 2018-10-17 ENCOUNTER — OFFICE VISIT (OUTPATIENT)
Dept: HEMATOLOGY/ONCOLOGY | Facility: CLINIC | Age: 63
End: 2018-10-17
Payer: MEDICARE

## 2018-10-17 VITALS
RESPIRATION RATE: 18 BRPM | SYSTOLIC BLOOD PRESSURE: 149 MMHG | BODY MASS INDEX: 24.55 KG/M2 | DIASTOLIC BLOOD PRESSURE: 76 MMHG | WEIGHT: 181 LBS | HEART RATE: 100 BPM | TEMPERATURE: 98 F

## 2018-10-17 VITALS
DIASTOLIC BLOOD PRESSURE: 72 MMHG | TEMPERATURE: 98 F | SYSTOLIC BLOOD PRESSURE: 123 MMHG | RESPIRATION RATE: 16 BRPM | WEIGHT: 182.13 LBS | OXYGEN SATURATION: 99 % | HEIGHT: 72 IN | BODY MASS INDEX: 24.67 KG/M2 | HEART RATE: 85 BPM

## 2018-10-17 DIAGNOSIS — E07.89 OTHER SPECIFIED DISORDERS OF THYROID: ICD-10-CM

## 2018-10-17 DIAGNOSIS — C79.51 LUNG CANCER METASTATIC TO BONE: ICD-10-CM

## 2018-10-17 DIAGNOSIS — C34.92 PRIMARY MALIGNANT NEOPLASM OF LEFT LUNG: Primary | ICD-10-CM

## 2018-10-17 DIAGNOSIS — C79.51 LUNG CANCER METASTATIC TO BONE: Primary | ICD-10-CM

## 2018-10-17 DIAGNOSIS — C34.90 LUNG CANCER METASTATIC TO BONE: Primary | ICD-10-CM

## 2018-10-17 DIAGNOSIS — C34.90 LUNG CANCER METASTATIC TO BONE: ICD-10-CM

## 2018-10-17 DIAGNOSIS — C34.92 PRIMARY MALIGNANT NEOPLASM OF LEFT LUNG: ICD-10-CM

## 2018-10-17 LAB
ALBUMIN SERPL BCP-MCNC: 3.4 G/DL
ALP SERPL-CCNC: 105 U/L
ALT SERPL W/O P-5'-P-CCNC: 9 U/L
ANION GAP SERPL CALC-SCNC: 7 MMOL/L
AST SERPL-CCNC: 17 U/L
BILIRUB SERPL-MCNC: 0.3 MG/DL
BUN SERPL-MCNC: 26 MG/DL
CALCIUM SERPL-MCNC: 9.6 MG/DL
CHLORIDE SERPL-SCNC: 103 MMOL/L
CO2 SERPL-SCNC: 28 MMOL/L
CREAT SERPL-MCNC: 1.2 MG/DL
ERYTHROCYTE [DISTWIDTH] IN BLOOD BY AUTOMATED COUNT: 17.2 %
EST. GFR  (AFRICAN AMERICAN): >60 ML/MIN/1.73 M^2
EST. GFR  (NON AFRICAN AMERICAN): >60 ML/MIN/1.73 M^2
GLUCOSE SERPL-MCNC: 102 MG/DL
HCT VFR BLD AUTO: 27 %
HGB BLD-MCNC: 8.9 G/DL
IMM GRANULOCYTES # BLD AUTO: 0.02 K/UL
MAGNESIUM SERPL-MCNC: 1.6 MG/DL
MCH RBC QN AUTO: 32.8 PG
MCHC RBC AUTO-ENTMCNC: 33 G/DL
MCV RBC AUTO: 100 FL
NEUTROPHILS # BLD AUTO: 2.9 K/UL
PLATELET # BLD AUTO: 151 K/UL
PMV BLD AUTO: 8.8 FL
POTASSIUM SERPL-SCNC: 4.2 MMOL/L
PROT SERPL-MCNC: 7 G/DL
RBC # BLD AUTO: 2.71 M/UL
SODIUM SERPL-SCNC: 138 MMOL/L
WBC # BLD AUTO: 4.36 K/UL

## 2018-10-17 PROCEDURE — 3078F DIAST BP <80 MM HG: CPT | Mod: CPTII,,, | Performed by: INTERNAL MEDICINE

## 2018-10-17 PROCEDURE — 36415 COLL VENOUS BLD VENIPUNCTURE: CPT

## 2018-10-17 PROCEDURE — 99213 OFFICE O/P EST LOW 20 MIN: CPT | Mod: S$PBB,,, | Performed by: RADIOLOGY

## 2018-10-17 PROCEDURE — 3077F SYST BP >= 140 MM HG: CPT | Mod: CPTII,,, | Performed by: RADIOLOGY

## 2018-10-17 PROCEDURE — 83735 ASSAY OF MAGNESIUM: CPT

## 2018-10-17 PROCEDURE — 80053 COMPREHEN METABOLIC PANEL: CPT

## 2018-10-17 PROCEDURE — 99999 PR PBB SHADOW E&M-EST. PATIENT-LVL IV: CPT | Mod: PBBFAC,,, | Performed by: INTERNAL MEDICINE

## 2018-10-17 PROCEDURE — 3074F SYST BP LT 130 MM HG: CPT | Mod: CPTII,,, | Performed by: INTERNAL MEDICINE

## 2018-10-17 PROCEDURE — 3008F BODY MASS INDEX DOCD: CPT | Mod: CPTII,,, | Performed by: INTERNAL MEDICINE

## 2018-10-17 PROCEDURE — 85027 COMPLETE CBC AUTOMATED: CPT

## 2018-10-17 PROCEDURE — 99214 OFFICE O/P EST MOD 30 MIN: CPT | Mod: S$PBB,,, | Performed by: INTERNAL MEDICINE

## 2018-10-17 PROCEDURE — 3008F BODY MASS INDEX DOCD: CPT | Mod: CPTII,,, | Performed by: RADIOLOGY

## 2018-10-17 PROCEDURE — 99214 OFFICE O/P EST MOD 30 MIN: CPT | Mod: PBBFAC,27 | Performed by: INTERNAL MEDICINE

## 2018-10-17 PROCEDURE — 99999 PR PBB SHADOW E&M-EST. PATIENT-LVL III: CPT | Mod: PBBFAC,,, | Performed by: RADIOLOGY

## 2018-10-17 PROCEDURE — 99213 OFFICE O/P EST LOW 20 MIN: CPT | Mod: PBBFAC | Performed by: RADIOLOGY

## 2018-10-17 PROCEDURE — 3078F DIAST BP <80 MM HG: CPT | Mod: CPTII,,, | Performed by: RADIOLOGY

## 2018-10-17 RX ORDER — SODIUM CHLORIDE 0.9 % (FLUSH) 0.9 %
10 SYRINGE (ML) INJECTION
Status: CANCELLED | OUTPATIENT
Start: 2018-10-22

## 2018-10-17 RX ORDER — METOPROLOL SUCCINATE 50 MG/1
50 TABLET, EXTENDED RELEASE ORAL
Status: ON HOLD | COMMUNITY
Start: 2018-10-15 | End: 2019-05-10 | Stop reason: HOSPADM

## 2018-10-17 RX ORDER — HEPARIN 100 UNIT/ML
500 SYRINGE INTRAVENOUS
Status: CANCELLED | OUTPATIENT
Start: 2018-10-22

## 2018-10-17 NOTE — PROGRESS NOTES
PATIENT: Nimesh Nunn  MRN: [id number]  DATE: 10/17/2018      Diagnosis:   1. Primary malignant neoplasm of left lung    2. Lung cancer metastatic to bone        Chief Complaint: Adenocarcinoma of left lung, stage 2      Oncologic History:      Oncologic History Non-small cell lung cancer, left diagnosed 06/2018  Metastatic disease to bone at presentation    Oncologic Treatment Stereotactic radiation to spinal lesion  Cisplatin/pemetrexed with concurrent radiation 08/08/2018 (completed 09/2018)    Pathology Poorly differentiated adenocarcinoma, EGFR wild type, No ALK, ROS-1 rearrangements, PD-L1 TPS 90%          Subjective:    Interval History: Mr. Nunn is a 62 y.o. male who is seen in follow-up for lung cancer.  He states he has generally been feeling well.  Swallowing has improved.  He still has some cough and back pain.  His weight has been stable.  He is without other new complaints.    His history dates to 06/2018 when he sought medical attention for chest pain.  A CT of the chest was performed showing a left lower lobe mass measuring 7.4 cm.  There were also enlarged left hilar lymph nodes. He underwent PET-CT which showed uptake in this mass and also in L1.  He underwent bronchoscopy and biopsy with pathology showing poorly differentiated adenocarcinoma.  Molecular studies showed EGFR wild-type and he did not harbor any ALK or ROS-1 rearrangements.  PD L1 tumor proportions core was 90%.  He underwent radiation to the spinal lesion in 08/2018 and started on concurrent chemo and radiation with cisplatin and pemetrexed in 08/2018.  He completed treatment on 09/19/2018.  He was only able to receive 2 cycles of chemotherapy secondary to cytopenias.    Past Medical History:   Past Medical History:   Diagnosis Date    Abdominal aortic aneurysm (AAA) without rupture 3/5/2018    Arthritis     Blood transfusion     COPD (chronic obstructive pulmonary disease)     Coronary artery disease     Dehydration  8/15/2018    Encounter for blood transfusion     Hyperlipidemia 11/7/2013    Hypertension     Lung cancer metastatic to bone 7/18/2018    Occlusion and stenosis of carotid artery without mention of cerebral infarction 1/12/2014    Primary malignant neoplasm of left lung 7/18/2018    Screening for colon cancer 6/1/2015    Syncope 1/13/2014    Thrombocytopenia 9/19/2018       Past Surgical HIstory:   Past Surgical History:   Procedure Laterality Date    ARCH & 4 VESSEL STUDY Bilateral 5/27/2014    Performed by [facility]hi Martin [facility] at [facility] CATH LAB    BACK SURGERY      BRONCHOSCOPY,FIBEROPTIC N/A 6/26/2018    Performed by Liana Serrano MD at [facility] OR [address] [place] [address]    carotid endarectomy      CATHETERIZATION, HEART, LEFT Left 11/8/2013    [facility] at [facility] LAB    COLONOSCOPY N/A 6/1/2015    Per[facility] by [facility]cel[facility]derrama [facility] at [facility] ENDO    CORONARY ANGIOPLASTY      CORONARY ARTERY BYPASS GRAFT      ENDOBRONCHIAL ULTRASOUND N/A 6/26/2018    Procedure: ULTRASOUND, ENDOBRONCHIAL;  Surgeon: Liana Serrano MD;  Location: [facility] OR [address] [place] [address];  Service: Cardiothoracic;  Laterality: N/A;    ESOPHAGOGASTRODUODENOSCOPY N/A 8/16/2018    Procedure: ESOPHAGOGASTRODUODENOSCOPY (EGD);  Surgeon: Yohannes Osuna MD;  Location: [facility] ([address] [place] [address]);  Service: Endoscopy;  Laterality: N/A;    ESOPHAGOGASTRODUODENOSCOPY (EGD) N/A 8/16/2018    [facility]    HEART CATH-LEFT N/A 5/27/2014    Performed by Mekhi Martin MD at [facility] CATH LAB    INSERTION, STENT, CORONARY ARTERY N/A 12/20/2013    Performed by Mekhi Martin MD at [facility] CATH LAB    PTA, PERIPHERAL BLOOD VESSEL Left 3/11/2014    Performed by Mekhi Martin MD at [facility] CATH LAB    PTA, PERIPHERAL BLOOD VESSEL N/A 2/18/2014    Performed by Mekhi Martin MD at [facility] CATH LAB    SKIN BIOPSY      ULTRASOUND, ENDOBRONCHIAL N/A 6/26/2018    Performed by Liana Serrano MD at [facility] OR [address] [place] [address]        Family History:   Family History   Problem Relation Age of Onset    Heart disease Father     Hypertension Father     Heart attack Father     Heart failure Father     Cancer Mother     Breast cancer Mother     No Known Problems Sister     No Known Problems Brother     No Known Problems Maternal Grandmother     No Known Problems Maternal Grandfather     No Known Problems Paternal Grandmother     No Known Problems Paternal Grandfather     No Known Problems Maternal Aunt     No Known Problems Maternal Uncle     No Known Problems Paternal Aunt     No Known Problems Paternal Uncle     Anemia Neg Hx     Arrhythmia Neg Hx     Asthma Neg Hx     Clotting disorder Neg Hx     Fainting Neg Hx     Hyperlipidemia Neg Hx        Social History:  reports that he quit smoking about 7 years ago. He has a 60.00 pack-year smoking history. he has never used smokeless tobacco. He reports that he drinks about 1.8 oz of alcohol per week. He reports that he uses drugs. Drug: Marijuana.    Allergies:  Review of patient's allergies indicates:  No Known Allergies    Medications:  Current Outpatient Medications   Medication Sig Dispense Refill    aspirin (ECOTRIN) 81 MG EC tablet Take 81 mg by mouth once daily.      atorvastatin (LIPITOR) 80 MG tablet TAKE 1 TABLET BY MOUTH EVERY DAY 30 tablet 11    cilostazol (PLETAL) 100 MG Tab TAKE 1 TABLET BY MOUTH TWICE DAILY 60 tablet 11    clopidogrel (PLAVIX) 75 mg tablet TAKE 1 TABLET BY MOUTH EVERY DAY 90 tablet 4    dexamethasone (DECADRON) 4 MG Tab Take 1 tablet (4 mg total) by mouth every 12 (twelve) hours. Take 1 tab twice daily starting the day prior to chemotherapy for 3 days. 24 tablet 1    finasteride (PROSCAR) 5 mg tablet Take 1 tablet (5 mg total) by mouth once daily. 90 tablet 3    folic acid (FOLVITE) 400 MCG tablet Take 1 tablet (400 mcg total) by mouth once daily. 100 tablet 3    irbesartan (AVAPRO) 75 MG tablet Take 1 tablet (75 mg total) by mouth once  daily. 90 tablet 3    magic mouthwash diphen/antac/lidoc/nysta Take 2 teaspoonful (10 ml) by mouth 5 minutes before meals and every night at bedtime 480 mL 1    metoprolol succinate (TOPROL-XL) 50 MG 24 hr tablet Take 50 mg by mouth.      metoprolol tartrate (LOPRESSOR) 50 MG tablet Take 50 mg by mouth 2 (two) times daily.      nitroGLYCERIN (NITROSTAT) 0.4 MG SL tablet Place 1 tablet (0.4 mg total) under the tongue every 5 (five) minutes as needed for Chest pain. 90 tablet 1    ondansetron (ZOFRAN-ODT) 8 MG TbDL Take 1 tablet (8 mg total) by mouth every 12 (twelve) hours as needed. 30 tablet 1    PROAIR HFA 90 mcg/actuation inhaler Inhale 2 puffs into the lungs as needed. 18 g 6    umeclidinium-vilanterol (ANORO ELLIPTA) 62.5-25 mcg/actuation DsDv Inhale 1 puff into the lungs once daily. Controller 1 each 5    isosorbide mononitrate (IMDUR) 60 MG 24 hr tablet Take 1 tablet (60 mg total) by mouth once daily. 90 tablet 3    pantoprazole (PROTONIX) 40 MG tablet Take 1 tablet (40 mg total) by mouth 2 (two) times daily. 60 tablet 0     No current facility-administered medications for this visit.        Review of Systems   Constitutional: Positive for fatigue. Negative for appetite change, chills, fever and unexpected weight change.   HENT: Negative for dental problem, sinus pressure and sneezing.    Eyes: Negative for visual disturbance.   Respiratory: Positive for cough. Negative for choking, chest tightness and shortness of breath.    Cardiovascular: Negative for chest pain and leg swelling.   Gastrointestinal: Negative for abdominal pain, blood in stool, constipation, diarrhea and nausea.   Genitourinary: Negative for difficulty urinating, dysuria and frequency.   Musculoskeletal: Positive for back pain. Negative for arthralgias.   Skin: Negative for rash and wound.   Neurological: Negative for dizziness, weakness, light-headedness and headaches.   Hematological: Negative for adenopathy. Does not  bruise/bleed easily.   Psychiatric/Behavioral: Negative for sleep disturbance. The patient is nervous/anxious.        ECOG Performance Status:   ECOG SCORE    1 - Restricted in strenuous activity-ambulatory and able to carry out work of a light nature         Objective:      Vitals:   Vitals:    10/17/18 1105   BP: 123/72   BP Location: Right arm   Patient Position: Sitting   BP Method: Large (Automatic)   Pulse: 85   Resp: 16   Temp: 97.7 °F (36.5 °C)   TempSrc: Oral   SpO2: 99%   Weight: 82.6 kg (182 lb 1.6 oz)   Height: 6' (1.829 m)     BMI: Body mass index is 24.7 kg/m².    Physical Exam   Constitutional: He is oriented to person, place, and time. He appears well-developed and well-nourished.   HENT:   Head: Normocephalic and atraumatic.   Eyes: Pupils are equal, round, and reactive to light.   Neck: Normal range of motion. Neck supple.   Cardiovascular: Normal rate and regular rhythm.   Pulmonary/Chest: Effort normal. No respiratory distress. He has no wheezes.   Abdominal: Soft. He exhibits no distension. There is no tenderness.   Musculoskeletal: He exhibits no edema or tenderness.   Lymphadenopathy:     He has no cervical adenopathy.   Neurological: He is alert and oriented to person, place, and time. No cranial nerve deficit or sensory deficit. He exhibits normal muscle tone. Coordination normal.   Skin: Skin is warm and dry.   Psychiatric: He has a normal mood and affect. His behavior is normal.       Laboratory Data:  Lab Visit on 10/17/2018   Component Date Value Ref Range Status    WBC 10/17/2018 4.36  3.90 - 12.70 K/uL Final    RBC 10/17/2018 2.71* 4.60 - 6.20 M/uL Final    Hemoglobin 10/17/2018 8.9* 14.0 - 18.0 g/dL Final    Hematocrit 10/17/2018 27.0* 40.0 - 54.0 % Final    MCV 10/17/2018 100* 82 - 98 fL Final    MCH 10/17/2018 32.8* 27.0 - 31.0 pg Final    MCHC 10/17/2018 33.0  32.0 - 36.0 g/dL Final    RDW 10/17/2018 17.2* 11.5 - 14.5 % Final    Platelets 10/17/2018 151  150 - 350 K/uL  Final    MPV 10/17/2018 8.8* 9.2 - 12.9 fL Final    Gran # (ANC) 10/17/2018 2.9  1.8 - 7.7 K/uL Final    Comment: The ANC is based on a white cell differential from an   automated cell counter. It has not been microscopically   reviewed for the presence of abnormal cells. Clinical   correlation is required.      Immature Grans (Abs) 10/17/2018 0.02  0.00 - 0.04 K/uL Final    Comment: Mild elevation in immature granulocytes is non specific and   can be seen in a variety of conditions including stress response,   acute inflammation, trauma and pregnancy. Correlation with other   laboratory and clinical findings is essential.      Magnesium 10/17/2018 1.6  1.6 - 2.6 mg/dL Final    Sodium 10/17/2018 138  136 - 145 mmol/L Final    Potassium 10/17/2018 4.2  3.5 - 5.1 mmol/L Final    Chloride 10/17/2018 103  95 - 110 mmol/L Final    CO2 10/17/2018 28  23 - 29 mmol/L Final    Glucose 10/17/2018 102  70 - 110 mg/dL Final    BUN, Bld 10/17/2018 26* 8 - 23 mg/dL Final    Creatinine 10/17/2018 1.2  0.5 - 1.4 mg/dL Final    Calcium 10/17/2018 9.6  8.7 - 10.5 mg/dL Final    Total Protein 10/17/2018 7.0  6.0 - 8.4 g/dL Final    Albumin 10/17/2018 3.4* 3.5 - 5.2 g/dL Final    Total Bilirubin 10/17/2018 0.3  0.1 - 1.0 mg/dL Final    Comment: For infants and newborns, interpretation of results should be based  on gestational age, weight and in agreement with clinical  observations.  Premature Infant recommended reference ranges:  Up to 24 hours.............<8.0 mg/dL  Up to 48 hours............<12.0 mg/dL  3-5 days..................<15.0 mg/dL  6-29 days.................<15.0 mg/dL      Alkaline Phosphatase 10/17/2018 105  55 - 135 U/L Final    AST 10/17/2018 17  10 - 40 U/L Final    ALT 10/17/2018 9* 10 - 44 U/L Final    Anion Gap 10/17/2018 7* 8 - 16 mmol/L Final    eGFR if African American 10/17/2018 >60.0  >60 mL/min/1.73 m^2 Final    eGFR if non African American 10/17/2018 >60.0  >60 mL/min/1.73 m^2 Final     Comment: Calculation used to obtain the estimated glomerular filtration  rate (eGFR) is the CKD-EPI equation.            Imaging:   CT 10/16/2018  COMPARISON:  A CT examination of the thorax performed on 06/10/2018.    FINDINGS:  The size of heart is within normal limits.  There is a moderate amount of atherosclerosis.  There is an ill-defined mass in the medial aspect of the left lower lobe that measures 5.3 cm in craniocaudal dimension by 5.8 cm in AP dimension by 4.3 cm in medial-lateral dimension on the current examination.  On the prior examination it measured 4.6 cm in craniocaudal dimension by 6.1 cm in AP dimension by 4.5 cm in medial-lateral dimension.  There has been interval development of several patchy areas of increased density in the posterior aspect of the left upper lobe.  The larger area measures 27 mm.  The right lung is clear.  There is no pneumothorax or pleural effusion.  There is a 6 mm stone in the dependent portion of the gallbladder.      Impression       1. There is an ill-defined mass in the medial aspect of the left lower lobe that measures 5.3 cm in craniocaudal dimension by 5.8 cm in AP dimension by 4.3 cm in medial-lateral dimension on the current examination. On the prior examination it measured 4.6 cm in craniocaudal dimension by 6.1 cm in AP dimension by 4.5 cm in medial-lateral dimension. There has been interval development of several patchy areas of increased density in the posterior aspect of the left upper lobe.  The larger area measures 27 mm.  This is consistent with the patient's history and characteristic of lung cancer.  2. There is a 6 mm stone in the dependent portion of the gallbladder.  All CT scans at this facility use dose modulation, iterative reconstruction, and/or weight base dosing when appropriate to reduce radiation dose when appropriate to reduce radiation dose to as low as reasonably achievable.              Assessment:       1. Primary malignant neoplasm of  left lung    2. Lung cancer metastatic to bone           Plan:   Mr. Nunn is doing well clinically.  Review of his CT scan shows no evidence of progressive disease.  At this point I would recommend treatment with durvalumab.  I have discussed the rationale, alternatives and potential side effects of this treatment with him.  I had previously provided him with written information about this drug.  He is agreeable to proceed and has signed an informed consent.  We will plan to start next week.  See me again prior to cycle 2.  All questions were answered and he is agreeable with this plan.    Demetrius Infante DO, FACP  Hematology & Oncology  [facility] [place] [address]  ph. [phone]  Fax. [phone]    25 minutes were spent in coordination of patient's care, record review and counseling.  More than 50% of the time was face-to-face.

## 2018-10-17 NOTE — PROGRESS NOTES
REFERRING PHYSICIAN:  Dr. Chen    DIAGNOSIS: Oligometastatic lung adenocarcinoma, stage JOHN    INTERVAL SINCE COMPLETION: 1 month     INTERVAL HISTORY: Mr. Nunn returns a month after completing definitive chemoradiation to his left lung for his oligometastatic lung cancer.  He received 60Gy in 30 fractions with concurrent chemotherapy under the direction of Dr. Infante.  Prior to receiving chemoradiation he underwent SRS, 18Gy in a single fraction to his L2 vertebral body, which is his sole visible metastatic site.  This was performed on 8/2/18.  Since completing treatment he has felt fatigued but is improving slowly.  His dysphagia has resolved and he is eating well and gaining weight.  No significant cough.  He has had some changes in his blood pressure meds and his BP has been low at home, sometimes 60/40 which has resulted in a couple of falls from orthostatic hypotension.  He stopped his Imdur and his BP is now running 140/80 or so and he is not getting light headed.  He quit smoking 8 years ago.  Chronic back pain, unchanged.  CT chest performed yesterday shows essentially stable disease in the left lower lobe though some of the findings are likely treatment related.  No obvious progression or metastatic disease.    PHYSICAL EXAMINATION:  VITAL SIGNS: BP (!) 149/76 (BP Location: Right arm, Patient Position: Sitting)   Pulse 100   Temp 97.7 °F (36.5 °C)   Resp 18   Wt 82.1 kg (181 lb)   BMI 24.55 kg/m²   GENERAL: Patient is alert and oriented, in no acute distress.  HEENT: Extraocular muscles are intact.  Oropharynx is clear without lesions.    LYMPH: There is no cervical or supraclavicular adenopathy palpated.    CHEST: Distant breath sounds, clear bilaterally.  No rales.  No rhonchi.  Unlabored respirations.  CARDIOVASCULAR: Distant heart sounds.  Normal S1, S2.  Normal rate.  Regular rhythm.  ABDOMEN: Bowel sounds normal.  No tenderness.  No abdominal distention.  No hepatomegaly.  No  splenomegaly.  EXTREMITIES: No clubbing, cyanosis, edema.  SKIN: mild hyperpigmentation/dryness left mid back c/w radiation changes.  NEUROLOGIC: Cranial nerves II through XII are grossly intact.  Sensation is intact.  Strength is 5 out of 5 in the upper and lower extremities bilaterally. Gait is normal.    ASSESSMENT:   61 yo man 1 month s/p chemoradiation for stage JOHN adenocarcinoma of the left lung, 10 weeks s/p SRS to the L2 vertebral body oligometastasis.   He is doing well clinically, no e/o progression on 4 wk post treatment PET/CT.      PLAN:   He will see Dr. Infante today for consideration of adjuvant durvalumab.  The patient knows that I am leaving [facility] at the end of the month.  He will follow up with Dr. Infante on a regular basis.  I do not feel he needs to follow up with one of my partners regularly; however, if he or Dr. Infante feels he needs a radiation oncology evaluation he can return to our clinic at any time.  I asked him to see his cardiologist and discuss his blood pressure management and lightheadedness with her.  15 minutes were spent in follow up, of which >50% was spent in face to face counseling.

## 2018-10-17 NOTE — Clinical Note
Start treatment with durvalumab next weekHe will need CBC, CMP, TSH prior to cycle to.  Cycles are every 2 weeks.  See me prior to cycle 2.

## 2018-10-22 ENCOUNTER — TELEPHONE (OUTPATIENT)
Dept: HEMATOLOGY/ONCOLOGY | Facility: CLINIC | Age: 63
End: 2018-10-22

## 2018-10-22 NOTE — TELEPHONE ENCOUNTER
Called patient to schedule chemo and labs for Wednesday. Patient verbalize understanding.         ----- Message from Lily Thorne sent at 10/17/2018  2:26 PM CDT -----  Regarding: Pending IMFINZI® (durvalumab).  IMFINZI (durvalumab).= 2 hrs (120m) Q2W        Demetrius Infante DO, FACP  Lily Thorne    Start treatment with durvalumab next week   He will need CBC, CMP, TSH prior to cycle to.  Cycles are every 2 weeks.  See me prior to cycle 2.

## 2018-10-24 ENCOUNTER — INFUSION (OUTPATIENT)
Dept: INFUSION THERAPY | Facility: HOSPITAL | Age: 63
End: 2018-10-24
Attending: INTERNAL MEDICINE
Payer: MEDICARE

## 2018-10-24 VITALS
SYSTOLIC BLOOD PRESSURE: 124 MMHG | DIASTOLIC BLOOD PRESSURE: 76 MMHG | TEMPERATURE: 98 F | RESPIRATION RATE: 18 BRPM | WEIGHT: 182.75 LBS | BODY MASS INDEX: 24.75 KG/M2 | HEIGHT: 72 IN | HEART RATE: 86 BPM

## 2018-10-24 DIAGNOSIS — C34.92 PRIMARY MALIGNANT NEOPLASM OF LEFT LUNG: Primary | ICD-10-CM

## 2018-10-24 PROCEDURE — 63600175 PHARM REV CODE 636 W HCPCS: Mod: TB | Performed by: INTERNAL MEDICINE

## 2018-10-24 PROCEDURE — 96413 CHEMO IV INFUSION 1 HR: CPT

## 2018-10-24 PROCEDURE — 25000003 PHARM REV CODE 250: Performed by: INTERNAL MEDICINE

## 2018-10-24 PROCEDURE — C9492 INJECTION, DURVALUMAB: HCPCS | Mod: TB | Performed by: INTERNAL MEDICINE

## 2018-10-24 RX ORDER — HEPARIN 100 UNIT/ML
500 SYRINGE INTRAVENOUS
Status: DISCONTINUED | OUTPATIENT
Start: 2018-10-24 | End: 2018-10-24 | Stop reason: HOSPADM

## 2018-10-24 RX ORDER — SODIUM CHLORIDE 0.9 % (FLUSH) 0.9 %
10 SYRINGE (ML) INJECTION
Status: DISCONTINUED | OUTPATIENT
Start: 2018-10-24 | End: 2018-10-24 | Stop reason: HOSPADM

## 2018-10-24 RX ADMIN — DURVALUMAB 825 MG: 120 INJECTION, SOLUTION INTRAVENOUS at 01:10

## 2018-10-24 RX ADMIN — SODIUM CHLORIDE: 0.9 INJECTION, SOLUTION INTRAVENOUS at 01:10

## 2018-10-24 NOTE — PLAN OF CARE
Problem: Chemotherapy Effects (Adult)  Goal: Signs and Symptoms of Listed Potential Problems Will be Absent, Minimized or Managed (Chemotherapy Effects)  Signs and symptoms of listed potential problems will be absent, minimized or managed by discharge/transition of care (reference Chemotherapy Effects (Adult) CPG).   Outcome: Ongoing (interventions implemented as appropriate)  Pt here for Imfinzi infusion, no new complaints or concerns at presents, reports feeling better since completing XRT w/ appetite and energy improved; discussed treatment drug, regimen, possible side effects and how to treat, pt reports discussing same with MD Infante previously, also receiving printed ed handout for same; all pt questions answered and pt agrees to proceed

## 2018-10-29 ENCOUNTER — OFFICE VISIT (OUTPATIENT)
Dept: INTERNAL MEDICINE | Facility: CLINIC | Age: 63
End: 2018-10-29
Payer: MEDICARE

## 2018-10-29 VITALS
DIASTOLIC BLOOD PRESSURE: 70 MMHG | BODY MASS INDEX: 24.46 KG/M2 | HEART RATE: 104 BPM | WEIGHT: 180.56 LBS | OXYGEN SATURATION: 98 % | HEIGHT: 72 IN | SYSTOLIC BLOOD PRESSURE: 100 MMHG

## 2018-10-29 DIAGNOSIS — Z00.00 ROUTINE GENERAL MEDICAL EXAMINATION AT A HEALTH CARE FACILITY: Primary | ICD-10-CM

## 2018-10-29 DIAGNOSIS — I25.10 CORONARY ARTERY DISEASE INVOLVING NATIVE CORONARY ARTERY WITHOUT ANGINA PECTORIS, UNSPECIFIED WHETHER NATIVE OR TRANSPLANTED HEART: ICD-10-CM

## 2018-10-29 DIAGNOSIS — C34.92 ADENOCARCINOMA OF LEFT LUNG, STAGE 2: ICD-10-CM

## 2018-10-29 DIAGNOSIS — G89.29 CHRONIC MIDLINE LOW BACK PAIN WITHOUT SCIATICA: ICD-10-CM

## 2018-10-29 DIAGNOSIS — F32.0 CURRENT MILD EPISODE OF MAJOR DEPRESSIVE DISORDER, UNSPECIFIED WHETHER RECURRENT: ICD-10-CM

## 2018-10-29 DIAGNOSIS — C34.92 ADENOCARCINOMA OF LEFT LUNG, STAGE 4: ICD-10-CM

## 2018-10-29 DIAGNOSIS — I10 ESSENTIAL HYPERTENSION: ICD-10-CM

## 2018-10-29 DIAGNOSIS — M54.50 CHRONIC MIDLINE LOW BACK PAIN WITHOUT SCIATICA: ICD-10-CM

## 2018-10-29 PROCEDURE — 90686 IIV4 VACC NO PRSV 0.5 ML IM: CPT | Mod: PBBFAC,PN

## 2018-10-29 PROCEDURE — 3074F SYST BP LT 130 MM HG: CPT | Mod: CPTII,,, | Performed by: INTERNAL MEDICINE

## 2018-10-29 PROCEDURE — 99214 OFFICE O/P EST MOD 30 MIN: CPT | Mod: S$PBB,,, | Performed by: INTERNAL MEDICINE

## 2018-10-29 PROCEDURE — 99499 UNLISTED E&M SERVICE: CPT | Mod: S$GLB,,, | Performed by: INTERNAL MEDICINE

## 2018-10-29 PROCEDURE — 3078F DIAST BP <80 MM HG: CPT | Mod: CPTII,,, | Performed by: INTERNAL MEDICINE

## 2018-10-29 PROCEDURE — 99213 OFFICE O/P EST LOW 20 MIN: CPT | Mod: PBBFAC,PN,25 | Performed by: INTERNAL MEDICINE

## 2018-10-29 PROCEDURE — 99999 PR PBB SHADOW E&M-EST. PATIENT-LVL III: CPT | Mod: PBBFAC,,, | Performed by: INTERNAL MEDICINE

## 2018-10-29 PROCEDURE — 90732 PPSV23 VACC 2 YRS+ SUBQ/IM: CPT | Mod: PBBFAC,PN

## 2018-10-29 RX ORDER — DULOXETIN HYDROCHLORIDE 60 MG/1
60 CAPSULE, DELAYED RELEASE ORAL DAILY
Qty: 90 CAPSULE | Refills: 4 | Status: ON HOLD | OUTPATIENT
Start: 2018-10-29 | End: 2019-03-25

## 2018-10-29 RX ORDER — OXYCODONE AND ACETAMINOPHEN 5; 325 MG/1; MG/1
1 TABLET ORAL EVERY 4 HOURS PRN
Qty: 30 TABLET | Refills: 0 | Status: SHIPPED | OUTPATIENT
Start: 2018-10-29 | End: 2018-11-12 | Stop reason: SDUPTHER

## 2018-10-29 NOTE — PROGRESS NOTES
Subjective:       Patient ID: Nimesh Nunn is a 62 y.o. male.    Chief Complaint: Follow-up and Low-back Pain    HPI  Wellness check.    Chart reviewed.  Pt undergoing radiation rx,chemo for metastatic adenoca of the lung.  C/O SOB, LBP.  Pt with an apparent met at L1.  C/O SOB.  C/O depression.  Stopped Imdur due to hypotension.  No CP.  C/O poor memory.  Review of Systems   All other systems reviewed and are negative.      Objective:      Physical Exam   Constitutional: He appears well-developed. No distress.   HENT:   Head: Normocephalic.   Eyes: EOM are normal. No scleral icterus.   Neck: Normal range of motion. No tracheal deviation present.   Cardiovascular: Normal rate, regular rhythm and normal heart sounds.   Pulmonary/Chest: Effort normal. No respiratory distress.   Poor BSs   Abdominal: He exhibits no distension.   Musculoskeletal: Normal range of motion. He exhibits no edema.   Neurological: He is alert.   Skin: Skin is warm and dry. No rash noted. He is not diaphoretic. No erythema.   Purpura exts   Psychiatric: He has a normal mood and affect. His behavior is normal.   Vitals reviewed.      Assessment:       1. Routine general medical examination at a health care facility    2. Adenocarcinoma of left lung, stage 2    3. Essential hypertension    4. Coronary artery disease involving native coronary artery without angina pectoris, unspecified whether native or transplanted heart    5. Adenocarcinoma of left lung, stage 4    6. Current mild episode of major depressive disorder, unspecified whether recurrent    7. Chronic midline low back pain without sciatica        Plan:       Nimesh was seen today for follow-up and low-back pain.    Diagnoses and all orders for this visit:    Routine general medical examination at a health care facility  -     Pneumococcal Polysaccharide Vaccine (23 Valent) (SQ/IM)  -     Influenza - Quadrivalent (3 years & older) (PF)      Essential  hypertension   Well-cont    Coronary artery disease involving native coronary artery without angina pectoris, unspecified whether native or transplanted heart   Quiescent    Adenocarcinoma of left lung, stage 4   Cont rx    Current mild episode of major depressive disorder, unspecified whether recurrent  -     DULoxetine (CYMBALTA) 60 MG capsule; Take 1 capsule (60 mg total) by mouth once daily.    Chronic midline low back pain without sciatica   Percocet 5 q6h prn pain    Follow-up in about 2 months (around 12/29/2018).

## 2018-11-07 ENCOUNTER — PATIENT MESSAGE (OUTPATIENT)
Dept: HEMATOLOGY/ONCOLOGY | Facility: CLINIC | Age: 63
End: 2018-11-07

## 2018-11-09 NOTE — PROGRESS NOTES
PATIENT: Nimesh Nunn  MRN: [id number]  DATE: 11/12/2018      Diagnosis:   1. Adenocarcinoma of left lung, stage 2    2. Lung cancer metastatic to bone    3. Anemia in neoplastic disease    4. Chronic obstructive pulmonary disease, unspecified COPD type    5. Essential hypertension    6. Coronary artery disease involving native coronary artery of native heart without angina pectoris        Chief Complaint: Lung Cancer      Oncologic History:      Oncologic History Non-small cell lung cancer, left diagnosed 06/2018  Metastatic disease to bone at presentation    Oncologic Treatment Stereotactic radiation to spinal lesion  Cisplatin/pemetrexed with concurrent radiation 08/08/2018 (completed 09/2018)  Durvalumab 10/24/18    Pathology Poorly differentiated adenocarcinoma, EGFR wild type, No ALK, ROS-1 rearrangements, PD-L1 TPS 90%          Subjective:    Interval History: Mr. Nunn is a 62 y.o. male who is seen in follow-up for lung cancer and cycle 2 Durvalumab. This treatment is delayed due to car trouble last week. He fell last week and hit his left eye and right arm. He does not recall hitting his head. He reports getting dizzy after standing from a seated position and his BP dropping at the time of the fall. He did not lose consciousness and did not seek medical attention. He has not had an episode since. He denies headaches, blurred vision, unsteady gait, etc. His jaw (left) is sore but is able to eat without difficulty. The pain is improving. He takes Avapro if his BP is >120 systolic. He took the Avapro today.   Today, he feels good. Swallowing has improved.  He still has some cough and back pain.  His weight has been stable.  He is without other new complaints.    His history dates to 06/2018 when he sought medical attention for chest pain.  A CT of the chest was performed showing a left lower lobe mass measuring 7.4 cm.  There were also enlarged left hilar lymph nodes. He underwent PET-CT which showed  uptake in this mass and also in L1.  He underwent bronchoscopy and biopsy with pathology showing poorly differentiated adenocarcinoma.  Molecular studies showed EGFR wild-type and he did not harbor any ALK or ROS-1 rearrangements.  PD L1 tumor proportions core was 90%.  He underwent radiation to the spinal lesion in 08/2018 and started on concurrent chemo and radiation with cisplatin and pemetrexed in 08/2018.  He completed treatment on 09/19/2018.  He was only able to receive 2 cycles of chemotherapy secondary to cytopenias.  Review of his CT scan shows no evidence of progressive disease.    Durvalumab started 10/24/18 (q 2 weeks).    Past Medical History:   Past Medical History:   Diagnosis Date    Abdominal aortic aneurysm (AAA) without rupture 3/5/2018    Arthritis     Blood transfusion     COPD (chronic obstructive pulmonary disease)     Coronary artery disease     Dehydration 8/15/2018    Encounter for blood transfusion     Hyperlipidemia 11/7/2013    Hypertension     Lung cancer metastatic to bone 7/18/2018    Occlusion and stenosis of carotid artery without mention of cerebral infarction 1/12/2014    Primary malignant neoplasm of left lung 7/18/2018    Screening for colon cancer 6/1/2015    Syncope 1/13/2014    Thrombocytopenia 9/19/2018       Past Surgical HIstory:   Past Surgical History:   Procedure Laterality Date    ARCH & 4 VESSEL STUDY Bilateral 5/27/2014    Performed [facility] at [facility] CATH LAB    BACK SURGERY      BRONCHOSCOPY,FIBEROPTIC N/A 6/26/2018    Performed by Liana Serrano MD at [facility] OR 2ND FLR    carotid endarectomy      CATHETERIZATION, HEART, LEFT Left 11/8/2013    Performed by [facility] Martin [facility] at [facility] CATH LAB    COLONOSCOPY N/A 6/1/2015    Per[facility] at [facility] ENDO    CORONARY ANGIOPLASTY      CORONARY ARTERY BYPASS GRAFT      ENDOBRONCHIAL ULTRASOUND N/A 6/26/2018    Procedure: ULTRASOUND, ENDOBRONCHIAL;  Surgeon: Liana JOHNSON  MD Zach;  [facility];  Service: Cardiothoracic;  Laterality: N/A;    ESOPHAGOGASTRODUODENOSCOPY N/A 8/16/2018    Procedure: ESOPHAGOGASTRODUODENOSCOPY (EGD);  Surgeon: Yohannes Osuna MD;  Location: [facility] (2ND FLR);  Service: Endoscopy;  Laterality: N/A;    ESOPHAGOGASTRODUODENOSCOPY (EGD) N/A 8/16/2018    Performed by Y[facility]annes Osuna MD at [facility] ENDO (2ND FLR)    HEART CATH-LEFT N/A 5/27/2014    Performed by [facility]hi Martin MD at [facility] CATH LAB    INSERTION, STENT, CORONARY ARTERY N/A 12/20/2013    Performed by [facility]ekhi Martin MD at [facility] CATH LAB    PTA, PERIPHERAL BLOOD VESSEL Left 3/11/2014    Performed by Mekhi Martin MD at [facility] CATH LAB    PTA, PERIPHERAL BLOOD VESSEL N/A 2/18/2014    Performed by Mekhi Martin MD at [facility] CATH LAB    SKIN BIOPSY      ULTRASOUND, ENDOBRONCHIAL N/A 6/26/2018    [facility]       Family History:   Family History   Problem Relation Age of Onset    Heart disease Father     Hypertension Father     Heart attack Father     Heart failure Father     Cancer Mother     Breast cancer Mother     No Known Problems Sister     No Known Problems Brother     No Known Problems Maternal Grandmother     No Known Problems Maternal Grandfather     No Known Problems Paternal Grandmother     No Known Problems Paternal Grandfather     No Known Problems Maternal Aunt     No Known Problems Maternal Uncle     No Known Problems Paternal Aunt     No Known Problems Paternal Uncle     Anemia Neg Hx     Arrhythmia Neg Hx     Asthma Neg Hx     Clotting disorder Neg Hx     Fainting Neg Hx     Hyperlipidemia Neg Hx        Social History:  reports that he quit smoking about 7 years ago. He has a 60.00 pack-year smoking history. he has never used smokeless tobacco. He reports that he drinks about 1.8 oz of alcohol per week. He reports that he uses drugs. Drug: Marijuana.    Allergies:  Review of patient's allergies  indicates:  No Known Allergies    Medications:  Current Outpatient Medications   Medication Sig Dispense Refill    aspirin (ECOTRIN) 81 MG EC tablet Take 81 mg by mouth once daily.      atorvastatin (LIPITOR) 80 MG tablet TAKE 1 TABLET BY MOUTH EVERY DAY 30 tablet 11    cilostazol (PLETAL) 100 MG Tab TAKE 1 TABLET BY MOUTH TWICE DAILY 60 tablet 11    clopidogrel (PLAVIX) 75 mg tablet TAKE 1 TABLET BY MOUTH EVERY DAY 90 tablet 4    DULoxetine (CYMBALTA) 60 MG capsule Take 1 capsule (60 mg total) by mouth once daily. 90 capsule 4    finasteride (PROSCAR) 5 mg tablet Take 1 tablet (5 mg total) by mouth once daily. 90 tablet 3    folic acid (FOLVITE) 400 MCG tablet Take 1 tablet (400 mcg total) by mouth once daily. 100 tablet 3    irbesartan (AVAPRO) 75 MG tablet Take 1 tablet (75 mg total) by mouth once daily. 90 tablet 3    metoprolol succinate (TOPROL-XL) 50 MG 24 hr tablet Take 50 mg by mouth.      nitroGLYCERIN (NITROSTAT) 0.4 MG SL tablet Place 1 tablet (0.4 mg total) under the tongue every 5 (five) minutes as needed for Chest pain. 90 tablet 1    ondansetron (ZOFRAN-ODT) 8 MG TbDL Take 1 tablet (8 mg total) by mouth every 12 (twelve) hours as needed. 30 tablet 1    PROAIR HFA 90 mcg/actuation inhaler Inhale 2 puffs into the lungs as needed. 18 g 6    oxyCODONE-acetaminophen (PERCOCET) 5-325 mg per tablet Take 1 tablet by mouth every 4 (four) hours as needed for Pain. 30 tablet 0    umeclidinium-vilanterol (ANORO ELLIPTA) 62.5-25 mcg/actuation DsDv Inhale 1 puff into the lungs once daily. Controller 1 each 5     No current facility-administered medications for this visit.      Facility-Administered Medications Ordered in Other Visits   Medication Dose Route Frequency Provider Last Rate Last Dose    alteplase injection 2 mg  2 mg Intra-Catheter PRN Demetrius Infante DO, FACP        durvalumab (IMFINZI) 825 mg in sodium chloride 0.9% 266.5 mL chemo infusion  10 mg/kg (Treatment Plan Recorded)  Intravenous 1 time in Clinic/HOD Demetrius Infante DO, RONALD        heparin, porcine (PF) 100 unit/mL injection flush 500 Units  500 Units Intravenous PRN Demetrius Infante DO, FACP        sodium chloride 0.9% 100 mL flush bag   Intravenous 1 time in Clinic/HOD Demetrius Infante DO, RONALD        sodium chloride 0.9% flush 10 mL  10 mL Intravenous PRN Demetrius Infante DO, RONALD           Review of Systems   Constitutional: Positive for fatigue. Negative for appetite change, chills, fever and unexpected weight change.   HENT: Negative for dental problem, sinus pressure and sneezing.    Eyes: Negative for visual disturbance.   Respiratory: Positive for cough. Negative for choking, chest tightness and shortness of breath.    Cardiovascular: Negative for chest pain and leg swelling.   Gastrointestinal: Negative for abdominal pain, blood in stool, constipation, diarrhea and nausea.   Genitourinary: Negative for difficulty urinating, dysuria and frequency.   Musculoskeletal: Positive for back pain. Negative for arthralgias.   Skin: Negative for rash and wound.   Neurological: Negative for dizziness, weakness, light-headedness and headaches.   Hematological: Negative for adenopathy. Does not bruise/bleed easily.   Psychiatric/Behavioral: Negative for sleep disturbance. The patient is nervous/anxious.        ECOG Performance Status:   ECOG SCORE           Objective:      Vitals:   Vitals:    11/12/18 0838   BP: 114/64   Pulse: 88   Resp: 20   Temp: 97.8 °F (36.6 °C)   SpO2: 95%   Weight: 79.6 kg (175 lb 7.8 oz)   Height: 6' (1.829 m)     BMI: Body mass index is 23.8 kg/m².    Physical Exam   Constitutional: He is oriented to person, place, and time. He appears well-developed and well-nourished.   HENT:   Head: Normocephalic and atraumatic.   Eyes: Pupils are equal, round, and reactive to light.   Neck: Normal range of motion. Neck supple.   Cardiovascular: Normal rate and regular rhythm.   Pulmonary/Chest: Effort  normal. No respiratory distress. He has no wheezes.   Abdominal: Soft. He exhibits no distension. There is no tenderness.   Musculoskeletal: He exhibits no edema or tenderness.   Lymphadenopathy:     He has no cervical adenopathy.   Neurological: He is alert and oriented to person, place, and time. No cranial nerve deficit or sensory deficit. He exhibits normal muscle tone. Coordination normal.   Skin: Skin is warm and dry.   Flat purple/yellowish bruising underneath left eye and to right upper arm.  Very small hematoma to right upper arm noted. No swelling.    Psychiatric: He has a normal mood and affect. His behavior is normal.       Laboratory Data:  Lab Visit on 11/12/2018   Component Date Value Ref Range Status    WBC 11/12/2018 5.64  3.90 - 12.70 K/uL Final    RBC 11/12/2018 3.03* 4.60 - 6.20 M/uL Final    Hemoglobin 11/12/2018 9.7* 14.0 - 18.0 g/dL Final    Hematocrit 11/12/2018 29.4* 40.0 - 54.0 % Final    MCV 11/12/2018 97  82 - 98 fL Final    MCH 11/12/2018 32.0* 27.0 - 31.0 pg Final    MCHC 11/12/2018 33.0  32.0 - 36.0 g/dL Final    RDW 11/12/2018 13.7  11.5 - 14.5 % Final    Platelets 11/12/2018 200  150 - 350 K/uL Final    MPV 11/12/2018 8.9* 9.2 - 12.9 fL Final    Gran # (ANC) 11/12/2018 3.8  1.8 - 7.7 K/uL Final    Comment: The ANC is based on a white cell differential from an   automated cell counter. It has not been microscopically   reviewed for the presence of abnormal cells. Clinical   correlation is required.      Immature Grans (Abs) 11/12/2018 0.03  0.00 - 0.04 K/uL Final    Comment: Mild elevation in immature granulocytes is non specific and   can be seen in a variety of conditions including stress response,   acute inflammation, trauma and pregnancy. Correlation with other   laboratory and clinical findings is essential.      Sodium 11/12/2018 137  136 - 145 mmol/L Final    Potassium 11/12/2018 4.6  3.5 - 5.1 mmol/L Final    Chloride 11/12/2018 100  95 - 110 mmol/L Final     CO2 11/12/2018 25  23 - 29 mmol/L Final    Glucose 11/12/2018 83  70 - 110 mg/dL Final    BUN, Bld 11/12/2018 24* 8 - 23 mg/dL Final    Creatinine 11/12/2018 1.2  0.5 - 1.4 mg/dL Final    Calcium 11/12/2018 9.6  8.7 - 10.5 mg/dL Final    Total Protein 11/12/2018 7.6  6.0 - 8.4 g/dL Final    Albumin 11/12/2018 3.4* 3.5 - 5.2 g/dL Final    Total Bilirubin 11/12/2018 0.5  0.1 - 1.0 mg/dL Final    Comment: For infants and newborns, interpretation of results should be based  on gestational age, weight and in agreement with clinical  observations.  Premature Infant recommended reference ranges:  Up to 24 hours.............<8.0 mg/dL  Up to 48 hours............<12.0 mg/dL  3-5 days..................<15.0 mg/dL  6-29 days.................<15.0 mg/dL      Alkaline Phosphatase 11/12/2018 151* 55 - 135 U/L Final    AST 11/12/2018 23  10 - 40 U/L Final    ALT 11/12/2018 14  10 - 44 U/L Final    Anion Gap 11/12/2018 12  8 - 16 mmol/L Final    eGFR if African American 11/12/2018 >60.0  >60 mL/min/1.73 m^2 Final    eGFR if non African American 11/12/2018 >60.0  >60 mL/min/1.73 m^2 Final    Comment: Calculation used to obtain the estimated glomerular filtration  rate (eGFR) is the CKD-EPI equation.       TSH 11/12/2018 1.052  0.400 - 4.000 uIU/mL Final         Imaging:   CT 10/16/2018  COMPARISON:  A CT examination of the thorax performed on 06/10/2018.    FINDINGS:  The size of heart is within normal limits.  There is a moderate amount of atherosclerosis.  There is an ill-defined mass in the medial aspect of the left lower lobe that measures 5.3 cm in craniocaudal dimension by 5.8 cm in AP dimension by 4.3 cm in medial-lateral dimension on the current examination.  On the prior examination it measured 4.6 cm in craniocaudal dimension by 6.1 cm in AP dimension by 4.5 cm in medial-lateral dimension.  There has been interval development of several patchy areas of increased density in the posterior aspect of the left  upper lobe.  The larger area measures 27 mm.  The right lung is clear.  There is no pneumothorax or pleural effusion.  There is a 6 mm stone in the dependent portion of the gallbladder.      Impression       1. There is an ill-defined mass in the medial aspect of the left lower lobe that measures 5.3 cm in craniocaudal dimension by 5.8 cm in AP dimension by 4.3 cm in medial-lateral dimension on the current examination. On the prior examination it measured 4.6 cm in craniocaudal dimension by 6.1 cm in AP dimension by 4.5 cm in medial-lateral dimension. There has been interval development of several patchy areas of increased density in the posterior aspect of the left upper lobe.  The larger area measures 27 mm.  This is consistent with the patient's history and characteristic of lung cancer.  2. There is a 6 mm stone in the dependent portion of the gallbladder.  All CT scans at this facility use dose modulation, iterative reconstruction, and/or weight base dosing when appropriate to reduce radiation dose when appropriate to reduce radiation dose to as low as reasonably achievable.              Assessment:       1. Adenocarcinoma of left lung, stage 2    2. Lung cancer metastatic to bone    3. Anemia in neoplastic disease    4. Chronic obstructive pulmonary disease, unspecified COPD type    5. Essential hypertension    6. Coronary artery disease involving native coronary artery of native heart without angina pectoris           Plan:   1,2. Mr. Nunn is doing well clinically. Labs reviewed. Proceed with Cycle 2 Durvalumab.   RTC in 2 weeks to see me or Dr. Infante with a CBC, CMP, TSH, T4 and for cycle 3 Durvalumab.   Durvalumab q 2 weeks.   Refill percocet.   3. Parameters slightly improved.   4. Stable, no recent exacerbation noted.   5,6. Recent episode of hypotension with fall. Encouraged to continue to hold Avapro and record daily BP's. He can take Avapro if Systolic is over 160. He will make an appointment  with Cards in regards to possible medication changes for better BP control.     In regards to his fall last week- He understands that if he develops headaches, blurred vision, gait changes, chest pain etc., he is to report to the ED for further work up. We discussed that he is on Plavix/ASA and is at risk for bleeding. Encouraged oral hydration as BUN slightly elevated.   He was started on Pantoprazole while undergoing XRT. He is not having any issues with GERD, ok to stop.      Patient is in agreement with the proposed treatment plan. All questions were answered to the patient's satisfaction. Pt knows to call clinic for any new or worsening symptoms and if anything is needed before the next clinic visit.      DANELLE Hurtado-SANA  Hematology & Oncology  [facility] [place] [address]  ph. [phone]  Fax. [phone]     I spent 30 minutes (face to face) with the patient, more than 50% was in counseling and coordination of care as detailed above.     Distress Screening Results: Psychosocial Distress screening score of Distress Score: 6 noted and reviewed. No intervention indicated.

## 2018-11-12 ENCOUNTER — INFUSION (OUTPATIENT)
Dept: INFUSION THERAPY | Facility: HOSPITAL | Age: 63
End: 2018-11-12
Attending: INTERNAL MEDICINE
Payer: MEDICARE

## 2018-11-12 ENCOUNTER — OFFICE VISIT (OUTPATIENT)
Dept: HEMATOLOGY/ONCOLOGY | Facility: CLINIC | Age: 63
End: 2018-11-12
Payer: MEDICARE

## 2018-11-12 VITALS
TEMPERATURE: 98 F | HEART RATE: 76 BPM | RESPIRATION RATE: 17 BRPM | SYSTOLIC BLOOD PRESSURE: 90 MMHG | DIASTOLIC BLOOD PRESSURE: 55 MMHG

## 2018-11-12 VITALS
OXYGEN SATURATION: 95 % | WEIGHT: 175.5 LBS | TEMPERATURE: 98 F | HEIGHT: 72 IN | DIASTOLIC BLOOD PRESSURE: 64 MMHG | BODY MASS INDEX: 23.77 KG/M2 | SYSTOLIC BLOOD PRESSURE: 114 MMHG | HEART RATE: 88 BPM | RESPIRATION RATE: 20 BRPM

## 2018-11-12 DIAGNOSIS — C34.90 LUNG CANCER METASTATIC TO BONE: ICD-10-CM

## 2018-11-12 DIAGNOSIS — D63.0 ANEMIA IN NEOPLASTIC DISEASE: ICD-10-CM

## 2018-11-12 DIAGNOSIS — C34.92 ADENOCARCINOMA OF LEFT LUNG, STAGE 2: Primary | ICD-10-CM

## 2018-11-12 DIAGNOSIS — C79.51 LUNG CANCER METASTATIC TO BONE: ICD-10-CM

## 2018-11-12 DIAGNOSIS — I25.10 CORONARY ARTERY DISEASE INVOLVING NATIVE CORONARY ARTERY OF NATIVE HEART WITHOUT ANGINA PECTORIS: ICD-10-CM

## 2018-11-12 DIAGNOSIS — J44.9 CHRONIC OBSTRUCTIVE PULMONARY DISEASE, UNSPECIFIED COPD TYPE: ICD-10-CM

## 2018-11-12 DIAGNOSIS — I10 ESSENTIAL HYPERTENSION: ICD-10-CM

## 2018-11-12 DIAGNOSIS — C34.92 PRIMARY MALIGNANT NEOPLASM OF LEFT LUNG: Primary | ICD-10-CM

## 2018-11-12 PROCEDURE — 63600175 PHARM REV CODE 636 W HCPCS: Mod: TB | Performed by: INTERNAL MEDICINE

## 2018-11-12 PROCEDURE — 25000003 PHARM REV CODE 250: Performed by: INTERNAL MEDICINE

## 2018-11-12 PROCEDURE — 96413 CHEMO IV INFUSION 1 HR: CPT

## 2018-11-12 PROCEDURE — 99999 PR PBB SHADOW E&M-EST. PATIENT-LVL V: CPT | Mod: PBBFAC,,, | Performed by: NURSE PRACTITIONER

## 2018-11-12 PROCEDURE — 3078F DIAST BP <80 MM HG: CPT | Mod: CPTII,S$GLB,, | Performed by: NURSE PRACTITIONER

## 2018-11-12 PROCEDURE — C9492 INJECTION, DURVALUMAB: HCPCS | Mod: TB | Performed by: INTERNAL MEDICINE

## 2018-11-12 PROCEDURE — 99215 OFFICE O/P EST HI 40 MIN: CPT | Mod: S$GLB,,, | Performed by: NURSE PRACTITIONER

## 2018-11-12 PROCEDURE — 3074F SYST BP LT 130 MM HG: CPT | Mod: CPTII,S$GLB,, | Performed by: NURSE PRACTITIONER

## 2018-11-12 PROCEDURE — A4216 STERILE WATER/SALINE, 10 ML: HCPCS | Performed by: INTERNAL MEDICINE

## 2018-11-12 PROCEDURE — 3008F BODY MASS INDEX DOCD: CPT | Mod: CPTII,S$GLB,, | Performed by: NURSE PRACTITIONER

## 2018-11-12 RX ORDER — SODIUM CHLORIDE 0.9 % (FLUSH) 0.9 %
10 SYRINGE (ML) INJECTION
Status: DISCONTINUED | OUTPATIENT
Start: 2018-11-12 | End: 2018-11-12 | Stop reason: HOSPADM

## 2018-11-12 RX ORDER — OXYCODONE AND ACETAMINOPHEN 5; 325 MG/1; MG/1
1 TABLET ORAL EVERY 4 HOURS PRN
Qty: 30 TABLET | Refills: 0 | Status: SHIPPED | OUTPATIENT
Start: 2018-11-12 | End: 2018-11-26

## 2018-11-12 RX ORDER — HEPARIN 100 UNIT/ML
500 SYRINGE INTRAVENOUS
Status: CANCELLED | OUTPATIENT
Start: 2018-11-12

## 2018-11-12 RX ORDER — SODIUM CHLORIDE 0.9 % (FLUSH) 0.9 %
10 SYRINGE (ML) INJECTION
Status: CANCELLED | OUTPATIENT
Start: 2018-11-12

## 2018-11-12 RX ORDER — HEPARIN 100 UNIT/ML
500 SYRINGE INTRAVENOUS
Status: DISCONTINUED | OUTPATIENT
Start: 2018-11-12 | End: 2018-11-12 | Stop reason: HOSPADM

## 2018-11-12 RX ADMIN — DURVALUMAB 825 MG: 500 INJECTION, SOLUTION INTRAVENOUS at 10:11

## 2018-11-12 RX ADMIN — Medication 10 ML: at 11:11

## 2018-11-21 NOTE — PROGRESS NOTES
PATIENT: Nimesh Nunn  MRN: [id number]  DATE: 11/26/2018      Diagnosis:   1. Adenocarcinoma of left lung, stage 2    2. Acute bilateral low back pain, with sciatica presence unspecified    3. Weakness of both lower extremities    4. Urinary incontinence, unspecified type    5. Lung cancer metastatic to bone    6. Neoplasm related pain    7. Anemia in neoplastic disease    8. Chronic obstructive pulmonary disease, unspecified COPD type        Chief Complaint: Adenocarcinoma of left lung, stage 2      Oncologic History:      Oncologic History Non-small cell lung cancer, left diagnosed 06/2018  Metastatic disease to bone at presentation    Oncologic Treatment Stereotactic radiation to spinal lesion  Cisplatin/pemetrexed with concurrent radiation 08/08/2018 (completed 09/2018)  Durvalumab 10/24/18    Pathology Poorly differentiated adenocarcinoma, EGFR wild type, No ALK, ROS-1 rearrangements, PD-L1 TPS 90%          Subjective:    Interval History: Mr. Nunn is a 62 y.o. male who is seen in follow-up for lung cancer and cycle 3 Durvalumab (Durvalumab q 2 weeks). He reports more back pain, leg weakness and had 2 episodes of urinary incontinence since he was here last. He was standing at the kitchen counter last night and notice warm urine running down his leg.  He notices slightly more SOB on exertion and occasional lightheadedness. He stopped the Cymbalta last week and has notice the lightheadedness has improved. No recent falls/injury. No numbness of extremities.     His history dates to 06/2018 when he sought medical attention for chest pain.  A CT of the chest was performed showing a left lower lobe mass measuring 7.4 cm.  There were also enlarged left hilar lymph nodes. He underwent PET-CT which showed uptake in this mass and also in L1.  He underwent bronchoscopy and biopsy with pathology showing poorly differentiated adenocarcinoma.  Molecular studies showed EGFR wild-type and he did not harbor any ALK  or ROS-1 rearrangements.  PD L1 tumor proportions core was 90%.  He underwent radiation to the spinal lesion in 08/2018 and started on concurrent chemo and radiation with cisplatin and pemetrexed in 08/2018.  He completed treatment on 09/19/2018.  He was only able to receive 2 cycles of chemotherapy secondary to cytopenias.  Review of his CT scan shows no evidence of progressive disease.    Durvalumab started 10/24/18 (q 2 weeks).    Past Medical History:   Past Medical History:   Diagnosis Date    Abdominal aortic aneurysm (AAA) without rupture 3/5/2018    Arthritis     Blood transfusion     COPD (chronic obstructive pulmonary disease)     Coronary artery disease     Dehydration 8/15/2018    Encounter for blood transfusion     Hyperlipidemia 11/7/2013    Hypertension     Lung cancer metastatic to bone 7/18/2018    Occlusion and stenosis of carotid artery without mention of cerebral infarction 1/12/2014    Primary malignant neoplasm of left lung 7/18/2018    Screening for colon cancer 6/1/2015    Syncope 1/13/2014    Thrombocytopenia 9/19/2018       Past Surgical HIstory:   Past Surgical History:   Procedure Laterality Date    ARCH & 4 VESSEL STUDY Bilateral 5/27/2014    Performed by [facility]ekhi Martin MD at [facility] CATH LAB    BACK SURGERY      BRONCHOSCOPY,FIBEROPTIC N/A 6/26/2018    Performed by Liana Serrano MD at [facility] OR [address] [place] [address]    carotid endarectomy      CATHETERIZATION, HEART, LEFT Left 11/8/2013    Performed by Mekhi Martin MD at [facility] CATH LAB    COLONOSCOPY N/A 6/1/2015    Performed by [facility]celmo Balderrama [facility] at [facility] ENDO    CORONARY ANGIOPLASTY      CORONARY ARTERY BYPASS GRAFT      ENDOBRONCHIAL ULTRASOUND N/A 6/26/2018    Procedure: ULTRASOUND, ENDOBRONCHIAL;  Surgeon: Liana Serrano MD;  Location: [facility] OR [address] [place] [address];  Service: Cardiothoracic;  Laterality: N/A;    ESOPHAGOGASTRODUODENOSCOPY N/A 8/16/2018    Procedure: ESOPHAGOGASTRODUODENOSCOPY (EGD);  Surgeon: Yohannes Osuna MD;   Location: [facility] (2ND FLR);  Service: Endoscopy;  Laterality: N/A;    ESOPHAGOGASTRODUODENOSCOPY (EGD) N/A 8/16/2018    Performed by Y[facility]annes Osuna MD at [facility] ENDO (2ND FLR)    HEART CATH-LEFT N/A 5/27/2014    Performed by Mekhi Martin MD at [facility] CATH LAB    INSERTION, STENT, CORONARY ARTERY N/A 12/20/2013    Performed by Mekhi Martin MD at [facility] CATH LAB    PTA, PERIPHERAL BLOOD VESSEL Left 3/11/2014    Performed by Mekhi Martin MD at [facility] CATH LAB    PTA, PERIPHERAL BLOOD VESSEL N/A 2/18/2014    Performed by Mekhi Martin MD at [facility] CATH LAB    SKIN BIOPSY      ULTRASOUND, ENDOBRONCHIAL N/A 6/26/2018    Performed by Liana Serrano MD at [facility] OR 2ND FLR       Family History:   Family History   Problem Relation Age of Onset    Heart disease Father     Hypertension Father     Heart attack Father     Heart failure Father     Cancer Mother     Breast cancer Mother     No Known Problems Sister     No Known Problems Brother     No Known Problems Maternal Grandmother     No Known Problems Maternal Grandfather     No Known Problems Paternal Grandmother     No Known Problems Paternal Grandfather     No Known Problems Maternal Aunt     No Known Problems Maternal Uncle     No Known Problems Paternal Aunt     No Known Problems Paternal Uncle     Anemia Neg Hx     Arrhythmia Neg Hx     Asthma Neg Hx     Clotting disorder Neg Hx     Fainting Neg Hx     Hyperlipidemia Neg Hx        Social History:  reports that he quit smoking about 7 years ago. He has a 60.00 pack-year smoking history. he has never used smokeless tobacco. He reports that he drinks about 1.8 oz of alcohol per week. He reports that he uses drugs. Drug: Marijuana.    Allergies:  Review of patient's allergies indicates:  No Known Allergies    Medications:  Current Outpatient Medications   Medication Sig Dispense Refill    aspirin (ECOTRIN) 81 MG EC tablet Take 81 mg by mouth once daily.      atorvastatin (LIPITOR)  80 MG tablet TAKE 1 TABLET BY MOUTH EVERY DAY 30 tablet 11    cilostazol (PLETAL) 100 MG Tab TAKE 1 TABLET BY MOUTH TWICE DAILY 60 tablet 11    clopidogrel (PLAVIX) 75 mg tablet TAKE 1 TABLET BY MOUTH EVERY DAY 90 tablet 4    DULoxetine (CYMBALTA) 60 MG capsule Take 1 capsule (60 mg total) by mouth once daily. 90 capsule 4    finasteride (PROSCAR) 5 mg tablet Take 1 tablet (5 mg total) by mouth once daily. 90 tablet 3    folic acid (FOLVITE) 400 MCG tablet Take 1 tablet (400 mcg total) by mouth once daily. 100 tablet 3    irbesartan (AVAPRO) 75 MG tablet Take 1 tablet (75 mg total) by mouth once daily. 90 tablet 3    metoprolol succinate (TOPROL-XL) 50 MG 24 hr tablet Take 50 mg by mouth.      nitroGLYCERIN (NITROSTAT) 0.4 MG SL tablet Place 1 tablet (0.4 mg total) under the tongue every 5 (five) minutes as needed for Chest pain. 90 tablet 1    ondansetron (ZOFRAN-ODT) 8 MG TbDL Take 1 tablet (8 mg total) by mouth every 12 (twelve) hours as needed. 30 tablet 1    PROAIR HFA 90 mcg/actuation inhaler Inhale 2 puffs into the lungs as needed. 18 g 6    umeclidinium-vilanterol (ANORO ELLIPTA) 62.5-25 mcg/actuation DsDv Inhale 1 puff into the lungs once daily. Controller 1 each 5    oxyCODONE-acetaminophen (PERCOCET)  mg per tablet Take 1 tablet by mouth every 4 (four) hours as needed for Pain. 90 tablet 0     No current facility-administered medications for this visit.        Review of Systems   Constitutional: Positive for fatigue. Negative for appetite change, chills, fever and unexpected weight change.   HENT: Negative for dental problem, sinus pressure and sneezing.    Eyes: Negative for visual disturbance.   Respiratory: Positive for cough and shortness of breath (on exertion). Negative for choking and chest tightness.    Cardiovascular: Negative for chest pain and leg swelling.   Gastrointestinal: Negative for abdominal pain, blood in stool, constipation, diarrhea and nausea.   Genitourinary:  Negative for difficulty urinating, dysuria and frequency.        Incontinence     Musculoskeletal: Positive for back pain and gait problem. Negative for arthralgias.   Skin: Negative for rash and wound.   Neurological: Positive for weakness (legs) and light-headedness (improving since stopping cymbalta). Negative for dizziness and headaches.   Hematological: Negative for adenopathy. Does not bruise/bleed easily.   Psychiatric/Behavioral: Negative for sleep disturbance. The patient is nervous/anxious.        ECOG Performance Status:   ECOG SCORE    1 - Restricted in strenuous activity-ambulatory and able to carry out work of a light nature         Objective:      Vitals:   Vitals:    11/26/18 0901 11/26/18 0908   BP: (!) 170/81 (!) 155/88   BP Location: Right arm Right arm   Patient Position: Sitting Sitting   BP Method: Medium (Automatic) Medium (Automatic)   Pulse: (!) 112    Resp: 20    Temp: 98 °F (36.7 °C)    TempSrc: Oral    SpO2: 95%    Weight: 80 kg (176 lb 5.9 oz)    Height: 6' (1.829 m)      BMI: Body mass index is 23.92 kg/m².    Physical Exam   Constitutional: He is oriented to person, place, and time. He appears well-developed and well-nourished.   HENT:   Head: Normocephalic and atraumatic.   Eyes: Pupils are equal, round, and reactive to light.   Neck: Normal range of motion. Neck supple.   Cardiovascular: Normal rate and regular rhythm.   Pulmonary/Chest: Effort normal. No respiratory distress. He has no wheezes.   Abdominal: Soft. He exhibits no distension. There is no tenderness.   Musculoskeletal: He exhibits no edema or tenderness.   Bilateral leg weakness.   Spinal tenderness   Lymphadenopathy:     He has no cervical adenopathy.   Neurological: He is alert and oriented to person, place, and time. He displays abnormal reflex (LE's). No cranial nerve deficit or sensory deficit. He exhibits normal muscle tone. Coordination normal.   Skin: Skin is warm and dry.   Flat purple/yellowish bruising  underneath left eye-  improved from last visit.    Psychiatric: He has a normal mood and affect. His behavior is normal.       Laboratory Data:  Lab Visit on 11/26/2018   Component Date Value Ref Range Status    WBC 11/26/2018 5.54  3.90 - 12.70 K/uL Final    RBC 11/26/2018 3.04* 4.60 - 6.20 M/uL Final    Hemoglobin 11/26/2018 9.6* 14.0 - 18.0 g/dL Final    Hematocrit 11/26/2018 29.0* 40.0 - 54.0 % Final    MCV 11/26/2018 95  82 - 98 fL Final    MCH 11/26/2018 31.6* 27.0 - 31.0 pg Final    MCHC 11/26/2018 33.1  32.0 - 36.0 g/dL Final    RDW 11/26/2018 13.5  11.5 - 14.5 % Final    Platelets 11/26/2018 216  150 - 350 K/uL Final    MPV 11/26/2018 8.4* 9.2 - 12.9 fL Final    Gran # (ANC) 11/26/2018 3.4  1.8 - 7.7 K/uL Final    Comment: The ANC is based on a white cell differential from an   automated cell counter. It has not been microscopically   reviewed for the presence of abnormal cells. Clinical   correlation is required.      Immature Grans (Abs) 11/26/2018 0.04  0.00 - 0.04 K/uL Final    Comment: Mild elevation in immature granulocytes is non specific and   can be seen in a variety of conditions including stress response,   acute inflammation, trauma and pregnancy. Correlation with other   laboratory and clinical findings is essential.      Sodium 11/26/2018 139  136 - 145 mmol/L Final    Potassium 11/26/2018 4.6  3.5 - 5.1 mmol/L Final    Chloride 11/26/2018 101  95 - 110 mmol/L Final    CO2 11/26/2018 28  23 - 29 mmol/L Final    Glucose 11/26/2018 106  70 - 110 mg/dL Final    BUN, Bld 11/26/2018 34* 8 - 23 mg/dL Final    Creatinine 11/26/2018 1.2  0.5 - 1.4 mg/dL Final    Calcium 11/26/2018 9.8  8.7 - 10.5 mg/dL Final    Total Protein 11/26/2018 8.0  6.0 - 8.4 g/dL Final    Albumin 11/26/2018 3.3* 3.5 - 5.2 g/dL Final    Total Bilirubin 11/26/2018 0.4  0.1 - 1.0 mg/dL Final    Comment: For infants and newborns, interpretation of results should be based  on gestational age, weight and in  agreement with clinical  observations.  Premature Infant recommended reference ranges:  Up to 24 hours.............<8.0 mg/dL  Up to 48 hours............<12.0 mg/dL  3-5 days..................<15.0 mg/dL  6-29 days.................<15.0 mg/dL      Alkaline Phosphatase 11/26/2018 155* 55 - 135 U/L Final    AST 11/26/2018 17  10 - 40 U/L Final    ALT 11/26/2018 11  10 - 44 U/L Final    Anion Gap 11/26/2018 10  8 - 16 mmol/L Final    eGFR if African American 11/26/2018 >60.0  >60 mL/min/1.73 m^2 Final    eGFR if non African American 11/26/2018 >60.0  >60 mL/min/1.73 m^2 Final    Comment: Calculation used to obtain the estimated glomerular filtration  rate (eGFR) is the CKD-EPI equation.       TSH 11/26/2018 2.162  0.400 - 4.000 uIU/mL Final    Free T4 11/26/2018 0.84  0.71 - 1.51 ng/dL Final         Imaging:   CT 10/16/2018  COMPARISON:  A CT examination of the thorax performed on 06/10/2018.    FINDINGS:  The size of heart is within normal limits.  There is a moderate amount of atherosclerosis.  There is an ill-defined mass in the medial aspect of the left lower lobe that measures 5.3 cm in craniocaudal dimension by 5.8 cm in AP dimension by 4.3 cm in medial-lateral dimension on the current examination.  On the prior examination it measured 4.6 cm in craniocaudal dimension by 6.1 cm in AP dimension by 4.5 cm in medial-lateral dimension.  There has been interval development of several patchy areas of increased density in the posterior aspect of the left upper lobe.  The larger area measures 27 mm.  The right lung is clear.  There is no pneumothorax or pleural effusion.  There is a 6 mm stone in the dependent portion of the gallbladder.      Impression       1. There is an ill-defined mass in the medial aspect of the left lower lobe that measures 5.3 cm in craniocaudal dimension by 5.8 cm in AP dimension by 4.3 cm in medial-lateral dimension on the current examination. On the prior examination it measured 4.6 cm  in craniocaudal dimension by 6.1 cm in AP dimension by 4.5 cm in medial-lateral dimension. There has been interval development of several patchy areas of increased density in the posterior aspect of the left upper lobe.  The larger area measures 27 mm.  This is consistent with the patient's history and characteristic of lung cancer.  2. There is a 6 mm stone in the dependent portion of the gallbladder.  All CT scans at this facility use dose modulation, iterative reconstruction, and/or weight base dosing when appropriate to reduce radiation dose when appropriate to reduce radiation dose to as low as reasonably achievable.              Assessment:       1. Adenocarcinoma of left lung, stage 2    2. Acute bilateral low back pain, with sciatica presence unspecified    3. Weakness of both lower extremities    4. Urinary incontinence, unspecified type    5. Lung cancer metastatic to bone    6. Neoplasm related pain    7. Anemia in neoplastic disease    8. Chronic obstructive pulmonary disease, unspecified COPD type           Plan:   Mr. Nunn is due for cycle 3 Durvalumab, however he is complaining of worsening back pain, leg weakness, and incontinence. Most recent MRI lumbar/spine reviewed. He understands the urgency to r/o cord compression. Escorted to ED in stable condition to rule out cord compression. Report called to Lynsey in ED. I will send in a higher dose of Percocet for better pain control in the event he is d/c from ED.   Follow up to be determined. Hold chemo today.       Addendum: No cord compression per ED. Spoke to patient this morning. Pain significantly improved with high dose of Percocet. Labs reviewed. Plan to resume treatment this week cycle 3 Durvalumab. Will check u/a and refer to urology for incontinence. Will also send back to rad/onc for evaluation.   RTC in 2 weeks to see me or Dr. Infante with a CBC, CMP, TSH, T4 and for cycle 4 Durvalumab.   Durvalumab q 2 weeks.   Message to  nurse/.   Discussed above with Dr. Infante.     Appts made:   Rad onc appt 11/28  Urology 11/29  Chemo 11/29    Patient is in agreement with the proposed treatment plan. All questions were answered to the patient's satisfaction. Pt knows to call clinic for any new or worsening symptoms and if anything is needed before the next clinic visit. Discussed case with Dr. Infante who agrees with above.       DANELLE Hurtado-SANA  Hematology & Oncology  [address] [place] [id number]  ph. [phone]  Fax. [phone]     I spent 60 minutes (face to face) with the patient, more than 50% was in counseling and coordination of care as detailed above.       Distress Screening Results: Psychosocial Distress screening score of Distress Score: 7 noted and reviewed. No intervention indicated.

## 2018-11-26 ENCOUNTER — TELEPHONE (OUTPATIENT)
Dept: HEMATOLOGY/ONCOLOGY | Facility: CLINIC | Age: 63
End: 2018-11-26

## 2018-11-26 ENCOUNTER — HOSPITAL ENCOUNTER (EMERGENCY)
Facility: HOSPITAL | Age: 63
Discharge: HOME | End: 2018-11-26
Attending: EMERGENCY MEDICINE
Payer: MEDICARE

## 2018-11-26 ENCOUNTER — OFFICE VISIT (OUTPATIENT)
Dept: HEMATOLOGY/ONCOLOGY | Facility: CLINIC | Age: 63
End: 2018-11-26
Payer: MEDICARE

## 2018-11-26 VITALS
TEMPERATURE: 98 F | HEIGHT: 72 IN | HEART RATE: 112 BPM | OXYGEN SATURATION: 95 % | WEIGHT: 176.38 LBS | BODY MASS INDEX: 23.89 KG/M2 | DIASTOLIC BLOOD PRESSURE: 88 MMHG | SYSTOLIC BLOOD PRESSURE: 155 MMHG | RESPIRATION RATE: 20 BRPM

## 2018-11-26 VITALS
OXYGEN SATURATION: 95 % | BODY MASS INDEX: 24.65 KG/M2 | RESPIRATION RATE: 18 BRPM | DIASTOLIC BLOOD PRESSURE: 85 MMHG | HEIGHT: 72 IN | SYSTOLIC BLOOD PRESSURE: 157 MMHG | TEMPERATURE: 98 F | WEIGHT: 182 LBS | HEART RATE: 86 BPM

## 2018-11-26 DIAGNOSIS — R29.898 WEAKNESS OF BOTH LOWER EXTREMITIES: ICD-10-CM

## 2018-11-26 DIAGNOSIS — F41.1 GENERALIZED ANXIETY DISORDER: ICD-10-CM

## 2018-11-26 DIAGNOSIS — G89.29 OTHER CHRONIC BACK PAIN: ICD-10-CM

## 2018-11-26 DIAGNOSIS — J44.9 CHRONIC OBSTRUCTIVE PULMONARY DISEASE, UNSPECIFIED COPD TYPE: ICD-10-CM

## 2018-11-26 DIAGNOSIS — C79.51 LUNG CANCER METASTATIC TO BONE: ICD-10-CM

## 2018-11-26 DIAGNOSIS — C34.92 ADENOCARCINOMA OF LEFT LUNG, STAGE 2: Primary | ICD-10-CM

## 2018-11-26 DIAGNOSIS — R32 INCONTINENCE: Primary | ICD-10-CM

## 2018-11-26 DIAGNOSIS — C34.90 LUNG CANCER METASTATIC TO BONE: ICD-10-CM

## 2018-11-26 DIAGNOSIS — R32 URINARY INCONTINENCE, UNSPECIFIED TYPE: ICD-10-CM

## 2018-11-26 DIAGNOSIS — D63.0 ANEMIA IN NEOPLASTIC DISEASE: ICD-10-CM

## 2018-11-26 DIAGNOSIS — G89.3 NEOPLASM RELATED PAIN: ICD-10-CM

## 2018-11-26 DIAGNOSIS — M54.5 ACUTE BILATERAL LOW BACK PAIN, WITH SCIATICA PRESENCE UNSPECIFIED: ICD-10-CM

## 2018-11-26 DIAGNOSIS — R53.1 WEAKNESS: ICD-10-CM

## 2018-11-26 DIAGNOSIS — M54.9 OTHER CHRONIC BACK PAIN: ICD-10-CM

## 2018-11-26 PROCEDURE — 3077F SYST BP >= 140 MM HG: CPT | Mod: CPTII,S$GLB,, | Performed by: NURSE PRACTITIONER

## 2018-11-26 PROCEDURE — 3079F DIAST BP 80-89 MM HG: CPT | Mod: CPTII,S$GLB,, | Performed by: NURSE PRACTITIONER

## 2018-11-26 PROCEDURE — 25000003 PHARM REV CODE 250: Performed by: STUDENT IN AN ORGANIZED HEALTH CARE EDUCATION/TRAINING PROGRAM

## 2018-11-26 PROCEDURE — 99215 OFFICE O/P EST HI 40 MIN: CPT | Mod: S$GLB,,, | Performed by: NURSE PRACTITIONER

## 2018-11-26 PROCEDURE — 99285 EMERGENCY DEPT VISIT HI MDM: CPT | Mod: 25

## 2018-11-26 PROCEDURE — A9585 GADOBUTROL INJECTION: HCPCS | Performed by: EMERGENCY MEDICINE

## 2018-11-26 PROCEDURE — 25500020 PHARM REV CODE 255: Performed by: EMERGENCY MEDICINE

## 2018-11-26 PROCEDURE — 99284 EMERGENCY DEPT VISIT MOD MDM: CPT | Mod: ,,, | Performed by: EMERGENCY MEDICINE

## 2018-11-26 PROCEDURE — 3008F BODY MASS INDEX DOCD: CPT | Mod: CPTII,S$GLB,, | Performed by: NURSE PRACTITIONER

## 2018-11-26 PROCEDURE — 99999 PR PBB SHADOW E&M-EST. PATIENT-LVL IV: CPT | Mod: PBBFAC,,, | Performed by: NURSE PRACTITIONER

## 2018-11-26 RX ORDER — METOPROLOL SUCCINATE 50 MG/1
50 TABLET, EXTENDED RELEASE ORAL DAILY
Status: COMPLETED | OUTPATIENT
Start: 2018-11-26 | End: 2018-11-26

## 2018-11-26 RX ORDER — OXYCODONE AND ACETAMINOPHEN 10; 325 MG/1; MG/1
1 TABLET ORAL EVERY 4 HOURS PRN
Qty: 90 TABLET | Refills: 0 | Status: SHIPPED | OUTPATIENT
Start: 2018-11-26 | End: 2018-12-12 | Stop reason: SDUPTHER

## 2018-11-26 RX ORDER — IRBESARTAN 75 MG/1
75 TABLET ORAL
Status: COMPLETED | OUTPATIENT
Start: 2018-11-26 | End: 2018-11-26

## 2018-11-26 RX ORDER — GADOBUTROL 604.72 MG/ML
9 INJECTION INTRAVENOUS
Status: COMPLETED | OUTPATIENT
Start: 2018-11-26 | End: 2018-11-26

## 2018-11-26 RX ADMIN — GADOBUTROL 9 ML: 604.72 INJECTION INTRAVENOUS at 03:11

## 2018-11-26 RX ADMIN — IRBESARTAN 75 MG: 75 TABLET ORAL at 06:11

## 2018-11-26 RX ADMIN — METOPROLOL SUCCINATE 50 MG: 50 TABLET, EXTENDED RELEASE ORAL at 04:11

## 2018-11-26 NOTE — ED TRIAGE NOTES
Pt reports was at the cancer center preparing for chemotherapy but has been having extremity weakness.  PT currently being treated for lung cancer with metastasis to the bone. PT reports urinary incontinece twice in the past week. PT was sent to er to get MRI to rule out spinal compression.

## 2018-11-26 NOTE — Clinical Note
1. Plan to resume treatment this week cycle 3 Durvalumab.- please get him in this week in AM. 2.Obtain Urinalysis on treatment day. 3.Please make an appointment for him to see Rad/Onc again for worsening back pain- ASAP 4.Refer to urology for incontinence- order in. 5.RTC  2 weeks (from cycle 3) to see me or Dr. Infante with a CBC, CMP, TSH, T4 and for cycle 4 Durvalumab.

## 2018-11-26 NOTE — ED NOTES
LOC: The patient is awake, alert, and aware of environment. The patient is oriented x 3 and speaking appropriately.   APPEARANCE: No acute distress noted.   PSYCHOSOCIAL: Patient is calm and cooperative.   SKIN: The skin is warm, dry.   RESPIRATORY: Airway is open and patent. Bilateral chest rise and fall. Respirations are spontaneous, even and unlabored. Normal effort and rate noted. No accessory muscle use noted.   CARDIAC: Patient has a normal rate and rhythm.   ABDOMEN: Soft and non tender to palpation. No distention noted.   URINARY:  Voids independently. Pt reports 2 episodes of urinary incontinence.  NEUROLOGIC: Eyes open spontaneously. Speech clear. Tolerating saliva secretions well. Able to follow commands, demonstrating ability to actively and appropriately communicate within context of current conversation. Symmetrical facial muscles. Moving all extremities well. Movement is purposeful. PT reports generalized weakness.   MUSCULOSKELETAL: No obvious deformities noted.

## 2018-11-26 NOTE — ED PROVIDER NOTES
Encounter Date: 11/26/2018       History     Chief Complaint   Patient presents with    Extremity Weakness     sent from [facility] to r/o cord compression, incontinent urine, weak legs     HPI     Mr. 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 Per Pt, he has been experiencing increasing weakness of his legs manifested as growing unsteadiness. He is supposed to be utilizing a walker however has yet to make it a habit. He reports a fall last week when while walking with some associated trauma to his face without LOC. He also had a fall 2 weeks ago when he missed the stool he was attempting to sit on and instead fell to the side. In addition to the falls, Pt reports two episodes of urinary incontinence; he was unaware he was urinating until he felt the urine running down his legs. One episode was last night and another was one week ago. He denies stool incontinence. He denies any numbness, tinging, sharp pains shooting down his legs. He does note that his lumbar back pain has gotten worse. The pain is more intensified and occurs with minimal movement even when laying supine in bed.        Review of patient's allergies indicates:  No Known Allergies  Past Medical History:   Diagnosis Date    Abdominal aortic aneurysm (AAA) without rupture 3/5/2018    Arthritis     Blood transfusion     COPD (chronic obstructive pulmonary disease)     Coronary artery disease     Dehydration 8/15/2018    Encounter for blood transfusion     Hyperlipidemia 11/7/2013    Hypertension     Lung cancer metastatic to bone 7/18/2018    Occlusion and stenosis of carotid artery without mention of cerebral infarction 1/12/2014    Primary malignant neoplasm of left lung 7/18/2018    Screening for colon cancer  6/1/2015    Syncope 1/13/2014    Thrombocytopenia 9/19/2018     Past Surgical History:   Procedure Laterality Date    ARCH & 4 VESSEL STUDY Bilateral 5/27/2014    Performed by Mekhi Martin MD at [facility] CATH LAB    BACK SURGERY      BRONCHOSCOPY,FIBEROPTIC N/A 6/26/2018    Performed by Liana Serrano MD at [facility] OR 2ND FLR    carotid endarectomy      CATHETERIZATION, HEART, LEFT Left 11/8/2013    Performed by Mekhi Martin MD at [facility] CATH LAB    COLONOSCOPY N/A 6/1/2015    Performed by Ancelmo Balderrama MD at [facility] ENDO    CORONARY ANGIOPLASTY      CORONARY ARTERY BYPASS GRAFT      ENDOBRONCHIAL ULTRASOUND N/A 6/26/2018    Procedure: ULTRASOUND, ENDOBRONCHIAL;  Surgeon: Liana Serrano MD;  [facility];  Service: Cardiothoracic;  Laterality: N/A;    ESOPHAGOGASTRODUODENOSCOPY N/A 8/16/2018    Procedure: ESOPHAGOGASTRODUODENOSCOPY (EGD);  Surgeon: Yohannes Osuna MD;  Location: [facility] (2ND FLR);  Service: Endoscopy;  Laterality: N/A;    ESOPHAGOGASTRODUODENOSCOPY (EGD) N/A 8/16/2018    Performed by [facility]es [facility]una MD at [facility] (2ND FLR)    HEART CATH-LEFT N/A 5/27/2014    Performed by Mekhi Martin MD at [facility] CATH LAB    INSERTION, STENT, CORONARY ARTERY N/A 12/20/2013    Performed by Mekhi Martin MD at [facility] CATH LAB    PTA, PERIPHERAL BLOOD VESSEL Left 3/11/2014    Performed by Mekhi Martin MD at [facility] CATH LAB    PTA, PERIPHERAL BLOOD VESSEL N/A 2/18/2014    Performed by Mekhi Martin MD at [facility] CATH LAB    SKIN BIOPSY      ULTRASOUND, ENDOBRONCHIAL N/A 6/26/2018    Performed by Liana Serrano MD at [facility] OR [address] [place] [address]     Family History   Problem Relation Age of Onset    Heart disease Father     Hypertension Father     Heart attack Father     Heart failure Father     Cancer Mother     Breast cancer Mother     No Known Problems Sister     No Known Problems Brother     No Known Problems Maternal Grandmother     No Known Problems Maternal Grandfather      No Known Problems Paternal Grandmother     No Known Problems Paternal Grandfather     No Known Problems Maternal Aunt     No Known Problems Maternal Uncle     No Known Problems Paternal Aunt     No Known Problems Paternal Uncle     Anemia Neg Hx     Arrhythmia Neg Hx     Asthma Neg Hx     Clotting disorder Neg Hx     Fainting Neg Hx     Hyperlipidemia Neg Hx      Social History     Tobacco Use    Smoking status: Former Smoker     Packs/day: 1.50     Years: 40.00     Pack years: 60.00     Last attempt to quit: 10/11/2011     Years since quittin.1    Smokeless tobacco: Never Used   Substance Use Topics    Alcohol use: Yes     Alcohol/week: 1.8 oz     Types: 3 Cans of beer per week     Comment: 3 cans beer every day or every other day    Drug use: Yes     Types: Marijuana     Review of Systems   Constitutional: Negative for chills and fever.   HENT: Negative for congestion, rhinorrhea, sneezing and sore throat.    Eyes: Negative for visual disturbance.   Respiratory: Negative for cough and shortness of breath.    Cardiovascular: Negative for chest pain.   Gastrointestinal: Negative for abdominal pain, constipation, diarrhea, nausea and vomiting.   Genitourinary: Negative for dysuria.   Musculoskeletal: Positive for back pain.   Skin: Positive for color change.   Neurological: Positive for dizziness and light-headedness.       Physical Exam     Initial Vitals [18 1045]   BP Pulse Resp Temp SpO2   130/83 (!) 115 18 98.4 °F (36.9 °C) 96 %      MAP       --         Physical Exam    Nursing note and vitals reviewed.  Constitutional: He is not diaphoretic. No distress.   HENT:   Right Ear: External ear normal.   Left Ear: External ear normal.   Nose: Nose normal.   Mouth/Throat: Oropharynx is clear and moist. No oropharyngeal exudate.   Resolving ecchymosis under L eye   Eyes: Conjunctivae are normal. Pupils are equal, round, and reactive to light. No scleral icterus.   Neck: Normal range of motion.  Neck supple.   Pulmonary/Chest: He exhibits no tenderness.   Abdominal: Soft. Bowel sounds are normal. There is no tenderness. There is no guarding.   Musculoskeletal: He exhibits no edema.   Lymphadenopathy:     He has no cervical adenopathy.   Neurological: He is alert and oriented to person, place, and time. No cranial nerve deficit or sensory deficit. Coordination normal.   Rectal tone normal.   BL UE strength 5/5.  BL LE strength 4/5.    Skin: Skin is warm and dry.         ED Course   Procedures  Labs Reviewed - No data to display       Imaging Results          MRI Cervical Spine W WO Cont (Final result)  Result time 11/26/18 16:29:54    Final result by Aki Ramos MD (11/26/18 16:29:54)                 Impression:      1. Stable focal abnormal marrow signal and contrast enhancement involving the posterior inferior aspect of the L1 vertebral body consistent with patient's known metastatic focus. No new focus of abnormal marrow signal or enhancement within the cervical, thoracic and lumbar spine.  2. Cervical spondylosis, most prominent at C5-C6 resulting in moderate spinal canal stenosis and severe bilateral neural foraminal narrowing.  3. Mild thoracic spondylosis without spinal canal stenosis or neural foraminal narrowing.  4. Lumbar spondylosis, most prominent at L3-L4 resulting in moderate spinal canal stenosis.  Moderate bilateral neural foraminal narrowing at L5-S1.  Findings similar in comparison to prior examination dated 07/10/2018.  5. Left lung mass in this patient with known lung cancer.  6. Additional findings as above.    Electronically signed by resident: Jeremias Messer  Date:    11/26/2018  Time:    16:10    Electronically signed by: Aki Ramos MD  Date:    11/26/2018  Time:    16:29             Narrative:    EXAMINATION:  MRI THORACIC SPINE W WO CONTRAST; MRI CERVICAL SPINE W WO CONTRAST; MRI LUMBAR SPINE W WO CONTRAST    CLINICAL HISTORY:  fall; BLE weakness;  incontinence    TECHNIQUE:  Multiplanar, multisequence images were performed through cervical, thoracic, and lumbar spine before and after intravenous administration of 9 cc Gadavist.    COMPARISON:  MRI lumbar spine 07/10/2018    FINDINGS:  Cervical spine:    There is grade 1 retrolisthesis C5-C6.    Heterogeneous marrow signal throughout the cervical spine.  No abnormal contrast enhancement.  Vertebral body heights are maintained. No fracture.    Multilevel degenerative disc disease most prominent at C4-C5-C5-C6 and C6-C7 with intervertebral disc space height loss and disc desiccation.    Cord is normal in course and contour. No abnormal cord signal.    Craniocervical junction is within normal limits.    C2-C3: No disc herniation, spinal canal stenosis or neural foraminal narrowing.    C3-C4: Diffuse disc bulge and facet arthropathy resulting in mild neural foraminal narrowing. No significant spinal canal stenosis.    C4-C5: Diffuse disc bulge, uncovertebral spurring, and facet arthropathy resulting in mild spinal canal stenosis, and severe right neural foraminal narrowing.    C5-C6: Grade 1 retrolisthesis, diffuse disc bulge, uncovertebral spurring, and facet arthropathy resulting in moderate spinal canal stenosis and severe bilateral neural foraminal narrowing.    C6-C7: Diffuse disc bulge, uncovertebral spurring, and facet arthropathy resulting in mild spinal canal stenosis and moderate bilateral neural foraminal narrowing.    C7-T1: Ligamentum flavum hypertrophy and facet arthropathy resulting in mild spinal canal stenosis and mild neural foraminal narrowing.    Thoracic spine:    There is satisfactory alignment. No spondylolisthesis.    Increased T1 marrow signal of 4 adjacent vertebral bodies compatible with radiation change.  No abnormal contrast enhancement.  Vertebral body heights are well maintained.  No fracture.    Intervertebral disc demonstrate adequate height and normal T2 signal.    Cord  demonstrates normal course and contour. No abnormal cord signal.    No disc herniation.  Multilevel facet arthropathy and ligamentum flavum hypertrophy without spinal canal stenosis or neural foraminal narrowing.    Left lung mass in this patient with known lung cancer.    Lumbar spine:    Grade 1 anterolisthesis L3-L4 and grade 1 retrolisthesis L5-S1.    Vertebral body heights are well maintained.  Stable focal abnormal marrow signal and contrast enhancement involving the posterior inferior aspect of the L1 vertebral body consistent with patient's known metastatic focus.  No new focus of abnormal marrow signal.  No fracture.    Multilevel degenerative disc disease most prominent at L4-L5 with severe disc space height loss. There is disc desiccation at L4-L5 and L5-S1.    Spinal cord ends at L1.    L1-L2: Uncovertebral spurring and facet arthropathy without spinal canal stenosis or neural foraminal narrowing.    L2-L3: Uncovertebral spurring and facet arthropathy without spinal canal stenosis or neural foraminal narrowing.    L3-L4: Grade 1 anterolisthesis with uncovered disc, diffuse disc bulge, facet arthropathy and ligamentum hypertrophy resulting in moderate spinal canal stenosis.  Facets demonstrate contrast enhancement compatible with degenerative change.    L4-L5: Uncovertebral hypertrophy and facet arthropathy resulting in mild left neural foraminal narrowing. No spinal canal stenosis.  Facets demonstrate contrast enhancement compatible with degenerative change.    L5-S1: Grade 1 retrolisthesis, diffuse disc bulge with central disc protrusion, facet arthropathy resulting in mild spinal canal stenosis and moderate bilateral foraminal narrowing.                               MRI Thoracic Spine W WO Cont (Final result)  Result time 11/26/18 16:29:54    Final result by Aki Ramos MD (11/26/18 16:29:54)                 Impression:      1. Stable focal abnormal marrow signal and contrast enhancement  involving the posterior inferior aspect of the L1 vertebral body consistent with patient's known metastatic focus. No new focus of abnormal marrow signal or enhancement within the cervical, thoracic and lumbar spine.  2. Cervical spondylosis, most prominent at C5-C6 resulting in moderate spinal canal stenosis and severe bilateral neural foraminal narrowing.  3. Mild thoracic spondylosis without spinal canal stenosis or neural foraminal narrowing.  4. Lumbar spondylosis, most prominent at L3-L4 resulting in moderate spinal canal stenosis.  Moderate bilateral neural foraminal narrowing at L5-S1.  Findings similar in comparison to prior examination dated 07/10/2018.  5. Left lung mass in this patient with known lung cancer.  6. Additional findings as above.    Electronically signed by resident: Jeremias Messer  Date:    11/26/2018  Time:    16:10    Electronically signed by: Aki Ramos MD  Date:    11/26/2018  Time:    16:29             Narrative:    EXAMINATION:  MRI THORACIC SPINE W WO CONTRAST; MRI CERVICAL SPINE W WO CONTRAST; MRI LUMBAR SPINE W WO CONTRAST    CLINICAL HISTORY:  fall; BLE weakness; incontinence    TECHNIQUE:  Multiplanar, multisequence images were performed through cervical, thoracic, and lumbar spine before and after intravenous administration of 9 cc Gadavist.    COMPARISON:  MRI lumbar spine 07/10/2018    FINDINGS:  Cervical spine:    There is grade 1 retrolisthesis C5-C6.    Heterogeneous marrow signal throughout the cervical spine.  No abnormal contrast enhancement.  Vertebral body heights are maintained. No fracture.    Multilevel degenerative disc disease most prominent at C4-C5-C5-C6 and C6-C7 with intervertebral disc space height loss and disc desiccation.    Cord is normal in course and contour. No abnormal cord signal.    Craniocervical junction is within normal limits.    C2-C3: No disc herniation, spinal canal stenosis or neural foraminal narrowing.    C3-C4: Diffuse disc bulge and  facet arthropathy resulting in mild neural foraminal narrowing. No significant spinal canal stenosis.    C4-C5: Diffuse disc bulge, uncovertebral spurring, and facet arthropathy resulting in mild spinal canal stenosis, and severe right neural foraminal narrowing.    C5-C6: Grade 1 retrolisthesis, diffuse disc bulge, uncovertebral spurring, and facet arthropathy resulting in moderate spinal canal stenosis and severe bilateral neural foraminal narrowing.    C6-C7: Diffuse disc bulge, uncovertebral spurring, and facet arthropathy resulting in mild spinal canal stenosis and moderate bilateral neural foraminal narrowing.    C7-T1: Ligamentum flavum hypertrophy and facet arthropathy resulting in mild spinal canal stenosis and mild neural foraminal narrowing.    Thoracic spine:    There is satisfactory alignment. No spondylolisthesis.    Increased T1 marrow signal of 4 adjacent vertebral bodies compatible with radiation change.  No abnormal contrast enhancement.  Vertebral body heights are well maintained.  No fracture.    Intervertebral disc demonstrate adequate height and normal T2 signal.    Cord demonstrates normal course and contour. No abnormal cord signal.    No disc herniation.  Multilevel facet arthropathy and ligamentum flavum hypertrophy without spinal canal stenosis or neural foraminal narrowing.    Left lung mass in this patient with known lung cancer.    Lumbar spine:    Grade 1 anterolisthesis L3-L4 and grade 1 retrolisthesis L5-S1.    Vertebral body heights are well maintained.  Stable focal abnormal marrow signal and contrast enhancement involving the posterior inferior aspect of the L1 vertebral body consistent with patient's known metastatic focus.  No new focus of abnormal marrow signal.  No fracture.    Multilevel degenerative disc disease most prominent at L4-L5 with severe disc space height loss. There is disc desiccation at L4-L5 and L5-S1.    Spinal cord ends at L1.    L1-L2: Uncovertebral spurring  and facet arthropathy without spinal canal stenosis or neural foraminal narrowing.    L2-L3: Uncovertebral spurring and facet arthropathy without spinal canal stenosis or neural foraminal narrowing.    L3-L4: Grade 1 anterolisthesis with uncovered disc, diffuse disc bulge, facet arthropathy and ligamentum hypertrophy resulting in moderate spinal canal stenosis.  Facets demonstrate contrast enhancement compatible with degenerative change.    L4-L5: Uncovertebral hypertrophy and facet arthropathy resulting in mild left neural foraminal narrowing. No spinal canal stenosis.  Facets demonstrate contrast enhancement compatible with degenerative change.    L5-S1: Grade 1 retrolisthesis, diffuse disc bulge with central disc protrusion, facet arthropathy resulting in mild spinal canal stenosis and moderate bilateral foraminal narrowing.                               MRI Lumbar Spine W WO Cont (Final result)  Result time 11/26/18 16:29:54    Final result by Aki Ramos MD (11/26/18 16:29:54)                 Impression:      1. Stable focal abnormal marrow signal and contrast enhancement involving the posterior inferior aspect of the L1 vertebral body consistent with patient's known metastatic focus. No new focus of abnormal marrow signal or enhancement within the cervical, thoracic and lumbar spine.  2. Cervical spondylosis, most prominent at C5-C6 resulting in moderate spinal canal stenosis and severe bilateral neural foraminal narrowing.  3. Mild thoracic spondylosis without spinal canal stenosis or neural foraminal narrowing.  4. Lumbar spondylosis, most prominent at L3-L4 resulting in moderate spinal canal stenosis.  Moderate bilateral neural foraminal narrowing at L5-S1.  Findings similar in comparison to prior examination dated 07/10/2018.  5. Left lung mass in this patient with known lung cancer.  6. Additional findings as above.    Electronically signed by resident: Jeremias  Foto  Date:    11/26/2018  Time:    16:10    Electronically signed by: Aki Ramos MD  Date:    11/26/2018  Time:    16:29             Narrative:    EXAMINATION:  MRI THORACIC SPINE W WO CONTRAST; MRI CERVICAL SPINE W WO CONTRAST; MRI LUMBAR SPINE W WO CONTRAST    CLINICAL HISTORY:  fall; BLE weakness; incontinence    TECHNIQUE:  Multiplanar, multisequence images were performed through cervical, thoracic, and lumbar spine before and after intravenous administration of 9 cc Gadavist.    COMPARISON:  MRI lumbar spine 07/10/2018    FINDINGS:  Cervical spine:    There is grade 1 retrolisthesis C5-C6.    Heterogeneous marrow signal throughout the cervical spine.  No abnormal contrast enhancement.  Vertebral body heights are maintained. No fracture.    Multilevel degenerative disc disease most prominent at C4-C5-C5-C6 and C6-C7 with intervertebral disc space height loss and disc desiccation.    Cord is normal in course and contour. No abnormal cord signal.    Craniocervical junction is within normal limits.    C2-C3: No disc herniation, spinal canal stenosis or neural foraminal narrowing.    C3-C4: Diffuse disc bulge and facet arthropathy resulting in mild neural foraminal narrowing. No significant spinal canal stenosis.    C4-C5: Diffuse disc bulge, uncovertebral spurring, and facet arthropathy resulting in mild spinal canal stenosis, and severe right neural foraminal narrowing.    C5-C6: Grade 1 retrolisthesis, diffuse disc bulge, uncovertebral spurring, and facet arthropathy resulting in moderate spinal canal stenosis and severe bilateral neural foraminal narrowing.    C6-C7: Diffuse disc bulge, uncovertebral spurring, and facet arthropathy resulting in mild spinal canal stenosis and moderate bilateral neural foraminal narrowing.    C7-T1: Ligamentum flavum hypertrophy and facet arthropathy resulting in mild spinal canal stenosis and mild neural foraminal narrowing.    Thoracic spine:    There is satisfactory  alignment. No spondylolisthesis.    Increased T1 marrow signal of 4 adjacent vertebral bodies compatible with radiation change.  No abnormal contrast enhancement.  Vertebral body heights are well maintained.  No fracture.    Intervertebral disc demonstrate adequate height and normal T2 signal.    Cord demonstrates normal course and contour. No abnormal cord signal.    No disc herniation.  Multilevel facet arthropathy and ligamentum flavum hypertrophy without spinal canal stenosis or neural foraminal narrowing.    Left lung mass in this patient with known lung cancer.    Lumbar spine:    Grade 1 anterolisthesis L3-L4 and grade 1 retrolisthesis L5-S1.    Vertebral body heights are well maintained.  Stable focal abnormal marrow signal and contrast enhancement involving the posterior inferior aspect of the L1 vertebral body consistent with patient's known metastatic focus.  No new focus of abnormal marrow signal.  No fracture.    Multilevel degenerative disc disease most prominent at L4-L5 with severe disc space height loss. There is disc desiccation at L4-L5 and L5-S1.    Spinal cord ends at L1.    L1-L2: Uncovertebral spurring and facet arthropathy without spinal canal stenosis or neural foraminal narrowing.    L2-L3: Uncovertebral spurring and facet arthropathy without spinal canal stenosis or neural foraminal narrowing.    L3-L4: Grade 1 anterolisthesis with uncovered disc, diffuse disc bulge, facet arthropathy and ligamentum hypertrophy resulting in moderate spinal canal stenosis.  Facets demonstrate contrast enhancement compatible with degenerative change.    L4-L5: Uncovertebral hypertrophy and facet arthropathy resulting in mild left neural foraminal narrowing. No spinal canal stenosis.  Facets demonstrate contrast enhancement compatible with degenerative change.    L5-S1: Grade 1 retrolisthesis, diffuse disc bulge with central disc protrusion, facet arthropathy resulting in mild spinal canal stenosis and  moderate bilateral foraminal narrowing.                                 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   Differential Diagnosis:   Differential diagnosis includes but is not limited to spinal cord compression, sciatica, bladder dysfunction, progression of primary cancer metastasis.   ED Management:  Initial work-up to include PVR and MRI Cervical, Thoracic, and Lumbar with and w/o contrast.   Will administer home BP medications.     11:36 AM  PVR 20ml    5:28 PM No cord compression noted on MRI. Stable cervical, thoracic, lumbar findings. Will discharge with return precautions and instructions for oncology followup.              Attending Attestation:   Physician Attestation Statement for Resident:  As the supervising MD   Physician Attestation Statement: I have personally seen and examined this patient.   I agree with the above history. -:   As the supervising MD I agree with the above PE.    As the supervising MD I agree with the above treatment, course, plan, and disposition.   -:     Vitals normal. Afebrile. Clinic lab work unremarkable. Here w/ concern for cauda equina. PVR obtained and is normal; cauda equina unlikely. However, given worsening BLE weakness w/ known spine mets, MR CTL spine w/w/o contrast obtained.     Patient signed out to Dr. Ivory at shift change to f/u MR results and overall disposition.      I have reviewed and agree with the residents interpretation of the following: lab data.                       Clinical Impression:   The primary encounter diagnosis was Incontinence. A diagnosis of Weakness was also pertinent to this visit.                            Chi Quesada MD  Resident  11/26/18 0955       Kin Deleon MD  11/28/18 1041

## 2018-11-27 RX ORDER — SODIUM CHLORIDE 0.9 % (FLUSH) 0.9 %
10 SYRINGE (ML) INJECTION
Status: CANCELLED | OUTPATIENT
Start: 2018-11-29

## 2018-11-27 RX ORDER — HEPARIN 100 UNIT/ML
500 SYRINGE INTRAVENOUS
Status: CANCELLED | OUTPATIENT
Start: 2018-11-29

## 2018-11-27 NOTE — TELEPHONE ENCOUNTER
Called patient to give him the dates and times for the appointments that are scheduled.Dr Ricketts on Wednesday 11/28, Chemo, urine specimen and urology (in [place]) appointments on Thursday. Mailed the appointment slip for the appointments in Dec.        ----- Message from Stephani Sims NP sent at 11/27/2018  9:01 AM CST -----  1. Plan to resume treatment this week cycle 3 Durvalumab.- please get him in this week in AM.   2.Obtain Urinalysis on treatment day.   3.Please make an appointment for him to see Rad/Onc again for worsening back pain- ASAP   4.Refer to urology for incontinence- order in.   5.RTC  2 weeks (from cycle 3) to see me or Dr. Infante with a CBC, CMP, TSH, T4 and for cycle 4 Durvalumab.

## 2018-11-28 ENCOUNTER — INITIAL CONSULT (OUTPATIENT)
Dept: RADIATION ONCOLOGY | Facility: CLINIC | Age: 63
End: 2018-11-28
Payer: MEDICARE

## 2018-11-28 VITALS
HEIGHT: 72 IN | WEIGHT: 179.69 LBS | HEART RATE: 127 BPM | DIASTOLIC BLOOD PRESSURE: 68 MMHG | SYSTOLIC BLOOD PRESSURE: 151 MMHG | BODY MASS INDEX: 24.34 KG/M2 | TEMPERATURE: 99 F | RESPIRATION RATE: 20 BRPM

## 2018-11-28 DIAGNOSIS — C79.51 LUNG CANCER METASTATIC TO BONE: Primary | ICD-10-CM

## 2018-11-28 DIAGNOSIS — C34.90 LUNG CANCER METASTATIC TO BONE: Primary | ICD-10-CM

## 2018-11-28 PROCEDURE — 99499 UNLISTED E&M SERVICE: CPT | Mod: S$GLB,,, | Performed by: RADIOLOGY

## 2018-11-28 PROCEDURE — 99214 OFFICE O/P EST MOD 30 MIN: CPT | Mod: S$GLB,,, | Performed by: RADIOLOGY

## 2018-11-28 PROCEDURE — 3077F SYST BP >= 140 MM HG: CPT | Mod: CPTII,S$GLB,, | Performed by: RADIOLOGY

## 2018-11-28 PROCEDURE — 3078F DIAST BP <80 MM HG: CPT | Mod: CPTII,S$GLB,, | Performed by: RADIOLOGY

## 2018-11-28 PROCEDURE — 99999 PR PBB SHADOW E&M-EST. PATIENT-LVL IV: CPT | Mod: PBBFAC,,, | Performed by: RADIOLOGY

## 2018-11-28 PROCEDURE — 3288F FALL RISK ASSESSMENT DOCD: CPT | Mod: CPTII,S$GLB,, | Performed by: RADIOLOGY

## 2018-11-28 PROCEDURE — 1100F PTFALLS ASSESS-DOCD GE2>/YR: CPT | Mod: CPTII,S$GLB,, | Performed by: RADIOLOGY

## 2018-11-28 PROCEDURE — 3008F BODY MASS INDEX DOCD: CPT | Mod: CPTII,S$GLB,, | Performed by: RADIOLOGY

## 2018-11-28 NOTE — LETTER
November 29, 2018      Demetrius Infante DO, FACP  [address] [facility] [address]           [facility] - Radiation Oncology  [address]  [facility] [address]  Phone: [phone]          Patient: Nimesh Nunn   MR Number: [id number]   YOB: 1955   Date of Visit: 11/28/2018       Dear Dr. Demetrius Infante:    Thank you for referring Nimesh Nunn to me for evaluation. Attached you will find relevant portions of my assessment and plan of care.    If you have questions, please do not hesitate to call me. I look forward to following Nimesh Nunn along with you.    Sincerely,    Ryan Ricketts MD    Enclosure  CC:  No Recipients    If you would like to receive this communication electronically, please contact [email or web address] or [phone] to request more information on [employer] Link access.    For providers and/or their staff who would like to refer a patient to [facility], please contact us through our one-stop-shop provider referral line, [facility], at [phone].    If you feel you have received this communication in error or would no longer like to receive these types of communications, please e-mail [email or web address]

## 2018-11-29 ENCOUNTER — INFUSION (OUTPATIENT)
Dept: INFUSION THERAPY | Facility: HOSPITAL | Age: 63
End: 2018-11-29
Attending: INTERNAL MEDICINE
Payer: MEDICARE

## 2018-11-29 ENCOUNTER — OFFICE VISIT (OUTPATIENT)
Dept: UROLOGY | Facility: CLINIC | Age: 63
End: 2018-11-29
Payer: MEDICARE

## 2018-11-29 VITALS
SYSTOLIC BLOOD PRESSURE: 150 MMHG | HEIGHT: 72 IN | TEMPERATURE: 99 F | BODY MASS INDEX: 24.34 KG/M2 | DIASTOLIC BLOOD PRESSURE: 71 MMHG | HEART RATE: 99 BPM | WEIGHT: 179.69 LBS | RESPIRATION RATE: 18 BRPM

## 2018-11-29 VITALS
BODY MASS INDEX: 24.24 KG/M2 | TEMPERATURE: 98 F | SYSTOLIC BLOOD PRESSURE: 114 MMHG | HEIGHT: 72 IN | DIASTOLIC BLOOD PRESSURE: 69 MMHG | WEIGHT: 179 LBS | HEART RATE: 133 BPM

## 2018-11-29 DIAGNOSIS — N40.1 BPH WITH OBSTRUCTION/LOWER URINARY TRACT SYMPTOMS: ICD-10-CM

## 2018-11-29 DIAGNOSIS — N32.81 OAB (OVERACTIVE BLADDER): ICD-10-CM

## 2018-11-29 DIAGNOSIS — C34.92 PRIMARY MALIGNANT NEOPLASM OF LEFT LUNG: Primary | ICD-10-CM

## 2018-11-29 DIAGNOSIS — N13.8 BPH WITH OBSTRUCTION/LOWER URINARY TRACT SYMPTOMS: ICD-10-CM

## 2018-11-29 DIAGNOSIS — N39.498 OTHER URINARY INCONTINENCE: Primary | ICD-10-CM

## 2018-11-29 DIAGNOSIS — R82.90 ABNORMAL RESULT ON SCREENING URINE TEST: ICD-10-CM

## 2018-11-29 LAB
MICROSCOPIC COMMENT: NORMAL
RBC #/AREA URNS AUTO: 0 /HPF (ref 0–4)
SQUAMOUS #/AREA URNS AUTO: 1 /HPF
WBC #/AREA URNS AUTO: 0 /HPF (ref 0–5)

## 2018-11-29 PROCEDURE — 87086 URINE CULTURE/COLONY COUNT: CPT

## 2018-11-29 PROCEDURE — 51798 US URINE CAPACITY MEASURE: CPT | Mod: S$GLB,,, | Performed by: UROLOGY

## 2018-11-29 PROCEDURE — 99999 PR PBB SHADOW E&M-EST. PATIENT-LVL IV: CPT | Mod: PBBFAC,,, | Performed by: UROLOGY

## 2018-11-29 PROCEDURE — 1101F PT FALLS ASSESS-DOCD LE1/YR: CPT | Mod: CPTII,S$GLB,, | Performed by: UROLOGY

## 2018-11-29 PROCEDURE — 96413 CHEMO IV INFUSION 1 HR: CPT

## 2018-11-29 PROCEDURE — C9492 INJECTION, DURVALUMAB: HCPCS | Mod: TB | Performed by: INTERNAL MEDICINE

## 2018-11-29 PROCEDURE — 3074F SYST BP LT 130 MM HG: CPT | Mod: CPTII,S$GLB,, | Performed by: UROLOGY

## 2018-11-29 PROCEDURE — 81001 URINALYSIS AUTO W/SCOPE: CPT

## 2018-11-29 PROCEDURE — 25000003 PHARM REV CODE 250: Performed by: INTERNAL MEDICINE

## 2018-11-29 PROCEDURE — 63600175 PHARM REV CODE 636 W HCPCS: Mod: TB | Performed by: INTERNAL MEDICINE

## 2018-11-29 PROCEDURE — 3078F DIAST BP <80 MM HG: CPT | Mod: CPTII,S$GLB,, | Performed by: UROLOGY

## 2018-11-29 PROCEDURE — 99204 OFFICE O/P NEW MOD 45 MIN: CPT | Mod: S$GLB,,, | Performed by: UROLOGY

## 2018-11-29 PROCEDURE — 3008F BODY MASS INDEX DOCD: CPT | Mod: CPTII,S$GLB,, | Performed by: UROLOGY

## 2018-11-29 RX ORDER — HEPARIN 100 UNIT/ML
500 SYRINGE INTRAVENOUS
Status: DISCONTINUED | OUTPATIENT
Start: 2018-11-29 | End: 2018-11-29 | Stop reason: HOSPADM

## 2018-11-29 RX ORDER — SODIUM CHLORIDE 0.9 % (FLUSH) 0.9 %
10 SYRINGE (ML) INJECTION
Status: DISCONTINUED | OUTPATIENT
Start: 2018-11-29 | End: 2018-11-29 | Stop reason: HOSPADM

## 2018-11-29 RX ADMIN — DURVALUMAB 825 MG: 500 INJECTION, SOLUTION INTRAVENOUS at 10:11

## 2018-11-29 NOTE — PROGRESS NOTES
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    OTHER MEDICAL HISTORY:  Patient quit smoking 10/11/2011.  He has had surgery, carotid endarterectomy coronary artery coronary artery angioplasty.  He is treated her followed for abdominal aortic aneurysm, chronic lung disease coronary artery disease hyperlipidemia and hypertension. PS is ECOG 1.    PRIOR CANCER HISTORY:  A diagnosis adenocarcinoma of the lung was made on 06/26/2018 by bronchoscopy.  PET/CT scan 06/15/2018 showed a 35 mm hypermetabolic mass obstructing the left lower lobe bronchus at its origin a tiny hypermetabolic focus in the posterior margin of the L1 vertebral body thought to represent metastasis and a suspect small density in the right upper lobe of the lung.  He was treated with a single fraction of 18 Gy to the L1 lesion with stereotactic body irradiation on ET/2/18.  He was treated to a dose of 60 Gy in fractions of 2 Gy to the left lower lobe disease ending 09/19/2018.  He is concurrently being treated with Durvilulimab targeted therapy..     PRESENT ILLNESS:  Patient was seen in the ED on 11/26/2018 complaining of pain in the low at just below the pelvic CREST Level,  bilateral leg weakness and urinary incontinence present for few days.  MRI scan of the cervical thoracic and lumbar spine on 11/26/2018 shows the L1 vertebral body lesion and no other sites of metastasis in the spine.  There is no evidence compression of the spinal cord.    PHYSICAL EXAMINATION:  Patient is an alert man who responds appropriately normal speech and voice.  The weight is 180 lb.  The BMI is 24.37 The respirations are normal. There is no cervical or supraclavicular  lymphadenopathy extremities are without edema.  There is no sensory level.  The Romberg is normal. There are no evident neurologic deficits    RADIOLOGIC STUDIES:  In addition to that noted above a CT scan the chest 10/16/2018 without contrast shows no significant change in the treated left lower lobe mass and additionally several areas patchy density in upper lobe.  MRI of the brain on 06/26/18 showed no evidence of metastatic disease    LABORATORY STUDIES:  11/26/2018 the hemoglobin is 0.6 with of 1040 and a 216,000. Comprehensive metabolic panel is remarkable for BUN of 34 and alkaline phosphatase of 150.    IMPRESSION:  Patient is exhibiting symptoms of cauda equina compression.  The site of this may be in the sacrum inferior to the MRI scan the spine 11/26/2018.     PLAN:  Patient returns 12/03/2018 for radiation treatment planning simulation.  This will necessarily CT scan the lower lumbar spine and pelvis.  If a lesion is in the sacrum that explains the patient's pain and symptoms we will treat with radiation to a dose of 30 Gy in fractions of 3 Gy. (45 minutes in discussion with patient).

## 2018-11-29 NOTE — PROGRESS NOTES
HPI:  Nimesh Nunn is a 63 y.o. year old male that  presents with No chief complaint on file.  .  This patient referred by his PCP with urinary incontinence.    Patient has history of metastatic lung cancer on chemotherapy.  He has had mets to his spine and has had radiation therapy to his spine.  At a recent office visit with his oncologist patient was complaining of lower extremity weakness and back pain and emergent imaging of his spine showed no cord compression.    Patient is on finasteride for BPH.  His baseline voiding pattern is an okay strength stream with nocturia times 2-3.  He has no dysuria or gross hematuria or straining to void.  Bladder scan postvoid residual in the office today is okay approximately 40 cc    Patient has only had 2 episodes of incontinence.  It sounds like these were urge incontinence    There is no family history of kidney bladder prostate cancer the patient has never had urologic surgery    Chart review shows progress no from his oncologist earlier this month being follow-up on lung cancer and chemotherapy.  At that time emergent MRI of spine obtain due to lower extremity weakness.  Results as described above.  In August of 2017 lumbosacral spine film shows no stones.  This image review by me in agree with this finding.  Patient PSA December 2017 which was 0.97 and earlier this month GFR greater than 60.      Past Medical History:   Diagnosis Date    Abdominal aortic aneurysm (AAA) without rupture 3/5/2018    Arthritis     Blood transfusion     COPD (chronic obstructive pulmonary disease)     Coronary artery disease     Dehydration 8/15/2018    Encounter for blood transfusion     Hyperlipidemia 11/7/2013    Hypertension     Lung cancer metastatic to bone 7/18/2018    Occlusion and stenosis of carotid artery without mention of cerebral infarction 1/12/2014    Primary malignant neoplasm of left lung 7/18/2018    Screening for colon cancer 6/1/2015    Syncope  2014    Thrombocytopenia 2018     Social History     Socioeconomic History    Marital status:      Spouse name: Not on file    Number of children: Not on file    Years of education: Not on file    Highest education level: Not on file   Social Needs    Financial resource strain: Not on file    Food insecurity - worry: Not on file    Food insecurity - inability: Not on file    Transportation needs - medical: Not on file    Transportation needs - non-medical: Not on file   Occupational History    Not on file   Tobacco Use    Smoking status: Former Smoker     Packs/day: 1.50     Years: 40.00     Pack years: 60.00     Last attempt to quit: 10/11/2011     Years since quittin.1    Smokeless tobacco: Never Used   Substance and Sexual Activity    Alcohol use: Yes     Alcohol/week: 1.8 oz     Types: 3 Cans of beer per week     Comment: 3 cans beer every day or every other day    Drug use: Yes     Types: Marijuana    Sexual activity: Not on file   Other Topics Concern    Not on file   Social History Narrative    Not on file     Past Surgical History:   Procedure Laterality Date    ARCH & 4 VESSEL STUDY Bilateral 2014    Performed by [facility] at [facility] CATH LAB    BACK SURGERY      BRONCHOSCOPY,FIBEROPTIC N/A 2018    Performed by Liana Serrano MD at [facility] OR [address] [place] [address]    carotid endarectomy      CATHETERIZATION, HEART, LEFT Left 2013    Performed by [facility] at [facility] CATH LAB    COLONOSCOPY N/A 2015    Per[facility] at [facility] ENDO    CORONARY ANGIOPLASTY      CORONARY ARTERY BYPASS GRAFT      ENDOBRONCHIAL ULTRASOUND N/A 2018    Procedure: ULTRASOUND, ENDOBRONCHIAL;  Surgeon: Liana Serrano MD;  Location: [facility] OR [address] [place] [address];  Service: Cardiothoracic;  Laterality: N/A;    ESOPHAGOGASTRODUODENOSCOPY N/A 2018    Procedure: ESOPHAGOGASTRODUODENOSCOPY (EGD);  Surgeon: Yohannes Osuna MD;  Location: [facility] ([address] [place] [address]);   Service: Endoscopy;  Laterality: N/A;    ESOPHAGOGASTRODUODENOSCOPY (EGD) N/A 8/16/2018    Performed by [facility]es [facility]una MD at [facility] ENDO (2ND FLR)    HEART CATH-LEFT N/A 5/27/2014    Performed by [facility] Martin [facility] at [facility] CATH LAB    INSERTION, STENT, CORONARY ARTERY N/A 12/20/2013    Performed by [facility] Martin [facility] at [facility] CATH LAB    PTA, PERIPHERAL BLOOD VESSEL Left 3/11/2014    Performed by [facility] Martin [facility] at [facility] CATH LAB    PTA, PERIPHERAL BLOOD VESSEL N/A 2/18/2014    Performed by [facility] Martin MD at [facility] CATH LAB    SKIN BIOPSY      ULTRASOUND, ENDOBRONCHIAL N/A 6/26/2018    Performed by Liana [facility]errano MD at [facility] OR 2ND FLR     Family History   Problem Relation Age of Onset    Heart disease Father     Hypertension Father     Heart attack Father     Heart failure Father     Cancer Mother     Breast cancer Mother     No Known Problems Sister     No Known Problems Brother     No Known Problems Maternal Grandmother     No Known Problems Maternal Grandfather     No Known Problems Paternal Grandmother     No Known Problems Paternal Grandfather     No Known Problems Maternal Aunt     No Known Problems Maternal Uncle     No Known Problems Paternal Aunt     No Known Problems Paternal Uncle     Anemia Neg Hx     Arrhythmia Neg Hx     Asthma Neg Hx     Clotting disorder Neg Hx     Fainting Neg Hx     Hyperlipidemia Neg Hx            Review of Systems  The patient has no chest pains.  The patient has no shortness of breath  Patient wears        glasses.  All other review of systems are negative.      Physical Exam:  /69 (BP Location: Right arm, Patient Position: Sitting)   Pulse (!) 133   Temp 98.2 °F (36.8 °C)   Ht 6' (1.829 m)   Wt 81.2 kg (179 lb)   BMI 24.28 kg/m²   General appearance: alert, cooperative, no distress  Constitutional:Oriented to person, place, and time.appears well-developed and well-nourished.   HEENT: Normocephalic, atraumatic, neck symmetrical,  no nasal discharge   Eyes: conjunctivae/corneas clear, PERRL, EOM's intact  Lungs: clear to auscultation bilaterally, no dullness to percussion bilaterally  Heart: regular rate and rhythm without rub; no displacement of the PMI   Abdomen: soft, non-tender; bowel sounds normoactive; no organomegaly  :  Penis/perineum without lesions, scrotum without rash/cysts, epididymis nontender bilaterally, urethral meatus in normal location normal size, no penile plaques palpated, prostate:    Smooth, enlarged, and benign-feeling                   seminal vesicles not palpated.  No rectal masses, sphincter tone normal.  Testes equal in size without masses  Extremities: extremities symmetric; no clubbing, cyanosis, or edema  Integument: Skin color, texture, turgor normal; no rashes; hair distrubution normal  Neurologic: Alert and oriented X 3, normal strength, normal coordination and gait  Psychiatric: no pressured speech; normal affect; no evidence of impaired cognition     LABS:    Complete Blood Count  Lab Results   Component Value Date    RBC 3.04 (L) 11/26/2018    HGB 9.6 (L) 11/26/2018    HCT 29.0 (L) 11/26/2018    MCV 95 11/26/2018    MCH 31.6 (H) 11/26/2018    MCHC 33.1 11/26/2018    RDW 13.5 11/26/2018     11/26/2018    MPV 8.4 (L) 11/26/2018    GRAN 3.4 11/26/2018    LYMPH 0.1 (L) 08/31/2018    LYMPH 2.4 (L) 08/31/2018    MONO 0.4 08/31/2018    MONO 9.5 08/31/2018    EOS 0.0 08/31/2018    BASO 0.00 08/31/2018    EOSINOPHIL 0.0 08/31/2018    BASOPHIL 0.0 08/31/2018    DIFFMETHOD Automated 08/31/2018       Comprehensive Metabolic Panel  Lab Results   Component Value Date     11/26/2018    BUN 34 (H) 11/26/2018    CREATININE 1.2 11/26/2018     11/26/2018    K 4.6 11/26/2018     11/26/2018    PROT 8.0 11/26/2018    ALBUMIN 3.3 (L) 11/26/2018    BILITOT 0.4 11/26/2018    AST 17 11/26/2018    ALKPHOS 155 (H) 11/26/2018    CO2 28 11/26/2018    ALT 11 11/26/2018    ANIONGAP 10 11/26/2018    EGFRNONAA  >60.0 11/26/2018    ESTGFRAFRICA >60.0 11/26/2018       PSA  No results found for: PSA      Assessment:    ICD-10-CM ICD-9-CM    1. Other urinary incontinence N39.498 788.39 POCT Bladder Scan   2. OAB (overactive bladder) N32.81 596.51    3. BPH with obstruction/lower urinary tract symptoms N40.1 600.01     N13.8 599.69    4. Abnormal result on screening urine test R82.90 791.9 Urinalysis Microscopic      Urine culture     The primary encounter diagnosis was Other urinary incontinence. Diagnoses of OAB (overactive bladder), BPH with obstruction/lower urinary tract symptoms, and Abnormal result on screening urine test were also pertinent to this visit.      Plan:  1 2 and 3.  Urinary incontinence most likely overactive bladder due to BPH.  As patient is only had 2 episodes of the incontinence, at this point I would hold off on adding another medication for his prostate.  If these episodes are recurrent, then we will consider starting an alpha-blocker.  At this point will finasteride.  Patient's urine will be cultured and once results are available ,we will contact the patient if antibiotics are indicated.  Follow-up 3 months with flow rate and postvoid residual    4.    Dip urine positive for blood.  I discussed with the patient that the dip urine test has a high false positive result for blood.  We will send the patient's urine for microscopic analysis and contact the patient if in fact significant blood is present and if evaluation is indicated.  Orders Placed This Encounter   Procedures    Urine culture    Urinalysis Microscopic    POCT Bladder Scan           Aki Deutsch MD    PLEASE NOTE:  Please be advised that portions of this note were dictated using voice recognition software and may contain dictation related errors in spelling/grammar/appropriate pronouns/syntax or other errors that might have not been found and or corrected on text review.

## 2018-11-29 NOTE — PLAN OF CARE
Problem: Patient Care Overview  Goal: Plan of Care Review  Outcome: Ongoing (interventions implemented as appropriate)  Pt. Tolerated infusion. VSS. PIV flushed and removed. No questions at this time.

## 2018-11-29 NOTE — LETTER
November 29, 2018      Stephani Sims, NP  [address] [facility] [address]           [place] - Urology  [address] [facility] [address]  Phone: [phone]          Patient: Nimesh Nunn   MR Number: [id number]   YOB: 1955   Date of Visit: 11/29/2018       Dear Stephani Sims:    Thank you for referring Nimesh Nunn to me for evaluation. Attached you will find relevant portions of my assessment and plan of care.    If you have questions, please do not hesitate to call me. I look forward to following Nimesh Nunn along with you.    Sincerely,    Aki Deutsch MD    Enclosure  CC:  No Recipients    If you would like to receive this communication electronically, please contact [email or web address] or [phone] to request more information on [employer] Link access.    For providers and/or their staff who would like to refer a patient to [facility], please contact us through our one-stop-shop provider referral line, [facility] , at [phone].    If you feel you have received this communication in error or would no longer like to receive these types of communications, please e-mail [email or web address]

## 2018-11-30 LAB — BACTERIA UR CULT: NO GROWTH

## 2018-12-03 ENCOUNTER — HOSPITAL ENCOUNTER (OUTPATIENT)
Dept: RADIATION THERAPY | Facility: HOSPITAL | Age: 63
Discharge: HOME | End: 2018-12-03
Attending: RADIOLOGY
Payer: MEDICARE

## 2018-12-03 PROCEDURE — 77263 THER RADIOLOGY TX PLNG CPLX: CPT | Mod: ,,, | Performed by: RADIOLOGY

## 2018-12-03 PROCEDURE — 77334 RADIATION TREATMENT AID(S): CPT | Mod: TC | Performed by: RADIOLOGY

## 2018-12-03 PROCEDURE — 77290 THER RAD SIMULAJ FIELD CPLX: CPT | Mod: 26,,, | Performed by: RADIOLOGY

## 2018-12-03 PROCEDURE — 77290 THER RAD SIMULAJ FIELD CPLX: CPT | Mod: TC | Performed by: RADIOLOGY

## 2018-12-03 PROCEDURE — 77334 RADIATION TREATMENT AID(S): CPT | Mod: 26,,, | Performed by: RADIOLOGY

## 2018-12-03 PROCEDURE — 77014 HC CT GUIDANCE RADIATION THERAPY FLDS PLACEMENT: CPT | Mod: TC | Performed by: RADIOLOGY

## 2018-12-05 PROCEDURE — 77300 RADIATION THERAPY DOSE PLAN: CPT | Mod: 26,,, | Performed by: RADIOLOGY

## 2018-12-05 PROCEDURE — 77295 3-D RADIOTHERAPY PLAN: CPT | Mod: 26,,, | Performed by: RADIOLOGY

## 2018-12-05 PROCEDURE — 77295 3-D RADIOTHERAPY PLAN: CPT | Mod: TC | Performed by: RADIOLOGY

## 2018-12-05 PROCEDURE — 77300 RADIATION THERAPY DOSE PLAN: CPT | Mod: TC | Performed by: RADIOLOGY

## 2018-12-05 PROCEDURE — 77334 RADIATION TREATMENT AID(S): CPT | Mod: TC | Performed by: RADIOLOGY

## 2018-12-05 PROCEDURE — 77334 RADIATION TREATMENT AID(S): CPT | Mod: 26,,, | Performed by: RADIOLOGY

## 2018-12-06 PROCEDURE — G6002 STEREOSCOPIC X-RAY GUIDANCE: HCPCS | Mod: 26,,, | Performed by: RADIOLOGY

## 2018-12-06 PROCEDURE — 77412 RADIATION TX DELIVERY LVL 3: CPT | Performed by: RADIOLOGY

## 2018-12-06 PROCEDURE — 77417 THER RADIOLOGY PORT IMAGE(S): CPT | Performed by: RADIOLOGY

## 2018-12-06 PROCEDURE — 77387 GUIDANCE FOR RADJ TX DLVR: CPT | Mod: TC | Performed by: RADIOLOGY

## 2018-12-07 PROCEDURE — 77412 RADIATION TX DELIVERY LVL 3: CPT | Performed by: RADIOLOGY

## 2018-12-07 PROCEDURE — G6002 STEREOSCOPIC X-RAY GUIDANCE: HCPCS | Mod: 26,,, | Performed by: RADIOLOGY

## 2018-12-07 PROCEDURE — 77387 GUIDANCE FOR RADJ TX DLVR: CPT | Mod: TC | Performed by: RADIOLOGY

## 2018-12-10 ENCOUNTER — DOCUMENTATION ONLY (OUTPATIENT)
Dept: RADIATION ONCOLOGY | Facility: CLINIC | Age: 63
End: 2018-12-10

## 2018-12-10 DIAGNOSIS — C34.02 MALIGNANT NEOPLASM OF HILUS OF LEFT LUNG: Primary | ICD-10-CM

## 2018-12-10 PROCEDURE — 77412 RADIATION TX DELIVERY LVL 3: CPT | Performed by: RADIOLOGY

## 2018-12-10 PROCEDURE — G6002 STEREOSCOPIC X-RAY GUIDANCE: HCPCS | Mod: 26,,, | Performed by: RADIOLOGY

## 2018-12-10 PROCEDURE — 77387 GUIDANCE FOR RADJ TX DLVR: CPT | Mod: TC | Performed by: RADIOLOGY

## 2018-12-11 PROCEDURE — 77412 RADIATION TX DELIVERY LVL 3: CPT | Performed by: RADIOLOGY

## 2018-12-11 PROCEDURE — G6002 STEREOSCOPIC X-RAY GUIDANCE: HCPCS | Mod: 26,,, | Performed by: RADIOLOGY

## 2018-12-11 PROCEDURE — 77387 GUIDANCE FOR RADJ TX DLVR: CPT | Mod: TC | Performed by: RADIOLOGY

## 2018-12-11 NOTE — PLAN OF CARE
Problem: Adult Inpatient Plan of Care  Goal: Plan of Care Review  Outcome: Ongoing (interventions implemented as appropriate)  Day 3 of radiation to the l spine pain 7/10 c/o sob CT scan ordered per Dr Ricketts

## 2018-12-12 ENCOUNTER — LAB VISIT (OUTPATIENT)
Dept: LAB | Facility: HOSPITAL | Age: 63
End: 2018-12-12
Payer: MEDICARE

## 2018-12-12 ENCOUNTER — OFFICE VISIT (OUTPATIENT)
Dept: HEMATOLOGY/ONCOLOGY | Facility: CLINIC | Age: 63
End: 2018-12-12
Payer: MEDICARE

## 2018-12-12 VITALS
DIASTOLIC BLOOD PRESSURE: 77 MMHG | SYSTOLIC BLOOD PRESSURE: 136 MMHG | OXYGEN SATURATION: 94 % | WEIGHT: 178.56 LBS | RESPIRATION RATE: 20 BRPM | HEIGHT: 72 IN | HEART RATE: 98 BPM | BODY MASS INDEX: 24.18 KG/M2 | TEMPERATURE: 98 F

## 2018-12-12 DIAGNOSIS — G89.3 NEOPLASM RELATED PAIN: ICD-10-CM

## 2018-12-12 DIAGNOSIS — C79.51 LUNG CANCER METASTATIC TO BONE: ICD-10-CM

## 2018-12-12 DIAGNOSIS — C34.90 LUNG CANCER METASTATIC TO BONE: ICD-10-CM

## 2018-12-12 DIAGNOSIS — C34.92 PRIMARY MALIGNANT NEOPLASM OF LEFT LUNG: Primary | ICD-10-CM

## 2018-12-12 DIAGNOSIS — E07.89 OTHER SPECIFIED DISORDERS OF THYROID: ICD-10-CM

## 2018-12-12 DIAGNOSIS — C34.92 ADENOCARCINOMA OF LEFT LUNG, STAGE 2: ICD-10-CM

## 2018-12-12 DIAGNOSIS — R06.02 SHORTNESS OF BREATH: ICD-10-CM

## 2018-12-12 DIAGNOSIS — F41.1 GENERALIZED ANXIETY DISORDER: ICD-10-CM

## 2018-12-12 DIAGNOSIS — R32 URINARY INCONTINENCE, UNSPECIFIED TYPE: ICD-10-CM

## 2018-12-12 DIAGNOSIS — M54.5 ACUTE BILATERAL LOW BACK PAIN, WITH SCIATICA PRESENCE UNSPECIFIED: ICD-10-CM

## 2018-12-12 DIAGNOSIS — C34.92 PRIMARY MALIGNANT NEOPLASM OF LEFT LUNG: ICD-10-CM

## 2018-12-12 LAB
ALBUMIN SERPL BCP-MCNC: 3 G/DL
ALP SERPL-CCNC: 155 U/L
ALT SERPL W/O P-5'-P-CCNC: 9 U/L
ANION GAP SERPL CALC-SCNC: 8 MMOL/L
AST SERPL-CCNC: 18 U/L
BILIRUB SERPL-MCNC: 0.5 MG/DL
BUN SERPL-MCNC: 15 MG/DL
CALCIUM SERPL-MCNC: 9.7 MG/DL
CHLORIDE SERPL-SCNC: 99 MMOL/L
CO2 SERPL-SCNC: 29 MMOL/L
CREAT SERPL-MCNC: 1.1 MG/DL
ERYTHROCYTE [DISTWIDTH] IN BLOOD BY AUTOMATED COUNT: 14.1 %
EST. GFR  (AFRICAN AMERICAN): >60 ML/MIN/1.73 M^2
EST. GFR  (NON AFRICAN AMERICAN): >60 ML/MIN/1.73 M^2
GLUCOSE SERPL-MCNC: 100 MG/DL
HCT VFR BLD AUTO: 26.8 %
HGB BLD-MCNC: 8.6 G/DL
IMM GRANULOCYTES # BLD AUTO: 0.03 K/UL
MCH RBC QN AUTO: 31.5 PG
MCHC RBC AUTO-ENTMCNC: 32.1 G/DL
MCV RBC AUTO: 98 FL
NEUTROPHILS # BLD AUTO: 2.6 K/UL
PLATELET # BLD AUTO: 186 K/UL
PMV BLD AUTO: 8.5 FL
POTASSIUM SERPL-SCNC: 4.7 MMOL/L
PROT SERPL-MCNC: 7.3 G/DL
RBC # BLD AUTO: 2.73 M/UL
SODIUM SERPL-SCNC: 136 MMOL/L
T4 FREE SERPL-MCNC: 0.8 NG/DL
TSH SERPL DL<=0.005 MIU/L-ACNC: 0.93 UIU/ML
WBC # BLD AUTO: 3.91 K/UL

## 2018-12-12 PROCEDURE — 99214 OFFICE O/P EST MOD 30 MIN: CPT | Mod: S$GLB,,, | Performed by: INTERNAL MEDICINE

## 2018-12-12 PROCEDURE — 80053 COMPREHEN METABOLIC PANEL: CPT

## 2018-12-12 PROCEDURE — 36415 COLL VENOUS BLD VENIPUNCTURE: CPT

## 2018-12-12 PROCEDURE — 3078F DIAST BP <80 MM HG: CPT | Mod: CPTII,S$GLB,, | Performed by: INTERNAL MEDICINE

## 2018-12-12 PROCEDURE — 1101F PT FALLS ASSESS-DOCD LE1/YR: CPT | Mod: CPTII,S$GLB,, | Performed by: INTERNAL MEDICINE

## 2018-12-12 PROCEDURE — 85027 COMPLETE CBC AUTOMATED: CPT

## 2018-12-12 PROCEDURE — 77412 RADIATION TX DELIVERY LVL 3: CPT | Performed by: RADIOLOGY

## 2018-12-12 PROCEDURE — 84443 ASSAY THYROID STIM HORMONE: CPT

## 2018-12-12 PROCEDURE — 99999 PR PBB SHADOW E&M-EST. PATIENT-LVL IV: CPT | Mod: PBBFAC,,, | Performed by: INTERNAL MEDICINE

## 2018-12-12 PROCEDURE — 3075F SYST BP GE 130 - 139MM HG: CPT | Mod: CPTII,S$GLB,, | Performed by: INTERNAL MEDICINE

## 2018-12-12 PROCEDURE — 84439 ASSAY OF FREE THYROXINE: CPT

## 2018-12-12 PROCEDURE — 77387 GUIDANCE FOR RADJ TX DLVR: CPT | Mod: TC | Performed by: RADIOLOGY

## 2018-12-12 PROCEDURE — 3008F BODY MASS INDEX DOCD: CPT | Mod: CPTII,S$GLB,, | Performed by: INTERNAL MEDICINE

## 2018-12-12 PROCEDURE — G6002 STEREOSCOPIC X-RAY GUIDANCE: HCPCS | Mod: 26,,, | Performed by: RADIOLOGY

## 2018-12-12 RX ORDER — OXYCODONE AND ACETAMINOPHEN 10; 325 MG/1; MG/1
1 TABLET ORAL EVERY 4 HOURS PRN
Qty: 90 TABLET | Refills: 0 | Status: SHIPPED | OUTPATIENT
Start: 2018-12-12 | End: 2019-01-10 | Stop reason: SDUPTHER

## 2018-12-12 NOTE — PROGRESS NOTES
PATIENT: Nimesh Nunn  MRN: [id number]  DATE: 12/12/2018      Diagnosis:   1. Primary malignant neoplasm of left lung    2. Neoplasm related pain    3. Lung cancer metastatic to bone    4. Shortness of breath        Chief Complaint: Lung Cancer      Oncologic History:      Oncologic History Non-small cell lung cancer, left diagnosed 06/2018  Metastatic disease to bone at presentation    Oncologic Treatment Stereotactic radiation to spinal lesion  Cisplatin/pemetrexed with concurrent radiation 08/08/2018 (completed 09/2018)    Pathology Poorly differentiated adenocarcinoma, EGFR wild type, No ALK, ROS-1 rearrangements, PD-L1 TPS 90%          Subjective:    Interval History: Mr. Nunn is a 63 y.o. male who is seen in follow-up for lung cancer.  Since I had seen him last he developed lower extremity weakness and urinary incontinence.  An MRI of the spine was unrevealing, however, he was seen by Radiation Oncology who felt symptoms were related to cauda equina syndrome and he was started on radiation to his spine.  He states since this time his incontinence has resolved in his lower extremities while still weak have improved.  He now complains of some worsening shortness of breath along with chest wall pain. He is scheduled to have a CT scan done tomorrow.  No other new complaints.    His history dates to 06/2018 when he sought medical attention for chest pain.  A CT of the chest was performed showing a left lower lobe mass measuring 7.4 cm.  There were also enlarged left hilar lymph nodes. He underwent PET-CT which showed uptake in this mass and also in L1.  He underwent bronchoscopy and biopsy with pathology showing poorly differentiated adenocarcinoma.  Molecular studies showed EGFR wild-type and he did not harbor any ALK or ROS-1 rearrangements.  PD L1 tumor proportions core was 90%.  He underwent radiation to the spinal lesion in 08/2018 and started on concurrent chemo and radiation with cisplatin and  pemetrexed in 08/2018.  He completed treatment on 09/19/2018.  He was only able to receive 2 cycles of chemotherapy secondary to cytopenias.    Past Medical History:   Past Medical History:   Diagnosis Date    Abdominal aortic aneurysm (AAA) without rupture 3/5/2018    Arthritis     Blood transfusion     COPD (chronic obstructive pulmonary disease)     Coronary artery disease     Dehydration 8/15/2018    Encounter for blood transfusion     Hyperlipidemia 11/7/2013    Hypertension     Lung cancer metastatic to bone 7/18/2018    Occlusion and stenosis of carotid artery without mention of cerebral infarction 1/12/2014    Primary malignant neoplasm of left lung 7/18/2018    Screening for colon cancer 6/1/2015    Syncope 1/13/2014    Thrombocytopenia 9/19/2018       Past Surgical HIstory:   Past Surgical History:   Procedure Laterality Date    ARCH & 4 VESSEL STUDY Bilateral 5/27/2014    Performed by [facility]hi Martin [facility] at [facility] CATH LAB    BACK SURGERY      BRONCHOSCOPY,FIBEROPTIC N/A 6/26/2018    Performed by Liana Serrano MD at [facility] OR [address] [place] [address]    carotid endarectomy      CATHETERIZATION, HEART, LEFT Left 11/8/2013    Performed by Mekhi Martin MD at [facility] CATH LAB    COLONOSCOPY N/A 6/1/2015    [facility] at [facility] ENDO    CORONARY ANGIOPLASTY      CORONARY ARTERY BYPASS GRAFT      ENDOBRONCHIAL ULTRASOUND N/A 6/26/2018    Procedure: ULTRASOUND, ENDOBRONCHIAL;  Surgeon: Liana Serrano MD;  Location: [facility] OR [address] [place] [address];  Service: Cardiothoracic;  Laterality: N/A;    ESOPHAGOGASTRODUODENOSCOPY N/A 8/16/2018    Procedure: ESOPHAGOGASTRODUODENOSCOPY (EGD);  Surgeon: Yohannes Osuna MD;  Location: [facility] ([address] [place] [address]);  Service: Endoscopy;  Laterality: N/A;    ESOPHAGOGASTRODUODENOSCOPY (EGD) N/A 8/16/2018    Performed by Yohannes Osuna MD at [facility] ([address] [place] [address])    HEART CATH-LEFT N/A 5/27/2014    Performed by [facility]ekhi Martin MD at [facility] CATH LAB    INSERTION, STENT,  CORONARY ARTERY N/A 12/20/2013    Performed by [facility] at [facility] CATH LAB    PTA, PERIPHERAL BLOOD VESSEL Left 3/11/2014    Performed by [facility] Martin MD at [facility] CATH LAB    PTA, PERIPHERAL BLOOD VESSEL N/A 2/18/2014    Performed by [facility] Martin MD at [facility] CATH LAB    SKIN BIOPSY      ULTRASOUND, ENDOBRONCHIAL N/A 6/26/2018    Performed by Liana Serrano MD at [facility] OR [facility] FLR       Family History:   Family History   Problem Relation Age of Onset    Heart disease Father     Hypertension Father     Heart attack Father     Heart failure Father     Cancer Mother     Breast cancer Mother     No Known Problems Sister     No Known Problems Brother     No Known Problems Maternal Grandmother     No Known Problems Maternal Grandfather     No Known Problems Paternal Grandmother     No Known Problems Paternal Grandfather     No Known Problems Maternal Aunt     No Known Problems Maternal Uncle     No Known Problems Paternal Aunt     No Known Problems Paternal Uncle     Anemia Neg Hx     Arrhythmia Neg Hx     Asthma Neg Hx     Clotting disorder Neg Hx     Fainting Neg Hx     Hyperlipidemia Neg Hx        Social History:  reports that he quit smoking about 7 years ago. He has a 60.00 pack-year smoking history. he has never used smokeless tobacco. He reports that he drinks about 1.8 oz of alcohol per week. He reports that he uses drugs. Drug: Marijuana.    Allergies:  Review of patient's allergies indicates:  No Known Allergies    Medications:  Current Outpatient Medications   Medication Sig Dispense Refill    aspirin (ECOTRIN) 81 MG EC tablet Take 81 mg by mouth once daily.      atorvastatin (LIPITOR) 80 MG tablet TAKE 1 TABLET BY MOUTH EVERY DAY 30 tablet 11    cilostazol (PLETAL) 100 MG Tab TAKE 1 TABLET BY MOUTH TWICE DAILY 60 tablet 11    clopidogrel (PLAVIX) 75 mg tablet TAKE 1 TABLET BY MOUTH EVERY DAY 90 tablet 4    DULoxetine (CYMBALTA) 60 MG capsule Take 1 capsule (60 mg  total) by mouth once daily. 90 capsule 4    folic acid (FOLVITE) 400 MCG tablet Take 1 tablet (400 mcg total) by mouth once daily. 100 tablet 3    irbesartan (AVAPRO) 75 MG tablet Take 1 tablet (75 mg total) by mouth once daily. 90 tablet 3    metoprolol succinate (TOPROL-XL) 50 MG 24 hr tablet Take 50 mg by mouth.      nitroGLYCERIN (NITROSTAT) 0.4 MG SL tablet Place 1 tablet (0.4 mg total) under the tongue every 5 (five) minutes as needed for Chest pain. 90 tablet 1    ondansetron (ZOFRAN-ODT) 8 MG TbDL Take 1 tablet (8 mg total) by mouth every 12 (twelve) hours as needed. 30 tablet 1    oxyCODONE-acetaminophen (PERCOCET)  mg per tablet Take 1 tablet by mouth every 4 (four) hours as needed for Pain. 90 tablet 0    PROAIR HFA 90 mcg/actuation inhaler Inhale 2 puffs into the lungs as needed. 18 g 6    umeclidinium-vilanterol (ANORO ELLIPTA) 62.5-25 mcg/actuation DsDv Inhale 1 puff into the lungs once daily. Controller 1 each 5    finasteride (PROSCAR) 5 mg tablet Take 1 tablet (5 mg total) by mouth once daily. 90 tablet 3     No current facility-administered medications for this visit.        Review of Systems   Constitutional: Positive for fatigue. Negative for appetite change, chills, fever and unexpected weight change.   HENT: Negative for dental problem, sinus pressure and sneezing.    Eyes: Negative for visual disturbance.   Respiratory: Positive for cough and shortness of breath. Negative for choking and chest tightness.    Cardiovascular: Negative for chest pain and leg swelling.   Gastrointestinal: Negative for abdominal pain, blood in stool, constipation, diarrhea and nausea.   Genitourinary: Negative for difficulty urinating, dysuria and frequency.   Musculoskeletal: Positive for back pain. Negative for arthralgias.   Skin: Negative for rash and wound.   Neurological: Negative for dizziness, weakness, light-headedness and headaches.   Hematological: Negative for adenopathy. Does not  bruise/bleed easily.   Psychiatric/Behavioral: Negative for sleep disturbance. The patient is nervous/anxious.        ECOG Performance Status:   ECOG SCORE    2 - Capable of all selfcare but unable to carry out any work activities, active > 50% of hours         Objective:      Vitals:   Vitals:    12/12/18 1024   BP: 136/77   Pulse: 98   Resp: 20   Temp: 98.2 °F (36.8 °C)   SpO2: (!) 94%   Weight: 81 kg (178 lb 9.2 oz)   Height: 6' (1.829 m)     BMI: Body mass index is 24.22 kg/m².    Physical Exam   Constitutional: He is oriented to person, place, and time. He appears well-developed and well-nourished.   HENT:   Head: Normocephalic and atraumatic.   Eyes: Pupils are equal, round, and reactive to light.   Neck: Normal range of motion. Neck supple.   Cardiovascular: Normal rate and regular rhythm.   Pulmonary/Chest: Effort normal. No respiratory distress. He has no wheezes.   Abdominal: Soft. He exhibits no distension. There is no tenderness.   Musculoskeletal: He exhibits no edema or tenderness.   Lymphadenopathy:     He has no cervical adenopathy.   Neurological: He is alert and oriented to person, place, and time. No cranial nerve deficit or sensory deficit. He exhibits normal muscle tone. Coordination normal.   Skin: Skin is warm and dry.   Psychiatric: He has a normal mood and affect. His behavior is normal.       Laboratory Data:  Lab Visit on 12/12/2018   Component Date Value Ref Range Status    WBC 12/12/2018 3.91  3.90 - 12.70 K/uL Final    RBC 12/12/2018 2.73* 4.60 - 6.20 M/uL Final    Hemoglobin 12/12/2018 8.6* 14.0 - 18.0 g/dL Final    Hematocrit 12/12/2018 26.8* 40.0 - 54.0 % Final    MCV 12/12/2018 98  82 - 98 fL Final    MCH 12/12/2018 31.5* 27.0 - 31.0 pg Final    MCHC 12/12/2018 32.1  32.0 - 36.0 g/dL Final    RDW 12/12/2018 14.1  11.5 - 14.5 % Final    Platelets 12/12/2018 186  150 - 350 K/uL Final    MPV 12/12/2018 8.5* 9.2 - 12.9 fL Final    Gran # (ANC) 12/12/2018 2.6  1.8 - 7.7 K/uL  Final    Comment: The ANC is based on a white cell differential from an   automated cell counter. It has not been microscopically   reviewed for the presence of abnormal cells. Clinical   correlation is required.      Immature Grans (Abs) 12/12/2018 0.03  0.00 - 0.04 K/uL Final    Comment: Mild elevation in immature granulocytes is non specific and   can be seen in a variety of conditions including stress response,   acute inflammation, trauma and pregnancy. Correlation with other   laboratory and clinical findings is essential.      Sodium 12/12/2018 136  136 - 145 mmol/L Final    Potassium 12/12/2018 4.7  3.5 - 5.1 mmol/L Final    Chloride 12/12/2018 99  95 - 110 mmol/L Final    CO2 12/12/2018 29  23 - 29 mmol/L Final    Glucose 12/12/2018 100  70 - 110 mg/dL Final    BUN, Bld 12/12/2018 15  8 - 23 mg/dL Final    Creatinine 12/12/2018 1.1  0.5 - 1.4 mg/dL Final    Calcium 12/12/2018 9.7  8.7 - 10.5 mg/dL Final    Total Protein 12/12/2018 7.3  6.0 - 8.4 g/dL Final    Albumin 12/12/2018 3.0* 3.5 - 5.2 g/dL Final    Total Bilirubin 12/12/2018 0.5  0.1 - 1.0 mg/dL Final    Comment: For infants and newborns, interpretation of results should be based  on gestational age, weight and in agreement with clinical  observations.  Premature Infant recommended reference ranges:  Up to 24 hours.............<8.0 mg/dL  Up to 48 hours............<12.0 mg/dL  3-5 days..................<15.0 mg/dL  6-29 days.................<15.0 mg/dL      Alkaline Phosphatase 12/12/2018 155* 55 - 135 U/L Final    AST 12/12/2018 18  10 - 40 U/L Final    ALT 12/12/2018 9* 10 - 44 U/L Final    Anion Gap 12/12/2018 8  8 - 16 mmol/L Final    eGFR if African American 12/12/2018 >60.0  >60 mL/min/1.73 m^2 Final    eGFR if non African American 12/12/2018 >60.0  >60 mL/min/1.73 m^2 Final    Comment: Calculation used to obtain the estimated glomerular filtration  rate (eGFR) is the CKD-EPI equation.       TSH 12/12/2018 0.933  0.400 -  4.000 uIU/mL Final    Free T4 12/12/2018 0.80  0.71 - 1.51 ng/dL Final         Imaging:   CT 10/16/2018  COMPARISON:  A CT examination of the thorax performed on 06/10/2018.    FINDINGS:  The size of heart is within normal limits.  There is a moderate amount of atherosclerosis.  There is an ill-defined mass in the medial aspect of the left lower lobe that measures 5.3 cm in craniocaudal dimension by 5.8 cm in AP dimension by 4.3 cm in medial-lateral dimension on the current examination.  On the prior examination it measured 4.6 cm in craniocaudal dimension by 6.1 cm in AP dimension by 4.5 cm in medial-lateral dimension.  There has been interval development of several patchy areas of increased density in the posterior aspect of the left upper lobe.  The larger area measures 27 mm.  The right lung is clear.  There is no pneumothorax or pleural effusion.  There is a 6 mm stone in the dependent portion of the gallbladder.      Impression       1. There is an ill-defined mass in the medial aspect of the left lower lobe that measures 5.3 cm in craniocaudal dimension by 5.8 cm in AP dimension by 4.3 cm in medial-lateral dimension on the current examination. On the prior examination it measured 4.6 cm in craniocaudal dimension by 6.1 cm in AP dimension by 4.5 cm in medial-lateral dimension. There has been interval development of several patchy areas of increased density in the posterior aspect of the left upper lobe.  The larger area measures 27 mm.  This is consistent with the patient's history and characteristic of lung cancer.  2. There is a 6 mm stone in the dependent portion of the gallbladder.  All CT scans at this facility use dose modulation, iterative reconstruction, and/or weight base dosing when appropriate to reduce radiation dose when appropriate to reduce radiation dose to as low as reasonably achievable.              Assessment:       1. Primary malignant neoplasm of left lung    2. Neoplasm related pain     3. Lung cancer metastatic to bone    4. Shortness of breath           Plan:      I will plan to hold off on treatment with durvalumab today while we await the results of his CT scan of the chest.  If there are no detrimental changes we will resume with treatment.  All questions were answered and he is agreeable with this plan.    Demetrius Infante DO, [facility]  Hematology & Oncology  [facility] [place] [address]  ph. [phone]  Fax. [phone]    25 minutes were spent in coordination of patient's care, record review and counseling.  More than 50% of the time was face-to-face.

## 2018-12-13 ENCOUNTER — HOSPITAL ENCOUNTER (OUTPATIENT)
Dept: RADIOLOGY | Facility: HOSPITAL | Age: 63
Discharge: HOME | End: 2018-12-13
Attending: RADIOLOGY
Payer: MEDICARE

## 2018-12-13 DIAGNOSIS — C34.02 MALIGNANT NEOPLASM OF HILUS OF LEFT LUNG: ICD-10-CM

## 2018-12-13 PROCEDURE — 77387 GUIDANCE FOR RADJ TX DLVR: CPT | Mod: TC | Performed by: RADIOLOGY

## 2018-12-13 PROCEDURE — 71250 CT THORAX DX C-: CPT | Mod: TC,PO

## 2018-12-13 PROCEDURE — 77412 RADIATION TX DELIVERY LVL 3: CPT | Performed by: RADIOLOGY

## 2018-12-13 PROCEDURE — G6002 STEREOSCOPIC X-RAY GUIDANCE: HCPCS | Mod: 26,,, | Performed by: RADIOLOGY

## 2018-12-13 PROCEDURE — 77417 THER RADIOLOGY PORT IMAGE(S): CPT | Performed by: RADIOLOGY

## 2018-12-13 PROCEDURE — 77336 RADIATION PHYSICS CONSULT: CPT | Performed by: RADIOLOGY

## 2018-12-14 ENCOUNTER — HOSPITAL ENCOUNTER (OUTPATIENT)
Dept: RADIOLOGY | Facility: HOSPITAL | Age: 63
Discharge: HOME | End: 2018-12-14
Attending: RADIOLOGY
Payer: MEDICARE

## 2018-12-14 DIAGNOSIS — C34.02 MALIGNANT NEOPLASM OF HILUS OF LEFT LUNG: Primary | ICD-10-CM

## 2018-12-14 DIAGNOSIS — C34.02 MALIGNANT NEOPLASM OF HILUS OF LEFT LUNG: ICD-10-CM

## 2018-12-14 PROCEDURE — 25500020 PHARM REV CODE 255: Performed by: RADIOLOGY

## 2018-12-14 PROCEDURE — 77387 GUIDANCE FOR RADJ TX DLVR: CPT | Mod: TC | Performed by: RADIOLOGY

## 2018-12-14 PROCEDURE — 77412 RADIATION TX DELIVERY LVL 3: CPT | Performed by: RADIOLOGY

## 2018-12-14 PROCEDURE — 71275 CT ANGIOGRAPHY CHEST: CPT | Mod: 26,,, | Performed by: RADIOLOGY

## 2018-12-14 PROCEDURE — 71275 CT ANGIOGRAPHY CHEST: CPT | Mod: TC

## 2018-12-14 PROCEDURE — G6002 STEREOSCOPIC X-RAY GUIDANCE: HCPCS | Mod: 26,,, | Performed by: RADIOLOGY

## 2018-12-14 RX ADMIN — IOHEXOL 100 ML: 350 INJECTION, SOLUTION INTRAVENOUS at 11:12

## 2018-12-17 ENCOUNTER — DOCUMENTATION ONLY (OUTPATIENT)
Dept: RADIATION ONCOLOGY | Facility: CLINIC | Age: 63
End: 2018-12-17

## 2018-12-17 PROCEDURE — 77387 GUIDANCE FOR RADJ TX DLVR: CPT | Mod: TC | Performed by: RADIOLOGY

## 2018-12-17 PROCEDURE — G6002 STEREOSCOPIC X-RAY GUIDANCE: HCPCS | Mod: 26,,, | Performed by: RADIOLOGY

## 2018-12-17 PROCEDURE — 77412 RADIATION TX DELIVERY LVL 3: CPT | Performed by: RADIOLOGY

## 2018-12-18 PROCEDURE — 77387 GUIDANCE FOR RADJ TX DLVR: CPT | Mod: TC | Performed by: RADIOLOGY

## 2018-12-18 PROCEDURE — G6002 STEREOSCOPIC X-RAY GUIDANCE: HCPCS | Mod: 26,,, | Performed by: RADIOLOGY

## 2018-12-18 PROCEDURE — 77412 RADIATION TX DELIVERY LVL 3: CPT | Performed by: RADIOLOGY

## 2018-12-18 NOTE — PLAN OF CARE
Problem: Adult Inpatient Plan of Care  Goal: Plan of Care Review  Outcome: Ongoing (interventions implemented as appropriate)  Day 8 of radiation to the l spine denies sob today scans showed no PE

## 2018-12-19 ENCOUNTER — DOCUMENTATION ONLY (OUTPATIENT)
Dept: RADIATION ONCOLOGY | Facility: CLINIC | Age: 63
End: 2018-12-19

## 2018-12-19 PROCEDURE — 77387 GUIDANCE FOR RADJ TX DLVR: CPT | Mod: TC | Performed by: RADIOLOGY

## 2018-12-19 PROCEDURE — 77412 RADIATION TX DELIVERY LVL 3: CPT | Performed by: RADIOLOGY

## 2018-12-19 PROCEDURE — G6002 STEREOSCOPIC X-RAY GUIDANCE: HCPCS | Mod: 26,,, | Performed by: RADIOLOGY

## 2018-12-19 PROCEDURE — 77336 RADIATION PHYSICS CONSULT: CPT | Performed by: RADIOLOGY

## 2018-12-19 NOTE — PLAN OF CARE
Problem: Adult Inpatient Plan of Care  Goal: Plan of Care Review  Outcome: Outcome(s) achieved Date Met: 12/19/18  Radiation to the l-spine and sacrum completed on 12/19/18  F/u appt made

## 2018-12-20 DIAGNOSIS — N40.0 BENIGN NON-NODULAR PROSTATIC HYPERPLASIA WITHOUT LOWER URINARY TRACT SYMPTOMS: ICD-10-CM

## 2018-12-20 RX ORDER — FINASTERIDE 5 MG/1
TABLET, FILM COATED ORAL
Qty: 90 TABLET | Refills: 3 | Status: SHIPPED | OUTPATIENT
Start: 2018-12-20 | End: 2019-04-01 | Stop reason: SDUPTHER

## 2018-12-27 ENCOUNTER — TELEPHONE (OUTPATIENT)
Dept: RADIATION ONCOLOGY | Facility: CLINIC | Age: 63
End: 2018-12-27

## 2019-01-04 ENCOUNTER — PATIENT MESSAGE (OUTPATIENT)
Dept: HEMATOLOGY/ONCOLOGY | Facility: CLINIC | Age: 64
End: 2019-01-04

## 2019-01-07 ENCOUNTER — TELEPHONE (OUTPATIENT)
Dept: HEMATOLOGY/ONCOLOGY | Facility: CLINIC | Age: 64
End: 2019-01-07

## 2019-01-07 DIAGNOSIS — C34.90 LUNG CANCER METASTATIC TO BONE: ICD-10-CM

## 2019-01-07 DIAGNOSIS — C34.92 PRIMARY MALIGNANT NEOPLASM OF LEFT LUNG: ICD-10-CM

## 2019-01-07 DIAGNOSIS — E07.89 OTHER SPECIFIED DISORDERS OF THYROID: Primary | ICD-10-CM

## 2019-01-07 DIAGNOSIS — C79.51 LUNG CANCER METASTATIC TO BONE: ICD-10-CM

## 2019-01-07 NOTE — TELEPHONE ENCOUNTER
----- Message from Lily Thorne sent at 1/7/2019  8:49 AM CST -----  Regarding: T4 order  Michelle,  Can I get a T4 order put in for patient.    Thank you

## 2019-01-09 ENCOUNTER — PATIENT MESSAGE (OUTPATIENT)
Dept: RADIATION ONCOLOGY | Facility: CLINIC | Age: 64
End: 2019-01-09

## 2019-01-10 DIAGNOSIS — G89.3 NEOPLASM RELATED PAIN: ICD-10-CM

## 2019-01-10 RX ORDER — OXYCODONE AND ACETAMINOPHEN 10; 325 MG/1; MG/1
1 TABLET ORAL EVERY 4 HOURS PRN
Qty: 90 TABLET | Refills: 0 | Status: SHIPPED | OUTPATIENT
Start: 2019-01-10 | End: 2019-02-01 | Stop reason: SDUPTHER

## 2019-01-11 LAB — POC RESIDUAL URINE VOLUME: 40 ML (ref 0–100)

## 2019-01-14 ENCOUNTER — OFFICE VISIT (OUTPATIENT)
Dept: HEMATOLOGY/ONCOLOGY | Facility: CLINIC | Age: 64
End: 2019-01-14
Payer: MEDICARE

## 2019-01-14 ENCOUNTER — OFFICE VISIT (OUTPATIENT)
Dept: RADIATION ONCOLOGY | Facility: CLINIC | Age: 64
End: 2019-01-14
Payer: MEDICARE

## 2019-01-14 ENCOUNTER — LAB VISIT (OUTPATIENT)
Dept: LAB | Facility: HOSPITAL | Age: 64
End: 2019-01-14
Payer: MEDICARE

## 2019-01-14 VITALS
BODY MASS INDEX: 23.29 KG/M2 | DIASTOLIC BLOOD PRESSURE: 69 MMHG | RESPIRATION RATE: 20 BRPM | WEIGHT: 171.69 LBS | TEMPERATURE: 97 F | HEART RATE: 92 BPM | SYSTOLIC BLOOD PRESSURE: 129 MMHG

## 2019-01-14 VITALS
BODY MASS INDEX: 23.29 KG/M2 | DIASTOLIC BLOOD PRESSURE: 57 MMHG | HEIGHT: 72 IN | OXYGEN SATURATION: 94 % | SYSTOLIC BLOOD PRESSURE: 92 MMHG | HEART RATE: 78 BPM | WEIGHT: 171.94 LBS | RESPIRATION RATE: 16 BRPM | TEMPERATURE: 98 F

## 2019-01-14 DIAGNOSIS — C34.90 LUNG CANCER METASTATIC TO BONE: ICD-10-CM

## 2019-01-14 DIAGNOSIS — C79.51 LUNG CANCER METASTATIC TO BONE: ICD-10-CM

## 2019-01-14 DIAGNOSIS — C34.92 PRIMARY MALIGNANT NEOPLASM OF LEFT LUNG: Primary | ICD-10-CM

## 2019-01-14 DIAGNOSIS — R29.898 WEAKNESS OF BOTH LOWER EXTREMITIES: ICD-10-CM

## 2019-01-14 DIAGNOSIS — E07.89 OTHER SPECIFIED DISORDERS OF THYROID: ICD-10-CM

## 2019-01-14 DIAGNOSIS — C34.92 PRIMARY MALIGNANT NEOPLASM OF LEFT LUNG: ICD-10-CM

## 2019-01-14 DIAGNOSIS — C34.92 ADENOCARCINOMA OF LEFT LUNG, STAGE 2: Primary | ICD-10-CM

## 2019-01-14 LAB
ALBUMIN SERPL BCP-MCNC: 3.1 G/DL
ALP SERPL-CCNC: 162 U/L
ALT SERPL W/O P-5'-P-CCNC: 12 U/L
ANION GAP SERPL CALC-SCNC: 10 MMOL/L
AST SERPL-CCNC: 15 U/L
BILIRUB SERPL-MCNC: 0.3 MG/DL
BUN SERPL-MCNC: 22 MG/DL
CALCIUM SERPL-MCNC: 10.1 MG/DL
CHLORIDE SERPL-SCNC: 98 MMOL/L
CO2 SERPL-SCNC: 27 MMOL/L
CREAT SERPL-MCNC: 1.2 MG/DL
ERYTHROCYTE [DISTWIDTH] IN BLOOD BY AUTOMATED COUNT: 14 %
EST. GFR  (AFRICAN AMERICAN): >60 ML/MIN/1.73 M^2
EST. GFR  (NON AFRICAN AMERICAN): >60 ML/MIN/1.73 M^2
GLUCOSE SERPL-MCNC: 108 MG/DL
HCT VFR BLD AUTO: 29.2 %
HGB BLD-MCNC: 9 G/DL
IMM GRANULOCYTES # BLD AUTO: 0.02 K/UL
MCH RBC QN AUTO: 29.2 PG
MCHC RBC AUTO-ENTMCNC: 30.8 G/DL
MCV RBC AUTO: 95 FL
NEUTROPHILS # BLD AUTO: 2.7 K/UL
PLATELET # BLD AUTO: 164 K/UL
PMV BLD AUTO: 8.9 FL
POTASSIUM SERPL-SCNC: 5.2 MMOL/L
PROT SERPL-MCNC: 7.8 G/DL
RBC # BLD AUTO: 3.08 M/UL
SODIUM SERPL-SCNC: 135 MMOL/L
T4 FREE SERPL-MCNC: 0.81 NG/DL
TSH SERPL DL<=0.005 MIU/L-ACNC: 2.12 UIU/ML
WBC # BLD AUTO: 3.94 K/UL

## 2019-01-14 PROCEDURE — 3078F DIAST BP <80 MM HG: CPT | Mod: CPTII,S$GLB,, | Performed by: INTERNAL MEDICINE

## 2019-01-14 PROCEDURE — 3078F PR MOST RECENT DIASTOLIC BLOOD PRESSURE < 80 MM HG: ICD-10-PCS | Mod: CPTII,S$GLB,, | Performed by: INTERNAL MEDICINE

## 2019-01-14 PROCEDURE — 99999 PR PBB SHADOW E&M-EST. PATIENT-LVL IV: CPT | Mod: PBBFAC,,, | Performed by: RADIOLOGY

## 2019-01-14 PROCEDURE — 36415 COLL VENOUS BLD VENIPUNCTURE: CPT

## 2019-01-14 PROCEDURE — 3074F PR MOST RECENT SYSTOLIC BLOOD PRESSURE < 130 MM HG: ICD-10-PCS | Mod: CPTII,S$GLB,, | Performed by: INTERNAL MEDICINE

## 2019-01-14 PROCEDURE — 99214 OFFICE O/P EST MOD 30 MIN: CPT | Mod: S$GLB,,, | Performed by: INTERNAL MEDICINE

## 2019-01-14 PROCEDURE — 99999 PR PBB SHADOW E&M-EST. PATIENT-LVL V: ICD-10-PCS | Mod: PBBFAC,,, | Performed by: INTERNAL MEDICINE

## 2019-01-14 PROCEDURE — 84443 ASSAY THYROID STIM HORMONE: CPT

## 2019-01-14 PROCEDURE — 99999 PR PBB SHADOW E&M-EST. PATIENT-LVL V: CPT | Mod: PBBFAC,,, | Performed by: INTERNAL MEDICINE

## 2019-01-14 PROCEDURE — 85027 COMPLETE CBC AUTOMATED: CPT

## 2019-01-14 PROCEDURE — 99214 PR OFFICE/OUTPT VISIT, EST, LEVL IV, 30-39 MIN: ICD-10-PCS | Mod: S$GLB,,, | Performed by: INTERNAL MEDICINE

## 2019-01-14 PROCEDURE — 1101F PT FALLS ASSESS-DOCD LE1/YR: CPT | Mod: CPTII,S$GLB,, | Performed by: INTERNAL MEDICINE

## 2019-01-14 PROCEDURE — 99999 PR PBB SHADOW E&M-EST. PATIENT-LVL IV: ICD-10-PCS | Mod: PBBFAC,,, | Performed by: RADIOLOGY

## 2019-01-14 PROCEDURE — 3008F BODY MASS INDEX DOCD: CPT | Mod: CPTII,S$GLB,, | Performed by: INTERNAL MEDICINE

## 2019-01-14 PROCEDURE — 3008F PR BODY MASS INDEX (BMI) DOCUMENTED: ICD-10-PCS | Mod: CPTII,S$GLB,, | Performed by: INTERNAL MEDICINE

## 2019-01-14 PROCEDURE — 80053 COMPREHEN METABOLIC PANEL: CPT

## 2019-01-14 PROCEDURE — 84439 ASSAY OF FREE THYROXINE: CPT

## 2019-01-14 PROCEDURE — 99499 NO LOS: ICD-10-PCS | Mod: S$GLB,,, | Performed by: RADIOLOGY

## 2019-01-14 PROCEDURE — 99499 UNLISTED E&M SERVICE: CPT | Mod: S$GLB,,, | Performed by: RADIOLOGY

## 2019-01-14 PROCEDURE — 3074F SYST BP LT 130 MM HG: CPT | Mod: CPTII,S$GLB,, | Performed by: INTERNAL MEDICINE

## 2019-01-14 PROCEDURE — 1101F PR PT FALLS ASSESS DOC 0-1 FALLS W/OUT INJ PAST YR: ICD-10-PCS | Mod: CPTII,S$GLB,, | Performed by: INTERNAL MEDICINE

## 2019-01-14 RX ORDER — HEPARIN 100 UNIT/ML
500 SYRINGE INTRAVENOUS
Status: CANCELLED | OUTPATIENT
Start: 2019-01-14

## 2019-01-14 RX ORDER — SODIUM CHLORIDE 0.9 % (FLUSH) 0.9 %
10 SYRINGE (ML) INJECTION
Status: CANCELLED | OUTPATIENT
Start: 2019-01-14

## 2019-01-14 NOTE — PROGRESS NOTES
is a 63 years old man with stage T3 N0 M1 adenocarcinoma of the left lower lobe of the lung metastatic to skeleton treated with radiation on this occasion for pain at the level of the L5-sacrum and for recent development urinary incontinence and leg weakness, possibly related to a cauda equina compression. Treatment ended 12/19/18.     Pain in the pelvis is better but still significant. It radiates down lower extremities. Urinary function has normalized.    PHYSICAL EXAM: Gait is normal. Rhomberg is negative.      IMPRESSION: Partial relief of pain response to radiation treatment. Resolution of abnormal bladder function.     PLAN: He sees Dr Infante in oncology clinic later today. Additional chemotherapy is anticipated. He will return here as needed.

## 2019-01-14 NOTE — PROGRESS NOTES
PATIENT: Nimesh Nunn  MRN: [id number]  DATE: 1/14/2019      Diagnosis:   1. Primary malignant neoplasm of left lung    2. Lung cancer metastatic to bone    3. Weakness of both lower extremities        Chief Complaint: Primary malignant neoplasm of left lung      Oncologic History:      Oncologic History Non-small cell lung cancer, left diagnosed 06/2018  Metastatic disease to bone at presentation    Oncologic Treatment Stereotactic radiation to spinal lesion  Cisplatin/pemetrexed with concurrent radiation 08/08/2018 (completed 09/2018)  Durvalumab 10/2018  XRT L4-S3 30 Gy 12/2018    Pathology Poorly differentiated adenocarcinoma, EGFR wild type, No ALK, ROS-1 rearrangements, PD-L1 TPS 90%          Subjective:    Interval History: Mr. Nunn is a 63 y.o. male who is seen in follow-up for lung cancer.  Since I had seen him last he completed a course of radiation to his lumbar spine which was felt to be necessary for possible cauda equina syndrome.  He still has some ongoing lower extremity weakness.  He denies any bowel or bladder dysfunction.  The weakness can come and go.  He has no other new complaints.    His history dates to 06/2018 when he sought medical attention for chest pain.  A CT of the chest was performed showing a left lower lobe mass measuring 7.4 cm.  There were also enlarged left hilar lymph nodes. He underwent PET-CT which showed uptake in this mass and also in L1.  He underwent bronchoscopy and biopsy with pathology showing poorly differentiated adenocarcinoma.  Molecular studies showed EGFR wild-type and he did not harbor any ALK or ROS-1 rearrangements.  PD L1 tumor proportions core was 90%.  He underwent radiation to the spinal lesion in 08/2018 and started on concurrent chemo and radiation with cisplatin and pemetrexed in 08/2018.  He completed treatment on 09/19/2018.  He was only able to receive 2 cycles of chemotherapy secondary to cytopenias.  He was initiated on durvalumab in  10/2018.  He received 3 cycles but then developed lower extremity weakness and was felt that this may be related to cauda equina syndrome related to bone metastasis.  He underwent radiation to his lumbar and sacral spine in 12/2018.    Past Medical History:   Past Medical History:   Diagnosis Date    Abdominal aortic aneurysm (AAA) without rupture 3/5/2018    Arthritis     Blood transfusion     COPD (chronic obstructive pulmonary disease)     Coronary artery disease     Dehydration 8/15/2018    Encounter for blood transfusion     Hyperlipidemia 11/7/2013    Hypertension     Lung cancer metastatic to bone 7/18/2018    Occlusion and stenosis of carotid artery without mention of cerebral infarction 1/12/2014    Primary malignant neoplasm of left lung 7/18/2018    Screening for colon cancer 6/1/2015    Syncope 1/13/2014    Thrombocytopenia 9/19/2018    Weakness of both lower extremities 1/14/2019       Past Surgical HIstory:   Past Surgical History:   Procedure Laterality Date    ARCH & 4 VESSEL STUDY Bilateral 5/27/2014    Performed by [facility] at [facility] CATH LAB    BACK SURGERY      BRONCHOSCOPY,FIBEROPTIC N/A 6/26/2018    Performed by Liana Serrano [facility] at [facility] OR 2ND FLR    carotid endarectomy      CATHETERIZATION, HEART, LEFT Left 11/8/2013    Performed by [facility]hi Martin [facility] at [facility] CATH LAB    COLONOSCOPY N/A 6/1/2015    [facility] at [facility] ENDO    CORONARY ANGIOPLASTY      CORONARY ARTERY BYPASS GRAFT      ESOPHAGOGASTRODUODENOSCOPY (EGD) N/A 8/16/2018    Performed by Yohannes Osuna MD at [facility] ENDO (2ND FLR)    HEART CATH-LEFT N/A 5/27/2014    Performed by [facility]ekhi Martin [facility] at [facility] CATH LAB    INSERTION, STENT, CORONARY ARTERY N/A 12/20/2013    Performed by Mekhi Martin MD at [facility] CATH LAB    PTA, PERIPHERAL BLOOD VESSEL Left 3/11/2014    Performed by Mekhi Martin MD at [facility] CATH LAB    PTA, PERIPHERAL BLOOD VESSEL N/A 2/18/2014     Per[facility] at [facility] CATH LAB    SKIN BIOPSY      ULTRASOUND, ENDOBRONCHIAL N/A 6/26/2018    Performed by L[facility]ano MD at [facility] OR [facility] FLR       Family History:   Family History   Problem Relation Age of Onset    Heart disease Father     Hypertension Father     Heart attack Father     Heart failure Father     Cancer Mother     Breast cancer Mother     No Known Problems Sister     No Known Problems Brother     No Known Problems Maternal Grandmother     No Known Problems Maternal Grandfather     No Known Problems Paternal Grandmother     No Known Problems Paternal Grandfather     No Known Problems Maternal Aunt     No Known Problems Maternal Uncle     No Known Problems Paternal Aunt     No Known Problems Paternal Uncle     Anemia Neg Hx     Arrhythmia Neg Hx     Asthma Neg Hx     Clotting disorder Neg Hx     Fainting Neg Hx     Hyperlipidemia Neg Hx        Social History:  reports that he quit smoking about 7 years ago. He has a 60.00 pack-year smoking history. he has never used smokeless tobacco. He reports that he drinks about 1.8 oz of alcohol per week. He reports that he uses drugs. Drug: Marijuana.    Allergies:  Review of patient's allergies indicates:  No Known Allergies    Medications:  Current Outpatient Medications   Medication Sig Dispense Refill    aspirin (ECOTRIN) 81 MG EC tablet Take 81 mg by mouth once daily.      atorvastatin (LIPITOR) 80 MG tablet TAKE 1 TABLET BY MOUTH EVERY DAY 30 tablet 11    cilostazol (PLETAL) 100 MG Tab TAKE 1 TABLET BY MOUTH TWICE DAILY 60 tablet 11    clopidogrel (PLAVIX) 75 mg tablet TAKE 1 TABLET BY MOUTH EVERY DAY 90 tablet 4    DULoxetine (CYMBALTA) 60 MG capsule Take 1 capsule (60 mg total) by mouth once daily. 90 capsule 4    finasteride (PROSCAR) 5 mg tablet TAKE 1 TABLET BY MOUTH EVERY DAY 90 tablet 3    folic acid (FOLVITE) 400 MCG tablet Take 1 tablet (400 mcg total) by mouth once daily. 100 tablet 3     irbesartan (AVAPRO) 75 MG tablet Take 1 tablet (75 mg total) by mouth once daily. 90 tablet 3    metoprolol succinate (TOPROL-XL) 50 MG 24 hr tablet Take 50 mg by mouth.      nitroGLYCERIN (NITROSTAT) 0.4 MG SL tablet Place 1 tablet (0.4 mg total) under the tongue every 5 (five) minutes as needed for Chest pain. 90 tablet 1    ondansetron (ZOFRAN-ODT) 8 MG TbDL Take 1 tablet (8 mg total) by mouth every 12 (twelve) hours as needed. 30 tablet 1    oxyCODONE-acetaminophen (PERCOCET)  mg per tablet Take 1 tablet by mouth every 4 (four) hours as needed for Pain. 90 tablet 0    PROAIR HFA 90 mcg/actuation inhaler Inhale 2 puffs into the lungs as needed. 18 g 6    umeclidinium-vilanterol (ANORO ELLIPTA) 62.5-25 mcg/actuation DsDv Inhale 1 puff into the lungs once daily. Controller 1 each 5     No current facility-administered medications for this visit.        Review of Systems   Constitutional: Positive for fatigue. Negative for appetite change, chills, fever and unexpected weight change.   HENT: Negative for dental problem, sinus pressure and sneezing.    Eyes: Negative for visual disturbance.   Respiratory: Negative for cough, choking, chest tightness and shortness of breath.    Cardiovascular: Negative for chest pain and leg swelling.   Gastrointestinal: Negative for abdominal pain, blood in stool, constipation, diarrhea and nausea.   Genitourinary: Negative for difficulty urinating, dysuria and frequency.   Musculoskeletal: Positive for back pain. Negative for arthralgias.   Skin: Negative for rash and wound.   Neurological: Positive for weakness. Negative for dizziness, light-headedness and headaches.   Hematological: Negative for adenopathy. Does not bruise/bleed easily.   Psychiatric/Behavioral: Negative for sleep disturbance. The patient is not nervous/anxious.        ECOG Performance Status:   ECOG SCORE    1 - Restricted in strenuous activity-ambulatory and able to carry out work of a light nature          Objective:      Vitals:   Vitals:    01/14/19 1437   BP: (!) 92/57   BP Location: Right arm   Patient Position: Sitting   BP Method: Large (Automatic)   Pulse: 78   Resp: 16   Temp: 97.7 °F (36.5 °C)   TempSrc: Oral   SpO2: (!) 94%   Weight: 78 kg (171 lb 15.3 oz)   Height: 6' (1.829 m)     BMI: Body mass index is 23.32 kg/m².    Physical Exam   Constitutional: He is oriented to person, place, and time. He appears well-developed and well-nourished.   HENT:   Head: Normocephalic and atraumatic.   Eyes: Pupils are equal, round, and reactive to light.   Neck: Normal range of motion. Neck supple.   Cardiovascular: Normal rate and regular rhythm.   Pulmonary/Chest: Effort normal. No respiratory distress. He has no wheezes.   Abdominal: Soft. He exhibits no distension. There is no tenderness.   Musculoskeletal: He exhibits no edema or tenderness.   Lymphadenopathy:     He has no cervical adenopathy.   Neurological: He is alert and oriented to person, place, and time. No cranial nerve deficit or sensory deficit. He exhibits normal muscle tone. Coordination normal.   Skin: Skin is warm and dry.   Psychiatric: He has a normal mood and affect. His behavior is normal.       Laboratory Data:  Lab Visit on 01/14/2019   Component Date Value Ref Range Status    WBC 01/14/2019 3.94  3.90 - 12.70 K/uL Final    RBC 01/14/2019 3.08* 4.60 - 6.20 M/uL Final    Hemoglobin 01/14/2019 9.0* 14.0 - 18.0 g/dL Final    Hematocrit 01/14/2019 29.2* 40.0 - 54.0 % Final    MCV 01/14/2019 95  82 - 98 fL Final    MCH 01/14/2019 29.2  27.0 - 31.0 pg Final    MCHC 01/14/2019 30.8* 32.0 - 36.0 g/dL Final    RDW 01/14/2019 14.0  11.5 - 14.5 % Final    Platelets 01/14/2019 164  150 - 350 K/uL Final    MPV 01/14/2019 8.9* 9.2 - 12.9 fL Final    Gran # (ANC) 01/14/2019 2.7  1.8 - 7.7 K/uL Final    Comment: The ANC is based on a white cell differential from an   automated cell counter. It has not been microscopically   reviewed for the  presence of abnormal cells. Clinical   correlation is required.      Immature Grans (Abs) 01/14/2019 0.02  0.00 - 0.04 K/uL Final    Comment: Mild elevation in immature granulocytes is non specific and   can be seen in a variety of conditions including stress response,   acute inflammation, trauma and pregnancy. Correlation with other   laboratory and clinical findings is essential.      Sodium 01/14/2019 135* 136 - 145 mmol/L Final    Potassium 01/14/2019 5.2* 3.5 - 5.1 mmol/L Final    Chloride 01/14/2019 98  95 - 110 mmol/L Final    CO2 01/14/2019 27  23 - 29 mmol/L Final    Glucose 01/14/2019 108  70 - 110 mg/dL Final    BUN, Bld 01/14/2019 22  8 - 23 mg/dL Final    Creatinine 01/14/2019 1.2  0.5 - 1.4 mg/dL Final    Calcium 01/14/2019 10.1  8.7 - 10.5 mg/dL Final    Total Protein 01/14/2019 7.8  6.0 - 8.4 g/dL Final    Albumin 01/14/2019 3.1* 3.5 - 5.2 g/dL Final    Total Bilirubin 01/14/2019 0.3  0.1 - 1.0 mg/dL Final    Comment: For infants and newborns, interpretation of results should be based  on gestational age, weight and in agreement with clinical  observations.  Premature Infant recommended reference ranges:  Up to 24 hours.............<8.0 mg/dL  Up to 48 hours............<12.0 mg/dL  3-5 days..................<15.0 mg/dL  6-29 days.................<15.0 mg/dL      Alkaline Phosphatase 01/14/2019 162* 55 - 135 U/L Final    AST 01/14/2019 15  10 - 40 U/L Final    ALT 01/14/2019 12  10 - 44 U/L Final    Anion Gap 01/14/2019 10  8 - 16 mmol/L Final    eGFR if African American 01/14/2019 >60.0  >60 mL/min/1.73 m^2 Final    eGFR if non African American 01/14/2019 >60.0  >60 mL/min/1.73 m^2 Final    Comment: Calculation used to obtain the estimated glomerular filtration  rate (eGFR) is the CKD-EPI equation.       TSH 01/14/2019 2.121  0.400 - 4.000 uIU/mL Final    Free T4 01/14/2019 0.81  0.71 - 1.51 ng/dL Final         Imaging:   CT 10/16/2018  COMPARISON:  A CT examination of the thorax  performed on 06/10/2018.    FINDINGS:  The size of heart is within normal limits.  There is a moderate amount of atherosclerosis.  There is an ill-defined mass in the medial aspect of the left lower lobe that measures 5.3 cm in craniocaudal dimension by 5.8 cm in AP dimension by 4.3 cm in medial-lateral dimension on the current examination.  On the prior examination it measured 4.6 cm in craniocaudal dimension by 6.1 cm in AP dimension by 4.5 cm in medial-lateral dimension.  There has been interval development of several patchy areas of increased density in the posterior aspect of the left upper lobe.  The larger area measures 27 mm.  The right lung is clear.  There is no pneumothorax or pleural effusion.  There is a 6 mm stone in the dependent portion of the gallbladder.      Impression       1. There is an ill-defined mass in the medial aspect of the left lower lobe that measures 5.3 cm in craniocaudal dimension by 5.8 cm in AP dimension by 4.3 cm in medial-lateral dimension on the current examination. On the prior examination it measured 4.6 cm in craniocaudal dimension by 6.1 cm in AP dimension by 4.5 cm in medial-lateral dimension. There has been interval development of several patchy areas of increased density in the posterior aspect of the left upper lobe.  The larger area measures 27 mm.  This is consistent with the patient's history and characteristic of lung cancer.  2. There is a 6 mm stone in the dependent portion of the gallbladder.  All CT scans at this facility use dose modulation, iterative reconstruction, and/or weight base dosing when appropriate to reduce radiation dose when appropriate to reduce radiation dose to as low as reasonably achievable.              Assessment:       1. Primary malignant neoplasm of left lung    2. Lung cancer metastatic to bone    3. Weakness of both lower extremities           Plan:      Mr. Nunn is still having some ongoing lower extremity weakness.  His symptoms  come and go and I am not quite sure if this is related to his disease or possibly vascular in nature.  He does have known vascular disease.  I will refer to Neurosurgery at this time and get their opinion on his lower extremity weakness. Restart durvalumab.  All questions were answered and he is agreeable with this plan.    Demetrius Infante DO, FACP  Hematology & Oncology  [facility] [place] [address]  ph. [phone]  Fax. [phone]    25 minutes were spent in coordination of patient's care, record review and counseling.  More than 50% of the time was face-to-face.

## 2019-01-15 ENCOUNTER — TELEPHONE (OUTPATIENT)
Dept: HEMATOLOGY/ONCOLOGY | Facility: CLINIC | Age: 64
End: 2019-01-15

## 2019-01-15 NOTE — TELEPHONE ENCOUNTER
Left voice mail to call the clinic about scheduling infusion. And to give date and time for the appointment that is scheduled with Dr Moses.      ----- Message from Demetrius Infante DO, FACP sent at 1/14/2019  5:23 PM CST -----  Restart durvalumab  See me 2 weeks after starting for cycle 2 with CBC, CMP, TSH  Refer to Neurosurgery

## 2019-01-17 ENCOUNTER — INFUSION (OUTPATIENT)
Dept: INFUSION THERAPY | Facility: HOSPITAL | Age: 64
End: 2019-01-17
Attending: INTERNAL MEDICINE
Payer: MEDICARE

## 2019-01-17 VITALS
DIASTOLIC BLOOD PRESSURE: 78 MMHG | RESPIRATION RATE: 18 BRPM | HEART RATE: 97 BPM | SYSTOLIC BLOOD PRESSURE: 121 MMHG | TEMPERATURE: 98 F

## 2019-01-17 DIAGNOSIS — C34.92 PRIMARY MALIGNANT NEOPLASM OF LEFT LUNG: Primary | ICD-10-CM

## 2019-01-17 PROCEDURE — 63600175 PHARM REV CODE 636 W HCPCS: Mod: TB | Performed by: INTERNAL MEDICINE

## 2019-01-17 PROCEDURE — 96413 CHEMO IV INFUSION 1 HR: CPT

## 2019-01-17 PROCEDURE — 25000003 PHARM REV CODE 250: Performed by: INTERNAL MEDICINE

## 2019-01-17 RX ORDER — HEPARIN 100 UNIT/ML
500 SYRINGE INTRAVENOUS
Status: DISCONTINUED | OUTPATIENT
Start: 2019-01-17 | End: 2019-01-17 | Stop reason: HOSPADM

## 2019-01-17 RX ORDER — SODIUM CHLORIDE 0.9 % (FLUSH) 0.9 %
10 SYRINGE (ML) INJECTION
Status: DISCONTINUED | OUTPATIENT
Start: 2019-01-17 | End: 2019-01-17 | Stop reason: HOSPADM

## 2019-01-17 RX ADMIN — SODIUM CHLORIDE: 0.9 INJECTION, SOLUTION INTRAVENOUS at 10:01

## 2019-01-17 RX ADMIN — DURVALUMAB 825 MG: 120 INJECTION, SOLUTION INTRAVENOUS at 10:01

## 2019-01-17 NOTE — PLAN OF CARE
Problem: Adult Inpatient Plan of Care  Goal: Plan of Care Review  Pt did well today on imfinzi. Will see neuro-surg for his back.

## 2019-01-18 ENCOUNTER — TELEPHONE (OUTPATIENT)
Dept: NEUROSURGERY | Facility: CLINIC | Age: 64
End: 2019-01-18

## 2019-01-30 ENCOUNTER — OFFICE VISIT (OUTPATIENT)
Dept: NEUROSURGERY | Facility: CLINIC | Age: 64
End: 2019-01-30
Payer: MEDICARE

## 2019-01-30 ENCOUNTER — LAB VISIT (OUTPATIENT)
Dept: LAB | Facility: HOSPITAL | Age: 64
End: 2019-01-30
Attending: INTERNAL MEDICINE
Payer: MEDICARE

## 2019-01-30 ENCOUNTER — INFUSION (OUTPATIENT)
Dept: INFUSION THERAPY | Facility: HOSPITAL | Age: 64
End: 2019-01-30
Attending: INTERNAL MEDICINE
Payer: MEDICARE

## 2019-01-30 ENCOUNTER — OFFICE VISIT (OUTPATIENT)
Dept: HEMATOLOGY/ONCOLOGY | Facility: CLINIC | Age: 64
End: 2019-01-30
Payer: MEDICARE

## 2019-01-30 VITALS
TEMPERATURE: 97 F | WEIGHT: 168.88 LBS | HEIGHT: 72 IN | BODY MASS INDEX: 22.87 KG/M2 | HEART RATE: 105 BPM | SYSTOLIC BLOOD PRESSURE: 155 MMHG | OXYGEN SATURATION: 95 % | DIASTOLIC BLOOD PRESSURE: 79 MMHG | RESPIRATION RATE: 20 BRPM

## 2019-01-30 VITALS
DIASTOLIC BLOOD PRESSURE: 81 MMHG | HEIGHT: 72 IN | SYSTOLIC BLOOD PRESSURE: 121 MMHG | RESPIRATION RATE: 18 BRPM | BODY MASS INDEX: 22.75 KG/M2 | TEMPERATURE: 98 F | WEIGHT: 168 LBS | HEART RATE: 113 BPM

## 2019-01-30 VITALS
DIASTOLIC BLOOD PRESSURE: 83 MMHG | BODY MASS INDEX: 22.75 KG/M2 | WEIGHT: 168 LBS | HEART RATE: 107 BPM | SYSTOLIC BLOOD PRESSURE: 137 MMHG | HEIGHT: 72 IN | TEMPERATURE: 99 F

## 2019-01-30 DIAGNOSIS — C34.92 ADENOCARCINOMA OF LEFT LUNG, STAGE 2: ICD-10-CM

## 2019-01-30 DIAGNOSIS — R29.898 WEAKNESS OF BOTH LOWER EXTREMITIES: ICD-10-CM

## 2019-01-30 DIAGNOSIS — E07.89 OTHER SPECIFIED DISORDERS OF THYROID: ICD-10-CM

## 2019-01-30 DIAGNOSIS — C34.90 LUNG CANCER METASTATIC TO BONE: ICD-10-CM

## 2019-01-30 DIAGNOSIS — C79.51 LUNG CANCER METASTATIC TO BONE: ICD-10-CM

## 2019-01-30 DIAGNOSIS — R91.8 MASS OF LOWER LOBE OF LEFT LUNG: ICD-10-CM

## 2019-01-30 DIAGNOSIS — M47.816 LUMBAR SPONDYLOSIS: Primary | ICD-10-CM

## 2019-01-30 DIAGNOSIS — C34.92 PRIMARY MALIGNANT NEOPLASM OF LEFT LUNG: Primary | ICD-10-CM

## 2019-01-30 DIAGNOSIS — D49.89 NEOPLASM OF ABDOMEN: ICD-10-CM

## 2019-01-30 DIAGNOSIS — C34.92 PRIMARY MALIGNANT NEOPLASM OF LEFT LUNG: ICD-10-CM

## 2019-01-30 LAB
ALBUMIN SERPL BCP-MCNC: 3.4 G/DL
ALP SERPL-CCNC: 150 U/L
ALT SERPL W/O P-5'-P-CCNC: 16 U/L
ANION GAP SERPL CALC-SCNC: 11 MMOL/L
AST SERPL-CCNC: 25 U/L
BILIRUB SERPL-MCNC: 0.5 MG/DL
BUN SERPL-MCNC: 20 MG/DL
CALCIUM SERPL-MCNC: 10.2 MG/DL
CHLORIDE SERPL-SCNC: 96 MMOL/L
CO2 SERPL-SCNC: 26 MMOL/L
CREAT SERPL-MCNC: 1.2 MG/DL
ERYTHROCYTE [DISTWIDTH] IN BLOOD BY AUTOMATED COUNT: 14.6 %
EST. GFR  (AFRICAN AMERICAN): >60 ML/MIN/1.73 M^2
EST. GFR  (NON AFRICAN AMERICAN): >60 ML/MIN/1.73 M^2
GLUCOSE SERPL-MCNC: 123 MG/DL
HCT VFR BLD AUTO: 32.3 %
HGB BLD-MCNC: 10.3 G/DL
IMM GRANULOCYTES # BLD AUTO: 0.01 K/UL
MCH RBC QN AUTO: 29.8 PG
MCHC RBC AUTO-ENTMCNC: 31.9 G/DL
MCV RBC AUTO: 93 FL
NEUTROPHILS # BLD AUTO: 4.1 K/UL
PLATELET # BLD AUTO: 201 K/UL
PMV BLD AUTO: 8.6 FL
POTASSIUM SERPL-SCNC: 4.8 MMOL/L
PROT SERPL-MCNC: 7.9 G/DL
RBC # BLD AUTO: 3.46 M/UL
SODIUM SERPL-SCNC: 133 MMOL/L
T4 FREE SERPL-MCNC: 0.88 NG/DL
TSH SERPL DL<=0.005 MIU/L-ACNC: 0.9 UIU/ML
WBC # BLD AUTO: 5.44 K/UL

## 2019-01-30 PROCEDURE — 80053 COMPREHEN METABOLIC PANEL: CPT

## 2019-01-30 PROCEDURE — 3075F PR MOST RECENT SYSTOLIC BLOOD PRESS GE 130-139MM HG: ICD-10-PCS | Mod: CPTII,S$GLB,, | Performed by: NEUROLOGICAL SURGERY

## 2019-01-30 PROCEDURE — 3008F PR BODY MASS INDEX (BMI) DOCUMENTED: ICD-10-PCS | Mod: CPTII,S$GLB,, | Performed by: NEUROLOGICAL SURGERY

## 2019-01-30 PROCEDURE — 3077F PR MOST RECENT SYSTOLIC BLOOD PRESSURE >= 140 MM HG: ICD-10-PCS | Mod: CPTII,S$GLB,, | Performed by: INTERNAL MEDICINE

## 2019-01-30 PROCEDURE — 3075F SYST BP GE 130 - 139MM HG: CPT | Mod: CPTII,S$GLB,, | Performed by: NEUROLOGICAL SURGERY

## 2019-01-30 PROCEDURE — 3078F PR MOST RECENT DIASTOLIC BLOOD PRESSURE < 80 MM HG: ICD-10-PCS | Mod: CPTII,S$GLB,, | Performed by: INTERNAL MEDICINE

## 2019-01-30 PROCEDURE — 1101F PR PT FALLS ASSESS DOC 0-1 FALLS W/OUT INJ PAST YR: ICD-10-PCS | Mod: CPTII,S$GLB,, | Performed by: NEUROLOGICAL SURGERY

## 2019-01-30 PROCEDURE — 63600175 PHARM REV CODE 636 W HCPCS: Mod: TB | Performed by: INTERNAL MEDICINE

## 2019-01-30 PROCEDURE — 84439 ASSAY OF FREE THYROXINE: CPT

## 2019-01-30 PROCEDURE — 85027 COMPLETE CBC AUTOMATED: CPT

## 2019-01-30 PROCEDURE — 3078F DIAST BP <80 MM HG: CPT | Mod: CPTII,S$GLB,, | Performed by: INTERNAL MEDICINE

## 2019-01-30 PROCEDURE — 99999 PR PBB SHADOW E&M-EST. PATIENT-LVL V: ICD-10-PCS | Mod: PBBFAC,,, | Performed by: INTERNAL MEDICINE

## 2019-01-30 PROCEDURE — 3008F BODY MASS INDEX DOCD: CPT | Mod: CPTII,S$GLB,, | Performed by: INTERNAL MEDICINE

## 2019-01-30 PROCEDURE — 3079F DIAST BP 80-89 MM HG: CPT | Mod: CPTII,S$GLB,, | Performed by: NEUROLOGICAL SURGERY

## 2019-01-30 PROCEDURE — 3077F SYST BP >= 140 MM HG: CPT | Mod: CPTII,S$GLB,, | Performed by: INTERNAL MEDICINE

## 2019-01-30 PROCEDURE — 96413 CHEMO IV INFUSION 1 HR: CPT

## 2019-01-30 PROCEDURE — 1101F PT FALLS ASSESS-DOCD LE1/YR: CPT | Mod: CPTII,S$GLB,, | Performed by: NEUROLOGICAL SURGERY

## 2019-01-30 PROCEDURE — 99204 PR OFFICE/OUTPT VISIT, NEW, LEVL IV, 45-59 MIN: ICD-10-PCS | Mod: S$GLB,,, | Performed by: NEUROLOGICAL SURGERY

## 2019-01-30 PROCEDURE — 36415 COLL VENOUS BLD VENIPUNCTURE: CPT

## 2019-01-30 PROCEDURE — 1101F PR PT FALLS ASSESS DOC 0-1 FALLS W/OUT INJ PAST YR: ICD-10-PCS | Mod: CPTII,S$GLB,, | Performed by: INTERNAL MEDICINE

## 2019-01-30 PROCEDURE — 3008F BODY MASS INDEX DOCD: CPT | Mod: CPTII,S$GLB,, | Performed by: NEUROLOGICAL SURGERY

## 2019-01-30 PROCEDURE — 99214 PR OFFICE/OUTPT VISIT, EST, LEVL IV, 30-39 MIN: ICD-10-PCS | Mod: S$GLB,,, | Performed by: INTERNAL MEDICINE

## 2019-01-30 PROCEDURE — 99999 PR PBB SHADOW E&M-EST. PATIENT-LVL V: CPT | Mod: PBBFAC,,, | Performed by: INTERNAL MEDICINE

## 2019-01-30 PROCEDURE — 99999 PR PBB SHADOW E&M-EST. PATIENT-LVL V: ICD-10-PCS | Mod: PBBFAC,,, | Performed by: NEUROLOGICAL SURGERY

## 2019-01-30 PROCEDURE — 99999 PR PBB SHADOW E&M-EST. PATIENT-LVL V: CPT | Mod: PBBFAC,,, | Performed by: NEUROLOGICAL SURGERY

## 2019-01-30 PROCEDURE — 3079F PR MOST RECENT DIASTOLIC BLOOD PRESSURE 80-89 MM HG: ICD-10-PCS | Mod: CPTII,S$GLB,, | Performed by: NEUROLOGICAL SURGERY

## 2019-01-30 PROCEDURE — 1101F PT FALLS ASSESS-DOCD LE1/YR: CPT | Mod: CPTII,S$GLB,, | Performed by: INTERNAL MEDICINE

## 2019-01-30 PROCEDURE — 84443 ASSAY THYROID STIM HORMONE: CPT

## 2019-01-30 PROCEDURE — 99214 OFFICE O/P EST MOD 30 MIN: CPT | Mod: S$GLB,,, | Performed by: INTERNAL MEDICINE

## 2019-01-30 PROCEDURE — 99204 OFFICE O/P NEW MOD 45 MIN: CPT | Mod: S$GLB,,, | Performed by: NEUROLOGICAL SURGERY

## 2019-01-30 PROCEDURE — 3008F PR BODY MASS INDEX (BMI) DOCUMENTED: ICD-10-PCS | Mod: CPTII,S$GLB,, | Performed by: INTERNAL MEDICINE

## 2019-01-30 PROCEDURE — 25000003 PHARM REV CODE 250: Performed by: INTERNAL MEDICINE

## 2019-01-30 RX ORDER — HEPARIN 100 UNIT/ML
500 SYRINGE INTRAVENOUS
Status: CANCELLED | OUTPATIENT
Start: 2019-01-30

## 2019-01-30 RX ORDER — HEPARIN 100 UNIT/ML
500 SYRINGE INTRAVENOUS
Status: DISCONTINUED | OUTPATIENT
Start: 2019-01-30 | End: 2019-01-30 | Stop reason: HOSPADM

## 2019-01-30 RX ORDER — SODIUM CHLORIDE 0.9 % (FLUSH) 0.9 %
10 SYRINGE (ML) INJECTION
Status: DISCONTINUED | OUTPATIENT
Start: 2019-01-30 | End: 2019-01-30 | Stop reason: HOSPADM

## 2019-01-30 RX ORDER — SODIUM CHLORIDE 0.9 % (FLUSH) 0.9 %
10 SYRINGE (ML) INJECTION
Status: CANCELLED | OUTPATIENT
Start: 2019-01-30

## 2019-01-30 RX ADMIN — SODIUM CHLORIDE: 9 INJECTION, SOLUTION INTRAVENOUS at 02:01

## 2019-01-30 RX ADMIN — DURVALUMAB 825 MG: 500 INJECTION, SOLUTION INTRAVENOUS at 02:01

## 2019-01-30 NOTE — PROGRESS NOTES
Subjective:   I, Ethel Harmon, attest that this documentation has been prepared under the direction and in the presence of Rosalio Moses MD.     Patient ID: Nimesh Nunn is a 63 y.o. male     Chief Complaint: spine tumor      HPI  MrKenyetta Nunn is a pleasant 63 y.o. gentleman with lung CA metastatic to the bone who presents today for consultation regarding metastasis to the spine. The pt states he was diagnosed with CA in 07/2018 and was treated with radiation. He subsequently found to have a spinal lesion which was treated with radiation on 08/2018 and was started on concurrent chemotherapy. He states he has been having low back pain and states his pain level has remained more-or-less the same since the radiation. He states he takes Percocet as needed for pain control and has been seen by pain management in the past. He endorses weakness and intermittent numbness in his BLE and is participating in PT. He is followed by Dr. Infante of Hem/Onc for his lung CA which is currently being treated chemotherapeutically.    Review of Systems   Constitutional: Negative for activity change, appetite change, fatigue, fever and unexpected weight change.   HENT: Negative for facial swelling.    Eyes: Negative.    Respiratory: Negative.    Cardiovascular: Negative.    Gastrointestinal: Negative for diarrhea, nausea and vomiting.   Endocrine: Negative.    Genitourinary: Negative.    Musculoskeletal: Positive for back pain. Negative for joint swelling, myalgias and neck pain.   Neurological: Positive for weakness (BLE) and numbness (BLE). Negative for dizziness and headaches.   Psychiatric/Behavioral: Negative.       Past Medical History:   Diagnosis Date    Abdominal aortic aneurysm (AAA) without rupture 3/5/2018    Arthritis     Blood transfusion     COPD (chronic obstructive pulmonary disease)     Coronary artery disease     Dehydration 8/15/2018    Encounter for blood transfusion     Hyperlipidemia  11/7/2013    Hypertension     Lung cancer metastatic to bone 7/18/2018    Occlusion and stenosis of carotid artery without mention of cerebral infarction 1/12/2014    Primary malignant neoplasm of left lung 7/18/2018    Screening for colon cancer 6/1/2015    Syncope 1/13/2014    Thrombocytopenia 9/19/2018    Weakness of both lower extremities 1/14/2019       Objective:      Vitals:    01/30/19 1240   BP: 137/83   Pulse: 107   Temp: 98.7 °F (37.1 °C)      Physical Exam   Constitutional: He is oriented to person, place, and time. He appears well-nourished.   HENT:   Head: Normocephalic and atraumatic.   Neck: Neck supple.   Neurological: He is alert and oriented to person, place, and time. No cranial nerve deficit. He displays a negative Romberg sign. GCS eye subscore is 4. GCS verbal subscore is 5. GCS motor subscore is 6.          IMAGING:  MRI Lumbar spine (11/26/2018) shows a small nodule at L1 in addition to severe degenerative changes at L4-5 and L5-S1.    I have personally reviewed the images with the pt.      I, Dr. Rosalio Moses, personally performed the services described in this documentation. All medical record entries made by the scribe, Ethel Harmon, were at my direction and in my presence.  I have reviewed the chart and agree that the record reflects my personal performance and is accurate and complete. Rosalio Moses MD. 01/30/2019    Assessment:       1. Lumbar spondylosis    2. Weakness of both lower extremities    3. Mass of lower lobe of left lung    4. Adenocarcinoma of left lung, stage 2    5. Lung cancer metastatic to bone         Plan:   I have personally reviewed the MRI of lumbar spine with the pt which shows a small nodule at L1. He has severe degenerative changes at L4-5 and L5-S1 which is likely the source of his pain. There are no indications for surgical intervention at this time.    I recommend the patient follow up with physical medicine and palliative care. I will refer the pt.  He was advised to continue physical therapy to improve his flexibility and strength.    He will follow up with me as needed.

## 2019-01-30 NOTE — LETTER
January 30, 2019      Demetrius Infante, DO, FACP  [address]  [facility] [address]           [facility] - Neurosurgery 7th Fl  [address]  [facility] [address]  Phone: [phone]          Patient: Nimesh Nunn   MR Number: [id number]   YOB: 1955   Date of Visit: 1/30/2019       Dear Dr. Demetrius Infante:    Thank you for referring Nimesh Nunn to me for evaluation. Attached you will find relevant portions of my assessment and plan of care.    If you have questions, please do not hesitate to call me. I look forward to following Nimesh Nunn along with you.    Sincerely,    Mitra Maher RN    Enclosure  CC:  No Recipients    If you would like to receive this communication electronically, please contact [email or web address] or [phone] to request more information on My [employer]Exactly Link access.    For providers and/or their staff who would like to refer a patient to [facility], please contact us through our one-stop-shop provider referral line, [facility], [place], at [phone].    If you feel you have received this communication in error or would no longer like to receive these types of communications, please e-mail [email or web address]

## 2019-01-30 NOTE — PROGRESS NOTES
PATIENT: Nimesh Nunn  MRN: [id number]  DATE: 1/30/2019      Diagnosis:   1. Primary malignant neoplasm of left lung    2. Lung cancer metastatic to bone    3. Neoplasm of abdomen        Chief Complaint: Lung Cancer      Oncologic History:      Oncologic History Non-small cell lung cancer, left diagnosed 06/2018  Metastatic disease to bone at presentation    Oncologic Treatment Stereotactic radiation to spinal lesion  Cisplatin/pemetrexed with concurrent radiation 08/08/2018 (completed 09/2018)  Durvalumab 10/2018  XRT L4-S3 30 Gy 12/2018    Pathology Poorly differentiated adenocarcinoma, EGFR wild type, No ALK, ROS-1 rearrangements, PD-L1 TPS 90%          Subjective:    Interval History: Mr. Nunn is a 63 y.o. male who is seen in follow-up for lung cancer.  He states that he still has some ongoing lower extremity weakness that comes and goes.  Still no bowel or bladder incontinence.  He has also noted some worsening shortness of breath cough for the last few weeks.  Sometimes he has to stop and catch his breath for a goes on.  He has no other new complaints.    His history dates to 06/2018 when he sought medical attention for chest pain.  A CT of the chest was performed showing a left lower lobe mass measuring 7.4 cm.  There were also enlarged left hilar lymph nodes. He underwent PET-CT which showed uptake in this mass and also in L1.  He underwent bronchoscopy and biopsy with pathology showing poorly differentiated adenocarcinoma.  Molecular studies showed EGFR wild-type and he did not harbor any ALK or ROS-1 rearrangements.  PD L1 tumor proportions core was 90%.  He underwent radiation to the spinal lesion in 08/2018 and started on concurrent chemo and radiation with cisplatin and pemetrexed in 08/2018.  He completed treatment on 09/19/2018.  He was only able to receive 2 cycles of chemotherapy secondary to cytopenias.  He was initiated on durvalumab in 10/2018.  He received 3 cycles but then developed  lower extremity weakness and was felt that this may be related to cauda equina syndrome related to bone metastasis.  He underwent radiation to his lumbar and sacral spine in 12/2018.    Past Medical History:   Past Medical History:   Diagnosis Date    Abdominal aortic aneurysm (AAA) without rupture 3/5/2018    Arthritis     Blood transfusion     COPD (chronic obstructive pulmonary disease)     Coronary artery disease     Dehydration 8/15/2018    Encounter for blood transfusion     Hyperlipidemia 11/7/2013    Hypertension     Lung cancer metastatic to bone 7/18/2018    Occlusion and stenosis of carotid artery without mention of cerebral infarction 1/12/2014    Primary malignant neoplasm of left lung 7/18/2018    Screening for colon cancer 6/1/2015    Syncope 1/13/2014    Thrombocytopenia 9/19/2018    Weakness of both lower extremities 1/14/2019       Past Surgical HIstory:   Past Surgical History:   Procedure Laterality Date    ARCH & 4 VESSEL STUDY Bilateral 5/27/2014    Performed [facility] at [facility] CATH LAB    BACK SURGERY      BRONCHOSCOPY,FIBEROPTIC N/A 6/26/2018    Performed by Liana Serrano MD at [facility] OR 2ND FLR    carotid endarectomy      CATHETERIZATION, HEART, LEFT Left 11/8/2013    Performed by Mekhi Martin [facility] at [facility] CATH LAB    COLONOSCOPY N/A 6/1/2015    Per[facility] at [facility] ENDO    CORONARY ANGIOPLASTY      CORONARY ARTERY BYPASS GRAFT      ESOPHAGOGASTRODUODENOSCOPY (EGD) N/A 8/16/2018    Performed by Yohannes Osuna MD at [facility] ENDO (2ND FLR)    HEART CATH-LEFT N/A 5/27/2014    Performed by [facility]ekhi Martin [facility] at [facility] CATH LAB    INSERTION, STENT, CORONARY ARTERY N/A 12/20/2013    Performed by Mekhi Martin MD at [facility] CATH LAB    PTA, PERIPHERAL BLOOD VESSEL Left 3/11/2014    Performed by Mekhi Martin MD at [facility] CATH LAB    PTA, PERIPHERAL BLOOD VESSEL N/A 2/18/2014    Performed by Mekhi Martin MD at [facility] CATH LAB     SKIN BIOPSY      ULTRASOUND, ENDOBRONCHIAL N/A 6/26/2018    Performed by [facility] at [facility] OR [address] [place] [address]       Family History:   Family History   Problem Relation Age of Onset    Heart disease Father     Hypertension Father     Heart attack Father     Heart failure Father     Cancer Mother     Breast cancer Mother     No Known Problems Sister     No Known Problems Brother     No Known Problems Maternal Grandmother     No Known Problems Maternal Grandfather     No Known Problems Paternal Grandmother     No Known Problems Paternal Grandfather     No Known Problems Maternal Aunt     No Known Problems Maternal Uncle     No Known Problems Paternal Aunt     No Known Problems Paternal Uncle     Anemia Neg Hx     Arrhythmia Neg Hx     Asthma Neg Hx     Clotting disorder Neg Hx     Fainting Neg Hx     Hyperlipidemia Neg Hx        Social History:  reports that he quit smoking about 7 years ago. He has a 60.00 pack-year smoking history. he has never used smokeless tobacco. He reports that he drinks about 1.8 oz of alcohol per week. He reports that he uses drugs. Drug: Marijuana.    Allergies:  Review of patient's allergies indicates:  No Known Allergies    Medications:  Current Outpatient Medications   Medication Sig Dispense Refill    aspirin (ECOTRIN) 81 MG EC tablet Take 81 mg by mouth once daily.      atorvastatin (LIPITOR) 80 MG tablet TAKE 1 TABLET BY MOUTH EVERY DAY 30 tablet 11    cilostazol (PLETAL) 100 MG Tab TAKE 1 TABLET BY MOUTH TWICE DAILY 60 tablet 11    clopidogrel (PLAVIX) 75 mg tablet TAKE 1 TABLET BY MOUTH EVERY DAY 90 tablet 4    DULoxetine (CYMBALTA) 60 MG capsule Take 1 capsule (60 mg total) by mouth once daily. 90 capsule 4    finasteride (PROSCAR) 5 mg tablet TAKE 1 TABLET BY MOUTH EVERY DAY 90 tablet 3    folic acid (FOLVITE) 400 MCG tablet Take 1 tablet (400 mcg total) by mouth once daily. 100 tablet 3    irbesartan (AVAPRO) 75 MG tablet Take 1 tablet (75 mg total)  by mouth once daily. 90 tablet 3    metoprolol succinate (TOPROL-XL) 50 MG 24 hr tablet Take 50 mg by mouth.      nitroGLYCERIN (NITROSTAT) 0.4 MG SL tablet Place 1 tablet (0.4 mg total) under the tongue every 5 (five) minutes as needed for Chest pain. 90 tablet 1    ondansetron (ZOFRAN-ODT) 8 MG TbDL Take 1 tablet (8 mg total) by mouth every 12 (twelve) hours as needed. 30 tablet 1    oxyCODONE-acetaminophen (PERCOCET)  mg per tablet Take 1 tablet by mouth every 4 (four) hours as needed for Pain. 90 tablet 0    PROAIR HFA 90 mcg/actuation inhaler Inhale 2 puffs into the lungs as needed. 18 g 6    umeclidinium-vilanterol (ANORO ELLIPTA) 62.5-25 mcg/actuation DsDv Inhale 1 puff into the lungs once daily. Controller 1 each 5     No current facility-administered medications for this visit.        Review of Systems   Constitutional: Positive for fatigue. Negative for appetite change, chills, fever and unexpected weight change.   HENT: Negative for dental problem, sinus pressure and sneezing.    Eyes: Negative for visual disturbance.   Respiratory: Positive for cough and shortness of breath. Negative for choking and chest tightness.    Cardiovascular: Negative for chest pain and leg swelling.   Gastrointestinal: Negative for abdominal pain, blood in stool, constipation, diarrhea and nausea.   Genitourinary: Negative for difficulty urinating, dysuria and frequency.   Musculoskeletal: Positive for back pain. Negative for arthralgias.   Skin: Negative for rash and wound.   Neurological: Positive for weakness. Negative for dizziness, light-headedness and headaches.   Hematological: Negative for adenopathy. Does not bruise/bleed easily.   Psychiatric/Behavioral: Negative for sleep disturbance. The patient is not nervous/anxious.        ECOG Performance Status:   ECOG SCORE    1 - Restricted in strenuous activity-ambulatory and able to carry out work of a light nature         Objective:      Vitals:   Vitals:     01/30/19 1134   BP: (!) 155/79   Pulse: 105   Resp: 20   Temp: 97.2 °F (36.2 °C)   SpO2: 95%   Weight: 76.6 kg (168 lb 14 oz)   Height: 6' (1.829 m)     BMI: Body mass index is 22.9 kg/m².    Physical Exam   Constitutional: He is oriented to person, place, and time. He appears well-developed and well-nourished.   HENT:   Head: Normocephalic and atraumatic.   Eyes: Pupils are equal, round, and reactive to light.   Neck: Normal range of motion. Neck supple.   Cardiovascular: Normal rate and regular rhythm.   Pulmonary/Chest: Effort normal. No respiratory distress. He has wheezes.   Abdominal: Soft. He exhibits no distension. There is no tenderness.   Musculoskeletal: He exhibits no edema or tenderness.   Lymphadenopathy:     He has no cervical adenopathy.   Neurological: He is alert and oriented to person, place, and time. No cranial nerve deficit or sensory deficit. He exhibits normal muscle tone. Coordination normal.   Skin: Skin is warm and dry.   Psychiatric: He has a normal mood and affect. His behavior is normal.       Laboratory Data:  Lab Visit on 01/30/2019   Component Date Value Ref Range Status    WBC 01/30/2019 5.44  3.90 - 12.70 K/uL Final    RBC 01/30/2019 3.46* 4.60 - 6.20 M/uL Final    Hemoglobin 01/30/2019 10.3* 14.0 - 18.0 g/dL Final    Hematocrit 01/30/2019 32.3* 40.0 - 54.0 % Final    MCV 01/30/2019 93  82 - 98 fL Final    MCH 01/30/2019 29.8  27.0 - 31.0 pg Final    MCHC 01/30/2019 31.9* 32.0 - 36.0 g/dL Final    RDW 01/30/2019 14.6* 11.5 - 14.5 % Final    Platelets 01/30/2019 201  150 - 350 K/uL Final    MPV 01/30/2019 8.6* 9.2 - 12.9 fL Final    Gran # (ANC) 01/30/2019 4.1  1.8 - 7.7 K/uL Final    Comment: The ANC is based on a white cell differential from an   automated cell counter. It has not been microscopically   reviewed for the presence of abnormal cells. Clinical   correlation is required.      Immature Grans (Abs) 01/30/2019 0.01  0.00 - 0.04 K/uL Final    Comment: Mild  elevation in immature granulocytes is non specific and   can be seen in a variety of conditions including stress response,   acute inflammation, trauma and pregnancy. Correlation with other   laboratory and clinical findings is essential.      Sodium 01/30/2019 133* 136 - 145 mmol/L Final    Potassium 01/30/2019 4.8  3.5 - 5.1 mmol/L Final    Chloride 01/30/2019 96  95 - 110 mmol/L Final    CO2 01/30/2019 26  23 - 29 mmol/L Final    Glucose 01/30/2019 123* 70 - 110 mg/dL Final    BUN, Bld 01/30/2019 20  8 - 23 mg/dL Final    Creatinine 01/30/2019 1.2  0.5 - 1.4 mg/dL Final    Calcium 01/30/2019 10.2  8.7 - 10.5 mg/dL Final    Total Protein 01/30/2019 7.9  6.0 - 8.4 g/dL Final    Albumin 01/30/2019 3.4* 3.5 - 5.2 g/dL Final    Total Bilirubin 01/30/2019 0.5  0.1 - 1.0 mg/dL Final    Comment: For infants and newborns, interpretation of results should be based  on gestational age, weight and in agreement with clinical  observations.  Premature Infant recommended reference ranges:  Up to 24 hours.............<8.0 mg/dL  Up to 48 hours............<12.0 mg/dL  3-5 days..................<15.0 mg/dL  6-29 days.................<15.0 mg/dL      Alkaline Phosphatase 01/30/2019 150* 55 - 135 U/L Final    AST 01/30/2019 25  10 - 40 U/L Final    ALT 01/30/2019 16  10 - 44 U/L Final    Anion Gap 01/30/2019 11  8 - 16 mmol/L Final    eGFR if African American 01/30/2019 >60.0  >60 mL/min/1.73 m^2 Final    eGFR if non African American 01/30/2019 >60.0  >60 mL/min/1.73 m^2 Final    Comment: Calculation used to obtain the estimated glomerular filtration  rate (eGFR) is the CKD-EPI equation.       TSH 01/30/2019 0.896  0.400 - 4.000 uIU/mL Final    Free T4 01/30/2019 0.88  0.71 - 1.51 ng/dL Final         Imaging:   CT 10/16/2018  COMPARISON:  A CT examination of the thorax performed on 06/10/2018.    FINDINGS:  The size of heart is within normal limits.  There is a moderate amount of atherosclerosis.  There is an  ill-defined mass in the medial aspect of the left lower lobe that measures 5.3 cm in craniocaudal dimension by 5.8 cm in AP dimension by 4.3 cm in medial-lateral dimension on the current examination.  On the prior examination it measured 4.6 cm in craniocaudal dimension by 6.1 cm in AP dimension by 4.5 cm in medial-lateral dimension.  There has been interval development of several patchy areas of increased density in the posterior aspect of the left upper lobe.  The larger area measures 27 mm.  The right lung is clear.  There is no pneumothorax or pleural effusion.  There is a 6 mm stone in the dependent portion of the gallbladder.      Impression       1. There is an ill-defined mass in the medial aspect of the left lower lobe that measures 5.3 cm in craniocaudal dimension by 5.8 cm in AP dimension by 4.3 cm in medial-lateral dimension on the current examination. On the prior examination it measured 4.6 cm in craniocaudal dimension by 6.1 cm in AP dimension by 4.5 cm in medial-lateral dimension. There has been interval development of several patchy areas of increased density in the posterior aspect of the left upper lobe.  The larger area measures 27 mm.  This is consistent with the patient's history and characteristic of lung cancer.  2. There is a 6 mm stone in the dependent portion of the gallbladder.  All CT scans at this facility use dose modulation, iterative reconstruction, and/or weight base dosing when appropriate to reduce radiation dose when appropriate to reduce radiation dose to as low as reasonably achievable.              Assessment:       1. Primary malignant neoplasm of left lung    2. Lung cancer metastatic to bone    3. Neoplasm of abdomen           Plan:      Mr. Nunn is meeting with Neurosurgery later today to address his lower extremity weakness. He will continue on with durvalumab but I am concerned about his worsening respiratory symptoms and will plan to check a CT on him prior to his  next visit in 2 weeks.  Report any worsening of symptoms immediately.  All questions were answered and he is agreeable with this plan.    Demetrius Infante DO, FACP  Hematology & Oncology  [facility] [place] [address]  ph. [phone]  Fax. [phone]    25 minutes were spent in coordination of patient's care, record review and counseling.  More than 50% of the time was face-to-face.

## 2019-01-30 NOTE — PLAN OF CARE
Problem: Adult Inpatient Plan of Care  Goal: Plan of Care Review  Outcome: Ongoing (interventions implemented as appropriate)  Did well with imfinzi.

## 2019-01-30 NOTE — PATIENT INSTRUCTIONS
I have personally reviewed the MRI of lumbar with the pt which shows a small nodule at L1. He has severe degenerative changes at L4-5 and L5-S1 which is likely the source of his pain. There are no indications for surgical intervention at this time.    I recommend the patient follow up with physical medicine and palliative care. I will refer the pt. He was advised to continue physical therapy to improve his flexibility and strength.    He will follow up with me as needed.

## 2019-02-01 DIAGNOSIS — G89.3 NEOPLASM RELATED PAIN: ICD-10-CM

## 2019-02-01 RX ORDER — OXYCODONE AND ACETAMINOPHEN 10; 325 MG/1; MG/1
1 TABLET ORAL EVERY 4 HOURS PRN
Qty: 90 TABLET | Refills: 0 | Status: SHIPPED | OUTPATIENT
Start: 2019-02-01 | End: 2019-02-18 | Stop reason: SDUPTHER

## 2019-02-11 ENCOUNTER — HOSPITAL ENCOUNTER (OUTPATIENT)
Dept: RADIOLOGY | Facility: HOSPITAL | Age: 64
Discharge: HOME | End: 2019-02-11
Attending: INTERNAL MEDICINE
Payer: MEDICARE

## 2019-02-11 DIAGNOSIS — D49.89 NEOPLASM OF ABDOMEN: ICD-10-CM

## 2019-02-11 PROCEDURE — 25500020 PHARM REV CODE 255: Mod: PO | Performed by: INTERNAL MEDICINE

## 2019-02-11 PROCEDURE — 74177 CT ABD & PELVIS W/CONTRAST: CPT | Mod: TC,PO

## 2019-02-11 RX ADMIN — IOHEXOL 100 ML: 350 INJECTION, SOLUTION INTRAVENOUS at 10:02

## 2019-02-13 ENCOUNTER — OFFICE VISIT (OUTPATIENT)
Dept: HEMATOLOGY/ONCOLOGY | Facility: CLINIC | Age: 64
End: 2019-02-13
Payer: MEDICARE

## 2019-02-13 ENCOUNTER — LAB VISIT (OUTPATIENT)
Dept: LAB | Facility: HOSPITAL | Age: 64
End: 2019-02-13
Attending: INTERNAL MEDICINE
Payer: MEDICARE

## 2019-02-13 VITALS
SYSTOLIC BLOOD PRESSURE: 142 MMHG | RESPIRATION RATE: 16 BRPM | WEIGHT: 171.5 LBS | BODY MASS INDEX: 23.23 KG/M2 | OXYGEN SATURATION: 93 % | TEMPERATURE: 98 F | HEART RATE: 72 BPM | HEIGHT: 72 IN | DIASTOLIC BLOOD PRESSURE: 56 MMHG

## 2019-02-13 DIAGNOSIS — R32 URINARY INCONTINENCE, UNSPECIFIED TYPE: ICD-10-CM

## 2019-02-13 DIAGNOSIS — C34.90 LUNG CANCER METASTATIC TO BONE: ICD-10-CM

## 2019-02-13 DIAGNOSIS — F41.1 GENERALIZED ANXIETY DISORDER: ICD-10-CM

## 2019-02-13 DIAGNOSIS — J98.4 PNEUMONITIS: ICD-10-CM

## 2019-02-13 DIAGNOSIS — C34.92 PRIMARY MALIGNANT NEOPLASM OF LEFT LUNG: Primary | ICD-10-CM

## 2019-02-13 DIAGNOSIS — M54.5 ACUTE BILATERAL LOW BACK PAIN, WITH SCIATICA PRESENCE UNSPECIFIED: ICD-10-CM

## 2019-02-13 DIAGNOSIS — C79.51 LUNG CANCER METASTATIC TO BONE: ICD-10-CM

## 2019-02-13 DIAGNOSIS — C34.92 ADENOCARCINOMA OF LEFT LUNG, STAGE 2: ICD-10-CM

## 2019-02-13 LAB
ALBUMIN SERPL BCP-MCNC: 3.1 G/DL
ALP SERPL-CCNC: 127 U/L
ALT SERPL W/O P-5'-P-CCNC: 10 U/L
ANION GAP SERPL CALC-SCNC: 6 MMOL/L
AST SERPL-CCNC: 19 U/L
BILIRUB SERPL-MCNC: 0.2 MG/DL
BUN SERPL-MCNC: 23 MG/DL
CALCIUM SERPL-MCNC: 9.4 MG/DL
CHLORIDE SERPL-SCNC: 98 MMOL/L
CO2 SERPL-SCNC: 30 MMOL/L
CREAT SERPL-MCNC: 1.3 MG/DL
ERYTHROCYTE [DISTWIDTH] IN BLOOD BY AUTOMATED COUNT: 14.6 %
EST. GFR  (AFRICAN AMERICAN): >60 ML/MIN/1.73 M^2
EST. GFR  (NON AFRICAN AMERICAN): 57.3 ML/MIN/1.73 M^2
GLUCOSE SERPL-MCNC: 119 MG/DL
HCT VFR BLD AUTO: 28 %
HGB BLD-MCNC: 8.7 G/DL
IMM GRANULOCYTES # BLD AUTO: 0.05 K/UL
MCH RBC QN AUTO: 29.6 PG
MCHC RBC AUTO-ENTMCNC: 31.1 G/DL
MCV RBC AUTO: 95 FL
NEUTROPHILS # BLD AUTO: 3.9 K/UL
PLATELET # BLD AUTO: 155 K/UL
PMV BLD AUTO: 8.8 FL
POTASSIUM SERPL-SCNC: 4.6 MMOL/L
PROT SERPL-MCNC: 7.1 G/DL
RBC # BLD AUTO: 2.94 M/UL
SODIUM SERPL-SCNC: 134 MMOL/L
TSH SERPL DL<=0.005 MIU/L-ACNC: 3.2 UIU/ML
WBC # BLD AUTO: 5.44 K/UL

## 2019-02-13 PROCEDURE — 3077F SYST BP >= 140 MM HG: CPT | Mod: CPTII,S$GLB,, | Performed by: INTERNAL MEDICINE

## 2019-02-13 PROCEDURE — 80053 COMPREHEN METABOLIC PANEL: CPT

## 2019-02-13 PROCEDURE — 84443 ASSAY THYROID STIM HORMONE: CPT

## 2019-02-13 PROCEDURE — 3008F PR BODY MASS INDEX (BMI) DOCUMENTED: ICD-10-PCS | Mod: CPTII,S$GLB,, | Performed by: INTERNAL MEDICINE

## 2019-02-13 PROCEDURE — 3077F PR MOST RECENT SYSTOLIC BLOOD PRESSURE >= 140 MM HG: ICD-10-PCS | Mod: CPTII,S$GLB,, | Performed by: INTERNAL MEDICINE

## 2019-02-13 PROCEDURE — 99999 PR PBB SHADOW E&M-EST. PATIENT-LVL IV: CPT | Mod: PBBFAC,,, | Performed by: INTERNAL MEDICINE

## 2019-02-13 PROCEDURE — 99499 RISK ADDL DX/OHS AUDIT: ICD-10-PCS | Mod: S$GLB,,, | Performed by: INTERNAL MEDICINE

## 2019-02-13 PROCEDURE — 99499 UNLISTED E&M SERVICE: CPT | Mod: S$GLB,,, | Performed by: INTERNAL MEDICINE

## 2019-02-13 PROCEDURE — 3008F BODY MASS INDEX DOCD: CPT | Mod: CPTII,S$GLB,, | Performed by: INTERNAL MEDICINE

## 2019-02-13 PROCEDURE — 36415 COLL VENOUS BLD VENIPUNCTURE: CPT

## 2019-02-13 PROCEDURE — 3078F PR MOST RECENT DIASTOLIC BLOOD PRESSURE < 80 MM HG: ICD-10-PCS | Mod: CPTII,S$GLB,, | Performed by: INTERNAL MEDICINE

## 2019-02-13 PROCEDURE — 3078F DIAST BP <80 MM HG: CPT | Mod: CPTII,S$GLB,, | Performed by: INTERNAL MEDICINE

## 2019-02-13 PROCEDURE — 99214 PR OFFICE/OUTPT VISIT, EST, LEVL IV, 30-39 MIN: ICD-10-PCS | Mod: S$GLB,,, | Performed by: INTERNAL MEDICINE

## 2019-02-13 PROCEDURE — 99999 PR PBB SHADOW E&M-EST. PATIENT-LVL IV: ICD-10-PCS | Mod: PBBFAC,,, | Performed by: INTERNAL MEDICINE

## 2019-02-13 PROCEDURE — 1101F PT FALLS ASSESS-DOCD LE1/YR: CPT | Mod: CPTII,S$GLB,, | Performed by: INTERNAL MEDICINE

## 2019-02-13 PROCEDURE — 1101F PR PT FALLS ASSESS DOC 0-1 FALLS W/OUT INJ PAST YR: ICD-10-PCS | Mod: CPTII,S$GLB,, | Performed by: INTERNAL MEDICINE

## 2019-02-13 PROCEDURE — 99214 OFFICE O/P EST MOD 30 MIN: CPT | Mod: S$GLB,,, | Performed by: INTERNAL MEDICINE

## 2019-02-13 PROCEDURE — 85027 COMPLETE CBC AUTOMATED: CPT

## 2019-02-13 RX ORDER — PREDNISONE 10 MG/1
30 TABLET ORAL DAILY
Qty: 60 TABLET | Refills: 2 | Status: SHIPPED | OUTPATIENT
Start: 2019-02-13 | End: 2019-03-13

## 2019-02-13 RX ORDER — PREDNISONE 50 MG/1
50 TABLET ORAL DAILY
Qty: 60 TABLET | Refills: 0 | Status: SHIPPED | OUTPATIENT
Start: 2019-02-13 | End: 2019-03-13

## 2019-02-13 NOTE — PROGRESS NOTES
PATIENT: Nimesh Nunn  MRN: [id number]  DATE: 2/13/2019      Diagnosis:   1. Primary malignant neoplasm of left lung    2. Lung cancer metastatic to bone    3. Pneumonitis        Chief Complaint: Lung Cancer      Oncologic History:      Oncologic History Non-small cell lung cancer, left diagnosed 06/2018  Metastatic disease to bone at presentation    Oncologic Treatment Stereotactic radiation to spinal lesion  Cisplatin/pemetrexed with concurrent radiation 08/08/2018 (completed 09/2018)  Durvalumab 10/2018  XRT L4-S3 30 Gy 12/2018    Pathology Poorly differentiated adenocarcinoma, EGFR wild type, No ALK, ROS-1 rearrangements, PD-L1 TPS 90%          Subjective:    Interval History: Mr. Nunn is a 63 y.o. male who is seen in follow-up for lung cancer.  He states he still has some ongoing cough and shortness of breath which is relatively unchanged.  His fatigue seems a little better as does his lower extremity weakness.  He has no other new complaints.    His history dates to 06/2018 when he sought medical attention for chest pain.  A CT of the chest was performed showing a left lower lobe mass measuring 7.4 cm.  There were also enlarged left hilar lymph nodes. He underwent PET-CT which showed uptake in this mass and also in L1.  He underwent bronchoscopy and biopsy with pathology showing poorly differentiated adenocarcinoma.  Molecular studies showed EGFR wild-type and he did not harbor any ALK or ROS-1 rearrangements.  PD L1 tumor proportions core was 90%.  He underwent radiation to the spinal lesion in 08/2018 and started on concurrent chemo and radiation with cisplatin and pemetrexed in 08/2018.  He completed treatment on 09/19/2018.  He was only able to receive 2 cycles of chemotherapy secondary to cytopenias.  He was initiated on durvalumab in 10/2018.  He received 3 cycles but then developed lower extremity weakness and was felt that this may be related to cauda equina syndrome related to bone  metastasis.  He underwent radiation to his lumbar and sacral spine in 12/2018.    Past Medical History:   Past Medical History:   Diagnosis Date    Abdominal aortic aneurysm (AAA) without rupture 3/5/2018    Arthritis     Blood transfusion     COPD (chronic obstructive pulmonary disease)     Coronary artery disease     Dehydration 8/15/2018    Encounter for blood transfusion     Hyperlipidemia 11/7/2013    Hypertension     Lung cancer metastatic to bone 7/18/2018    Occlusion and stenosis of carotid artery without mention of cerebral infarction 1/12/2014    Pneumonitis 2/13/2019    Primary malignant neoplasm of left lung 7/18/2018    Screening for colon cancer 6/1/2015    Syncope 1/13/2014    Thrombocytopenia 9/19/2018    Weakness of both lower extremities 1/14/2019       Past Surgical HIstory:   Past Surgical History:   Procedure Laterality Date    ARCH & 4 VESSEL STUDY Bilateral 5/27/2014    Performed by [facility]ekhi Martin [facility] at [facility] CATH LAB    BACK SURGERY      BRONCHOSCOPY,FIBEROPTIC N/A 6/26/2018    Performed by Liana Serrano MD at [facility] OR 2ND FLR    carotid endarectomy      CATHETERIZATION, HEART, LEFT Left 11/8/2013    Performed by Mekhi Martin MD at [facility] CATH LAB    COLONOSCOPY N/A 6/1/2015    [facility] at [facility] ENDO    CORONARY ANGIOPLASTY      CORONARY ARTERY BYPASS GRAFT      ESOPHAGOGASTRODUODENOSCOPY (EGD) N/A 8/16/2018    Performed by Yohannes Osuna MD at [facility] ENDO (2ND FLR)    HEART CATH-LEFT N/A 5/27/2014    Performed by Mekhi Martin MD at [facility] CATH LAB    INSERTION, STENT, CORONARY ARTERY N/A 12/20/2013    Performed by Mekhi Martin MD at [facility] CATH LAB    PTA, PERIPHERAL BLOOD VESSEL Left 3/11/2014    Performed by Mekhi Martin MD at [facility] CATH LAB    PTA, PERIPHERAL BLOOD VESSEL N/A 2/18/2014    Performed by Mekhi Martin MD at [facility] CATH LAB    SKIN BIOPSY      ULTRASOUND, ENDOBRONCHIAL N/A 6/26/2018    Performed by Liana  ELIZABETH Serrano MD at [facility] OR [address] [place] [address]       Family History:   Family History   Problem Relation Age of Onset    Heart disease Father     Hypertension Father     Heart attack Father     Heart failure Father     Cancer Mother     Breast cancer Mother     No Known Problems Sister     No Known Problems Brother     No Known Problems Maternal Grandmother     No Known Problems Maternal Grandfather     No Known Problems Paternal Grandmother     No Known Problems Paternal Grandfather     No Known Problems Maternal Aunt     No Known Problems Maternal Uncle     No Known Problems Paternal Aunt     No Known Problems Paternal Uncle     Anemia Neg Hx     Arrhythmia Neg Hx     Asthma Neg Hx     Clotting disorder Neg Hx     Fainting Neg Hx     Hyperlipidemia Neg Hx        Social History:  reports that he quit smoking about 7 years ago. He has a 60.00 pack-year smoking history. he has never used smokeless tobacco. He reports that he drinks about 1.8 oz of alcohol per week. He reports that he uses drugs. Drug: Marijuana.    Allergies:  Review of patient's allergies indicates:  No Known Allergies    Medications:  Current Outpatient Medications   Medication Sig Dispense Refill    aspirin (ECOTRIN) 81 MG EC tablet Take 81 mg by mouth once daily.      atorvastatin (LIPITOR) 80 MG tablet TAKE 1 TABLET BY MOUTH EVERY DAY 30 tablet 11    cilostazol (PLETAL) 100 MG Tab TAKE 1 TABLET BY MOUTH TWICE DAILY 60 tablet 11    clopidogrel (PLAVIX) 75 mg tablet TAKE 1 TABLET BY MOUTH EVERY DAY 90 tablet 4    DULoxetine (CYMBALTA) 60 MG capsule Take 1 capsule (60 mg total) by mouth once daily. 90 capsule 4    finasteride (PROSCAR) 5 mg tablet TAKE 1 TABLET BY MOUTH EVERY DAY 90 tablet 3    folic acid (FOLVITE) 400 MCG tablet Take 1 tablet (400 mcg total) by mouth once daily. 100 tablet 3    irbesartan (AVAPRO) 75 MG tablet Take 1 tablet (75 mg total) by mouth once daily. 90 tablet 3    metoprolol succinate (TOPROL-XL) 50 MG 24  hr tablet Take 50 mg by mouth.      nitroGLYCERIN (NITROSTAT) 0.4 MG SL tablet Place 1 tablet (0.4 mg total) under the tongue every 5 (five) minutes as needed for Chest pain. 90 tablet 1    ondansetron (ZOFRAN-ODT) 8 MG TbDL Take 1 tablet (8 mg total) by mouth every 12 (twelve) hours as needed. 30 tablet 1    oxyCODONE-acetaminophen (PERCOCET)  mg per tablet Take 1 tablet by mouth every 4 (four) hours as needed for Pain. 90 tablet 0    PROAIR HFA 90 mcg/actuation inhaler Inhale 2 puffs into the lungs as needed. 18 g 6    umeclidinium-vilanterol (ANORO ELLIPTA) 62.5-25 mcg/actuation DsDv Inhale 1 puff into the lungs once daily. Controller 1 each 5    predniSONE (DELTASONE) 10 MG tablet Take 3 tablets (30 mg total) by mouth once daily. Use with 50 mg tabs to take 80mg daily 60 tablet 2    predniSONE (DELTASONE) 50 MG Tab Take 1 tablet (50 mg total) by mouth once daily. Use with 10 mg tabs to take 80mg daily 60 tablet 0     No current facility-administered medications for this visit.        Review of Systems   Constitutional: Positive for fatigue. Negative for appetite change, chills, fever and unexpected weight change.   HENT: Negative for dental problem, sinus pressure and sneezing.    Eyes: Negative for visual disturbance.   Respiratory: Positive for cough and shortness of breath. Negative for choking and chest tightness.    Cardiovascular: Negative for chest pain and leg swelling.   Gastrointestinal: Negative for abdominal pain, blood in stool, constipation, diarrhea and nausea.   Genitourinary: Negative for difficulty urinating, dysuria and frequency.   Musculoskeletal: Positive for back pain. Negative for arthralgias.   Skin: Negative for rash and wound.   Neurological: Positive for weakness. Negative for dizziness, light-headedness and headaches.   Hematological: Negative for adenopathy. Does not bruise/bleed easily.   Psychiatric/Behavioral: Negative for sleep disturbance. The patient is not  nervous/anxious.        ECOG Performance Status:   ECOG SCORE    1 - Restricted in strenuous activity-ambulatory and able to carry out work of a light nature         Objective:      Vitals:   Vitals:    02/13/19 1018   BP: (!) 142/56   Pulse: 72   Resp: 16   Temp: 97.8 °F (36.6 °C)   SpO2: (!) 93%   Weight: 77.8 kg (171 lb 8.3 oz)   Height: 6' (1.829 m)     BMI: Body mass index is 23.26 kg/m².    Physical Exam   Constitutional: He is oriented to person, place, and time. He appears well-developed and well-nourished.   HENT:   Head: Normocephalic and atraumatic.   Eyes: Pupils are equal, round, and reactive to light.   Neck: Normal range of motion. Neck supple.   Cardiovascular: Normal rate and regular rhythm.   Pulmonary/Chest: Effort normal. No respiratory distress. He has wheezes.   Abdominal: Soft. He exhibits no distension. There is no tenderness.   Musculoskeletal: He exhibits no edema or tenderness.   Lymphadenopathy:     He has no cervical adenopathy.   Neurological: He is alert and oriented to person, place, and time. No cranial nerve deficit or sensory deficit. He exhibits normal muscle tone. Coordination normal.   Skin: Skin is warm and dry.   Psychiatric: He has a normal mood and affect. His behavior is normal.       Laboratory Data:  Lab Visit on 02/13/2019   Component Date Value Ref Range Status    WBC 02/13/2019 5.44  3.90 - 12.70 K/uL Final    RBC 02/13/2019 2.94* 4.60 - 6.20 M/uL Final    Hemoglobin 02/13/2019 8.7* 14.0 - 18.0 g/dL Final    Hematocrit 02/13/2019 28.0* 40.0 - 54.0 % Final    MCV 02/13/2019 95  82 - 98 fL Final    MCH 02/13/2019 29.6  27.0 - 31.0 pg Final    MCHC 02/13/2019 31.1* 32.0 - 36.0 g/dL Final    RDW 02/13/2019 14.6* 11.5 - 14.5 % Final    Platelets 02/13/2019 155  150 - 350 K/uL Final    MPV 02/13/2019 8.8* 9.2 - 12.9 fL Final    Gran # (ANC) 02/13/2019 3.9  1.8 - 7.7 K/uL Final    Comment: The ANC is based on a white cell differential from an   automated cell  counter. It has not been microscopically   reviewed for the presence of abnormal cells. Clinical   correlation is required.      Immature Grans (Abs) 02/13/2019 0.05* 0.00 - 0.04 K/uL Final    Comment: Mild elevation in immature granulocytes is non specific and   can be seen in a variety of conditions including stress response,   acute inflammation, trauma and pregnancy. Correlation with other   laboratory and clinical findings is essential.      Sodium 02/13/2019 134* 136 - 145 mmol/L Final    Potassium 02/13/2019 4.6  3.5 - 5.1 mmol/L Final    Chloride 02/13/2019 98  95 - 110 mmol/L Final    CO2 02/13/2019 30* 23 - 29 mmol/L Final    Glucose 02/13/2019 119* 70 - 110 mg/dL Final    BUN, Bld 02/13/2019 23  8 - 23 mg/dL Final    Creatinine 02/13/2019 1.3  0.5 - 1.4 mg/dL Final    Calcium 02/13/2019 9.4  8.7 - 10.5 mg/dL Final    Total Protein 02/13/2019 7.1  6.0 - 8.4 g/dL Final    Albumin 02/13/2019 3.1* 3.5 - 5.2 g/dL Final    Total Bilirubin 02/13/2019 0.2  0.1 - 1.0 mg/dL Final    Comment: For infants and newborns, interpretation of results should be based  on gestational age, weight and in agreement with clinical  observations.  Premature Infant recommended reference ranges:  Up to 24 hours.............<8.0 mg/dL  Up to 48 hours............<12.0 mg/dL  3-5 days..................<15.0 mg/dL  6-29 days.................<15.0 mg/dL      Alkaline Phosphatase 02/13/2019 127  55 - 135 U/L Final    AST 02/13/2019 19  10 - 40 U/L Final    ALT 02/13/2019 10  10 - 44 U/L Final    Anion Gap 02/13/2019 6* 8 - 16 mmol/L Final    eGFR if African American 02/13/2019 >60.0  >60 mL/min/1.73 m^2 Final    eGFR if non African American 02/13/2019 57.3* >60 mL/min/1.73 m^2 Final    Comment: Calculation used to obtain the estimated glomerular filtration  rate (eGFR) is the CKD-EPI equation.       TSH 02/13/2019 3.203  0.400 - 4.000 uIU/mL Final         Imaging:   CT 02/11/2019    FINDINGS:  Finding: Comparison was  made to a prior CT examination of the thorax performed on 12/14/2018.  The size of the heart is within normal limits.  There has been interval worsening of the appearance of both lungs.  There is a moderate amount of alveolar consolidation in the medial aspect of the left lung.  There has been interval development of a mild amount of haziness in the medial aspect of the right lower lobe.  There is a persistent tiny left pleural effusion.  There is no pneumothorax.  There is a mild amount dextroconvex curvature of the thoracic spine.    There is a 5 mm stone in the dependent portion of the gallbladder.  The liver, pancreas, spleen, adrenals, and kidneys are normal in appearance. The ureters and the urinary bladder are normal in appearance. The appendix is normal in appearance.  There is a moderate amount of diverticulosis in the sigmoid portion of the colon.  There is no free fluid within the abdomen or pelvis. There is no pneumoperitoneum.  There is a moderate amount of atherosclerosis.  There is an aneurysm in the distal aspect of the abdominal aorta.  It has an AP diameter of 3.5 cm.  There is grade 1 anterolisthesis of L3 on L4.  There are mild degenerative changes between L4 and S1.      Impression       1. There has been interval worsening of the appearance of the left lung.  There is a moderate amount of alveolar consolidation in the medial aspect of the left lung. This is consistent with the patient's history and characteristic of lung cancer.  2. There has been interval development of a mild amount of haziness in the medial aspect of the right lower lobe.  This is characteristic of atelectasis or scarring.  3. There is a persistent tiny left pleural effusion.  4. There is an aneurysm in the distal aspect of the abdominal aorta. It has an AP diameter of 3.5 cm.  5. There is a 5 mm stone in the dependent portion of the gallbladder.  6.  There is grade 1 anterolisthesis of L3 on L4. There are mild degenerative  changes between L4 and S1.  7. There is a moderate amount of diverticulosis in the sigmoid portion of the colon.  All CT scans at this facility use dose modulation, iterative reconstruction, and/or weight base dosing when appropriate to reduce radiation dose when appropriate to reduce radiation dose to as low as reasonably achievable.                Assessment:       1. Primary malignant neoplasm of left lung    2. Lung cancer metastatic to bone    3. Pneumonitis           Plan:        I have reviewed the findings of Mr. Nunn's CT with him in clinic today.  He does have worsening consolidation on the left which is possibly related to pneumonitis secondary to either radiation or treatment with durvalumab.  Because of his ongoing symptoms I will plan to hold durvalumab today and start him on prednisone 80 mg daily.  I will plan to see him back in another 2 weeks and at that time if symptoms have improved will start tapering his dose.  I will also review his scans in tumor Board.  All questions were answered and he is agreeable with this plan.    Demetrius Infante DO, [facility]  Hematology & Oncology  [facility] [place] [address]  ph. [phone]  Fax. [phone]    25 minutes were spent in coordination of patient's care, record review and counseling.  More than 50% of the time was face-to-face.

## 2019-02-18 DIAGNOSIS — G89.3 NEOPLASM RELATED PAIN: ICD-10-CM

## 2019-02-18 RX ORDER — OXYCODONE AND ACETAMINOPHEN 10; 325 MG/1; MG/1
1 TABLET ORAL EVERY 4 HOURS PRN
Qty: 90 TABLET | Refills: 0 | Status: SHIPPED | OUTPATIENT
Start: 2019-02-18 | End: 2019-03-08 | Stop reason: SDUPTHER

## 2019-02-19 NOTE — PROGRESS NOTES
Subjective:      Patient ID: Nimesh Nnun is a 63 y.o. male.    Chief Complaint: Low-back Pain    Mr Nunn is a 62 yo male with metastatic lung cancer here for evaluation of back and leg pain.  He had injections with pain management in 2017.  He has had low back pain for the past 50 years.  He has had 2 low back surgeries, 50 years ago and 20 years ago.  He feels like he has done multiple things and nothing help.  He is not having leg pain currently.  The pain is in the middle of the lower back.  He has occasional leg numbness that comes and goes.  The pain is constant.  The pain is worse with walking, and standing.  The more he does the more it hurts.  He will lie down and then get back up and do things.  The pain is better with sitting.  The pain is sometimes sharp, sometimes burning, sometimes achy.  The pain varies.  She went to PT 2 years ago and no relief.  He has an appointment to go back, but not sure he wants to go back.  The pain is 6/10 now, worst 9/10 walking, cleaning house, best 5/10 lying down    Pain Procedures:   Right L4 and L5 TESI (8/21/2017)  BLOCK-NERVE-MEDIAL BRANCH-LUMBAR Right Lumbar L2, L3, L4, L5  (07/24/2017)    MRI cervical, thoracic, and lumbar 11/2018  Cervical spine:    There is grade 1 retrolisthesis C5-C6.    Heterogeneous marrow signal throughout the cervical spine.  No abnormal contrast enhancement.  Vertebral body heights are maintained. No fracture.    Multilevel degenerative disc disease most prominent at C4-C5-C5-C6 and C6-C7 with intervertebral disc space height loss and disc desiccation.    Cord is normal in course and contour. No abnormal cord signal.    Craniocervical junction is within normal limits.    C2-C3: No disc herniation, spinal canal stenosis or neural foraminal narrowing.    C3-C4: Diffuse disc bulge and facet arthropathy resulting in mild neural foraminal narrowing. No significant spinal canal stenosis.    C4-C5: Diffuse disc bulge, uncovertebral  spurring, and facet arthropathy resulting in mild spinal canal stenosis, and severe right neural foraminal narrowing.    C5-C6: Grade 1 retrolisthesis, diffuse disc bulge, uncovertebral spurring, and facet arthropathy resulting in moderate spinal canal stenosis and severe bilateral neural foraminal narrowing.    C6-C7: Diffuse disc bulge, uncovertebral spurring, and facet arthropathy resulting in mild spinal canal stenosis and moderate bilateral neural foraminal narrowing.    C7-T1: Ligamentum flavum hypertrophy and facet arthropathy resulting in mild spinal canal stenosis and mild neural foraminal narrowing.    Thoracic spine:    There is satisfactory alignment. No spondylolisthesis.    Increased T1 marrow signal of 4 adjacent vertebral bodies compatible with radiation change.  No abnormal contrast enhancement.  Vertebral body heights are well maintained.  No fracture.    Intervertebral disc demonstrate adequate height and normal T2 signal.    Cord demonstrates normal course and contour. No abnormal cord signal.    No disc herniation.  Multilevel facet arthropathy and ligamentum flavum hypertrophy without spinal canal stenosis or neural foraminal narrowing.    Left lung mass in this patient with known lung cancer.    Lumbar spine:    Grade 1 anterolisthesis L3-L4 and grade 1 retrolisthesis L5-S1.    Vertebral body heights are well maintained.  Stable focal abnormal marrow signal and contrast enhancement involving the posterior inferior aspect of the L1 vertebral body consistent with patient's known metastatic focus.  No new focus of abnormal marrow signal.  No fracture.    Multilevel degenerative disc disease most prominent at L4-L5 with severe disc space height loss. There is disc desiccation at L4-L5 and L5-S1.    Spinal cord ends at L1.    L1-L2: Uncovertebral spurring and facet arthropathy without spinal canal stenosis or neural foraminal narrowing.    L2-L3: Uncovertebral spurring and facet arthropathy without  spinal canal stenosis or neural foraminal narrowing.    L3-L4: Grade 1 anterolisthesis with uncovered disc, diffuse disc bulge, facet arthropathy and ligamentum hypertrophy resulting in moderate spinal canal stenosis.  Facets demonstrate contrast enhancement compatible with degenerative change.    L4-L5: Uncovertebral hypertrophy and facet arthropathy resulting in mild left neural foraminal narrowing. No spinal canal stenosis.  Facets demonstrate contrast enhancement compatible with degenerative change.    L5-S1: Grade 1 retrolisthesis, diffuse disc bulge with central disc protrusion, facet arthropathy resulting in mild spinal canal stenosis and moderate bilateral foraminal narrowing.    Impression      1. Stable focal abnormal marrow signal and contrast enhancement involving the posterior inferior aspect of the L1 vertebral body consistent with patient's known metastatic focus. No new focus of abnormal marrow signal or enhancement within the cervical, thoracic and lumbar spine.  2. Cervical spondylosis, most prominent at C5-C6 resulting in moderate spinal canal stenosis and severe bilateral neural foraminal narrowing.  3. Mild thoracic spondylosis without spinal canal stenosis or neural foraminal narrowing.  4. Lumbar spondylosis, most prominent at L3-L4 resulting in moderate spinal canal stenosis.  Moderate bilateral neural foraminal narrowing at L5-S1.  Findings similar in comparison to prior examination dated 07/10/2018.  5. Left lung mass in this patient with known lung cancer.  6. Additional findings as above.    Past Medical History:  3/5/2018: Abdominal aortic aneurysm (AAA) without rupture  No date: Arthritis  No date: Blood transfusion  No date: COPD (chronic obstructive pulmonary disease)  No date: Coronary artery disease  8/15/2018: Dehydration  No date: Encounter for blood transfusion  11/7/2013: Hyperlipidemia  No date: Hypertension  7/18/2018: Lung cancer metastatic to bone  1/12/2014: Occlusion and  stenosis of carotid artery without mention   of cerebral infarction  2/13/2019: Pneumonitis  7/18/2018: Primary malignant neoplasm of left lung  6/1/2015: Screening for colon cancer  1/13/2014: Syncope  9/19/2018: Thrombocytopenia  1/14/2019: Weakness of both lower extremities    Past Surgical History:  5/27/2014: ARCH & 4 VESSEL STUDY; Bilateral      Comment:  Performed by Mekhi Martin MD at [facility] CATH LAB  No date: BACK SURGERY  6/26/2018: BRONCHOSCOPY,FIBEROPTIC; N/A      Comment:  [facility]  No date: carotid endarectomy  11/8/2013: CATHETERIZATION, HEART, LEFT; Left      Comment:  Performed by Mekhi Martin MD at [facility] CATH LAB  6/1/2015: COLONOSCOPY; N/A      Comment:  Performed by Ancelmo Balderrama MD at [facility] ENDO  No date: CORONARY ANGIOPLASTY  No date: CORONARY ARTERY BYPASS GRAFT  8/16/2018: ESOPHAGOGASTRODUODENOSCOPY (EGD); N/A      Comment:  Performed by Yohannes Osuna MD at [facility] ENDO (2ND FLR)  5/27/2014: HEART CATH-LEFT; N/A      Comment:  Performed by Mekhi Martin MD at [facility] CATH LAB  12/20/2013: INSERTION, STENT, CORONARY ARTERY; N/A      Comment:  Performed by Mekhi Martin MD at [facility] CATH LAB  3/11/2014: PTA, PERIPHERAL BLOOD VESSEL; Left      Comment:  Performed by Mekhi Martin MD at [facility] CATH LAB  2/18/2014: PTA, PERIPHERAL BLOOD VESSEL; N/A      Comment:  Performed by Mekhi Martin MD at [facility] CATH LAB  No date: SKIN BIOPSY  6/26/2018: ULTRASOUND, ENDOBRONCHIAL; N/A      Comment:  Performed by Liana Serrano MD at [facility] OR [facility] FLR    Review of patient's family history indicates:  Problem: Heart disease      Relation: Father          Age of Onset: (Not Specified)  Problem: Hypertension      Relation: Father          Age of Onset: (Not Specified)  Problem: Heart attack      Relation: Father          Age of Onset: (Not Specified)  Problem: Heart failure      Relation: Father          Age of Onset: (Not Specified)  Problem: Cancer       Relation: Mother          Age of Onset: (Not Specified)  Problem: Breast cancer      Relation: Mother          Age of Onset: (Not Specified)  Problem: No Known Problems      Relation: Sister          Age of Onset: (Not Specified)  Problem: No Known Problems      Relation: Brother          Age of Onset: (Not Specified)  Problem: No Known Problems      Relation: Maternal Grandmother          Age of Onset: (Not Specified)  Problem: No Known Problems      Relation: Maternal Grandfather          Age of Onset: (Not Specified)  Problem: No Known Problems      Relation: Paternal Grandmother          Age of Onset: (Not Specified)  Problem: No Known Problems      Relation: Paternal Grandfather          Age of Onset: (Not Specified)  Problem: No Known Problems      Relation: Maternal Aunt          Age of Onset: (Not Specified)  Problem: No Known Problems      Relation: Maternal Uncle          Age of Onset: (Not Specified)  Problem: No Known Problems      Relation: Paternal Aunt          Age of Onset: (Not Specified)  Problem: No Known Problems      Relation: Paternal Uncle          Age of Onset: (Not Specified)  Problem: Anemia      Relation: Neg Hx          Age of Onset: (Not Specified)  Problem: Arrhythmia      Relation: Neg Hx          Age of Onset: (Not Specified)  Problem: Asthma      Relation: Neg Hx          Age of Onset: (Not Specified)  Problem: Clotting disorder      Relation: Neg Hx          Age of Onset: (Not Specified)  Problem: Fainting      Relation: Neg Hx          Age of Onset: (Not Specified)  Problem: Hyperlipidemia      Relation: Neg Hx          Age of Onset: (Not Specified)      Social History    Socioeconomic History      Marital status:       Spouse name: Not on file      Number of children: Not on file      Years of education: Not on file      Highest education level: Not on file    Social Needs      Financial resource strain: Not on file      Food insecurity - worry: Not on file      Food  insecurity - inability: Not on file      Transportation needs - medical: Not on file      Transportation needs - non-medical: Not on file    Occupational History      Not on file    Tobacco Use      Smoking status: Former Smoker        Packs/day: 1.50        Years: 40.00        Pack years: 60        Quit date: 10/11/2011        Years since quittin.3      Smokeless tobacco: Never Used    Substance and Sexual Activity      Alcohol use: Yes        Alcohol/week: 1.8 oz        Types: 3 Cans of beer per week        Comment: 3 cans beer every day or every other day      Drug use: Yes        Types: Marijuana      Sexual activity: Not on file    Other Topics      Concerns:        Not on file    Social History Narrative      Not on file      Current Outpatient Medications:  aspirin (ECOTRIN) 81 MG EC tablet, Take 81 mg by mouth once daily., Disp: , Rfl:   atorvastatin (LIPITOR) 80 MG tablet, TAKE 1 TABLET BY MOUTH EVERY DAY, Disp: 30 tablet, Rfl: 11  cilostazol (PLETAL) 100 MG Tab, TAKE 1 TABLET BY MOUTH TWICE DAILY, Disp: 60 tablet, Rfl: 11  clopidogrel (PLAVIX) 75 mg tablet, TAKE 1 TABLET BY MOUTH EVERY DAY, Disp: 90 tablet, Rfl: 4  DULoxetine (CYMBALTA) 60 MG capsule, Take 1 capsule (60 mg total) by mouth once daily., Disp: 90 capsule, Rfl: 4  finasteride (PROSCAR) 5 mg tablet, TAKE 1 TABLET BY MOUTH EVERY DAY, Disp: 90 tablet, Rfl: 3  folic acid (FOLVITE) 400 MCG tablet, Take 1 tablet (400 mcg total) by mouth once daily., Disp: 100 tablet, Rfl: 3  irbesartan (AVAPRO) 75 MG tablet, Take 1 tablet (75 mg total) by mouth once daily., Disp: 90 tablet, Rfl: 3  metoprolol succinate (TOPROL-XL) 50 MG 24 hr tablet, Take 50 mg by mouth., Disp: , Rfl:   nitroGLYCERIN (NITROSTAT) 0.4 MG SL tablet, Place 1 tablet (0.4 mg total) under the tongue every 5 (five) minutes as needed for Chest pain., Disp: 90 tablet, Rfl: 1  ondansetron (ZOFRAN-ODT) 8 MG TbDL, Take 1 tablet (8 mg total) by mouth every 12 (twelve) hours as needed., Disp:  30 tablet, Rfl: 1  oxyCODONE-acetaminophen (PERCOCET)  mg per tablet, Take 1 tablet by mouth every 4 (four) hours as needed for Pain., Disp: 90 tablet, Rfl: 0  predniSONE (DELTASONE) 10 MG tablet, Take 3 tablets (30 mg total) by mouth once daily. Use with 50 mg tabs to take 80mg daily, Disp: 60 tablet, Rfl: 2  predniSONE (DELTASONE) 50 MG Tab, Take 1 tablet (50 mg total) by mouth once daily. Use with 10 mg tabs to take 80mg daily, Disp: 60 tablet, Rfl: 0  PROAIR HFA 90 mcg/actuation inhaler, Inhale 2 puffs into the lungs as needed., Disp: 18 g, Rfl: 6  umeclidinium-vilanterol (ANORO ELLIPTA) 62.5-25 mcg/actuation DsDv, Inhale 1 puff into the lungs once daily. Controller, Disp: 1 each, Rfl: 5    No current facility-administered medications for this visit.       Review of patient's allergies indicates:  No Known Allergies          Review of Systems   Constitution: Negative for weight gain and weight loss.   Cardiovascular: Negative for chest pain.   Respiratory: Positive for shortness of breath.    Musculoskeletal: Positive for back pain. Negative for joint pain and joint swelling.   Gastrointestinal: Negative for abdominal pain, bowel incontinence, nausea and vomiting.   Genitourinary: Negative for bladder incontinence.   Neurological: Positive for numbness (leg numbness).         Objective:        General: Nimesh is well-developed, well-nourished, appears stated age, in no acute distress, alert and oriented to time, place and person.     General    Vitals reviewed.  Constitutional: He is oriented to person, place, and time. He appears well-developed and well-nourished.   HENT:   Head: Normocephalic and atraumatic.   Pulmonary/Chest: Effort normal.   Neurological: He is alert and oriented to person, place, and time.   Psychiatric: He has a normal mood and affect. His behavior is normal. Judgment and thought content normal.     General Musculoskeletal Exam   Gait: abnormal (slow)     Right Ankle/Foot Exam      Tests   Heel Walk: able to perform  Tiptoe Walk: able to perform    Left Ankle/Foot Exam     Tests   Heel Walk: able to perform  Tiptoe Walk: able to perform  Back (L-Spine & T-Spine) / Neck (C-Spine) Exam     Tenderness Right paramedian tenderness of the Sacrum. Left paramedian tenderness of the Sacrum.     Back (L-Spine & T-Spine) Range of Motion   Extension: 10   Flexion: 70   Lateral bend right: 10   Lateral bend left: 10   Rotation right: 30   Rotation left: 30     Spinal Sensation   Right Side Sensation  C-Spine Level: normal   L-Spine Level: normal  S-Spine Level: normal  Left Side Sensation  C-Spine Level: normal  L-Spine Level: normal  S-Spine Level: normal    Back (L-Spine & T-Spine) Tests   Right Side Tests  Straight leg raise:      Sitting SLR: > 70 degrees      Left Side Tests  Straight leg raise:     Sitting SLR: > 70 degrees          Other He has no scoliosis .  Spinal Kyphosis:  Absent      Muscle Strength   Right Upper Extremity   Biceps: 5/5/5   Deltoid:  5/5  Triceps:  5/5  Wrist extension: 5/5/5   Finger Flexors:  5/5  Left Upper Extremity  Biceps: 5/5/5   Deltoid:  5/5  Triceps:  5/5  Wrist extension: 5/5/5   Finger Flexors:  5/5  Right Lower Extremity   Hip Flexion: 5/5   Quadriceps:  5/5   Anterior tibial:  5/5/5  EHL:  5/5  Left Lower Extremity   Hip Flexion: 5/5   Quadriceps:  5/5   Anterior tibial:  5/5/5   EHL:  5/5    Reflexes     Left Side  Biceps:  2+  Triceps:  2+  Brachioradialis:  2+  Quadriceps:  2+  Achilles:  2+  Left Conn's Sign:  Absent  Babinski Sign:  absent    Right Side   Biceps:  2+  Triceps:  2+  Brachioradialis:  2+  Quadriceps:  2+  Achilles:  2+  Right Conn's Sign:  absent  Babinski Sign:  absent    Vascular Exam     Right Pulses        Carotid:                  2+    Left Pulses        Carotid:                  2+              Assessment:       1. Spondylosis of lumbar region without myelopathy or radiculopathy    2. Lung cancer metastatic to bone            Plan:       Orders Placed This Encounter    tiZANidine (ZANAFLEX) 2 MG tablet    More than 50% of the total time of 45 minutes was spent in counseling on diagnosis and treatment options. We discussed back pain and the nature of back pain.  We discussed that it is not one thing that causes the pain but an accumulation of multiple things that we do.  We discussed posture sitting and the importance of trying to sit better.  We discussed that it is important to monitor what we are doing all the time. We discussed the benefits of therapy and exercise and continuing to move. We discussed that pain has been 50 years, we discussed strengthening the back and core.    1.  PT he is going to try PT.  He has a visit scheduled.  He is willing to try.  He cannot make it here for healthy back, he is going to go to [facility]  2.  He does not want to try injections.  Since the 2 he had didn't work.  He does not want to try another.  He has trouble getting home from injections.  He lives alone.    3.  Tizanidine 1-2mg po TID  4.  He takes percocet for the pain per heme onc  5.   He has metastatic lung cancer and waiting for pet scan to give him ore information.    6.  RTC 3 months          Follow-up: Follow-up in about 3 months (around 5/20/2019). If there are any questions prior to this, the patient was instructed to contact the office.

## 2019-02-20 ENCOUNTER — DOCUMENTATION ONLY (OUTPATIENT)
Dept: CARDIOTHORACIC SURGERY | Facility: HOSPITAL | Age: 64
End: 2019-02-20

## 2019-02-20 ENCOUNTER — OFFICE VISIT (OUTPATIENT)
Dept: SPINE | Facility: CLINIC | Age: 64
End: 2019-02-20
Attending: NEUROLOGICAL SURGERY
Payer: MEDICARE

## 2019-02-20 VITALS
HEART RATE: 79 BPM | HEIGHT: 72 IN | SYSTOLIC BLOOD PRESSURE: 115 MMHG | DIASTOLIC BLOOD PRESSURE: 65 MMHG | WEIGHT: 162.06 LBS | BODY MASS INDEX: 21.95 KG/M2 | TEMPERATURE: 98 F

## 2019-02-20 DIAGNOSIS — C34.90 LUNG CANCER METASTATIC TO BONE: ICD-10-CM

## 2019-02-20 DIAGNOSIS — C79.51 LUNG CANCER METASTATIC TO BONE: ICD-10-CM

## 2019-02-20 DIAGNOSIS — M47.816 SPONDYLOSIS OF LUMBAR REGION WITHOUT MYELOPATHY OR RADICULOPATHY: Primary | ICD-10-CM

## 2019-02-20 PROCEDURE — 3078F PR MOST RECENT DIASTOLIC BLOOD PRESSURE < 80 MM HG: ICD-10-PCS | Mod: CPTII,S$GLB,, | Performed by: PHYSICAL MEDICINE & REHABILITATION

## 2019-02-20 PROCEDURE — 3074F SYST BP LT 130 MM HG: CPT | Mod: CPTII,S$GLB,, | Performed by: PHYSICAL MEDICINE & REHABILITATION

## 2019-02-20 PROCEDURE — 3074F PR MOST RECENT SYSTOLIC BLOOD PRESSURE < 130 MM HG: ICD-10-PCS | Mod: CPTII,S$GLB,, | Performed by: PHYSICAL MEDICINE & REHABILITATION

## 2019-02-20 PROCEDURE — 3008F BODY MASS INDEX DOCD: CPT | Mod: CPTII,S$GLB,, | Performed by: PHYSICAL MEDICINE & REHABILITATION

## 2019-02-20 PROCEDURE — 1101F PT FALLS ASSESS-DOCD LE1/YR: CPT | Mod: CPTII,S$GLB,, | Performed by: PHYSICAL MEDICINE & REHABILITATION

## 2019-02-20 PROCEDURE — 1101F PR PT FALLS ASSESS DOC 0-1 FALLS W/OUT INJ PAST YR: ICD-10-PCS | Mod: CPTII,S$GLB,, | Performed by: PHYSICAL MEDICINE & REHABILITATION

## 2019-02-20 PROCEDURE — 99999 PR PBB SHADOW E&M-EST. PATIENT-LVL III: CPT | Mod: PBBFAC,,, | Performed by: PHYSICAL MEDICINE & REHABILITATION

## 2019-02-20 PROCEDURE — 99204 PR OFFICE/OUTPT VISIT, NEW, LEVL IV, 45-59 MIN: ICD-10-PCS | Mod: S$GLB,,, | Performed by: PHYSICAL MEDICINE & REHABILITATION

## 2019-02-20 PROCEDURE — 3008F PR BODY MASS INDEX (BMI) DOCUMENTED: ICD-10-PCS | Mod: CPTII,S$GLB,, | Performed by: PHYSICAL MEDICINE & REHABILITATION

## 2019-02-20 PROCEDURE — 99204 OFFICE O/P NEW MOD 45 MIN: CPT | Mod: S$GLB,,, | Performed by: PHYSICAL MEDICINE & REHABILITATION

## 2019-02-20 PROCEDURE — 99999 PR PBB SHADOW E&M-EST. PATIENT-LVL III: ICD-10-PCS | Mod: PBBFAC,,, | Performed by: PHYSICAL MEDICINE & REHABILITATION

## 2019-02-20 PROCEDURE — 3078F DIAST BP <80 MM HG: CPT | Mod: CPTII,S$GLB,, | Performed by: PHYSICAL MEDICINE & REHABILITATION

## 2019-02-20 RX ORDER — TIZANIDINE 2 MG/1
2 TABLET ORAL EVERY 8 HOURS PRN
Qty: 90 TABLET | Refills: 2 | Status: ON HOLD | OUTPATIENT
Start: 2019-02-20 | End: 2019-03-26 | Stop reason: HOSPADM

## 2019-02-20 NOTE — LETTER
February 20, 2019      Rosalio Moses MD  [address]  [facility] [address]           [facility]  [address], [address]  [facility] [address]  Phone: [phone]  Fax: [phone]          Patient: Nimesh Nunn   MR Number: [id number]   YOB: 1955   Date of Visit: 2/20/2019       Dear Dr. Rosalio Moses:    Thank you for referring Nimesh Nunn to me for evaluation. Attached you will find relevant portions of my assessment and plan of care.    If you have questions, please do not hesitate to call me. I look forward to following Nimesh Nunn along with you.    Sincerely,    Yady Oliveira MD    Enclosure  CC:  No Recipients    If you would like to receive this communication electronically, please contact [email or web address] or [phone] to request more information on [employer] Link access.    For providers and/or their staff who would like to refer a patient to [facility], please contact us through our one-stop-shop provider referral line, [facility], at [phone].    If you feel you have received this communication in error or would no longer like to receive these types of communications, please e-mail [email or web address]

## 2019-02-20 NOTE — PATIENT CARE CONFERENCE
[facility]      THORACIC MULTIDISCIPLINARY TUMOR BOARD  PATIENT REVIEW FORM  ________________________________________________________________________    CLINIC #: [id number]  DATE: 02/20/2019    DIAGNOSIS: NSCLC     PRESENTER: Dr. Infante    PATIENT SUMMARY: 63 yo male former smoker with PMH of MI s/p stent x 4 (most recently 4 years ago, AAA, chronic occlusion of left CCA, PAD, PVD, COPD, HTN, HLD, and left lower lobe mass recently found on CTA for w/u of substernal chest pain. 70 pack year history. PET scan concerning for metastatic disease with uptake in left hilum, and L1 vertebrae. Initially seen by thoracic surgery [place] [facility] [place] Patient deemed to not be a surgical candidate due to anatomic location of tumor, medical comorbidities and concern for lumbar spine mets. He underwent bronchoscopy and biopsy with pathology showing poorly differentiated adenocarcinoma.  He underwent concurrent chemoradiation. Completed two cycles of Cisplatin and Pemetrexed before it was stopped due to cytopenias. He was then initiated on durvalumab in 10/2018.  He received 3 cycles but then developed lower extremity weakness and was felt that this may be related to cauda equina syndrome related to bone metastasis.  He underwent additional radiation to his lumbar and sacral spine in 12/2018.    BOARD RECOMMENDATIONS: Left lower lobe consolidation consistent with post-radiation pneumonitis. Agree with plan to start him on steroids.     CONSULT NEEDED:     [] Surgery    [] Hem/Onc    [] Rad/Onc    [] Dietary                 [] Social Service    [] Psychology       [] Pulmonology    Clinical Stage: Tumor- T3 Node(s)- N0 Metastasis- M1b    Pathologic Stage: Tumor  Node(s)  Metastasis     GROUP STAGE:     [] O    [] 1A    [] IB    [] IIA    [] IIB     [] IIIA     [] IIIB     [] IIIC    [x] IV                               [] Local recurrence     [] Regional recurrence     [] Distant recurrence                    [x] NSCLC     [] SCLC     Tumor type- Poorly differentiated adenocarcinoma     Molecular studies showed EGFR wild-type and he did not harbor any ALK or ROS-1 rearrangements.  PD L1 tumor proportions core was 90%.    Unstageable:      [] Yes     [] No  Metastatic site(s): Spine         [x] [facility] Treatment Guidelines reviewed and care planned is consistent with guidelines.         (i.e., NCCN, NCI, PD, ACO, AUA, etc.)    PRESENTATION AT CANCER CONFERENCE:         [] Prospective    [] Retrospective     [] Follow-Up          [] Eligible for clinical trial

## 2019-02-25 ENCOUNTER — CLINICAL SUPPORT (OUTPATIENT)
Dept: REHABILITATION | Facility: HOSPITAL | Age: 64
End: 2019-02-25
Attending: NEUROLOGICAL SURGERY
Payer: MEDICARE

## 2019-02-25 DIAGNOSIS — M62.9 HAMSTRING TIGHTNESS OF BOTH LOWER EXTREMITIES: ICD-10-CM

## 2019-02-25 DIAGNOSIS — R26.89 DECREASED FUNCTIONAL MOBILITY: ICD-10-CM

## 2019-02-25 DIAGNOSIS — R29.898 WEAKNESS OF BOTH LOWER EXTREMITIES: Primary | ICD-10-CM

## 2019-02-25 PROCEDURE — 97163 PT EVAL HIGH COMPLEX 45 MIN: CPT | Mod: PO

## 2019-02-25 PROCEDURE — G8987 SELF CARE CURRENT STATUS: HCPCS | Mod: CL,PO

## 2019-02-25 PROCEDURE — G8988 SELF CARE GOAL STATUS: HCPCS | Mod: CK,PO

## 2019-02-25 NOTE — PLAN OF CARE
[facility]  Physical Therapy Initial Evaluation    Name: Nimesh Nunn  [facility] Number: [id number]    Therapy Diagnosis:   Encounter Diagnoses   Name Primary?    Weakness of both lower extremities Yes    Hamstring tightness of both lower extremities     Decreased functional mobility      Physician: Rosalio Moses MD    Physician Orders: PT Eval and Treat   Medical Diagnosis from Referral: Lumbar spondylosis, Weakness of both lower extremities  Evaluation Date: 2/25/2019  Authorization Period Expiration: 04/13/2019  Plan of Care Expiration: 04/25/2019  Visit # / Visits authorized: 1/ 12    Time In: 1:00  Time Out: 2:00  Total Billable Time: 60 minutes    Precautions: Standard, Immunosuppression and cancer    Subjective   Date of onset: Chronic  History of current condition - Nimesh reports: currently he is having trouble with everything due to weakness and poor endurance as a result of his cancer treatments. Currently he is having to use chair in the shower due to standing endurance, sometimes has trouble going up stairs, has to use his arms to scoot himself into place in his truck, some increase pain in his back when driving [place] difficulty doing his usual household duties, difficulty getting down to work in his garden, and increase pain when walking. He does have a cane and a rollator but does not use either one on a regular basis.       Medical History:   Past Medical History:   Diagnosis Date    Abdominal aortic aneurysm (AAA) without rupture 3/5/2018    Arthritis     Blood transfusion     COPD (chronic obstructive pulmonary disease)     Coronary artery disease     Dehydration 8/15/2018    Encounter for blood transfusion     Hyperlipidemia 11/7/2013    Hypertension     Lung cancer metastatic to bone 7/18/2018    Occlusion and stenosis of carotid artery without mention of cerebral infarction 1/12/2014    Pneumonitis 2/13/2019    Primary malignant neoplasm of  left lung 7/18/2018    Screening for colon cancer 6/1/2015    Syncope 1/13/2014    Thrombocytopenia 9/19/2018    Weakness of both lower extremities 1/14/2019       Surgical History:   Nimesh Nunn  has a past surgical history that includes Back surgery; Coronary angioplasty; Skin biopsy; carotid endarectomy; Endobronchial ultrasound (N/A, 6/26/2018); Coronary artery bypass graft; and Esophagogastroduodenoscopy (N/A, 8/16/2018).    Medications:   Nimesh has a current medication list which includes the following prescription(s): aspirin, atorvastatin, cilostazol, clopidogrel, duloxetine, finasteride, folic acid, irbesartan, metoprolol succinate, nitroglycerin, ondansetron, oxycodone-acetaminophen, prednisone, prednisone, proair hfa, tizanidine, and umeclidinium-vilanterol.    Allergies:   Review of patient's allergies indicates:  No Known Allergies     Imaging, MRI studies: Lumbar Spine 11/26/2018  Lumbar spine:    Grade 1 anterolisthesis L3-L4 and grade 1 retrolisthesis L5-S1.    Vertebral body heights are well maintained.  Stable focal abnormal marrow signal and contrast enhancement involving the posterior inferior aspect of the L1 vertebral body consistent with patient's known metastatic focus.  No new focus of abnormal marrow signal.  No fracture.    Multilevel degenerative disc disease most prominent at L4-L5 with severe disc space height loss. There is disc desiccation at L4-L5 and L5-S1.    Spinal cord ends at L1.    L1-L2: Uncovertebral spurring and facet arthropathy without spinal canal stenosis or neural foraminal narrowing.    L2-L3: Uncovertebral spurring and facet arthropathy without spinal canal stenosis or neural foraminal narrowing.    L3-L4: Grade 1 anterolisthesis with uncovered disc, diffuse disc bulge, facet arthropathy and ligamentum hypertrophy resulting in moderate spinal canal stenosis.  Facets demonstrate contrast enhancement compatible with degenerative change.    L4-L5:  Uncovertebral hypertrophy and facet arthropathy resulting in mild left neural foraminal narrowing. No spinal canal stenosis.  Facets demonstrate contrast enhancement compatible with degenerative change.    L5-S1: Grade 1 retrolisthesis, diffuse disc bulge with central disc protrusion, facet arthropathy resulting in mild spinal canal stenosis and moderate bilateral foraminal narrowing.      Impression       1. Stable focal abnormal marrow signal and contrast enhancement involving the posterior inferior aspect of the L1 vertebral body consistent with patient's known metastatic focus. No new focus of abnormal marrow signal or enhancement within the cervical, thoracic and lumbar spine.  2. Cervical spondylosis, most prominent at C5-C6 resulting in moderate spinal canal stenosis and severe bilateral neural foraminal narrowing.  3. Mild thoracic spondylosis without spinal canal stenosis or neural foraminal narrowing.  4. Lumbar spondylosis, most prominent at L3-L4 resulting in moderate spinal canal stenosis.  Moderate bilateral neural foraminal narrowing at L5-S1.  Findings similar in comparison to prior examination dated 07/10/2018.  5. Left lung mass in this patient with known lung cancer.  6. Additional findings as above.         Prior Therapy: Pt previously seen in this facility for PT  Social History: mobile home, 4 steps 1 hand rail to enter, lives alone  Occupation: retired  Prior Level of Function: independent  Current Level of Function: independent    Pain:  Current 6/10, worst 9/10, best 1/10   Location: R>L back  and B LE to toes   Description: Aching, Dull, Throbbing and Sharp  Aggravating Factors: Sitting, Standing, Bending, Walking and Lifting  Easing Factors: pain medication and rest    Pts goals: I'm not sure why I'm here.     Objective     Observation: Patient is a 63 year old male, who presents to the clinic in no apparent distress.     Posture:  Flat back posture in standing, sacral sitting    Lumbar  Range of Motion:    Degrees Pain   Flexion 55 Pain across back   Extension 15 Pain lumbar   Left Side Bending 15 Pain L side   Right Side Bending 10 Pain R side        Lower Extremity Strength  Right LE  Left LE    Knee extension: 4/5 Knee extension: 4/5   Knee flexion: 4/5 Knee flexion: 4/5   Hip flexion: 3/5 Hip flexion: 3/5   Hip extension:  3/5 Hip extension: 3/5   Hip abduction: 3+/5 Hip abduction: 3+/5   Hip adduction: 3/5 Hip adduction 3/5   Ankle dorsiflexion: 4/5 Ankle dorsiflexion: 4/5   Ankle plantarflexion: 4/5 Ankle plantarflexion: 4/5         Special Tests:  -Repeated Flexion: no change  -Repeated Ext: no change    DTR:   Right Left Comment   Patellar (L3-4) 1+ 1+    Achilles (S1) 1+ 1+        Neuro Dynamic Testing:    Sciatic nerve:      SLR: R = 55 degrees     L = 50 degrees      Joint Mobility: limited lumbar PIVM due to previous fusions.     Palpation: TTP over R QL    Sensation: light touch intact    Flexibility:    Lance's test: R = posiitve ; L = positive       CMS Impairment/Limitation/Restriction for FOTO Lumbar Spine Survey    Therapist reviewed FOTO scores for Nimesh Nunn on 2/25/2019.   FOTO documents entered into [employer] - see Media section.    Limitation Score: 66%  Category: Self Care    Current : CL = least 60% but < 80% impaired, limited or restricted  Goal: CK = at least 40% but < 60% impaired, limited or restricted  Discharge: N/A at this time         TREATMENT     Home Exercises and Patient Education Provided    Education provided:   - compliance with HEP    Written Home Exercises Provided: none at this time.      Assessment   Nimesh is a 63 y.o. male referred to outpatient Physical Therapy with a medical diagnosis of Lumbar spondylosis, Weakness of both lower extremities. Pt presents with weakness of both lower extremities, hamstring tightness of both lower extremities, and decreased functional mobility due to his endurance and weakness. His weakness and poor endurance he is  limited in his ability to perform his usual household duties, recreational activities, transfers, and ADLs. His current score on FOTO Lumbar Spine Survey places him in the 60%<80% impaired, limited, or restricted category.     Pt prognosis is Fair.   Pt will benefit from skilled outpatient Physical Therapy to address the deficits stated above and in the chart below, provide pt/family education, and to maximize pt's level of independence.     Plan of care discussed with patient: Yes  Pt's spiritual, cultural and educational needs considered and patient is agreeable to the plan of care and goals as stated below:     Anticipated Barriers for therapy: cancer treatment schedule    Medical Necessity is demonstrated by the following  History  Co-morbidities and personal factors that may impact the plan of care Co-morbidities:   history of cancer, history of TBI, immunosuppression and prior lumbar surgery    Personal Factors:   attitudes     high   Examination  Body Structures and Functions, activity limitations and participation restrictions that may impact the plan of care Body Regions:   back  lower extremities    Body Systems:    ROM  strength  balance  flexibility    Participation Restrictions:   Difficulty with ADLs, transfers, household duties, recreational activities, prolonged driving, prolonged walking    Activity limitations:   Learning and applying knowledge  no deficits    General Tasks and Commands  no deficits    Communication  no deficits    Mobility  lifting and carrying objects  walking  driving (bike, car, motorcycle)    Self care  no deficits    Domestic Life  shopping  doing house work (cleaning house, washing dishes, laundry)    Interactions/Relationships  no deficits    Life Areas  no deficits    Community and Social Life  community life  recreation and leisure         high   Clinical Presentation evolving clinical presentation with changing clinical characteristics moderate   Decision Making/  Complexity Score: high     Goals:  Short Term Goals: 4 weeks   1. This patient will be independent with a basic HEP.  2. This patient will increase B SLR by 20 degrees in order to use a golfer's lift to  light objects from the floor.   3. This patient will increase B LE strength by 1 grade in order to be able to perform vehicle transfers with no difficulty.   4. Patient able to score greater than or equal to 40 on the FOTO Lumbar Spine Survey placing patient in 60%<80% impaired, limited, or restricted category demonstrating overall decreased low back pain with functional activities.   Long Term Goals: 8 weeks   1. This patient will be independent with an updated HEP.  2. This patient will increase B LE strength to 5/5 in order to be able to ascend//descend the steps in front of his home with no difficulty.   3. Patient able to score greater than or equal to 50 on the FOTO Lumbar Spine Survey placing patient in 40%<60% impaired, limited, or restricted category demonstrating overall decreased low back pain with functional activities.     Plan   Plan of care Certification: 2/25/2019 to 04/25/2019.    Outpatient Physical Therapy 2 times weekly for 8 weeks to include the following interventions: Manual Therapy, Moist Heat/ Ice, Neuromuscular Re-ed, Patient Education, Therapeutic Exercise and IASTM.     Melissa Sutherland, PT

## 2019-02-26 PROBLEM — M62.9 HAMSTRING TIGHTNESS OF BOTH LOWER EXTREMITIES: Status: ACTIVE | Noted: 2019-02-26

## 2019-02-26 PROBLEM — R26.89 DECREASED FUNCTIONAL MOBILITY: Status: ACTIVE | Noted: 2019-02-26

## 2019-02-27 ENCOUNTER — OFFICE VISIT (OUTPATIENT)
Dept: HEMATOLOGY/ONCOLOGY | Facility: CLINIC | Age: 64
End: 2019-02-27
Payer: MEDICARE

## 2019-02-27 VITALS
OXYGEN SATURATION: 95 % | BODY MASS INDEX: 23.77 KG/M2 | TEMPERATURE: 95 F | SYSTOLIC BLOOD PRESSURE: 176 MMHG | DIASTOLIC BLOOD PRESSURE: 80 MMHG | RESPIRATION RATE: 16 BRPM | HEIGHT: 72 IN | WEIGHT: 175.5 LBS | HEART RATE: 69 BPM

## 2019-02-27 DIAGNOSIS — C34.92 PRIMARY MALIGNANT NEOPLASM OF LEFT LUNG: Primary | ICD-10-CM

## 2019-02-27 DIAGNOSIS — C79.51 LUNG CANCER METASTATIC TO BONE: ICD-10-CM

## 2019-02-27 DIAGNOSIS — J98.4 PNEUMONITIS: ICD-10-CM

## 2019-02-27 DIAGNOSIS — C34.90 LUNG CANCER METASTATIC TO BONE: ICD-10-CM

## 2019-02-27 PROCEDURE — 99214 PR OFFICE/OUTPT VISIT, EST, LEVL IV, 30-39 MIN: ICD-10-PCS | Mod: S$GLB,,, | Performed by: INTERNAL MEDICINE

## 2019-02-27 PROCEDURE — 99999 PR PBB SHADOW E&M-EST. PATIENT-LVL V: CPT | Mod: PBBFAC,,, | Performed by: INTERNAL MEDICINE

## 2019-02-27 PROCEDURE — 99999 PR PBB SHADOW E&M-EST. PATIENT-LVL V: ICD-10-PCS | Mod: PBBFAC,,, | Performed by: INTERNAL MEDICINE

## 2019-02-27 PROCEDURE — 99214 OFFICE O/P EST MOD 30 MIN: CPT | Mod: S$GLB,,, | Performed by: INTERNAL MEDICINE

## 2019-02-27 PROCEDURE — 1101F PT FALLS ASSESS-DOCD LE1/YR: CPT | Mod: CPTII,S$GLB,, | Performed by: INTERNAL MEDICINE

## 2019-02-27 PROCEDURE — 3077F PR MOST RECENT SYSTOLIC BLOOD PRESSURE >= 140 MM HG: ICD-10-PCS | Mod: CPTII,S$GLB,, | Performed by: INTERNAL MEDICINE

## 2019-02-27 PROCEDURE — 3079F DIAST BP 80-89 MM HG: CPT | Mod: CPTII,S$GLB,, | Performed by: INTERNAL MEDICINE

## 2019-02-27 PROCEDURE — 3079F PR MOST RECENT DIASTOLIC BLOOD PRESSURE 80-89 MM HG: ICD-10-PCS | Mod: CPTII,S$GLB,, | Performed by: INTERNAL MEDICINE

## 2019-02-27 PROCEDURE — 3077F SYST BP >= 140 MM HG: CPT | Mod: CPTII,S$GLB,, | Performed by: INTERNAL MEDICINE

## 2019-02-27 PROCEDURE — 3008F BODY MASS INDEX DOCD: CPT | Mod: CPTII,S$GLB,, | Performed by: INTERNAL MEDICINE

## 2019-02-27 PROCEDURE — 1101F PR PT FALLS ASSESS DOC 0-1 FALLS W/OUT INJ PAST YR: ICD-10-PCS | Mod: CPTII,S$GLB,, | Performed by: INTERNAL MEDICINE

## 2019-02-27 PROCEDURE — 3008F PR BODY MASS INDEX (BMI) DOCUMENTED: ICD-10-PCS | Mod: CPTII,S$GLB,, | Performed by: INTERNAL MEDICINE

## 2019-02-27 NOTE — PROGRESS NOTES
PATIENT: Nimesh Nunn  MRN: [id number]  DATE: 2/27/2019      Diagnosis:   1. Primary malignant neoplasm of left lung    2. Lung cancer metastatic to bone    3. Pneumonitis        Chief Complaint: Lung Cancer      Oncologic History:      Oncologic History Non-small cell lung cancer, left diagnosed 06/2018  Metastatic disease to bone at presentation    Oncologic Treatment Stereotactic radiation to spinal lesion  Cisplatin/pemetrexed with concurrent radiation 08/08/2018 (completed 09/2018)  Durvalumab 10/2018  XRT L4-S3 30 Gy 12/2018    Pathology Poorly differentiated adenocarcinoma, EGFR wild type, No ALK, ROS-1 rearrangements, PD-L1 TPS 90%          Subjective:    Interval History: Mr. Nunn is a 63 y.o. male who is seen in follow-up for lung cancer.  Since I had seen him last we started him on prednisone 80 mg daily for pneumonitis.  He states his breathing has improved his weakness is also improved and he is more ambulatory less fatigued.  He has started working physical therapy.  He has no other new complaints.    His history dates to 06/2018 when he sought medical attention for chest pain.  A CT of the chest was performed showing a left lower lobe mass measuring 7.4 cm.  There were also enlarged left hilar lymph nodes. He underwent PET-CT which showed uptake in this mass and also in L1.  He underwent bronchoscopy and biopsy with pathology showing poorly differentiated adenocarcinoma.  Molecular studies showed EGFR wild-type and he did not harbor any ALK or ROS-1 rearrangements.  PD L1 tumor proportions core was 90%.  He underwent radiation to the spinal lesion in 08/2018 and started on concurrent chemo and radiation with cisplatin and pemetrexed in 08/2018.  He completed treatment on 09/19/2018.  He was only able to receive 2 cycles of chemotherapy secondary to cytopenias.  He was initiated on durvalumab in 10/2018.  He received 3 cycles but then developed lower extremity weakness and was felt that this  may be related to cauda equina syndrome related to bone metastasis.  He underwent radiation to his lumbar and sacral spine in 12/2018.    Past Medical History:   Past Medical History:   Diagnosis Date    Abdominal aortic aneurysm (AAA) without rupture 3/5/2018    Arthritis     Blood transfusion     COPD (chronic obstructive pulmonary disease)     Coronary artery disease     Dehydration 8/15/2018    Encounter for blood transfusion     Hyperlipidemia 11/7/2013    Hypertension     Lung cancer metastatic to bone 7/18/2018    Occlusion and stenosis of carotid artery without mention of cerebral infarction 1/12/2014    Pneumonitis 2/13/2019    Primary malignant neoplasm of left lung 7/18/2018    Screening for colon cancer 6/1/2015    Syncope 1/13/2014    Thrombocytopenia 9/19/2018    Weakness of both lower extremities 1/14/2019       Past Surgical HIstory:   Past Surgical History:   Procedure Laterality Date    ARCH & 4 VESSEL STUDY Bilateral 5/27/2014    Performed by [facility]hi Martin [facility] at [facility] CATH LAB    BACK SURGERY      BRONCHOSCOPY,FIBEROPTIC N/A 6/26/2018    Performed by Liana Serrano [facility] at [facility] OR 2ND FLR    carotid endarectomy      CATHETERIZATION, HEART, LEFT Left 11/8/2013    Performed by [facility]hi Martin [facility] at [facility] CATH LAB    COLONOSCOPY N/A 6/1/2015    [facility] ENDO    CORONARY ANGIOPLASTY      CORONARY ARTERY BYPASS GRAFT      ESOPHAGOGASTRODUODENOSCOPY (EGD) N/A 8/16/2018    Performed by Yohannes Osuna [facility] at [facility] ENDO (2ND FLR)    HEART CATH-LEFT N/A 5/27/2014    Performed by Mek[facility] Martin [facility] CATH LAB    INSERTION, STENT, CORONARY ARTERY N/A 12/20/2013    Performed by Mekhi Martin MD at [facility] CATH LAB    PTA, PERIPHERAL BLOOD VESSEL Left 3/11/2014    Performed by [facility] Martin [facility] at [facility] CATH LAB    PTA, PERIPHERAL BLOOD VESSEL N/A 2/18/2014    Performed by [facility] Martin [facility] at [facility] CATH LAB    SKIN BIOPSY       ULTRASOUND, ENDOBRONCHIAL N/A 6/26/2018    Performed by [facility] at [facility] OR [address] [place] [address]       Family History:   Family History   Problem Relation Age of Onset    Heart disease Father     Hypertension Father     Heart attack Father     Heart failure Father     Cancer Mother     Breast cancer Mother     No Known Problems Sister     No Known Problems Brother     No Known Problems Maternal Grandmother     No Known Problems Maternal Grandfather     No Known Problems Paternal Grandmother     No Known Problems Paternal Grandfather     No Known Problems Maternal Aunt     No Known Problems Maternal Uncle     No Known Problems Paternal Aunt     No Known Problems Paternal Uncle     Anemia Neg Hx     Arrhythmia Neg Hx     Asthma Neg Hx     Clotting disorder Neg Hx     Fainting Neg Hx     Hyperlipidemia Neg Hx        Social History:  reports that he quit smoking about 7 years ago. He has a 60.00 pack-year smoking history. he has never used smokeless tobacco. He reports that he drinks about 1.8 oz of alcohol per week. He reports that he uses drugs. Drug: Marijuana.    Allergies:  Review of patient's allergies indicates:  No Known Allergies    Medications:  Current Outpatient Medications   Medication Sig Dispense Refill    aspirin (ECOTRIN) 81 MG EC tablet Take 81 mg by mouth once daily.      atorvastatin (LIPITOR) 80 MG tablet TAKE 1 TABLET BY MOUTH EVERY DAY 30 tablet 11    cilostazol (PLETAL) 100 MG Tab TAKE 1 TABLET BY MOUTH TWICE DAILY 60 tablet 11    clopidogrel (PLAVIX) 75 mg tablet TAKE 1 TABLET BY MOUTH EVERY DAY 90 tablet 4    DULoxetine (CYMBALTA) 60 MG capsule Take 1 capsule (60 mg total) by mouth once daily. 90 capsule 4    finasteride (PROSCAR) 5 mg tablet TAKE 1 TABLET BY MOUTH EVERY DAY 90 tablet 3    folic acid (FOLVITE) 400 MCG tablet Take 1 tablet (400 mcg total) by mouth once daily. 100 tablet 3    irbesartan (AVAPRO) 75 MG tablet Take 1 tablet (75 mg total) by mouth once  daily. 90 tablet 3    metoprolol succinate (TOPROL-XL) 50 MG 24 hr tablet Take 50 mg by mouth.      nitroGLYCERIN (NITROSTAT) 0.4 MG SL tablet Place 1 tablet (0.4 mg total) under the tongue every 5 (five) minutes as needed for Chest pain. 90 tablet 1    ondansetron (ZOFRAN-ODT) 8 MG TbDL Take 1 tablet (8 mg total) by mouth every 12 (twelve) hours as needed. 30 tablet 1    oxyCODONE-acetaminophen (PERCOCET)  mg per tablet Take 1 tablet by mouth every 4 (four) hours as needed for Pain. 90 tablet 0    predniSONE (DELTASONE) 10 MG tablet Take 3 tablets (30 mg total) by mouth once daily. Use with 50 mg tabs to take 80mg daily 60 tablet 2    predniSONE (DELTASONE) 50 MG Tab Take 1 tablet (50 mg total) by mouth once daily. Use with 10 mg tabs to take 80mg daily 60 tablet 0    PROAIR HFA 90 mcg/actuation inhaler Inhale 2 puffs into the lungs as needed. 18 g 6    tiZANidine (ZANAFLEX) 2 MG tablet Take 1 tablet (2 mg total) by mouth every 8 (eight) hours as needed. 90 tablet 2    umeclidinium-vilanterol (ANORO ELLIPTA) 62.5-25 mcg/actuation DsDv Inhale 1 puff into the lungs once daily. Controller 1 each 5     No current facility-administered medications for this visit.        Review of Systems   Constitutional: Negative for appetite change, chills, fatigue, fever and unexpected weight change.   HENT: Negative for dental problem, sinus pressure and sneezing.    Eyes: Negative for visual disturbance.   Respiratory: Positive for shortness of breath. Negative for cough, choking and chest tightness.    Cardiovascular: Negative for chest pain and leg swelling.   Gastrointestinal: Negative for abdominal pain, blood in stool, constipation, diarrhea and nausea.   Genitourinary: Negative for difficulty urinating, dysuria and frequency.   Musculoskeletal: Positive for back pain. Negative for arthralgias.   Skin: Negative for rash and wound.   Neurological: Positive for weakness. Negative for dizziness, light-headedness  and headaches.   Hematological: Negative for adenopathy. Does not bruise/bleed easily.   Psychiatric/Behavioral: Negative for sleep disturbance. The patient is not nervous/anxious.        ECOG Performance Status:   ECOG SCORE           Objective:      Vitals:   Vitals:    02/27/19 1001   BP: (!) 176/80   Pulse: 69   Resp: 16   Temp: (!) 95 °F (35 °C)   SpO2: 95%   Weight: 79.6 kg (175 lb 7.8 oz)   Height: 6' (1.829 m)     BMI: Body mass index is 23.8 kg/m².    Physical Exam   Constitutional: He is oriented to person, place, and time. He appears well-developed and well-nourished.   HENT:   Head: Normocephalic and atraumatic.   Eyes: Pupils are equal, round, and reactive to light.   Neck: Normal range of motion. Neck supple.   Cardiovascular: Normal rate and regular rhythm.   Pulmonary/Chest: Effort normal. No respiratory distress. He has wheezes.   Abdominal: Soft. He exhibits no distension. There is no tenderness.   Musculoskeletal: He exhibits no edema or tenderness.   Lymphadenopathy:     He has no cervical adenopathy.   Neurological: He is alert and oriented to person, place, and time. No cranial nerve deficit or sensory deficit. He exhibits normal muscle tone. Coordination normal.   Skin: Skin is warm and dry.   Psychiatric: He has a normal mood and affect. His behavior is normal.       Laboratory Data:  Lab Visit on 02/27/2019   Component Date Value Ref Range Status    WBC 02/27/2019 7.92  3.90 - 12.70 K/uL Final    RBC 02/27/2019 3.20* 4.60 - 6.20 M/uL Final    Hemoglobin 02/27/2019 9.6* 14.0 - 18.0 g/dL Final    Hematocrit 02/27/2019 31.0* 40.0 - 54.0 % Final    MCV 02/27/2019 97  82 - 98 fL Final    MCH 02/27/2019 30.0  27.0 - 31.0 pg Final    MCHC 02/27/2019 31.0* 32.0 - 36.0 g/dL Final    RDW 02/27/2019 17.1* 11.5 - 14.5 % Final    Platelets 02/27/2019 191  150 - 350 K/uL Final    MPV 02/27/2019 8.6* 9.2 - 12.9 fL Final    Gran # (ANC) 02/27/2019 6.9  1.8 - 7.7 K/uL Final    Comment: The ANC is  based on a white cell differential from an   automated cell counter. It has not been microscopically   reviewed for the presence of abnormal cells. Clinical   correlation is required.      Immature Grans (Abs) 02/27/2019 0.09* 0.00 - 0.04 K/uL Final    Comment: Mild elevation in immature granulocytes is non specific and   can be seen in a variety of conditions including stress response,   acute inflammation, trauma and pregnancy. Correlation with other   laboratory and clinical findings is essential.      Magnesium 02/27/2019 1.8  1.6 - 2.6 mg/dL Final    Sodium 02/27/2019 133* 136 - 145 mmol/L Final    Potassium 02/27/2019 4.7  3.5 - 5.1 mmol/L Final    Chloride 02/27/2019 98  95 - 110 mmol/L Final    CO2 02/27/2019 27  23 - 29 mmol/L Final    Glucose 02/27/2019 95  70 - 110 mg/dL Final    BUN, Bld 02/27/2019 35* 8 - 23 mg/dL Final    Creatinine 02/27/2019 1.1  0.5 - 1.4 mg/dL Final    Calcium 02/27/2019 9.0  8.7 - 10.5 mg/dL Final    Total Protein 02/27/2019 6.6  6.0 - 8.4 g/dL Final    Albumin 02/27/2019 3.2* 3.5 - 5.2 g/dL Final    Total Bilirubin 02/27/2019 0.4  0.1 - 1.0 mg/dL Final    Comment: For infants and newborns, interpretation of results should be based  on gestational age, weight and in agreement with clinical  observations.  Premature Infant recommended reference ranges:  Up to 24 hours.............<8.0 mg/dL  Up to 48 hours............<12.0 mg/dL  3-5 days..................<15.0 mg/dL  6-29 days.................<15.0 mg/dL      Alkaline Phosphatase 02/27/2019 85  55 - 135 U/L Final    AST 02/27/2019 24  10 - 40 U/L Final    ALT 02/27/2019 21  10 - 44 U/L Final    Anion Gap 02/27/2019 8  8 - 16 mmol/L Final    eGFR if African American 02/27/2019 >60.0  >60 mL/min/1.73 m^2 Final    eGFR if non African American 02/27/2019 >60.0  >60 mL/min/1.73 m^2 Final    Comment: Calculation used to obtain the estimated glomerular filtration  rate (eGFR) is the CKD-EPI equation.       TSH  02/27/2019 4.158* 0.400 - 4.000 uIU/mL Final    Free T4 02/27/2019 0.60* 0.71 - 1.51 ng/dL Final         Imaging:   CT 02/11/2019    FINDINGS:  Finding: Comparison was made to a prior CT examination of the thorax performed on 12/14/2018.  The size of the heart is within normal limits.  There has been interval worsening of the appearance of both lungs.  There is a moderate amount of alveolar consolidation in the medial aspect of the left lung.  There has been interval development of a mild amount of haziness in the medial aspect of the right lower lobe.  There is a persistent tiny left pleural effusion.  There is no pneumothorax.  There is a mild amount dextroconvex curvature of the thoracic spine.    There is a 5 mm stone in the dependent portion of the gallbladder.  The liver, pancreas, spleen, adrenals, and kidneys are normal in appearance. The ureters and the urinary bladder are normal in appearance. The appendix is normal in appearance.  There is a moderate amount of diverticulosis in the sigmoid portion of the colon.  There is no free fluid within the abdomen or pelvis. There is no pneumoperitoneum.  There is a moderate amount of atherosclerosis.  There is an aneurysm in the distal aspect of the abdominal aorta.  It has an AP diameter of 3.5 cm.  There is grade 1 anterolisthesis of L3 on L4.  There are mild degenerative changes between L4 and S1.      Impression       1. There has been interval worsening of the appearance of the left lung.  There is a moderate amount of alveolar consolidation in the medial aspect of the left lung. This is consistent with the patient's history and characteristic of lung cancer.  2. There has been interval development of a mild amount of haziness in the medial aspect of the right lower lobe.  This is characteristic of atelectasis or scarring.  3. There is a persistent tiny left pleural effusion.  4. There is an aneurysm in the distal aspect of the abdominal aorta. It has an AP  diameter of 3.5 cm.  5. There is a 5 mm stone in the dependent portion of the gallbladder.  6.  There is grade 1 anterolisthesis of L3 on L4. There are mild degenerative changes between L4 and S1.  7. There is a moderate amount of diverticulosis in the sigmoid portion of the colon.  All CT scans at this facility use dose modulation, iterative reconstruction, and/or weight base dosing when appropriate to reduce radiation dose when appropriate to reduce radiation dose to as low as reasonably achievable.                Assessment:       1. Primary malignant neoplasm of left lung    2. Lung cancer metastatic to bone    3. Pneumonitis           Plan:      Mr. Nunn was discussed in thoracic tumor Board and his scans were reviewed with findings consistent with pneumonitis and recommendation for steroids.  He has been started on prednisone 80 mg I will decrease this dose to 60 mg today.  He will use this for the next week and then drop to 50 mg and we will step down each week by 10 mg.  Follow back up in another 2 weeks for reassessment.  Hold treatment until we complete his steroid course.  All questions were answered and he is agreeable with this plan.    Demetrius Infanet DO, FACP  Hematology & Oncology  [facility] [place] [address]  ph. [phone]  Fax. [phone]    25 minutes were spent in coordination of patient's care, record review and counseling.  More than 50% of the time was face-to-face.

## 2019-02-28 ENCOUNTER — TELEPHONE (OUTPATIENT)
Dept: REHABILITATION | Facility: HOSPITAL | Age: 64
End: 2019-02-28

## 2019-02-28 NOTE — TELEPHONE ENCOUNTER
Called regarding today's No Show, left a voicemail reminding patient his next appointment is on 3/6 @ 8:00.

## 2019-03-06 ENCOUNTER — TELEPHONE (OUTPATIENT)
Dept: REHABILITATION | Facility: HOSPITAL | Age: 64
End: 2019-03-06

## 2019-03-08 DIAGNOSIS — G89.3 NEOPLASM RELATED PAIN: ICD-10-CM

## 2019-03-08 RX ORDER — OXYCODONE AND ACETAMINOPHEN 10; 325 MG/1; MG/1
1 TABLET ORAL EVERY 4 HOURS PRN
Qty: 90 TABLET | Refills: 0 | Status: SHIPPED | OUTPATIENT
Start: 2019-03-08 | End: 2019-03-11 | Stop reason: SDUPTHER

## 2019-03-11 DIAGNOSIS — G89.3 NEOPLASM RELATED PAIN: ICD-10-CM

## 2019-03-11 RX ORDER — OXYCODONE AND ACETAMINOPHEN 10; 325 MG/1; MG/1
1 TABLET ORAL EVERY 4 HOURS PRN
Qty: 90 TABLET | Refills: 0 | Status: SHIPPED | OUTPATIENT
Start: 2019-03-11 | End: 2019-04-01 | Stop reason: SDUPTHER

## 2019-03-12 NOTE — PROGRESS NOTES
"PATIENT: Nimesh Nunn  MRN: [id number]  DATE: 3/13/2019      Diagnosis:   1. Primary malignant neoplasm of left lung    2. Lung cancer metastatic to bone    3. Pneumonitis    4. Abnormal thyroid function test    5. Anemia in neoplastic disease    6. Thrombocytopenia    7. Chest pain, unspecified type        Chief Complaint: Follow-up      Oncologic History:      Oncologic History Non-small cell lung cancer, left diagnosed 06/2018  Metastatic disease to bone at presentation    Oncologic Treatment Stereotactic radiation to spinal lesion  Cisplatin/pemetrexed with concurrent radiation 08/08/2018 (completed 09/2018)  Durvalumab 10/2018  XRT L4-S3 30 Gy 12/2018    Pathology Poorly differentiated adenocarcinoma, EGFR wild type, No ALK, ROS-1 rearrangements, PD-L1 TPS 90%          Subjective:    Interval History: Mr. Nunn is a 63 y.o. male who is seen in follow-up for lung cancer. He is currently on treatment hold and taking prednisone 60 mg daily for pneumonitis.  He is being weaned currently and doing well.   Tripped and fell 3 days ago- caught himself with right arm- bruise noted.  Staying active- working in yard and garden.   Steroids causing insomnia but not fatigued during the day. Hands shaky at times after taking.   Since starting steroids- off balance at times. No dizziness.   BP's at home have been normal- 137/77 this morning.   Breathing well- actually improved.   Cough improved- minimal mucus. No hemoptysis.   Would like to follow up with cards as he has not had a stress test in awhile- usually sees cards at [facility] but would like to see Dr. Martin here as he is not happy with [facility] and [facility].   No exertional CP but occasional "twinge" that comes and goes.   Of note, he had indigestion when he had previous MI years ago. None recently. No palpitations.  He has no other new complaints.    His history dates to 06/2018 when he sought medical attention for chest pain.  A CT of the chest was " performed showing a left lower lobe mass measuring 7.4 cm.  There were also enlarged left hilar lymph nodes. He underwent PET-CT which showed uptake in this mass and also in L1.  He underwent bronchoscopy and biopsy with pathology showing poorly differentiated adenocarcinoma.  Molecular studies showed EGFR wild-type and he did not harbor any ALK or ROS-1 rearrangements.  PD L1 tumor proportions core was 90%.  He underwent radiation to the spinal lesion in 08/2018 and started on concurrent chemo and radiation with cisplatin and pemetrexed in 08/2018.  He completed treatment on 09/19/2018.  He was only able to receive 2 cycles of chemotherapy secondary to cytopenias.  He was initiated on durvalumab in 10/2018.  He received 3 cycles but then developed lower extremity weakness and was felt that this may be related to cauda equina syndrome related to bone metastasis.  He underwent radiation to his lumbar and sacral spine in 12/2018.    Past Medical History:   Past Medical History:   Diagnosis Date    Abdominal aortic aneurysm (AAA) without rupture 3/5/2018    Arthritis     Blood transfusion     COPD (chronic obstructive pulmonary disease)     Coronary artery disease     Dehydration 8/15/2018    Encounter for blood transfusion     Hyperlipidemia 11/7/2013    Hypertension     Lung cancer metastatic to bone 7/18/2018    Occlusion and stenosis of carotid artery without mention of cerebral infarction 1/12/2014    Pneumonitis 2/13/2019    Primary malignant neoplasm of left lung 7/18/2018    Screening for colon cancer 6/1/2015    Syncope 1/13/2014    Thrombocytopenia 9/19/2018    Weakness of both lower extremities 1/14/2019       Past Surgical HIstory:   Past Surgical History:   Procedure Laterality Date    ARCH & 4 VESSEL STUDY Bilateral 5/27/2014    Performed by [facility] at [facility] CATH LAB    BACK SURGERY      BRONCHOSCOPY,FIBEROPTIC N/A 6/26/2018    Performed by Liana Serrano MD at [facility] OR 2ND  FLR    carotid endarectomy      CATHETERIZATION, HEART, LEFT Left 11/8/2013    [facility]formed by [facility] at [facility] CATH LAB    COLONOSCOPY N/A 6/1/2015    [facility] ENDO    CORONARY ANGIOPLASTY      CORONARY ARTERY BYPASS GRAFT      ESOPHAGOGASTRODUODENOSCOPY (EGD) N/A 8/16/2018    Performed by Y[facility]annes Osuna MD at [facility] ENDO (2ND FLR)    HEART CATH-LEFT N/A 5/27/2014    Performed by [facility]hi Martin [facility] at [facility] CATH LAB    INSERTION, STENT, CORONARY ARTERY N/A 12/20/2013    Performed by [facility]hi Martin MD at [facility] CATH LAB    PTA, PERIPHERAL BLOOD VESSEL Left 3/11/2014    Performed by [facility]hi Martin MD at [facility] CATH LAB    PTA, PERIPHERAL BLOOD VESSEL N/A 2/18/2014    Performed by [facility]ekhi Martin MD at [facility] CATH LAB    SKIN BIOPSY      ULTRASOUND, ENDOBRONCHIAL N/A 6/26/2018    Performed by Liana Serrano MD at [facility] OR 2ND FLR       Family History:   Family History   Problem Relation Age of Onset    Heart disease Father     Hypertension Father     Heart attack Father     Heart failure Father     Cancer Mother     Breast cancer Mother     No Known Problems Sister     No Known Problems Brother     No Known Problems Maternal Grandmother     No Known Problems Maternal Grandfather     No Known Problems Paternal Grandmother     No Known Problems Paternal Grandfather     No Known Problems Maternal Aunt     No Known Problems Maternal Uncle     No Known Problems Paternal Aunt     No Known Problems Paternal Uncle     Anemia Neg Hx     Arrhythmia Neg Hx     Asthma Neg Hx     Clotting disorder Neg Hx     Fainting Neg Hx     Hyperlipidemia Neg Hx        Social History:  reports that he quit smoking about 7 years ago. He has a 60.00 pack-year smoking history. he has never used smokeless tobacco. He reports that he drinks about 1.8 oz of alcohol per week. He reports that he uses drugs. Drug: Marijuana.    Allergies:  Review of patient's allergies  indicates:  No Known Allergies    Medications:  Current Outpatient Medications   Medication Sig Dispense Refill    aspirin (ECOTRIN) 81 MG EC tablet Take 81 mg by mouth once daily.      atorvastatin (LIPITOR) 80 MG tablet TAKE 1 TABLET BY MOUTH EVERY DAY 30 tablet 11    cilostazol (PLETAL) 100 MG Tab TAKE 1 TABLET BY MOUTH TWICE DAILY 60 tablet 11    clopidogrel (PLAVIX) 75 mg tablet TAKE 1 TABLET BY MOUTH EVERY DAY 90 tablet 4    DULoxetine (CYMBALTA) 60 MG capsule Take 1 capsule (60 mg total) by mouth once daily. 90 capsule 4    finasteride (PROSCAR) 5 mg tablet TAKE 1 TABLET BY MOUTH EVERY DAY 90 tablet 3    folic acid (FOLVITE) 400 MCG tablet Take 1 tablet (400 mcg total) by mouth once daily. 100 tablet 3    irbesartan (AVAPRO) 75 MG tablet Take 1 tablet (75 mg total) by mouth once daily. 90 tablet 3    metoprolol succinate (TOPROL-XL) 50 MG 24 hr tablet Take 50 mg by mouth.      nitroGLYCERIN (NITROSTAT) 0.4 MG SL tablet Place 1 tablet (0.4 mg total) under the tongue every 5 (five) minutes as needed for Chest pain. 90 tablet 1    ondansetron (ZOFRAN-ODT) 8 MG TbDL Take 1 tablet (8 mg total) by mouth every 12 (twelve) hours as needed. 30 tablet 1    oxyCODONE-acetaminophen (PERCOCET)  mg per tablet Take 1 tablet by mouth every 4 (four) hours as needed for Pain. 90 tablet 0    predniSONE (DELTASONE) 10 MG tablet Take as directed to wean off. 100 tablet 0    PROAIR HFA 90 mcg/actuation inhaler Inhale 2 puffs into the lungs as needed. 18 g 6    tiZANidine (ZANAFLEX) 2 MG tablet Take 1 tablet (2 mg total) by mouth every 8 (eight) hours as needed. 90 tablet 2    umeclidinium-vilanterol (ANORO ELLIPTA) 62.5-25 mcg/actuation DsDv Inhale 1 puff into the lungs once daily. Controller 1 each 5     No current facility-administered medications for this visit.        Review of Systems   Constitutional: Negative for appetite change, chills, fatigue, fever and unexpected weight change.   HENT: Negative for  dental problem, sinus pressure and sneezing.    Eyes: Negative for visual disturbance.   Respiratory: Positive for cough (improved) and shortness of breath (improved). Negative for choking and chest tightness.    Cardiovascular: Negative for chest pain and leg swelling.   Gastrointestinal: Negative for abdominal pain, blood in stool, constipation, diarrhea and nausea.   Genitourinary: Negative for difficulty urinating, dysuria and frequency.   Musculoskeletal: Positive for back pain (no worse than usual.). Negative for arthralgias.   Skin: Negative for rash and wound.   Neurological: Negative for dizziness, weakness, light-headedness and headaches.   Hematological: Negative for adenopathy. Does not bruise/bleed easily.   Psychiatric/Behavioral: Positive for sleep disturbance (since starting prednisone). The patient is not nervous/anxious.        ECOG Performance Status:   ECOG SCORE    0 - Fully active-able to carry on all pre-disease performance without restriction         Objective:      Vitals:   Vitals:    03/13/19 1054 03/13/19 1129   BP: (!) 83/50 110/70   BP Location: Left arm Right arm   Patient Position: Sitting Sitting   BP Method: Large (Automatic) Medium (Manual)   Pulse: 76    Resp: 16    Temp: 97.6 °F (36.4 °C)    TempSrc: Oral    SpO2: (!) 94%    Weight: 80 kg (176 lb 5.9 oz)    Height: 6' (1.829 m)      BMI: Body mass index is 23.92 kg/m².    Physical Exam   Constitutional: He is oriented to person, place, and time. He appears well-developed and well-nourished. No distress.   Presents alone.    HENT:   Head: Normocephalic and atraumatic.   Right Ear: External ear normal.   Left Ear: External ear normal.   Mouth/Throat: No oropharyngeal exudate.   Eyes: Conjunctivae and lids are normal. Pupils are equal, round, and reactive to light. No scleral icterus.   Neck: Trachea normal and normal range of motion. Neck supple. No thyromegaly present.   Cardiovascular: Normal rate, regular rhythm, normal heart  sounds and normal pulses.   Pulmonary/Chest: Effort normal and breath sounds normal. No respiratory distress. He has no wheezes.   Abdominal: Soft. Normal appearance and bowel sounds are normal. He exhibits no distension and no mass. There is no hepatosplenomegaly or splenomegaly. There is no tenderness.   Musculoskeletal: Normal range of motion. He exhibits no edema or tenderness.   Lymphadenopathy:        Head (right side): No submental and no submandibular adenopathy present.        Head (left side): No submental and no submandibular adenopathy present.     He has no cervical adenopathy.     He has no axillary adenopathy.        Right: No supraclavicular adenopathy present.        Left: No supraclavicular adenopathy present.   Neurological: He is alert and oriented to person, place, and time. He has normal reflexes. No cranial nerve deficit or sensory deficit. He exhibits normal muscle tone. Coordination normal.   Skin: Skin is warm, dry and intact. No bruising and no rash noted. Nails show no clubbing.   Flat bruise noted under right arm and left forearm. No petechia.    Psychiatric: He has a normal mood and affect. His speech is normal and behavior is normal. Cognition and memory are normal.   Vitals reviewed.      Laboratory Data:  Lab Visit on 03/13/2019   Component Date Value Ref Range Status    WBC 03/13/2019 4.07  3.90 - 12.70 K/uL Final    RBC 03/13/2019 3.48* 4.60 - 6.20 M/uL Final    Hemoglobin 03/13/2019 10.5* 14.0 - 18.0 g/dL Final    Hematocrit 03/13/2019 32.9* 40.0 - 54.0 % Final    MCV 03/13/2019 95  82 - 98 fL Final    MCH 03/13/2019 30.2  27.0 - 31.0 pg Final    MCHC 03/13/2019 31.9* 32.0 - 36.0 g/dL Final    RDW 03/13/2019 17.2* 11.5 - 14.5 % Final    Platelets 03/13/2019 122* 150 - 350 K/uL Final    MPV 03/13/2019 9.3  9.2 - 12.9 fL Final    Gran # (ANC) 03/13/2019 3.6  1.8 - 7.7 K/uL Final    Comment: The ANC is based on a white cell differential from an   automated cell counter. It  has not been microscopically   reviewed for the presence of abnormal cells. Clinical   correlation is required.      Immature Grans (Abs) 03/13/2019 0.04  0.00 - 0.04 K/uL Final    Comment: Mild elevation in immature granulocytes is non specific and   can be seen in a variety of conditions including stress response,   acute inflammation, trauma and pregnancy. Correlation with other   laboratory and clinical findings is essential.      Magnesium 03/13/2019 1.6  1.6 - 2.6 mg/dL Final    Sodium 03/13/2019 133* 136 - 145 mmol/L Final    Potassium 03/13/2019 3.9  3.5 - 5.1 mmol/L Final    Chloride 03/13/2019 93* 95 - 110 mmol/L Final    CO2 03/13/2019 30* 23 - 29 mmol/L Final    Glucose 03/13/2019 109  70 - 110 mg/dL Final    BUN, Bld 03/13/2019 31* 8 - 23 mg/dL Final    Creatinine 03/13/2019 1.2  0.5 - 1.4 mg/dL Final    Calcium 03/13/2019 9.0  8.7 - 10.5 mg/dL Final    Total Protein 03/13/2019 6.7  6.0 - 8.4 g/dL Final    Albumin 03/13/2019 3.3* 3.5 - 5.2 g/dL Final    Total Bilirubin 03/13/2019 0.9  0.1 - 1.0 mg/dL Final    Comment: For infants and newborns, interpretation of results should be based  on gestational age, weight and in agreement with clinical  observations.  Premature Infant recommended reference ranges:  Up to 24 hours.............<8.0 mg/dL  Up to 48 hours............<12.0 mg/dL  3-5 days..................<15.0 mg/dL  6-29 days.................<15.0 mg/dL      Alkaline Phosphatase 03/13/2019 91  55 - 135 U/L Final    AST 03/13/2019 30  10 - 40 U/L Final    ALT 03/13/2019 29  10 - 44 U/L Final    Anion Gap 03/13/2019 10  8 - 16 mmol/L Final    eGFR if African American 03/13/2019 >60.0  >60 mL/min/1.73 m^2 Final    eGFR if non African American 03/13/2019 >60.0  >60 mL/min/1.73 m^2 Final    Comment: Calculation used to obtain the estimated glomerular filtration  rate (eGFR) is the CKD-EPI equation.       TSH 03/13/2019 5.091* 0.400 - 4.000 uIU/mL Final    Free T4 03/13/2019 0.52*  0.71 - 1.51 ng/dL Final         Imaging:   CT 02/11/2019    FINDINGS:  Finding: Comparison was made to a prior CT examination of the thorax performed on 12/14/2018.  The size of the heart is within normal limits.  There has been interval worsening of the appearance of both lungs.  There is a moderate amount of alveolar consolidation in the medial aspect of the left lung.  There has been interval development of a mild amount of haziness in the medial aspect of the right lower lobe.  There is a persistent tiny left pleural effusion.  There is no pneumothorax.  There is a mild amount dextroconvex curvature of the thoracic spine.    There is a 5 mm stone in the dependent portion of the gallbladder.  The liver, pancreas, spleen, adrenals, and kidneys are normal in appearance. The ureters and the urinary bladder are normal in appearance. The appendix is normal in appearance.  There is a moderate amount of diverticulosis in the sigmoid portion of the colon.  There is no free fluid within the abdomen or pelvis. There is no pneumoperitoneum.  There is a moderate amount of atherosclerosis.  There is an aneurysm in the distal aspect of the abdominal aorta.  It has an AP diameter of 3.5 cm.  There is grade 1 anterolisthesis of L3 on L4.  There are mild degenerative changes between L4 and S1.      Impression       1. There has been interval worsening of the appearance of the left lung.  There is a moderate amount of alveolar consolidation in the medial aspect of the left lung. This is consistent with the patient's history and characteristic of lung cancer.  2. There has been interval development of a mild amount of haziness in the medial aspect of the right lower lobe.  This is characteristic of atelectasis or scarring.  3. There is a persistent tiny left pleural effusion.  4. There is an aneurysm in the distal aspect of the abdominal aorta. It has an AP diameter of 3.5 cm.  5. There is a 5 mm stone in the dependent portion of  "the gallbladder.  6.  There is grade 1 anterolisthesis of L3 on L4. There are mild degenerative changes between L4 and S1.  7. There is a moderate amount of diverticulosis in the sigmoid portion of the colon.  All CT scans at this facility use dose modulation, iterative reconstruction, and/or weight base dosing when appropriate to reduce radiation dose when appropriate to reduce radiation dose to as low as reasonably achievable.                Assessment:       1. Primary malignant neoplasm of left lung    2. Lung cancer metastatic to bone    3. Pneumonitis    4. Abnormal thyroid function test    5. Anemia in neoplastic disease    6. Thrombocytopenia    7. Chest pain, unspecified type           Plan:      -Doing well, clinically stable. Continues to take prednisone for pneumonitis. Treatment on hold until steroid course completed.   -Labs reviewed. Anemia parameters essentially stable. Plts with slight dip- no bleeding. Precautions discussed. Fall precautions discussed. Will monitor. TSH noted- will monitor as well.   -Continue to wean prednisone. Calendar provided. He will take Prednisone  50mg x 7 days then reduce by 10mg every 7 days if tolerated. Rx escribed.   -Continue to monitor BP at home and record. Will make him an appt to see Cards per his request as he would like a stress test and wants to transfer from [facility]. No exertional CP or indigestion but occasional "twinges" in chest. Encouraged to go to ED for any persistent chest pain or indigestion.  -RTC in 2 weeks to see Dr. Infante with CBC, CMP, TSH, T4.     Patient is in agreement with the proposed treatment plan. All questions were answered to the patient's satisfaction. Pt knows to call clinic for any new or worsening symptoms and if anything is needed before the next clinic visit.      DANELLE Hurtado-C  Hematology & Oncology  [facility] [place] [address]  ph. [phone]  Fax. [phone]     I spent 30 minutes (face to " face) with the patient, more than 50% was in counseling and coordination of care as detailed above.         Distress Screening Results: Psychosocial Distress screening score of Distress Score: 8 noted and reviewed. No intervention indicated. Stress factors at home; not sleeping due to steroids. Does not need psych referral.

## 2019-03-13 ENCOUNTER — LAB VISIT (OUTPATIENT)
Dept: LAB | Facility: HOSPITAL | Age: 64
End: 2019-03-13
Attending: INTERNAL MEDICINE
Payer: MEDICARE

## 2019-03-13 ENCOUNTER — OFFICE VISIT (OUTPATIENT)
Dept: HEMATOLOGY/ONCOLOGY | Facility: CLINIC | Age: 64
End: 2019-03-13
Payer: MEDICARE

## 2019-03-13 VITALS
DIASTOLIC BLOOD PRESSURE: 70 MMHG | TEMPERATURE: 98 F | HEART RATE: 76 BPM | OXYGEN SATURATION: 94 % | BODY MASS INDEX: 23.89 KG/M2 | SYSTOLIC BLOOD PRESSURE: 110 MMHG | RESPIRATION RATE: 16 BRPM | WEIGHT: 176.38 LBS | HEIGHT: 72 IN

## 2019-03-13 DIAGNOSIS — C79.51 LUNG CANCER METASTATIC TO BONE: ICD-10-CM

## 2019-03-13 DIAGNOSIS — R07.9 CHEST PAIN, UNSPECIFIED TYPE: ICD-10-CM

## 2019-03-13 DIAGNOSIS — C34.92 PRIMARY MALIGNANT NEOPLASM OF LEFT LUNG: ICD-10-CM

## 2019-03-13 DIAGNOSIS — C34.90 LUNG CANCER METASTATIC TO BONE: ICD-10-CM

## 2019-03-13 DIAGNOSIS — E07.89 OTHER SPECIFIED DISORDERS OF THYROID: ICD-10-CM

## 2019-03-13 DIAGNOSIS — R94.6 ABNORMAL THYROID FUNCTION TEST: ICD-10-CM

## 2019-03-13 DIAGNOSIS — C34.92 PRIMARY MALIGNANT NEOPLASM OF LEFT LUNG: Primary | ICD-10-CM

## 2019-03-13 DIAGNOSIS — D63.0 ANEMIA IN NEOPLASTIC DISEASE: ICD-10-CM

## 2019-03-13 DIAGNOSIS — J98.4 PNEUMONITIS: ICD-10-CM

## 2019-03-13 DIAGNOSIS — D69.6 THROMBOCYTOPENIA: ICD-10-CM

## 2019-03-13 LAB
ALBUMIN SERPL BCP-MCNC: 3.3 G/DL
ALP SERPL-CCNC: 91 U/L
ALT SERPL W/O P-5'-P-CCNC: 29 U/L
ANION GAP SERPL CALC-SCNC: 10 MMOL/L
AST SERPL-CCNC: 30 U/L
BILIRUB SERPL-MCNC: 0.9 MG/DL
BUN SERPL-MCNC: 31 MG/DL
CALCIUM SERPL-MCNC: 9 MG/DL
CHLORIDE SERPL-SCNC: 93 MMOL/L
CO2 SERPL-SCNC: 30 MMOL/L
CREAT SERPL-MCNC: 1.2 MG/DL
ERYTHROCYTE [DISTWIDTH] IN BLOOD BY AUTOMATED COUNT: 17.2 %
EST. GFR  (AFRICAN AMERICAN): >60 ML/MIN/1.73 M^2
EST. GFR  (NON AFRICAN AMERICAN): >60 ML/MIN/1.73 M^2
GLUCOSE SERPL-MCNC: 109 MG/DL
HCT VFR BLD AUTO: 32.9 %
HGB BLD-MCNC: 10.5 G/DL
IMM GRANULOCYTES # BLD AUTO: 0.04 K/UL
MAGNESIUM SERPL-MCNC: 1.6 MG/DL
MCH RBC QN AUTO: 30.2 PG
MCHC RBC AUTO-ENTMCNC: 31.9 G/DL
MCV RBC AUTO: 95 FL
NEUTROPHILS # BLD AUTO: 3.6 K/UL
PLATELET # BLD AUTO: 122 K/UL
PMV BLD AUTO: 9.3 FL
POTASSIUM SERPL-SCNC: 3.9 MMOL/L
PROT SERPL-MCNC: 6.7 G/DL
RBC # BLD AUTO: 3.48 M/UL
SODIUM SERPL-SCNC: 133 MMOL/L
T4 FREE SERPL-MCNC: 0.52 NG/DL
TSH SERPL DL<=0.005 MIU/L-ACNC: 5.09 UIU/ML
WBC # BLD AUTO: 4.07 K/UL

## 2019-03-13 PROCEDURE — 99999 PR PBB SHADOW E&M-EST. PATIENT-LVL IV: CPT | Mod: PBBFAC,,, | Performed by: NURSE PRACTITIONER

## 2019-03-13 PROCEDURE — 3074F PR MOST RECENT SYSTOLIC BLOOD PRESSURE < 130 MM HG: ICD-10-PCS | Mod: CPTII,S$GLB,, | Performed by: NURSE PRACTITIONER

## 2019-03-13 PROCEDURE — 99214 OFFICE O/P EST MOD 30 MIN: CPT | Mod: S$GLB,,, | Performed by: NURSE PRACTITIONER

## 2019-03-13 PROCEDURE — 3008F PR BODY MASS INDEX (BMI) DOCUMENTED: ICD-10-PCS | Mod: CPTII,S$GLB,, | Performed by: NURSE PRACTITIONER

## 2019-03-13 PROCEDURE — 99999 PR PBB SHADOW E&M-EST. PATIENT-LVL IV: ICD-10-PCS | Mod: PBBFAC,,, | Performed by: NURSE PRACTITIONER

## 2019-03-13 PROCEDURE — 3078F DIAST BP <80 MM HG: CPT | Mod: CPTII,S$GLB,, | Performed by: NURSE PRACTITIONER

## 2019-03-13 PROCEDURE — 99499 UNLISTED E&M SERVICE: CPT | Mod: S$GLB,,, | Performed by: NURSE PRACTITIONER

## 2019-03-13 PROCEDURE — 3078F PR MOST RECENT DIASTOLIC BLOOD PRESSURE < 80 MM HG: ICD-10-PCS | Mod: CPTII,S$GLB,, | Performed by: NURSE PRACTITIONER

## 2019-03-13 PROCEDURE — 3074F SYST BP LT 130 MM HG: CPT | Mod: CPTII,S$GLB,, | Performed by: NURSE PRACTITIONER

## 2019-03-13 PROCEDURE — 99214 PR OFFICE/OUTPT VISIT, EST, LEVL IV, 30-39 MIN: ICD-10-PCS | Mod: S$GLB,,, | Performed by: NURSE PRACTITIONER

## 2019-03-13 PROCEDURE — 84443 ASSAY THYROID STIM HORMONE: CPT

## 2019-03-13 PROCEDURE — 1101F PR PT FALLS ASSESS DOC 0-1 FALLS W/OUT INJ PAST YR: ICD-10-PCS | Mod: CPTII,S$GLB,, | Performed by: NURSE PRACTITIONER

## 2019-03-13 PROCEDURE — 1101F PT FALLS ASSESS-DOCD LE1/YR: CPT | Mod: CPTII,S$GLB,, | Performed by: NURSE PRACTITIONER

## 2019-03-13 PROCEDURE — 36415 COLL VENOUS BLD VENIPUNCTURE: CPT

## 2019-03-13 PROCEDURE — 83735 ASSAY OF MAGNESIUM: CPT

## 2019-03-13 PROCEDURE — 85027 COMPLETE CBC AUTOMATED: CPT

## 2019-03-13 PROCEDURE — 84439 ASSAY OF FREE THYROXINE: CPT

## 2019-03-13 PROCEDURE — 99499 RISK ADDL DX/OHS AUDIT: ICD-10-PCS | Mod: S$GLB,,, | Performed by: NURSE PRACTITIONER

## 2019-03-13 PROCEDURE — 3008F BODY MASS INDEX DOCD: CPT | Mod: CPTII,S$GLB,, | Performed by: NURSE PRACTITIONER

## 2019-03-13 PROCEDURE — 80053 COMPREHEN METABOLIC PANEL: CPT

## 2019-03-13 RX ORDER — PREDNISONE 10 MG/1
TABLET ORAL
Qty: 100 TABLET | Refills: 0 | Status: ON HOLD | OUTPATIENT
Start: 2019-03-13 | End: 2019-03-26 | Stop reason: HOSPADM

## 2019-03-13 NOTE — Clinical Note
"Please make him a follow up with Cards- Dr. Martin. Patient with history of MI/stents and having occasional "twinges" in chest. May need stress test. "

## 2019-03-24 ENCOUNTER — HOSPITAL ENCOUNTER (EMERGENCY)
Facility: HOSPITAL | Age: 64
Discharge: STILL ADMITTED OR ADMINISTRATIVE | End: 2019-03-25
Attending: FAMILY MEDICINE
Payer: MEDICARE

## 2019-03-24 DIAGNOSIS — J44.1 COPD EXACERBATION: Primary | ICD-10-CM

## 2019-03-24 LAB
ALBUMIN SERPL BCP-MCNC: 3.9 G/DL (ref 3.5–5.2)
ALP SERPL-CCNC: 90 U/L (ref 38–126)
ALT SERPL W/O P-5'-P-CCNC: 29 U/L (ref 10–44)
ANION GAP SERPL CALC-SCNC: 8 MMOL/L (ref 8–16)
AST SERPL-CCNC: 58 U/L (ref 15–46)
BACTERIA #/AREA URNS AUTO: ABNORMAL /HPF
BASOPHILS # BLD AUTO: 0 K/UL (ref 0–0.2)
BASOPHILS NFR BLD: 0 % (ref 0–1.9)
BILIRUB SERPL-MCNC: 0.9 MG/DL (ref 0.1–1)
BILIRUB UR QL STRIP: NEGATIVE
BUN SERPL-MCNC: 35 MG/DL (ref 2–20)
CALCIUM SERPL-MCNC: 8.5 MG/DL (ref 8.7–10.5)
CHLORIDE SERPL-SCNC: 91 MMOL/L (ref 95–110)
CLARITY UR REFRACT.AUTO: CLEAR
CO2 SERPL-SCNC: 30 MMOL/L (ref 23–29)
COLOR UR AUTO: YELLOW
CREAT SERPL-MCNC: 1.16 MG/DL (ref 0.5–1.4)
DIFFERENTIAL METHOD: ABNORMAL
EOSINOPHIL # BLD AUTO: 0 K/UL (ref 0–0.5)
EOSINOPHIL NFR BLD: 0.4 % (ref 0–8)
ERYTHROCYTE [DISTWIDTH] IN BLOOD BY AUTOMATED COUNT: 16.4 % (ref 11.5–14.5)
EST. GFR  (AFRICAN AMERICAN): >60 ML/MIN/1.73 M^2
EST. GFR  (NON AFRICAN AMERICAN): >60 ML/MIN/1.73 M^2
GLUCOSE SERPL-MCNC: 97 MG/DL (ref 70–110)
GLUCOSE UR QL STRIP: NEGATIVE
HCT VFR BLD AUTO: 30.7 % (ref 40–54)
HGB BLD-MCNC: 9.8 G/DL (ref 14–18)
HGB UR QL STRIP: ABNORMAL
HYALINE CASTS UR QL AUTO: 0 /LPF
KETONES UR QL STRIP: NEGATIVE
LEUKOCYTE ESTERASE UR QL STRIP: NEGATIVE
LYMPHOCYTES # BLD AUTO: 0.1 K/UL (ref 1–4.8)
LYMPHOCYTES NFR BLD: 5.1 % (ref 18–48)
MCH RBC QN AUTO: 29.3 PG (ref 27–31)
MCHC RBC AUTO-ENTMCNC: 31.9 G/DL (ref 32–36)
MCV RBC AUTO: 92 FL (ref 82–98)
MICROSCOPIC COMMENT: ABNORMAL
MONOCYTES # BLD AUTO: 0.1 K/UL (ref 0.3–1)
MONOCYTES NFR BLD: 4.4 % (ref 4–15)
NEUTROPHILS # BLD AUTO: 2.5 K/UL (ref 1.8–7.7)
NEUTROPHILS NFR BLD: 89.7 % (ref 38–73)
NITRITE UR QL STRIP: NEGATIVE
PH UR STRIP: 7 [PH] (ref 5–8)
PLATELET # BLD AUTO: 95 K/UL (ref 150–350)
PMV BLD AUTO: 9.3 FL (ref 9.2–12.9)
POTASSIUM SERPL-SCNC: 4.2 MMOL/L (ref 3.5–5.1)
PROT SERPL-MCNC: 7.4 G/DL (ref 6–8.4)
PROT UR QL STRIP: ABNORMAL
RBC # BLD AUTO: 3.34 M/UL (ref 4.6–6.2)
RBC #/AREA URNS AUTO: 100 /HPF (ref 0–4)
SODIUM SERPL-SCNC: 129 MMOL/L (ref 136–145)
SP GR UR STRIP: 1.01 (ref 1–1.03)
TROPONIN I SERPL DL<=0.01 NG/ML-MCNC: 0.05 NG/ML (ref 0.01–0.03)
URN SPEC COLLECT METH UR: ABNORMAL
UROBILINOGEN UR STRIP-ACNC: NEGATIVE EU/DL
WBC # BLD AUTO: 2.75 K/UL (ref 3.9–12.7)
WBC #/AREA URNS AUTO: 0 /HPF (ref 0–5)

## 2019-03-24 PROCEDURE — 93010 ELECTROCARDIOGRAM REPORT: CPT | Mod: ,,, | Performed by: STUDENT IN AN ORGANIZED HEALTH CARE EDUCATION/TRAINING PROGRAM

## 2019-03-24 PROCEDURE — 80053 COMPREHEN METABOLIC PANEL: CPT | Mod: ER

## 2019-03-24 PROCEDURE — 96374 THER/PROPH/DIAG INJ IV PUSH: CPT | Mod: ER

## 2019-03-24 PROCEDURE — 63600175 PHARM REV CODE 636 W HCPCS: Mod: ER | Performed by: FAMILY MEDICINE

## 2019-03-24 PROCEDURE — 93010 EKG 12-LEAD: ICD-10-PCS | Mod: ,,, | Performed by: STUDENT IN AN ORGANIZED HEALTH CARE EDUCATION/TRAINING PROGRAM

## 2019-03-24 PROCEDURE — 25000242 PHARM REV CODE 250 ALT 637 W/ HCPCS: Mod: ER | Performed by: FAMILY MEDICINE

## 2019-03-24 PROCEDURE — 81000 URINALYSIS NONAUTO W/SCOPE: CPT | Mod: ER

## 2019-03-24 PROCEDURE — 93005 ELECTROCARDIOGRAM TRACING: CPT | Mod: PO

## 2019-03-24 PROCEDURE — 85025 COMPLETE CBC W/AUTO DIFF WBC: CPT | Mod: ER

## 2019-03-24 PROCEDURE — 84484 ASSAY OF TROPONIN QUANT: CPT | Mod: ER

## 2019-03-24 PROCEDURE — 94640 AIRWAY INHALATION TREATMENT: CPT | Mod: ER

## 2019-03-24 PROCEDURE — 99284 EMERGENCY DEPT VISIT MOD MDM: CPT | Mod: 25,ER

## 2019-03-24 RX ORDER — METHYLPREDNISOLONE SOD SUCC 125 MG
125 VIAL (EA) INJECTION
Status: COMPLETED | OUTPATIENT
Start: 2019-03-24 | End: 2019-03-24

## 2019-03-24 RX ORDER — IPRATROPIUM BROMIDE AND ALBUTEROL SULFATE 2.5; .5 MG/3ML; MG/3ML
3 SOLUTION RESPIRATORY (INHALATION)
Status: COMPLETED | OUTPATIENT
Start: 2019-03-24 | End: 2019-03-24

## 2019-03-24 RX ADMIN — METHYLPREDNISOLONE SODIUM SUCCINATE 125 MG: 125 INJECTION, POWDER, FOR SOLUTION INTRAMUSCULAR; INTRAVENOUS at 09:03

## 2019-03-24 RX ADMIN — IPRATROPIUM BROMIDE AND ALBUTEROL SULFATE 3 ML: .5; 3 SOLUTION RESPIRATORY (INHALATION) at 09:03

## 2019-03-25 ENCOUNTER — HOSPITAL ENCOUNTER (INPATIENT)
Facility: HOSPITAL | Age: 64
LOS: 1 days | Discharge: HOME | DRG: 190 | End: 2019-03-26
Attending: INTERNAL MEDICINE | Admitting: INTERNAL MEDICINE
Payer: MEDICARE

## 2019-03-25 VITALS
RESPIRATION RATE: 18 BRPM | TEMPERATURE: 99 F | HEART RATE: 92 BPM | HEIGHT: 72 IN | SYSTOLIC BLOOD PRESSURE: 152 MMHG | WEIGHT: 170 LBS | OXYGEN SATURATION: 93 % | DIASTOLIC BLOOD PRESSURE: 78 MMHG | BODY MASS INDEX: 23.03 KG/M2

## 2019-03-25 DIAGNOSIS — D62 ACUTE BLOOD LOSS ANEMIA: ICD-10-CM

## 2019-03-25 DIAGNOSIS — M62.9 HAMSTRING TIGHTNESS OF BOTH LOWER EXTREMITIES: ICD-10-CM

## 2019-03-25 DIAGNOSIS — E83.42 HYPOMAGNESEMIA: ICD-10-CM

## 2019-03-25 DIAGNOSIS — D69.6 THROMBOCYTOPENIA: ICD-10-CM

## 2019-03-25 DIAGNOSIS — E86.0 DEHYDRATION: ICD-10-CM

## 2019-03-25 DIAGNOSIS — D63.0 ANEMIA IN NEOPLASTIC DISEASE: ICD-10-CM

## 2019-03-25 DIAGNOSIS — J44.1 COPD EXACERBATION: Primary | ICD-10-CM

## 2019-03-25 DIAGNOSIS — T45.1X5A ANTINEOPLASTIC CHEMOTHERAPY INDUCED PANCYTOPENIA: ICD-10-CM

## 2019-03-25 DIAGNOSIS — R06.02 SHORTNESS OF BREATH: ICD-10-CM

## 2019-03-25 DIAGNOSIS — R19.5 GUAIAC POSITIVE STOOLS: ICD-10-CM

## 2019-03-25 DIAGNOSIS — R26.89 DECREASED FUNCTIONAL MOBILITY: ICD-10-CM

## 2019-03-25 DIAGNOSIS — R91.8 MASS OF LOWER LOBE OF LEFT LUNG: ICD-10-CM

## 2019-03-25 DIAGNOSIS — C34.92 ADENOCARCINOMA OF LEFT LUNG, STAGE 2: ICD-10-CM

## 2019-03-25 DIAGNOSIS — D61.810 ANTINEOPLASTIC CHEMOTHERAPY INDUCED PANCYTOPENIA: ICD-10-CM

## 2019-03-25 DIAGNOSIS — R07.9 CHEST PAIN, UNSPECIFIED TYPE: ICD-10-CM

## 2019-03-25 DIAGNOSIS — C34.92 PRIMARY MALIGNANT NEOPLASM OF LEFT LUNG: ICD-10-CM

## 2019-03-25 DIAGNOSIS — D69.2 SENILE PURPURA: ICD-10-CM

## 2019-03-25 DIAGNOSIS — C79.51 LUNG CANCER METASTATIC TO BONE: ICD-10-CM

## 2019-03-25 DIAGNOSIS — C34.90 LUNG CANCER METASTATIC TO BONE: ICD-10-CM

## 2019-03-25 PROBLEM — R31.21 ASYMPTOMATIC MICROSCOPIC HEMATURIA: Status: ACTIVE | Noted: 2019-03-25

## 2019-03-25 LAB
ANISOCYTOSIS BLD QL SMEAR: SLIGHT
BASOPHILS # BLD AUTO: 0 K/UL (ref 0–0.2)
BASOPHILS NFR BLD: 0 % (ref 0–1.9)
DIFFERENTIAL METHOD: ABNORMAL
EOSINOPHIL # BLD AUTO: 0 K/UL (ref 0–0.5)
EOSINOPHIL NFR BLD: 0 % (ref 0–8)
ERYTHROCYTE [DISTWIDTH] IN BLOOD BY AUTOMATED COUNT: 16.4 % (ref 11.5–14.5)
HCT VFR BLD AUTO: 28.9 % (ref 40–54)
HGB BLD-MCNC: 9.3 G/DL (ref 14–18)
IMM GRANULOCYTES # BLD AUTO: 0.01 K/UL (ref 0–0.04)
IMM GRANULOCYTES NFR BLD AUTO: 0.4 % (ref 0–0.5)
LYMPHOCYTES # BLD AUTO: 0.1 K/UL (ref 1–4.8)
LYMPHOCYTES NFR BLD: 4.3 % (ref 18–48)
MCH RBC QN AUTO: 30.1 PG (ref 27–31)
MCHC RBC AUTO-ENTMCNC: 32.2 G/DL (ref 32–36)
MCV RBC AUTO: 94 FL (ref 82–98)
MONOCYTES # BLD AUTO: 0 K/UL (ref 0.3–1)
MONOCYTES NFR BLD: 1.2 % (ref 4–15)
NEUTROPHILS # BLD AUTO: 2.4 K/UL (ref 1.8–7.7)
NEUTROPHILS NFR BLD: 94.1 % (ref 38–73)
NRBC BLD-RTO: 0 /100 WBC
PLATELET # BLD AUTO: 89 K/UL (ref 150–350)
PLATELET BLD QL SMEAR: ABNORMAL
PMV BLD AUTO: 10 FL (ref 9.2–12.9)
POLYCHROMASIA BLD QL SMEAR: ABNORMAL
RBC # BLD AUTO: 3.09 M/UL (ref 4.6–6.2)
TOXIC GRANULES BLD QL SMEAR: PRESENT
WBC # BLD AUTO: 2.56 K/UL (ref 3.9–12.7)

## 2019-03-25 PROCEDURE — 85025 COMPLETE CBC W/AUTO DIFF WBC: CPT

## 2019-03-25 PROCEDURE — 63600175 PHARM REV CODE 636 W HCPCS: Performed by: STUDENT IN AN ORGANIZED HEALTH CARE EDUCATION/TRAINING PROGRAM

## 2019-03-25 PROCEDURE — 27000221 HC OXYGEN, UP TO 24 HOURS

## 2019-03-25 PROCEDURE — 20600001 HC STEP DOWN PRIVATE ROOM

## 2019-03-25 PROCEDURE — 36415 COLL VENOUS BLD VENIPUNCTURE: CPT

## 2019-03-25 PROCEDURE — 87205 SMEAR GRAM STAIN: CPT

## 2019-03-25 PROCEDURE — 99223 1ST HOSP IP/OBS HIGH 75: CPT | Mod: AI,GC,, | Performed by: INTERNAL MEDICINE

## 2019-03-25 PROCEDURE — 87070 CULTURE OTHR SPECIMN AEROBIC: CPT

## 2019-03-25 PROCEDURE — 25000003 PHARM REV CODE 250: Performed by: STUDENT IN AN ORGANIZED HEALTH CARE EDUCATION/TRAINING PROGRAM

## 2019-03-25 PROCEDURE — 87186 SC STD MICRODIL/AGAR DIL: CPT

## 2019-03-25 PROCEDURE — 25000242 PHARM REV CODE 250 ALT 637 W/ HCPCS: Performed by: STUDENT IN AN ORGANIZED HEALTH CARE EDUCATION/TRAINING PROGRAM

## 2019-03-25 PROCEDURE — 87077 CULTURE AEROBIC IDENTIFY: CPT

## 2019-03-25 PROCEDURE — 94761 N-INVAS EAR/PLS OXIMETRY MLT: CPT

## 2019-03-25 PROCEDURE — 99223 PR INITIAL HOSPITAL CARE,LEVL III: ICD-10-PCS | Mod: AI,GC,, | Performed by: INTERNAL MEDICINE

## 2019-03-25 PROCEDURE — 87040 BLOOD CULTURE FOR BACTERIA: CPT | Mod: 59

## 2019-03-25 PROCEDURE — 94640 AIRWAY INHALATION TREATMENT: CPT

## 2019-03-25 RX ORDER — FLUTICASONE FUROATE AND VILANTEROL 100; 25 UG/1; UG/1
1 POWDER RESPIRATORY (INHALATION) DAILY
Status: DISCONTINUED | OUTPATIENT
Start: 2019-03-25 | End: 2019-03-26 | Stop reason: HOSPADM

## 2019-03-25 RX ORDER — SODIUM CHLORIDE 0.9 % (FLUSH) 0.9 %
3 SYRINGE (ML) INJECTION
Status: DISCONTINUED | OUTPATIENT
Start: 2019-03-25 | End: 2019-03-26 | Stop reason: HOSPADM

## 2019-03-25 RX ORDER — METHYLPREDNISOLONE SOD SUCC 125 MG
80 VIAL (EA) INJECTION EVERY 8 HOURS
Status: DISCONTINUED | OUTPATIENT
Start: 2019-03-25 | End: 2019-03-25

## 2019-03-25 RX ORDER — CILOSTAZOL 100 MG/1
100 TABLET ORAL 2 TIMES DAILY
Status: DISCONTINUED | OUTPATIENT
Start: 2019-03-25 | End: 2019-03-26 | Stop reason: HOSPADM

## 2019-03-25 RX ORDER — TIZANIDINE 2 MG/1
2 TABLET ORAL EVERY 8 HOURS PRN
Status: DISCONTINUED | OUTPATIENT
Start: 2019-03-25 | End: 2019-03-26 | Stop reason: HOSPADM

## 2019-03-25 RX ORDER — DULOXETIN HYDROCHLORIDE 60 MG/1
60 CAPSULE, DELAYED RELEASE ORAL DAILY
Status: DISCONTINUED | OUTPATIENT
Start: 2019-03-25 | End: 2019-03-25

## 2019-03-25 RX ORDER — METOPROLOL SUCCINATE 50 MG/1
50 TABLET, EXTENDED RELEASE ORAL DAILY
Status: DISCONTINUED | OUTPATIENT
Start: 2019-03-25 | End: 2019-03-26 | Stop reason: HOSPADM

## 2019-03-25 RX ORDER — NITROGLYCERIN 0.4 MG/1
0.4 TABLET SUBLINGUAL EVERY 5 MIN PRN
Status: DISCONTINUED | OUTPATIENT
Start: 2019-03-25 | End: 2019-03-26 | Stop reason: HOSPADM

## 2019-03-25 RX ORDER — FOLIC ACID 0.4 MG
400 TABLET ORAL DAILY
Status: DISCONTINUED | OUTPATIENT
Start: 2019-03-25 | End: 2019-03-25

## 2019-03-25 RX ORDER — ACETAMINOPHEN 325 MG/1
650 TABLET ORAL EVERY 4 HOURS PRN
Status: DISCONTINUED | OUTPATIENT
Start: 2019-03-25 | End: 2019-03-26 | Stop reason: HOSPADM

## 2019-03-25 RX ORDER — IRBESARTAN 75 MG/1
75 TABLET ORAL DAILY
Status: DISCONTINUED | OUTPATIENT
Start: 2019-03-25 | End: 2019-03-25

## 2019-03-25 RX ORDER — IPRATROPIUM BROMIDE AND ALBUTEROL SULFATE 2.5; .5 MG/3ML; MG/3ML
3 SOLUTION RESPIRATORY (INHALATION)
Status: DISCONTINUED | OUTPATIENT
Start: 2019-03-25 | End: 2019-03-26 | Stop reason: HOSPADM

## 2019-03-25 RX ORDER — FINASTERIDE 5 MG/1
5 TABLET, FILM COATED ORAL DAILY
Status: DISCONTINUED | OUTPATIENT
Start: 2019-03-25 | End: 2019-03-26 | Stop reason: HOSPADM

## 2019-03-25 RX ORDER — AZITHROMYCIN 250 MG/1
250 TABLET, FILM COATED ORAL DAILY
Status: DISCONTINUED | OUTPATIENT
Start: 2019-03-26 | End: 2019-03-26 | Stop reason: HOSPADM

## 2019-03-25 RX ORDER — POLYETHYLENE GLYCOL 3350 17 G/17G
17 POWDER, FOR SOLUTION ORAL DAILY
Status: DISCONTINUED | OUTPATIENT
Start: 2019-03-25 | End: 2019-03-26 | Stop reason: HOSPADM

## 2019-03-25 RX ORDER — HYDROCODONE BITARTRATE AND ACETAMINOPHEN 5; 325 MG/1; MG/1
1 TABLET ORAL EVERY 4 HOURS PRN
Status: DISCONTINUED | OUTPATIENT
Start: 2019-03-25 | End: 2019-03-25

## 2019-03-25 RX ORDER — ALBUTEROL SULFATE 90 UG/1
2 AEROSOL, METERED RESPIRATORY (INHALATION) EVERY 6 HOURS PRN
Status: DISCONTINUED | OUTPATIENT
Start: 2019-03-25 | End: 2019-03-26 | Stop reason: HOSPADM

## 2019-03-25 RX ORDER — OXYCODONE AND ACETAMINOPHEN 10; 325 MG/1; MG/1
1 TABLET ORAL EVERY 4 HOURS PRN
Status: DISCONTINUED | OUTPATIENT
Start: 2019-03-25 | End: 2019-03-26 | Stop reason: HOSPADM

## 2019-03-25 RX ORDER — ONDANSETRON 8 MG/1
8 TABLET, ORALLY DISINTEGRATING ORAL EVERY 12 HOURS PRN
Status: DISCONTINUED | OUTPATIENT
Start: 2019-03-25 | End: 2019-03-26 | Stop reason: HOSPADM

## 2019-03-25 RX ORDER — AZITHROMYCIN 250 MG/1
500 TABLET, FILM COATED ORAL ONCE
Status: COMPLETED | OUTPATIENT
Start: 2019-03-25 | End: 2019-03-25

## 2019-03-25 RX ORDER — METHYLPREDNISOLONE SOD SUCC 125 MG
50 VIAL (EA) INJECTION DAILY
Status: DISCONTINUED | OUTPATIENT
Start: 2019-03-25 | End: 2019-03-26 | Stop reason: HOSPADM

## 2019-03-25 RX ORDER — ATORVASTATIN CALCIUM 20 MG/1
80 TABLET, FILM COATED ORAL DAILY
Status: DISCONTINUED | OUTPATIENT
Start: 2019-03-25 | End: 2019-03-26 | Stop reason: HOSPADM

## 2019-03-25 RX ADMIN — AZITHROMYCIN 500 MG: 250 TABLET, FILM COATED ORAL at 07:03

## 2019-03-25 RX ADMIN — ATORVASTATIN CALCIUM 80 MG: 20 TABLET, FILM COATED ORAL at 10:03

## 2019-03-25 RX ADMIN — FINASTERIDE 5 MG: 5 TABLET, FILM COATED ORAL at 10:03

## 2019-03-25 RX ADMIN — IPRATROPIUM BROMIDE AND ALBUTEROL SULFATE 3 ML: .5; 3 SOLUTION RESPIRATORY (INHALATION) at 09:03

## 2019-03-25 RX ADMIN — FLUTICASONE FUROATE AND VILANTEROL TRIFENATATE 1 PUFF: 100; 25 POWDER RESPIRATORY (INHALATION) at 10:03

## 2019-03-25 RX ADMIN — POLYETHYLENE GLYCOL 3350 17 G: 17 POWDER, FOR SOLUTION ORAL at 09:03

## 2019-03-25 RX ADMIN — METHYLPREDNISOLONE SODIUM SUCCINATE 50 MG: 125 INJECTION, POWDER, FOR SOLUTION INTRAMUSCULAR; INTRAVENOUS at 09:03

## 2019-03-25 RX ADMIN — METOPROLOL SUCCINATE 50 MG: 50 TABLET, EXTENDED RELEASE ORAL at 10:03

## 2019-03-25 RX ADMIN — CILOSTAZOL 100 MG: 100 TABLET ORAL at 10:03

## 2019-03-25 RX ADMIN — CILOSTAZOL 100 MG: 100 TABLET ORAL at 08:03

## 2019-03-25 NOTE — NURSING
Pt arrived to room via stretcher by [facility]. AAOX4, VSS, Respirations even and unlabored, pt oriented to room. No noted pressure injuries upon admit. Bed at lowest position locked. MD notified of pts arrival. No questions or concerns at this time.

## 2019-03-25 NOTE — SUBJECTIVE & OBJECTIVE
Oncology Treatment Plan:   OP DURVALUMAB Q2W    Medications:  Continuous Infusions:  Scheduled Meds:   albuterol-ipratropium  3 mL Nebulization Q6H WAKE    atorvastatin  80 mg Oral Daily    [START ON 3/26/2019] azithromycin  250 mg Oral Daily    cilostazol  100 mg Oral BID    finasteride  5 mg Oral Daily    fluticasone-vilanterol  1 puff Inhalation Daily    methylPREDNISolone sodium succinate  50 mg Intravenous Daily    metoprolol succinate  50 mg Oral Daily    polyethylene glycol  17 g Oral Daily     PRN Meds:acetaminophen, albuterol, nitroGLYCERIN, ondansetron, oxyCODONE-acetaminophen, sodium chloride 0.9%, tiZANidine     Review of patient's allergies indicates:  No Known Allergies     Past Medical History:   Diagnosis Date    Abdominal aortic aneurysm (AAA) without rupture 3/5/2018    Arthritis     Blood transfusion     COPD (chronic obstructive pulmonary disease)     COPD exacerbation 3/24/2019    Coronary artery disease     Dehydration 8/15/2018    Encounter for blood transfusion     Hyperlipidemia 11/7/2013    Hypertension     Lung cancer metastatic to bone 7/18/2018    Occlusion and stenosis of carotid artery without mention of cerebral infarction 1/12/2014    Pneumonitis 2/13/2019    Primary malignant neoplasm of left lung 7/18/2018    Screening for colon cancer 6/1/2015    Syncope 1/13/2014    Thrombocytopenia 9/19/2018    Weakness of both lower extremities 1/14/2019     Past Surgical History:   Procedure Laterality Date    ARCH & 4 VESSEL STUDY Bilateral 5/27/2014    Performed by [facility] at [facility] CATH LAB    BACK SURGERY      BRONCHOSCOPY,FIBEROPTIC N/A 6/26/2018    Performed by Liana Serrano [facility] at [facility] OR 2ND FLR    carotid endarectomy      CATHETERIZATION, HEART, LEFT Left 11/8/2013    Performed by [facility]hi Martin [facility] at [facility] CATH LAB    COLONOSCOPY N/A 6/1/2015    Performed by [facility]celmo Balderrama [facility] at [facility] ENDO    CORONARY ANGIOPLASTY      CORONARY ARTERY  BYPASS GRAFT      ESOPHAGOGASTRODUODENOSCOPY (EGD) N/A 2018    Performed by Yohannes Osuna MD at [facility] ENDO (2ND FLR)    HEART CATH-LEFT N/A 2014    Performed by [facility]ekhi Martin MD at [facility] CATH LAB    INSERTION, STENT, CORONARY ARTERY N/A 2013    Performed by Mekhi Martin MD at [facility] CATH LAB    PTA, PERIPHERAL BLOOD VESSEL Left 3/11/2014    Performed by M[facility]hi Martin MD at [facility] CATH LAB    PTA, PERIPHERAL BLOOD VESSEL N/A 2014    Performed by Mekhi Martin MD at [facility] CATH LAB    SKIN BIOPSY      ULTRASOUND, ENDOBRONCHIAL N/A 2018    Performed by Liana Serrano MD at [facility] OR 2ND FLR     Family History     Problem Relation (Age of Onset)    Breast cancer Mother    Cancer Mother    Heart attack Father    Heart disease Father    Heart failure Father    Hypertension Father    No Known Problems Sister, Brother, Maternal Grandmother, Maternal Grandfather, Paternal Grandmother, Paternal Grandfather, Maternal Aunt, Maternal Uncle, Paternal Aunt, Paternal Uncle        Tobacco Use    Smoking status: Former Smoker     Packs/day: 1.50     Years: 40.00     Pack years: 60.00     Last attempt to quit: 10/11/2011     Years since quittin.4    Smokeless tobacco: Never Used   Substance and Sexual Activity    Alcohol use: Yes     Alcohol/week: 1.8 oz     Types: 3 Cans of beer per week     Comment: 3 cans beer every day or every other day    Drug use: Yes     Types: Marijuana    Sexual activity: Not on file       Review of Systems   Constitutional: Positive for fever. Negative for diaphoresis.   Respiratory: Positive for cough, chest tightness and shortness of breath.    Cardiovascular: Negative for chest pain, palpitations and leg swelling.   Gastrointestinal: Positive for nausea. Negative for abdominal distention, abdominal pain and vomiting.   Genitourinary: Positive for hematuria. Negative for decreased urine volume, dysuria, flank pain, frequency and urgency.   Musculoskeletal:  Negative.    Neurological: Negative.      Objective:     Vital Signs (Most Recent):  Temp: 97.9 °F (36.6 °C) (03/25/19 0758)  Pulse: 79 (03/25/19 0900)  Resp: 18 (03/25/19 0900)  BP: (!) 103/57 (03/25/19 0758)  SpO2: (!) 90 % (03/25/19 0900) Vital Signs (24h Range):  Temp:  [97.9 °F (36.6 °C)-99.5 °F (37.5 °C)] 97.9 °F (36.6 °C)  Pulse:  [] 79  Resp:  [14-28] 18  SpO2:  [84 %-100 %] 90 %  BP: (103-155)/(57-86) 103/57     Weight: 77.1 kg (169 lb 15.6 oz)  Body mass index is 23.05 kg/m².  Body surface area is 1.98 meters squared.      Intake/Output Summary (Last 24 hours) at 3/25/2019 1007  Last data filed at 3/25/2019 0500  Gross per 24 hour   Intake --   Output 600 ml   Net -600 ml       Physical Exam   Constitutional: He is oriented to person, place, and time. He appears well-developed and well-nourished. No distress.   HENT:   Head: Normocephalic and atraumatic.   Cardiovascular: Normal rate, regular rhythm and normal heart sounds.   Pulmonary/Chest: Effort normal. No respiratory distress. He has wheezes (generalized). He has rales (Bilateral, L>R).   Musculoskeletal: He exhibits no edema.   Neurological: He is alert and oriented to person, place, and time.   Skin: He is not diaphoretic.   Nursing note and vitals reviewed.      Significant Labs:   All pertinent labs from the last 24 hours have been reviewed.    Diagnostic Results:  I have reviewed all pertinent imaging results/findings within the past 24 hours.

## 2019-03-25 NOTE — ASSESSMENT & PLAN NOTE
- azithromycin 500mg once, then 250mg daily for 4 days  - solumedrol 50mg IV daily  - Duonebs q4h while awake  - O2 by nasal cannula for sats >92%

## 2019-03-25 NOTE — H&P
[facility]  Hematology/Oncology  H&P    Patient Name: Nimesh Nunn  MRN: [id number]  Admission Date: 3/25/2019  Code Status: Full Code   Attending Provider: Demetrius Infante DO, F*  Primary Care Physician: Nishant Perez MD  Principal Problem:COPD exacerbation    Subjective:     HPI:   64 yo M with metastatic L sided NSCLC with mets to L spine s/p cisplatin/pemetrexed and concurrent stereotactic radiation to spinal lesion L4-S3 complicated by pneumonitis, as well as Darvulumab. Pt also has HTN, HLD, enlarged prostate, and COPD without home O2. Presenting for 24 hour history of increased SOB, fever to 101F and increased sputum purulence to a light yellow colour. Pt was working on his car when he became acutely short of breath and decided to present to hospital. He has been placed on a steroid taper, from 80mg to 40mg daily currently, due to his pneumonitis. He denies chest pain, nausea, vomiting. Patient also noted a single episode of light red urine, which he has had before when he has a urine and kidney infection. He denies frequency, dysuria, flank pain, or pyuria at this time.         Oncologic History Non-small cell lung cancer, left diagnosed 06/2018  Metastatic disease to bone at presentation    Oncologic Treatment Stereotactic radiation to spinal lesion  Cisplatin/pemetrexed with concurrent radiation 08/08/2018 (completed 09/2018)  Durvalumab 10/2018  XRT L4-S3 30 Gy 12/2018    Pathology Poorly differentiated adenocarcinoma, EGFR wild type, No ALK, ROS-1 rearrangements, PD-L1 TPS 90%     ONCOLOGY HISTORY  His history dates to 06/2018 when he sought medical attention for chest pain.  A CT of the chest was performed showing a left lower lobe mass measuring 7.4 cm.  There were also enlarged left hilar lymph nodes. He underwent PET-CT which showed uptake in this mass and also in L1.  He underwent bronchoscopy and biopsy with pathology showing poorly differentiated adenocarcinoma.   Molecular studies showed EGFR wild-type and he did not harbor any ALK or ROS-1 rearrangements.  PD L1 tumor proportions core was 90%.  He underwent radiation to the spinal lesion in 08/2018 and started on concurrent chemo and radiation with cisplatin and pemetrexed in 08/2018.  He completed treatment on 09/19/2018.  He was only able to receive 2 cycles of chemotherapy secondary to cytopenias.  He was initiated on durvalumab in 10/2018.  He received 3 cycles but then developed lower extremity weakness and was felt that this may be related to cauda equina syndrome related to bone metastasis.  He underwent radiation to his lumbar and sacral spine in 12/2018.           Oncology Treatment Plan:   OP DURVALUMAB Q2W    Medications:  Continuous Infusions:  Scheduled Meds:   albuterol-ipratropium  3 mL Nebulization Q6H WAKE    atorvastatin  80 mg Oral Daily    [START ON 3/26/2019] azithromycin  250 mg Oral Daily    cilostazol  100 mg Oral BID    finasteride  5 mg Oral Daily    fluticasone-vilanterol  1 puff Inhalation Daily    methylPREDNISolone sodium succinate  50 mg Intravenous Daily    metoprolol succinate  50 mg Oral Daily    polyethylene glycol  17 g Oral Daily     PRN Meds:acetaminophen, albuterol, nitroGLYCERIN, ondansetron, oxyCODONE-acetaminophen, sodium chloride 0.9%, tiZANidine     Review of patient's allergies indicates:  No Known Allergies     Past Medical History:   Diagnosis Date    Abdominal aortic aneurysm (AAA) without rupture 3/5/2018    Arthritis     Blood transfusion     COPD (chronic obstructive pulmonary disease)     COPD exacerbation 3/24/2019    Coronary artery disease     Dehydration 8/15/2018    Encounter for blood transfusion     Hyperlipidemia 11/7/2013    Hypertension     Lung cancer metastatic to bone 7/18/2018    Occlusion and stenosis of carotid artery without mention of cerebral infarction 1/12/2014    Pneumonitis 2/13/2019    Primary malignant neoplasm of left lung  2018    Screening for colon cancer 2015    Syncope 2014    Thrombocytopenia 2018    Weakness of both lower extremities 2019     Past Surgical History:   Procedure Laterality Date    ARCH & 4 VESSEL STUDY Bilateral 2014    Performed by [facility] MD at [facility] CATH LAB    BACK SURGERY      BRONCHOSCOPY,FIBEROPTIC N/A 2018    Performed by Liana Serrano MD at [facility] OR 2ND FLR    carotid endarectomy      CATHETERIZATION, HEART, LEFT Left 2013    Performed by [facility]ekhi Martin MD at [facility] CATH LAB    COLONOSCOPY N/A 2015    Per[facility] at [facility] ENDO    CORONARY ANGIOPLASTY      CORONARY ARTERY BYPASS GRAFT      ESOPHAGOGASTRODUODENOSCOPY (EGD) N/A 2018    Performed by Yohannes Osuna MD at [facility] ENDO (2ND FLR)    HEART CATH-LEFT N/A 2014    Performed by Mekhi Martin MD at [facility] CATH LAB    INSERTION, STENT, CORONARY ARTERY N/A 2013    Performed by Mekhi Martin MD at [facility] CATH LAB    PTA, PERIPHERAL BLOOD VESSEL Left 3/11/2014    Performed by [facility] Martin MD at [facility] CATH LAB    PTA, PERIPHERAL BLOOD VESSEL N/A 2014    Performed by Mekhi Martin MD at [facility] CATH LAB    SKIN BIOPSY      ULTRASOUND, ENDOBRONCHIAL N/A 2018    Performed by Liana Serrano MD at [facility] OR 2ND FLR     Family History     Problem Relation (Age of Onset)    Breast cancer Mother    Cancer Mother    Heart attack Father    Heart disease Father    Heart failure Father    Hypertension Father    No Known Problems Sister, Brother, Maternal Grandmother, Maternal Grandfather, Paternal Grandmother, Paternal Grandfather, Maternal Aunt, Maternal Uncle, Paternal Aunt, Paternal Uncle        Tobacco Use    Smoking status: Former Smoker     Packs/day: 1.50     Years: 40.00     Pack years: 60.00     Last attempt to quit: 10/11/2011     Years since quittin.4    Smokeless tobacco: Never Used   Substance and Sexual Activity    Alcohol use:  Yes     Alcohol/week: 1.8 oz     Types: 3 Cans of beer per week     Comment: 3 cans beer every day or every other day    Drug use: Yes     Types: Marijuana    Sexual activity: Not on file       Review of Systems   Constitutional: Positive for fever. Negative for diaphoresis.   Respiratory: Positive for cough, chest tightness and shortness of breath.    Cardiovascular: Negative for chest pain, palpitations and leg swelling.   Gastrointestinal: Positive for nausea. Negative for abdominal distention, abdominal pain and vomiting.   Genitourinary: Positive for hematuria. Negative for decreased urine volume, dysuria, flank pain, frequency and urgency.   Musculoskeletal: Negative.    Neurological: Negative.      Objective:     Vital Signs (Most Recent):  Temp: 97.9 °F (36.6 °C) (03/25/19 0758)  Pulse: 79 (03/25/19 0900)  Resp: 18 (03/25/19 0900)  BP: (!) 103/57 (03/25/19 0758)  SpO2: (!) 90 % (03/25/19 0900) Vital Signs (24h Range):  Temp:  [97.9 °F (36.6 °C)-99.5 °F (37.5 °C)] 97.9 °F (36.6 °C)  Pulse:  [] 79  Resp:  [14-28] 18  SpO2:  [84 %-100 %] 90 %  BP: (103-155)/(57-86) 103/57     Weight: 77.1 kg (169 lb 15.6 oz)  Body mass index is 23.05 kg/m².  Body surface area is 1.98 meters squared.      Intake/Output Summary (Last 24 hours) at 3/25/2019 1007  Last data filed at 3/25/2019 0500  Gross per 24 hour   Intake --   Output 600 ml   Net -600 ml       Physical Exam   Constitutional: He is oriented to person, place, and time. He appears well-developed and well-nourished. No distress.   HENT:   Head: Normocephalic and atraumatic.   Cardiovascular: Normal rate, regular rhythm and normal heart sounds.   Pulmonary/Chest: Effort normal. No respiratory distress. He has wheezes (generalized). He has rales (Bilateral, L>R).   Musculoskeletal: He exhibits no edema.   Neurological: He is alert and oriented to person, place, and time.   Skin: He is not diaphoretic.   Nursing note and vitals reviewed.      Significant Labs:    All pertinent labs from the last 24 hours have been reviewed.    Diagnostic Results:  I have reviewed all pertinent imaging results/findings within the past 24 hours.    Assessment/Plan:     * COPD exacerbation  - azithromycin 500mg once, then 250mg daily for 4 days  - solumedrol 50mg IV daily  - Duonebs q4h while awake  - O2 by nasal cannula for sats >92%    Primary malignant neoplasm of left lung   Oncologic History Non-small cell lung cancer, left diagnosed 06/2018  Metastatic disease to bone at presentation    Oncologic Treatment Stereotactic radiation to spinal lesion  Cisplatin/pemetrexed with concurrent radiation 08/08/2018 (completed 09/2018)  Durvalumab 10/2018  XRT L4-S3 30 Gy 12/2018    Pathology Poorly differentiated adenocarcinoma, EGFR wild type, No ALK, ROS-1 rearrangements, PD-L1 TPS 90%         Asymptomatic microscopic hematuria  Isolated hematuria without symptoms of flank pain, dysuria, frequency.     Will monitor and consider retroperitoneal imagine or nephrology workup for assistance with work up    Pneumonitis  Was on a taper per primary for pneumonitis, 80mg initially down to 40mg daily at presentation. Will prefer solumedrol while inpatient.     Chronic obstructive pulmonary disease  Continue home albuterol  - q4h while awake duonebs      Hyperlipidemia  Continue home statin    HTN (hypertension)  Continue home metoprolol        Roxanne Woods MD  Hematology/Oncology  [facility]

## 2019-03-25 NOTE — NURSING
Pt requesting a diet, attempted to call Dr. Riley, on call med/onc resident for new admit orders. No answer when called; Awaiting call back. Will monitor.

## 2019-03-25 NOTE — ED NOTES
Veena from the [facility] returning call. Dr. Fairbanks speaking with Dr. Dixon.

## 2019-03-25 NOTE — ASSESSMENT & PLAN NOTE
 Oncologic History Non-small cell lung cancer, left diagnosed 06/2018  Metastatic disease to bone at presentation    Oncologic Treatment Stereotactic radiation to spinal lesion  Cisplatin/pemetrexed with concurrent radiation 08/08/2018 (completed 09/2018)  Durvalumab 10/2018  XRT L4-S3 30 Gy 12/2018    Pathology Poorly differentiated adenocarcinoma, EGFR wild type, No ALK, ROS-1 rearrangements, PD-L1 TPS 90%

## 2019-03-25 NOTE — PHARMACY MED REC
"Admission Medication Reconciliation - Pharmacy Consult Note    The home medication history was taken by Niranjan Felix PharmD.  Based on information gathered and subsequent review by the clinical pharmacist, the items below may need attention.     You may go to "Admission" then "Reconcile Home Medications" tabs to review and/or act upon these items.     Potentially problematic discrepancies with current MAR  o Patient IS taking the following which was not ordered upon admit  o ASA 81mg PO daily (holding given admit w/ hematuria)  o Patient IS NOT taking the following which was ordered upon admit  o Tizanidine 2mg PO Q8H PRN - patient states it does not work for him and that he does not take at home  o Patient is taking a DIFFERENT DRUG than that ordered upon admit  o Fluticasone/Vilanterol ordered on admit   - Pt takes umeclidinium/vilanterol at home - if wish to start him on an inhaled long acting anticholinergic agent in comparison to his umeclidinium we have tiotropium on formulary    Potential issues to be addressed PRIOR TO DISCHARGE  o Drug cost interfering with therapy - patient states he can barely afford his inhaler (Anoro Ellipta - umeclidinium-vilanterol) - consider adjusting inhalers on discharge to better manage COPD/compliance  o HTN management - normotensive this AM  o Patient was at some point on irbesartan 75mg po daily for HTN (states he is no longer taking and has not been filled since 6/2018 for 90d supply) - unsure why discontinued   o Per patient and fill history, only taking metoprolol succinate 50mg po daily       Please address this information as you see fit.  Feel free to contact us if you have any questions or require assistance.    Niranjan Felix PharmD, [facility]  [id number]                .    .            "

## 2019-03-25 NOTE — ED NOTES
[facility] contacted for f/u.  Pt accepted by Dr. Infante, assigned to [facility] bed 805A.  Call report to [phone].  [facility] arranging trasport.

## 2019-03-25 NOTE — ASSESSMENT & PLAN NOTE
Was on a taper per primary for pneumonitis, 80mg initially down to 40mg daily at presentation. Will prefer solumedrol while inpatient.

## 2019-03-25 NOTE — ED PROVIDER NOTES
Encounter Date: 3/24/2019       History     Chief Complaint   Patient presents with    Shortness of Breath     SOB since this afternoon while working outside; hx of Stage IV Lung CA, not on home o2; used inhalers at home without any relief     63-year-old male patient comes in with complaint of shortness of breath hematuria patient states that he has lung cancer is undergoing chemotherapy his oncologist is Dr. Infante at [facility].  Patient states that his shortness of breath is beyond baseline and worsened today patient did have an episode of chest pain yesterday no nausea no vomiting no diarrhea.        Review of patient's allergies indicates:  No Known Allergies  Past Medical History:   Diagnosis Date    Abdominal aortic aneurysm (AAA) without rupture 3/5/2018    Arthritis     Blood transfusion     COPD (chronic obstructive pulmonary disease)     Coronary artery disease     Dehydration 8/15/2018    Encounter for blood transfusion     Hyperlipidemia 11/7/2013    Hypertension     Lung cancer metastatic to bone 7/18/2018    Occlusion and stenosis of carotid artery without mention of cerebral infarction 1/12/2014    Pneumonitis 2/13/2019    Primary malignant neoplasm of left lung 7/18/2018    Screening for colon cancer 6/1/2015    Syncope 1/13/2014    Thrombocytopenia 9/19/2018    Weakness of both lower extremities 1/14/2019     Past Surgical History:   Procedure Laterality Date    ARCH & 4 VESSEL STUDY Bilateral 5/27/2014    Performed by Mekhi Martin MD at [facility] CATH LAB    BACK SURGERY      BRONCHOSCOPY,FIBEROPTIC N/A 6/26/2018    Performed by Liana Serrano MD at [facility] OR 2ND FLR    carotid endarectomy      CATHETERIZATION, HEART, LEFT Left 11/8/2013    Performed by Mekhi Martin MD at [facility] CATH LAB    COLONOSCOPY N/A 6/1/2015    Performed by Ancelmo Balderrama MD at [facility] ENDO    CORONARY ANGIOPLASTY      CORONARY ARTERY BYPASS GRAFT      ESOPHAGOGASTRODUODENOSCOPY (EGD)  N/A 2018    Performed by Y[facility]annes Osuna MD [facility] ENDO (2ND FLR)    HEART CATH-LEFT N/A 2014    Per[facility] by [facility]hi Martin MD at [facility] CATH LAB    INSERTION, STENT, CORONARY ARTERY N/A 2013    Per[facility] by [facility]ekhi Martin [facility] at [facility] CATH LAB    PTA, PERIPHERAL BLOOD VESSEL Left 3/11/2014    Performed by Mekhi Martin MD at [facility] CATH LAB    PTA, PERIPHERAL BLOOD VESSEL N/A 2014    Performed by [facility]ekhi Martin MD at [facility] CATH LAB    SKIN BIOPSY      ULTRASOUND, ENDOBRONCHIAL N/A 2018    Performed by Liana Serrano MD at [facility] OR 2ND FLR     Family History   Problem Relation Age of Onset    Heart disease Father     Hypertension Father     Heart attack Father     Heart failure Father     Cancer Mother     Breast cancer Mother     No Known Problems Sister     No Known Problems Brother     No Known Problems Maternal Grandmother     No Known Problems Maternal Grandfather     No Known Problems Paternal Grandmother     No Known Problems Paternal Grandfather     No Known Problems Maternal Aunt     No Known Problems Maternal Uncle     No Known Problems Paternal Aunt     No Known Problems Paternal Uncle     Anemia Neg Hx     Arrhythmia Neg Hx     Asthma Neg Hx     Clotting disorder Neg Hx     Fainting Neg Hx     Hyperlipidemia Neg Hx      Social History     Tobacco Use    Smoking status: Former Smoker     Packs/day: 1.50     Years: 40.00     Pack years: 60.00     Last attempt to quit: 10/11/2011     Years since quittin.4    Smokeless tobacco: Never Used   Substance Use Topics    Alcohol use: Yes     Alcohol/week: 1.8 oz     Types: 3 Cans of beer per week     Comment: 3 cans beer every day or every other day    Drug use: Yes     Types: Marijuana     Review of Systems   Constitutional: Negative for fever.   HENT: Negative for sore throat.    Respiratory: Positive for chest tightness and shortness of breath.    Cardiovascular: Negative for chest pain.    Gastrointestinal: Negative for nausea.   Genitourinary: Positive for hematuria. Negative for dysuria.   Musculoskeletal: Negative for back pain.   Skin: Negative for rash.   Neurological: Negative for weakness.   Hematological: Does not bruise/bleed easily.   All other systems reviewed and are negative.      Physical Exam     Initial Vitals [03/24/19 2053]   BP Pulse Resp Temp SpO2   111/84 (!) 128 (!) 28 98.4 °F (36.9 °C) (!) 91 %      MAP       --         Physical Exam    Constitutional: He appears well-developed and well-nourished.   HENT:   Head: Normocephalic and atraumatic.   Eyes: Conjunctivae and EOM are normal. Pupils are equal, round, and reactive to light.   Neck: Normal range of motion. Neck supple.   Cardiovascular: Normal rate, regular rhythm and normal heart sounds.   Pulmonary/Chest: He has decreased breath sounds in the right lower field and the left lower field. He has wheezes in the right lower field and the left lower field.   Abdominal: Soft. Bowel sounds are normal.   Musculoskeletal: Normal range of motion.   Neurological: He is alert. He has normal reflexes.         ED Course   Procedures  Labs Reviewed   CBC W/ AUTO DIFFERENTIAL - Abnormal; Notable for the following components:       Result Value    WBC 2.75 (*)     RBC 3.34 (*)     Hemoglobin 9.8 (*)     Hematocrit 30.7 (*)     MCHC 31.9 (*)     RDW 16.4 (*)     Platelets 95 (*)     Lymph # 0.1 (*)     Mono # 0.1 (*)     Gran% 89.7 (*)     Lymph% 5.1 (*)     All other components within normal limits   COMPREHENSIVE METABOLIC PANEL - Abnormal; Notable for the following components:    Sodium 129 (*)     Chloride 91 (*)     CO2 30 (*)     BUN, Bld 35 (*)     Calcium 8.5 (*)     AST 58 (*)     All other components within normal limits   TROPONIN I - Abnormal; Notable for the following components:    Troponin I 0.051 (*)     All other components within normal limits   URINALYSIS     EKG Readings: (Independently Interpreted)   Rhythm: Sinus  Tachycardia. Heart Rate: 119. Ectopy: No Ectopy. Conduction: Normal. ST Segments: Normal ST Segments. T Waves: Normal. Clinical Impression: Normal Sinus Rhythm       Imaging Results          X-Ray Chest AP Portable (Final result)  Result time 03/24/19 21:16:05    Final result by Hansel Ruiz Jr., MD (03/24/19 21:16:05)                 Impression:      Persistent abnormal markings in left upper lobe.      Electronically signed by: Hansel Ruiz MD  Date:    03/24/2019  Time:    21:16             Narrative:    EXAMINATION:  XR CHEST AP PORTABLE    CLINICAL HISTORY:  Asthma;    COMPARISON:  CT chest 12/14/2018    FINDINGS:  Heart size is normal.  Right lung appears clear.  Persistent asymmetric increase interstitial markings in the left lung, likely within the posterior aspect of the inferior portion of left upper lobe, similar to that seen on prior CT.  No detrimental changes.                              X-Rays:   Independently Interpreted Readings:   Other Readings:  See radiology report    Medical Decision Making:   Initial Assessment:   Patient in moderate distress and no other complains    Differential Diagnosis:   Pneumonia, sinusitis, allergies, upper respiratory illness, bronchitis  Angina, unstable angina, hypertension, hypertension urgency, hypertension emergency, myocardial infarction    Clinical Tests:   Lab Tests: Ordered and Reviewed  Radiological Study: Ordered and Reviewed                      Clinical Impression:       ICD-10-CM ICD-9-CM   1. COPD exacerbation J44.1 491.21                                Rafita Fairbanks MD  03/25/19 0506

## 2019-03-25 NOTE — ED NOTES
Patient lying on stretcher with eyes closed, breathing even and non-labored, in no acute distress. Call light within reach, siderails up x2, bed in lowest/locked position. Will continue to monitor.

## 2019-03-25 NOTE — ED NOTES
Patient resting on stretcher watching television, in no acute distress, patient denies any needs at this time. Call light within reach, siderails up x2, bed in lowest/locked position. Will continue to monitor.

## 2019-03-25 NOTE — HPI
64 yo M with metastatic L sided NSCLC with mets to L spine s/p cisplatin/pemetrexed and concurrent stereotactic radiation to spinal lesion L4-S3 complicated by pneumonitis, as well as Darvulumab. Pt also has HTN, HLD, enlarged prostate, and COPD without home O2. Presenting for 24 hour history of increased SOB, fever to 101F and increased sputum purulence to a light yellow colour. Pt was working on his car when he became acutely short of breath and decided to present to hospital. He has been placed on a steroid taper, from 80mg to 40mg daily currently, due to his pneumonitis. He denies chest pain, nausea, vomiting. Patient also noted a single episode of light red urine, which he has had before when he has a urine and kidney infection. He denies frequency, dysuria, flank pain, or pyuria at this time.         Oncologic History Non-small cell lung cancer, left diagnosed 06/2018  Metastatic disease to bone at presentation    Oncologic Treatment Stereotactic radiation to spinal lesion  Cisplatin/pemetrexed with concurrent radiation 08/08/2018 (completed 09/2018)  Durvalumab 10/2018  XRT L4-S3 30 Gy 12/2018    Pathology Poorly differentiated adenocarcinoma, EGFR wild type, No ALK, ROS-1 rearrangements, PD-L1 TPS 90%     ONCOLOGY HISTORY  His history dates to 06/2018 when he sought medical attention for chest pain.  A CT of the chest was performed showing a left lower lobe mass measuring 7.4 cm.  There were also enlarged left hilar lymph nodes. He underwent PET-CT which showed uptake in this mass and also in L1.  He underwent bronchoscopy and biopsy with pathology showing poorly differentiated adenocarcinoma.  Molecular studies showed EGFR wild-type and he did not harbor any ALK or ROS-1 rearrangements.  PD L1 tumor proportions core was 90%.  He underwent radiation to the spinal lesion in 08/2018 and started on concurrent chemo and radiation with cisplatin and pemetrexed in 08/2018.  He completed treatment on 09/19/2018.   He was only able to receive 2 cycles of chemotherapy secondary to cytopenias.  He was initiated on durvalumab in 10/2018.  He received 3 cycles but then developed lower extremity weakness and was felt that this may be related to cauda equina syndrome related to bone metastasis.  He underwent radiation to his lumbar and sacral spine in 12/2018.

## 2019-03-25 NOTE — ASSESSMENT & PLAN NOTE
Isolated hematuria without symptoms of flank pain, dysuria, frequency.     Will monitor and consider retroperitoneal imagine or nephrology workup for assistance with work up

## 2019-03-25 NOTE — PLAN OF CARE
MDRs completed with Dr. Spencer and the team. Patient of Dr. Infante with a history of metastatic non small cell lung cancer with mets to the spine (s/p cisplatin/pemetrexed and concurrent stereotactic radiation to spinal lesion L4-S3). Patient admitted on 3/25/19 c/o increased SOB, fever at home (101) and increased colored sputum. Patient was on a steroid taper at home due to a recent diagnosis of pneumonitis. Patient diagnosed with COPD exacerbation this admission. VSS. T max 99.5. Patient requiring supplemental oxygen; O2 sats 88-91% on 2L per nasal cannula. No continuous IV infusions. Patient is currently receiving azithromycin  mg daily. Blood cultures x 2 ordered and pending. Respiratory culture ordered and pending. Solumedrol 50 mg IV ordered daily. PT/OT ordered to evaluate and treat. Discharge pending medical stability. CM to continue to follow with the team.    Paola Kasper, RN, BSN, CM  [facility]  Nurse - Med Onc/Gyn Onc  [phone]

## 2019-03-25 NOTE — PLAN OF CARE
Problem: Adult Inpatient Plan of Care  Goal: Plan of Care Review  Outcome: Ongoing (interventions implemented as appropriate)  Patient remains free from falls and injury this shift. Bed in low, locked position with call light in reach. Pt denies pain, n/v/d, VS stable. Maintaining saturations on 2L NC; scheduled breathing treatments initiated. Patient encouraged to call for assistance when needed.  Patient verbalized understanding. All belongings within reach.  Will continue to monitor.

## 2019-03-25 NOTE — ED NOTES
Spoke with Kelly from [facility], authorization # is [id number].  [facility] and Andres provided with [facility] #.

## 2019-03-26 ENCOUNTER — TELEPHONE (OUTPATIENT)
Dept: HEMATOLOGY/ONCOLOGY | Facility: CLINIC | Age: 64
End: 2019-03-26

## 2019-03-26 VITALS
SYSTOLIC BLOOD PRESSURE: 142 MMHG | DIASTOLIC BLOOD PRESSURE: 70 MMHG | TEMPERATURE: 98 F | OXYGEN SATURATION: 98 % | WEIGHT: 170 LBS | HEART RATE: 99 BPM | RESPIRATION RATE: 16 BRPM | BODY MASS INDEX: 23.03 KG/M2 | HEIGHT: 72 IN

## 2019-03-26 LAB
ANION GAP SERPL CALC-SCNC: 14 MMOL/L (ref 8–16)
BASOPHILS # BLD AUTO: 0 K/UL (ref 0–0.2)
BASOPHILS NFR BLD: 0 % (ref 0–1.9)
BUN SERPL-MCNC: 38 MG/DL (ref 8–23)
CALCIUM SERPL-MCNC: 9.1 MG/DL (ref 8.7–10.5)
CHLORIDE SERPL-SCNC: 95 MMOL/L (ref 95–110)
CO2 SERPL-SCNC: 28 MMOL/L (ref 23–29)
CREAT SERPL-MCNC: 1.3 MG/DL (ref 0.5–1.4)
DIFFERENTIAL METHOD: ABNORMAL
EOSINOPHIL # BLD AUTO: 0 K/UL (ref 0–0.5)
EOSINOPHIL NFR BLD: 0 % (ref 0–8)
ERYTHROCYTE [DISTWIDTH] IN BLOOD BY AUTOMATED COUNT: 15.7 % (ref 11.5–14.5)
EST. GFR  (AFRICAN AMERICAN): >60 ML/MIN/1.73 M^2
EST. GFR  (NON AFRICAN AMERICAN): 57.3 ML/MIN/1.73 M^2
GLUCOSE SERPL-MCNC: 157 MG/DL (ref 70–110)
HCT VFR BLD AUTO: 30.1 % (ref 40–54)
HGB BLD-MCNC: 9.4 G/DL (ref 14–18)
IMM GRANULOCYTES # BLD AUTO: 0.04 K/UL (ref 0–0.04)
IMM GRANULOCYTES NFR BLD AUTO: 0.6 % (ref 0–0.5)
LYMPHOCYTES # BLD AUTO: 0.2 K/UL (ref 1–4.8)
LYMPHOCYTES NFR BLD: 2.4 % (ref 18–48)
MCH RBC QN AUTO: 29.5 PG (ref 27–31)
MCHC RBC AUTO-ENTMCNC: 31.2 G/DL (ref 32–36)
MCV RBC AUTO: 94 FL (ref 82–98)
MONOCYTES # BLD AUTO: 0.2 K/UL (ref 0.3–1)
MONOCYTES NFR BLD: 2.6 % (ref 4–15)
NEUTROPHILS # BLD AUTO: 6.2 K/UL (ref 1.8–7.7)
NEUTROPHILS NFR BLD: 94.4 % (ref 38–73)
NRBC BLD-RTO: 0 /100 WBC
PLATELET # BLD AUTO: 100 K/UL (ref 150–350)
PMV BLD AUTO: 9.9 FL (ref 9.2–12.9)
POTASSIUM SERPL-SCNC: 3.8 MMOL/L (ref 3.5–5.1)
RBC # BLD AUTO: 3.19 M/UL (ref 4.6–6.2)
SODIUM SERPL-SCNC: 137 MMOL/L (ref 136–145)
WBC # BLD AUTO: 6.59 K/UL (ref 3.9–12.7)

## 2019-03-26 PROCEDURE — 99239 HOSP IP/OBS DSCHRG MGMT >30: CPT | Mod: GC,,, | Performed by: INTERNAL MEDICINE

## 2019-03-26 PROCEDURE — 80048 BASIC METABOLIC PNL TOTAL CA: CPT

## 2019-03-26 PROCEDURE — 27000221 HC OXYGEN, UP TO 24 HOURS

## 2019-03-26 PROCEDURE — 36415 COLL VENOUS BLD VENIPUNCTURE: CPT

## 2019-03-26 PROCEDURE — 25000242 PHARM REV CODE 250 ALT 637 W/ HCPCS: Performed by: STUDENT IN AN ORGANIZED HEALTH CARE EDUCATION/TRAINING PROGRAM

## 2019-03-26 PROCEDURE — 97165 OT EVAL LOW COMPLEX 30 MIN: CPT

## 2019-03-26 PROCEDURE — 94761 N-INVAS EAR/PLS OXIMETRY MLT: CPT

## 2019-03-26 PROCEDURE — 99239 PR HOSPITAL DISCHARGE DAY,>30 MIN: ICD-10-PCS | Mod: GC,,, | Performed by: INTERNAL MEDICINE

## 2019-03-26 PROCEDURE — 85025 COMPLETE CBC W/AUTO DIFF WBC: CPT

## 2019-03-26 PROCEDURE — G8989 SELF CARE D/C STATUS: HCPCS | Mod: CI

## 2019-03-26 PROCEDURE — 63600175 PHARM REV CODE 636 W HCPCS: Performed by: STUDENT IN AN ORGANIZED HEALTH CARE EDUCATION/TRAINING PROGRAM

## 2019-03-26 PROCEDURE — 25000003 PHARM REV CODE 250: Performed by: STUDENT IN AN ORGANIZED HEALTH CARE EDUCATION/TRAINING PROGRAM

## 2019-03-26 PROCEDURE — 97530 THERAPEUTIC ACTIVITIES: CPT

## 2019-03-26 PROCEDURE — 25000003 PHARM REV CODE 250: Performed by: INTERNAL MEDICINE

## 2019-03-26 PROCEDURE — G8987 SELF CARE CURRENT STATUS: HCPCS | Mod: CI

## 2019-03-26 PROCEDURE — 94640 AIRWAY INHALATION TREATMENT: CPT

## 2019-03-26 PROCEDURE — G8988 SELF CARE GOAL STATUS: HCPCS | Mod: CI

## 2019-03-26 RX ORDER — FAMOTIDINE 20 MG/1
20 TABLET, FILM COATED ORAL 2 TIMES DAILY
Status: DISCONTINUED | OUTPATIENT
Start: 2019-03-26 | End: 2019-03-26 | Stop reason: HOSPADM

## 2019-03-26 RX ORDER — PREDNISONE 20 MG/1
60 TABLET ORAL DAILY
Qty: 30 TABLET | Refills: 0 | Status: ON HOLD | OUTPATIENT
Start: 2019-03-26 | End: 2019-03-29 | Stop reason: HOSPADM

## 2019-03-26 RX ORDER — FAMOTIDINE 20 MG/1
20 TABLET, FILM COATED ORAL 2 TIMES DAILY
Qty: 20 TABLET | Refills: 0 | Status: SHIPPED | OUTPATIENT
Start: 2019-03-26 | End: 2019-04-23

## 2019-03-26 RX ORDER — AZITHROMYCIN 250 MG/1
250 TABLET, FILM COATED ORAL DAILY
Qty: 3 TABLET | Refills: 0 | Status: ON HOLD | OUTPATIENT
Start: 2019-03-28 | End: 2019-03-29 | Stop reason: HOSPADM

## 2019-03-26 RX ADMIN — METHYLPREDNISOLONE SODIUM SUCCINATE 50 MG: 125 INJECTION, POWDER, FOR SOLUTION INTRAMUSCULAR; INTRAVENOUS at 08:03

## 2019-03-26 RX ADMIN — IPRATROPIUM BROMIDE AND ALBUTEROL SULFATE 3 ML: .5; 3 SOLUTION RESPIRATORY (INHALATION) at 07:03

## 2019-03-26 RX ADMIN — CILOSTAZOL 100 MG: 100 TABLET ORAL at 08:03

## 2019-03-26 RX ADMIN — IPRATROPIUM BROMIDE AND ALBUTEROL SULFATE 3 ML: .5; 3 SOLUTION RESPIRATORY (INHALATION) at 01:03

## 2019-03-26 RX ADMIN — METOPROLOL SUCCINATE 50 MG: 50 TABLET, EXTENDED RELEASE ORAL at 08:03

## 2019-03-26 RX ADMIN — AZITHROMYCIN 250 MG: 250 TABLET, FILM COATED ORAL at 08:03

## 2019-03-26 RX ADMIN — FINASTERIDE 5 MG: 5 TABLET, FILM COATED ORAL at 08:03

## 2019-03-26 RX ADMIN — FLUTICASONE FUROATE AND VILANTEROL TRIFENATATE 1 PUFF: 100; 25 POWDER RESPIRATORY (INHALATION) at 08:03

## 2019-03-26 RX ADMIN — FAMOTIDINE 20 MG: 20 TABLET, FILM COATED ORAL at 11:03

## 2019-03-26 RX ADMIN — OXYCODONE HYDROCHLORIDE AND ACETAMINOPHEN 1 TABLET: 10; 325 TABLET ORAL at 11:03

## 2019-03-26 RX ADMIN — ATORVASTATIN CALCIUM 80 MG: 20 TABLET, FILM COATED ORAL at 08:03

## 2019-03-26 RX ADMIN — POLYETHYLENE GLYCOL 3350 17 G: 17 POWDER, FOR SOLUTION ORAL at 08:03

## 2019-03-26 RX ADMIN — ALBUTEROL SULFATE 2 PUFF: 90 AEROSOL, METERED RESPIRATORY (INHALATION) at 08:03

## 2019-03-26 NOTE — PT/OT/SLP EVAL
"Occupational Therapy   Evaluation and Discharge Note    Name: Nimesh Nunn  MRN: [id number]  Admitting Diagnosis:  COPD exacerbation      Recommendations:     Discharge Recommendations: home  Discharge Equipment Recommendations:  none  Barriers to discharge:  None    Assessment:     Nimesh Nunn is a 63 y.o. male with a medical diagnosis of COPD exacerbation. Pt tolerated OT evaluation well requiring Mod I- SPV for all functional activities on this date. Pt displayed no OT concerns on this date. Pt reported ambulation to bathroom for toileting throughout the day. Pt presents with no functional impairments that would affect his ability to complete his self care tasks and functional mobility on this date. No further skilled OT needed in this setting. At this time, patient is functioning at their prior level of function and does not require further acute OT services.     Plan:     During this hospitalization, patient does not require further acute OT services.  Please re-consult if situation changes.    · Plan of Care Reviewed with: patient    Subjective     Chief Complaint: None   Patient/Family Comments/goals: Pt agreeable to OT POC.     Occupational Profile:  Living Environment: Pt lives in a mobile home alone with 4 RADHA with a R handrail. Bathroom set up: Tub shower combination with shower chair   Previous level of function: I in all ADLs/ IADLs including driving; Increased time required for all functional activities  Falls: "I've has a few." Pt reported of a few falls within the home 2/2 weakness from chemo and clumsiness.    Roles and Routines: Homemaker, Loves gardening  Equipment Used at home:  walker, rolling, cane, straight, shower chair  Assistance upon Discharge: Pt will have limited assistance from neighbors upon discharge. No family lives close by.     Pain/Comfort:  · Pain Rating 1: 0/10  · Pain Rating Post-Intervention 1: 0/10    Patients cultural, spiritual, Moravian conflicts given the " current situation: no    Objective:     Communicated with: RN prior to session.  Patient found up in chair with oxygen, peripheral IV(3 L via NC) upon OT entry to room.    General Precautions: Standard, fall   Orthopedic Precautions:N/A   Braces: N/A     Occupational Performance:    Bed Mobility:    · Patient completed Rolling/Turning to Right with modified independence  · Patient completed Scooting/Bridging with modified independence  · Patient completed Supine to Sit with modified independence    Functional Mobility/Transfers:  · Patient completed Sit <> Stand Transfer from bed with modified independence  with  no assistive device   · Patient completed Bed <> Chair Transfer using Step Transfer technique with supervision with no assistive device  · Functional Mobility: Pt ambulated ~10 ft with SPV and no AD within room with no LOBs or RBs required. No shortness of breath observed    Activities of Daily Living:  · Upper Body Dressing: set up assistance to don gown as jacket in sitting   · Lower Body Dressing: pt reported that he hasn't been able to don his BLE socks in years due to multiple back surgeries    Cognitive/Visual Perceptual:  Cognitive/Psychosocial Skills:     -       Oriented to: Person, Place, Time and Situation   -       Follows Commands/attention:Follows multistep  commands  -       Communication: clear/fluent  -       Memory: No Deficits noted  -       Safety awareness/insight to disability: intact   -       Mood/Affect/Coping skills/emotional control: Appropriate to situation  Visual/Perceptual:      -Intact no deficits noted    Physical Exam:  Balance:    -       Intact  Postural examination/scapula alignment:    -       No postural abnormalities identified  Skin integrity: Visible skin intact  Edema:  None noted  Sensation:    -       Intact  Dominant hand:    -       R  Upper Extremity Range of Motion:     -       Right Upper Extremity: WFL  -       Left Upper Extremity: WFL  Upper Extremity  Strength:    -       Right Upper Extremity: WFL 4/5  -       Left Upper Extremity: WFL  4/5   Strength:    -       Right Upper Extremity: WFL  -       Left Upper Extremity: WFL  Fine Motor Coordination:    -       Intact    AMPAC 6 Click ADL:  AMPAC Total Score: 23    Treatment & Education:  - Role of OT/ OT POC  - Self care safety/ independence  - Functional transfer/ mobility safety  - Bed mobility safety  - Pursed lip breathing  - Importance of sitting UIC  - Energy conservation techniques such as pacing, planning, and prioritizing his day  - Safety when returning home during functional activities    - UE HEP handout provided with explanation of its' importance       Education:    Patient left up in chair with all lines intact and call button in reach    GOALS:   Multidisciplinary Problems     Occupational Therapy Goals     Not on file          Multidisciplinary Problems (Resolved)        Problem: Occupational Therapy Goal    Goal Priority Disciplines Outcome Interventions   Occupational Therapy Goal   (Resolved)     OT, PT/OT Outcome(s) achieved                    History:     Past Medical History:   Diagnosis Date    Abdominal aortic aneurysm (AAA) without rupture 3/5/2018    Arthritis     Blood transfusion     COPD (chronic obstructive pulmonary disease)     COPD exacerbation 3/24/2019    Coronary artery disease     Dehydration 8/15/2018    Encounter for blood transfusion     Hyperlipidemia 11/7/2013    Hypertension     Lung cancer metastatic to bone 7/18/2018    Occlusion and stenosis of carotid artery without mention of cerebral infarction 1/12/2014    Pneumonitis 2/13/2019    Primary malignant neoplasm of left lung 7/18/2018    Screening for colon cancer 6/1/2015    Syncope 1/13/2014    Thrombocytopenia 9/19/2018    Weakness of both lower extremities 1/14/2019       Past Surgical History:   Procedure Laterality Date    ARCH & 4 VESSEL STUDY Bilateral 5/27/2014    Performed by Mekhi LUO  MD [facility] at [facility] CATH LAB    BACK SURGERY      BRONCHOSCOPY,FIBEROPTIC N/A 6/26/2018    Performed by Liana Serrano MD at [facility] OR 2ND FLR    carotid endarectomy      CATHETERIZATION, HEART, LEFT Left 11/8/2013    Performed by Mekhi Martin MD at [facility] CATH LAB    COLONOSCOPY N/A 6/1/2015    [facility] at [facility] ENDO    CORONARY ANGIOPLASTY      CORONARY ARTERY BYPASS GRAFT      ESOPHAGOGASTRODUODENOSCOPY (EGD) N/A 8/16/2018    Performed by Yohannes Osuna MD at [facility] ENDO (2ND FLR)    HEART CATH-LEFT N/A 5/27/2014    Performed by Mekhi Martin MD at [facility] CATH LAB    INSERTION, STENT, CORONARY ARTERY N/A 12/20/2013    Performed by Mekhi Martin MD at [facility] CATH LAB    PTA, PERIPHERAL BLOOD VESSEL Left 3/11/2014    Performed by Mekhi Martin MD at [facility] CATH LAB    PTA, PERIPHERAL BLOOD VESSEL N/A 2/18/2014    Performed by Mekhi Martin MD at [facility] CATH LAB    SKIN BIOPSY      ULTRASOUND, ENDOBRONCHIAL N/A 6/26/2018    Performed by Liana Serrano MD at [facility] OR 2ND FLR       Time Tracking:     OT Date of Treatment: 03/26/19  OT Start Time: 0928  OT Stop Time: 0951  OT Total Time (min): 23 min    Billable Minutes:Evaluation 13  Therapeutic Activity 10    Shanae Nassar, OT  3/26/2019

## 2019-03-26 NOTE — PLAN OF CARE
Future Appointments   Date Time Provider Department Center   4/1/2019  8:50 AM LAB, HEMONC CANCER BLDG [facility] LAB HO Yo Méndezlisa   4/1/2019  9:40 AM Demetrius Infante DO, FACP [facility] HEM ONC Ram Brooke   4/2/2019  3:20 PM Mekhi Martin MD [facility] EVA Raines   6/17/2019  1:30 PM Yady Oliveira MD [facility]     Paola Kasper, RN, BSN, CM [facility]  Nurse - Med Onc/Gyn Onc  [phone]

## 2019-03-26 NOTE — PLAN OF CARE
Problem: Adult Inpatient Plan of Care  Goal: Plan of Care Review  Outcome: Ongoing (interventions implemented as appropriate)  Patient remains free from falls and injury this shift. Bed in low, locked position with call bell in reach. Patient encouraged to call for assistance when getting out of bed. Patient verbalized understanding. All belongings within reach. VS stable. Afebrile. Currently on 2L NC sats 92-94%. No acute events overnight. Will continue to monitor.

## 2019-03-26 NOTE — NURSING
Patient is discharged home, all discharge orders given, verbalized understanding of all discharge orders.  Road Test:O:   Has oxygen in use pulse ox 90% 3 liters and 76% at rest on room air. A:  Ambulating without difficulty, D:  Left forearm iv will be disconnected, T:  Last bowel movement 3/26/19, voiding without difficulty, E:  Tolerating a regular diet without difficulty, S:  Self care independent, T:  Discharge teaching done.

## 2019-03-26 NOTE — NURSING
Patient ambulated up and down the hallway without oxygen pulse ox 76% after 6 min walk.  Placed back on oxygen 3 liters 90%.

## 2019-03-26 NOTE — PLAN OF CARE
Problem: Adult Inpatient Plan of Care  Goal: Plan of Care Review  Outcome: Ongoing (interventions implemented as appropriate)  Plan of care reviewed with patient .  FAll precautions maintained, side rails up x2, call light in reach, bed in low position and locked, nonskid socks on.  Patient being discharged home today.  Ciara the  is now handling setting up transport for the patient.  No complaints voiced at this time..

## 2019-03-26 NOTE — SUBJECTIVE & OBJECTIVE
Interval History: NAEON. Intermittent use of oxygen, cough now non-productive.     Oncology Treatment Plan:   OP DURVALUMAB Q2W    Medications:  Continuous Infusions:  Scheduled Meds:   albuterol-ipratropium  3 mL Nebulization Q6H WAKE    atorvastatin  80 mg Oral Daily    azithromycin  250 mg Oral Daily    cilostazol  100 mg Oral BID    finasteride  5 mg Oral Daily    fluticasone-vilanterol  1 puff Inhalation Daily    methylPREDNISolone sodium succinate  50 mg Intravenous Daily    metoprolol succinate  50 mg Oral Daily    polyethylene glycol  17 g Oral Daily     PRN Meds:acetaminophen, albuterol, nitroGLYCERIN, ondansetron, oxyCODONE-acetaminophen, sodium chloride 0.9%, tiZANidine     Review of Systems   Constitutional: Negative for chills, diaphoresis, fatigue and fever.   HENT: Negative.    Eyes: Negative.    Respiratory: Positive for cough and shortness of breath.    Cardiovascular: Negative for chest pain.   Gastrointestinal: Negative.    Endocrine: Negative.    Genitourinary: Negative.    Musculoskeletal: Negative.    Skin: Negative.    Allergic/Immunologic: Negative.    Neurological: Negative.    Psychiatric/Behavioral: Negative.      Objective:     Vital Signs (Most Recent):  Temp: 97.3 °F (36.3 °C) (03/26/19 0815)  Pulse: 91 (03/26/19 0822)  Resp: 18 (03/26/19 0822)  BP: (!) 166/75 (03/26/19 0815)  SpO2: (!) 92 % (03/26/19 0815) Vital Signs (24h Range):  Temp:  [96.8 °F (36 °C)-97.4 °F (36.3 °C)] 97.3 °F (36.3 °C)  Pulse:  [72-94] 91  Resp:  [16-20] 18  SpO2:  [90 %-98 %] 92 %  BP: ()/(56-75) 166/75     Weight: 77.1 kg (169 lb 15.6 oz)  Body mass index is 23.05 kg/m².  Body surface area is 1.98 meters squared.      Intake/Output Summary (Last 24 hours) at 3/26/2019 0857  Last data filed at 3/25/2019 1504  Gross per 24 hour   Intake 720 ml   Output --   Net 720 ml       Physical Exam   Constitutional: He is oriented to person, place, and time. He appears well-developed and well-nourished. No  distress.   HENT:   Head: Normocephalic and atraumatic.   Eyes: Pupils are equal, round, and reactive to light. Conjunctivae are normal.   Cardiovascular: Normal rate, regular rhythm and normal heart sounds.   Pulmonary/Chest: No respiratory distress. He has wheezes.   Decreased breath sounds with rhonchi generalized, with some expiratory wheezing, improved from prior.    Abdominal: Soft. Bowel sounds are normal. There is no tenderness.   Neurological: He is alert and oriented to person, place, and time.   Psychiatric: He has a normal mood and affect. His behavior is normal. Judgment and thought content normal.   Nursing note and vitals reviewed.      Significant Labs:   All pertinent labs from the last 24 hours have been reviewed.    Diagnostic Results:  I have reviewed all pertinent imaging results/findings within the past 24 hours.

## 2019-03-26 NOTE — PROGRESS NOTES
Spoke to Tomasz at [facility]. They were still awaiting authorization from [facility], but he agreed to deliver the patient's oxygen to his room.  Received a call from the patient's nurse on the 8th floor stating that the patient needed transportation home . His vehicle is parked at the [facility] in [facility] and he is hoping to be transported there. Arranged for transportation by wheelchair van through the [facility] -  requested for 7 pm. Confirmed request with the transportation center.  Notified the patient's nurse on the 8th floor.

## 2019-03-26 NOTE — TELEPHONE ENCOUNTER
----- Message from Paola Kasper RN sent at 3/26/2019 12:12 PM CDT -----  Contact: Paola Kasper RN,   Monday is fine. Thank you  ----- Message -----  From: Becki Karimi RN  Sent: 3/26/2019  11:39 AM  To: ADRIANNA Way Dr. is not in clinic on Friday he is out of town the earliest he can come in is Monday.     ----- Message -----  From: Paola Kasper RN  Sent: 3/26/2019   9:08 AM  To: Naresh Bainner    Patient is discharging today. Dr. Spencer asked if we can have the appt scheduled for tomorrow with Dr. Infante (with labs) moved to Friday. Please and thank you

## 2019-03-26 NOTE — PROGRESS NOTES
Patient is in need of home oxygen upon discharge. Informed team that he was 76% on room air. Orders placed for home oxygen. Spoke with Reid from [facility] they have received the order and are processing. I have sent packet that included the medical necessity form to [employer].

## 2019-03-26 NOTE — PLAN OF CARE
Problem: Occupational Therapy Goal  Goal: Occupational Therapy Goal  Outcome: Outcome(s) achieved Date Met: 03/26/19  Pt presents with no functional impairments that would affect his ability to complete his self care tasks and functional mobility on this date. No further skilled OT needed in this setting.       Comments: Shanae Nassar OTR/L

## 2019-03-26 NOTE — DISCHARGE SUMMARY
[facility]  Hematology/Oncology  Discharge Summary      Patient Name: Nmiesh Nunn  MRN: [id number]  Admission Date: 3/25/2019  Hospital Length of Stay: 1 days  Discharge Date and Time:  03/26/2019 12:15 PM  Attending Physician: Radha Spencer MD   Discharging Provider: Roxanne Woods MD  Primary Care Provider: Nishant Perez MD    HPI:   64 yo M with metastatic L sided NSCLC with mets to L spine s/p cisplatin/pemetrexed and concurrent stereotactic radiation to spinal lesion L4-S3 complicated by pneumonitis, as well as Darvulumab. Pt also has HTN, HLD, enlarged prostate, and COPD without home O2. Presenting for 24 hour history of increased SOB, fever to 101F and increased sputum purulence to a light yellow colour. Pt was working on his car when he became acutely short of breath and decided to present to hospital. He has been placed on a steroid taper, from 80mg to 40mg daily currently, due to his pneumonitis. He denies chest pain, nausea, vomiting. Patient also noted a single episode of light red urine, which he has had before when he has a urine and kidney infection. He denies frequency, dysuria, flank pain, or pyuria at this time.         Oncologic History Non-small cell lung cancer, left diagnosed 06/2018  Metastatic disease to bone at presentation    Oncologic Treatment Stereotactic radiation to spinal lesion  Cisplatin/pemetrexed with concurrent radiation 08/08/2018 (completed 09/2018)  Durvalumab 10/2018  XRT L4-S3 30 Gy 12/2018    Pathology Poorly differentiated adenocarcinoma, EGFR wild type, No ALK, ROS-1 rearrangements, PD-L1 TPS 90%     ONCOLOGY HISTORY  His history dates to 06/2018 when he sought medical attention for chest pain.  A CT of the chest was performed showing a left lower lobe mass measuring 7.4 cm.  There were also enlarged left hilar lymph nodes. He underwent PET-CT which showed uptake in this mass and also in L1.  He underwent bronchoscopy and biopsy with  pathology showing poorly differentiated adenocarcinoma.  Molecular studies showed EGFR wild-type and he did not harbor any ALK or ROS-1 rearrangements.  PD L1 tumor proportions core was 90%.  He underwent radiation to the spinal lesion in 08/2018 and started on concurrent chemo and radiation with cisplatin and pemetrexed in 08/2018.  He completed treatment on 09/19/2018.  He was only able to receive 2 cycles of chemotherapy secondary to cytopenias.  He was initiated on durvalumab in 10/2018.  He received 3 cycles but then developed lower extremity weakness and was felt that this may be related to cauda equina syndrome related to bone metastasis.  He underwent radiation to his lumbar and sacral spine in 12/2018.      Discharge with close follow up with Dr. Infante within 1 week and increased prednisone dosing.     * No surgery found *     Hospital Course: Continuing to improve, intermittently using oxygen by NC. Cough now non-productive.     Consults:   Consults (From admission, onward)        Status Ordering Provider     Inpatient consult to Respiratory Care  Once     Provider:  (Not yet assigned)    Acknowledged JORGE ECHEVERRIA          Significant Diagnostic Studies: Labs: All labs within the past 24 hours have been reviewed    Pending Diagnostic Studies:     None        Final Active Diagnoses:    Diagnosis Date Noted POA    PRINCIPAL PROBLEM:  COPD exacerbation [J44.1] 03/24/2019 Yes    Primary malignant neoplasm of left lung [C34.92] 07/18/2018 Yes    HTN (hypertension) [I10] 11/07/2013 Yes    Hyperlipidemia [E78.5] 11/07/2013 Yes    Pneumonitis [J18.9] 02/13/2019 Yes    Asymptomatic microscopic hematuria [R31.21] 03/25/2019 Yes    CKD (chronic kidney disease) [N18.9] 08/15/2018 Yes    Chronic obstructive pulmonary disease [J44.9] 05/16/2016 Yes    Benign non-nodular prostatic hyperplasia without lower urinary tract symptoms [N40.0] 04/15/2016 Yes      Problems Resolved During this Admission:       Discharged Condition: fair    Disposition:     Follow Up:  Follow-up Information     Demetrius Infante DO, FACP.    Specialty:  Hematology and Oncology  Why:  As scheduled by the clinic  Contact information:  [address]  [facility] [address] [phone]                 Patient Instructions:   No discharge procedures on file.  Medications:  Reconciled Home Medications:      Medication List      START taking these medications    azithromycin 250 MG tablet  Commonly known as:  Z-TREY  Take 1 tablet (250 mg total) by mouth once daily.  Start taking on:  3/28/2019     famotidine 20 MG tablet  Commonly known as:  PEPCID  Take 1 tablet (20 mg total) by mouth 2 (two) times daily. for 10 days        CHANGE how you take these medications    predniSONE 20 MG tablet  Commonly known as:  DELTASONE  Take 3 tablets (60 mg total) by mouth once daily. Dr. Infante to taper. for 10 days  What changed:    · medication strength  · how much to take  · how to take this  · when to take this  · additional instructions        CONTINUE taking these medications    aspirin 81 MG EC tablet  Commonly known as:  ECOTRIN  Take 81 mg by mouth once daily.     atorvastatin 80 MG tablet  Commonly known as:  LIPITOR  TAKE 1 TABLET BY MOUTH EVERY DAY     cilostazol 100 MG Tab  Commonly known as:  PLETAL  TAKE 1 TABLET BY MOUTH TWICE DAILY     clopidogrel 75 mg tablet  Commonly known as:  PLAVIX  TAKE 1 TABLET BY MOUTH EVERY DAY     finasteride 5 mg tablet  Commonly known as:  PROSCAR  TAKE 1 TABLET BY MOUTH EVERY DAY     metoprolol succinate 50 MG 24 hr tablet  Commonly known as:  TOPROL-XL  Take 50 mg by mouth.     nitroGLYCERIN 0.4 MG SL tablet  Commonly known as:  NITROSTAT  Place 1 tablet (0.4 mg total) under the tongue every 5 (five) minutes as needed for Chest pain.     ondansetron 8 MG Tbdl  Commonly known as:  ZOFRAN-ODT  Take 1 tablet (8 mg total) by mouth every 12 (twelve) hours as needed.     oxyCODONE-acetaminophen  mg per  tablet  Commonly known as:  PERCOCET  Take 1 tablet by mouth every 4 (four) hours as needed for Pain.     PROAIR HFA 90 mcg/actuation inhaler  Generic drug:  albuterol  Inhale 2 puffs into the lungs as needed.     umeclidinium-vilanterol 62.5-25 mcg/actuation Dsdv  Commonly known as:  ANORO ELLIPTA  Inhale 1 puff into the lungs once daily. Controller        STOP taking these medications    folic acid 400 MCG tablet  Commonly known as:  FOLVITE     tiZANidine 2 MG tablet  Commonly known as:  ZANAFLEX            Roxanne Woods MD  Hematology/Oncology  [facility]

## 2019-03-26 NOTE — PLAN OF CARE
MDRs completed with Dr. Spencer and the team. Plans for patient to discharge home today. MD to order walk test to assess patient's need for home oxygen.     Patient has an appt with Dr. Infante scheduled for tomorrow (3/27/19). Per Dr. Spencer's request  requested that appt be rescheduled for Friday 3/29/19. Per Dr. Infante's clinic- Dr. Infante is out of the office on Friday and the patient is now scheduled for follow-up on Monday 4/1/19. No other discharge needs identified.    Discharge and follow-up instructions to be completed by the bedside nurse.    Future Appointments   Date Time Provider Department Center   3/27/2019  7:50 AM LAB, HEMONC CANCER [facility] LAB HO [facility]   3/27/2019  9:00 AM Demetrius Infante DO, FACP [facility] HEM ONC [facility]   4/2/2019  3:20 PM Mekhi Martin MD [facility] EVA [facility]   6/17/2019  1:30 PM Yady Oliveira MD [facility]        03/26/19 1231   Final Note   Assessment Type Final Discharge Note   Anticipated Discharge Disposition Home   What phone number can be called within the next 1-3 days to see how you are doing after discharge?   ([phone])   Hospital Follow Up  Appt(s) scheduled? Yes   Discharge plans and expectations educations in teach back method with documentation complete? Yes  (per bedside nurse)

## 2019-03-27 ENCOUNTER — PATIENT MESSAGE (OUTPATIENT)
Dept: HEMATOLOGY/ONCOLOGY | Facility: CLINIC | Age: 64
End: 2019-03-27

## 2019-03-27 ENCOUNTER — PATIENT MESSAGE (OUTPATIENT)
Dept: CARDIOLOGY | Facility: CLINIC | Age: 64
End: 2019-03-27

## 2019-03-27 DIAGNOSIS — J44.1 COPD EXACERBATION: Primary | ICD-10-CM

## 2019-03-27 LAB
BACTERIA SPEC AEROBE CULT: NORMAL
BACTERIA SPEC AEROBE CULT: NORMAL
GRAM STN SPEC: NORMAL

## 2019-03-27 NOTE — NURSING
ROAD TEST:    Oxygen: 3L O2 NC     Ambulates: independent    Discontinued: PIV    Tolerating: regular diet    Elimination: voids without difficulty     ADLs: independent    Teaching: An informational handout was provided to the patient which included instructions that addressed activity level, diet, discharge medications, follow-up appointments, weight monitoring and what to do if symptoms worsen.  Discussed all discharge instructions and medication education with the patient. Patient verbalizes understanding of all information given. Patient states he/she has no further questions. Patient leaving with [employer] via van.

## 2019-03-28 ENCOUNTER — HOSPITAL ENCOUNTER (INPATIENT)
Facility: HOSPITAL | Age: 64
LOS: 2 days | Discharge: HOME HEALTH | DRG: 189 | End: 2019-03-30
Attending: EMERGENCY MEDICINE | Admitting: INTERNAL MEDICINE
Payer: MEDICARE

## 2019-03-28 DIAGNOSIS — J44.1 COPD WITH ACUTE EXACERBATION: Primary | ICD-10-CM

## 2019-03-28 DIAGNOSIS — R00.0 TACHYCARDIA: ICD-10-CM

## 2019-03-28 DIAGNOSIS — J44.9 CHRONIC OBSTRUCTIVE PULMONARY DISEASE, UNSPECIFIED COPD TYPE: ICD-10-CM

## 2019-03-28 PROBLEM — J96.01 ACUTE RESPIRATORY FAILURE WITH HYPOXIA: Status: ACTIVE | Noted: 2019-03-28

## 2019-03-28 LAB
ALBUMIN SERPL BCP-MCNC: 2.8 G/DL (ref 3.5–5.2)
ALP SERPL-CCNC: 107 U/L (ref 55–135)
ALT SERPL W/O P-5'-P-CCNC: 28 U/L (ref 10–44)
ANION GAP SERPL CALC-SCNC: 13 MMOL/L (ref 8–16)
AST SERPL-CCNC: 36 U/L (ref 10–40)
BASOPHILS # BLD AUTO: 0 K/UL (ref 0–0.2)
BASOPHILS NFR BLD: 0 % (ref 0–1.9)
BILIRUB SERPL-MCNC: 0.5 MG/DL (ref 0.1–1)
BILIRUB UR QL STRIP: NEGATIVE
BNP SERPL-MCNC: 168 PG/ML (ref 0–99)
BUN SERPL-MCNC: 23 MG/DL (ref 8–23)
CALCIUM SERPL-MCNC: 9.5 MG/DL (ref 8.7–10.5)
CHLORIDE SERPL-SCNC: 96 MMOL/L (ref 95–110)
CLARITY UR REFRACT.AUTO: CLEAR
CO2 SERPL-SCNC: 30 MMOL/L (ref 23–29)
COLOR UR AUTO: ABNORMAL
CREAT SERPL-MCNC: 0.9 MG/DL (ref 0.5–1.4)
DIFFERENTIAL METHOD: ABNORMAL
EOSINOPHIL # BLD AUTO: 0 K/UL (ref 0–0.5)
EOSINOPHIL NFR BLD: 0 % (ref 0–8)
ERYTHROCYTE [DISTWIDTH] IN BLOOD BY AUTOMATED COUNT: 16.1 % (ref 11.5–14.5)
EST. GFR  (AFRICAN AMERICAN): >60 ML/MIN/1.73 M^2
EST. GFR  (NON AFRICAN AMERICAN): >60 ML/MIN/1.73 M^2
GLUCOSE SERPL-MCNC: 96 MG/DL (ref 70–110)
GLUCOSE UR QL STRIP: NEGATIVE
HCT VFR BLD AUTO: 31.9 % (ref 40–54)
HGB BLD-MCNC: 10.2 G/DL (ref 14–18)
HGB UR QL STRIP: ABNORMAL
IMM GRANULOCYTES # BLD AUTO: 0.05 K/UL (ref 0–0.04)
IMM GRANULOCYTES NFR BLD AUTO: 1.3 % (ref 0–0.5)
INFLUENZA A, MOLECULAR: NEGATIVE
INFLUENZA B, MOLECULAR: NEGATIVE
INR PPP: 0.9 (ref 0.8–1.2)
KETONES UR QL STRIP: NEGATIVE
LACTATE SERPL-SCNC: 2.5 MMOL/L (ref 0.5–2.2)
LACTATE SERPL-SCNC: 2.8 MMOL/L (ref 0.5–2.2)
LEUKOCYTE ESTERASE UR QL STRIP: NEGATIVE
LYMPHOCYTES # BLD AUTO: 0.3 K/UL (ref 1–4.8)
LYMPHOCYTES NFR BLD: 8.5 % (ref 18–48)
MCH RBC QN AUTO: 29.9 PG (ref 27–31)
MCHC RBC AUTO-ENTMCNC: 32 G/DL (ref 32–36)
MCV RBC AUTO: 94 FL (ref 82–98)
MICROSCOPIC COMMENT: NORMAL
MONOCYTES # BLD AUTO: 0.2 K/UL (ref 0.3–1)
MONOCYTES NFR BLD: 4.2 % (ref 4–15)
NEUTROPHILS # BLD AUTO: 3.2 K/UL (ref 1.8–7.7)
NEUTROPHILS NFR BLD: 86 % (ref 38–73)
NITRITE UR QL STRIP: NEGATIVE
NRBC BLD-RTO: 0 /100 WBC
PH UR STRIP: 8 [PH] (ref 5–8)
PLATELET # BLD AUTO: 85 K/UL (ref 150–350)
PMV BLD AUTO: 9.7 FL (ref 9.2–12.9)
POTASSIUM SERPL-SCNC: 3.9 MMOL/L (ref 3.5–5.1)
PROCALCITONIN SERPL IA-MCNC: 0.17 NG/ML
PROT SERPL-MCNC: 7.1 G/DL (ref 6–8.4)
PROT UR QL STRIP: NEGATIVE
PROTHROMBIN TIME: 9.8 SEC (ref 9–12.5)
RBC # BLD AUTO: 3.41 M/UL (ref 4.6–6.2)
RBC #/AREA URNS AUTO: 3 /HPF (ref 0–4)
SODIUM SERPL-SCNC: 139 MMOL/L (ref 136–145)
SP GR UR STRIP: 1.01 (ref 1–1.03)
SPECIMEN SOURCE: NORMAL
SQUAMOUS #/AREA URNS AUTO: 0 /HPF
TROPONIN I SERPL DL<=0.01 NG/ML-MCNC: 0.02 NG/ML (ref 0–0.03)
URN SPEC COLLECT METH UR: ABNORMAL
WBC # BLD AUTO: 3.77 K/UL (ref 3.9–12.7)
WBC #/AREA URNS AUTO: 0 /HPF (ref 0–5)

## 2019-03-28 PROCEDURE — 99285 PR EMERGENCY DEPT VISIT,LEVEL V: ICD-10-PCS | Mod: ,,, | Performed by: EMERGENCY MEDICINE

## 2019-03-28 PROCEDURE — 25000003 PHARM REV CODE 250: Performed by: EMERGENCY MEDICINE

## 2019-03-28 PROCEDURE — 25000242 PHARM REV CODE 250 ALT 637 W/ HCPCS: Performed by: STUDENT IN AN ORGANIZED HEALTH CARE EDUCATION/TRAINING PROGRAM

## 2019-03-28 PROCEDURE — 93010 EKG 12-LEAD: ICD-10-PCS | Mod: ,,, | Performed by: INTERNAL MEDICINE

## 2019-03-28 PROCEDURE — 93005 ELECTROCARDIOGRAM TRACING: CPT

## 2019-03-28 PROCEDURE — 81001 URINALYSIS AUTO W/SCOPE: CPT

## 2019-03-28 PROCEDURE — 63600175 PHARM REV CODE 636 W HCPCS: Performed by: EMERGENCY MEDICINE

## 2019-03-28 PROCEDURE — 25000003 PHARM REV CODE 250: Performed by: STUDENT IN AN ORGANIZED HEALTH CARE EDUCATION/TRAINING PROGRAM

## 2019-03-28 PROCEDURE — 84484 ASSAY OF TROPONIN QUANT: CPT

## 2019-03-28 PROCEDURE — 96361 HYDRATE IV INFUSION ADD-ON: CPT

## 2019-03-28 PROCEDURE — 99222 1ST HOSP IP/OBS MODERATE 55: CPT | Mod: AI,GC,, | Performed by: INTERNAL MEDICINE

## 2019-03-28 PROCEDURE — 63600175 PHARM REV CODE 636 W HCPCS: Performed by: STUDENT IN AN ORGANIZED HEALTH CARE EDUCATION/TRAINING PROGRAM

## 2019-03-28 PROCEDURE — 93010 ELECTROCARDIOGRAM REPORT: CPT | Mod: ,,, | Performed by: INTERNAL MEDICINE

## 2019-03-28 PROCEDURE — 99222 PR INITIAL HOSPITAL CARE,LEVL II: ICD-10-PCS | Mod: AI,GC,, | Performed by: INTERNAL MEDICINE

## 2019-03-28 PROCEDURE — 99285 EMERGENCY DEPT VISIT HI MDM: CPT | Mod: 25

## 2019-03-28 PROCEDURE — 96374 THER/PROPH/DIAG INJ IV PUSH: CPT

## 2019-03-28 PROCEDURE — 11000001 HC ACUTE MED/SURG PRIVATE ROOM

## 2019-03-28 PROCEDURE — 27000221 HC OXYGEN, UP TO 24 HOURS

## 2019-03-28 PROCEDURE — 25000242 PHARM REV CODE 250 ALT 637 W/ HCPCS: Performed by: EMERGENCY MEDICINE

## 2019-03-28 PROCEDURE — 83605 ASSAY OF LACTIC ACID: CPT

## 2019-03-28 PROCEDURE — 84145 PROCALCITONIN (PCT): CPT

## 2019-03-28 PROCEDURE — 99285 EMERGENCY DEPT VISIT HI MDM: CPT | Mod: ,,, | Performed by: EMERGENCY MEDICINE

## 2019-03-28 PROCEDURE — 83880 ASSAY OF NATRIURETIC PEPTIDE: CPT

## 2019-03-28 PROCEDURE — 87040 BLOOD CULTURE FOR BACTERIA: CPT

## 2019-03-28 PROCEDURE — 85025 COMPLETE CBC W/AUTO DIFF WBC: CPT

## 2019-03-28 PROCEDURE — 80053 COMPREHEN METABOLIC PANEL: CPT

## 2019-03-28 PROCEDURE — 87502 INFLUENZA DNA AMP PROBE: CPT

## 2019-03-28 PROCEDURE — 94640 AIRWAY INHALATION TREATMENT: CPT

## 2019-03-28 PROCEDURE — 85610 PROTHROMBIN TIME: CPT

## 2019-03-28 PROCEDURE — 94761 N-INVAS EAR/PLS OXIMETRY MLT: CPT

## 2019-03-28 RX ORDER — CILOSTAZOL 100 MG/1
100 TABLET ORAL 2 TIMES DAILY
Status: DISCONTINUED | OUTPATIENT
Start: 2019-03-28 | End: 2019-03-30 | Stop reason: HOSPADM

## 2019-03-28 RX ORDER — SODIUM CHLORIDE 0.9 % (FLUSH) 0.9 %
10 SYRINGE (ML) INJECTION
Status: DISCONTINUED | OUTPATIENT
Start: 2019-03-28 | End: 2019-03-30 | Stop reason: HOSPADM

## 2019-03-28 RX ORDER — CLOPIDOGREL BISULFATE 75 MG/1
75 TABLET ORAL DAILY
Status: DISCONTINUED | OUTPATIENT
Start: 2019-03-28 | End: 2019-03-30 | Stop reason: HOSPADM

## 2019-03-28 RX ORDER — POLYETHYLENE GLYCOL 3350 17 G/17G
17 POWDER, FOR SOLUTION ORAL DAILY
Status: DISCONTINUED | OUTPATIENT
Start: 2019-03-28 | End: 2019-03-30 | Stop reason: HOSPADM

## 2019-03-28 RX ORDER — METHYLPREDNISOLONE SOD SUCC 125 MG
125 VIAL (EA) INJECTION ONCE
Status: COMPLETED | OUTPATIENT
Start: 2019-03-28 | End: 2019-03-28

## 2019-03-28 RX ORDER — IPRATROPIUM BROMIDE AND ALBUTEROL SULFATE 2.5; .5 MG/3ML; MG/3ML
3 SOLUTION RESPIRATORY (INHALATION)
Status: DISCONTINUED | OUTPATIENT
Start: 2019-03-28 | End: 2019-03-30 | Stop reason: HOSPADM

## 2019-03-28 RX ORDER — AZITHROMYCIN 250 MG/1
250 TABLET, FILM COATED ORAL DAILY
Status: COMPLETED | OUTPATIENT
Start: 2019-03-28 | End: 2019-03-29

## 2019-03-28 RX ORDER — ACETAMINOPHEN 325 MG/1
650 TABLET ORAL EVERY 4 HOURS PRN
Status: DISCONTINUED | OUTPATIENT
Start: 2019-03-28 | End: 2019-03-30 | Stop reason: HOSPADM

## 2019-03-28 RX ORDER — FAMOTIDINE 20 MG/1
20 TABLET, FILM COATED ORAL 2 TIMES DAILY
Status: DISCONTINUED | OUTPATIENT
Start: 2019-03-28 | End: 2019-03-30 | Stop reason: HOSPADM

## 2019-03-28 RX ORDER — TIZANIDINE 2 MG/1
2 TABLET ORAL EVERY 8 HOURS PRN
Status: DISCONTINUED | OUTPATIENT
Start: 2019-03-28 | End: 2019-03-30 | Stop reason: HOSPADM

## 2019-03-28 RX ORDER — IPRATROPIUM BROMIDE AND ALBUTEROL SULFATE 2.5; .5 MG/3ML; MG/3ML
3 SOLUTION RESPIRATORY (INHALATION) EVERY 4 HOURS
Status: DISCONTINUED | OUTPATIENT
Start: 2019-03-28 | End: 2019-03-28

## 2019-03-28 RX ORDER — OXYCODONE AND ACETAMINOPHEN 10; 325 MG/1; MG/1
1 TABLET ORAL EVERY 4 HOURS PRN
Status: DISCONTINUED | OUTPATIENT
Start: 2019-03-28 | End: 2019-03-30 | Stop reason: HOSPADM

## 2019-03-28 RX ORDER — METOPROLOL SUCCINATE 50 MG/1
50 TABLET, EXTENDED RELEASE ORAL DAILY
Status: DISCONTINUED | OUTPATIENT
Start: 2019-03-28 | End: 2019-03-30 | Stop reason: HOSPADM

## 2019-03-28 RX ORDER — SODIUM CHLORIDE 0.9 % (FLUSH) 0.9 %
3 SYRINGE (ML) INJECTION
Status: DISCONTINUED | OUTPATIENT
Start: 2019-03-28 | End: 2019-03-30 | Stop reason: HOSPADM

## 2019-03-28 RX ORDER — ONDANSETRON 8 MG/1
8 TABLET, ORALLY DISINTEGRATING ORAL EVERY 12 HOURS PRN
Status: DISCONTINUED | OUTPATIENT
Start: 2019-03-28 | End: 2019-03-30 | Stop reason: HOSPADM

## 2019-03-28 RX ORDER — ALBUTEROL SULFATE 90 UG/1
2 AEROSOL, METERED RESPIRATORY (INHALATION) EVERY 6 HOURS PRN
Status: DISCONTINUED | OUTPATIENT
Start: 2019-03-28 | End: 2019-03-30 | Stop reason: HOSPADM

## 2019-03-28 RX ORDER — FINASTERIDE 5 MG/1
5 TABLET, FILM COATED ORAL DAILY
Status: DISCONTINUED | OUTPATIENT
Start: 2019-03-28 | End: 2019-03-30 | Stop reason: HOSPADM

## 2019-03-28 RX ORDER — ATORVASTATIN CALCIUM 20 MG/1
80 TABLET, FILM COATED ORAL DAILY
Status: DISCONTINUED | OUTPATIENT
Start: 2019-03-28 | End: 2019-03-30 | Stop reason: HOSPADM

## 2019-03-28 RX ORDER — FLUTICASONE FUROATE AND VILANTEROL 100; 25 UG/1; UG/1
1 POWDER RESPIRATORY (INHALATION) DAILY
Status: DISCONTINUED | OUTPATIENT
Start: 2019-03-28 | End: 2019-03-30 | Stop reason: HOSPADM

## 2019-03-28 RX ORDER — PREDNISONE 20 MG/1
60 TABLET ORAL
Status: COMPLETED | OUTPATIENT
Start: 2019-03-28 | End: 2019-03-28

## 2019-03-28 RX ORDER — ASPIRIN 81 MG/1
81 TABLET ORAL DAILY
Status: DISCONTINUED | OUTPATIENT
Start: 2019-03-28 | End: 2019-03-30 | Stop reason: HOSPADM

## 2019-03-28 RX ADMIN — CLOPIDOGREL 75 MG: 75 TABLET, FILM COATED ORAL at 04:03

## 2019-03-28 RX ADMIN — OXYCODONE AND ACETAMINOPHEN 1 TABLET: 10; 325 TABLET ORAL at 07:03

## 2019-03-28 RX ADMIN — ASPIRIN 81 MG: 81 TABLET, COATED ORAL at 04:03

## 2019-03-28 RX ADMIN — FAMOTIDINE 20 MG: 20 TABLET ORAL at 09:03

## 2019-03-28 RX ADMIN — METHYLPREDNISOLONE SODIUM SUCCINATE 125 MG: 125 INJECTION, POWDER, FOR SOLUTION INTRAMUSCULAR; INTRAVENOUS at 04:03

## 2019-03-28 RX ADMIN — CILOSTAZOL 100 MG: 100 TABLET ORAL at 09:03

## 2019-03-28 RX ADMIN — METOPROLOL SUCCINATE 50 MG: 50 TABLET, EXTENDED RELEASE ORAL at 04:03

## 2019-03-28 RX ADMIN — IPRATROPIUM BROMIDE AND ALBUTEROL SULFATE 3 ML: .5; 3 SOLUTION RESPIRATORY (INHALATION) at 02:03

## 2019-03-28 RX ADMIN — FINASTERIDE 5 MG: 5 TABLET, FILM COATED ORAL at 07:03

## 2019-03-28 RX ADMIN — IPRATROPIUM BROMIDE AND ALBUTEROL SULFATE 3 ML: .5; 3 SOLUTION RESPIRATORY (INHALATION) at 10:03

## 2019-03-28 RX ADMIN — AZITHROMYCIN 250 MG: 250 TABLET, FILM COATED ORAL at 04:03

## 2019-03-28 RX ADMIN — PREDNISONE 60 MG: 20 TABLET ORAL at 11:03

## 2019-03-28 RX ADMIN — ATORVASTATIN CALCIUM 80 MG: 20 TABLET, FILM COATED ORAL at 04:03

## 2019-03-28 RX ADMIN — IPRATROPIUM BROMIDE AND ALBUTEROL SULFATE 3 ML: .5; 3 SOLUTION RESPIRATORY (INHALATION) at 09:03

## 2019-03-28 RX ADMIN — Medication 2409 ML: at 09:03

## 2019-03-28 NOTE — SUBJECTIVE & OBJECTIVE
Oncology Treatment Plan:   OP DURVALUMAB Q2W    Medications:  Continuous Infusions:  Scheduled Meds:   albuterol-ipratropium  3 mL Nebulization Q6H WAKE    aspirin  81 mg Oral Daily    atorvastatin  80 mg Oral Daily    azithromycin  250 mg Oral Daily    cilostazol  100 mg Oral BID    clopidogrel  75 mg Oral Daily    famotidine  20 mg Oral BID    finasteride  5 mg Oral Daily    fluticasone-vilanterol  1 puff Inhalation Daily    metoprolol succinate  50 mg Oral Daily    polyethylene glycol  17 g Oral Daily     PRN Meds:acetaminophen, albuterol, ondansetron, oxyCODONE-acetaminophen, sodium chloride 0.9%, sodium chloride 0.9%, tiZANidine     Review of patient's allergies indicates:  No Known Allergies     Past Medical History:   Diagnosis Date    Abdominal aortic aneurysm (AAA) without rupture 3/5/2018    Arthritis     Blood transfusion     COPD (chronic obstructive pulmonary disease)     COPD exacerbation 3/24/2019    Coronary artery disease     Dehydration 8/15/2018    Encounter for blood transfusion     Hyperlipidemia 11/7/2013    Hypertension     Lung cancer metastatic to bone 7/18/2018    Occlusion and stenosis of carotid artery without mention of cerebral infarction 1/12/2014    Pneumonitis 2/13/2019    Primary malignant neoplasm of left lung 7/18/2018    Screening for colon cancer 6/1/2015    Syncope 1/13/2014    Thrombocytopenia 9/19/2018    Weakness of both lower extremities 1/14/2019     Past Surgical History:   Procedure Laterality Date    ARCH & 4 VESSEL STUDY Bilateral 5/27/2014    Performed by [facility] at [facility] CATH LAB    BACK SURGERY      BRONCHOSCOPY,FIBEROPTIC N/A 6/26/2018    Performed by Liana Serrano MD at [facility] OR 2ND FLR    carotid endarectomy      CATHETERIZATION, HEART, LEFT Left 11/8/2013    Performed by [facility] at [facility] CATH LAB    COLONOSCOPY N/A 6/1/2015    Per[facility] by [facility]cel[facility]derr[facility] at [facility] ENDO    CORONARY ANGIOPLASTY       CORONARY ARTERY BYPASS GRAFT      ESOPHAGOGASTRODUODENOSCOPY (EGD) N/A 2018    Performed by [facility]annes Osuna MD at [facility] ENDO (2ND FLR)    HEART CATH-LEFT N/A 2014    Performed by [facility] Martin [facility] at [facility] CATH LAB    INSERTION, STENT, CORONARY ARTERY N/A 2013    Performed by [facility] Martin MD at [facility] CATH LAB    PTA, PERIPHERAL BLOOD VESSEL Left 3/11/2014    Performed by [facility] Martin MD at [facility] CATH LAB    PTA, PERIPHERAL BLOOD VESSEL N/A 2014    Performed by Mekhi Martin MD at [facility] CATH LAB    SKIN BIOPSY      ULTRASOUND, ENDOBRONCHIAL N/A 2018    Performed by Liana Serrano MD at [facility] OR 2ND FLR     Family History     Problem Relation (Age of Onset)    Breast cancer Mother    Cancer Mother    Heart attack Father    Heart disease Father    Heart failure Father    Hypertension Father    No Known Problems Sister, Brother, Maternal Grandmother, Maternal Grandfather, Paternal Grandmother, Paternal Grandfather, Maternal Aunt, Maternal Uncle, Paternal Aunt, Paternal Uncle        Tobacco Use    Smoking status: Former Smoker     Packs/day: 1.50     Years: 40.00     Pack years: 60.00     Last attempt to quit: 10/11/2011     Years since quittin.4    Smokeless tobacco: Never Used   Substance and Sexual Activity    Alcohol use: Yes     Alcohol/week: 1.8 oz     Types: 3 Cans of beer per week     Comment: 3 cans beer every day or every other day    Drug use: Yes     Types: Marijuana    Sexual activity: Not Currently       Review of Systems   Constitutional: Positive for activity change and fatigue. Negative for appetite change, chills, diaphoresis and fever.   HENT: Negative for congestion, sore throat and trouble swallowing.    Eyes: Negative for photophobia, pain and discharge.   Respiratory: Positive for chest tightness, shortness of breath and wheezing. Negative for cough.    Cardiovascular: Negative for chest pain, palpitations and leg swelling.   Gastrointestinal:  Negative for abdominal distention, abdominal pain, constipation, diarrhea, nausea and vomiting.   Genitourinary: Negative for decreased urine volume, difficulty urinating and flank pain.   Musculoskeletal: Negative for arthralgias, back pain, myalgias and neck pain.   Skin: Negative for pallor and rash.   Neurological: Positive for weakness. Negative for light-headedness, numbness and headaches.     Objective:     Vital Signs (Most Recent):  Temp: 97.9 °F (36.6 °C) (03/28/19 0918)  Pulse: 90 (03/28/19 1500)  Resp: 18 (03/28/19 1500)  BP: (!) 180/75 (03/28/19 1454)  SpO2: 96 % (03/28/19 1500) Vital Signs (24h Range):  Temp:  [97.9 °F (36.6 °C)] 97.9 °F (36.6 °C)  Pulse:  [] 90  Resp:  [15-25] 18  SpO2:  [94 %-100 %] 96 %  BP: (160-186)/(75-90) 180/75     Weight: 80.3 kg (177 lb)  Body mass index is 24.01 kg/m².  Body surface area is 2.02 meters squared.      Intake/Output Summary (Last 24 hours) at 3/28/2019 1620  Last data filed at 3/28/2019 1208  Gross per 24 hour   Intake 2409 ml   Output 800 ml   Net 1609 ml       Physical Exam   Constitutional: He is oriented to person, place, and time. He appears well-developed and well-nourished. No distress. Nasal cannula in place.   HENT:   Head: Normocephalic and atraumatic.   Mouth/Throat: Uvula is midline, oropharynx is clear and moist and mucous membranes are normal. Normal dentition.   Eyes: Pupils are equal, round, and reactive to light. Conjunctivae are normal. No scleral icterus.   Neck: No JVD present.   Cardiovascular: Normal rate, regular rhythm, normal heart sounds and normal pulses.   No murmur heard.  Pulmonary/Chest: Effort normal. No respiratory distress. He has wheezes.   Minimal expiratory wheezing bilaterally.    Abdominal: Soft. Normal appearance and bowel sounds are normal. He exhibits no distension. There is no tenderness.   Musculoskeletal:   There is no lower extremity swelling.    Neurological: He is alert and oriented to person, place, and  time. He displays no tremor. No cranial nerve deficit. He exhibits normal muscle tone. He displays no Babinski's sign on the right side. He displays no Babinski's sign on the left side.   Skin: Skin is warm and dry. No bruising and no rash noted.     Significant Labs:   CBC:   Recent Labs   Lab 03/28/19  0956   WBC 3.77*   HGB 10.2*   HCT 31.9*   PLT 85*    and CMP:   Recent Labs   Lab 03/28/19  0956      K 3.9   CL 96   CO2 30*   GLU 96   BUN 23   CREATININE 0.9   CALCIUM 9.5   PROT 7.1   ALBUMIN 2.8*   BILITOT 0.5   ALKPHOS 107   AST 36   ALT 28   ANIONGAP 13   EGFRNONAA >60.0       Diagnostic Results:  I have reviewed all pertinent imaging results/findings within the past 24 hours.

## 2019-03-28 NOTE — ASSESSMENT & PLAN NOTE
-SBP elevated on admission >140 likely from hypoxic stress response plus breathing treatments with steroids.   -No anti-hypertensive listed on home medications.     Plan:   -Continue to monitor.   -For persistent elevations, >180 will consider initiating anti-hypertensive.

## 2019-03-28 NOTE — ED NOTES
Care assumed. Pt sitting up in bed eating lunch. RR 18 even and unlabored. PT provided with a warm blanket and pts belongings placed in belonging bag.

## 2019-03-28 NOTE — ED NOTES
LOC: Patient name and date of birth verified for Nimesh Nunn. The patient is awake, alert and aware of environment with an appropriate affect, the patient is oriented x 3 and speaking appropriately.   APPEARANCE: Patient resting comfortably, patient is clean and well groomed, patient's clothing is properly fastened.  SKIN: The skin is warm and dry, color consistent with ethnicity, patient has normal skin turgor and moist mucus membranes, skin intact, no breakdown or bruising noted.  MUSCULOSKELETAL: Patient moving all extremities well, no obvious swelling or deformities noted.   RESPIRATORY: Respirations are spontaneous, patient is tachypnic, pt on 3L nasal cannula.   CARDIAC: Patient has a normal rate, no periphreal edema noted, capillary refill < 3 seconds.  ABDOMEN: Soft and non tender, no distention noted.   NEUROLOGIC: Eyes open spontaneously, behavior appropriate to situation, follows commands, facial expression symmetrical.

## 2019-03-28 NOTE — ASSESSMENT & PLAN NOTE
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 Now requiring home oxygen.   -Differentials include infection vs COPD exacerbation vs pneumonitis vs PE.   -S/P hospitalization here for similar symptoms suspected to be secondary to COPD exacerbation that were responsive to IV steroids, duo-nebulizers, and AZT. Gram stain noted to be positive for MSSA.   -Presentation notable for tachycardia, tachypnea, and slight metabolic acidosis now requiring up to 4L O2 via NC; exam with minimal wheezing liking from multiple duo-nebulizers prior to examination.   -Cardiac etiology not likely as EKG with sinus tachycardia and negative troponin, BNP.   -Well's score 2.5 on presentation.   -Afebrile. No signs of infection.     Plan:   -Hold Prednisone and order Solumedrol IV with loading dose of 125 mg.   -Continue scheduled duo-nebulizer and inhalation treatments.   -Continue Azithromycin for now.   -Continuous pulse oximetry.   -Titrate down O2 as tolerated.   -Will monitor for continue clinical improvement with IV steroids; if not will consider CTA to rule-out PE.

## 2019-03-28 NOTE — H&P
[facility]  Hematology/Oncology  H&P    Patient Name: Nimesh Nunn  MRN: [id number]  Admission Date: 3/28/2019  Code Status: Full Code   Attending Provider: Gloria Borges MD  Primary Care Physician: Nishant Perez MD  Principal Problem:Acute respiratory failure with hypoxia    Subjective:     HPI: Mr. Nimesh Nunn is a 63 year old male with NSCLC of the left lung presenting with a chief complaint of SOB. He has additional PMH of COPD, CAD S/P stents, HLD, and HTN. Patient noted to have on-going SOB, cough, and fatigue on exertion since 02/2019. He was seen by his oncologist in clinic on 02/13. He was started on Prednisone 80 mg for suspected pneumonitis based on CXR findings with initial improvement. He was recently hospitalized here from 03/25 to 03/26 for acute respiratory failure with hypoxia suspected to be secondary to COPD exacerbation; at that time he presented with one day duration of exertional SOB, fever (Tmax 101), and cough with increased sputum production. While admitted, he received Azithromycin, Solumedrol and scheduled duo-nebulizers with symptom improvement. During that hospitalization he required 3L O2 via NC; discharged with home oxygen and Prednisone 60 mg. After discharge home, he reports again progressively worsening SOB and fatigue with exertion associated with intermittent cough productive of yellow mucus. SOB associated with midline chest tightness and audible wheezing; symptoms relieved by rest. Patient called EMS today after becoming acutely SOB with wheezing upon standing that did not resolve. Denies relief with increasing home O2 and rescue inhaler. Prior to arrival, he received multiple breathing treatments from EMS with symptom resolution. He denies diaphoresis, radiating jaw pain, abdominal pain, decreased PO intake, abdominal pain, and fevers.     Oncology History:   He currently has non-small cell lung carcinoma of the left lung diagnosed in  06/2018 with metastases to spine. Pathology showing poorly differentiated lung adenocarcinoma. He is S/P stereotactic radiation of spinal lesion in 08/2018 and Cisplatin/Pemetrexed in 08/2018. He was initiated on Durvalumab on 10/2018; last dose in 01/2019. He follows with Dr. Infante.     ED course: On arrival, vitals significant for tachycardia (130), tachypnea (24), with SBP elevated to 180. He was stabilized on 4L O2 via NC. Labs notable for pancytopenia. Electrolytes notable for slight metabolic alkalosis (bicarbonate 30). Lactate elevated to 2.5. EKG sinus tachycardia; troponin's negative. CXR suggestive of expected changes with COPD with nicole-hilar congestion. He received 2.4L NS, Prednisone 60 mg PO, and additional duo-nebulizers.      Oncology Treatment Plan:   OP DURVALUMAB Q2W    Medications:  Continuous Infusions:  Scheduled Meds:   albuterol-ipratropium  3 mL Nebulization Q6H WAKE    aspirin  81 mg Oral Daily    atorvastatin  80 mg Oral Daily    azithromycin  250 mg Oral Daily    cilostazol  100 mg Oral BID    clopidogrel  75 mg Oral Daily    famotidine  20 mg Oral BID    finasteride  5 mg Oral Daily    fluticasone-vilanterol  1 puff Inhalation Daily    metoprolol succinate  50 mg Oral Daily    polyethylene glycol  17 g Oral Daily     PRN Meds:acetaminophen, albuterol, ondansetron, oxyCODONE-acetaminophen, sodium chloride 0.9%, sodium chloride 0.9%, tiZANidine     Review of patient's allergies indicates:  No Known Allergies     Past Medical History:   Diagnosis Date    Abdominal aortic aneurysm (AAA) without rupture 3/5/2018    Arthritis     Blood transfusion     COPD (chronic obstructive pulmonary disease)     COPD exacerbation 3/24/2019    Coronary artery disease     Dehydration 8/15/2018    Encounter for blood transfusion     Hyperlipidemia 11/7/2013    Hypertension     Lung cancer metastatic to bone 7/18/2018    Occlusion and stenosis of carotid artery without mention of  cerebral infarction 1/12/2014    Pneumonitis 2/13/2019    Primary malignant neoplasm of left lung 7/18/2018    Screening for colon cancer 6/1/2015    Syncope 1/13/2014    Thrombocytopenia 9/19/2018    Weakness of both lower extremities 1/14/2019     Past Surgical History:   Procedure Laterality Date    ARCH & 4 VESSEL STUDY Bilateral 5/27/2014    Performed by [facility] at [facility] CATH LAB    BACK SURGERY      BRONCHOSCOPY,FIBEROPTIC N/A 6/26/2018    Performed by Liana Serrano MD at [facility] OR 2ND FLR    carotid endarectomy      CATHETERIZATION, HEART, LEFT Left 11/8/2013    Performed by [facility]ekhi Martin [facility] at [facility] CATH LAB    COLONOSCOPY N/A 6/1/2015    Per[facility] at [facility] ENDO    CORONARY ANGIOPLASTY      CORONARY ARTERY BYPASS GRAFT      ESOPHAGOGASTRODUODENOSCOPY (EGD) N/A 8/16/2018    Performed by Yohannes Osuna MD at [facility] ENDO (2ND FLR)    HEART CATH-LEFT N/A 5/27/2014    Performed by Mekhi Martin [facility] at [facility] CATH LAB    INSERTION, STENT, CORONARY ARTERY N/A 12/20/2013    Performed by Mekhi Martin MD at [facility] CATH LAB    PTA, PERIPHERAL BLOOD VESSEL Left 3/11/2014    Performed by Mekhi Martin MD at [facility] CATH LAB    PTA, PERIPHERAL BLOOD VESSEL N/A 2/18/2014    Performed by Mekhi Martin MD at [facility] CATH LAB    SKIN BIOPSY      ULTRASOUND, ENDOBRONCHIAL N/A 6/26/2018    Performed by Liana Serrano MD at [facility] OR 2ND FLR     Family History     Problem Relation (Age of Onset)    Breast cancer Mother    Cancer Mother    Heart attack Father    Heart disease Father    Heart failure Father    Hypertension Father    No Known Problems Sister, Brother, Maternal Grandmother, Maternal Grandfather, Paternal Grandmother, Paternal Grandfather, Maternal Aunt, Maternal Uncle, Paternal Aunt, Paternal Uncle        Tobacco Use    Smoking status: Former Smoker     Packs/day: 1.50     Years: 40.00     Pack years: 60.00     Last attempt to quit: 10/11/2011     Years  since quittin.4    Smokeless tobacco: Never Used   Substance and Sexual Activity    Alcohol use: Yes     Alcohol/week: 1.8 oz     Types: 3 Cans of beer per week     Comment: 3 cans beer every day or every other day    Drug use: Yes     Types: Marijuana    Sexual activity: Not Currently       Review of Systems   Constitutional: Positive for activity change and fatigue. Negative for appetite change, chills, diaphoresis and fever.   HENT: Negative for congestion, sore throat and trouble swallowing.    Eyes: Negative for photophobia, pain and discharge.   Respiratory: Positive for chest tightness, shortness of breath and wheezing. Negative for cough.    Cardiovascular: Negative for chest pain, palpitations and leg swelling.   Gastrointestinal: Negative for abdominal distention, abdominal pain, constipation, diarrhea, nausea and vomiting.   Genitourinary: Negative for decreased urine volume, difficulty urinating and flank pain.   Musculoskeletal: Negative for arthralgias, back pain, myalgias and neck pain.   Skin: Negative for pallor and rash.   Neurological: Positive for weakness. Negative for light-headedness, numbness and headaches.     Objective:     Vital Signs (Most Recent):  Temp: 97.9 °F (36.6 °C) (19 0918)  Pulse: 90 (19 1500)  Resp: 18 (19 1500)  BP: (!) 180/75 (19 1454)  SpO2: 96 % (19 1500) Vital Signs (24h Range):  Temp:  [97.9 °F (36.6 °C)] 97.9 °F (36.6 °C)  Pulse:  [] 90  Resp:  [15-25] 18  SpO2:  [94 %-100 %] 96 %  BP: (160-186)/(75-90) 180/75     Weight: 80.3 kg (177 lb)  Body mass index is 24.01 kg/m².  Body surface area is 2.02 meters squared.      Intake/Output Summary (Last 24 hours) at 3/28/2019 1620  Last data filed at 3/28/2019 1208  Gross per 24 hour   Intake 2409 ml   Output 800 ml   Net 1609 ml       Physical Exam   Constitutional: He is oriented to person, place, and time. He appears well-developed and well-nourished. No distress. Nasal cannula in  place.   HENT:   Head: Normocephalic and atraumatic.   Mouth/Throat: Uvula is midline, oropharynx is clear and moist and mucous membranes are normal. Normal dentition.   Eyes: Pupils are equal, round, and reactive to light. Conjunctivae are normal. No scleral icterus.   Neck: No JVD present.   Cardiovascular: Normal rate, regular rhythm, normal heart sounds and normal pulses.   No murmur heard.  Pulmonary/Chest: Effort normal. No respiratory distress. He has wheezes.   Minimal expiratory wheezing bilaterally.    Abdominal: Soft. Normal appearance and bowel sounds are normal. He exhibits no distension. There is no tenderness.   Musculoskeletal:   There is no lower extremity swelling.    Neurological: He is alert and oriented to person, place, and time. He displays no tremor. No cranial nerve deficit. He exhibits normal muscle tone. He displays no Babinski's sign on the right side. He displays no Babinski's sign on the left side.   Skin: Skin is warm and dry. No bruising and no rash noted.     Significant Labs:   CBC:   Recent Labs   Lab 03/28/19  0956   WBC 3.77*   HGB 10.2*   HCT 31.9*   PLT 85*    and CMP:   Recent Labs   Lab 03/28/19  0956      K 3.9   CL 96   CO2 30*   GLU 96   BUN 23   CREATININE 0.9   CALCIUM 9.5   PROT 7.1   ALBUMIN 2.8*   BILITOT 0.5   ALKPHOS 107   AST 36   ALT 28   ANIONGAP 13   EGFRNONAA >60.0       Diagnostic Results:  I have reviewed all pertinent imaging results/findings within the past 24 hours.    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 Now requiring home oxygen.   -Differentials include infection vs COPD exacerbation vs pneumonitis vs PE.   -S/P hospitalization here for similar symptoms  suspected to be secondary to COPD exacerbation that were responsive to IV steroids, duo-nebulizers, and AZT. Gram stain noted to be positive for MSSA.   -Presentation notable for tachycardia, tachypnea, and slight metabolic acidosis now requiring up to 4L O2 via NC; exam with minimal wheezing liking from multiple duo-nebulizers prior to examination.   -Cardiac etiology not likely as EKG with sinus tachycardia and negative troponin, BNP.   -Well's score 2.5 on presentation.   -Afebrile. No signs of infection.     Plan:   -Hold Prednisone and order Solumedrol IV with loading dose of 125 mg.   -Continue scheduled duo-nebulizer and inhalation treatments.   -Continue Azithromycin for now.   -Continuous pulse oximetry.   -Titrate down O2 as tolerated.   -Will monitor for continue clinical improvement with IV steroids; if not will consider CTA to rule-out PE.       COPD with acute exacerbation  See assessment for acute hypoxic respiratory failure.     Gastric ulcer  Continue home H2 blocker.     CKD (chronic kidney disease)  Baseline Cr around 1.2; stable on admission.     Plan:   -BMP daily.     Antineoplastic chemotherapy induced pancytopenia  Monitoring CBC daily.     Lung cancer metastatic to bone  See assessment for adenocarcinoma.     Adenocarcinoma of left lung, stage 2  -Diagnosed in 06/2018 with metastases to spine. Pathology showing poorly differentiated lung adenocarcinoma.   -He is S/P stereotactic radiation of spinal lesion in 08/2018 and Cisplatin/Pemetrexed in 08/2018. He was initiated on Durvalumab on 10/2018; last dose in 01/2019.   -Follows with Dr. Infante.     Essential hypertension  -SBP elevated on admission >140 likely from hypoxic stress response plus breathing treatments with steroids.   -No anti-hypertensive listed on home medications.     Plan:   -Continue to monitor.   -For persistent elevations, >180 will consider initiating anti-hypertensive.       Coronary artery disease involving native  coronary artery without angina pectoris  S/P stents. Continue ASA, Plavix, Statin, and Metoprolol.     Benign non-nodular prostatic hyperplasia without lower urinary tract symptoms  Continue Finasteride.     PAD (peripheral artery disease)  Continue anti-platelet agents and Statin.     Hyperlipidemia  Continue Statin.         Hnug Barnes MD  Hematology/Oncology  [facility]

## 2019-03-28 NOTE — ASSESSMENT & PLAN NOTE
-Diagnosed in 06/2018 with metastases to spine. Pathology showing poorly differentiated lung adenocarcinoma.   -He is S/P stereotactic radiation of spinal lesion in 08/2018 and Cisplatin/Pemetrexed in 08/2018. He was initiated on Durvalumab on 10/2018; last dose in 01/2019.   -Follows with Dr. Infante.

## 2019-03-28 NOTE — ED NOTES
Pt resting in bed and is aware his bed is clean and is waiting for transport. RR 20 even and unlabored.

## 2019-03-28 NOTE — ED PROVIDER NOTES
Encounter Date: 3/28/2019    SCRIBE #1 NOTE: I, Soni Escobar, am scribing for, and in the presence of,  Dr. Luevano. I have scribed the entire note.       History     Chief Complaint   Patient presents with    Shortness of Breath     since this morning - initial sat 74 on 2L - now 94 on albuterol tx      Time patient was seen by the provider: 9:34 AM      The patient is a 63 y.o. male with co-morbidities including: HTN, CAD, COPD, HLD, primary malignant neoplasm of left lung, lung cancer metastatic to bone dehydration, thrombocytopenia, pneumonitis who presents to the ED via EMS with a complaint of SOB and productive cough that began last night but worsened this morning. He was recently admitted to the hospital overnight on 3/25 for SOB and fever and was improving during his hospitalization. After being discharged and arriving home his symptoms began to worsen again. Pt claims that his current episode of SOB worsens upon exertion and his sx did not improve after using his albuterol inhaler or home oxygen. Reports he has been experiencing after returning home from the hospital: urine frequency (4 times per night) and dysuria; although, denies hematuria. Also complains of a productive cough with yellow sputum that has been thickening. No hemoptysis, CP, or abd pain. Positive for chills, cold sensation, bilateral lower back pain, and pedal edema.      The history is provided by the patient and medical records.     Review of patient's allergies indicates:  No Known Allergies  Past Medical History:   Diagnosis Date    Abdominal aortic aneurysm (AAA) without rupture 3/5/2018    Arthritis     Blood transfusion     COPD (chronic obstructive pulmonary disease)     COPD exacerbation 3/24/2019    Coronary artery disease     Dehydration 8/15/2018    Encounter for blood transfusion     Hyperlipidemia 11/7/2013    Hypertension     Lung cancer metastatic to bone 7/18/2018    Occlusion and stenosis of carotid artery  without mention of cerebral infarction 1/12/2014    Pneumonitis 2/13/2019    Primary malignant neoplasm of left lung 7/18/2018    Screening for colon cancer 6/1/2015    Syncope 1/13/2014    Thrombocytopenia 9/19/2018    Weakness of both lower extremities 1/14/2019     Past Surgical History:   Procedure Laterality Date    ARCH & 4 VESSEL STUDY Bilateral 5/27/2014    Performed by [facility]hi Martin [facility] at [facility] CATH LAB    BACK SURGERY      BRONCHOSCOPY,FIBEROPTIC N/A 6/26/2018    Performed by Liana Serrano MD at [facility] OR 2ND FLR    carotid endarectomy      CATHETERIZATION, HEART, LEFT Left 11/8/2013    Performed by Mekhi Martin MD at [facility] CATH LAB    COLONOSCOPY N/A 6/1/2015    Performed by [facility]derrama [facility] at [facility] ENDO    CORONARY ANGIOPLASTY      CORONARY ARTERY BYPASS GRAFT      ESOPHAGOGASTRODUODENOSCOPY (EGD) N/A 8/16/2018    Performed by Yohannes Osuna MD at [facility] ENDO (2ND FLR)    HEART CATH-LEFT N/A 5/27/2014    Performed by Mekhi Martin MD at [facility] CATH LAB    INSERTION, STENT, CORONARY ARTERY N/A 12/20/2013    Performed by Mekhi Martin MD at [facility] CATH LAB    PTA, PERIPHERAL BLOOD VESSEL Left 3/11/2014    Performed by Mekhi Martin MD at [facility] CATH LAB    PTA, PERIPHERAL BLOOD VESSEL N/A 2/18/2014    Performed by Mekhi Martin MD at [facility] CATH LAB    SKIN BIOPSY      ULTRASOUND, ENDOBRONCHIAL N/A 6/26/2018    Performed by Liana Serrano MD at [facility] OR 2ND FLR     Family History   Problem Relation Age of Onset    Heart disease Father     Hypertension Father     Heart attack Father     Heart failure Father     Cancer Mother     Breast cancer Mother     No Known Problems Sister     No Known Problems Brother     No Known Problems Maternal Grandmother     No Known Problems Maternal Grandfather     No Known Problems Paternal Grandmother     No Known Problems Paternal Grandfather     No Known Problems Maternal Aunt     No Known Problems Maternal Uncle     No  Known Problems Paternal Aunt     No Known Problems Paternal Uncle     Anemia Neg Hx     Arrhythmia Neg Hx     Asthma Neg Hx     Clotting disorder Neg Hx     Fainting Neg Hx     Hyperlipidemia Neg Hx      Social History     Tobacco Use    Smoking status: Former Smoker     Packs/day: 1.50     Years: 40.00     Pack years: 60.00     Last attempt to quit: 10/11/2011     Years since quittin.4    Smokeless tobacco: Never Used   Substance Use Topics    Alcohol use: Yes     Alcohol/week: 1.8 oz     Types: 3 Cans of beer per week     Comment: 3 cans beer every day or every other day    Drug use: Yes     Types: Marijuana     Review of Systems   Constitutional: Positive for chills. Negative for fever.   HENT: Positive for congestion. Negative for sore throat.    Eyes: Negative for redness and visual disturbance.   Respiratory: Positive for cough. Negative for shortness of breath.    Cardiovascular: Positive for leg swelling. Negative for chest pain.   Gastrointestinal: Negative for abdominal pain, diarrhea, nausea and vomiting.   Endocrine: Positive for polyuria. Negative for heat intolerance.   Genitourinary: Positive for dysuria and urgency. Negative for hematuria.   Musculoskeletal: Negative for back pain and joint swelling.   Skin: Negative for rash.   Neurological: Negative for weakness and headaches.       Physical Exam     Initial Vitals   BP Pulse Resp Temp SpO2   19 0918 19 0253 19 0253 19 0918 19 0253   (!) 160/90 103 16 97.9 °F (36.6 °C) 96 %      MAP       --                Physical Exam    Nursing note and vitals reviewed.  Constitutional: He appears well-developed and well-nourished. No distress.   HENT:   Head: Normocephalic and atraumatic.   Right Ear: External ear normal.   Left Ear: External ear normal.   Dry mucous membranes, otherwise nml.    Eyes: Conjunctivae and EOM are normal. Pupils are equal, round, and reactive to light.   Neck: Trachea normal, normal range  of motion and full passive range of motion without pain. Neck supple. No JVD present.   Cardiovascular: Tachycardia present.  Exam reveals no gallop.    No murmur heard.  Pulmonary/Chest: He is in respiratory distress. He has decreased breath sounds. He has wheezes.   Lungs have decreased air movement bilaterally. Course breath sounds on left side. Diffuse inspiratory and expiratory, wheezes bilaterally. Tachypneic and moderate respiratory distress.     Abdominal: Soft. Normal appearance.   Neurological: He is alert and oriented to person, place, and time. He has normal strength. A cranial nerve deficit is present. No sensory deficit.   Skin: Skin is dry and intact.   Psychiatric: He has a normal mood and affect. His behavior is normal. Thought content normal.         ED Course   Procedures  Labs Reviewed   CBC W/ AUTO DIFFERENTIAL - Abnormal; Notable for the following components:       Result Value    WBC 3.77 (*)     RBC 3.41 (*)     Hemoglobin 10.2 (*)     Hematocrit 31.9 (*)     RDW 16.1 (*)     Platelets 85 (*)     Immature Granulocytes 1.3 (*)     Immature Grans (Abs) 0.05 (*)     Lymph # 0.3 (*)     Mono # 0.2 (*)     Gran% 86.0 (*)     Lymph% 8.5 (*)     All other components within normal limits   COMPREHENSIVE METABOLIC PANEL - Abnormal; Notable for the following components:    CO2 30 (*)     Albumin 2.8 (*)     All other components within normal limits   LACTIC ACID, PLASMA - Abnormal; Notable for the following components:    Lactate (Lactic Acid) 2.5 (*)     All other components within normal limits   URINALYSIS, REFLEX TO URINE CULTURE - Abnormal; Notable for the following components:    Occult Blood UA 1+ (*)     All other components within normal limits    Narrative:     Preferred Collection Type->Urine, Clean Catch   B-TYPE NATRIURETIC PEPTIDE - Abnormal; Notable for the following components:     (*)     All other components within normal limits   LACTIC ACID, PLASMA - Abnormal; Notable for the  following components:    Lactate (Lactic Acid) 2.8 (*)     All other components within normal limits   INFLUENZA A & B BY MOLECULAR   CULTURE, BLOOD   CULTURE, BLOOD   INFLUENZA A & B BY MOLECULAR   TROPONIN I   PROCALCITONIN   PROTIME-INR   URINALYSIS MICROSCOPIC    Narrative:     Preferred Collection Type->Urine, Clean Catch     EKG Readings: (Independently Interpreted)   Clinical Impression: with PVCs   Sinus tachycardic, rate 111, PVC, no ST elevation or T-wave inversions. When compared to EKG from March 24th it appears unchanged.       Imaging Results          X-Ray Chest AP Portable (Final result)  Result time 03/28/19 10:32:29    Final result by Jeri Ponce MD (03/28/19 10:32:29)                 Impression:      Right mid lung and left perihilar opacities unchanged.  No pneumothorax or interstitial edema.      Electronically signed by: Jeri Ponce  Date:    03/28/2019  Time:    10:32             Narrative:    EXAMINATION:  XR CHEST AP PORTABLE    CLINICAL HISTORY:  Sepsis;    TECHNIQUE:  Single frontal view of the chest was performed.    COMPARISON:  03/24/2019 is single-view chest.  CT scan 02/11/2019.    FINDINGS:  Heart is normal size.  No pneumothorax or discrete pleural effusion.  Trachea shows no focal narrowing.  No abdominal free air.  No lytic or blastic lesion or acute fracture.    There is moderate confluent left parahilar streaky opacities unchanged, this could be related to infection or post therapy change.  There is mild patchy hazy opacities of the right mid lung.  No lobar consolidation or discrete nodule or cavitary lesion.                              X-Rays:   Independently Interpreted Readings:   Chest X-Ray: Patchy, hazy opacity of the right mid-lung, and perihilar patch opacity on the left. This is unchanged when compared to his CXR from March 24, 2019.      Medical Decision Making:   History:   Old Medical Records: I decided to obtain old medical records.  Old Records  Summarized: other records.       <> Summary of Records: Pt was just admitted to hospital overnight 3/25/19. 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 Has been treated with chemotherapy and radiation complicated by pneumonitis, has COPD and uses home oxygen. He was admitted for SOB and fever. He is currently on a steroid taper for his pneumonitis. He was discharged on a Z-pack and to continue his steroid taper. He had blood cultures done that were negative, a urine culture that was negative, and a sputum culture that grew out moderate Staph aureus.    Initial Assessment:   This is a 63 year old man with PMHx of metastatic lung cancer and COPD. He was recently admitted and discharged on Abx now with worsening cough and SOB. Differential diagnosis includes: PNA, sepsis, acute COPD exacerbation, PTX, PE, Dehydration.  Will give 30 cc/kilo bolus of IV fluids as pt is tachycardic and tachypneic and I am concerned of the possibility of sepsis. I will continue albuterol, Atrovent nebulizer for his SOB, will check CXR, labs, and plan to discuss with heme-onc once results have returned.   Differential Diagnosis:      Independently Interpreted Test(s):   I have ordered and independently interpreted X-rays - see prior notes.  I have ordered and independently interpreted EKG Reading(s) - see prior notes  Clinical Tests:   Lab Tests: Ordered and Reviewed  Radiological Study: Ordered and Reviewed  Medical Tests: Ordered and Reviewed  Other:   I have discussed this case with another health care provider.       <> Summary of the Discussion: 11:02 AM: Heme-onc            Scribe Attestation:   Scribe #1: I performed the above scribed service and the documentation accurately describes the services I performed. I attest to the accuracy of the note.    Attending Attestation:         Attending Critical Care:   Critical Care Times:   Direct Patient Care (initial evaluation, reassessments,  and time considering the case)................................................................15 minutes.   Additional History from reviewing old medical records or taking additional history from the family, EMS, PCP, etc.......................5 minutes.   Ordering, Reviewing, and Interpreting Diagnostic Studies...............................................................................................................5 minutes.   Documentation..................................................................................................................................................................................5 minutes.   Consultation with other Physicians. .................................................................................................................................................5 minutes.   ==============================================================  · Total Critical Care Time - exclusive of procedural time: 35 minutes.  ==============================================================  Critical Care Condition: potentially life-threatening   Critical Care Comments: Critical care time required in this patient who presented with respiratory distress and tachypnea.  He was significantly hypoxic on scene and had the potential for deterioration in his condition at any time.     Physician Attestation for Scribe:      Comments: I, Dr. Gloria Borges, personally performed the services described in this documentation. All medical record entries made by the scribe were at my direction and in my presence.  I have reviewed the chart and agree that the record reflects my personal performance and is accurate and complete. Gloria Borges MD.  11:18 AM 03/28/2019      Attending ED Notes:   10:46 AM: Reassessment--Pt reports he is feeling somewhat improved, his pulse ox is now 99% on 5L. He is dyspneic and his HR is down to 112 receiving IV fluids. Will give his daily dose of prednisone,  which according to his taper is 60 mg.       Discussed with Heme-Onc fellow the pertinence of Staph aureus in his sputum culture from prior admission.  It is uncertain whether this represents true infection versus contamination.  He will discuss with his team whether not this warrants further antibiotic treatment above the azithromycin that the patient is already taking.  I will defer treatment to the admitting team.  Of note patient's lactic acid returned elevated 2.5.  A 2nd lactic acid will be sent to trend his lactic acid.      Of note 2nd lactic acid returned after this patient was admitted to the hospital.             Clinical Impression:       ICD-10-CM ICD-9-CM   1. COPD with acute exacerbation J44.1 491.21   2. Tachycardia R00.0 785.0         Disposition:   Disposition: Admitted  Condition: Stable                        Gloria Borges MD  03/28/19 [id number]

## 2019-03-28 NOTE — HPI
Mr. Nimesh Nunn is a 63 year old male with NSCLC of the left lung presenting with a chief complaint of SOB. He has additional PMH of COPD, CAD S/P stents, HLD, and HTN. Patient noted to have on-going SOB, cough, and fatigue on exertion since 02/2019. He was seen by his oncologist in clinic on 02/13. He was started on Prednisone 80 mg for suspected pneumonitis based on CXR findings with initial improvement. He was recently hospitalized here from 03/25 to 03/26 for acute respiratory failure with hypoxia suspected to be secondary to COPD exacerbation; at that time he presented with one day duration of exertional SOB, fever (Tmax 101), and cough with increased sputum production. While admitted, he received Azithromycin, Solumedrol and scheduled duo-nebulizers with symptom improvement. During that hospitalization he required 3L O2 via NC; discharged with home oxygen and Prednisone 60 mg. After discharge home, he reports again progressively worsening SOB and fatigue with exertion associated with intermittent cough productive of yellow mucus. SOB associated with midline chest tightness and audible wheezing; symptoms relieved by rest. Patient called EMS today after becoming acutely SOB with wheezing upon standing that did not resolve. Denies relief with increasing home O2 and rescue inhaler. Prior to arrival, he received multiple breathing treatments from EMS with symptom resolution. He denies diaphoresis, radiating jaw pain, abdominal pain, decreased PO intake, abdominal pain, and fevers.     Oncology History:   He currently has non-small cell lung carcinoma of the left lung diagnosed in 06/2018 with metastases to spine. Pathology showing poorly differentiated lung adenocarcinoma. He is S/P stereotactic radiation of spinal lesion in 08/2018 and Cisplatin/Pemetrexed in 08/2018. He was initiated on Durvalumab on 10/2018; last dose in 01/2019. He follows with Dr. Infante.     ED course: On arrival, vitals significant for  tachycardia (130), tachypnea (24), with SBP elevated to 180. He was stabilized on 4L O2 via NC. Labs notable for pancytopenia. Electrolytes notable for slight metabolic alkalosis (bicarbonate 30). Lactate elevated to 2.5. EKG sinus tachycardia; troponin's negative. CXR suggestive of expected changes with COPD with nicole-hilar congestion. He received 2.4L NS, Prednisone 60 mg PO, and additional duo-nebulizers.

## 2019-03-28 NOTE — ED NOTES
Charge nurse and house supervisor made aware of patients agitation with being in the hallway while waiting on a  Hospital bed.

## 2019-03-28 NOTE — ED NOTES
Pt notified that dinner tray will get here at 1615 pt very agitated. Pt offered turkey sandwich, and two orange juices to hold him over.  Pt also provided with two blankets for comfort. Pt still very agitated that he was pulled to the hallway.  Will get nursing supervisor to come speak with patient.

## 2019-03-29 LAB
ANION GAP SERPL CALC-SCNC: 13 MMOL/L (ref 8–16)
BASOPHILS # BLD AUTO: 0 K/UL (ref 0–0.2)
BASOPHILS NFR BLD: 0 % (ref 0–1.9)
BUN SERPL-MCNC: 24 MG/DL (ref 8–23)
CALCIUM SERPL-MCNC: 8.5 MG/DL (ref 8.7–10.5)
CHLORIDE SERPL-SCNC: 96 MMOL/L (ref 95–110)
CO2 SERPL-SCNC: 28 MMOL/L (ref 23–29)
CREAT SERPL-MCNC: 0.8 MG/DL (ref 0.5–1.4)
DIFFERENTIAL METHOD: ABNORMAL
EOSINOPHIL # BLD AUTO: 0 K/UL (ref 0–0.5)
EOSINOPHIL NFR BLD: 0 % (ref 0–8)
ERYTHROCYTE [DISTWIDTH] IN BLOOD BY AUTOMATED COUNT: 15.9 % (ref 11.5–14.5)
EST. GFR  (AFRICAN AMERICAN): >60 ML/MIN/1.73 M^2
EST. GFR  (NON AFRICAN AMERICAN): >60 ML/MIN/1.73 M^2
GLUCOSE SERPL-MCNC: 110 MG/DL (ref 70–110)
HCT VFR BLD AUTO: 28.9 % (ref 40–54)
HGB BLD-MCNC: 9.3 G/DL (ref 14–18)
IMM GRANULOCYTES # BLD AUTO: 0.07 K/UL (ref 0–0.04)
IMM GRANULOCYTES NFR BLD AUTO: 2 % (ref 0–0.5)
LYMPHOCYTES # BLD AUTO: 0.1 K/UL (ref 1–4.8)
LYMPHOCYTES NFR BLD: 3.5 % (ref 18–48)
MCH RBC QN AUTO: 29.8 PG (ref 27–31)
MCHC RBC AUTO-ENTMCNC: 32.2 G/DL (ref 32–36)
MCV RBC AUTO: 93 FL (ref 82–98)
MONOCYTES # BLD AUTO: 0.2 K/UL (ref 0.3–1)
MONOCYTES NFR BLD: 5.5 % (ref 4–15)
NEUTROPHILS # BLD AUTO: 3.1 K/UL (ref 1.8–7.7)
NEUTROPHILS NFR BLD: 89 % (ref 38–73)
NRBC BLD-RTO: 0 /100 WBC
PLATELET # BLD AUTO: 95 K/UL (ref 150–350)
PMV BLD AUTO: 10.1 FL (ref 9.2–12.9)
POTASSIUM SERPL-SCNC: 3.9 MMOL/L (ref 3.5–5.1)
RBC # BLD AUTO: 3.12 M/UL (ref 4.6–6.2)
SODIUM SERPL-SCNC: 137 MMOL/L (ref 136–145)
WBC # BLD AUTO: 3.47 K/UL (ref 3.9–12.7)

## 2019-03-29 PROCEDURE — 99233 SBSQ HOSP IP/OBS HIGH 50: CPT | Mod: GC,,, | Performed by: INTERNAL MEDICINE

## 2019-03-29 PROCEDURE — 27000221 HC OXYGEN, UP TO 24 HOURS

## 2019-03-29 PROCEDURE — 80048 BASIC METABOLIC PNL TOTAL CA: CPT

## 2019-03-29 PROCEDURE — 11000001 HC ACUTE MED/SURG PRIVATE ROOM

## 2019-03-29 PROCEDURE — 36415 COLL VENOUS BLD VENIPUNCTURE: CPT

## 2019-03-29 PROCEDURE — 25000242 PHARM REV CODE 250 ALT 637 W/ HCPCS: Performed by: STUDENT IN AN ORGANIZED HEALTH CARE EDUCATION/TRAINING PROGRAM

## 2019-03-29 PROCEDURE — 85025 COMPLETE CBC W/AUTO DIFF WBC: CPT

## 2019-03-29 PROCEDURE — 63600175 PHARM REV CODE 636 W HCPCS: Performed by: STUDENT IN AN ORGANIZED HEALTH CARE EDUCATION/TRAINING PROGRAM

## 2019-03-29 PROCEDURE — 94761 N-INVAS EAR/PLS OXIMETRY MLT: CPT

## 2019-03-29 PROCEDURE — 94640 AIRWAY INHALATION TREATMENT: CPT

## 2019-03-29 PROCEDURE — 25000003 PHARM REV CODE 250: Performed by: STUDENT IN AN ORGANIZED HEALTH CARE EDUCATION/TRAINING PROGRAM

## 2019-03-29 PROCEDURE — 99233 PR SUBSEQUENT HOSPITAL CARE,LEVL III: ICD-10-PCS | Mod: GC,,, | Performed by: INTERNAL MEDICINE

## 2019-03-29 RX ORDER — MORPHINE SULFATE 15 MG/1
15 TABLET, FILM COATED, EXTENDED RELEASE ORAL EVERY 12 HOURS
Qty: 60 TABLET | Refills: 0 | Status: CANCELLED | OUTPATIENT
Start: 2019-03-29

## 2019-03-29 RX ORDER — MORPHINE SULFATE 15 MG/1
15 TABLET, FILM COATED, EXTENDED RELEASE ORAL EVERY 12 HOURS
Status: DISCONTINUED | OUTPATIENT
Start: 2019-03-29 | End: 2019-03-30 | Stop reason: HOSPADM

## 2019-03-29 RX ORDER — DOXYCYCLINE HYCLATE 100 MG
100 TABLET ORAL EVERY 12 HOURS
Status: DISCONTINUED | OUTPATIENT
Start: 2019-03-29 | End: 2019-03-30 | Stop reason: HOSPADM

## 2019-03-29 RX ORDER — DOXYCYCLINE HYCLATE 100 MG
100 TABLET ORAL EVERY 12 HOURS
Qty: 12 TABLET | Refills: 0 | Status: SHIPPED | OUTPATIENT
Start: 2019-03-29 | End: 2019-04-05

## 2019-03-29 RX ORDER — MORPHINE SULFATE 15 MG/1
15 TABLET, FILM COATED, EXTENDED RELEASE ORAL EVERY 12 HOURS
Qty: 60 TABLET | Refills: 0 | Status: SHIPPED | OUTPATIENT
Start: 2019-03-29 | End: 2019-04-04

## 2019-03-29 RX ORDER — PREDNISONE 10 MG/1
70 TABLET ORAL DAILY
Qty: 35 TABLET | Refills: 0 | Status: SHIPPED | OUTPATIENT
Start: 2019-03-29 | End: 2019-04-04

## 2019-03-29 RX ADMIN — DOXYCYCLINE HYCLATE 100 MG: 100 TABLET, COATED ORAL at 08:03

## 2019-03-29 RX ADMIN — OXYCODONE AND ACETAMINOPHEN 1 TABLET: 10; 325 TABLET ORAL at 01:03

## 2019-03-29 RX ADMIN — CILOSTAZOL 100 MG: 100 TABLET ORAL at 09:03

## 2019-03-29 RX ADMIN — IPRATROPIUM BROMIDE AND ALBUTEROL SULFATE 3 ML: .5; 3 SOLUTION RESPIRATORY (INHALATION) at 07:03

## 2019-03-29 RX ADMIN — FINASTERIDE 5 MG: 5 TABLET, FILM COATED ORAL at 09:03

## 2019-03-29 RX ADMIN — FLUTICASONE FUROATE AND VILANTEROL TRIFENATATE 1 PUFF: 100; 25 POWDER RESPIRATORY (INHALATION) at 09:03

## 2019-03-29 RX ADMIN — FAMOTIDINE 20 MG: 20 TABLET ORAL at 09:03

## 2019-03-29 RX ADMIN — CLOPIDOGREL 75 MG: 75 TABLET, FILM COATED ORAL at 09:03

## 2019-03-29 RX ADMIN — AZITHROMYCIN 250 MG: 250 TABLET, FILM COATED ORAL at 09:03

## 2019-03-29 RX ADMIN — DOXYCYCLINE HYCLATE 100 MG: 100 TABLET, COATED ORAL at 09:03

## 2019-03-29 RX ADMIN — ATORVASTATIN CALCIUM 80 MG: 20 TABLET, FILM COATED ORAL at 09:03

## 2019-03-29 RX ADMIN — ASPIRIN 81 MG: 81 TABLET, COATED ORAL at 09:03

## 2019-03-29 RX ADMIN — METOPROLOL SUCCINATE 50 MG: 50 TABLET, EXTENDED RELEASE ORAL at 09:03

## 2019-03-29 RX ADMIN — FAMOTIDINE 20 MG: 20 TABLET ORAL at 08:03

## 2019-03-29 RX ADMIN — MORPHINE SULFATE 15 MG: 15 TABLET, EXTENDED RELEASE ORAL at 08:03

## 2019-03-29 RX ADMIN — MORPHINE SULFATE 15 MG: 15 TABLET, EXTENDED RELEASE ORAL at 01:03

## 2019-03-29 RX ADMIN — PREDNISONE 70 MG: 20 TABLET ORAL at 01:03

## 2019-03-29 RX ADMIN — CILOSTAZOL 100 MG: 100 TABLET ORAL at 08:03

## 2019-03-29 RX ADMIN — IPRATROPIUM BROMIDE AND ALBUTEROL SULFATE 3 ML: .5; 3 SOLUTION RESPIRATORY (INHALATION) at 02:03

## 2019-03-29 RX ADMIN — OXYCODONE AND ACETAMINOPHEN 1 TABLET: 10; 325 TABLET ORAL at 09:03

## 2019-03-29 NOTE — PROGRESS NOTES
During hospital rounds patient stated that he could not afford one of his inhalers as well as pain medications. Prescription sent to the pharmacy for so that we could assist patient. Patient with anticipated discharge on 3/30. Received call back and patient's prescription will have a copay of $10. His inhaler cannot be filled until 4/1.I instructed him to contact me about this on Monday after his appointment if his copay was still too high for assistance.      Patient also relayed to me that the portable tank that he was sent home with is empty and that he does not have tank to return home with. He also stated he did not receive the home concentrator. I have notified [facility] about this and they are currently working on a plan for the concentrator and tank.       Patient also will need transportation home. He will need it to [address] [place] [address]. I will notify the on call  of this need so that she may set it up upon discharge.

## 2019-03-29 NOTE — HOSPITAL COURSE
Pt continues to report some SOB (although saturating well on nasal cannula) and no change in cough, continuing to produce some light yellow sputum. Patient reporting moderate improvement of cough and SOB, not resolved. Understanding this baseline SOB may exist. Patient did not have O2 on discharge due to empty tank, SW has worked on this and he has follow up with Dr. Infante on 4/1. Patient to be discharged with  aide to help with medication and O2 management.

## 2019-03-29 NOTE — PLAN OF CARE
[facility]  HOME HEALTH ORDERS  FACE TO FACE ENCOUNTER    Patient Name: Nimesh Nunn  YOB: 1955    PCP: Nishant Perez MD   PCP Address: [address] [place], [place] [address]ALAN Aguilar / ELIAS BERUMEN [address]  PCP Phone Number: [phone]  PCP Fax: [phone]    Encounter Date: 03/29/2019    Admit to Home Health    Diagnoses:  Active Hospital Problems    Diagnosis  POA    *Acute respiratory failure with hypoxia [J96.01]  Yes     Priority: 1 - High    Hyperlipidemia [E78.5]  Yes     Priority: 10     COPD with acute exacerbation [J44.1]  Yes    Gastric ulcer [K25.9]  Yes    CKD (chronic kidney disease) [N18.9]  Yes    Antineoplastic chemotherapy induced pancytopenia [D61.810, T45.1X5A]  Yes    Lung cancer metastatic to bone [C34.90, C79.51]  Yes    Adenocarcinoma of left lung, stage 2 [C34.92]  Yes    Coronary artery disease involving native coronary artery without angina pectoris [I25.10]  Yes    Essential hypertension [I10]  Yes    Benign non-nodular prostatic hyperplasia without lower urinary tract symptoms [N40.0]  Yes    PAD (peripheral artery disease) [I73.9]  Yes      Resolved Hospital Problems   No resolved problems to display.       Future Appointments   Date Time Provider Department Center   4/1/2019  8:50 AM LAB, HEMONC CANCER DG [facility] LAB HO [facility] [place]   4/1/2019  9:40 AM Demetrius Infante DO, FACP [facility] HEM ONC [facility] [place]   4/2/2019  3:20 PM Mekhi Martin MD [facility] EVA [place]   6/17/2019  1:30 PM Yady Oliveira MD [facility]     Follow-up Information     Demetrius Infante DO, FACP.    Specialty:  Hematology and Oncology  Why:  As scheduled by the clinic  Contact information:  [address]  [facility] [address] [phone]                     I have seen and examined this patient face to face today. My clinical findings that support the need for the home health skilled services and home bound status are the  following:  Weakness/numbness causing balance and gait disturbance due to Malignancy/Cancer making it taxing to leave home.  Requiring assistive device to leave home due to unsteady gait caused by  Malignancy/Cancer.  Patient with medication mismanagement issues requiring home bound status as evidenced by  Unstable vital signs (blood pressure, heart rate) and Poor understanding of medication regimen/dosage.    Allergies:Review of patient's allergies indicates:  No Known Allergies    Diet: regular diet    Activities: activity as tolerated    Nursing:   SN to complete comprehensive assessment including routine vital signs. Instruct on disease process and s/s of complications to report to MD. Review/verify medication list sent home with the patient at time of discharge  and instruct patient/caregiver as needed. Frequency may be adjusted depending on start of care date.    Notify MD if SBP > 160 or < 90; DBP > 90 or < 50; HR > 120 or < 50; Temp > 101      CONSULTS:    Physical Therapy to evaluate and treat. Evaluate for home safety and equipment needs; Establish/upgrade home exercise program. Perform / instruct on therapeutic exercises, gait training, transfer training, and Range of Motion.  Occupational Therapy to evaluate and treat. Evaluate home environment for safety and equipment needs. Perform/Instruct on transfers, ADL training, ROM, and therapeutic exercises.  Aide to provide assistance with personal care, ADLs, and vital signs.    MISCELLANEOUS CARE:  Home Oxygen:  Oxygen at 3 L/min nasal canula to be used:  Continuously.  COPD: Teach patient MDI/HFA usage and guidelines  Please provide smoking education, cessation, and nicotine replacement options if patient is a current smoker or if anyone in the household is a smoker  Please ensure that patient has a pulse oximeter and is educated on his normal oxygen saturations: 88-92%    WOUND CARE ORDERS  n/a      Medications: Review discharge medications with patient  and family and provide education.      Current Discharge Medication List      START taking these medications    Details   doxycycline (VIBRA-TABS) 100 MG tablet Take 1 tablet (100 mg total) by mouth every 12 (twelve) hours. for 6 days  Qty: 12 tablet, Refills: 0      morphine (MS CONTIN) 15 MG 12 hr tablet Take 1 tablet (15 mg total) by mouth every 12 (twelve) hours.  Qty: 60 tablet, Refills: 0         CONTINUE these medications which have CHANGED    Details   predniSONE (DELTASONE) 10 MG tablet Take 7 tablets (70 mg total) by mouth once daily. Dr. Infante to taper. for 5 days  Qty: 35 tablet, Refills: 0      umeclidinium-vilanterol (ANORO ELLIPTA) 62.5-25 mcg/actuation DsDv Inhale 1 puff into the lungs once daily. Controller  Qty: 60 each, Refills: 5    Associated Diagnoses: Chronic obstructive pulmonary disease, unspecified COPD type         CONTINUE these medications which have NOT CHANGED    Details   aspirin (ECOTRIN) 81 MG EC tablet Take 81 mg by mouth once daily.      atorvastatin (LIPITOR) 80 MG tablet TAKE 1 TABLET BY MOUTH EVERY DAY  Qty: 30 tablet, Refills: 11    Comments: Generic For:LIPITOR   80MG  07/12/2018 11:08:49 AM  Associated Diagnoses: Dyslipidemia      cilostazol (PLETAL) 100 MG Tab TAKE 1 TABLET BY MOUTH TWICE DAILY  Qty: 60 tablet, Refills: 11    Comments: Generic For:PLETAL    100MG  03/23/2018 12:02:45 PM  Associated Diagnoses: PVD (peripheral vascular disease)      clopidogrel (PLAVIX) 75 mg tablet TAKE 1 TABLET BY MOUTH EVERY DAY  Qty: 90 tablet, Refills: 4    Comments: Generic For:*PLAVIX    75MG 07/02/2018 10:09:50 AM  Associated Diagnoses: Coronary artery disease involving native coronary artery without angina pectoris, unspecified whether native or transplanted heart; PAD (peripheral artery disease); Right-sided carotid artery disease      famotidine (PEPCID) 20 MG tablet Take 1 tablet (20 mg total) by mouth 2 (two) times daily. for 10 days  Qty: 20 tablet, Refills: 0      finasteride  (PROSCAR) 5 mg tablet TAKE 1 TABLET BY MOUTH EVERY DAY  Qty: 90 tablet, Refills: 3    Comments: Generic For:PROSCAR   5MG  12/20/2018 3:17:34 PM  Associated Diagnoses: Benign non-nodular prostatic hyperplasia without lower urinary tract symptoms      metoprolol succinate (TOPROL-XL) 50 MG 24 hr tablet Take 50 mg by mouth.      PROAIR HFA 90 mcg/actuation inhaler Inhale 2 puffs into the lungs as needed.  Qty: 18 g, Refills: 6    Associated Diagnoses: Chronic obstructive pulmonary disease, unspecified COPD type      nitroGLYCERIN (NITROSTAT) 0.4 MG SL tablet Place 1 tablet (0.4 mg total) under the tongue every 5 (five) minutes as needed for Chest pain.  Qty: 90 tablet, Refills: 1      ondansetron (ZOFRAN-ODT) 8 MG TbDL Take 1 tablet (8 mg total) by mouth every 12 (twelve) hours as needed.  Qty: 30 tablet, Refills: 1    Associated Diagnoses: Primary malignant neoplasm of left lung      oxyCODONE-acetaminophen (PERCOCET)  mg per tablet Take 1 tablet by mouth every 4 (four) hours as needed for Pain.  Qty: 90 tablet, Refills: 0    Associated Diagnoses: Neoplasm related pain         STOP taking these medications       azithromycin (Z-TREY) 250 MG tablet Comments:   Reason for Stopping:               I certify that this patient is confined to his home and needs intermittent skilled nursing care, physical therapy and occupational therapy.

## 2019-03-29 NOTE — SUBJECTIVE & OBJECTIVE
Interval History: NAEON. Continues to report some SOB and productive cough unchanged.     Oncology Treatment Plan:   OP DURVALUMAB Q2W    Medications:  Continuous Infusions:  Scheduled Meds:   albuterol-ipratropium  3 mL Nebulization Q6H WAKE    aspirin  81 mg Oral Daily    atorvastatin  80 mg Oral Daily    azithromycin  250 mg Oral Daily    cilostazol  100 mg Oral BID    clopidogrel  75 mg Oral Daily    doxycycline  100 mg Oral Q12H    famotidine  20 mg Oral BID    finasteride  5 mg Oral Daily    fluticasone-vilanterol  1 puff Inhalation Daily    metoprolol succinate  50 mg Oral Daily    polyethylene glycol  17 g Oral Daily     PRN Meds:acetaminophen, albuterol, ondansetron, oxyCODONE-acetaminophen, sodium chloride 0.9%, sodium chloride 0.9%, tiZANidine     Review of Systems   Constitutional: Negative for chills, diaphoresis, fatigue and fever.   HENT: Negative.    Eyes: Negative.    Respiratory: Positive for cough and shortness of breath.    Cardiovascular: Negative for chest pain.   Gastrointestinal: Negative.    Endocrine: Negative.    Genitourinary: Negative.    Musculoskeletal: Negative.    Skin: Negative.    Allergic/Immunologic: Negative.    Neurological: Negative.    Psychiatric/Behavioral: Negative.      Objective:     Vital Signs (Most Recent):  Temp: 98.9 °F (37.2 °C) (03/29/19 0351)  Pulse: 72 (03/29/19 0758)  Resp: 18 (03/29/19 0758)  BP: (!) 160/82 (03/29/19 0351)  SpO2: (!) 92 % (03/29/19 0758) Vital Signs (24h Range):  Temp:  [48.2 °F (9 °C)-98.9 °F (37.2 °C)] 98.9 °F (37.2 °C)  Pulse:  [] 72  Resp:  [15-25] 18  SpO2:  [92 %-100 %] 92 %  BP: (159-186)/(75-90) 160/82     Weight: 77.4 kg (170 lb 11.2 oz)  Body mass index is 23.15 kg/m².  Body surface area is 1.98 meters squared.      Intake/Output Summary (Last 24 hours) at 3/29/2019 0875  Last data filed at 3/29/2019 0659  Gross per 24 hour   Intake 2649 ml   Output 1750 ml   Net 899 ml       Physical Exam   Constitutional: He is  oriented to person, place, and time. He appears well-developed and well-nourished. No distress.   HENT:   Head: Normocephalic and atraumatic.   Eyes: Pupils are equal, round, and reactive to light. Conjunctivae are normal.   Cardiovascular: Normal rate, regular rhythm and normal heart sounds.   Pulmonary/Chest: Effort normal. No respiratory distress. He has wheezes (minimal).   Decreased breath sounds with rhonchi     Abdominal: Soft. Bowel sounds are normal. There is no tenderness.   Musculoskeletal: He exhibits no edema.   Neurological: He is alert and oriented to person, place, and time.   Psychiatric: He has a normal mood and affect. His behavior is normal. Judgment and thought content normal.   Nursing note and vitals reviewed.      Significant Labs:   All pertinent labs from the last 24 hours have been reviewed.    Diagnostic Results:  I have reviewed all pertinent imaging results/findings within the past 24 hours.

## 2019-03-29 NOTE — ASSESSMENT & PLAN NOTE
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 Now requiring home oxygen  -S/P hospitalization here for similar symptoms suspected to be secondary to COPD exacerbation that were responsive to IV steroids, duo-nebulizers, and AZT. Gram stain noted to be positive for MSSA.   -Presentation notable for tachycardia, tachypnea, and slight metabolic acidosis now requiring up to 4L O2 via NC; exam with minimal wheezing liking from multiple duo-nebulizers prior to examination.   -Cardiac etiology not likely as EKG with sinus tachycardia and negative troponin, BNP.   -Well's score 2.5 on presentation.   -Afebrile. No signs of infection.     Plan:   -give 125mg solumedrol and 60mg prednisone in ED  -70mg prednisone daily   -Continue scheduled duo-nebulizer and inhalation treatments.   -Finish 5 day course of azithyomycin 3/29  -staph aureus grew in sputum culture from 3/25, sensitive to tetracyclines  -start doxycycline 100mg BID for 7 days  -Continuous pulse oximetry.   -Titrate down O2 as tolerated.   -Will monitor for continue clinical improvement with IV steroids; if not will consider CTA to rule-out PE.

## 2019-03-29 NOTE — PLAN OF CARE
Patient of Dr. Infante with a history of metastatic non small cell lung cancer with mets to the spine (s/p cisplatin/pemetrexed and concurrent stereotactic radiation to spinal lesion L4-S3). Patient admitted on 3/28/19 c/o increased SOB and productive cough with colored sputum. Patient was recently hospitalized for COPD exacerbation and pneumonitis requiring a steroid taper. Patient finished outpatient course of PO Azithromycin on 3/29/19 (5 day course). Sputum culture (+) for staph aureus. MD ordered to start doxycycline 100 mg BID x 7 days. VSS. Patient is currently afebrile. O2 sats 91-99% on 2-3L O2 per nasal cannula. Labs stable. Patient received Solumedrol 125 mg IV x 1 dose and Prednisone 60 mg PO x 1 dose in the ED. Patient currently on Prednisone 70 mg PO daily. Patient reports pain not well controlled on current pain regimen, requiring 4-5 doses of PRN pain medicine daily at home. MD ordered to start MS Contin 12 hr tablet 15 mg BID (long acting). CM to continue to follow with the team.    Paola Kasper, RN, BSN, CM  [facility]  Nurse - Med Onc/Gyn Onc  [phone]

## 2019-03-29 NOTE — PROGRESS NOTES
Clarified with [facility] that the patient received a portable concentrator at discharge. Patient will not need tank to go home. Transportation services can provide 02 for the ride home and his concentrator is currently at home.

## 2019-03-29 NOTE — PROGRESS NOTES
[facility]  Hematology/Oncology  Progress Note    Patient Name: Nimesh Nunn  Admission Date: 3/28/2019  Hospital Length of Stay: 1 days  Code Status: Full Code     Subjective:     HPI:  Mr. Nimesh Nunn is a 63 year old male with NSCLC of the left lung presenting with a chief complaint of SOB. He has additional PMH of COPD, CAD S/P stents, HLD, and HTN. Patient noted to have on-going SOB, cough, and fatigue on exertion since 02/2019. He was seen by his oncologist in clinic on 02/13. He was started on Prednisone 80 mg for suspected pneumonitis based on CXR findings with initial improvement. He was recently hospitalized here from 03/25 to 03/26 for acute respiratory failure with hypoxia suspected to be secondary to COPD exacerbation; at that time he presented with one day duration of exertional SOB, fever (Tmax 101), and cough with increased sputum production. While admitted, he received Azithromycin, Solumedrol and scheduled duo-nebulizers with symptom improvement. During that hospitalization he required 3L O2 via NC; discharged with home oxygen and Prednisone 60 mg. After discharge home, he reports again progressively worsening SOB and fatigue with exertion associated with intermittent cough productive of yellow mucus. SOB associated with midline chest tightness and audible wheezing; symptoms relieved by rest. Patient called EMS today after becoming acutely SOB with wheezing upon standing that did not resolve. Denies relief with increasing home O2 and rescue inhaler. Prior to arrival, he received multiple breathing treatments from EMS with symptom resolution. He denies diaphoresis, radiating jaw pain, abdominal pain, decreased PO intake, abdominal pain, and fevers.     Oncology History:   He currently has non-small cell lung carcinoma of the left lung diagnosed in 06/2018 with metastases to spine. Pathology showing poorly differentiated lung adenocarcinoma. He is S/P stereotactic  radiation of spinal lesion in 08/2018 and Cisplatin/Pemetrexed in 08/2018. He was initiated on Durvalumab on 10/2018; last dose in 01/2019. He follows with Dr. Infante.     ED course: On arrival, vitals significant for tachycardia (130), tachypnea (24), with SBP elevated to 180. He was stabilized on 4L O2 via NC. Labs notable for pancytopenia. Electrolytes notable for slight metabolic alkalosis (bicarbonate 30). Lactate elevated to 2.5. EKG sinus tachycardia; troponin's negative. CXR suggestive of expected changes with COPD with nicole-hilar congestion. He received 2.4L NS, Prednisone 60 mg PO, and additional duo-nebulizers.     Interval History: NAEON. Continues to report some SOB and productive cough unchanged.     Oncology Treatment Plan:   OP DURVALUMAB Q2W    Medications:  Continuous Infusions:  Scheduled Meds:   albuterol-ipratropium  3 mL Nebulization Q6H WAKE    aspirin  81 mg Oral Daily    atorvastatin  80 mg Oral Daily    azithromycin  250 mg Oral Daily    cilostazol  100 mg Oral BID    clopidogrel  75 mg Oral Daily    doxycycline  100 mg Oral Q12H    famotidine  20 mg Oral BID    finasteride  5 mg Oral Daily    fluticasone-vilanterol  1 puff Inhalation Daily    metoprolol succinate  50 mg Oral Daily    polyethylene glycol  17 g Oral Daily     PRN Meds:acetaminophen, albuterol, ondansetron, oxyCODONE-acetaminophen, sodium chloride 0.9%, sodium chloride 0.9%, tiZANidine     Review of Systems   Constitutional: Negative for chills, diaphoresis, fatigue and fever.   HENT: Negative.    Eyes: Negative.    Respiratory: Positive for cough and shortness of breath.    Cardiovascular: Negative for chest pain.   Gastrointestinal: Negative.    Endocrine: Negative.    Genitourinary: Negative.    Musculoskeletal: Negative.    Skin: Negative.    Allergic/Immunologic: Negative.    Neurological: Negative.    Psychiatric/Behavioral: Negative.      Objective:     Vital Signs (Most Recent):  Temp: 98.9 °F (37.2 °C)  (03/29/19 0351)  Pulse: 72 (03/29/19 0758)  Resp: 18 (03/29/19 0758)  BP: (!) 160/82 (03/29/19 0351)  SpO2: (!) 92 % (03/29/19 0758) Vital Signs (24h Range):  Temp:  [48.2 °F (9 °C)-98.9 °F (37.2 °C)] 98.9 °F (37.2 °C)  Pulse:  [] 72  Resp:  [15-25] 18  SpO2:  [92 %-100 %] 92 %  BP: (159-186)/(75-90) 160/82     Weight: 77.4 kg (170 lb 11.2 oz)  Body mass index is 23.15 kg/m².  Body surface area is 1.98 meters squared.      Intake/Output Summary (Last 24 hours) at 3/29/2019 0838  Last data filed at 3/29/2019 0659  Gross per 24 hour   Intake 2649 ml   Output 1750 ml   Net 899 ml       Physical Exam   Constitutional: He is oriented to person, place, and time. He appears well-developed and well-nourished. No distress.   HENT:   Head: Normocephalic and atraumatic.   Eyes: Pupils are equal, round, and reactive to light. Conjunctivae are normal.   Cardiovascular: Normal rate, regular rhythm and normal heart sounds.   Pulmonary/Chest: Effort normal. No respiratory distress. He has wheezes (minimal).   Decreased breath sounds with rhonchi     Abdominal: Soft. Bowel sounds are normal. There is no tenderness.   Musculoskeletal: He exhibits no edema.   Neurological: He is alert and oriented to person, place, and time.   Psychiatric: He has a normal mood and affect. His behavior is normal. Judgment and thought content normal.   Nursing note and vitals reviewed.      Significant Labs:   All pertinent labs from the last 24 hours have been reviewed.    Diagnostic Results:  I have reviewed all pertinent imaging results/findings within the past 24 hours.    Assessment/Plan:     * Acute respiratory failure with hypoxia  -Presenting poorly differentiated adenocarcinoma of the left lung and COPD presenting with recurrence of dypsnea on exertion with audible wheezing, exertional fatigue, and intermittent productive cough, essentially on-going since 02/2019 despite daily Prednisone. Now requiring home oxygen  -S/P hospitalization  here for similar symptoms suspected to be secondary to COPD exacerbation that were responsive to IV steroids, duo-nebulizers, and AZT. Gram stain noted to be positive for MSSA.   -Presentation notable for tachycardia, tachypnea, and slight metabolic acidosis now requiring up to 4L O2 via NC; exam with minimal wheezing liking from multiple duo-nebulizers prior to examination.   -Cardiac etiology not likely as EKG with sinus tachycardia and negative troponin, BNP.   -Well's score 2.5 on presentation.   -Afebrile. No signs of infection.     Plan:   -give 125mg solumedrol and 60mg prednisone in ED  -70mg prednisone daily   -Continue scheduled duo-nebulizer and inhalation treatments.   -Finish 5 day course of azithyomycin 3/29  -staph aureus grew in sputum culture from 3/25, sensitive to tetracyclines  -start doxycycline 100mg BID for 7 days  -Continuous pulse oximetry.   -Titrate down O2 as tolerated.   -Will monitor for continue clinical improvement with IV steroids; if not will consider CTA to rule-out PE.       Hyperlipidemia  Continue Statin.     COPD with acute exacerbation  See assessment for acute hypoxic respiratory failure.     Gastric ulcer  Continue home H2 blocker.     CKD (chronic kidney disease)  Baseline Cr around 1.2; stable on admission.     Plan:   -BMP daily.     Antineoplastic chemotherapy induced pancytopenia  Monitoring CBC daily.     Lung cancer metastatic to bone  See assessment for adenocarcinoma.     Adenocarcinoma of left lung, stage 2  -Diagnosed in 06/2018 with metastases to spine. Pathology showing poorly differentiated lung adenocarcinoma.   -He is S/P stereotactic radiation of spinal lesion in 08/2018 and Cisplatin/Pemetrexed in 08/2018. He was initiated on Durvalumab on 10/2018; last dose in 01/2019.   -Follows with Dr. Infante.     Essential hypertension  -SBP elevated on admission >140 likely from hypoxic stress response plus breathing treatments with steroids.   -No anti-hypertensive  listed on home medications.     Plan:   -Continue to monitor.   -For persistent elevations, >180 will consider initiating anti-hypertensive.       Coronary artery disease involving native coronary artery without angina pectoris  S/P stents. Continue ASA, Plavix, Statin, and Metoprolol.     Benign non-nodular prostatic hyperplasia without lower urinary tract symptoms  Continue Finasteride.     PAD (peripheral artery disease)  Continue anti-platelet agents and Statin.              Roxanne Woods MD  Hematology/Oncology  [facility]    Attending Note  I have personally taken the history and examined this patient and agree with the resident's note as stated above.  Discussed COPD management  NSCLC has been off therapy since 1/30/19  Pneumonitis- discussed steroid  Rigoberto discussion regarding respiratory status may be baseline with minimal improvement

## 2019-03-29 NOTE — PROGRESS NOTES
Called [facility] to follow up on referral. They are still working on getting him an assigned provider they will let me know once it is assigned.

## 2019-03-29 NOTE — PLAN OF CARE
Problem: Adult Inpatient Plan of Care  Goal: Plan of Care Review  Outcome: Ongoing (interventions implemented as appropriate)     03/29/19 9303   Plan of Care Review   Plan of Care Reviewed With patient   Pt remains free from falls and injuries during shift. Awake, alert, and oriented. Vital signs stable. Able to voice needs, denied pain. No signs of acute distress noted at this time. Bed in lowest position, wheels locked, and call light within reach.  Will continue to monitor, safety maintained.

## 2019-03-29 NOTE — PROGRESS NOTES
Admit Assessment    Patient Identification  Nimesh Nunn   :  1955  Admit Date:  3/28/2019  Attending Provider:  Desirae Vasquez MD              Referral:   Pt was admitted to  with a diagnosis of Acute respiratory failure with hypoxia, and was admitted this hospital stay due to Tachycardia [R00.0]  COPD with acute exacerbation [J44.1].       is involved was referred to the Social Work Department via routine referral.  Patient presents as a 63 y.o. year old  male.    Persons interviewed : Patient    Living Situation:      Resides at [address] [place] [address] [facility] [address], phone: [phone] (home).  Patient lives alone in a mobile home. He as a son but he lives 3 to 4 hours away.         Current or Past Agencies and Description of Services/Supplies    DME  Equipment Currently Used at Home: walker, rolling, cane and shower chair    Home Health: Patient is not currently active on home health services. He had been referred to outpatient therapy but he has not been attending. He stated that at times he has lacked the motivation to attend. He also stated that at times the intensity of the schedule was hard for him due to medical issues and pain. He is requesting to have home services at this time      IV Infusion: N/A      Nutrition: Oral     Outpatient Pharmacy:     [place], [place] - [address] [address]  Phone: [phone] Fax: [phone]      Patient Preference of agencies include: Patient does not express any preference.     Patient/Caregiver informed of right to choose providers or agencies.  Patient provides permission to release any necessary information to [facility] and to [facility] agencies as needed to facilitate patient care, treatment planning, and patient discharge planning.  Written and verbal resources provided.      Coping: Patient stated that he has been having difficulty at times getting  motivated. He discussed the struggling to afford medications. I talked to him about the supportive services  provides. I have encouraged him and given him my direct contact information to reach out to me when he needs assistance.           Adjustment to Diagnosis and Treatment: Ongoing    Emotional/Behavioral/Cognitive Issues: None observed or noted            History/Current Symptoms of Anxiety/Depression: No:  History/Current Substance Use:   Social History     Tobacco Use    Smoking status: Former Smoker     Packs/day: 1.50     Years: 40.00     Pack years: 60.00     Last attempt to quit: 10/11/2011     Years since quittin.4    Smokeless tobacco: Never Used   Substance and Sexual Activity    Alcohol use: Yes     Alcohol/week: 1.8 oz     Types: 3 Cans of beer per week     Comment: 3 cans beer every day or every other day    Drug use: Yes     Types: Marijuana    Sexual activity: Not Currently       Indications of Abuse/Neglect: No:   Abuse Screen (yes response referral indicated)  Feels Unsafe at Home or Work/School: no  Feels Threatened by Someone: no  Does Anyone Try to Keep You From Having Contact with Others or Doing Things Outside Your Home?: no  Physical Signs of Abuse Present: no    Financial:  Payor/Plan Subscr  Sex Relation Sub. Ins. ID Effective Group Num   1. ART SIEGEL* MARGARITA JONAS* 1955 Male  [id number]                                    [address]                            Other identified concerns/needs: None at this time    Plan: Return home with home health     Interventions/Referrals: Will go to [facility] for authorization and they will assign the vendor for home health services.  Patient/caregiver engaged in treatment planning process.     providing psychosocial and supportive counseling, resources, education, assistance and discharge planning as appropriate.  Patient/caregiver state understanding of   available resources,  following, remains available.

## 2019-03-30 VITALS
OXYGEN SATURATION: 96 % | WEIGHT: 170.69 LBS | SYSTOLIC BLOOD PRESSURE: 168 MMHG | HEIGHT: 72 IN | HEART RATE: 80 BPM | BODY MASS INDEX: 23.12 KG/M2 | DIASTOLIC BLOOD PRESSURE: 79 MMHG | RESPIRATION RATE: 16 BRPM | TEMPERATURE: 98 F

## 2019-03-30 LAB
ANION GAP SERPL CALC-SCNC: 7 MMOL/L (ref 8–16)
BACTERIA BLD CULT: NORMAL
BACTERIA BLD CULT: NORMAL
BASOPHILS # BLD AUTO: 0 K/UL (ref 0–0.2)
BASOPHILS NFR BLD: 0 % (ref 0–1.9)
BUN SERPL-MCNC: 30 MG/DL (ref 8–23)
CALCIUM SERPL-MCNC: 8.6 MG/DL (ref 8.7–10.5)
CHLORIDE SERPL-SCNC: 99 MMOL/L (ref 95–110)
CO2 SERPL-SCNC: 30 MMOL/L (ref 23–29)
CREAT SERPL-MCNC: 0.8 MG/DL (ref 0.5–1.4)
DIFFERENTIAL METHOD: ABNORMAL
EOSINOPHIL # BLD AUTO: 0 K/UL (ref 0–0.5)
EOSINOPHIL NFR BLD: 0 % (ref 0–8)
ERYTHROCYTE [DISTWIDTH] IN BLOOD BY AUTOMATED COUNT: 15.7 % (ref 11.5–14.5)
EST. GFR  (AFRICAN AMERICAN): >60 ML/MIN/1.73 M^2
EST. GFR  (NON AFRICAN AMERICAN): >60 ML/MIN/1.73 M^2
GLUCOSE SERPL-MCNC: 99 MG/DL (ref 70–110)
HCT VFR BLD AUTO: 28.9 % (ref 40–54)
HGB BLD-MCNC: 9.1 G/DL (ref 14–18)
IMM GRANULOCYTES # BLD AUTO: 0.05 K/UL (ref 0–0.04)
IMM GRANULOCYTES NFR BLD AUTO: 1.3 % (ref 0–0.5)
LYMPHOCYTES # BLD AUTO: 0.1 K/UL (ref 1–4.8)
LYMPHOCYTES NFR BLD: 3.5 % (ref 18–48)
MCH RBC QN AUTO: 29.3 PG (ref 27–31)
MCHC RBC AUTO-ENTMCNC: 31.5 G/DL (ref 32–36)
MCV RBC AUTO: 93 FL (ref 82–98)
MONOCYTES # BLD AUTO: 0.2 K/UL (ref 0.3–1)
MONOCYTES NFR BLD: 6.4 % (ref 4–15)
NEUTROPHILS # BLD AUTO: 3.3 K/UL (ref 1.8–7.7)
NEUTROPHILS NFR BLD: 88.8 % (ref 38–73)
NRBC BLD-RTO: 0 /100 WBC
PLATELET # BLD AUTO: 87 K/UL (ref 150–350)
PMV BLD AUTO: 10.1 FL (ref 9.2–12.9)
POTASSIUM SERPL-SCNC: 4.3 MMOL/L (ref 3.5–5.1)
RBC # BLD AUTO: 3.11 M/UL (ref 4.6–6.2)
SODIUM SERPL-SCNC: 136 MMOL/L (ref 136–145)
WBC # BLD AUTO: 3.73 K/UL (ref 3.9–12.7)

## 2019-03-30 PROCEDURE — 97161 PT EVAL LOW COMPLEX 20 MIN: CPT

## 2019-03-30 PROCEDURE — 63600175 PHARM REV CODE 636 W HCPCS: Performed by: STUDENT IN AN ORGANIZED HEALTH CARE EDUCATION/TRAINING PROGRAM

## 2019-03-30 PROCEDURE — 99239 HOSP IP/OBS DSCHRG MGMT >30: CPT | Mod: GC,,, | Performed by: INTERNAL MEDICINE

## 2019-03-30 PROCEDURE — 25000003 PHARM REV CODE 250: Performed by: STUDENT IN AN ORGANIZED HEALTH CARE EDUCATION/TRAINING PROGRAM

## 2019-03-30 PROCEDURE — 94640 AIRWAY INHALATION TREATMENT: CPT

## 2019-03-30 PROCEDURE — 85025 COMPLETE CBC W/AUTO DIFF WBC: CPT

## 2019-03-30 PROCEDURE — 99239 PR HOSPITAL DISCHARGE DAY,>30 MIN: ICD-10-PCS | Mod: GC,,, | Performed by: INTERNAL MEDICINE

## 2019-03-30 PROCEDURE — 25000242 PHARM REV CODE 250 ALT 637 W/ HCPCS: Performed by: STUDENT IN AN ORGANIZED HEALTH CARE EDUCATION/TRAINING PROGRAM

## 2019-03-30 PROCEDURE — 36415 COLL VENOUS BLD VENIPUNCTURE: CPT

## 2019-03-30 PROCEDURE — 80048 BASIC METABOLIC PNL TOTAL CA: CPT

## 2019-03-30 PROCEDURE — 94761 N-INVAS EAR/PLS OXIMETRY MLT: CPT

## 2019-03-30 RX ADMIN — ALBUTEROL SULFATE 2 PUFF: 90 AEROSOL, METERED RESPIRATORY (INHALATION) at 09:03

## 2019-03-30 RX ADMIN — ATORVASTATIN CALCIUM 80 MG: 20 TABLET, FILM COATED ORAL at 09:03

## 2019-03-30 RX ADMIN — METOPROLOL SUCCINATE 50 MG: 50 TABLET, EXTENDED RELEASE ORAL at 09:03

## 2019-03-30 RX ADMIN — CLOPIDOGREL 75 MG: 75 TABLET, FILM COATED ORAL at 09:03

## 2019-03-30 RX ADMIN — IPRATROPIUM BROMIDE AND ALBUTEROL SULFATE 3 ML: .5; 3 SOLUTION RESPIRATORY (INHALATION) at 07:03

## 2019-03-30 RX ADMIN — PREDNISONE 70 MG: 20 TABLET ORAL at 09:03

## 2019-03-30 RX ADMIN — MORPHINE SULFATE 15 MG: 15 TABLET, EXTENDED RELEASE ORAL at 09:03

## 2019-03-30 RX ADMIN — FINASTERIDE 5 MG: 5 TABLET, FILM COATED ORAL at 09:03

## 2019-03-30 RX ADMIN — IPRATROPIUM BROMIDE AND ALBUTEROL SULFATE 3 ML: .5; 3 SOLUTION RESPIRATORY (INHALATION) at 02:03

## 2019-03-30 RX ADMIN — FAMOTIDINE 20 MG: 20 TABLET ORAL at 09:03

## 2019-03-30 RX ADMIN — OXYCODONE AND ACETAMINOPHEN 1 TABLET: 10; 325 TABLET ORAL at 09:03

## 2019-03-30 RX ADMIN — OXYCODONE AND ACETAMINOPHEN 1 TABLET: 10; 325 TABLET ORAL at 12:03

## 2019-03-30 RX ADMIN — ASPIRIN 81 MG: 81 TABLET, COATED ORAL at 09:03

## 2019-03-30 RX ADMIN — FLUTICASONE FUROATE AND VILANTEROL TRIFENATATE 1 PUFF: 100; 25 POWDER RESPIRATORY (INHALATION) at 09:03

## 2019-03-30 RX ADMIN — DOXYCYCLINE HYCLATE 100 MG: 100 TABLET, COATED ORAL at 09:03

## 2019-03-30 RX ADMIN — OXYCODONE AND ACETAMINOPHEN 1 TABLET: 10; 325 TABLET ORAL at 01:03

## 2019-03-30 RX ADMIN — CILOSTAZOL 100 MG: 100 TABLET ORAL at 09:03

## 2019-03-30 NOTE — DISCHARGE SUMMARY
[facility]  Hematology/Oncology  Discharge Summary      Patient Name: Nimesh Nunn  MRN: [id number]  Admission Date: 3/28/2019  Hospital Length of Stay: 2 days  Discharge Date and Time:  03/30/2019 11:25 AM  Attending Physician: Desirae Vasquez MD   Discharging Provider: Roxanne Woods MD  Primary Care Provider: Nishant Perez MD    HPI: Mr. Nimesh Nunn is a 63 year old male with NSCLC of the left lung presenting with a chief complaint of SOB. He has additional PMH of COPD, CAD S/P stents, HLD, and HTN. Patient noted to have on-going SOB, cough, and fatigue on exertion since 02/2019. He was seen by his oncologist in clinic on 02/13. He was started on Prednisone 80 mg for suspected pneumonitis based on CXR findings with initial improvement. He was recently hospitalized here from 03/25 to 03/26 for acute respiratory failure with hypoxia suspected to be secondary to COPD exacerbation; at that time he presented with one day duration of exertional SOB, fever (Tmax 101), and cough with increased sputum production. While admitted, he received Azithromycin, Solumedrol and scheduled duo-nebulizers with symptom improvement. During that hospitalization he required 3L O2 via NC; discharged with home oxygen and Prednisone 60 mg. After discharge home, he reports again progressively worsening SOB and fatigue with exertion associated with intermittent cough productive of yellow mucus. SOB associated with midline chest tightness and audible wheezing; symptoms relieved by rest. Patient called EMS today after becoming acutely SOB with wheezing upon standing that did not resolve. Denies relief with increasing home O2 and rescue inhaler. Prior to arrival, he received multiple breathing treatments from EMS with symptom resolution. He denies diaphoresis, radiating jaw pain, abdominal pain, decreased PO intake, abdominal pain, and fevers.     Oncology History:   He currently has non-small cell lung carcinoma  of the left lung diagnosed in 06/2018 with metastases to spine. Pathology showing poorly differentiated lung adenocarcinoma. He is S/P stereotactic radiation of spinal lesion in 08/2018 and Cisplatin/Pemetrexed in 08/2018. He was initiated on Durvalumab on 10/2018; last dose in 01/2019. He follows with Dr. Infante.     ED course: On arrival, vitals significant for tachycardia (130), tachypnea (24), with SBP elevated to 180. He was stabilized on 4L O2 via NC. Labs notable for pancytopenia. Electrolytes notable for slight metabolic alkalosis (bicarbonate 30). Lactate elevated to 2.5. EKG sinus tachycardia; troponin's negative. CXR suggestive of expected changes with COPD with nicole-hilar congestion. He received 2.4L NS, Prednisone 60 mg PO, and additional duo-nebulizers.     * No surgery found *     Hospital Course: Pt continues to report some SOB (although saturating well on nasal cannula) and no change in cough, continuing to produce some light yellow sputum. Patient reporting moderate improvement of cough and SOB, not resolved. Understanding this baseline SOB may exist. Patient did not have O2 on discharge due to empty tank, SW has worked on this and he has follow up with Dr. Infante on 4/1. Patient to be discharged with  aide to help with medication and O2 management.     Physical Exam   Vitals:    03/30/19 0919   BP:    Pulse: 90   Resp: 18   Temp:     Constitutional: He is oriented to person, place, and time. He appears well-developed and well-nourished. No distress.   Head: Normocephalic and atraumatic.   Eyes: Pupils are equal, round, and reactive to light. Conjunctivae are normal.   Cardiovascular: Normal rate, regular rhythm and normal heart sounds.   Pulmonary/Chest: Effort normal. No respiratory distress. He has wheezes (minimal).   Decreased breath sounds with rhonchi   Abdominal: Soft. Bowel sounds are normal. There is no tenderness.   Musculoskeletal: He exhibits no edema.   Neurological: He is alert  and oriented to person, place, and time.   Psychiatric: He has a normal mood and affect. His behavior is normal. Judgment and thought content normal.   Nursing note and vitals reviewed.  Significant Diagnostic Studies: Labs: All labs within the past 24 hours have been reviewed    Pending Diagnostic Studies:     None        Final Active Diagnoses:    Diagnosis Date Noted POA    PRINCIPAL PROBLEM:  Acute respiratory failure with hypoxia [J96.01] 03/28/2019 Yes    Hyperlipidemia [E78.5] 11/07/2013 Yes    COPD with acute exacerbation [J44.1] 03/28/2019 Yes    Gastric ulcer [K25.9] 08/16/2018 Yes    CKD (chronic kidney disease) [N18.9] 08/15/2018 Yes    Antineoplastic chemotherapy induced pancytopenia [D61.810, T45.1X5A] 08/15/2018 Yes    Lung cancer metastatic to bone [C34.90, C79.51] 07/18/2018 Yes    Adenocarcinoma of left lung, stage 2 [C34.92] 07/06/2018 Yes    Coronary artery disease involving native coronary artery without angina pectoris [I25.10] 11/16/2016 Yes    Essential hypertension [I10] 11/16/2016 Yes    Benign non-nodular prostatic hyperplasia without lower urinary tract symptoms [N40.0] 04/15/2016 Yes    PAD (peripheral artery disease) [I73.9] 02/18/2014 Yes      Problems Resolved During this Admission:      Discharged Condition: fair    Disposition: Home or Self Care    Follow Up:  Follow-up Information     Demetrius Infante DO, FACP.    Specialty:  Hematology and Oncology  Why:  As scheduled by the clinic  Contact information:  [address] [facility] [address]  [phone]                 Patient Instructions:      Notify your health care provider if you experience any of the following:  temperature >100.4     Notify your health care provider if you experience any of the following:  difficulty breathing or increased cough     Activity as tolerated     Medications:  Reconciled Home Medications:      Medication List      START taking these medications    doxycycline 100 MG  tablet  Commonly known as:  VIBRA-TABS  Take 1 tablet (100 mg total) by mouth every 12 (twelve) hours. for 6 days     morphine 15 MG 12 hr tablet  Commonly known as:  MS CONTIN  Take 1 tablet (15 mg total) by mouth every 12 (twelve) hours.        CHANGE how you take these medications    predniSONE 10 MG tablet  Commonly known as:  DELTASONE  Take 7 tablets (70 mg total) by mouth once daily. Dr. Infante to taper. for 5 days  What changed:    · medication strength  · how much to take        CONTINUE taking these medications    aspirin 81 MG EC tablet  Commonly known as:  ECOTRIN  Take 81 mg by mouth once daily.     atorvastatin 80 MG tablet  Commonly known as:  LIPITOR  TAKE 1 TABLET BY MOUTH EVERY DAY     cilostazol 100 MG Tab  Commonly known as:  PLETAL  TAKE 1 TABLET BY MOUTH TWICE DAILY     clopidogrel 75 mg tablet  Commonly known as:  PLAVIX  TAKE 1 TABLET BY MOUTH EVERY DAY     famotidine 20 MG tablet  Commonly known as:  PEPCID  Take 1 tablet (20 mg total) by mouth 2 (two) times daily. for 10 days     finasteride 5 mg tablet  Commonly known as:  PROSCAR  TAKE 1 TABLET BY MOUTH EVERY DAY     metoprolol succinate 50 MG 24 hr tablet  Commonly known as:  TOPROL-XL  Take 50 mg by mouth.     nitroGLYCERIN 0.4 MG SL tablet  Commonly known as:  NITROSTAT  Place 1 tablet (0.4 mg total) under the tongue every 5 (five) minutes as needed for Chest pain.     ondansetron 8 MG Tbdl  Commonly known as:  ZOFRAN-ODT  Take 1 tablet (8 mg total) by mouth every 12 (twelve) hours as needed.     oxyCODONE-acetaminophen  mg per tablet  Commonly known as:  PERCOCET  Take 1 tablet by mouth every 4 (four) hours as needed for Pain.     PROAIR HFA 90 mcg/actuation inhaler  Generic drug:  albuterol  Inhale 2 puffs into the lungs as needed.     umeclidinium-vilanterol 62.5-25 mcg/actuation Dsdv  Commonly known as:  ANORO ELLIPTA  Inhale 1 puff into the lungs once daily. Controller        STOP taking these medications    azithromycin  250 MG tablet  Commonly known as:  Z-TREY Woods MD  Hematology/Oncology  [facility]

## 2019-03-30 NOTE — ASSESSMENT & PLAN NOTE
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 Now requiring home oxygen  -S/P hospitalization here for similar symptoms suspected to be secondary to COPD exacerbation that were responsive to IV steroids, duo-nebulizers, and AZT. Gram stain noted to be positive for MSSA.   -Presentation notable for tachycardia, tachypnea, and slight metabolic acidosis now requiring up to 4L O2 via NC; exam with minimal wheezing liking from multiple duo-nebulizers prior to examination.   -Cardiac etiology not likely as EKG with sinus tachycardia and negative troponin, BNP.   -Well's score 2.5 on presentation.   -Afebrile. No signs of infection.     Plan:   -give 125mg solumedrol and 60mg prednisone in ED  -70mg prednisone daily   -Continue scheduled duo-nebulizer and inhalation treatments.   -Finish 5 day course of azithyomycin 3/29  -staph aureus grew in sputum culture from 3/25, sensitive to tetracyclines  -start doxycycline 100mg BID for 7 days  -Continuous pulse oximetry.   -Titrate down O2 as tolerated.

## 2019-03-30 NOTE — PLAN OF CARE
Problem: Adult Inpatient Plan of Care  Goal: Plan of Care Review  Outcome: Ongoing (interventions implemented as appropriate)  Pt free of fall this shift. Pain assessed and pt c/o generalized pain from back to over the body; scheduled morphine and prn percocet administered and pt verbalized moderate relief of pain. Pt aaox4. Afebrile. Vss. Pt is getting discharge. Transportation is set up by [facility].

## 2019-03-30 NOTE — PT/OT/SLP EVAL
"Physical Therapy Evaluation and Discharge Note    Patient Name:  Nimesh Nunn   MRN:  [id number]    Recommendations:     Discharge Recommendations:  home health PT   Discharge Equipment Recommendations: none   Barriers to discharge: None    Assessment:     Nimesh Nunn is a 63 y.o. male admitted with a medical diagnosis of Acute respiratory failure with hypoxia. .  At this time, patient is functioning at their prior level of function and does not require further acute PT services.     Recent Surgery: * No surgery found *      Plan:     During this hospitalization, patient does not require further acute PT services.  Please re-consult if situation changes.      Subjective     Chief Complaint: none noted   Patient/Family Comments/goals: "pain pills haven't kicked in I don't want to walk far"  Pain/Comfort:  · Pain Rating 1: 0/10    Patients cultural, spiritual, Zoroastrian conflicts given the current situation: no    Living Environment:  Pt lives alone in a trailer with 4 RADHA and R handrail.     Prior to admission, patients level of function was performing ADL's without assist but having difficulty. Pt states "I managed", pt amb with SC at baseline.  Equipment used at home: rollator, cane, straight, shower chair, oxygen.  DME owned (not currently used): none.  Upon discharge, patient will have assistance from no one.    Objective:     Communicated with RN prior to session.  Patient found supine with oxygen upon PT entry to room.    General Precautions: Standard, fall   Orthopedic Precautions:N/A   Braces: N/A     Exams:  · Cognitive Exam:  Patient is oriented to Person, Place, Time and Situation  · RLE ROM: WFL  · RLE Strength: WFL  · LLE ROM: WFL  · LLE Strength: WFL    Functional Mobility:  · Bed Mobility:     · Rolling Left:  independence  · Scooting: independence  · Supine to Sit: independence  · Sit to Supine: independence  · Transfers:     · Sit to Stand:  independence with no AD  · Gait: pt amb 12' on " 2L O2 Mod I requiring increased time but no deviations. pt requested to return to bed following 12'.   · Balance: Sitting: Ind.      Standing: Ind.     AM-PAC 6 CLICK MOBILITY  Total Score:24       Therapeutic Activities and Exercises:   -Pt educated on:   -PT roles, expectations, and POC    -Safety with mobility   -Benefits of OOB activities to increase strength and functional mobility    -Discharge recommendations     AM-PAC 6 CLICK MOBILITY  Total Score:24     Patient left supine with all lines intact, call button in reach and bed alarm on.    GOALS:   Multidisciplinary Problems     Physical Therapy Goals     Not on file          Multidisciplinary Problems (Resolved)        Problem: Physical Therapy Goal    Goal Priority Disciplines Outcome Goal Variances Interventions   Physical Therapy Goal   (Resolved)     PT, PT/OT Outcome(s) achieved                     History:     Past Medical History:   Diagnosis Date    Abdominal aortic aneurysm (AAA) without rupture 3/5/2018    Arthritis     Blood transfusion     COPD (chronic obstructive pulmonary disease)     COPD exacerbation 3/24/2019    Coronary artery disease     Dehydration 8/15/2018    Encounter for blood transfusion     Hyperlipidemia 11/7/2013    Hypertension     Lung cancer metastatic to bone 7/18/2018    Occlusion and stenosis of carotid artery without mention of cerebral infarction 1/12/2014    Pneumonitis 2/13/2019    Primary malignant neoplasm of left lung 7/18/2018    Screening for colon cancer 6/1/2015    Syncope 1/13/2014    Thrombocytopenia 9/19/2018    Weakness of both lower extremities 1/14/2019       Past Surgical History:   Procedure Laterality Date    ARCH & 4 VESSEL STUDY Bilateral 5/27/2014    [facility] CATH LAB    BACK SURGERY      BRONCHOSCOPY,FIBEROPTIC N/A 6/26/2018    Per[facility] by [facility] at [facility] OR 2ND FLR    carotid endarectomy      CATHETERIZATION, HEART, LEFT Left  11/8/2013    [facility] CATH LAB    COLONOSCOPY N/A 6/1/2015    Per[facility] by [facility]cel[facility]derrama [facility] at [facility] ENDO    CORONARY ANGIOPLASTY      CORONARY ARTERY BYPASS GRAFT      ESOPHAGOGASTRODUODENOSCOPY (EGD) N/A 8/16/2018    Performed by Y[facility]annes Osuna MD at [facility] ENDO (2ND FLR)    HEART CATH-LEFT N/A 5/27/2014    Performed by [facility] at [facility] CATH LAB    INSERTION, STENT, CORONARY ARTERY N/A 12/20/2013    Performed by [facility] Martin [facility] at [facility] CATH LAB    PTA, PERIPHERAL BLOOD VESSEL Left 3/11/2014    Performed by [facility]hi Martin [facility] at [facility] CATH LAB    PTA, PERIPHERAL BLOOD VESSEL N/A 2/18/2014    Performed by [facility]ekhi Martin MD at [facility] CATH LAB    SKIN BIOPSY      ULTRASOUND, ENDOBRONCHIAL N/A 6/26/2018    Performed by Liana Serrano MD at [facility] OR 2ND FLR       Time Tracking:     PT Received On: 03/30/19  PT Start Time: 0945     PT Stop Time: 0955  PT Total Time (min): 10 min     Billable Minutes: Evaluation 10      Kofi Chen, PT  03/30/2019

## 2019-03-30 NOTE — PLAN OF CARE
Problem: Physical Therapy Goal  Goal: Physical Therapy Goal  Outcome: Outcome(s) achieved Date Met: 03/30/19  No goals set, pt does not require additional acute PT services at this time due to baseline functional mobility.     Pt educated on asking medical staff for PT consult if changes in functional status occurs.     - Ovidio Chen, PT, DPT  3/30/2019

## 2019-03-30 NOTE — PROGRESS NOTES
Pt is getting discharge and wants SM or CM to set up a ride for him, but nurse cannot find out who is the SW or CM to get in touch with.

## 2019-03-30 NOTE — SUBJECTIVE & OBJECTIVE
Interval History: NAEON. Continues to report some SOB and productive cough, mildly improved.     Oncology Treatment Plan:   OP DURVALUMAB Q2W    Medications:  Continuous Infusions:  Scheduled Meds:   albuterol-ipratropium  3 mL Nebulization Q6H WAKE    aspirin  81 mg Oral Daily    atorvastatin  80 mg Oral Daily    cilostazol  100 mg Oral BID    clopidogrel  75 mg Oral Daily    doxycycline  100 mg Oral Q12H    famotidine  20 mg Oral BID    finasteride  5 mg Oral Daily    fluticasone-vilanterol  1 puff Inhalation Daily    metoprolol succinate  50 mg Oral Daily    morphine  15 mg Oral Q12H    polyethylene glycol  17 g Oral Daily    predniSONE  70 mg Oral Daily     PRN Meds:acetaminophen, albuterol, ondansetron, oxyCODONE-acetaminophen, sodium chloride 0.9%, sodium chloride 0.9%, tiZANidine     Review of Systems   Constitutional: Negative for chills, diaphoresis, fatigue and fever.   HENT: Negative.    Eyes: Negative.    Respiratory: Positive for cough and shortness of breath.    Cardiovascular: Negative for chest pain.   Gastrointestinal: Negative.    Endocrine: Negative.    Genitourinary: Negative.    Musculoskeletal: Negative.    Skin: Negative.    Allergic/Immunologic: Negative.    Neurological: Negative.    Psychiatric/Behavioral: Negative.      Objective:     Vital Signs (Most Recent):  Temp: 97.4 °F (36.3 °C) (03/30/19 0319)  Pulse: 81 (03/30/19 0317)  Resp: 15 (03/30/19 0317)  BP: (!) 142/94 (03/30/19 0317)  SpO2: (!) 92 % (03/30/19 0317) Vital Signs (24h Range):  Temp:  [97.4 °F (36.3 °C)-98.1 °F (36.7 °C)] 97.4 °F (36.3 °C)  Pulse:  [72-98] 81  Resp:  [15-19] 15  SpO2:  [89 %-95 %] 92 %  BP: (132-174)/(67-94) 142/94     Weight: 77.4 kg (170 lb 11.2 oz)  Body mass index is 23.15 kg/m².  Body surface area is 1.98 meters squared.      Intake/Output Summary (Last 24 hours) at 3/30/2019 0732  Last data filed at 3/30/2019 0703  Gross per 24 hour   Intake 1080 ml   Output 950 ml   Net 130 ml        Physical Exam   Constitutional: He is oriented to person, place, and time. He appears well-developed and well-nourished. No distress.   HENT:   Head: Normocephalic and atraumatic.   Eyes: Pupils are equal, round, and reactive to light. Conjunctivae are normal.   Cardiovascular: Normal rate, regular rhythm and normal heart sounds.   Pulmonary/Chest: Effort normal. No respiratory distress. He has wheezes (minimal).   Decreased breath sounds with rhonchi   Abdominal: Soft. Bowel sounds are normal. There is no tenderness.   Musculoskeletal: He exhibits no edema.   Neurological: He is alert and oriented to person, place, and time.   Psychiatric: He has a normal mood and affect. His behavior is normal. Judgment and thought content normal.   Nursing note and vitals reviewed.      Significant Labs:   All pertinent labs from the last 24 hours have been reviewed.    Diagnostic Results:  I have reviewed all pertinent imaging results/findings within the past 24 hours.

## 2019-03-30 NOTE — PLAN OF CARE
Problem: Adult Inpatient Plan of Care  Goal: Plan of Care Review  Outcome: Ongoing (interventions implemented as appropriate)  Pt free of fall this shift. Pain assessed and pt c/o generalized pain from back to over the body; scheduled morphine and prn percocet administered and pt verbalized moderate relief of pain. Pt aaox4. Afebrile. Vss. Will continue to monitor pt.

## 2019-04-01 ENCOUNTER — HOSPITAL ENCOUNTER (OUTPATIENT)
Dept: PULMONOLOGY | Facility: CLINIC | Age: 64
Discharge: HOME | End: 2019-04-01
Payer: MEDICARE

## 2019-04-01 ENCOUNTER — LAB VISIT (OUTPATIENT)
Dept: LAB | Facility: HOSPITAL | Age: 64
End: 2019-04-01
Attending: INTERNAL MEDICINE
Payer: MEDICARE

## 2019-04-01 ENCOUNTER — OFFICE VISIT (OUTPATIENT)
Dept: PULMONOLOGY | Facility: CLINIC | Age: 64
End: 2019-04-01
Payer: MEDICARE

## 2019-04-01 ENCOUNTER — OFFICE VISIT (OUTPATIENT)
Dept: HEMATOLOGY/ONCOLOGY | Facility: CLINIC | Age: 64
End: 2019-04-01
Payer: MEDICARE

## 2019-04-01 ENCOUNTER — TELEPHONE (OUTPATIENT)
Dept: PULMONOLOGY | Facility: CLINIC | Age: 64
End: 2019-04-01

## 2019-04-01 VITALS
HEIGHT: 72 IN | WEIGHT: 168 LBS | BODY MASS INDEX: 23.52 KG/M2 | DIASTOLIC BLOOD PRESSURE: 72 MMHG | HEART RATE: 91 BPM | OXYGEN SATURATION: 96 % | BODY MASS INDEX: 22.75 KG/M2 | SYSTOLIC BLOOD PRESSURE: 122 MMHG | HEIGHT: 71 IN | WEIGHT: 168 LBS

## 2019-04-01 VITALS
RESPIRATION RATE: 14 BRPM | DIASTOLIC BLOOD PRESSURE: 90 MMHG | HEART RATE: 111 BPM | TEMPERATURE: 98 F | BODY MASS INDEX: 22.87 KG/M2 | SYSTOLIC BLOOD PRESSURE: 165 MMHG | HEIGHT: 72 IN | WEIGHT: 168.88 LBS | OXYGEN SATURATION: 93 %

## 2019-04-01 DIAGNOSIS — R06.00 DYSPNEA, UNSPECIFIED TYPE: ICD-10-CM

## 2019-04-01 DIAGNOSIS — C79.51 LUNG CANCER METASTATIC TO BONE: ICD-10-CM

## 2019-04-01 DIAGNOSIS — J44.1 CHRONIC OBSTRUCTIVE PULMONARY DISEASE WITH ACUTE EXACERBATION: ICD-10-CM

## 2019-04-01 DIAGNOSIS — C79.51 METASTASIS TO BONE: ICD-10-CM

## 2019-04-01 DIAGNOSIS — E07.89 OTHER SPECIFIED DISORDERS OF THYROID: ICD-10-CM

## 2019-04-01 DIAGNOSIS — C34.92 ADENOCARCINOMA OF LEFT LUNG, STAGE 4: ICD-10-CM

## 2019-04-01 DIAGNOSIS — J44.1 COPD EXACERBATION: ICD-10-CM

## 2019-04-01 DIAGNOSIS — R06.00 DYSPNEA, UNSPECIFIED TYPE: Primary | ICD-10-CM

## 2019-04-01 DIAGNOSIS — C34.92 PRIMARY MALIGNANT NEOPLASM OF LEFT LUNG: ICD-10-CM

## 2019-04-01 DIAGNOSIS — G89.3 NEOPLASM RELATED PAIN: ICD-10-CM

## 2019-04-01 DIAGNOSIS — R06.02 SHORTNESS OF BREATH: ICD-10-CM

## 2019-04-01 DIAGNOSIS — C34.92 PRIMARY MALIGNANT NEOPLASM OF LEFT LUNG: Primary | ICD-10-CM

## 2019-04-01 DIAGNOSIS — R07.9 ACUTE CHEST PAIN: ICD-10-CM

## 2019-04-01 DIAGNOSIS — J98.4 PNEUMONITIS: ICD-10-CM

## 2019-04-01 DIAGNOSIS — C34.90 LUNG CANCER METASTATIC TO BONE: ICD-10-CM

## 2019-04-01 DIAGNOSIS — N40.0 BENIGN NON-NODULAR PROSTATIC HYPERPLASIA WITHOUT LOWER URINARY TRACT SYMPTOMS: ICD-10-CM

## 2019-04-01 DIAGNOSIS — E78.5 DYSLIPIDEMIA: ICD-10-CM

## 2019-04-01 DIAGNOSIS — J96.01 ACUTE RESPIRATORY FAILURE WITH HYPOXIA: Primary | ICD-10-CM

## 2019-04-01 LAB
ALBUMIN SERPL BCP-MCNC: 2.7 G/DL (ref 3.5–5.2)
ALP SERPL-CCNC: 107 U/L (ref 55–135)
ALT SERPL W/O P-5'-P-CCNC: 20 U/L (ref 10–44)
ANION GAP SERPL CALC-SCNC: 11 MMOL/L (ref 8–16)
AST SERPL-CCNC: 23 U/L (ref 10–40)
BILIRUB SERPL-MCNC: 0.5 MG/DL (ref 0.1–1)
BUN SERPL-MCNC: 31 MG/DL (ref 8–23)
CALCIUM SERPL-MCNC: 9.4 MG/DL (ref 8.7–10.5)
CHLORIDE SERPL-SCNC: 97 MMOL/L (ref 95–110)
CO2 SERPL-SCNC: 30 MMOL/L (ref 23–29)
CREAT SERPL-MCNC: 0.9 MG/DL (ref 0.5–1.4)
ERYTHROCYTE [DISTWIDTH] IN BLOOD BY AUTOMATED COUNT: 15.7 % (ref 11.5–14.5)
EST. GFR  (AFRICAN AMERICAN): >60 ML/MIN/1.73 M^2
EST. GFR  (NON AFRICAN AMERICAN): >60 ML/MIN/1.73 M^2
GLUCOSE SERPL-MCNC: 86 MG/DL (ref 70–110)
HCT VFR BLD AUTO: 33.7 % (ref 40–54)
HGB BLD-MCNC: 10.5 G/DL (ref 14–18)
IMM GRANULOCYTES # BLD AUTO: 0.12 K/UL (ref 0–0.04)
MAGNESIUM SERPL-MCNC: 1.5 MG/DL (ref 1.6–2.6)
MCH RBC QN AUTO: 29.5 PG (ref 27–31)
MCHC RBC AUTO-ENTMCNC: 31.2 G/DL (ref 32–36)
MCV RBC AUTO: 95 FL (ref 82–98)
NEUTROPHILS # BLD AUTO: 4.7 K/UL (ref 1.8–7.7)
PLATELET # BLD AUTO: 101 K/UL (ref 150–350)
PMV BLD AUTO: 9.7 FL (ref 9.2–12.9)
POTASSIUM SERPL-SCNC: 4.9 MMOL/L (ref 3.5–5.1)
PRE FEV1 FVC: 53
PRE FEV1: 1.71
PRE FVC: 3.22
PREDICTED FEV1 FVC: 79
PREDICTED FEV1: 3.59
PREDICTED FVC: 4.49
PROT SERPL-MCNC: 7 G/DL (ref 6–8.4)
RBC # BLD AUTO: 3.56 M/UL (ref 4.6–6.2)
SODIUM SERPL-SCNC: 138 MMOL/L (ref 136–145)
T4 FREE SERPL-MCNC: 0.52 NG/DL (ref 0.71–1.51)
TSH SERPL DL<=0.005 MIU/L-ACNC: 4.83 UIU/ML (ref 0.4–4)
WBC # BLD AUTO: 5.26 K/UL (ref 3.9–12.7)

## 2019-04-01 PROCEDURE — 3077F PR MOST RECENT SYSTOLIC BLOOD PRESSURE >= 140 MM HG: ICD-10-PCS | Mod: CPTII,S$GLB,, | Performed by: INTERNAL MEDICINE

## 2019-04-01 PROCEDURE — 36415 COLL VENOUS BLD VENIPUNCTURE: CPT

## 2019-04-01 PROCEDURE — 1101F PR PT FALLS ASSESS DOC 0-1 FALLS W/OUT INJ PAST YR: ICD-10-PCS | Mod: CPTII,S$GLB,, | Performed by: INTERNAL MEDICINE

## 2019-04-01 PROCEDURE — 83735 ASSAY OF MAGNESIUM: CPT

## 2019-04-01 PROCEDURE — 94010 BREATHING CAPACITY TEST: ICD-10-PCS | Mod: S$GLB,,, | Performed by: INTERNAL MEDICINE

## 2019-04-01 PROCEDURE — 99499 UNLISTED E&M SERVICE: CPT | Mod: S$GLB,,, | Performed by: INTERNAL MEDICINE

## 2019-04-01 PROCEDURE — 99999 PR PBB SHADOW E&M-EST. PATIENT-LVL IV: CPT | Mod: PBBFAC,,, | Performed by: INTERNAL MEDICINE

## 2019-04-01 PROCEDURE — 3074F PR MOST RECENT SYSTOLIC BLOOD PRESSURE < 130 MM HG: ICD-10-PCS | Mod: CPTII,S$GLB,, | Performed by: INTERNAL MEDICINE

## 2019-04-01 PROCEDURE — 3080F PR MOST RECENT DIASTOLIC BLOOD PRESSURE >= 90 MM HG: ICD-10-PCS | Mod: CPTII,S$GLB,, | Performed by: INTERNAL MEDICINE

## 2019-04-01 PROCEDURE — 99214 PR OFFICE/OUTPT VISIT, EST, LEVL IV, 30-39 MIN: ICD-10-PCS | Mod: S$GLB,,, | Performed by: INTERNAL MEDICINE

## 2019-04-01 PROCEDURE — 84439 ASSAY OF FREE THYROXINE: CPT

## 2019-04-01 PROCEDURE — 84443 ASSAY THYROID STIM HORMONE: CPT

## 2019-04-01 PROCEDURE — 1101F PT FALLS ASSESS-DOCD LE1/YR: CPT | Mod: CPTII,S$GLB,, | Performed by: INTERNAL MEDICINE

## 2019-04-01 PROCEDURE — 99999 PR PBB SHADOW E&M-EST. PATIENT-LVL III: ICD-10-PCS | Mod: PBBFAC,,, | Performed by: INTERNAL MEDICINE

## 2019-04-01 PROCEDURE — 99999 PR PBB SHADOW E&M-EST. PATIENT-LVL IV: ICD-10-PCS | Mod: PBBFAC,,, | Performed by: INTERNAL MEDICINE

## 2019-04-01 PROCEDURE — 3080F DIAST BP >= 90 MM HG: CPT | Mod: CPTII,S$GLB,, | Performed by: INTERNAL MEDICINE

## 2019-04-01 PROCEDURE — 99214 OFFICE O/P EST MOD 30 MIN: CPT | Mod: S$GLB,,, | Performed by: INTERNAL MEDICINE

## 2019-04-01 PROCEDURE — 3078F PR MOST RECENT DIASTOLIC BLOOD PRESSURE < 80 MM HG: ICD-10-PCS | Mod: CPTII,S$GLB,, | Performed by: INTERNAL MEDICINE

## 2019-04-01 PROCEDURE — 3008F PR BODY MASS INDEX (BMI) DOCUMENTED: ICD-10-PCS | Mod: CPTII,S$GLB,, | Performed by: INTERNAL MEDICINE

## 2019-04-01 PROCEDURE — 94729 DIFFUSING CAPACITY: CPT | Mod: S$GLB,,, | Performed by: INTERNAL MEDICINE

## 2019-04-01 PROCEDURE — 99999 PR PBB SHADOW E&M-EST. PATIENT-LVL III: CPT | Mod: PBBFAC,,, | Performed by: INTERNAL MEDICINE

## 2019-04-01 PROCEDURE — 3008F BODY MASS INDEX DOCD: CPT | Mod: CPTII,S$GLB,, | Performed by: INTERNAL MEDICINE

## 2019-04-01 PROCEDURE — 94010 BREATHING CAPACITY TEST: CPT | Mod: S$GLB,,, | Performed by: INTERNAL MEDICINE

## 2019-04-01 PROCEDURE — 3074F SYST BP LT 130 MM HG: CPT | Mod: CPTII,S$GLB,, | Performed by: INTERNAL MEDICINE

## 2019-04-01 PROCEDURE — 80053 COMPREHEN METABOLIC PANEL: CPT

## 2019-04-01 PROCEDURE — 94618 PULMONARY STRESS TESTING: CPT | Mod: S$GLB,,, | Performed by: INTERNAL MEDICINE

## 2019-04-01 PROCEDURE — 94618 PULMONARY STRESS TESTING: ICD-10-PCS | Mod: S$GLB,,, | Performed by: INTERNAL MEDICINE

## 2019-04-01 PROCEDURE — 3077F SYST BP >= 140 MM HG: CPT | Mod: CPTII,S$GLB,, | Performed by: INTERNAL MEDICINE

## 2019-04-01 PROCEDURE — 3078F DIAST BP <80 MM HG: CPT | Mod: CPTII,S$GLB,, | Performed by: INTERNAL MEDICINE

## 2019-04-01 PROCEDURE — 99214 OFFICE O/P EST MOD 30 MIN: CPT | Mod: 25,S$GLB,, | Performed by: INTERNAL MEDICINE

## 2019-04-01 PROCEDURE — 99214 PR OFFICE/OUTPT VISIT, EST, LEVL IV, 30-39 MIN: ICD-10-PCS | Mod: 25,S$GLB,, | Performed by: INTERNAL MEDICINE

## 2019-04-01 PROCEDURE — 94729 PR C02/MEMBANE DIFFUSE CAPACITY: ICD-10-PCS | Mod: S$GLB,,, | Performed by: INTERNAL MEDICINE

## 2019-04-01 PROCEDURE — 99499 RISK ADDL DX/OHS AUDIT: ICD-10-PCS | Mod: S$GLB,,, | Performed by: INTERNAL MEDICINE

## 2019-04-01 PROCEDURE — 85027 COMPLETE CBC AUTOMATED: CPT

## 2019-04-01 RX ORDER — FINASTERIDE 5 MG/1
5 TABLET, FILM COATED ORAL DAILY
Qty: 90 TABLET | Refills: 3 | Status: ON HOLD | OUTPATIENT
Start: 2019-04-01 | End: 2019-05-13 | Stop reason: HOSPADM

## 2019-04-01 RX ORDER — ATORVASTATIN CALCIUM 80 MG/1
80 TABLET, FILM COATED ORAL DAILY
Qty: 30 TABLET | Refills: 11 | Status: ON HOLD | OUTPATIENT
Start: 2019-04-01 | End: 2019-05-13 | Stop reason: HOSPADM

## 2019-04-01 RX ORDER — OXYCODONE AND ACETAMINOPHEN 10; 325 MG/1; MG/1
1 TABLET ORAL EVERY 4 HOURS PRN
Qty: 90 TABLET | Refills: 0 | Status: ON HOLD | OUTPATIENT
Start: 2019-04-01 | End: 2019-04-17 | Stop reason: SDUPTHER

## 2019-04-01 NOTE — PROGRESS NOTES
PATIENT: Nimesh Nunn  MRN: [id number]  DATE: 4/1/2019      Diagnosis:   1. Primary malignant neoplasm of left lung    2. Dyslipidemia    3. Benign non-nodular prostatic hyperplasia without lower urinary tract symptoms    4. Neoplasm related pain    5. Lung cancer metastatic to bone    6. Shortness of breath    7. Pneumonitis    8. COPD exacerbation        Chief Complaint: Follow-up (hospital follow up)      Oncologic History:      Oncologic History Non-small cell lung cancer, left diagnosed 06/2018  Metastatic disease to bone at presentation    Oncologic Treatment Stereotactic radiation to spinal lesion  Cisplatin/pemetrexed with concurrent radiation 08/08/2018 (completed 09/2018)  Durvalumab 10/2018  XRT L4-S3 30 Gy 12/2018    Pathology Poorly differentiated adenocarcinoma, EGFR wild type, No ALK, ROS-1 rearrangements, PD-L1 TPS 90%          Subjective:    Interval History: Mr. Nunn is a 63 y.o. male who is seen in follow-up for lung cancer.  Since I had seen him last he was maintained on steroids to treat radiation pneumonitis.  During this taper he became more short of breath was hospitalized and the steroids were increased and he was also started on antibiotics.  He was recently discharged and states that his breathing has not improved at all.  He is still coughing.  Inhalers offer minimal relief.  He did have 1 episode of fever but this has resolved.  No other    His history dates to 06/2018 when he sought medical attention for chest pain.  A CT of the chest was performed showing a left lower lobe mass measuring 7.4 cm.  There were also enlarged left hilar lymph nodes. He underwent PET-CT which showed uptake in this mass and also in L1.  He underwent bronchoscopy and biopsy with pathology showing poorly differentiated adenocarcinoma.  Molecular studies showed EGFR wild-type and he did not harbor any ALK or ROS-1 rearrangements.  PD L1 tumor proportions core was 90%.  He underwent radiation to the  spinal lesion in 08/2018 and started on concurrent chemo and radiation with cisplatin and pemetrexed in 08/2018.  He completed treatment on 09/19/2018.  He was only able to receive 2 cycles of chemotherapy secondary to cytopenias.  He was initiated on durvalumab in 10/2018.  He received 3 cycles but then developed lower extremity weakness and was felt that this may be related to cauda equina syndrome related to bone metastasis.  He underwent radiation to his lumbar and sacral spine in 12/2018.    Past Medical History:   Past Medical History:   Diagnosis Date    Abdominal aortic aneurysm (AAA) without rupture 3/5/2018    Arthritis     Blood transfusion     COPD (chronic obstructive pulmonary disease)     COPD exacerbation 3/24/2019    Coronary artery disease     Dehydration 8/15/2018    Encounter for blood transfusion     Hyperlipidemia 11/7/2013    Hypertension     Lung cancer metastatic to bone 7/18/2018    Occlusion and stenosis of carotid artery without mention of cerebral infarction 1/12/2014    Pneumonitis 2/13/2019    Primary malignant neoplasm of left lung 7/18/2018    Screening for colon cancer 6/1/2015    Syncope 1/13/2014    Thrombocytopenia 9/19/2018    Weakness of both lower extremities 1/14/2019       Past Surgical HIstory:   Past Surgical History:   Procedure Laterality Date    ARCH & 4 VESSEL STUDY Bilateral 5/27/2014    Performed by [facility]hi Martin MD at [facility] CATH LAB    BACK SURGERY      BRONCHOSCOPY,FIBEROPTIC N/A 6/26/2018    Performed by Liana Serrano MD at [facility] OR 2ND FLR    carotid endarectomy      CATHETERIZATION, HEART, LEFT Left 11/8/2013    Performed by [facility]ekhi Martin MD at [facility] CATH LAB    COLONOSCOPY N/A 6/1/2015    Per[facility] at [facility] ENDO    CORONARY ANGIOPLASTY      CORONARY ARTERY BYPASS GRAFT      ESOPHAGOGASTRODUODENOSCOPY (EGD) N/A 8/16/2018    Performed by Yohannes Osuna MD at [facility] ENDO (2ND FLR)    HEART CATH-LEFT N/A  5/27/2014    Per[facility] at [facility] CATH LAB    INSERTION, STENT, CORONARY ARTERY N/A 12/20/2013    Performed by [facility] Martin [facility] at [facility] CATH LAB    PTA, PERIPHERAL BLOOD VESSEL Left 3/11/2014    Performed by [facility] at [facility] CATH LAB    PTA, PERIPHERAL BLOOD VESSEL N/A 2/18/2014    Performed by [facility] Martin MD at [facility] CATH LAB    SKIN BIOPSY      ULTRASOUND, ENDOBRONCHIAL N/A 6/26/2018    Performed by Liana S[facility]ano [facility] at [facility] OR [facility] FLR       Family History:   Family History   Problem Relation Age of Onset    Heart disease Father     Hypertension Father     Heart attack Father     Heart failure Father     Cancer Mother     Breast cancer Mother     No Known Problems Sister     No Known Problems Brother     No Known Problems Maternal Grandmother     No Known Problems Maternal Grandfather     No Known Problems Paternal Grandmother     No Known Problems Paternal Grandfather     No Known Problems Maternal Aunt     No Known Problems Maternal Uncle     No Known Problems Paternal Aunt     No Known Problems Paternal Uncle     Anemia Neg Hx     Arrhythmia Neg Hx     Asthma Neg Hx     Clotting disorder Neg Hx     Fainting Neg Hx     Hyperlipidemia Neg Hx        Social History:  reports that he quit smoking about 7 years ago. He has a 60.00 pack-year smoking history. He has never used smokeless tobacco. He reports that he drinks about 1.8 oz of alcohol per week. He reports that he has current or past drug history. Drug: Marijuana.    Allergies:  Review of patient's allergies indicates:  No Known Allergies    Medications:  Current Outpatient Medications   Medication Sig Dispense Refill    aspirin (ECOTRIN) 81 MG EC tablet Take 81 mg by mouth once daily.      atorvastatin (LIPITOR) 80 MG tablet Take 1 tablet (80 mg total) by mouth once daily. 30 tablet 11    cilostazol (PLETAL) 100 MG Tab TAKE 1 TABLET BY MOUTH TWICE DAILY 60 tablet 11    clopidogrel  (PLAVIX) 75 mg tablet TAKE 1 TABLET BY MOUTH EVERY DAY 90 tablet 4    doxycycline (VIBRA-TABS) 100 MG tablet Take 1 tablet (100 mg total) by mouth every 12 (twelve) hours. for 6 days 12 tablet 0    famotidine (PEPCID) 20 MG tablet Take 1 tablet (20 mg total) by mouth 2 (two) times daily. for 10 days 20 tablet 0    metoprolol succinate (TOPROL-XL) 50 MG 24 hr tablet Take 50 mg by mouth.      morphine (MS CONTIN) 15 MG 12 hr tablet Take 1 tablet (15 mg total) by mouth every 12 (twelve) hours. 60 tablet 0    nitroGLYCERIN (NITROSTAT) 0.4 MG SL tablet Place 1 tablet (0.4 mg total) under the tongue every 5 (five) minutes as needed for Chest pain. 90 tablet 1    ondansetron (ZOFRAN-ODT) 8 MG TbDL Take 1 tablet (8 mg total) by mouth every 12 (twelve) hours as needed. 30 tablet 1    oxyCODONE-acetaminophen (PERCOCET)  mg per tablet Take 1 tablet by mouth every 4 (four) hours as needed for Pain. 90 tablet 0    predniSONE (DELTASONE) 10 MG tablet Take 7 tablets (70 mg total) by mouth once daily. Dr. Infante to taper. for 5 days 35 tablet 0    PROAIR HFA 90 mcg/actuation inhaler Inhale 2 puffs into the lungs as needed. 18 g 6    umeclidinium-vilanterol (ANORO ELLIPTA) 62.5-25 mcg/actuation DsDv Inhale 1 puff into the lungs once daily. Controller 60 each 5    finasteride (PROSCAR) 5 mg tablet Take 1 tablet (5 mg total) by mouth once daily. 90 tablet 3     No current facility-administered medications for this visit.        Review of Systems   Constitutional: Positive for activity change. Negative for appetite change, chills, fatigue, fever and unexpected weight change.   HENT: Negative for dental problem, sinus pressure and sneezing.    Eyes: Negative for visual disturbance.   Respiratory: Positive for cough, shortness of breath and wheezing. Negative for choking and chest tightness.    Cardiovascular: Negative for chest pain and leg swelling.   Gastrointestinal: Negative for abdominal pain, blood in stool,  constipation, diarrhea and nausea.   Genitourinary: Negative for difficulty urinating, dysuria and frequency.   Musculoskeletal: Positive for back pain. Negative for arthralgias.   Skin: Negative for rash and wound.   Neurological: Positive for weakness. Negative for dizziness, light-headedness and headaches.   Hematological: Negative for adenopathy. Does not bruise/bleed easily.   Psychiatric/Behavioral: Negative for sleep disturbance. The patient is not nervous/anxious.        ECOG Performance Status:   ECOG SCORE    2 - Capable of all selfcare but unable to carry out any work activities, active > 50% of hours         Objective:      Vitals:   Vitals:    04/01/19 0941   BP: (!) 165/90   BP Location: Right arm   Patient Position: Sitting   BP Method: Large (Automatic)   Pulse: (!) 111   Resp: 14   Temp: 98.1 °F (36.7 °C)   TempSrc: Oral   SpO2: (!) 93%   Weight: 76.6 kg (168 lb 14 oz)   Height: 6' (1.829 m)     BMI: Body mass index is 22.9 kg/m².    Physical Exam   Constitutional: He is oriented to person, place, and time. He appears well-developed and well-nourished. He appears ill.   O2 via nasal cannula   HENT:   Head: Normocephalic and atraumatic.   Eyes: Pupils are equal, round, and reactive to light.   Neck: Normal range of motion. Neck supple.   Cardiovascular: Normal rate and regular rhythm.   Pulmonary/Chest: Effort normal. No respiratory distress. He has no wheezes.   Abdominal: Soft. He exhibits no distension. There is no tenderness.   Musculoskeletal: He exhibits no edema or tenderness.   Lymphadenopathy:     He has no cervical adenopathy.   Neurological: He is alert and oriented to person, place, and time. No cranial nerve deficit or sensory deficit. He exhibits normal muscle tone. Coordination normal.   Skin: Skin is warm and dry.   Psychiatric: He has a normal mood and affect. His behavior is normal.       Laboratory Data:  Lab Visit on 04/01/2019   Component Date Value Ref Range Status    WBC  04/01/2019 5.26  3.90 - 12.70 K/uL Final    RBC 04/01/2019 3.56* 4.60 - 6.20 M/uL Final    Hemoglobin 04/01/2019 10.5* 14.0 - 18.0 g/dL Final    Hematocrit 04/01/2019 33.7* 40.0 - 54.0 % Final    MCV 04/01/2019 95  82 - 98 fL Final    MCH 04/01/2019 29.5  27.0 - 31.0 pg Final    MCHC 04/01/2019 31.2* 32.0 - 36.0 g/dL Final    RDW 04/01/2019 15.7* 11.5 - 14.5 % Final    Platelets 04/01/2019 101* 150 - 350 K/uL Final    MPV 04/01/2019 9.7  9.2 - 12.9 fL Final    Gran # (ANC) 04/01/2019 4.7  1.8 - 7.7 K/uL Final    Comment: The ANC is based on a white cell differential from an   automated cell counter. It has not been microscopically   reviewed for the presence of abnormal cells. Clinical   correlation is required.      Immature Grans (Abs) 04/01/2019 0.12* 0.00 - 0.04 K/uL Final    Comment: Mild elevation in immature granulocytes is non specific and   can be seen in a variety of conditions including stress response,   acute inflammation, trauma and pregnancy. Correlation with other   laboratory and clinical findings is essential.      Magnesium 04/01/2019 1.5* 1.6 - 2.6 mg/dL Final    Sodium 04/01/2019 138  136 - 145 mmol/L Final    Potassium 04/01/2019 4.9  3.5 - 5.1 mmol/L Final    Chloride 04/01/2019 97  95 - 110 mmol/L Final    CO2 04/01/2019 30* 23 - 29 mmol/L Final    Glucose 04/01/2019 86  70 - 110 mg/dL Final    BUN, Bld 04/01/2019 31* 8 - 23 mg/dL Final    Creatinine 04/01/2019 0.9  0.5 - 1.4 mg/dL Final    Calcium 04/01/2019 9.4  8.7 - 10.5 mg/dL Final    Total Protein 04/01/2019 7.0  6.0 - 8.4 g/dL Final    Albumin 04/01/2019 2.7* 3.5 - 5.2 g/dL Final    Total Bilirubin 04/01/2019 0.5  0.1 - 1.0 mg/dL Final    Comment: For infants and newborns, interpretation of results should be based  on gestational age, weight and in agreement with clinical  observations.  Premature Infant recommended reference ranges:  Up to 24 hours.............<8.0 mg/dL  Up to 48 hours............<12.0  mg/dL  3-5 days..................<15.0 mg/dL  6-29 days.................<15.0 mg/dL      Alkaline Phosphatase 04/01/2019 107  55 - 135 U/L Final    AST 04/01/2019 23  10 - 40 U/L Final    ALT 04/01/2019 20  10 - 44 U/L Final    Anion Gap 04/01/2019 11  8 - 16 mmol/L Final    eGFR if African American 04/01/2019 >60.0  >60 mL/min/1.73 m^2 Final    eGFR if non African American 04/01/2019 >60.0  >60 mL/min/1.73 m^2 Final    Comment: Calculation used to obtain the estimated glomerular filtration  rate (eGFR) is the CKD-EPI equation.       TSH 04/01/2019 4.827* 0.400 - 4.000 uIU/mL Final    Free T4 04/01/2019 0.52* 0.71 - 1.51 ng/dL Final   Admission on 03/28/2019, Discharged on 03/30/2019   Component Date Value Ref Range Status    Blood Culture, Routine 03/28/2019 No Growth to date   Preliminary    Blood Culture, Routine 03/28/2019 No Growth to date   Preliminary    Blood Culture, Routine 03/28/2019 No Growth to date   Preliminary    Blood Culture, Routine 03/28/2019 No Growth to date   Preliminary    Blood Culture, Routine 03/28/2019 No Growth to date   Preliminary    Blood Culture, Routine 03/28/2019 No Growth to date   Preliminary    Blood Culture, Routine 03/28/2019 No Growth to date   Preliminary    Blood Culture, Routine 03/28/2019 No Growth to date   Preliminary    WBC 03/28/2019 3.77* 3.90 - 12.70 K/uL Final    RBC 03/28/2019 3.41* 4.60 - 6.20 M/uL Final    Hemoglobin 03/28/2019 10.2* 14.0 - 18.0 g/dL Final    Hematocrit 03/28/2019 31.9* 40.0 - 54.0 % Final    MCV 03/28/2019 94  82 - 98 fL Final    MCH 03/28/2019 29.9  27.0 - 31.0 pg Final    MCHC 03/28/2019 32.0  32.0 - 36.0 g/dL Final    RDW 03/28/2019 16.1* 11.5 - 14.5 % Final    Platelets 03/28/2019 85* 150 - 350 K/uL Final    MPV 03/28/2019 9.7  9.2 - 12.9 fL Final    Immature Granulocytes 03/28/2019 1.3* 0.0 - 0.5 % Final    Gran # (ANC) 03/28/2019 3.2  1.8 - 7.7 K/uL Final    Immature Grans (Abs) 03/28/2019 0.05* 0.00 - 0.04  K/uL Final    Comment: Mild elevation in immature granulocytes is non specific and   can be seen in a variety of conditions including stress response,   acute inflammation, trauma and pregnancy. Correlation with other   laboratory and clinical findings is essential.      Lymph # 03/28/2019 0.3* 1.0 - 4.8 K/uL Final    Mono # 03/28/2019 0.2* 0.3 - 1.0 K/uL Final    Eos # 03/28/2019 0.0  0.0 - 0.5 K/uL Final    Baso # 03/28/2019 0.00  0.00 - 0.20 K/uL Final    nRBC 03/28/2019 0  0 /100 WBC Final    Gran% 03/28/2019 86.0* 38.0 - 73.0 % Final    Lymph% 03/28/2019 8.5* 18.0 - 48.0 % Final    Mono% 03/28/2019 4.2  4.0 - 15.0 % Final    Eosinophil% 03/28/2019 0.0  0.0 - 8.0 % Final    Basophil% 03/28/2019 0.0  0.0 - 1.9 % Final    Differential Method 03/28/2019 Automated   Final    Sodium 03/28/2019 139  136 - 145 mmol/L Final    Potassium 03/28/2019 3.9  3.5 - 5.1 mmol/L Final    Chloride 03/28/2019 96  95 - 110 mmol/L Final    CO2 03/28/2019 30* 23 - 29 mmol/L Final    Glucose 03/28/2019 96  70 - 110 mg/dL Final    BUN, Bld 03/28/2019 23  8 - 23 mg/dL Final    Creatinine 03/28/2019 0.9  0.5 - 1.4 mg/dL Final    Calcium 03/28/2019 9.5  8.7 - 10.5 mg/dL Final    Total Protein 03/28/2019 7.1  6.0 - 8.4 g/dL Final    Albumin 03/28/2019 2.8* 3.5 - 5.2 g/dL Final    Total Bilirubin 03/28/2019 0.5  0.1 - 1.0 mg/dL Final    Comment: For infants and newborns, interpretation of results should be based  on gestational age, weight and in agreement with clinical  observations.  Premature Infant recommended reference ranges:  Up to 24 hours.............<8.0 mg/dL  Up to 48 hours............<12.0 mg/dL  3-5 days..................<15.0 mg/dL  6-29 days.................<15.0 mg/dL      Alkaline Phosphatase 03/28/2019 107  55 - 135 U/L Final    AST 03/28/2019 36  10 - 40 U/L Final    ALT 03/28/2019 28  10 - 44 U/L Final    Anion Gap 03/28/2019 13  8 - 16 mmol/L Final    eGFR if African American 03/28/2019 >60.0   >60 mL/min/1.73 m^2 Final    eGFR if non African American 03/28/2019 >60.0  >60 mL/min/1.73 m^2 Final    Comment: Calculation used to obtain the estimated glomerular filtration  rate (eGFR) is the CKD-EPI equation.       Lactate (Lactic Acid) 03/28/2019 2.5* 0.5 - 2.2 mmol/L Final    Comment: Falsely low lactic acid results can be found in samples   containing >=13.0 mg/dL total bilirubin and/or >=3.5 mg/dL   direct bilirubin.      Specimen UA 03/28/2019 Urine, Clean Catch   Final    Color, UA 03/28/2019 Straw  Yellow, Straw, Michelle Final    Appearance, UA 03/28/2019 Clear  Clear Final    pH, UA 03/28/2019 8.0  5.0 - 8.0 Final    Specific Urania, UA 03/28/2019 1.010  1.005 - 1.030 Final    Protein, UA 03/28/2019 Negative  Negative Final    Comment: Recommend a 24 hour urine protein or a urine   protein/creatinine ratio if globulin induced proteinuria is  clinically suspected.      Glucose, UA 03/28/2019 Negative  Negative Final    Ketones, UA 03/28/2019 Negative  Negative Final    Bilirubin (UA) 03/28/2019 Negative  Negative Final    Occult Blood UA 03/28/2019 1+* Negative Final    Nitrite, UA 03/28/2019 Negative  Negative Final    Leukocytes, UA 03/28/2019 Negative  Negative Final    Troponin I 03/28/2019 0.021  0.000 - 0.026 ng/mL Final    Comment: The reference interval for Troponin I represents the 99th percentile   cutoff   for our facility and is consistent with 3rd generation assay   performance.      Procalcitonin 03/28/2019 0.17  <0.25 ng/mL Final    Comment: A concentration < 0.25 ng/mL represents a low risk bacterial   infection.  Procalcitonin may not be accurate among patients with localized   infection, recent trauma or major surgery, immunosuppressed state,   invasive fungal infection, renal dysfunction. Decisions regarding   initiation or continuation of antibiotic therapy should not be based   solely on procalcitonin levels.      Prothrombin Time 03/28/2019 9.8  9.0 - 12.5 sec Final     INR 03/28/2019 0.9  0.8 - 1.2 Final    Comment: Coumadin Therapy:  2.0 - 3.0 for INR for all indicators except mechanical heart valves  and antiphospholipid syndromes which should use 2.5 - 3.5.      BNP 03/28/2019 168* 0 - 99 pg/mL Final    Values of less than 100 pg/ml are consistent with non-CHF populations.    Lactate (Lactic Acid) 03/28/2019 2.8* 0.5 - 2.2 mmol/L Final    Comment: Falsely low lactic acid results can be found in samples   containing >=13.0 mg/dL total bilirubin and/or >=3.5 mg/dL   direct bilirubin.      Influenza A, Molecular 03/28/2019 Negative  Negative Final    Influenza B, Molecular 03/28/2019 Negative  Negative Final    Flu A & B Source 03/28/2019 Nasal swab   Final    RBC, UA 03/28/2019 3  0 - 4 /hpf Final    WBC, UA 03/28/2019 0  0 - 5 /hpf Final    Squam Epithel, UA 03/28/2019 0  /hpf Final    Microscopic Comment 03/28/2019 SEE COMMENT   Final    Comment: Other formed elements not mentioned in the report are not   present in the microscopic examination.       Sodium 03/29/2019 137  136 - 145 mmol/L Final    Potassium 03/29/2019 3.9  3.5 - 5.1 mmol/L Final    Chloride 03/29/2019 96  95 - 110 mmol/L Final    CO2 03/29/2019 28  23 - 29 mmol/L Final    Glucose 03/29/2019 110  70 - 110 mg/dL Final    BUN, Bld 03/29/2019 24* 8 - 23 mg/dL Final    Creatinine 03/29/2019 0.8  0.5 - 1.4 mg/dL Final    Calcium 03/29/2019 8.5* 8.7 - 10.5 mg/dL Final    Anion Gap 03/29/2019 13  8 - 16 mmol/L Final    eGFR if African American 03/29/2019 >60.0  >60 mL/min/1.73 m^2 Final    eGFR if non African American 03/29/2019 >60.0  >60 mL/min/1.73 m^2 Final    Comment: Calculation used to obtain the estimated glomerular filtration  rate (eGFR) is the CKD-EPI equation.       WBC 03/29/2019 3.47* 3.90 - 12.70 K/uL Final    RBC 03/29/2019 3.12* 4.60 - 6.20 M/uL Final    Hemoglobin 03/29/2019 9.3* 14.0 - 18.0 g/dL Final    Hematocrit 03/29/2019 28.9* 40.0 - 54.0 % Final    MCV 03/29/2019  93  82 - 98 fL Final    MCH 03/29/2019 29.8  27.0 - 31.0 pg Final    MCHC 03/29/2019 32.2  32.0 - 36.0 g/dL Final    RDW 03/29/2019 15.9* 11.5 - 14.5 % Final    Platelets 03/29/2019 95* 150 - 350 K/uL Final    MPV 03/29/2019 10.1  9.2 - 12.9 fL Final    Immature Granulocytes 03/29/2019 2.0* 0.0 - 0.5 % Final    Gran # (ANC) 03/29/2019 3.1  1.8 - 7.7 K/uL Final    Immature Grans (Abs) 03/29/2019 0.07* 0.00 - 0.04 K/uL Final    Comment: Mild elevation in immature granulocytes is non specific and   can be seen in a variety of conditions including stress response,   acute inflammation, trauma and pregnancy. Correlation with other   laboratory and clinical findings is essential.      Lymph # 03/29/2019 0.1* 1.0 - 4.8 K/uL Final    Mono # 03/29/2019 0.2* 0.3 - 1.0 K/uL Final    Eos # 03/29/2019 0.0  0.0 - 0.5 K/uL Final    Baso # 03/29/2019 0.00  0.00 - 0.20 K/uL Final    nRBC 03/29/2019 0  0 /100 WBC Final    Gran% 03/29/2019 89.0* 38.0 - 73.0 % Final    Lymph% 03/29/2019 3.5* 18.0 - 48.0 % Final    Mono% 03/29/2019 5.5  4.0 - 15.0 % Final    Eosinophil% 03/29/2019 0.0  0.0 - 8.0 % Final    Basophil% 03/29/2019 0.0  0.0 - 1.9 % Final    Differential Method 03/29/2019 Automated   Final    Sodium 03/30/2019 136  136 - 145 mmol/L Final    Potassium 03/30/2019 4.3  3.5 - 5.1 mmol/L Final    Chloride 03/30/2019 99  95 - 110 mmol/L Final    CO2 03/30/2019 30* 23 - 29 mmol/L Final    Glucose 03/30/2019 99  70 - 110 mg/dL Final    BUN, Bld 03/30/2019 30* 8 - 23 mg/dL Final    Creatinine 03/30/2019 0.8  0.5 - 1.4 mg/dL Final    Calcium 03/30/2019 8.6* 8.7 - 10.5 mg/dL Final    Anion Gap 03/30/2019 7* 8 - 16 mmol/L Final    eGFR if African American 03/30/2019 >60.0  >60 mL/min/1.73 m^2 Final    eGFR if non African American 03/30/2019 >60.0  >60 mL/min/1.73 m^2 Final    Comment: Calculation used to obtain the estimated glomerular filtration  rate (eGFR) is the CKD-EPI equation.       WBC 03/30/2019  3.73* 3.90 - 12.70 K/uL Final    RBC 03/30/2019 3.11* 4.60 - 6.20 M/uL Final    Hemoglobin 03/30/2019 9.1* 14.0 - 18.0 g/dL Final    Hematocrit 03/30/2019 28.9* 40.0 - 54.0 % Final    MCV 03/30/2019 93  82 - 98 fL Final    MCH 03/30/2019 29.3  27.0 - 31.0 pg Final    MCHC 03/30/2019 31.5* 32.0 - 36.0 g/dL Final    RDW 03/30/2019 15.7* 11.5 - 14.5 % Final    Platelets 03/30/2019 87* 150 - 350 K/uL Final    MPV 03/30/2019 10.1  9.2 - 12.9 fL Final    Immature Granulocytes 03/30/2019 1.3* 0.0 - 0.5 % Final    Gran # (ANC) 03/30/2019 3.3  1.8 - 7.7 K/uL Final    Immature Grans (Abs) 03/30/2019 0.05* 0.00 - 0.04 K/uL Final    Comment: Mild elevation in immature granulocytes is non specific and   can be seen in a variety of conditions including stress response,   acute inflammation, trauma and pregnancy. Correlation with other   laboratory and clinical findings is essential.      Lymph # 03/30/2019 0.1* 1.0 - 4.8 K/uL Final    Mono # 03/30/2019 0.2* 0.3 - 1.0 K/uL Final    Eos # 03/30/2019 0.0  0.0 - 0.5 K/uL Final    Baso # 03/30/2019 0.00  0.00 - 0.20 K/uL Final    nRBC 03/30/2019 0  0 /100 WBC Final    Gran% 03/30/2019 88.8* 38.0 - 73.0 % Final    Lymph% 03/30/2019 3.5* 18.0 - 48.0 % Final    Mono% 03/30/2019 6.4  4.0 - 15.0 % Final    Eosinophil% 03/30/2019 0.0  0.0 - 8.0 % Final    Basophil% 03/30/2019 0.0  0.0 - 1.9 % Final    Differential Method 03/30/2019 Automated   Final   Admission on 03/25/2019, Discharged on 03/26/2019   Component Date Value Ref Range Status    WBC 03/25/2019 2.56* 3.90 - 12.70 K/uL Final    RBC 03/25/2019 3.09* 4.60 - 6.20 M/uL Final    Hemoglobin 03/25/2019 9.3* 14.0 - 18.0 g/dL Final    Hematocrit 03/25/2019 28.9* 40.0 - 54.0 % Final    MCV 03/25/2019 94  82 - 98 fL Final    MCH 03/25/2019 30.1  27.0 - 31.0 pg Final    MCHC 03/25/2019 32.2  32.0 - 36.0 g/dL Final    RDW 03/25/2019 16.4* 11.5 - 14.5 % Final    Platelets 03/25/2019 89* 150 - 350 K/uL Final     MPV 03/25/2019 10.0  9.2 - 12.9 fL Final    Immature Granulocytes 03/25/2019 0.4  0.0 - 0.5 % Final    Gran # (ANC) 03/25/2019 2.4  1.8 - 7.7 K/uL Final    Immature Grans (Abs) 03/25/2019 0.01  0.00 - 0.04 K/uL Final    Comment: Mild elevation in immature granulocytes is non specific and   can be seen in a variety of conditions including stress response,   acute inflammation, trauma and pregnancy. Correlation with other   laboratory and clinical findings is essential.      Lymph # 03/25/2019 0.1* 1.0 - 4.8 K/uL Final    Mono # 03/25/2019 0.0* 0.3 - 1.0 K/uL Final    Eos # 03/25/2019 0.0  0.0 - 0.5 K/uL Final    Baso # 03/25/2019 0.00  0.00 - 0.20 K/uL Final    nRBC 03/25/2019 0  0 /100 WBC Final    Gran% 03/25/2019 94.1* 38.0 - 73.0 % Final    Lymph% 03/25/2019 4.3* 18.0 - 48.0 % Final    Mono% 03/25/2019 1.2* 4.0 - 15.0 % Final    Eosinophil% 03/25/2019 0.0  0.0 - 8.0 % Final    Basophil% 03/25/2019 0.0  0.0 - 1.9 % Final    Platelet Estimate 03/25/2019 Decreased*  Final    Aniso 03/25/2019 Slight   Final    Poly 03/25/2019 Occasional   Final    Toxic Granulation 03/25/2019 Present   Final    Differential Method 03/25/2019 Automated   Final    Blood Culture, Routine 03/25/2019 No growth after 5 days.   Final    Blood Culture, Routine 03/25/2019 No growth after 5 days.   Final    Respiratory Culture 03/25/2019 No Pseudomonas isolated.   Final    Respiratory Culture 03/25/2019    Final                    Value:STAPHYLOCOCCUS AUREUS  Moderate  Normal respiratory ami also present      Gram Stain (Respiratory) 03/25/2019 <10 epithelial cells per low power field.   Final    Gram Stain (Respiratory) 03/25/2019 Few WBC's   Final    Gram Stain (Respiratory) 03/25/2019 Many Gram positive cocci   Final    Gram Stain (Respiratory) 03/25/2019 Few Gram negative rods   Final    Sodium 03/26/2019 137  136 - 145 mmol/L Final    Potassium 03/26/2019 3.8  3.5 - 5.1 mmol/L Final    Chloride 03/26/2019 95   95 - 110 mmol/L Final    CO2 03/26/2019 28  23 - 29 mmol/L Final    Glucose 03/26/2019 157* 70 - 110 mg/dL Final    BUN, Bld 03/26/2019 38* 8 - 23 mg/dL Final    Creatinine 03/26/2019 1.3  0.5 - 1.4 mg/dL Final    Calcium 03/26/2019 9.1  8.7 - 10.5 mg/dL Final    Anion Gap 03/26/2019 14  8 - 16 mmol/L Final    eGFR if African American 03/26/2019 >60.0  >60 mL/min/1.73 m^2 Final    eGFR if non African American 03/26/2019 57.3* >60 mL/min/1.73 m^2 Final    Comment: Calculation used to obtain the estimated glomerular filtration  rate (eGFR) is the CKD-EPI equation.       WBC 03/26/2019 6.59  3.90 - 12.70 K/uL Final    RBC 03/26/2019 3.19* 4.60 - 6.20 M/uL Final    Hemoglobin 03/26/2019 9.4* 14.0 - 18.0 g/dL Final    Hematocrit 03/26/2019 30.1* 40.0 - 54.0 % Final    MCV 03/26/2019 94  82 - 98 fL Final    MCH 03/26/2019 29.5  27.0 - 31.0 pg Final    MCHC 03/26/2019 31.2* 32.0 - 36.0 g/dL Final    RDW 03/26/2019 15.7* 11.5 - 14.5 % Final    Platelets 03/26/2019 100* 150 - 350 K/uL Final    MPV 03/26/2019 9.9  9.2 - 12.9 fL Final    Immature Granulocytes 03/26/2019 0.6* 0.0 - 0.5 % Final    Gran # (ANC) 03/26/2019 6.2  1.8 - 7.7 K/uL Final    Immature Grans (Abs) 03/26/2019 0.04  0.00 - 0.04 K/uL Final    Comment: Mild elevation in immature granulocytes is non specific and   can be seen in a variety of conditions including stress response,   acute inflammation, trauma and pregnancy. Correlation with other   laboratory and clinical findings is essential.      Lymph # 03/26/2019 0.2* 1.0 - 4.8 K/uL Final    Mono # 03/26/2019 0.2* 0.3 - 1.0 K/uL Final    Eos # 03/26/2019 0.0  0.0 - 0.5 K/uL Final    Baso # 03/26/2019 0.00  0.00 - 0.20 K/uL Final    nRBC 03/26/2019 0  0 /100 WBC Final    Gran% 03/26/2019 94.4* 38.0 - 73.0 % Final    Lymph% 03/26/2019 2.4* 18.0 - 48.0 % Final    Mono% 03/26/2019 2.6* 4.0 - 15.0 % Final    Eosinophil% 03/26/2019 0.0  0.0 - 8.0 % Final    Basophil% 03/26/2019 0.0   0.0 - 1.9 % Final    Differential Method 03/26/2019 Automated   Final         Imaging:   CT 02/11/2019    FINDINGS:  Finding: Comparison was made to a prior CT examination of the thorax performed on 12/14/2018.  The size of the heart is within normal limits.  There has been interval worsening of the appearance of both lungs.  There is a moderate amount of alveolar consolidation in the medial aspect of the left lung.  There has been interval development of a mild amount of haziness in the medial aspect of the right lower lobe.  There is a persistent tiny left pleural effusion.  There is no pneumothorax.  There is a mild amount dextroconvex curvature of the thoracic spine.    There is a 5 mm stone in the dependent portion of the gallbladder.  The liver, pancreas, spleen, adrenals, and kidneys are normal in appearance. The ureters and the urinary bladder are normal in appearance. The appendix is normal in appearance.  There is a moderate amount of diverticulosis in the sigmoid portion of the colon.  There is no free fluid within the abdomen or pelvis. There is no pneumoperitoneum.  There is a moderate amount of atherosclerosis.  There is an aneurysm in the distal aspect of the abdominal aorta.  It has an AP diameter of 3.5 cm.  There is grade 1 anterolisthesis of L3 on L4.  There are mild degenerative changes between L4 and S1.      Impression       1. There has been interval worsening of the appearance of the left lung.  There is a moderate amount of alveolar consolidation in the medial aspect of the left lung. This is consistent with the patient's history and characteristic of lung cancer.  2. There has been interval development of a mild amount of haziness in the medial aspect of the right lower lobe.  This is characteristic of atelectasis or scarring.  3. There is a persistent tiny left pleural effusion.  4. There is an aneurysm in the distal aspect of the abdominal aorta. It has an AP diameter of 3.5 cm.  5.  There is a 5 mm stone in the dependent portion of the gallbladder.  6.  There is grade 1 anterolisthesis of L3 on L4. There are mild degenerative changes between L4 and S1.  7. There is a moderate amount of diverticulosis in the sigmoid portion of the colon.  All CT scans at this facility use dose modulation, iterative reconstruction, and/or weight base dosing when appropriate to reduce radiation dose when appropriate to reduce radiation dose to as low as reasonably achievable.                Assessment:       1. Primary malignant neoplasm of left lung    2. Dyslipidemia    3. Benign non-nodular prostatic hyperplasia without lower urinary tract symptoms    4. Neoplasm related pain    5. Lung cancer metastatic to bone    6. Shortness of breath    7. Pneumonitis    8. COPD exacerbation           Plan:      Mr. Nunn is having worsening shortness of breath despite 70 mg prednisone.  He does have an appointment with Cardiology tomorrow and will also try to get him back in to see pulmonology this see if there is anything else we can offer.  Follow back up me in 2 weeks and report any new symptoms in the interim.    Demetrius Infante DO, FACP  Hematology & Oncology  [facility] [place] [address]  ph. [phone]  Fax. [phone]    25 minutes were spent in coordination of patient's care, record review and counseling.  More than 50% of the time was face-to-face.

## 2019-04-01 NOTE — TELEPHONE ENCOUNTER
IF still in town, I can see today with harris, dlco and walk prior.    ----- Message from Beryl See MA sent at 4/1/2019 11:04 AM CDT -----  Dr Serrano,  What would you like to me to do?  ThanksBeryl  ----- Message -----  From: Miguel Angel Hollingsworth RN  Sent: 4/1/2019  10:10 AM  To: Zach NAVA Staff    Patient needs to be see by pulm ASAP, he comes from out of town will be here today through tomorrow. Can you help get him set up. He has new onset SOB, Dr. Infante has tried steroids but they did not help.  Thanks  Miguel Angel

## 2019-04-01 NOTE — PLAN OF CARE
On 4/1/19 at 0630: CM noted that the patient discharged home on 3/30/19 with home O2 and [facility]; see SW notes for details. Discharge and follow-up instructions completed by the bedside nurse.    Future Appointments   Date Time Provider Department Center   4/1/2019  8:50 AM LAB, HEMONC CANCER BLDG [facility] LAB HO [facility]   4/1/2019  9:40 AM Demetrius Infante DO, FACP [facility] HEM ONC [place]ena [place]dez[place]   4/2/2019  3:20 PM Mekhi Martin MD [facility] CARDJORDAN [place] génesis   6/17/2019  1:30 PM Yady Oliveira MD [facility]      04/01/19 0726   Final Note   Assessment Type Final Discharge Note   Anticipated Discharge Disposition Home-Health  ([facility])   What phone number can be called within the next 1-3 days to see how you are doing after discharge?   ([phone])   Hospital Follow Up  Appt(s) scheduled? Yes   Discharge plans and expectations educations in teach back method with documentation complete? Yes  (per bedside nurse)

## 2019-04-02 ENCOUNTER — OFFICE VISIT (OUTPATIENT)
Dept: CARDIOLOGY | Facility: CLINIC | Age: 64
End: 2019-04-02
Payer: MEDICARE

## 2019-04-02 VITALS
HEIGHT: 72 IN | WEIGHT: 168.44 LBS | BODY MASS INDEX: 22.81 KG/M2 | HEART RATE: 94 BPM | OXYGEN SATURATION: 99 % | SYSTOLIC BLOOD PRESSURE: 132 MMHG | DIASTOLIC BLOOD PRESSURE: 75 MMHG

## 2019-04-02 DIAGNOSIS — I10 ESSENTIAL HYPERTENSION: ICD-10-CM

## 2019-04-02 DIAGNOSIS — I71.40 ABDOMINAL AORTIC ANEURYSM (AAA) WITHOUT RUPTURE: ICD-10-CM

## 2019-04-02 DIAGNOSIS — I65.23 BILATERAL CAROTID ARTERY OCCLUSION: ICD-10-CM

## 2019-04-02 DIAGNOSIS — I25.811 CORONARY ARTERY DISEASE INVOLVING NATIVE ARTERY OF TRANSPLANTED HEART WITHOUT ANGINA PECTORIS: ICD-10-CM

## 2019-04-02 LAB
BACTERIA BLD CULT: NORMAL
BACTERIA BLD CULT: NORMAL

## 2019-04-02 PROCEDURE — 3075F SYST BP GE 130 - 139MM HG: CPT | Mod: CPTII,S$GLB,, | Performed by: INTERNAL MEDICINE

## 2019-04-02 PROCEDURE — 3078F PR MOST RECENT DIASTOLIC BLOOD PRESSURE < 80 MM HG: ICD-10-PCS | Mod: CPTII,S$GLB,, | Performed by: INTERNAL MEDICINE

## 2019-04-02 PROCEDURE — 3078F DIAST BP <80 MM HG: CPT | Mod: CPTII,S$GLB,, | Performed by: INTERNAL MEDICINE

## 2019-04-02 PROCEDURE — 1101F PT FALLS ASSESS-DOCD LE1/YR: CPT | Mod: CPTII,S$GLB,, | Performed by: INTERNAL MEDICINE

## 2019-04-02 PROCEDURE — 99999 PR PBB SHADOW E&M-EST. PATIENT-LVL III: CPT | Mod: PBBFAC,,, | Performed by: INTERNAL MEDICINE

## 2019-04-02 PROCEDURE — 3075F PR MOST RECENT SYSTOLIC BLOOD PRESS GE 130-139MM HG: ICD-10-PCS | Mod: CPTII,S$GLB,, | Performed by: INTERNAL MEDICINE

## 2019-04-02 PROCEDURE — 99215 OFFICE O/P EST HI 40 MIN: CPT | Mod: S$GLB,,, | Performed by: INTERNAL MEDICINE

## 2019-04-02 PROCEDURE — 3008F BODY MASS INDEX DOCD: CPT | Mod: CPTII,S$GLB,, | Performed by: INTERNAL MEDICINE

## 2019-04-02 PROCEDURE — 99499 RISK ADDL DX/OHS AUDIT: ICD-10-PCS | Mod: S$GLB,,, | Performed by: INTERNAL MEDICINE

## 2019-04-02 PROCEDURE — 3008F PR BODY MASS INDEX (BMI) DOCUMENTED: ICD-10-PCS | Mod: CPTII,S$GLB,, | Performed by: INTERNAL MEDICINE

## 2019-04-02 PROCEDURE — 1101F PR PT FALLS ASSESS DOC 0-1 FALLS W/OUT INJ PAST YR: ICD-10-PCS | Mod: CPTII,S$GLB,, | Performed by: INTERNAL MEDICINE

## 2019-04-02 PROCEDURE — 99499 UNLISTED E&M SERVICE: CPT | Mod: S$GLB,,, | Performed by: INTERNAL MEDICINE

## 2019-04-02 PROCEDURE — 99999 PR PBB SHADOW E&M-EST. PATIENT-LVL III: ICD-10-PCS | Mod: PBBFAC,,, | Performed by: INTERNAL MEDICINE

## 2019-04-02 PROCEDURE — 99215 PR OFFICE/OUTPT VISIT, EST, LEVL V, 40-54 MIN: ICD-10-PCS | Mod: S$GLB,,, | Performed by: INTERNAL MEDICINE

## 2019-04-02 NOTE — PROGRESS NOTES
Interventional Cardiology Clinic Note  Reason for Visit: Chest pain    HPI:   Pt is a 63 year old gentleman who is here for chest pain     Pt has a hx of stage IV lung cancer on O2 3L s/p chemotherapy and radiation and now on steroids, CAD s/p PCI in the past, hypertension, hyperlipidemia who is here with substernal chest discomfort mainly at the end of the day, non-radiating, it improves with TUMS. Also has associated shortness of breath.       Review of Systems   Constitution: Negative for decreased appetite.   HENT: Negative for congestion and hearing loss.    Eyes: Negative for double vision.   Cardiovascular: Positive for chest pain. Negative for palpitations.   Respiratory: Positive for shortness of breath. Negative for cough.    Hematologic/Lymphatic: Does not bruise/bleed easily.   Gastrointestinal: Negative for abdominal pain, nausea and vomiting.   Neurological: Negative for dizziness, light-headedness and weakness.       PMH:     Past Medical History:   Diagnosis Date    Abdominal aortic aneurysm (AAA) without rupture 3/5/2018    Arthritis     Blood transfusion     COPD (chronic obstructive pulmonary disease)     COPD exacerbation 3/24/2019    Coronary artery disease     Dehydration 8/15/2018    Encounter for blood transfusion     Hyperlipidemia 11/7/2013    Hypertension     Lung cancer metastatic to bone 7/18/2018    Occlusion and stenosis of carotid artery without mention of cerebral infarction 1/12/2014    Pneumonitis 2/13/2019    Primary malignant neoplasm of left lung 7/18/2018    Screening for colon cancer 6/1/2015    Syncope 1/13/2014    Thrombocytopenia 9/19/2018    Weakness of both lower extremities 1/14/2019     Past Surgical History:   Procedure Laterality Date    ARCH & 4 VESSEL STUDY Bilateral 5/27/2014    Per[facility] CATH LAB    BACK SURGERY      BRONCHOSCOPY,FIBEROPTIC N/A 6/26/2018    [facility]     carotid endarectomy      CATHETERIZATION, HEART, LEFT Left 11/8/2013    Performed by [facility] at [facility] CATH LAB    COLONOSCOPY N/A 6/1/2015    [facility] at [facility] ENDO    CORONARY ANGIOPLASTY      CORONARY ARTERY BYPASS GRAFT      ESOPHAGOGASTRODUODENOSCOPY (EGD) N/A 8/16/2018    Performed by Yohannes Osuna MD at [facility] ENDO (2ND FLR)    HEART CATH-LEFT N/A 5/27/2014    Performed by [facility] Martin MD at [facility] CATH LAB    INSERTION, STENT, CORONARY ARTERY N/A 12/20/2013    Performed by Mekhi Martin MD at [facility] CATH LAB    PTA, PERIPHERAL BLOOD VESSEL Left 3/11/2014    Performed by Mekhi Martin MD at [facility] CATH LAB    PTA, PERIPHERAL BLOOD VESSEL N/A 2/18/2014    Performed by [facility]ekhi Martin MD at [facility] CATH LAB    SKIN BIOPSY      ULTRASOUND, ENDOBRONCHIAL N/A 6/26/2018    Performed by Liana Serrano MD at [facility] OR 2ND FLR     Allergies:   Review of patient's allergies indicates:  No Known Allergies  Medications:     Current Outpatient Medications on File Prior to Visit   Medication Sig Dispense Refill    aspirin (ECOTRIN) 81 MG EC tablet Take 81 mg by mouth once daily.      atorvastatin (LIPITOR) 80 MG tablet Take 1 tablet (80 mg total) by mouth once daily. 30 tablet 11    cilostazol (PLETAL) 100 MG Tab TAKE 1 TABLET BY MOUTH TWICE DAILY 60 tablet 11    clopidogrel (PLAVIX) 75 mg tablet TAKE 1 TABLET BY MOUTH EVERY DAY 90 tablet 4    doxycycline (VIBRA-TABS) 100 MG tablet Take 1 tablet (100 mg total) by mouth every 12 (twelve) hours. for 6 days 12 tablet 0    famotidine (PEPCID) 20 MG tablet Take 1 tablet (20 mg total) by mouth 2 (two) times daily. for 10 days 20 tablet 0    finasteride (PROSCAR) 5 mg tablet Take 1 tablet (5 mg total) by mouth once daily. 90 tablet 3    metoprolol succinate (TOPROL-XL) 50 MG 24 hr tablet Take 50 mg by mouth.      morphine (MS CONTIN) 15 MG 12 hr tablet Take 1 tablet (15 mg total) by mouth every 12 (twelve) hours. 60  tablet 0    ondansetron (ZOFRAN-ODT) 8 MG TbDL Take 1 tablet (8 mg total) by mouth every 12 (twelve) hours as needed. 30 tablet 1    oxyCODONE-acetaminophen (PERCOCET)  mg per tablet Take 1 tablet by mouth every 4 (four) hours as needed for Pain. 90 tablet 0    predniSONE (DELTASONE) 10 MG tablet Take 7 tablets (70 mg total) by mouth once daily. Dr. Infante to taper. for 5 days 35 tablet 0    PROAIR HFA 90 mcg/actuation inhaler Inhale 2 puffs into the lungs as needed. 18 g 6    umeclidinium brm/vilanterol tr (UMECLIDINIUM-VILANTEROL INHL) Inhale 1 puff into the lungs.      fluticasone-umeclidin-vilanter (TRELEGY ELLIPTA) 100-62.5-25 mcg DsDv Inhale 1 puff into the lungs once daily. 60 each 11    nitroGLYCERIN (NITROSTAT) 0.4 MG SL tablet Place 1 tablet (0.4 mg total) under the tongue every 5 (five) minutes as needed for Chest pain. 90 tablet 1     No current facility-administered medications on file prior to visit.      Social History:     Social History     Tobacco Use    Smoking status: Former Smoker     Packs/day: 1.50     Years: 40.00     Pack years: 60.00     Last attempt to quit: 10/11/2011     Years since quittin.4    Smokeless tobacco: Never Used   Substance Use Topics    Alcohol use: Yes     Alcohol/week: 1.8 oz     Types: 3 Cans of beer per week     Comment: 3 cans beer every day or every other day     Family History:     Family History   Problem Relation Age of Onset    Heart disease Father     Hypertension Father     Heart attack Father     Heart failure Father     Cancer Mother     Breast cancer Mother     No Known Problems Sister     No Known Problems Brother     No Known Problems Maternal Grandmother     No Known Problems Maternal Grandfather     No Known Problems Paternal Grandmother     No Known Problems Paternal Grandfather     No Known Problems Maternal Aunt     No Known Problems Maternal Uncle     No Known Problems Paternal Aunt     No Known Problems Paternal  Uncle     Anemia Neg Hx     Arrhythmia Neg Hx     Asthma Neg Hx     Clotting disorder Neg Hx     Fainting Neg Hx     Hyperlipidemia Neg Hx        Physical Exam  /75 (BP Location: Right arm, Patient Position: Sitting, BP Method: Large (Automatic))   Pulse 94   Ht 6' (1.829 m)   Wt 76.4 kg (168 lb 6.9 oz)   SpO2 99%   BMI 22.84 kg/m²    GEN: Alert and oriented in NAD  NECK: no JVD appreciated   CVS: RRR, s1/s2, no MRG  PULM: diminished breath sounds and wheezing noted bilaterally.   ABD: NT/ND BS +  Extremities: warm and dry, palpable pulses, no edema  NEURO: Alert and oriented x 3  PSYCH: appropriate affect.             Labs:     Lab Results   Component Value Date     04/01/2019    K 4.9 04/01/2019    CL 97 04/01/2019    CO2 30 (H) 04/01/2019    BUN 31 (H) 04/01/2019    CREATININE 0.9 04/01/2019    ANIONGAP 11 04/01/2019     Lab Results   Component Value Date    HGBA1C 5.2 08/16/2018     Lab Results   Component Value Date     (H) 03/28/2019    BNP 61 12/20/2013    Lab Results   Component Value Date    WBC 5.26 04/01/2019    HGB 10.5 (L) 04/01/2019    HCT 33.7 (L) 04/01/2019     (L) 04/01/2019    GRAN 4.7 04/01/2019     Lab Results   Component Value Date    CHOL 196 12/11/2017    HDL 85 (H) 12/11/2017    LDLCALC 94.2 12/11/2017    TRIG 84 12/11/2017          No results found for: EF    EKG: reviewed    Assessment and Plan  Nimesh Nunn is a 63 y.o. gentleman who is here for chest discomfort.     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 He had a LHC in 2014 with non-obstructive disease.    Pt does have have known CAD but had non-obstructive disease in 2014. This chest discomfort seems likely related to GERD which is improved with TUMS. Would continue  with his current therapy and his shortness of breath seems likely related to his lung cancer.     Bilateral carotid artery disease  Follows yearly will see in august for annual visit.     Essential hypertension  Blood pressure well controlled.    Abdominal aortic aneurysm (AAA) without rupture  Following yearly will see in august.       Signed:        Ginny Mack MD  Cardiology Fellow  Pager [phone]    I have personally taken the history and examined this patient. I have discussed and agree with the resident's findings and plan as documented in the resident's note.  Mekhi Martin

## 2019-04-02 NOTE — ASSESSMENT & PLAN NOTE
Pt with prior CAD s/p PCI to RCA and LAD who was referred for shortness of breath and chest pressure at night that he states feels like indigestion and is improved with tums. He had a C in 2014 with non-obstructive disease.    Pt does have have known CAD but had non-obstructive disease in 2014. This chest discomfort seems likely related to GERD which is improved with TUMS. Would continue with his current therapy and his shortness of breath seems likely related to his lung cancer.

## 2019-04-03 ENCOUNTER — PATIENT MESSAGE (OUTPATIENT)
Dept: HEMATOLOGY/ONCOLOGY | Facility: CLINIC | Age: 64
End: 2019-04-03

## 2019-04-03 ENCOUNTER — PATIENT MESSAGE (OUTPATIENT)
Dept: PULMONOLOGY | Facility: CLINIC | Age: 64
End: 2019-04-03

## 2019-04-04 ENCOUNTER — PATIENT MESSAGE (OUTPATIENT)
Dept: HEMATOLOGY/ONCOLOGY | Facility: CLINIC | Age: 64
End: 2019-04-04

## 2019-04-04 DIAGNOSIS — G89.3 NEOPLASM RELATED PAIN: Primary | ICD-10-CM

## 2019-04-04 RX ORDER — MORPHINE SULFATE 30 MG/1
30 TABLET, FILM COATED, EXTENDED RELEASE ORAL EVERY 12 HOURS
Qty: 60 TABLET | Refills: 0 | Status: SHIPPED | OUTPATIENT
Start: 2019-04-04 | End: 2019-04-22 | Stop reason: SDUPTHER

## 2019-04-04 NOTE — TELEPHONE ENCOUNTER
Patient is taking morphine 15mg bid and percocet  every 3 hours while awake.   His pain is in his lower back--up towards shoulder--baseline pain is 8/10, with medication it stays around a 6/10.    Dr. Fritz patient seen in clinic on 4/1---completed prednisone for pneumonitis.       Should we increase his morphine to 30mg bid?      Dr fritz/heaven are off today.    ~birdie

## 2019-04-08 ENCOUNTER — TELEPHONE (OUTPATIENT)
Dept: ADMINISTRATIVE | Facility: CLINIC | Age: 64
End: 2019-04-08

## 2019-04-08 NOTE — TELEPHONE ENCOUNTER
Home Health SOC 03/31/2019 - 05/29/2019 with [facility] ([place] - Dr. Desirae Vasquez. PT services.

## 2019-04-09 NOTE — PHYSICIAN QUERY
PT Name: Nimesh Nunn  MR #: [id number]     Physician Query Form - Documentation Clarification      CDS/: Marielos Villalba  RN CCDS             Contact information:[email or web address]    This form is a permanent document in the medical record.     Query Date: April 9, 2019    By submitting this query, we are merely seeking further clarification of documentation. Please utilize your independent clinical judgment when addressing the question(s) below.    The Medical record reflects the following:    Supporting Clinical Findings Location in Medical Record     WBC = 3.77, 3.47, 3.73, 5.26  H/H=  10.2/31.9, 9.3/28.9,                         9.1/28.9,10.5/33.7    Platelets= 85, 95, 87, 101    ANC(1.8-7.7)= 3.2, 3.1, 3.3, 4.7       Lab 3/28-4/1   Antineoplastic chemotherapy induced pancytopenia    He was stabilized on 4L O2 via NC. Labs notable for pancytopenia    Has been treated with chemotherapy and radiation complicated by pneumonitis, has COPD and uses home oxygen     H&P-DS      H&P-DS      ED provider note                                                                            Based on above clinical indicators, please clarify the pancytopenia.  Thank you.    Provider Use Only    [    ] Pancytopenia due to chemotherapy ruled out.  Pt with only thrombocytopenia and anemia due to chemotherapy.    [  x ] Pancytopenia due to chemotherapy ruled in    [    ] Other________________________                                                                                                                 [  ] Clinically Undetermined

## 2019-04-09 NOTE — PHYSICIAN QUERY
PT Name: Nimesh Nunn  MR #: [id number]     Physician Query Form - Diagnosis Clarification      CDS/: Marielos Villalba    RN CCDS           Contact information:[email or web address]    This form is a permanent document in the medical record.     Query Date: April 9, 2019    By submitting this query, we are merely seeking further clarification of documentation.  Please utilize your independent clinical judgment when addressing the question(s) below.     The medical record contains the following:      Findings Supporting Clinical Information Location in Medical Record     Pneumonitis Admitted with recurrent shortness of air. Seems c/w COPD exacerbation and possibly pneumonitis from prior immunotherapy    He was started on Prednisone 80 mg for suspected pneumonitis based on CXR findings with initial improvement.    There is moderate confluent left parahilar streaky opacities unchanged, this could be related to infection or post therapy change.  There is mild patchy hazy opacities of the right mid lung.  No lobar consolidation or discrete nodule or cavitary lesion.    Prednisone 60 mg PO x 1 in ED  Prednisone 70 mg PO daily 3/29-3/30  Continues until 4/4 per outpt RX H&P        DS        CXR 3/28              MAR     Please clarify if the _____Pneumonitis____________ diagnosis has been:    [ x ] Ruled In   [  ] Ruled In, Now Resolved   [  ] Ruled Out   [  ] Other/Clarification of findings (please specify):     [  ] Clinically undetermined     Please document in your progress notes daily for the duration of treatment, until resolved, and include in your discharge summary.

## 2019-04-10 DIAGNOSIS — R29.898 DEFICIENCIES OF LIMBS: Primary | ICD-10-CM

## 2019-04-15 ENCOUNTER — HOSPITAL ENCOUNTER (INPATIENT)
Facility: HOSPITAL | Age: 64
LOS: 2 days | Discharge: HOME | DRG: 189 | End: 2019-04-17
Attending: EMERGENCY MEDICINE | Admitting: INTERNAL MEDICINE
Payer: MEDICARE

## 2019-04-15 ENCOUNTER — TELEPHONE (OUTPATIENT)
Dept: HEMATOLOGY/ONCOLOGY | Facility: CLINIC | Age: 64
End: 2019-04-15

## 2019-04-15 ENCOUNTER — PATIENT MESSAGE (OUTPATIENT)
Dept: HEMATOLOGY/ONCOLOGY | Facility: CLINIC | Age: 64
End: 2019-04-15

## 2019-04-15 DIAGNOSIS — D49.2 NEOPLASM OF SPINE: ICD-10-CM

## 2019-04-15 DIAGNOSIS — Z51.89 ENCOUNTER FOR OTHER SPECIFIED AFTERCARE: ICD-10-CM

## 2019-04-15 DIAGNOSIS — C79.51 LUNG CANCER METASTATIC TO BONE: ICD-10-CM

## 2019-04-15 DIAGNOSIS — R06.02 SOB (SHORTNESS OF BREATH): ICD-10-CM

## 2019-04-15 DIAGNOSIS — N18.9 CHRONIC KIDNEY DISEASE, UNSPECIFIED CKD STAGE: ICD-10-CM

## 2019-04-15 DIAGNOSIS — J44.1 COPD EXACERBATION: Primary | ICD-10-CM

## 2019-04-15 DIAGNOSIS — J44.1 CHRONIC OBSTRUCTIVE PULMONARY DISEASE WITH ACUTE EXACERBATION: ICD-10-CM

## 2019-04-15 DIAGNOSIS — R91.8 PULMONARY INFILTRATES ON CXR: ICD-10-CM

## 2019-04-15 DIAGNOSIS — J96.01 ACUTE RESPIRATORY FAILURE WITH HYPOXIA: ICD-10-CM

## 2019-04-15 DIAGNOSIS — C34.90 LUNG CANCER METASTATIC TO BONE: ICD-10-CM

## 2019-04-15 DIAGNOSIS — E87.8 ELECTROLYTE ABNORMALITY: ICD-10-CM

## 2019-04-15 DIAGNOSIS — R09.02 HYPOXIA: ICD-10-CM

## 2019-04-15 DIAGNOSIS — C34.92 ADENOCARCINOMA OF LEFT LUNG, STAGE 4: ICD-10-CM

## 2019-04-15 DIAGNOSIS — R07.9 CHEST PAIN: ICD-10-CM

## 2019-04-15 LAB
ALBUMIN SERPL BCP-MCNC: 2.5 G/DL (ref 3.5–5.2)
ALLENS TEST: ABNORMAL
ALP SERPL-CCNC: 166 U/L (ref 55–135)
ALT SERPL W/O P-5'-P-CCNC: 10 U/L (ref 10–44)
ANION GAP SERPL CALC-SCNC: 15 MMOL/L (ref 8–16)
AST SERPL-CCNC: 18 U/L (ref 10–40)
BASOPHILS # BLD AUTO: 0.02 K/UL (ref 0–0.2)
BASOPHILS NFR BLD: 0.5 % (ref 0–1.9)
BILIRUB SERPL-MCNC: 0.4 MG/DL (ref 0.1–1)
BNP SERPL-MCNC: 84 PG/ML (ref 0–99)
BUN SERPL-MCNC: 13 MG/DL (ref 8–23)
CALCIUM SERPL-MCNC: 9 MG/DL (ref 8.7–10.5)
CHLORIDE SERPL-SCNC: 93 MMOL/L (ref 95–110)
CO2 SERPL-SCNC: 27 MMOL/L (ref 23–29)
CREAT SERPL-MCNC: 0.8 MG/DL (ref 0.5–1.4)
DELSYS: ABNORMAL
DIFFERENTIAL METHOD: ABNORMAL
EOSINOPHIL # BLD AUTO: 0.1 K/UL (ref 0–0.5)
EOSINOPHIL NFR BLD: 1.2 % (ref 0–8)
ERYTHROCYTE [DISTWIDTH] IN BLOOD BY AUTOMATED COUNT: 16.2 % (ref 11.5–14.5)
ERYTHROCYTE [SEDIMENTATION RATE] IN BLOOD BY WESTERGREN METHOD: 18 MM/H
EST. GFR  (AFRICAN AMERICAN): >60 ML/MIN/1.73 M^2
EST. GFR  (NON AFRICAN AMERICAN): >60 ML/MIN/1.73 M^2
FIO2: 32
FLOW: 3
GLUCOSE SERPL-MCNC: 102 MG/DL (ref 70–110)
HCO3 UR-SCNC: 28.4 MMOL/L (ref 24–28)
HCT VFR BLD AUTO: 27.2 % (ref 40–54)
HGB BLD-MCNC: 8.6 G/DL (ref 14–18)
IMM GRANULOCYTES # BLD AUTO: 0.04 K/UL (ref 0–0.04)
IMM GRANULOCYTES NFR BLD AUTO: 1 % (ref 0–0.5)
LACTATE SERPL-SCNC: 0.9 MMOL/L (ref 0.5–2.2)
LYMPHOCYTES # BLD AUTO: 0.3 K/UL (ref 1–4.8)
LYMPHOCYTES NFR BLD: 8.2 % (ref 18–48)
MCH RBC QN AUTO: 30 PG (ref 27–31)
MCHC RBC AUTO-ENTMCNC: 31.6 G/DL (ref 32–36)
MCV RBC AUTO: 95 FL (ref 82–98)
MODE: ABNORMAL
MONOCYTES # BLD AUTO: 0.3 K/UL (ref 0.3–1)
MONOCYTES NFR BLD: 7.7 % (ref 4–15)
NEUTROPHILS # BLD AUTO: 3.4 K/UL (ref 1.8–7.7)
NEUTROPHILS NFR BLD: 81.4 % (ref 38–73)
NRBC BLD-RTO: 0 /100 WBC
PCO2 BLDA: 64.2 MMHG (ref 35–45)
PH SMN: 7.25 [PH] (ref 7.35–7.45)
PLATELET # BLD AUTO: 104 K/UL (ref 150–350)
PMV BLD AUTO: 10 FL (ref 9.2–12.9)
PO2 BLDA: 19 MMHG (ref 40–60)
POC BE: 1 MMOL/L
POC SATURATED O2: 22 % (ref 95–100)
POC TCO2: 30 MMOL/L (ref 24–29)
POTASSIUM SERPL-SCNC: 3.6 MMOL/L (ref 3.5–5.1)
PROT SERPL-MCNC: 7 G/DL (ref 6–8.4)
RBC # BLD AUTO: 2.87 M/UL (ref 4.6–6.2)
SAMPLE: ABNORMAL
SITE: ABNORMAL
SODIUM SERPL-SCNC: 135 MMOL/L (ref 136–145)
SP02: 89
TROPONIN I SERPL DL<=0.01 NG/ML-MCNC: <0.006 NG/ML (ref 0–0.03)
WBC # BLD AUTO: 4.15 K/UL (ref 3.9–12.7)

## 2019-04-15 PROCEDURE — 99223 1ST HOSP IP/OBS HIGH 75: CPT | Mod: AI,GC,, | Performed by: INTERNAL MEDICINE

## 2019-04-15 PROCEDURE — 93005 ELECTROCARDIOGRAM TRACING: CPT

## 2019-04-15 PROCEDURE — 99223 PR INITIAL HOSPITAL CARE,LEVL III: ICD-10-PCS | Mod: AI,GC,, | Performed by: INTERNAL MEDICINE

## 2019-04-15 PROCEDURE — 25000003 PHARM REV CODE 250: Performed by: STUDENT IN AN ORGANIZED HEALTH CARE EDUCATION/TRAINING PROGRAM

## 2019-04-15 PROCEDURE — 99900035 HC TECH TIME PER 15 MIN (STAT)

## 2019-04-15 PROCEDURE — 99291 CRITICAL CARE FIRST HOUR: CPT | Mod: ,,, | Performed by: EMERGENCY MEDICINE

## 2019-04-15 PROCEDURE — 94640 AIRWAY INHALATION TREATMENT: CPT

## 2019-04-15 PROCEDURE — 82803 BLOOD GASES ANY COMBINATION: CPT

## 2019-04-15 PROCEDURE — 85025 COMPLETE CBC W/AUTO DIFF WBC: CPT

## 2019-04-15 PROCEDURE — 94761 N-INVAS EAR/PLS OXIMETRY MLT: CPT

## 2019-04-15 PROCEDURE — 87040 BLOOD CULTURE FOR BACTERIA: CPT | Mod: 59

## 2019-04-15 PROCEDURE — 63600175 PHARM REV CODE 636 W HCPCS: Performed by: STUDENT IN AN ORGANIZED HEALTH CARE EDUCATION/TRAINING PROGRAM

## 2019-04-15 PROCEDURE — 80053 COMPREHEN METABOLIC PANEL: CPT

## 2019-04-15 PROCEDURE — 83880 ASSAY OF NATRIURETIC PEPTIDE: CPT

## 2019-04-15 PROCEDURE — 20600001 HC STEP DOWN PRIVATE ROOM

## 2019-04-15 PROCEDURE — 25000242 PHARM REV CODE 250 ALT 637 W/ HCPCS

## 2019-04-15 PROCEDURE — 99291 PR CRITICAL CARE, E/M 30-74 MINUTES: ICD-10-PCS | Mod: ,,, | Performed by: EMERGENCY MEDICINE

## 2019-04-15 PROCEDURE — 25500020 PHARM REV CODE 255: Performed by: EMERGENCY MEDICINE

## 2019-04-15 PROCEDURE — 93010 EKG 12-LEAD: ICD-10-PCS | Mod: ,,, | Performed by: INTERNAL MEDICINE

## 2019-04-15 PROCEDURE — 84484 ASSAY OF TROPONIN QUANT: CPT

## 2019-04-15 PROCEDURE — 25000242 PHARM REV CODE 250 ALT 637 W/ HCPCS: Performed by: STUDENT IN AN ORGANIZED HEALTH CARE EDUCATION/TRAINING PROGRAM

## 2019-04-15 PROCEDURE — 96372 THER/PROPH/DIAG INJ SC/IM: CPT | Mod: 59

## 2019-04-15 PROCEDURE — 93010 ELECTROCARDIOGRAM REPORT: CPT | Mod: ,,, | Performed by: INTERNAL MEDICINE

## 2019-04-15 PROCEDURE — 27000221 HC OXYGEN, UP TO 24 HOURS

## 2019-04-15 PROCEDURE — 83605 ASSAY OF LACTIC ACID: CPT

## 2019-04-15 PROCEDURE — 99285 EMERGENCY DEPT VISIT HI MDM: CPT | Mod: 25

## 2019-04-15 RX ORDER — SODIUM CHLORIDE 0.9 % (FLUSH) 0.9 %
10 SYRINGE (ML) INJECTION
Status: DISCONTINUED | OUTPATIENT
Start: 2019-04-15 | End: 2019-04-17 | Stop reason: HOSPADM

## 2019-04-15 RX ORDER — ATORVASTATIN CALCIUM 20 MG/1
80 TABLET, FILM COATED ORAL DAILY
Status: DISCONTINUED | OUTPATIENT
Start: 2019-04-16 | End: 2019-04-17 | Stop reason: HOSPADM

## 2019-04-15 RX ORDER — ALBUTEROL SULFATE 90 UG/1
2 AEROSOL, METERED RESPIRATORY (INHALATION) EVERY 6 HOURS PRN
Status: DISCONTINUED | OUTPATIENT
Start: 2019-04-15 | End: 2019-04-17 | Stop reason: HOSPADM

## 2019-04-15 RX ORDER — FINASTERIDE 5 MG/1
5 TABLET, FILM COATED ORAL DAILY
Status: DISCONTINUED | OUTPATIENT
Start: 2019-04-16 | End: 2019-04-17 | Stop reason: HOSPADM

## 2019-04-15 RX ORDER — PREDNISONE 10 MG/1
70 TABLET ORAL DAILY
Status: DISCONTINUED | OUTPATIENT
Start: 2019-04-15 | End: 2019-04-17 | Stop reason: HOSPADM

## 2019-04-15 RX ORDER — IPRATROPIUM BROMIDE AND ALBUTEROL SULFATE 2.5; .5 MG/3ML; MG/3ML
3 SOLUTION RESPIRATORY (INHALATION) EVERY 6 HOURS PRN
Status: DISCONTINUED | OUTPATIENT
Start: 2019-04-15 | End: 2019-04-16

## 2019-04-15 RX ORDER — ONDANSETRON 8 MG/1
8 TABLET, ORALLY DISINTEGRATING ORAL EVERY 12 HOURS PRN
Status: DISCONTINUED | OUTPATIENT
Start: 2019-04-15 | End: 2019-04-17 | Stop reason: HOSPADM

## 2019-04-15 RX ORDER — OXYCODONE AND ACETAMINOPHEN 10; 325 MG/1; MG/1
1 TABLET ORAL EVERY 4 HOURS PRN
Status: DISCONTINUED | OUTPATIENT
Start: 2019-04-15 | End: 2019-04-17 | Stop reason: HOSPADM

## 2019-04-15 RX ORDER — IPRATROPIUM BROMIDE AND ALBUTEROL SULFATE 2.5; .5 MG/3ML; MG/3ML
3 SOLUTION RESPIRATORY (INHALATION)
Status: COMPLETED | OUTPATIENT
Start: 2019-04-15 | End: 2019-04-15

## 2019-04-15 RX ORDER — CILOSTAZOL 100 MG/1
100 TABLET ORAL 2 TIMES DAILY
Status: DISCONTINUED | OUTPATIENT
Start: 2019-04-15 | End: 2019-04-17 | Stop reason: HOSPADM

## 2019-04-15 RX ORDER — CLOPIDOGREL BISULFATE 75 MG/1
75 TABLET ORAL DAILY
Status: DISCONTINUED | OUTPATIENT
Start: 2019-04-16 | End: 2019-04-17

## 2019-04-15 RX ORDER — GLUCAGON 1 MG
1 KIT INJECTION
Status: DISCONTINUED | OUTPATIENT
Start: 2019-04-15 | End: 2019-04-17 | Stop reason: HOSPADM

## 2019-04-15 RX ORDER — IBUPROFEN 200 MG
16 TABLET ORAL
Status: DISCONTINUED | OUTPATIENT
Start: 2019-04-15 | End: 2019-04-17 | Stop reason: HOSPADM

## 2019-04-15 RX ORDER — ENOXAPARIN SODIUM 100 MG/ML
40 INJECTION SUBCUTANEOUS EVERY 24 HOURS
Status: DISCONTINUED | OUTPATIENT
Start: 2019-04-15 | End: 2019-04-17 | Stop reason: HOSPADM

## 2019-04-15 RX ORDER — FAMOTIDINE 20 MG/1
20 TABLET, FILM COATED ORAL 2 TIMES DAILY
Status: DISCONTINUED | OUTPATIENT
Start: 2019-04-15 | End: 2019-04-17 | Stop reason: HOSPADM

## 2019-04-15 RX ORDER — MORPHINE SULFATE 30 MG/1
30 TABLET, FILM COATED, EXTENDED RELEASE ORAL EVERY 12 HOURS
Status: DISCONTINUED | OUTPATIENT
Start: 2019-04-15 | End: 2019-04-17 | Stop reason: HOSPADM

## 2019-04-15 RX ORDER — IBUPROFEN 200 MG
24 TABLET ORAL
Status: DISCONTINUED | OUTPATIENT
Start: 2019-04-15 | End: 2019-04-17 | Stop reason: HOSPADM

## 2019-04-15 RX ORDER — ASPIRIN 81 MG/1
81 TABLET ORAL DAILY
Status: DISCONTINUED | OUTPATIENT
Start: 2019-04-16 | End: 2019-04-17

## 2019-04-15 RX ORDER — ACETAMINOPHEN 325 MG/1
650 TABLET ORAL EVERY 8 HOURS PRN
Status: DISCONTINUED | OUTPATIENT
Start: 2019-04-15 | End: 2019-04-17 | Stop reason: HOSPADM

## 2019-04-15 RX ORDER — METOPROLOL SUCCINATE 25 MG/1
50 TABLET, EXTENDED RELEASE ORAL DAILY
Status: DISCONTINUED | OUTPATIENT
Start: 2019-04-16 | End: 2019-04-17 | Stop reason: HOSPADM

## 2019-04-15 RX ADMIN — IOHEXOL 75 ML: 350 INJECTION, SOLUTION INTRAVENOUS at 06:04

## 2019-04-15 RX ADMIN — ALBUTEROL SULFATE 2 PUFF: 90 AEROSOL, METERED RESPIRATORY (INHALATION) at 09:04

## 2019-04-15 RX ADMIN — OXYCODONE AND ACETAMINOPHEN 1 TABLET: 10; 325 TABLET ORAL at 07:04

## 2019-04-15 RX ADMIN — MORPHINE SULFATE 30 MG: 30 TABLET, EXTENDED RELEASE ORAL at 09:04

## 2019-04-15 RX ADMIN — IPRATROPIUM BROMIDE AND ALBUTEROL SULFATE 3 ML: .5; 3 SOLUTION RESPIRATORY (INHALATION) at 02:04

## 2019-04-15 RX ADMIN — CILOSTAZOL 100 MG: 100 TABLET ORAL at 09:04

## 2019-04-15 RX ADMIN — PREDNISONE 70 MG: 20 TABLET ORAL at 05:04

## 2019-04-15 RX ADMIN — ENOXAPARIN SODIUM 40 MG: 100 INJECTION SUBCUTANEOUS at 05:04

## 2019-04-15 RX ADMIN — FAMOTIDINE 20 MG: 20 TABLET, FILM COATED ORAL at 09:04

## 2019-04-15 NOTE — H&P
[facility]  Hematology/Oncology  H&P    Patient Name: Nimesh Nunn  MRN: [id number]  Admission Date: 4/15/2019  Code Status: Full Code   Attending Provider: Kin Deleon MD  Primary Care Physician: Nishant Perez MD  Principal Problem:<principal problem not specified>    Subjective:     HPI:   Nimesh Nunn is a 63-year-old male with COPD, hypertension, hyperlipidemia, CAD on  ASA/plavix, NSCLC (most recently on Durvalumab 1/18/19), who presented to the ED with shortness breath. Patient recently discharged home with oxygen. Patient reports he usually requires 2 L. This morning he had an appointment with Dr. Infante but felt too short of breath and fatigued to drive to the Oncology clinic. Endorses dyspnea for multiple weeks, with productive cough, sputum has been darkening. No fevers/chills. Decreased PO intake. Occasional chest pain. He was on prednisone 70mg Qd then weaned to 60mg with worsening symptoms, so was back on 70mg Qd, but states he ran out of steroids a few days ago. In the ER, patient hypoxic and tachypneic. No lower extremity edema. Patient was admitted to Medical Oncology for further management.    Oncologic History:       Oncologic History Non-small cell lung cancer, left diagnosed 06/2018  Metastatic disease to bone at presentation    Oncologic Treatment Stereotactic radiation to spinal lesion  Cisplatin/pemetrexed with concurrent radiation 08/08/2018 (completed 09/2018)  Durvalumab 10/2018  XRT L4-S3 30 Gy 12/2018    Pathology Poorly differentiated adenocarcinoma, EGFR wild type, No ALK, ROS-1 rearrangements, PD-L1 TPS 90%        Oncology Treatment Plan:   OP DURVALUMAB Q2W    Medications:  Continuous Infusions:  Scheduled Meds:   [START ON 4/16/2019] aspirin  81 mg Oral Daily    [START ON 4/16/2019] atorvastatin  80 mg Oral Daily    cilostazol  100 mg Oral BID    [START ON 4/16/2019] clopidogrel  75 mg Oral Daily    enoxaparin  40 mg Subcutaneous Daily     famotidine  20 mg Oral BID    [START ON 4/16/2019] finasteride  5 mg Oral Daily    [START ON 4/16/2019] metoprolol succinate  50 mg Oral Daily    morphine  30 mg Oral Q12H    predniSONE  70 mg Oral Daily     PRN Meds:acetaminophen, albuterol, dextrose 50%, dextrose 50%, glucagon (human recombinant), glucose, glucose, ondansetron, oxyCODONE-acetaminophen, sodium chloride 0.9%     Review of patient's allergies indicates:  No Known Allergies     Past Medical History:   Diagnosis Date    Abdominal aortic aneurysm (AAA) without rupture 3/5/2018    Arthritis     Blood transfusion     COPD (chronic obstructive pulmonary disease)     COPD exacerbation 3/24/2019    Coronary artery disease     Dehydration 8/15/2018    Encounter for blood transfusion     Hyperlipidemia 11/7/2013    Hypertension     Lung cancer metastatic to bone 7/18/2018    Occlusion and stenosis of carotid artery without mention of cerebral infarction 1/12/2014    Pneumonitis 2/13/2019    Primary malignant neoplasm of left lung 7/18/2018    Screening for colon cancer 6/1/2015    Syncope 1/13/2014    Thrombocytopenia 9/19/2018    Weakness of both lower extremities 1/14/2019     Past Surgical History:   Procedure Laterality Date    ARCH & 4 VESSEL STUDY Bilateral 5/27/2014    Performed by [facility] Martin [facility] at [facility] CATH LAB    BACK SURGERY      BRONCHOSCOPY,FIBEROPTIC N/A 6/26/2018    Performed by [facility]errano [facility] at [facility] OR 2ND FLR    carotid endarectomy      CATHETERIZATION, HEART, LEFT Left 11/8/2013    Performed by [facility]kehi Martin [facility] at [facility] CATH LAB    COLONOSCOPY N/A 6/1/2015    [facility] END[facility]    CORONARY ANGIOPLASTY      CORONARY ARTERY BYPASS GRAFT      ESOPHAGOGASTRODUODENOSCOPY (EGD) N/A 8/16/2018    Performed by [facility]annes Osuna MD at [facility] ENDO (2ND FLR)    HEART CATH-LEFT N/A 5/27/2014    Performed by [facility] at [facility] CATH LAB    INSERTION, STENT, CORONARY ARTERY  N/A 2013    [facility] at [facility] CATH LAB    PTA, PERIPHERAL BLOOD VESSEL Left 3/11/2014    Performed by [facility] at [facility] CATH LAB    PTA, PERIPHERAL BLOOD VESSEL N/A 2014    Performed by [facility] at [facility] CATH LAB    SKIN BIOPSY      ULTRASOUND, ENDOBRONCHIAL N/A 2018    Performed by L[facility] at [facility] OR [facility] F[facility]     Family History     Problem Relation (Age of Onset)    Breast cancer Mother    Cancer Mother    Heart attack Father    Heart disease Father    Heart failure Father    Hypertension Father    No Known Problems Sister, Brother, Maternal Grandmother, Maternal Grandfather, Paternal Grandmother, Paternal Grandfather, Maternal Aunt, Maternal Uncle, Paternal Aunt, Paternal Uncle        Tobacco Use    Smoking status: Former Smoker     Packs/day: 1.50     Years: 40.00     Pack years: 60.00     Last attempt to quit: 10/11/2011     Years since quittin.5    Smokeless tobacco: Never Used   Substance and Sexual Activity    Alcohol use: Yes     Alcohol/week: 1.8 oz     Types: 3 Cans of beer per week     Comment: 3 cans beer every day or every other day    Drug use: Yes     Types: Marijuana    Sexual activity: Not Currently       Review of Systems   Constitutional: Positive for appetite change and fatigue. Negative for chills, fever and unexpected weight change.   HENT: Negative for nosebleeds and sore throat.    Respiratory: Positive for cough and shortness of breath.    Cardiovascular: Negative for chest pain and leg swelling.   Gastrointestinal: Negative for abdominal pain, blood in stool, nausea and vomiting.   Genitourinary: Negative for dysuria and hematuria.   Musculoskeletal: Negative for arthralgias and back pain.   Skin: Negative for rash.   Neurological: Negative for light-headedness and headaches.   Hematological: Negative for adenopathy. Does not bruise/bleed easily.     Objective:     Vital Signs (Most Recent):  Temp: 97.8 °F  (36.6 °C) (04/15/19 1335)  Pulse: 106 (04/15/19 1619)  Resp: 16 (04/15/19 1619)  BP: 137/70 (04/15/19 1619)  SpO2: 95 % (04/15/19 1619) Vital Signs (24h Range):  Temp:  [97.8 °F (36.6 °C)] 97.8 °F (36.6 °C)  Pulse:  [] 106  Resp:  [15-28] 16  SpO2:  [95 %-100 %] 95 %  BP: (124-159)/(59-90) 137/70     Weight: 76.2 kg (168 lb)  Body mass index is 22.78 kg/m².  Body surface area is 1.97 meters squared.    No intake or output data in the 24 hours ending 04/15/19 1724    Physical Exam   Constitutional: He appears well-developed and well-nourished.   HENT:   Head: Normocephalic and atraumatic.   + mucositis  No dentition   Eyes: Pupils are equal, round, and reactive to light. EOM are normal. No scleral icterus.   Neck: Neck supple.   Cardiovascular: Normal rate and regular rhythm.   Pulmonary/Chest: Effort normal. No respiratory distress.   Diminished breath sounds b/l, some faint crackles   Abdominal: Soft. He exhibits no distension. There is no tenderness.   Musculoskeletal: Normal range of motion. He exhibits no edema.   Lymphadenopathy:     He has no cervical adenopathy.   Neurological: He is alert.   Skin: Skin is warm and dry.   Psychiatric: He has a normal mood and affect. His behavior is normal.       Significant Labs:   CBC:   Recent Labs   Lab 04/15/19  1415   WBC 4.15   HGB 8.6*   HCT 27.2*   *    and CMP:   Recent Labs   Lab 04/15/19  1415   *   K 3.6   CL 93*   CO2 27      BUN 13   CREATININE 0.8   CALCIUM 9.0   PROT 7.0   ALBUMIN 2.5*   BILITOT 0.4   ALKPHOS 166*   AST 18   ALT 10   ANIONGAP 15   EGFRNONAA >60.0       Diagnostic Results:  I have reviewed all pertinent imaging results/findings within the past 24 hours.    Assessment/Plan:     Hypoxia  - Ddx immunotherapy pneumonitis (patient suddenly discontinued steroids, has been on prednisone prior to this for multiple weeks), COPD exacerbation (but no significant wheezing), PE, progression of NSCLC. Less likely PNA. No evidence  of fluid overload. No evidence of ACS.  - Prednisone 70mg Qd starting today (on pepcid, monitor sugars)  - CTA chest to be done  - Duonebs PRN    Acute blood loss anemia  - Monitor Hb    CKD (chronic kidney disease)  - Cr stable on admission    Lung cancer metastatic to bone  - On MS contin 30mg BID and percocet 10-325mg PRN    Adenocarcinoma of left lung, stage 4  Oncologic History:       Oncologic History Non-small cell lung cancer, left diagnosed 06/2018  Metastatic disease to bone at presentation    Oncologic Treatment Stereotactic radiation to spinal lesion  Cisplatin/pemetrexed with concurrent radiation 08/08/2018 (completed 09/2018)  Durvalumab 10/2018, last given on 1/18/19  XRT L4-S3 30 Gy 12/2018    Pathology Poorly differentiated adenocarcinoma, EGFR wild type, No ALK, ROS-1 rearrangements, PD-L1 TPS 90%       Essential hypertension  - On metoprolol    Bilateral carotid artery disease  - On ASA/plavix    Chronic obstructive pulmonary disease  - Proair PRN  - On umeclinidium and ellipta at home, not on formulary    Benign non-nodular prostatic hyperplasia without lower urinary tract symptoms  - Continue finasteride    PAD (peripheral artery disease)  - On cilostazol    The following was staffed and discussed with supervising physician Dr. Mayorga.    Sabrina Guevara MD  Hematology/Oncology fellow    STAFF NOTE:  I have personally taken the history and examined this patient and agree with Dr. Guevara's Note as stated above.

## 2019-04-15 NOTE — ED NOTES
LOC: The patient is awake, alert, and oriented to place, time, situation. Affect is appropriate.  Speech is appropriate and clear.     APPEARANCE: Patient resting uncomfortably, reporting increased SOB and weakness, unable to go to scheduled oncology appt today. .  Patient is clean and well groomed.    SKIN: The skin is warm and dry; color consistent with ethnicity.  Patient has normal skin turgor and moist mucus membranes.      MUSCULOSKELETAL: Patient moving upper and lower extremities without difficulty.  Reporting  weakness.     RESPIRATORY: Pt  Reporting increased SOB. Airway is open and patent. Respirations spontaneous, non-labored.  Patient has an increased  effort and rate.  Wheezes and rales. Cough.      CARDIAC:   No peripheral edema noted. No complaints of chest pain.      ABDOMEN: Soft and non tender to palpation.  No distention noted.     NEUROLOGIC: Eyes open spontaneously.  Behavior appropriate to situation.  Follows commands; facial expression symmetrical.  Purposeful motor response noted; normal sensation in all extremities.

## 2019-04-15 NOTE — ASSESSMENT & PLAN NOTE
- Ddx immunotherapy pneumonitis (patient suddenly discontinued steroids, has been on prednisone prior to this for multiple weeks), COPD exacerbation (but no significant wheezing), PE, progression of NSCLC. Less likely PNA. No evidence of fluid overload. No evidence of ACS.  - Prednisone 70mg Qd starting today (on pepcid, monitor sugars)  - CTA chest to be done  - Gauri GONZALEZN

## 2019-04-15 NOTE — ED TRIAGE NOTES
To ED via EMS for c/o SOB.  Home O2 was 3l with long extention tubing noted by EMS,  Sats 80%.  Duoneb provided sats improved to 95% on 2 l.   Solumedrol 125mg given, resp were in the 30's.

## 2019-04-15 NOTE — HPI
Nimesh Nunn is a 63-year-old male with COPD, hypertension, hyperlipidemia, CAD on  ASA/plavix, NSCLC (most recently on Durvalumab 1/18/19), who presented to the ED with shortness breath. Patient recently discharged home with oxygen. Patient reports he usually requires 2 L. This morning he had an appointment with Dr. Infante but felt too short of breath and fatigued to drive to the Oncology clinic. Endorses dyspnea for multiple weeks, with productive cough, sputum has been darkening. No fevers/chills. Decreased PO intake. Occasional chest pain. He was on prednisone 70mg Qd then weaned to 60mg with worsening symptoms, so was back on 70mg Qd, but states he ran out of steroids a few days ago. In the ER, patient hypoxic and tachypneic. No lower extremity edema. Patient was admitted to Medical Oncology for further management.    Oncologic History:       Oncologic History Non-small cell lung cancer, left diagnosed 06/2018  Metastatic disease to bone at presentation    Oncologic Treatment Stereotactic radiation to spinal lesion  Cisplatin/pemetrexed with concurrent radiation 08/08/2018 (completed 09/2018)  Durvalumab 10/2018  XRT L4-S3 30 Gy 12/2018    Pathology Poorly differentiated adenocarcinoma, EGFR wild type, No ALK, ROS-1 rearrangements, PD-L1 TPS 90%     Cancer hx dates to 06/2018 when he sought medical attention for chest pain.  A CT of the chest was performed showing a left lower lobe mass measuring 7.4 cm.  There were also enlarged left hilar lymph nodes. He underwent PET-CT which showed uptake in this mass and also in L1.  He underwent bronchoscopy and biopsy with pathology showing poorly differentiated adenocarcinoma.  Molecular studies showed EGFR wild-type and he did not harbor any ALK or ROS-1 rearrangements.  PD L1 tumor proportions core was 90%.  He underwent radiation to the spinal lesion in 08/2018 and started on concurrent chemo and radiation with cisplatin and pemetrexed in 08/2018.  He  completed treatment on 09/19/2018.  He was only able to receive 2 cycles of chemotherapy secondary to cytopenias.  He was initiated on durvalumab in 10/2018.  He received 3 cycles but then developed lower extremity weakness and was felt that this may be related to cauda equina syndrome related to bone metastasis.  He underwent radiation to his lumbar and sacral spine in 12/2018.

## 2019-04-15 NOTE — TELEPHONE ENCOUNTER
Patient sent a message through the portal that he was too weak to come for his appt, he is now on oxygen.  I spoke to patient, he states he is too weak to eat, drink or even walk around, he has no one to bring him to the doctor and he his having a hard time breathing.  He states that when he takes a breath it causes him pain.  I stated if there was no one to bring him and he is feeling that bad he needs to call 911 to come to the hospital.

## 2019-04-15 NOTE — ASSESSMENT & PLAN NOTE
Oncologic History:       Oncologic History Non-small cell lung cancer, left diagnosed 06/2018  Metastatic disease to bone at presentation    Oncologic Treatment Stereotactic radiation to spinal lesion  Cisplatin/pemetrexed with concurrent radiation 08/08/2018 (completed 09/2018)  Durvalumab 10/2018, last given on 1/18/19  XRT L4-S3 30 Gy 12/2018    Pathology Poorly differentiated adenocarcinoma, EGFR wild type, No ALK, ROS-1 rearrangements, PD-L1 TPS 90%

## 2019-04-15 NOTE — SUBJECTIVE & OBJECTIVE
Oncology Treatment Plan:   OP DURVALUMAB Q2W    Medications:  Continuous Infusions:  Scheduled Meds:   [START ON 4/16/2019] aspirin  81 mg Oral Daily    [START ON 4/16/2019] atorvastatin  80 mg Oral Daily    cilostazol  100 mg Oral BID    [START ON 4/16/2019] clopidogrel  75 mg Oral Daily    enoxaparin  40 mg Subcutaneous Daily    famotidine  20 mg Oral BID    [START ON 4/16/2019] finasteride  5 mg Oral Daily    [START ON 4/16/2019] metoprolol succinate  50 mg Oral Daily    morphine  30 mg Oral Q12H    predniSONE  70 mg Oral Daily     PRN Meds:acetaminophen, albuterol, dextrose 50%, dextrose 50%, glucagon (human recombinant), glucose, glucose, ondansetron, oxyCODONE-acetaminophen, sodium chloride 0.9%     Review of patient's allergies indicates:  No Known Allergies     Past Medical History:   Diagnosis Date    Abdominal aortic aneurysm (AAA) without rupture 3/5/2018    Arthritis     Blood transfusion     COPD (chronic obstructive pulmonary disease)     COPD exacerbation 3/24/2019    Coronary artery disease     Dehydration 8/15/2018    Encounter for blood transfusion     Hyperlipidemia 11/7/2013    Hypertension     Lung cancer metastatic to bone 7/18/2018    Occlusion and stenosis of carotid artery without mention of cerebral infarction 1/12/2014    Pneumonitis 2/13/2019    Primary malignant neoplasm of left lung 7/18/2018    Screening for colon cancer 6/1/2015    Syncope 1/13/2014    Thrombocytopenia 9/19/2018    Weakness of both lower extremities 1/14/2019     Past Surgical History:   Procedure Laterality Date    ARCH & 4 VESSEL STUDY Bilateral 5/27/2014    Performed by Mehki Martin MD at [facility] CATH LAB    BACK SURGERY      BRONCHOSCOPY,FIBEROPTIC N/A 6/26/2018    Performed by Liana Serrano [facility] at [facility] OR 2ND FLR    carotid endarectomy      CATHETERIZATION, HEART, LEFT Left 11/8/2013    Performed by [facility]hi [facility] at [facility] CATH LAB    COLONOSCOPY N/A 6/1/2015    Performed  [facility] ENDO    CORONARY ANGIOPLASTY      CORONARY ARTERY BYPASS GRAFT      ESOPHAGOGASTRODUODENOSCOPY (EGD) N/A 2018    Performed by [facility] at [facility] ENDO (2ND FLR)    HEART CATH-LEFT N/A 2014    Performed by [facility]hi [facility] at [facility] CATH LAB    INSERTION, STENT, CORONARY ARTERY N/A 2013    Performed by [facility] at [facility] CATH LAB    PTA, PERIPHERAL BLOOD VESSEL Left 3/11/2014    Performed by [facility] at [facility] CATH LAB    PTA, PERIPHERAL BLOOD VESSEL N/A 2014    Performed by [facility] Martin MD at [facility] CATH LAB    SKIN BIOPSY      ULTRASOUND, ENDOBRONCHIAL N/A 2018    Performed by Liana Serrano MD at [facility] OR 2ND FLR     Family History     Problem Relation (Age of Onset)    Breast cancer Mother    Cancer Mother    Heart attack Father    Heart disease Father    Heart failure Father    Hypertension Father    No Known Problems Sister, Brother, Maternal Grandmother, Maternal Grandfather, Paternal Grandmother, Paternal Grandfather, Maternal Aunt, Maternal Uncle, Paternal Aunt, Paternal Uncle        Tobacco Use    Smoking status: Former Smoker     Packs/day: 1.50     Years: 40.00     Pack years: 60.00     Last attempt to quit: 10/11/2011     Years since quittin.5    Smokeless tobacco: Never Used   Substance and Sexual Activity    Alcohol use: Yes     Alcohol/week: 1.8 oz     Types: 3 Cans of beer per week     Comment: 3 cans beer every day or every other day    Drug use: Yes     Types: Marijuana    Sexual activity: Not Currently       Review of Systems   Constitutional: Positive for appetite change and fatigue. Negative for chills, fever and unexpected weight change.   HENT: Negative for nosebleeds and sore throat.    Respiratory: Positive for cough and shortness of breath.    Cardiovascular: Negative for chest pain and leg swelling.   Gastrointestinal: Negative for abdominal pain, blood in stool, nausea and vomiting.    Genitourinary: Negative for dysuria and hematuria.   Musculoskeletal: Negative for arthralgias and back pain.   Skin: Negative for rash.   Neurological: Negative for light-headedness and headaches.   Hematological: Negative for adenopathy. Does not bruise/bleed easily.     Objective:     Vital Signs (Most Recent):  Temp: 97.8 °F (36.6 °C) (04/15/19 1335)  Pulse: 106 (04/15/19 1619)  Resp: 16 (04/15/19 1619)  BP: 137/70 (04/15/19 1619)  SpO2: 95 % (04/15/19 1619) Vital Signs (24h Range):  Temp:  [97.8 °F (36.6 °C)] 97.8 °F (36.6 °C)  Pulse:  [] 106  Resp:  [15-28] 16  SpO2:  [95 %-100 %] 95 %  BP: (124-159)/(59-90) 137/70     Weight: 76.2 kg (168 lb)  Body mass index is 22.78 kg/m².  Body surface area is 1.97 meters squared.    No intake or output data in the 24 hours ending 04/15/19 1724    Physical Exam   Constitutional: He appears well-developed and well-nourished.   HENT:   Head: Normocephalic and atraumatic.   + mucositis  No dentition   Eyes: Pupils are equal, round, and reactive to light. EOM are normal. No scleral icterus.   Neck: Neck supple.   Cardiovascular: Normal rate and regular rhythm.   Pulmonary/Chest: Effort normal. No respiratory distress.   Diminished breath sounds b/l, some faint crackles   Abdominal: Soft. He exhibits no distension. There is no tenderness.   Musculoskeletal: Normal range of motion. He exhibits no edema.   Lymphadenopathy:     He has no cervical adenopathy.   Neurological: He is alert.   Skin: Skin is warm and dry.   Psychiatric: He has a normal mood and affect. His behavior is normal.       Significant Labs:   CBC:   Recent Labs   Lab 04/15/19  1415   WBC 4.15   HGB 8.6*   HCT 27.2*   *    and CMP:   Recent Labs   Lab 04/15/19  1415   *   K 3.6   CL 93*   CO2 27      BUN 13   CREATININE 0.8   CALCIUM 9.0   PROT 7.0   ALBUMIN 2.5*   BILITOT 0.4   ALKPHOS 166*   AST 18   ALT 10   ANIONGAP 15   EGFRNONAA >60.0       Diagnostic Results:  I have reviewed  all pertinent imaging results/findings within the past 24 hours.

## 2019-04-15 NOTE — ED NOTES
Telemetry Verification   Patient placed on Telemetry Box  Verified on ED monitor  Box [id number]   Monitor Tech Dina   Rate 99   Rhythm Sinus

## 2019-04-15 NOTE — ED PROVIDER NOTES
Encounter Date: 4/15/2019    SCRIBE #1 NOTE: I, Mary Ann Zhao, am scribing for, and in the presence of,  Dr. Deleon. I have scribed the following portions of the note - the EKG reading.       History     Chief Complaint   Patient presents with    Shortness of Breath     since this am     63-year-old male with medical history of COPD, hypertension, hyperlipidemia, CAD on long term anticoagulation, carcinoma of left lung presents the ED with shortness breath. Patient recently discharge home with oxygen.  Patient reports he usually requires 2 L. This morning he had an appointment with female felt too short of breath and fatigued to drive and felt Clinic.  He was informed to report to the ED immediately.  On EMS arrival patient was tachypneic with 32 breaths per minute and O2 sat of 80%.  Patient given DuoNeb and Solu-Medrol with improvement.  With EMS treatment and 2 L of oxygen patient has remained around 95%.  He denies chest pain, unilateral weakness, speech changes, vision changes, nausea, vomiting, lower extremity swelling. Last chemo was in January, currently on prednisone.        Review of patient's allergies indicates:  No Known Allergies  Past Medical History:   Diagnosis Date    Abdominal aortic aneurysm (AAA) without rupture 3/5/2018    Arthritis     Blood transfusion     COPD (chronic obstructive pulmonary disease)     COPD exacerbation 3/24/2019    Coronary artery disease     Dehydration 8/15/2018    Encounter for blood transfusion     Hyperlipidemia 11/7/2013    Hypertension     Lung cancer metastatic to bone 7/18/2018    Occlusion and stenosis of carotid artery without mention of cerebral infarction 1/12/2014    Pneumonitis 2/13/2019    Primary malignant neoplasm of left lung 7/18/2018    Screening for colon cancer 6/1/2015    Syncope 1/13/2014    Thrombocytopenia 9/19/2018    Weakness of both lower extremities 1/14/2019     Past Surgical History:   Procedure Laterality Date    ARCH  & 4 VESSEL STUDY Bilateral 5/27/2014    Performed by [facility]hi Martin [facility] at [facility] CATH LAB    BACK SURGERY      BRONCHOSCOPY,FIBEROPTIC N/A 6/26/2018    Performed by Liana Serrano MD at [facility] OR 2ND FLR    carotid endarectomy      CATHETERIZATION, HEART, LEFT Left 11/8/2013    Performed by Mekhi Martin MD at [facility] CATH LAB    COLONOSCOPY N/A 6/1/2015    Per[facility] at [facility] ENDO    CORONARY ANGIOPLASTY      CORONARY ARTERY BYPASS GRAFT      ESOPHAGOGASTRODUODENOSCOPY (EGD) N/A 8/16/2018    Performed by Yohannes Osuna MD at [facility] ENDO (2ND FLR)    HEART CATH-LEFT N/A 5/27/2014    Performed by Mekhi Martin MD at [facility] CATH LAB    INSERTION, STENT, CORONARY ARTERY N/A 12/20/2013    Performed by Mekhi Martin MD at [facility] CATH LAB    PTA, PERIPHERAL BLOOD VESSEL Left 3/11/2014    Performed by Mekhi Martin MD at [facility] CATH LAB    PTA, PERIPHERAL BLOOD VESSEL N/A 2/18/2014    Performed by Mekhi Martin MD at [facility] CATH LAB    SKIN BIOPSY      ULTRASOUND, ENDOBRONCHIAL N/A 6/26/2018    Performed by Liana Serrano MD at [facility] OR 2ND FLR     Family History   Problem Relation Age of Onset    Heart disease Father     Hypertension Father     Heart attack Father     Heart failure Father     Cancer Mother     Breast cancer Mother     No Known Problems Sister     No Known Problems Brother     No Known Problems Maternal Grandmother     No Known Problems Maternal Grandfather     No Known Problems Paternal Grandmother     No Known Problems Paternal Grandfather     No Known Problems Maternal Aunt     No Known Problems Maternal Uncle     No Known Problems Paternal Aunt     No Known Problems Paternal Uncle     Anemia Neg Hx     Arrhythmia Neg Hx     Asthma Neg Hx     Clotting disorder Neg Hx     Fainting Neg Hx     Hyperlipidemia Neg Hx      Social History     Tobacco Use    Smoking status: Former Smoker     Packs/day: 1.50     Years: 40.00     Pack years: 60.00      Last attempt to quit: 10/11/2011     Years since quittin.5    Smokeless tobacco: Never Used   Substance Use Topics    Alcohol use: Yes     Alcohol/week: 1.8 oz     Types: 3 Cans of beer per week     Comment: 3 cans beer every day or every other day    Drug use: Yes     Types: Marijuana     Review of Systems   Constitutional: Negative for chills, diaphoresis, fatigue and fever.   HENT: Negative for congestion.    Eyes: Negative for visual disturbance.   Respiratory: Positive for cough and shortness of breath.    Cardiovascular: Negative for chest pain and leg swelling.   Gastrointestinal: Negative for abdominal pain, nausea and vomiting.   Genitourinary: Negative for dysuria and hematuria.   Musculoskeletal: Negative for myalgias.   Skin: Negative for rash and wound.   Neurological: Negative for syncope, weakness and headaches.   Hematological: Bruises/bleeds easily.   Psychiatric/Behavioral: The patient is not nervous/anxious.        Physical Exam     Initial Vitals [04/15/19 1335]   BP Pulse Resp Temp SpO2   (!) 151/90 (!) 111 18 97.8 °F (36.6 °C) 95 %      MAP       --         Physical Exam    Vitals reviewed.  Constitutional: He appears well-developed and well-nourished.   HENT:   Head: Normocephalic and atraumatic.   Mouth/Throat: Oropharynx is clear and moist.   Eyes: EOM are normal. Pupils are equal, round, and reactive to light.   Neck: Normal range of motion. Neck supple.   Cardiovascular: Tachycardia present.    Pulmonary/Chest: He has wheezes. He has rales. He exhibits no mass, no laceration and no retraction.   Abdominal: Soft. Bowel sounds are normal. He exhibits no distension. There is no tenderness.   Musculoskeletal:        Right lower leg: Normal.        Left lower leg: Normal.   Neurological: He is alert and oriented to person, place, and time. He is not disoriented.   Skin: Skin is warm. Capillary refill takes less than 2 seconds.   Psychiatric: He has a normal mood and affect.         ED  Course   Procedures  Labs Reviewed   CBC W/ AUTO DIFFERENTIAL - Abnormal; Notable for the following components:       Result Value    RBC 2.87 (*)     Hemoglobin 8.6 (*)     Hematocrit 27.2 (*)     MCHC 31.6 (*)     RDW 16.2 (*)     Platelets 104 (*)     Immature Granulocytes 1.0 (*)     Lymph # 0.3 (*)     Gran% 81.4 (*)     Lymph% 8.2 (*)     All other components within normal limits    Narrative:     ONE LAVENDER SHARED   COMPREHENSIVE METABOLIC PANEL - Abnormal; Notable for the following components:    Sodium 135 (*)     Chloride 93 (*)     Albumin 2.5 (*)     Alkaline Phosphatase 166 (*)     All other components within normal limits    Narrative:     ONE LAVENDER SHARED   ISTAT PROCEDURE - Abnormal; Notable for the following components:    POC PH 7.254 (*)     POC PCO2 64.2 (*)     POC PO2 19 (*)     POC HCO3 28.4 (*)     POC SATURATED O2 22 (*)     POC TCO2 30 (*)     All other components within normal limits   CULTURE, BLOOD   CULTURE, BLOOD   LACTIC ACID, PLASMA   B-TYPE NATRIURETIC PEPTIDE    Narrative:     ONE LAVENDER SHARED   TROPONIN I    Narrative:     ONE LAVENDER SHARED     EKG Readings: (Independently Interpreted)   Initial Reading: No STEMI. Rhythm: Normal Sinus Rhythm. Heart Rate: 96.     ECG Results          EKG 12-lead (Final result)  Result time 04/15/19 15:28:24    Final result by Interface, Lab In [facility] (04/15/19 15:28:24)                 Narrative:    Test Reason : R06.02,    Vent. Rate : 096 BPM     Atrial Rate : 096 BPM     P-R Int : 176 ms          QRS Dur : 086 ms      QT Int : 336 ms       P-R-T Axes : 154 112 127 degrees     QTc Int : 424 ms    Limb lead reversal  Normal sinus rhythm  Normal ECG  When compared with ECG of 28-MAR-2019 10:01,  No significant change was found  Confirmed by NARDA CID MD ([id number]) on 4/15/2019 3:28:12 PM    Referred By: TRAY   SELF           Confirmed By:NARDA CID MD                            Imaging Results           X-Ray Chest AP Portable (SOB)  "(Final result)  Result time 04/15/19 15:48:27    Final result by Ancelmo Martinez Jr., MD (04/15/19 15:48:27)                 Impression:      Increase in the bilateral patchy parenchymal opacities particularly on the right.  Pneumonic infiltrate not excluded.    This report was flagged in Epic as abnormal.      Electronically signed by: Ancelmo Martinez MD  Date:    04/15/2019  Time:    15:48             Narrative:    EXAMINATION:  XR CHEST AP PORTABLE    CLINICAL HISTORY:  SOB;    TECHNIQUE:  Single frontal view of the chest was performed.    COMPARISON:  March 28, 2019.    FINDINGS:  Monitoring leads in place.  Increase in the patchy parenchymal opacities particularly on the right.  No pneumothorax.  Heart size is similar.                                 Medical Decision Making:   History:   Old Medical Records: I decided to obtain old medical records.  Old Records Summarized: records from clinic visits.  Initial Assessment:   63-year-old male with medical history of COPD, hypertension, hyperlipidemia, carcinoma of left lung presents the ED with shortness breath. On EMS arrival O2 sat was 80% at 32breaths/min. Patient on 3L at home. Crackles and wheezing heard bilaterally. No lower extremity swelling. HR improved to 92bpm, respirations 16, O2 sats 100%.   Differential Diagnosis:   DDX includes but is not limited to COPD exacerbation, ACS, arrhythmia, pneumonia, electrolyte abnormality.   Independently Interpreted Test(s):   I have ordered and independently interpreted X-rays - see summary below.  I have ordered and independently interpreted EKG Reading(s) - see prior notes  Clinical Tests:   Lab Tests: Reviewed and Ordered  Radiological Study: Ordered and Reviewed  Medical Tests: Ordered and Reviewed  ED Management:  Will get labs, CXR and EKG and give duoneb. On 4L of O2.    VBG pH 7.24, POC 64.2, PO2 19. CXR shows, "Increase in the bilateral patchy parenchymal opacities particularly on the right.  Pneumonic " "infiltrate not excluded." will not order antibiotics at this time because believe shortness of breath is a COPD exacerbation and worsening of his cancer, patient is afebrile. Heme/onc consulted and asked we order CTA chest and they will admit to their service.      I have discussed the treatment and management of this patient with my supervisory physician, and we agree on the plan of care.               Scribe Attestation:   Scribe #1: I performed the above scribed service and the documentation accurately describes the services I performed. I attest to the accuracy of the note.               Clinical Impression:       ICD-10-CM ICD-9-CM   1. COPD exacerbation J44.1 491.21   2. SOB (shortness of breath) R06.02 786.05   3. Acute respiratory failure with hypoxia J96.01 518.81   4. Chest pain R07.9 786.50         Disposition:   Disposition: Admitted  Condition: Stable                        Daniela Mahajan PA-C  04/15/19 1813    "

## 2019-04-16 PROBLEM — E87.8 ELECTROLYTE ABNORMALITY: Status: ACTIVE | Noted: 2019-04-16

## 2019-04-16 LAB
ALBUMIN SERPL BCP-MCNC: 2.3 G/DL (ref 3.5–5.2)
ALP SERPL-CCNC: 137 U/L (ref 55–135)
ALT SERPL W/O P-5'-P-CCNC: 8 U/L (ref 10–44)
ANION GAP SERPL CALC-SCNC: 8 MMOL/L (ref 8–16)
AST SERPL-CCNC: 12 U/L (ref 10–40)
BASOPHILS # BLD AUTO: 0.01 K/UL (ref 0–0.2)
BASOPHILS NFR BLD: 0.2 % (ref 0–1.9)
BILIRUB SERPL-MCNC: 0.2 MG/DL (ref 0.1–1)
BNP SERPL-MCNC: 118 PG/ML (ref 0–99)
BUN SERPL-MCNC: 16 MG/DL (ref 8–23)
CALCIUM SERPL-MCNC: 8.7 MG/DL (ref 8.7–10.5)
CHLORIDE SERPL-SCNC: 93 MMOL/L (ref 95–110)
CO2 SERPL-SCNC: 33 MMOL/L (ref 23–29)
CREAT SERPL-MCNC: 0.8 MG/DL (ref 0.5–1.4)
DIFFERENTIAL METHOD: ABNORMAL
EOSINOPHIL # BLD AUTO: 0 K/UL (ref 0–0.5)
EOSINOPHIL NFR BLD: 0 % (ref 0–8)
ERYTHROCYTE [DISTWIDTH] IN BLOOD BY AUTOMATED COUNT: 16.1 % (ref 11.5–14.5)
EST. GFR  (AFRICAN AMERICAN): >60 ML/MIN/1.73 M^2
EST. GFR  (NON AFRICAN AMERICAN): >60 ML/MIN/1.73 M^2
GLUCOSE SERPL-MCNC: 177 MG/DL (ref 70–110)
HCT VFR BLD AUTO: 24.4 % (ref 40–54)
HGB BLD-MCNC: 7.8 G/DL (ref 14–18)
IMM GRANULOCYTES # BLD AUTO: 0.06 K/UL (ref 0–0.04)
IMM GRANULOCYTES NFR BLD AUTO: 1.3 % (ref 0–0.5)
LYMPHOCYTES # BLD AUTO: 0.2 K/UL (ref 1–4.8)
LYMPHOCYTES NFR BLD: 4.8 % (ref 18–48)
MAGNESIUM SERPL-MCNC: 1.3 MG/DL (ref 1.6–2.6)
MCH RBC QN AUTO: 29.3 PG (ref 27–31)
MCHC RBC AUTO-ENTMCNC: 32 G/DL (ref 32–36)
MCV RBC AUTO: 92 FL (ref 82–98)
MONOCYTES # BLD AUTO: 0.3 K/UL (ref 0.3–1)
MONOCYTES NFR BLD: 6.1 % (ref 4–15)
NEUTROPHILS # BLD AUTO: 4.2 K/UL (ref 1.8–7.7)
NEUTROPHILS NFR BLD: 87.6 % (ref 38–73)
NRBC BLD-RTO: 0 /100 WBC
PHOSPHATE SERPL-MCNC: 2.4 MG/DL (ref 2.7–4.5)
PLATELET # BLD AUTO: 107 K/UL (ref 150–350)
PMV BLD AUTO: 10.2 FL (ref 9.2–12.9)
POTASSIUM SERPL-SCNC: 3.9 MMOL/L (ref 3.5–5.1)
PROT SERPL-MCNC: 6.4 G/DL (ref 6–8.4)
RBC # BLD AUTO: 2.66 M/UL (ref 4.6–6.2)
SODIUM SERPL-SCNC: 134 MMOL/L (ref 136–145)
WBC # BLD AUTO: 4.77 K/UL (ref 3.9–12.7)

## 2019-04-16 PROCEDURE — 25000003 PHARM REV CODE 250: Performed by: STUDENT IN AN ORGANIZED HEALTH CARE EDUCATION/TRAINING PROGRAM

## 2019-04-16 PROCEDURE — 84100 ASSAY OF PHOSPHORUS: CPT

## 2019-04-16 PROCEDURE — 99223 1ST HOSP IP/OBS HIGH 75: CPT | Mod: AI,GC,, | Performed by: INTERNAL MEDICINE

## 2019-04-16 PROCEDURE — 99223 PR INITIAL HOSPITAL CARE,LEVL III: ICD-10-PCS | Mod: AI,GC,, | Performed by: INTERNAL MEDICINE

## 2019-04-16 PROCEDURE — 25000242 PHARM REV CODE 250 ALT 637 W/ HCPCS: Performed by: STUDENT IN AN ORGANIZED HEALTH CARE EDUCATION/TRAINING PROGRAM

## 2019-04-16 PROCEDURE — 27000221 HC OXYGEN, UP TO 24 HOURS

## 2019-04-16 PROCEDURE — 20600001 HC STEP DOWN PRIVATE ROOM

## 2019-04-16 PROCEDURE — 94761 N-INVAS EAR/PLS OXIMETRY MLT: CPT

## 2019-04-16 PROCEDURE — 36415 COLL VENOUS BLD VENIPUNCTURE: CPT

## 2019-04-16 PROCEDURE — 83880 ASSAY OF NATRIURETIC PEPTIDE: CPT

## 2019-04-16 PROCEDURE — 85025 COMPLETE CBC W/AUTO DIFF WBC: CPT

## 2019-04-16 PROCEDURE — 63600175 PHARM REV CODE 636 W HCPCS: Performed by: STUDENT IN AN ORGANIZED HEALTH CARE EDUCATION/TRAINING PROGRAM

## 2019-04-16 PROCEDURE — 83735 ASSAY OF MAGNESIUM: CPT

## 2019-04-16 PROCEDURE — 94640 AIRWAY INHALATION TREATMENT: CPT

## 2019-04-16 PROCEDURE — 80053 COMPREHEN METABOLIC PANEL: CPT

## 2019-04-16 RX ORDER — SODIUM,POTASSIUM PHOSPHATES 280-250MG
1 POWDER IN PACKET (EA) ORAL ONCE
Status: COMPLETED | OUTPATIENT
Start: 2019-04-16 | End: 2019-04-16

## 2019-04-16 RX ORDER — IPRATROPIUM BROMIDE AND ALBUTEROL SULFATE 2.5; .5 MG/3ML; MG/3ML
3 SOLUTION RESPIRATORY (INHALATION)
Status: DISCONTINUED | OUTPATIENT
Start: 2019-04-16 | End: 2019-04-17 | Stop reason: HOSPADM

## 2019-04-16 RX ORDER — HYDROMORPHONE HYDROCHLORIDE 1 MG/ML
0.5 INJECTION, SOLUTION INTRAMUSCULAR; INTRAVENOUS; SUBCUTANEOUS ONCE
Status: DISCONTINUED | OUTPATIENT
Start: 2019-04-16 | End: 2019-04-16

## 2019-04-16 RX ADMIN — OXYCODONE AND ACETAMINOPHEN 1 TABLET: 10; 325 TABLET ORAL at 07:04

## 2019-04-16 RX ADMIN — IPRATROPIUM BROMIDE AND ALBUTEROL SULFATE 3 ML: .5; 3 SOLUTION RESPIRATORY (INHALATION) at 09:04

## 2019-04-16 RX ADMIN — IPRATROPIUM BROMIDE AND ALBUTEROL SULFATE 3 ML: .5; 3 SOLUTION RESPIRATORY (INHALATION) at 01:04

## 2019-04-16 RX ADMIN — PREDNISONE 70 MG: 20 TABLET ORAL at 08:04

## 2019-04-16 RX ADMIN — MORPHINE SULFATE 30 MG: 30 TABLET, EXTENDED RELEASE ORAL at 08:04

## 2019-04-16 RX ADMIN — POTASSIUM & SODIUM PHOSPHATES POWDER PACK 280-160-250 MG 1 PACKET: 280-160-250 PACK at 03:04

## 2019-04-16 RX ADMIN — FINASTERIDE 5 MG: 5 TABLET, FILM COATED ORAL at 08:04

## 2019-04-16 RX ADMIN — ASPIRIN 81 MG: 81 TABLET, COATED ORAL at 08:04

## 2019-04-16 RX ADMIN — CILOSTAZOL 100 MG: 100 TABLET ORAL at 08:04

## 2019-04-16 RX ADMIN — METOPROLOL SUCCINATE 50 MG: 25 TABLET, EXTENDED RELEASE ORAL at 08:04

## 2019-04-16 RX ADMIN — MAGNESIUM SULFATE HEPTAHYDRATE 3 G: 500 INJECTION, SOLUTION INTRAMUSCULAR; INTRAVENOUS at 03:04

## 2019-04-16 RX ADMIN — CLOPIDOGREL 75 MG: 75 TABLET, FILM COATED ORAL at 08:04

## 2019-04-16 RX ADMIN — OXYCODONE AND ACETAMINOPHEN 1 TABLET: 10; 325 TABLET ORAL at 03:04

## 2019-04-16 RX ADMIN — OXYCODONE AND ACETAMINOPHEN 1 TABLET: 10; 325 TABLET ORAL at 06:04

## 2019-04-16 RX ADMIN — ENOXAPARIN SODIUM 40 MG: 100 INJECTION SUBCUTANEOUS at 05:04

## 2019-04-16 RX ADMIN — FAMOTIDINE 20 MG: 20 TABLET, FILM COATED ORAL at 08:04

## 2019-04-16 RX ADMIN — OXYCODONE AND ACETAMINOPHEN 1 TABLET: 10; 325 TABLET ORAL at 10:04

## 2019-04-16 RX ADMIN — ATORVASTATIN CALCIUM 80 MG: 20 TABLET, FILM COATED ORAL at 08:04

## 2019-04-16 NOTE — PLAN OF CARE
Problem: Adult Inpatient Plan of Care  Goal: Plan of Care Review  Outcome: Ongoing (interventions implemented as appropriate)  Pt AAOx4, up with standby assist, pt with hx of falls at home, instructed to call staff for all any assistance, urinal at bedside. Pt very POWER, on 2.5 L NC, sats >93%. Plan for TTE.  this evening. Pulmonary consulted today. Pt NPO after midnight for bronch with washout. Plan of care reviewed with pt, pt in bed, bed in lowest position, wheels locked, call light in reach.

## 2019-04-16 NOTE — MEDICAL/APP STUDENT
[facility]  Hematology/Oncology  Progress Note    Patient Name: Nimesh Nunn  Admission Date: 4/15/2019  Hospital Length of Stay: 1 days  Code Status: Full Code     Subjective:   HPI:   Nimesh Nunn is a 63-year-old male with COPD, hypertension, hyperlipidemia, CAD on  ASA/plavix, NSCLC (most recently on Durvalumab 1/18/19), who presented to the ED with shortness breath. Patient recently discharged home with oxygen. Patient reports he usually requires 2 L. This morning he had an appointment with Dr. Infante but felt too short of breath and fatigued to drive to the Oncology clinic. Endorses dyspnea for multiple weeks, with productive cough, sputum has been darkening. No fevers/chills. Decreased PO intake. Occasional chest pain. He was on prednisone 70mg Qd then weaned to 60mg with worsening symptoms, so was back on 70mg Qd, but states he ran out of steroids a few days ago. In the ER, patient hypoxic and tachypneic. No lower extremity edema. Patient was admitted to Medical Oncology for further management.     Oncologic History:       Oncologic History Non-small cell lung cancer, left diagnosed 06/2018  Metastatic disease to bone at presentation    Oncologic Treatment Stereotactic radiation to spinal lesion  Cisplatin/pemetrexed with concurrent radiation 08/08/2018 (completed 09/2018)  Durvalumab 10/2018  XRT L4-S3 30 Gy 12/2018    Pathology Poorly differentiated adenocarcinoma, EGFR wild type, No ALK, ROS-1 rearrangements, PD-L1 TPS 90%        Interval History: Patient reports shortness of breath has improved from yesterday but remains constant. Patient has no other complaints.     Oncology Treatment Plan:   OP DURVALUMAB Q2W    Medications:  Continuous Infusions:  Scheduled Meds:   albuterol-ipratropium  3 mL Nebulization Q6H WAKE    aspirin  81 mg Oral Daily    atorvastatin  80 mg Oral Daily    cilostazol  100 mg Oral BID    clopidogrel  75 mg Oral Daily    enoxaparin  40 mg  Subcutaneous Daily    famotidine  20 mg Oral BID    finasteride  5 mg Oral Daily    metoprolol succinate  50 mg Oral Daily    morphine  30 mg Oral Q12H    predniSONE  70 mg Oral Daily     PRN Meds:acetaminophen, albuterol, dextrose 50%, dextrose 50%, glucagon (human recombinant), glucose, glucose, ondansetron, oxyCODONE-acetaminophen, sodium chloride 0.9%     Review of Systems   Constitutional: Negative.  Negative for fever.        Activity level has decreased secondary to increased shortness of breath with exertion.     HENT: Negative for rhinorrhea, sneezing and sore throat.    Respiratory: Positive for shortness of breath. Negative for cough and chest tightness.    Cardiovascular: Negative for chest pain, palpitations and leg swelling.   Gastrointestinal: Negative for abdominal pain, constipation, diarrhea and nausea.   Musculoskeletal: Negative for back pain and myalgias.   Skin: Negative for color change and pallor.   Neurological: Negative for weakness, light-headedness and headaches.     Objective:     Vital Signs (Most Recent):  Temp: 97.8 °F (36.6 °C) (04/16/19 1126)  Pulse: 100 (04/16/19 1145)  Resp: 19 (04/16/19 1145)  BP: 127/74 (04/16/19 1145)  SpO2: (!) 90 % (04/16/19 1145) Vital Signs (24h Range):  Temp:  [97.7 °F (36.5 °C)-98.5 °F (36.9 °C)] 97.8 °F (36.6 °C)  Pulse:  [] 100  Resp:  [14-90] 19  SpO2:  [90 %-100 %] 90 %  BP: (116-162)/(58-90) 127/74     Weight: 73.8 kg (162 lb 11.2 oz)  Body mass index is 22.07 kg/m².  Body surface area is 1.94 meters squared.      Intake/Output Summary (Last 24 hours) at 4/16/2019 1325  Last data filed at 4/16/2019 0600  Gross per 24 hour   Intake 1130 ml   Output 950 ml   Net 180 ml       Physical Exam   Constitutional: He is oriented to person, place, and time. He appears well-developed and well-nourished. No distress.   HENT:   Head: Normocephalic and atraumatic.   Eyes: Pupils are equal, round, and reactive to light. EOM are normal.   Neck: Normal  range of motion. Neck supple.   Cardiovascular: Normal rate, regular rhythm and normal heart sounds.   No murmur heard.  Pulmonary/Chest:   Mild respiratory distress especially with movement, speaking in full sentences. Mild wheeze heard bilaterally. Decreased lung sounds at bases.   Abdominal: Soft. Bowel sounds are normal. He exhibits no distension. There is no tenderness.   Neurological: He is alert and oriented to person, place, and time.   Skin: Skin is warm and dry. No erythema.       Significant Labs:   CBC:   Recent Labs   Lab 04/15/19  1415 04/16/19  0642   WBC 4.15 4.77   HGB 8.6* 7.8*   HCT 27.2* 24.4*   * 107*    and CMP:   Recent Labs   Lab 04/15/19  1415 04/16/19  0642   * 134*   K 3.6 3.9   CL 93* 93*   CO2 27 33*    177*   BUN 13 16   CREATININE 0.8 0.8   CALCIUM 9.0 8.7   PROT 7.0 6.4   ALBUMIN 2.5* 2.3*   BILITOT 0.4 0.2   ALKPHOS 166* 137*   AST 18 12   ALT 10 8*   ANIONGAP 15 8   EGFRNONAA >60.0 >60.0       Diagnostic Results:  CT results: No evidence of pulmonary thromboembolism up to the segmental arteries. Interval development of diffuse patchy reticular and ground-glass opacities bilaterally, this can be seen with lymphangitic carcinomatosis, atypical pulmonary edema, hypersensitive pneumonitis, atypical pneumonia, among other differential.  Consider pulmonology consultation.    Assessment/Plan:     Hypoxia  - Continue Prednisone 70mg qd  - Scheduled duoneb treatments q6h while awake  - Pulmonology consult in place, awaiting recommendations     Acute blood loss anemia  - Monitor Hgb     Lung cancer metastatic to bone  - Continue MS contin BID and percocet PRN     Adenocarcinoma of left lung, stage 4  Oncologic History:       Oncologic History Non-small cell lung cancer, left diagnosed 06/2018  Metastatic disease to bone at presentation    Oncologic Treatment Stereotactic radiation to spinal lesion  Cisplatin/pemetrexed with concurrent radiation 08/08/2018 (completed  09/2018)  Durvalumab 10/2018, last given on 1/18/19  XRT L4-S3 30 Gy 12/2018    Pathology Poorly differentiated adenocarcinoma, EGFR wild type, No ALK, ROS-1 rearrangements, PD-L1 TPS 90%         Essential hypertension  - Continue metoprolol     Chronic obstructive pulmonary disease  - Albuterol inhaler PRN      Benign non-nodular prostatic hyperplasia without lower urinary tract symptoms  - Continue finasteride     PAD (peripheral artery disease)  - Continue cilostazol    Vivien Stanford, MSY4  Hematology/Oncology  [facility]

## 2019-04-16 NOTE — NURSING
Pt arrived to [facility] [address] via stretcher. VSS. No skin breakdown noted. Pt oriented to the room. Pt able to position self independently. Pt in lowest position, side rails up x2, non-skid foot wear in place, call light within reach, pt verbalized understanding to call RN when needed. Hand hygiene practiced per protocol. Will continue to monitor.

## 2019-04-16 NOTE — PLAN OF CARE
MDRs completed with Dr. Mayorga and the team. Patient of Dr. Infante with a history of non-small cell lung cancer with bone mets. Patient admitted on 4/15/19 c/o shortness of breath. MD restarted steroids: Prednisone 70 mg daily starting today. Pulmonary consulted. PT/OT ordered to eval and treat; recs pending. Duonebs scheduled every 6 hours. TTE ordered and pending. Electrolytes replaced as needed. VSS. Patient is afebrile. O2 sats % on 2.5L per nasal cannula. Discharge planning pending medical stability. CM to continue to follow with the team.    Paola Kasper, RN, BSN, CM  [facility]  Nurse - Med Onc/Gyn Onc

## 2019-04-16 NOTE — ASSESSMENT & PLAN NOTE
63 year old with metastatic NSCLC (ALK-/ROS-/high PDL1 expression) with progression of disease despite chemotherapy and check point inhibition who has been on a prolonged course of steroids for presumptive CPI induced pneumonitis. He presents following two recent admission for presumed COPD exacerbation/pneumonitis and following running out of steroids at home with  progression of disease on CTA and significant worsening of right lung field with development of diffuse patchy reticular and ground-glass opacities bilaterally. Due to these findings pulmonary medicine was consulted.     Recommendations:    · Plan for bronchoscopy 4/17, bronch at 10AM with BAL as this immunocompromised (metastatic cancer/CPI) patient has been on long term high dose steroids without PJP prophylaxis  · NPO at [facility] 4/17 for procedure  · Order BNP and follow up 2D echo to evaluate for cardiogenic cause of CT findings, although less likely based on pattern/ distribution   · Unsure of need for high dose prednisone at this time as patient has had progression/worsening of CT findings and clinical worsening on prednisone - will discuss taper for this patient

## 2019-04-16 NOTE — SUBJECTIVE & OBJECTIVE
Past Medical History:   Diagnosis Date    Abdominal aortic aneurysm (AAA) without rupture 3/5/2018    Arthritis     Blood transfusion     COPD (chronic obstructive pulmonary disease)     COPD exacerbation 3/24/2019    Coronary artery disease     Dehydration 8/15/2018    Encounter for blood transfusion     Hyperlipidemia 11/7/2013    Hypertension     Lung cancer metastatic to bone 7/18/2018    Occlusion and stenosis of carotid artery without mention of cerebral infarction 1/12/2014    Pneumonitis 2/13/2019    Primary malignant neoplasm of left lung 7/18/2018    Screening for colon cancer 6/1/2015    Syncope 1/13/2014    Thrombocytopenia 9/19/2018    Weakness of both lower extremities 1/14/2019       Past Surgical History:   Procedure Laterality Date    ARCH & 4 VESSEL STUDY Bilateral 5/27/2014    Performed by Mekhi Martin MD at [facility] CATH LAB    BACK SURGERY      BRONCHOSCOPY,FIBEROPTIC N/A 6/26/2018    Performed by Liana Serrano MD at [facility] OR 2ND FLR    carotid endarectomy      CATHETERIZATION, HEART, LEFT Left 11/8/2013    Performed by Mekhi Martin MD at [facility] CATH LAB    COLONOSCOPY N/A 6/1/2015    Performed by [facility]celmo Balderrama [facility] at [facility] ENDO    CORONARY ANGIOPLASTY      CORONARY ARTERY BYPASS GRAFT      ESOPHAGOGASTRODUODENOSCOPY (EGD) N/A 8/16/2018    Performed by Yohannes Osuna MD at [facility] ENDO (2ND FLR)    HEART CATH-LEFT N/A 5/27/2014    Performed by Mekhi Martin MD at [facility] CATH LAB    INSERTION, STENT, CORONARY ARTERY N/A 12/20/2013    Performed by Mekhi Martin MD at [facility] CATH LAB    PTA, PERIPHERAL BLOOD VESSEL Left 3/11/2014    Performed by Mekhi Martin MD at [facility] CATH LAB    PTA, PERIPHERAL BLOOD VESSEL N/A 2/18/2014    Performed by Mekhi Martin MD at [facility] CATH LAB    SKIN BIOPSY      ULTRASOUND, ENDOBRONCHIAL N/A 6/26/2018    Performed by Liana Serrano MD at [facility] OR 2ND FLR       Review of patient's allergies indicates:  No Known  Allergies    Family History     Problem Relation (Age of Onset)    Breast cancer Mother    Cancer Mother    Heart attack Father    Heart disease Father    Heart failure Father    Hypertension Father    No Known Problems Sister, Brother, Maternal Grandmother, Maternal Grandfather, Paternal Grandmother, Paternal Grandfather, Maternal Aunt, Maternal Uncle, Paternal Aunt, Paternal Uncle        Tobacco Use    Smoking status: Former Smoker     Packs/day: 1.50     Years: 40.00     Pack years: 60.00     Last attempt to quit: 10/11/2011     Years since quittin.5    Smokeless tobacco: Never Used   Substance and Sexual Activity    Alcohol use: Yes     Alcohol/week: 1.8 oz     Types: 3 Cans of beer per week     Comment: 3 cans beer every day or every other day    Drug use: Yes     Types: Marijuana    Sexual activity: Not Currently         Review of Systems   Constitutional: Positive for activity change, appetite change, chills and fatigue. Negative for fever.   HENT: Negative for sore throat.    Respiratory: Positive for cough, shortness of breath and wheezing.    Cardiovascular: Positive for chest pain. Negative for palpitations and leg swelling.   Gastrointestinal: Negative for abdominal pain, constipation and diarrhea.   Endocrine: Negative.    Genitourinary: Negative.    Musculoskeletal: Positive for back pain.   Skin: Negative for color change and pallor.   Allergic/Immunologic: Negative.    Neurological: Positive for tremors. Negative for seizures and numbness.   Psychiatric/Behavioral: Positive for sleep disturbance.     Objective:     Vital Signs (Most Recent):  Temp: 97.8 °F (36.6 °C) (19 1126)  Pulse: 100 (19 1145)  Resp: 19 (19 1145)  BP: 127/74 (19 1145)  SpO2: (!) 90 % (19 1145) Vital Signs (24h Range):  Temp:  [97.7 °F (36.5 °C)-98.5 °F (36.9 °C)] 97.8 °F (36.6 °C)  Pulse:  [] 100  Resp:  [14-90] 19  SpO2:  [90 %-100 %] 90 %  BP: (116-162)/(58-90) 127/74     Weight:  73.8 kg (162 lb 11.2 oz)  Body mass index is 22.07 kg/m².      Intake/Output Summary (Last 24 hours) at 4/16/2019 1323  Last data filed at 4/16/2019 0600  Gross per 24 hour   Intake 1130 ml   Output 950 ml   Net 180 ml       Physical Exam   Constitutional: He is oriented to person, place, and time. He appears well-developed and well-nourished. He appears ill. No distress. Nasal cannula in place.   Thin male  Bruising to BL UE  On NC - satting 94%   HENT:   Head: Normocephalic and atraumatic.   edentulous   Oral ulcer RL gum line   Cardiovascular: Regular rhythm, S1 normal, S2 normal and intact distal pulses. Tachycardia present.   Murmur heard.   Systolic murmur is present with a grade of 3/6.  Pulses:       Radial pulses are 2+ on the right side, and 2+ on the left side.   Pulmonary/Chest: Effort normal. He has wheezes. He has rales.   Rales in middle lung fields  End exp wheeze R upper  Decreased breath sounds     Abdominal: Soft. There is tenderness in the left lower quadrant. There is no rigidity, no rebound and no guarding.   Musculoskeletal: Normal range of motion. He exhibits no edema or tenderness.   Lymphadenopathy:        Head (right side): No submental, no submandibular, no tonsillar and no preauricular adenopathy present.        Head (left side): No submental, no submandibular, no tonsillar and no preauricular adenopathy present.   Neurological: He is alert and oriented to person, place, and time. No cranial nerve deficit. GCS eye subscore is 4. GCS verbal subscore is 5. GCS motor subscore is 6.   Skin: Skin is warm and dry. There is pallor.   Nursing note and vitals reviewed.      Vents:  Oxygen Concentration (%): 32 (04/15/19 1736)    Lines/Drains/Airways     Peripheral Intravenous Line                 Peripheral IV - Single Lumen 04/15/19 1850 20 G Right Antecubital less than 1 day                Significant Labs:    CBC/Anemia Profile:  Recent Labs   Lab 04/15/19  1415 04/16/19  0642   WBC 4.15 4.77    HGB 8.6* 7.8*   HCT 27.2* 24.4*   * 107*   MCV 95 92   RDW 16.2* 16.1*        Chemistries:  Recent Labs   Lab 04/15/19  1415 04/16/19  0642   * 134*   K 3.6 3.9   CL 93* 93*   CO2 27 33*   BUN 13 16   CREATININE 0.8 0.8   CALCIUM 9.0 8.7   ALBUMIN 2.5* 2.3*   PROT 7.0 6.4   BILITOT 0.4 0.2   ALKPHOS 166* 137*   ALT 10 8*   AST 18 12   MG  --  1.3*   PHOS  --  2.4*           Significant Imaging:   I have reviewed all pertinent imaging results/findings within the past 24 hours.     No evidence of pulmonary thromboembolism up to the segmental arteries.    Interval development of diffuse patchy reticular and ground-glass opacities bilaterally, this can be seen with lymphangitic carcinomatosis, atypical pulmonary edema, hypersensitive pneumonitis, atypical pneumonia, among other differential.  Consider pulmonology consultation.

## 2019-04-16 NOTE — PLAN OF CARE
Problem: Adult Inpatient Plan of Care  Goal: Plan of Care Review  Outcome: Ongoing (interventions implemented as appropriate)  AAOx3, afebrile, c/o pain. Scheduled pain medication given for back pain. Telemetry monitor in place (NSR-sinus tach). Pt on 2-3 L NC. PRN inhaler used for SOB while walking. Pt able to position self independently in bed. Stand by assist OOB. Pt in lowest position, side rails up x2, non-skid foot wear in place, bed alarm on, call light within reach, pt verbalized understanding to call RN when needed. Will continue to monitor.

## 2019-04-16 NOTE — HPI
Mr. Nimesh Nunn is a 63 year old male with NSCLC of left lung dx 6/2018 (poor differentiated adenocarcinoma, EGFR wild type, ALK-, ROS-, PDL1 90%) with mets to spine. He additionally has COPD, CAD s/p PCI with nonobstructive CAD on last [facility] in 2014, HLD, HTN and GERD.  He is status post stereotactic radiation of spinal lesion in 08/2018 and Cisplatin/Pemetrexed in 08/2018. He was initiated on Durvalumab on 10/2018 and received what appears to be 5 cycles with last dose in 01/14/2019. He was hospitalized twice in 3/2019 with what was believed to be COPD exacerbation vs pneumonitis and continued on high dose prednisone therapy which was initially started in 2/13/2019 out of concern for pneumonitis, initially at 80mg of prednisone daily and then with reduction to 60mg/70mg in march. He was discharged with a 5 day course of prednisone 70mg without a taper noted and patient ultimately ran out of further prednisone before coming back to hospital on 4/15. Patient's clinical status continues to decline and was [facility] following last admission.     Patient returned to ED on 4/15 due to worsening shortness of breath at home which was unresponsive to rescue breathing treatments and increased oxygen supply. He notes that he is dyspneic at rest and becomes even more so with minimal activity (standing/coughing), he feels that his chronic cough has changed and has become more productive of brown sputum from yellow sputum and is now more hoarse in character. He notes that his sleep is fractured, sleeping only one hour at a time, he has occasional night sweats but denies fever or shaking chills. His appetite is poor, his energy is low and he continues to lose weight which he states is partially due to oral ulcers/mucositis. He also feels that his lung hurts on the right with coughing. He denies any known exposure to TB, imprisonment. Of note he was evaluated by Dr. Serrano is 4/1/2019 who notes that his  clinical status continues to decline, with progression of disease on CT with increased SOB for which she changed his Anoro to Trelegy.

## 2019-04-16 NOTE — PLAN OF CARE
"Problem: Spiritual Distress Risk or Actual  Goal: Spiritual Wellbeing    Intervention: Promote Spiritual Wellbeing  Provided initial visit. Pt present. Introduced and offered pastoral support with reflective listening. Pt mentioned he's taking his diagnosis, prognosis, and treatment "one day at a time." He added that his family and grandchildren give him strength. No spiritual needs expressed at this time. Pt given  resources and made aware of 's presence as needed.           "

## 2019-04-16 NOTE — PROGRESS NOTES
[facility]  Hematology/Oncology  Progress Note    Patient Name: Nimesh Nunn  Admission Date: 4/15/2019  Hospital Length of Stay: 1 days  Code Status: Full Code     Subjective:     HPI:  Nimesh Nunn is a 63-year-old male with COPD, hypertension, hyperlipidemia, CAD on  ASA/plavix, NSCLC (most recently on Durvalumab 1/18/19), who presented to the ED with shortness breath. Patient recently discharged home with oxygen. Patient reports he usually requires 2 L. This morning he had an appointment with Dr. Infante but felt too short of breath and fatigued to drive to the Oncology clinic. Endorses dyspnea for multiple weeks, with productive cough, sputum has been darkening. No fevers/chills. Decreased PO intake. Occasional chest pain. He was on prednisone 70mg Qd then weaned to 60mg with worsening symptoms, so was back on 70mg Qd, but states he ran out of steroids a few days ago. In the ER, patient hypoxic and tachypneic. No lower extremity edema. Patient was admitted to Medical Oncology for further management.    Oncologic History:       Oncologic History Non-small cell lung cancer, left diagnosed 06/2018  Metastatic disease to bone at presentation    Oncologic Treatment Stereotactic radiation to spinal lesion  Cisplatin/pemetrexed with concurrent radiation 08/08/2018 (completed 09/2018)  Durvalumab 10/2018  XRT L4-S3 30 Gy 12/2018    Pathology Poorly differentiated adenocarcinoma, EGFR wild type, No ALK, ROS-1 rearrangements, PD-L1 TPS 90%       Interval History: Patient seen today, states dyspnea improved. Still coughing (productive cough). No fever/chills, no edema, no chest pain. Good oral intake.    Oncology Treatment Plan:   OP DURVALUMAB Q2W    Medications:  Continuous Infusions:  Scheduled Meds:   albuterol-ipratropium  3 mL Nebulization Q6H WAKE    aspirin  81 mg Oral Daily    atorvastatin  80 mg Oral Daily    cilostazol  100 mg Oral BID    clopidogrel  75 mg Oral Daily     enoxaparin  40 mg Subcutaneous Daily    famotidine  20 mg Oral BID    finasteride  5 mg Oral Daily    metoprolol succinate  50 mg Oral Daily    morphine  30 mg Oral Q12H    predniSONE  70 mg Oral Daily     PRN Meds:acetaminophen, albuterol, dextrose 50%, dextrose 50%, glucagon (human recombinant), glucose, glucose, ondansetron, oxyCODONE-acetaminophen, sodium chloride 0.9%     Review of Systems  Objective:     Vital Signs (Most Recent):  Temp: 97.8 °F (36.6 °C) (04/16/19 1126)  Pulse: 72 (04/16/19 1327)  Resp: 18 (04/16/19 1327)  BP: 127/74 (04/16/19 1145)  SpO2: 96 % (04/16/19 1327) Vital Signs (24h Range):  Temp:  [97.7 °F (36.5 °C)-98.5 °F (36.9 °C)] 97.8 °F (36.6 °C)  Pulse:  [] 72  Resp:  [14-90] 18  SpO2:  [90 %-100 %] 96 %  BP: (116-162)/(58-89) 127/74     Weight: 73.8 kg (162 lb 11.2 oz)  Body mass index is 22.07 kg/m².  Body surface area is 1.94 meters squared.      Intake/Output Summary (Last 24 hours) at 4/16/2019 1338  Last data filed at 4/16/2019 0600  Gross per 24 hour   Intake 1130 ml   Output 950 ml   Net 180 ml       Physical Exam   Constitutional: He appears well-developed and well-nourished.   HENT:   Head: Normocephalic and atraumatic.   Eyes: Pupils are equal, round, and reactive to light. EOM are normal. No scleral icterus.   Neck: Neck supple.   Cardiovascular: Normal rate and regular rhythm.   Pulmonary/Chest: Effort normal. No respiratory distress. He has wheezes (inspiratory).   On 2.5L NC  Decreased breath sounds, poor air entry   Abdominal: Soft. He exhibits no distension. There is no tenderness.   Musculoskeletal: Normal range of motion. He exhibits no edema.   Lymphadenopathy:     He has no cervical adenopathy.   Neurological: He is alert.   Skin: Skin is warm and dry.   Psychiatric: He has a normal mood and affect. His behavior is normal.       Significant Labs:   CBC:   Recent Labs   Lab 04/15/19  1415 04/16/19  0642   WBC 4.15 4.77   HGB 8.6* 7.8*   HCT 27.2* 24.4*   PLT  104* 107*    and CMP:   Recent Labs   Lab 04/15/19  1415 04/16/19  0642   * 134*   K 3.6 3.9   CL 93* 93*   CO2 27 33*    177*   BUN 13 16   CREATININE 0.8 0.8   CALCIUM 9.0 8.7   PROT 7.0 6.4   ALBUMIN 2.5* 2.3*   BILITOT 0.4 0.2   ALKPHOS 166* 137*   AST 18 12   ALT 10 8*   ANIONGAP 15 8   EGFRNONAA >60.0 >60.0       Diagnostic Results:  I have reviewed all pertinent imaging results/findings within the past 24 hours.    Assessment/Plan:     * Hypoxia  - Ddx immunotherapy pneumonitis (patient suddenly discontinued steroids, has been on prednisone prior to this for multiple weeks), radiation pneumonitis, COPD exacerbation, progression of NSCLC ?lymphangetic spread. Less likely PNA. No PE on CTA chest. No evidence of fluid overload.   - Prednisone 70mg Qd starting 4/15 (on pepcid, monitor sugars)  - Pulmonary consulted  - Duonebs changed to scheduled Q6H awake  - ECHO ordered  - Ellipta and umeclinidium on hold as not on formulary  - Will likely continue to require O2 at discharge, was on 2-3L prior to admission  - Hold off further abx    Adenocarcinoma of left lung, stage 4  Oncologic History:       Oncologic History Non-small cell lung cancer, left diagnosed 06/2018  Metastatic disease to bone at presentation    Oncologic Treatment Stereotactic radiation to spinal lesion  Cisplatin/pemetrexed with concurrent radiation 08/08/2018 (completed 09/2018)  Durvalumab 10/2018, last given on 1/18/19  XRT L4-S3 30 Gy 12/2018    Pathology Poorly differentiated adenocarcinoma, EGFR wild type, No ALK, ROS-1 rearrangements, PD-L1 TPS 90%       Electrolyte abnormality  - Replete as needed    Thrombocytopenia  - Stable, continue to monitor    Acute blood loss anemia  - Monitor Hb    CKD (chronic kidney disease)  - Cr stable on admission    Lung cancer metastatic to bone  - On MS contin 30mg BID and percocet 10-325mg PRN    Essential hypertension  - On metoprolol    Bilateral carotid artery disease  - On  ASA/plavix    Coronary artery disease involving native artery of transplanted heart without angina pectoris  - H/o CAD s/p PCI to RCA and LAD  - On ASA/plavix        Chronic obstructive pulmonary disease  - Proair PRN  - On umeclinidium and ellipta at home, not on formulary    Benign non-nodular prostatic hyperplasia without lower urinary tract symptoms  - Continue finasteride    PAD (peripheral artery disease)  - On cilostazol    The following was staffed and discussed with supervising physician Dr. Mayorga.    Sabrina Guevara MD  Hematology/Oncology fellow    STAFF NOTE:  I have personally taken the history and examined this patient and agree with Dr. Guevara's Note as stated above.

## 2019-04-16 NOTE — SUBJECTIVE & OBJECTIVE
Interval History: Patient seen today, states dyspnea improved. Still coughing (productive cough). No fever/chills, no edema, no chest pain. Good oral intake.    Oncology Treatment Plan:   OP DURVALUMAB Q2W    Medications:  Continuous Infusions:  Scheduled Meds:   albuterol-ipratropium  3 mL Nebulization Q6H WAKE    aspirin  81 mg Oral Daily    atorvastatin  80 mg Oral Daily    cilostazol  100 mg Oral BID    clopidogrel  75 mg Oral Daily    enoxaparin  40 mg Subcutaneous Daily    famotidine  20 mg Oral BID    finasteride  5 mg Oral Daily    metoprolol succinate  50 mg Oral Daily    morphine  30 mg Oral Q12H    predniSONE  70 mg Oral Daily     PRN Meds:acetaminophen, albuterol, dextrose 50%, dextrose 50%, glucagon (human recombinant), glucose, glucose, ondansetron, oxyCODONE-acetaminophen, sodium chloride 0.9%     Review of Systems  Objective:     Vital Signs (Most Recent):  Temp: 97.8 °F (36.6 °C) (04/16/19 1126)  Pulse: 72 (04/16/19 1327)  Resp: 18 (04/16/19 1327)  BP: 127/74 (04/16/19 1145)  SpO2: 96 % (04/16/19 1327) Vital Signs (24h Range):  Temp:  [97.7 °F (36.5 °C)-98.5 °F (36.9 °C)] 97.8 °F (36.6 °C)  Pulse:  [] 72  Resp:  [14-90] 18  SpO2:  [90 %-100 %] 96 %  BP: (116-162)/(58-89) 127/74     Weight: 73.8 kg (162 lb 11.2 oz)  Body mass index is 22.07 kg/m².  Body surface area is 1.94 meters squared.      Intake/Output Summary (Last 24 hours) at 4/16/2019 1338  Last data filed at 4/16/2019 0600  Gross per 24 hour   Intake 1130 ml   Output 950 ml   Net 180 ml       Physical Exam   Constitutional: He appears well-developed and well-nourished.   HENT:   Head: Normocephalic and atraumatic.   Eyes: Pupils are equal, round, and reactive to light. EOM are normal. No scleral icterus.   Neck: Neck supple.   Cardiovascular: Normal rate and regular rhythm.   Pulmonary/Chest: Effort normal. No respiratory distress. He has wheezes (inspiratory).   On 2.5L NC  Decreased breath sounds, poor air entry    Abdominal: Soft. He exhibits no distension. There is no tenderness.   Musculoskeletal: Normal range of motion. He exhibits no edema.   Lymphadenopathy:     He has no cervical adenopathy.   Neurological: He is alert.   Skin: Skin is warm and dry.   Psychiatric: He has a normal mood and affect. His behavior is normal.       Significant Labs:   CBC:   Recent Labs   Lab 04/15/19  1415 04/16/19  0642   WBC 4.15 4.77   HGB 8.6* 7.8*   HCT 27.2* 24.4*   * 107*    and CMP:   Recent Labs   Lab 04/15/19  1415 04/16/19  0642   * 134*   K 3.6 3.9   CL 93* 93*   CO2 27 33*    177*   BUN 13 16   CREATININE 0.8 0.8   CALCIUM 9.0 8.7   PROT 7.0 6.4   ALBUMIN 2.5* 2.3*   BILITOT 0.4 0.2   ALKPHOS 166* 137*   AST 18 12   ALT 10 8*   ANIONGAP 15 8   EGFRNONAA >60.0 >60.0       Diagnostic Results:  I have reviewed all pertinent imaging results/findings within the past 24 hours.

## 2019-04-16 NOTE — CONSULTS
[facility]  Pulmonology  Consult Note    Patient Name: Nimesh Nunn  MRN: [id number]  Admission Date: 4/15/2019  Hospital Length of Stay: 1 days  Code Status: Full Code  Attending Physician: Silvia Mayorga MD  Primary Care Provider: Nishant Perez MD   Principal Problem: Hypoxia    Inpatient consult to Pulmonology  Consult performed by: Alberto Hill MD  Consult ordered by: Sabrina Guevara MD  Reason for consult: changes on CT in patient with met NSCLC         Subjective:     HPI:  Mr. Nimesh Nunn is a 63 year old male with NSCLC of left lung dx 6/2018 (poor differentiated adenocarcinoma, EGFR wild type, ALK-, ROS-, PDL1 90%) with mets to spine. He additionally has COPD, CAD s/p PCI with nonobstructive CAD on last [facility] in 2014, HLD, HTN and GERD.  He is status post stereotactic radiation of spinal lesion in 08/2018 and Cisplatin/Pemetrexed in 08/2018. He was initiated on Durvalumab on 10/2018 and received what appears to be 5 cycles with last dose in 01/14/2019. He was hospitalized twice in 3/2019 with what was believed to be COPD exacerbation vs pneumonitis and continued on high dose prednisone therapy which was initially started in 2/13/2019 out of concern for pneumonitis, initially at 80mg of prednisone daily and then with reduction to 60mg/70mg in march. He was discharged with a 5 day course of prednisone 70mg without a taper noted and patient ultimately ran out of further prednisone before coming back to hospital on 4/15. Patient's clinical status continues to decline and was discharged on 3L NC at home following last admission.     Patient returned to ED on 4/15 due to worsening shortness of breath at home which was unresponsive to rescue breathing treatments and increased oxygen supply. He notes that he is dyspneic at rest and becomes even more so with minimal activity (standing/coughing), he feels that his chronic cough has changed and has become more productive of brown sputum from  yellow sputum and is now more hoarse in character. He notes that his sleep is fractured, sleeping only one hour at a time, he has occasional night sweats but denies fever or shaking chills. His appetite is poor, his energy is low and he continues to lose weight which he states is partially due to oral ulcers/mucositis. He also feels that his lung hurts on the right with coughing. He denies any known exposure to TB, imprisonment. Of note he was evaluated by Dr. Serrano is 4/1/2019 who notes that his clinical status continues to decline, with progression of disease on CT with increased SOB for which she changed his Anoro to Trelegy.     Past Medical History:   Diagnosis Date    Abdominal aortic aneurysm (AAA) without rupture 3/5/2018    Arthritis     Blood transfusion     COPD (chronic obstructive pulmonary disease)     COPD exacerbation 3/24/2019    Coronary artery disease     Dehydration 8/15/2018    Encounter for blood transfusion     Hyperlipidemia 11/7/2013    Hypertension     Lung cancer metastatic to bone 7/18/2018    Occlusion and stenosis of carotid artery without mention of cerebral infarction 1/12/2014    Pneumonitis 2/13/2019    Primary malignant neoplasm of left lung 7/18/2018    Screening for colon cancer 6/1/2015    Syncope 1/13/2014    Thrombocytopenia 9/19/2018    Weakness of both lower extremities 1/14/2019       Past Surgical History:   Procedure Laterality Date    ARCH & 4 VESSEL STUDY Bilateral 5/27/2014    [facility] CATH LAB    BACK SURGERY      BRONCHOSCOPY,FIBEROPTIC N/A 6/26/2018    Performed by Liana [facility]ano [facility] at [facility] OR 2ND FLR    carotid endarectomy      CATHETERIZATION, HEART, LEFT Left 11/8/2013    Performed by [facility] Martin [facility] at [facility] CATH LAB    COLONOSCOPY N/A 6/1/2015    Performed by [facility]derr[facility] at [facility] ENDO    CORONARY ANGIOPLASTY      CORONARY ARTERY BYPASS GRAFT      ESOPHAGOGASTRODUODENOSCOPY (EGD) N/A  2018    Performed by [facility]annes [facility]una [facility] at [facility] ENDO (2ND FLR)    HEART CATH-LEFT N/A 2014    Performed by [facility] Martin [facility] at [facility] CATH LAB    INSERTION, STENT, CORONARY ARTERY N/A 2013    Performed by [facility]hi Martin MD at [facility] CATH LAB    PTA, PERIPHERAL BLOOD VESSEL Left 3/11/2014    Performed by [facility] Martin [facility] at [facility] CATH LAB    PTA, PERIPHERAL BLOOD VESSEL N/A 2014    Performed by [facility] Martin MD at [facility] CATH LAB    SKIN BIOPSY      ULTRASOUND, ENDOBRONCHIAL N/A 2018    Performed by Liana Serrano MD at [facility] OR 2ND FLR       Review of patient's allergies indicates:  No Known Allergies    Family History     Problem Relation (Age of Onset)    Breast cancer Mother    Cancer Mother    Heart attack Father    Heart disease Father    Heart failure Father    Hypertension Father    No Known Problems Sister, Brother, Maternal Grandmother, Maternal Grandfather, Paternal Grandmother, Paternal Grandfather, Maternal Aunt, Maternal Uncle, Paternal Aunt, Paternal Uncle        Tobacco Use    Smoking status: Former Smoker     Packs/day: 1.50     Years: 40.00     Pack years: 60.00     Last attempt to quit: 10/11/2011     Years since quittin.5    Smokeless tobacco: Never Used   Substance and Sexual Activity    Alcohol use: Yes     Alcohol/week: 1.8 oz     Types: 3 Cans of beer per week     Comment: 3 cans beer every day or every other day    Drug use: Yes     Types: Marijuana    Sexual activity: Not Currently         Review of Systems   Constitutional: Positive for activity change, appetite change, chills and fatigue. Negative for fever.   HENT: Negative for sore throat.    Respiratory: Positive for cough, shortness of breath and wheezing.    Cardiovascular: Positive for chest pain. Negative for palpitations and leg swelling.   Gastrointestinal: Negative for abdominal pain, constipation and diarrhea.   Endocrine: Negative.    Genitourinary: Negative.    Musculoskeletal:  Positive for back pain.   Skin: Negative for color change and pallor.   Allergic/Immunologic: Negative.    Neurological: Positive for tremors. Negative for seizures and numbness.   Psychiatric/Behavioral: Positive for sleep disturbance.     Objective:     Vital Signs (Most Recent):  Temp: 97.8 °F (36.6 °C) (04/16/19 1126)  Pulse: 100 (04/16/19 1145)  Resp: 19 (04/16/19 1145)  BP: 127/74 (04/16/19 1145)  SpO2: (!) 90 % (04/16/19 1145) Vital Signs (24h Range):  Temp:  [97.7 °F (36.5 °C)-98.5 °F (36.9 °C)] 97.8 °F (36.6 °C)  Pulse:  [] 100  Resp:  [14-90] 19  SpO2:  [90 %-100 %] 90 %  BP: (116-162)/(58-90) 127/74     Weight: 73.8 kg (162 lb 11.2 oz)  Body mass index is 22.07 kg/m².      Intake/Output Summary (Last 24 hours) at 4/16/2019 1323  Last data filed at 4/16/2019 0600  Gross per 24 hour   Intake 1130 ml   Output 950 ml   Net 180 ml       Physical Exam   Constitutional: He is oriented to person, place, and time. He appears well-developed and well-nourished. He appears ill. No distress. Nasal cannula in place.   Thin male  Bruising to BL UE  On NC - satting 94%   HENT:   Head: Normocephalic and atraumatic.   edentulous   Oral ulcer RL gum line   Cardiovascular: Regular rhythm, S1 normal, S2 normal and intact distal pulses. Tachycardia present.   Murmur heard.   Systolic murmur is present with a grade of 3/6.  Pulses:       Radial pulses are 2+ on the right side, and 2+ on the left side.   Pulmonary/Chest: Effort normal. He has wheezes. He has rales.   Rales in middle lung fields  End exp wheeze R upper  Decreased breath sounds     Abdominal: Soft. There is tenderness in the left lower quadrant. There is no rigidity, no rebound and no guarding.   Musculoskeletal: Normal range of motion. He exhibits no edema or tenderness.   Lymphadenopathy:        Head (right side): No submental, no submandibular, no tonsillar and no preauricular adenopathy present.        Head (left side): No submental, no submandibular, no  tonsillar and no preauricular adenopathy present.   Neurological: He is alert and oriented to person, place, and time. No cranial nerve deficit. GCS eye subscore is 4. GCS verbal subscore is 5. GCS motor subscore is 6.   Skin: Skin is warm and dry. There is pallor.   Nursing note and vitals reviewed.      Vents:  Oxygen Concentration (%): 32 (04/15/19 1736)    Lines/Drains/Airways     Peripheral Intravenous Line                 Peripheral IV - Single Lumen 04/15/19 1850 20 G Right Antecubital less than 1 day                Significant Labs:    CBC/Anemia Profile:  Recent Labs   Lab 04/15/19  1415 04/16/19  0642   WBC 4.15 4.77   HGB 8.6* 7.8*   HCT 27.2* 24.4*   * 107*   MCV 95 92   RDW 16.2* 16.1*        Chemistries:  Recent Labs   Lab 04/15/19  1415 04/16/19  0642   * 134*   K 3.6 3.9   CL 93* 93*   CO2 27 33*   BUN 13 16   CREATININE 0.8 0.8   CALCIUM 9.0 8.7   ALBUMIN 2.5* 2.3*   PROT 7.0 6.4   BILITOT 0.4 0.2   ALKPHOS 166* 137*   ALT 10 8*   AST 18 12   MG  --  1.3*   PHOS  --  2.4*           Significant Imaging:   I have reviewed all pertinent imaging results/findings within the past 24 hours.     No evidence of pulmonary thromboembolism up to the segmental arteries.    Interval development of diffuse patchy reticular and ground-glass opacities bilaterally, this can be seen with lymphangitic carcinomatosis, atypical pulmonary edema, hypersensitive pneumonitis, atypical pneumonia, among other differential.  Consider pulmonology consultation.    Assessment/Plan:     Adenocarcinoma of left lung, stage 4  63 year old with metastatic NSCLC (ALK-/ROS-/high PDL1 expression) with progression of disease despite chemotherapy and check point inhibition who has been on a prolonged course of steroids for presumptive CPI induced pneumonitis. He presents following two recent admission for presumed COPD exacerbation/pneumonitis and following running out of steroids at home with  progression of disease on CTA and  significant worsening of right lung field with development of diffuse patchy reticular and ground-glass opacities bilaterally. Due to these findings pulmonary medicine was consulted.     Recommendations:    · Plan for bronchoscopy 4/17, bronch at 10AM with BAL as this immunocompromised (metastatic cancer/CPI) patient has been on long term high dose steroids without PJP prophylaxis  · NPO at 0001 4/17 for procedure  · Order BNP and follow up 2D echo to evaluate for cardiogenic cause of CT findings, although less likely based on pattern/ distribution   · Unsure of need for high dose prednisone at this time as patient has had progression/worsening of CT findings and clinical worsening on prednisone - will discuss taper for this patient     Recommendations discussed with primary team physician, Dr. Guevara.       Thank you for your consult. I will follow-up with patient. Please contact us if you have any additional questions.     Alberto Hill MD  Pulmonology  [facility]

## 2019-04-16 NOTE — ASSESSMENT & PLAN NOTE
- Ddx immunotherapy pneumonitis (patient suddenly discontinued steroids, has been on prednisone prior to this for multiple weeks), radiation pneumonitis, COPD exacerbation, progression of NSCLC ?lymphangetic spread. Less likely PNA. No PE on CTA chest. No evidence of fluid overload.   - Prednisone 70mg Qd starting 4/15 (on pepcid, monitor sugars)  - Pulmonary consulted  - Duonebs changed to scheduled Q6H awake  - ECHO ordered  - Ellipta and umeclinidium on hold as not on formulary  - Will likely continue to require O2 at discharge, was on 2-3L prior to admission  - Hold off further abx

## 2019-04-17 ENCOUNTER — PATIENT MESSAGE (OUTPATIENT)
Dept: HEMATOLOGY/ONCOLOGY | Facility: CLINIC | Age: 64
End: 2019-04-17

## 2019-04-17 VITALS
HEIGHT: 72 IN | HEART RATE: 103 BPM | DIASTOLIC BLOOD PRESSURE: 65 MMHG | SYSTOLIC BLOOD PRESSURE: 101 MMHG | BODY MASS INDEX: 22.48 KG/M2 | WEIGHT: 166 LBS | TEMPERATURE: 97 F | RESPIRATION RATE: 18 BRPM | OXYGEN SATURATION: 96 %

## 2019-04-17 PROBLEM — R91.8 PULMONARY INFILTRATES ON CXR: Status: ACTIVE | Noted: 2019-04-17

## 2019-04-17 LAB
ABO + RH BLD: NORMAL
ACID FAST MOD KINY STN SPEC: NORMAL
ALBUMIN SERPL BCP-MCNC: 2.3 G/DL (ref 3.5–5.2)
ALP SERPL-CCNC: 115 U/L (ref 55–135)
ALT SERPL W/O P-5'-P-CCNC: 9 U/L (ref 10–44)
ANION GAP SERPL CALC-SCNC: 10 MMOL/L (ref 8–16)
ANISOCYTOSIS BLD QL SMEAR: SLIGHT
APPEARANCE FLD: NORMAL
ASCENDING AORTA: 3.57 CM
AST SERPL-CCNC: 18 U/L (ref 10–40)
AV INDEX (PROSTH): 0.81
AV MEAN GRADIENT: 3.88 MMHG
AV PEAK GRADIENT: 8.07 MMHG
AV VALVE AREA: 3.64 CM2
AV VELOCITY RATIO: 0.75
BASOPHILS # BLD AUTO: 0 K/UL (ref 0–0.2)
BASOPHILS NFR BLD: 0 % (ref 0–1.9)
BILIRUB SERPL-MCNC: 0.2 MG/DL (ref 0.1–1)
BLD GP AB SCN CELLS X3 SERPL QL: NORMAL
BODY FLD TYPE: NORMAL
BSA FOR ECHO PROCEDURE: 1.96 M2
BUN SERPL-MCNC: 19 MG/DL (ref 8–23)
CALCIUM SERPL-MCNC: 8.3 MG/DL (ref 8.7–10.5)
CHLORIDE SERPL-SCNC: 97 MMOL/L (ref 95–110)
CO2 SERPL-SCNC: 30 MMOL/L (ref 23–29)
COLOR FLD: COLORLESS
CREAT SERPL-MCNC: 0.8 MG/DL (ref 0.5–1.4)
CV ECHO LV RWT: 0.39 CM
DACRYOCYTES BLD QL SMEAR: ABNORMAL
DIFFERENTIAL METHOD: ABNORMAL
DOP CALC AO PEAK VEL: 1.42 M/S
DOP CALC AO VTI: 32.82 CM
DOP CALC LVOT AREA: 4.48 CM2
DOP CALC LVOT DIAMETER: 2.39 CM
DOP CALC LVOT PEAK VEL: 1.06 M/S
DOP CALC LVOT STROKE VOLUME: 119.5 CM3
DOP CALCLVOT PEAK VEL VTI: 26.65 CM
E WAVE DECELERATION TIME: 172.91 MSEC
E/A RATIO: 0.95
E/E' RATIO: 10.67
ECHO LV POSTERIOR WALL: 0.94 CM (ref 0.6–1.1)
EOSINOPHIL # BLD AUTO: 0 K/UL (ref 0–0.5)
EOSINOPHIL NFR BLD: 0.1 % (ref 0–8)
ERYTHROCYTE [DISTWIDTH] IN BLOOD BY AUTOMATED COUNT: 16.3 % (ref 11.5–14.5)
EST. GFR  (AFRICAN AMERICAN): >60 ML/MIN/1.73 M^2
EST. GFR  (NON AFRICAN AMERICAN): >60 ML/MIN/1.73 M^2
FRACTIONAL SHORTENING: 22 % (ref 28–44)
GLUCOSE SERPL-MCNC: 101 MG/DL (ref 70–110)
HCT VFR BLD AUTO: 22.7 % (ref 40–54)
HGB BLD-MCNC: 7.1 G/DL (ref 14–18)
HYPOCHROMIA BLD QL SMEAR: ABNORMAL
IMM GRANULOCYTES # BLD AUTO: 0.08 K/UL (ref 0–0.04)
IMM GRANULOCYTES NFR BLD AUTO: 1.2 % (ref 0–0.5)
INTERVENTRICULAR SEPTUM: 0.87 CM (ref 0.6–1.1)
KOH PREP SPEC: NORMAL
LA MAJOR: 4.29 CM
LA MINOR: 4.3 CM
LA WIDTH: 3.14 CM
LEFT ATRIUM SIZE: 3.48 CM
LEFT ATRIUM VOLUME INDEX: 20.3 ML/M2
LEFT ATRIUM VOLUME: 39.89 CM3
LEFT INTERNAL DIMENSION IN SYSTOLE: 3.76 CM (ref 2.1–4)
LEFT VENTRICLE DIASTOLIC VOLUME INDEX: 55.77 ML/M2
LEFT VENTRICLE DIASTOLIC VOLUME: 109.76 ML
LEFT VENTRICLE MASS INDEX: 76.7 G/M2
LEFT VENTRICLE SYSTOLIC VOLUME INDEX: 30.7 ML/M2
LEFT VENTRICLE SYSTOLIC VOLUME: 60.51 ML
LEFT VENTRICULAR INTERNAL DIMENSION IN DIASTOLE: 4.84 CM (ref 3.5–6)
LEFT VENTRICULAR MASS: 150.94 G
LV LATERAL E/E' RATIO: 8
LV SEPTAL E/E' RATIO: 16
LYMPHOCYTES # BLD AUTO: 0.4 K/UL (ref 1–4.8)
LYMPHOCYTES NFR BLD: 5.1 % (ref 18–48)
MAGNESIUM SERPL-MCNC: 2.2 MG/DL (ref 1.6–2.6)
MCH RBC QN AUTO: 29.6 PG (ref 27–31)
MCHC RBC AUTO-ENTMCNC: 31.3 G/DL (ref 32–36)
MCV RBC AUTO: 95 FL (ref 82–98)
MONOCYTES # BLD AUTO: 0.7 K/UL (ref 0.3–1)
MONOCYTES NFR BLD: 10.7 % (ref 4–15)
MV PEAK A VEL: 0.84 M/S
MV PEAK E VEL: 0.8 M/S
NEUTROPHILS # BLD AUTO: 5.7 K/UL (ref 1.8–7.7)
NEUTROPHILS NFR BLD: 82.9 % (ref 38–73)
NEUTROPHILS NFR FLD MANUAL: 100 %
NRBC BLD-RTO: 0 /100 WBC
OVALOCYTES BLD QL SMEAR: ABNORMAL
PHOSPHATE SERPL-MCNC: 2.6 MG/DL (ref 2.7–4.5)
PLATELET # BLD AUTO: 109 K/UL (ref 150–350)
PLATELET BLD QL SMEAR: ABNORMAL
PMV BLD AUTO: 11.1 FL (ref 9.2–12.9)
POIKILOCYTOSIS BLD QL SMEAR: SLIGHT
POLYCHROMASIA BLD QL SMEAR: ABNORMAL
POTASSIUM SERPL-SCNC: 4.3 MMOL/L (ref 3.5–5.1)
PROT SERPL-MCNC: 6.2 G/DL (ref 6–8.4)
PULM VEIN S/D RATIO: 1.21
PV PEAK D VEL: 0.43 M/S
PV PEAK S VEL: 0.52 M/S
RA MAJOR: 3.47 CM
RA PRESSURE: 8 MMHG
RA WIDTH: 3.9 CM
RBC # BLD AUTO: 2.4 M/UL (ref 4.6–6.2)
RIGHT VENTRICULAR END-DIASTOLIC DIMENSION: 4.23 CM
SINUS: 3.94 CM
SODIUM SERPL-SCNC: 137 MMOL/L (ref 136–145)
STJ: 3.34 CM
TDI LATERAL: 0.1
TDI SEPTAL: 0.05
TDI: 0.08
WBC # BLD AUTO: 6.84 K/UL (ref 3.9–12.7)
WBC # FLD: 229 /CU MM

## 2019-04-17 PROCEDURE — 89051 BODY FLUID CELL COUNT: CPT

## 2019-04-17 PROCEDURE — 31624 DX BRONCHOSCOPE/LAVAGE: CPT | Performed by: INTERNAL MEDICINE

## 2019-04-17 PROCEDURE — 63600175 PHARM REV CODE 636 W HCPCS: Performed by: INTERNAL MEDICINE

## 2019-04-17 PROCEDURE — 99223 PR INITIAL HOSPITAL CARE,LEVL III: ICD-10-PCS | Mod: 25,,, | Performed by: INTERNAL MEDICINE

## 2019-04-17 PROCEDURE — 99239 HOSP IP/OBS DSCHRG MGMT >30: CPT | Mod: GC,,, | Performed by: INTERNAL MEDICINE

## 2019-04-17 PROCEDURE — 27201040 HC RC 50 FILTER

## 2019-04-17 PROCEDURE — 94640 AIRWAY INHALATION TREATMENT: CPT

## 2019-04-17 PROCEDURE — 88112 CYTOLOGY SPECIMEN- MEDICAL CYTOLOGY (FLUID/WASH/BRUSH): ICD-10-PCS | Mod: 26,,, | Performed by: PATHOLOGY

## 2019-04-17 PROCEDURE — 88305 TISSUE EXAM BY PATHOLOGIST: CPT | Performed by: PATHOLOGY

## 2019-04-17 PROCEDURE — 88305 TISSUE EXAM BY PATHOLOGIST: CPT | Mod: 26,,, | Performed by: PATHOLOGY

## 2019-04-17 PROCEDURE — 87070 CULTURE OTHR SPECIMN AEROBIC: CPT

## 2019-04-17 PROCEDURE — 36415 COLL VENOUS BLD VENIPUNCTURE: CPT

## 2019-04-17 PROCEDURE — 87206 SMEAR FLUORESCENT/ACID STAI: CPT

## 2019-04-17 PROCEDURE — 87102 FUNGUS ISOLATION CULTURE: CPT

## 2019-04-17 PROCEDURE — 63600175 PHARM REV CODE 636 W HCPCS: Performed by: STUDENT IN AN ORGANIZED HEALTH CARE EDUCATION/TRAINING PROGRAM

## 2019-04-17 PROCEDURE — 87206 SMEAR FLUORESCENT/ACID STAI: CPT | Mod: 91

## 2019-04-17 PROCEDURE — 88312 SPECIAL STAINS GROUP 1: CPT | Performed by: PATHOLOGY

## 2019-04-17 PROCEDURE — 83735 ASSAY OF MAGNESIUM: CPT

## 2019-04-17 PROCEDURE — 99223 1ST HOSP IP/OBS HIGH 75: CPT | Mod: 25,,, | Performed by: INTERNAL MEDICINE

## 2019-04-17 PROCEDURE — 88112 CYTOPATH CELL ENHANCE TECH: CPT | Mod: 26,,, | Performed by: PATHOLOGY

## 2019-04-17 PROCEDURE — 99900035 HC TECH TIME PER 15 MIN (STAT)

## 2019-04-17 PROCEDURE — 87015 SPECIMEN INFECT AGNT CONCNTJ: CPT

## 2019-04-17 PROCEDURE — 99239 PR HOSPITAL DISCHARGE DAY,>30 MIN: ICD-10-PCS | Mod: GC,,, | Performed by: INTERNAL MEDICINE

## 2019-04-17 PROCEDURE — 88312 SPECIAL STAINS GROUP 1: CPT | Mod: 26,,, | Performed by: PATHOLOGY

## 2019-04-17 PROCEDURE — 87116 MYCOBACTERIA CULTURE: CPT

## 2019-04-17 PROCEDURE — 87107 FUNGI IDENTIFICATION MOLD: CPT | Mod: 59

## 2019-04-17 PROCEDURE — 80053 COMPREHEN METABOLIC PANEL: CPT

## 2019-04-17 PROCEDURE — 99153 MOD SED SAME PHYS/QHP EA: CPT | Performed by: INTERNAL MEDICINE

## 2019-04-17 PROCEDURE — 86920 COMPATIBILITY TEST SPIN: CPT

## 2019-04-17 PROCEDURE — 84100 ASSAY OF PHOSPHORUS: CPT

## 2019-04-17 PROCEDURE — 27000221 HC OXYGEN, UP TO 24 HOURS

## 2019-04-17 PROCEDURE — 99152 MOD SED SAME PHYS/QHP 5/>YRS: CPT | Performed by: INTERNAL MEDICINE

## 2019-04-17 PROCEDURE — 25000003 PHARM REV CODE 250: Performed by: STUDENT IN AN ORGANIZED HEALTH CARE EDUCATION/TRAINING PROGRAM

## 2019-04-17 PROCEDURE — 25000242 PHARM REV CODE 250 ALT 637 W/ HCPCS: Performed by: STUDENT IN AN ORGANIZED HEALTH CARE EDUCATION/TRAINING PROGRAM

## 2019-04-17 PROCEDURE — 87210 SMEAR WET MOUNT SALINE/INK: CPT

## 2019-04-17 PROCEDURE — 87205 SMEAR GRAM STAIN: CPT

## 2019-04-17 PROCEDURE — 88305 CYTOLOGY SPECIMEN- MEDICAL CYTOLOGY (FLUID/WASH/BRUSH): ICD-10-PCS | Mod: 26,,, | Performed by: PATHOLOGY

## 2019-04-17 PROCEDURE — 85025 COMPLETE CBC W/AUTO DIFF WBC: CPT

## 2019-04-17 PROCEDURE — 88312 CYTOLOGY SPECIMEN- MEDICAL CYTOLOGY (FLUID/WASH/BRUSH): ICD-10-PCS | Mod: 26,,, | Performed by: PATHOLOGY

## 2019-04-17 PROCEDURE — 25000003 PHARM REV CODE 250: Performed by: INTERNAL MEDICINE

## 2019-04-17 PROCEDURE — 86901 BLOOD TYPING SEROLOGIC RH(D): CPT

## 2019-04-17 RX ORDER — SULFAMETHOXAZOLE AND TRIMETHOPRIM 400; 80 MG/1; MG/1
1 TABLET ORAL DAILY
Qty: 40 TABLET | Refills: 4 | Status: ON HOLD | OUTPATIENT
Start: 2019-04-17 | End: 2019-05-06

## 2019-04-17 RX ORDER — MIDAZOLAM HYDROCHLORIDE 1 MG/ML
INJECTION INTRAMUSCULAR; INTRAVENOUS CODE/TRAUMA/SEDATION MEDICATION
Status: COMPLETED | OUTPATIENT
Start: 2019-04-17 | End: 2019-04-17

## 2019-04-17 RX ORDER — ASPIRIN 81 MG/1
81 TABLET ORAL DAILY
Status: DISCONTINUED | OUTPATIENT
Start: 2019-04-17 | End: 2019-04-17 | Stop reason: HOSPADM

## 2019-04-17 RX ORDER — SULFAMETHOXAZOLE AND TRIMETHOPRIM 400; 80 MG/1; MG/1
1 TABLET ORAL DAILY
Status: DISCONTINUED | OUTPATIENT
Start: 2019-04-17 | End: 2019-04-17 | Stop reason: HOSPADM

## 2019-04-17 RX ORDER — OXYCODONE AND ACETAMINOPHEN 10; 325 MG/1; MG/1
1 TABLET ORAL EVERY 4 HOURS PRN
Qty: 42 TABLET | Refills: 0 | Status: SHIPPED | OUTPATIENT
Start: 2019-04-17 | End: 2019-04-29 | Stop reason: SDUPTHER

## 2019-04-17 RX ORDER — PREDNISONE 10 MG/1
70 TABLET ORAL DAILY
Qty: 70 TABLET | Refills: 0 | Status: SHIPPED | OUTPATIENT
Start: 2019-04-18 | End: 2019-04-28

## 2019-04-17 RX ORDER — LIDOCAINE HYDROCHLORIDE 10 MG/ML
INJECTION INFILTRATION; PERINEURAL CODE/TRAUMA/SEDATION MEDICATION
Status: COMPLETED | OUTPATIENT
Start: 2019-04-17 | End: 2019-04-17

## 2019-04-17 RX ORDER — CLOPIDOGREL BISULFATE 75 MG/1
75 TABLET ORAL DAILY
Status: DISCONTINUED | OUTPATIENT
Start: 2019-04-17 | End: 2019-04-17 | Stop reason: HOSPADM

## 2019-04-17 RX ORDER — FENTANYL CITRATE 50 UG/ML
INJECTION, SOLUTION INTRAMUSCULAR; INTRAVENOUS CODE/TRAUMA/SEDATION MEDICATION
Status: COMPLETED | OUTPATIENT
Start: 2019-04-17 | End: 2019-04-17

## 2019-04-17 RX ORDER — LIDOCAINE HYDROCHLORIDE 20 MG/ML
INJECTION, SOLUTION INFILTRATION; PERINEURAL CODE/TRAUMA/SEDATION MEDICATION
Status: COMPLETED | OUTPATIENT
Start: 2019-04-17 | End: 2019-04-17

## 2019-04-17 RX ORDER — HYDROCODONE BITARTRATE AND ACETAMINOPHEN 500; 5 MG/1; MG/1
TABLET ORAL
Status: DISCONTINUED | OUTPATIENT
Start: 2019-04-17 | End: 2019-04-17 | Stop reason: HOSPADM

## 2019-04-17 RX ORDER — MIDAZOLAM HYDROCHLORIDE 5 MG/ML
INJECTION INTRAMUSCULAR; INTRAVENOUS CODE/TRAUMA/SEDATION MEDICATION
Status: COMPLETED | OUTPATIENT
Start: 2019-04-17 | End: 2019-04-17

## 2019-04-17 RX ADMIN — SULFAMETHOXAZOLE AND TRIMETHOPRIM 1 TABLET: 400; 80 TABLET ORAL at 05:04

## 2019-04-17 RX ADMIN — ATORVASTATIN CALCIUM 80 MG: 20 TABLET, FILM COATED ORAL at 08:04

## 2019-04-17 RX ADMIN — LIDOCAINE HYDROCHLORIDE 2 ML: 20 INJECTION, SOLUTION INFILTRATION; PERINEURAL at 09:04

## 2019-04-17 RX ADMIN — FINASTERIDE 5 MG: 5 TABLET, FILM COATED ORAL at 08:04

## 2019-04-17 RX ADMIN — LIDOCAINE HYDROCHLORIDE 2 ML: 10 INJECTION, SOLUTION INFILTRATION; PERINEURAL at 09:04

## 2019-04-17 RX ADMIN — CLOPIDOGREL 75 MG: 75 TABLET, FILM COATED ORAL at 01:04

## 2019-04-17 RX ADMIN — FENTANYL CITRATE 25 MCG: 50 INJECTION, SOLUTION INTRAMUSCULAR; INTRAVENOUS at 09:04

## 2019-04-17 RX ADMIN — MIDAZOLAM HYDROCHLORIDE 2 MG: 5 INJECTION, SOLUTION INTRAMUSCULAR; INTRAVENOUS at 09:04

## 2019-04-17 RX ADMIN — CILOSTAZOL 100 MG: 100 TABLET ORAL at 08:04

## 2019-04-17 RX ADMIN — ASPIRIN 81 MG: 81 TABLET, COATED ORAL at 01:04

## 2019-04-17 RX ADMIN — OXYCODONE AND ACETAMINOPHEN 1 TABLET: 10; 325 TABLET ORAL at 01:04

## 2019-04-17 RX ADMIN — MIDAZOLAM HYDROCHLORIDE 1 MG: 1 INJECTION, SOLUTION INTRAMUSCULAR; INTRAVENOUS at 09:04

## 2019-04-17 RX ADMIN — METOPROLOL SUCCINATE 50 MG: 25 TABLET, EXTENDED RELEASE ORAL at 08:04

## 2019-04-17 RX ADMIN — PREDNISONE 70 MG: 20 TABLET ORAL at 08:04

## 2019-04-17 RX ADMIN — OXYCODONE AND ACETAMINOPHEN 1 TABLET: 10; 325 TABLET ORAL at 05:04

## 2019-04-17 RX ADMIN — TOPICAL ANESTHETIC 0.5 ML: 200 SPRAY DENTAL; PERIODONTAL at 09:04

## 2019-04-17 RX ADMIN — MORPHINE SULFATE 30 MG: 30 TABLET, EXTENDED RELEASE ORAL at 08:04

## 2019-04-17 RX ADMIN — FAMOTIDINE 20 MG: 20 TABLET, FILM COATED ORAL at 08:04

## 2019-04-17 RX ADMIN — IPRATROPIUM BROMIDE AND ALBUTEROL SULFATE 3 ML: .5; 3 SOLUTION RESPIRATORY (INHALATION) at 12:04

## 2019-04-17 NOTE — SUBJECTIVE & OBJECTIVE
Interval history - please see hospital course    Review of Systems   Constitutional: Positive for activity change, appetite change, chills and fatigue. Negative for fever.   HENT: Negative for sore throat.    Respiratory: Positive for cough, shortness of breath and wheezing.    Cardiovascular: Positive for chest pain. Negative for palpitations and leg swelling.   Gastrointestinal: Negative for abdominal pain, constipation and diarrhea.   Endocrine: Negative.    Genitourinary: Negative.    Musculoskeletal: Positive for back pain.   Skin: Negative for color change and pallor.   Allergic/Immunologic: Negative.    Neurological: Positive for tremors. Negative for seizures and numbness.   Psychiatric/Behavioral: Positive for sleep disturbance.     Objective:     Vital Signs (Most Recent):  Temp: 97.8 °F (36.6 °C) (04/16/19 1934)  Pulse: 91 (04/17/19 1111)  Resp: 18 (04/17/19 1020)  BP: 101/65 (04/17/19 1020)  SpO2: (!) 94 % (04/17/19 1020) Vital Signs (24h Range):  Temp:  [97.8 °F (36.6 °C)-98 °F (36.7 °C)] 97.8 °F (36.6 °C)  Pulse:  [] 91  Resp:  [10-22] 18  SpO2:  [90 %-97 %] 94 %  BP: (101-145)/(58-82) 101/65     Weight: 75.5 kg (166 lb 7.2 oz)  Body mass index is 22.57 kg/m².      Intake/Output Summary (Last 24 hours) at 4/17/2019 1137  Last data filed at 4/17/2019 0557  Gross per 24 hour   Intake 1700 ml   Output 2180 ml   Net -480 ml       Physical Exam   Constitutional: He is oriented to person, place, and time. He appears well-developed and well-nourished. He appears ill. No distress. Nasal cannula in place.   Thin male  Bruising to BL UE  On NC 2.5L - satting 94%   HENT:   Head: Normocephalic and atraumatic.   edentulous   Oral ulcer RL gum line   Cardiovascular: Regular rhythm, S1 normal, S2 normal and intact distal pulses. Tachycardia present.   Murmur heard.   Systolic murmur is present with a grade of 3/6.  Pulses:       Radial pulses are 2+ on the right side, and 2+ on the left side.   Pulmonary/Chest:  Effort normal. He has wheezes. He has rales.   Rales in middle lung fields  End exp wheeze R upper  Decreased breath sounds     Abdominal: Soft. There is tenderness in the left lower quadrant. There is no rigidity, no rebound and no guarding.   Musculoskeletal: Normal range of motion. He exhibits no edema or tenderness.   Lymphadenopathy:        Head (right side): No submental, no submandibular, no tonsillar and no preauricular adenopathy present.        Head (left side): No submental, no submandibular, no tonsillar and no preauricular adenopathy present.   Neurological: He is alert and oriented to person, place, and time. No cranial nerve deficit. GCS eye subscore is 4. GCS verbal subscore is 5. GCS motor subscore is 6.   Skin: Skin is warm and dry. There is pallor.   Nursing note and vitals reviewed.      Vents:  Oxygen Concentration (%): 32 (04/15/19 1736)    Lines/Drains/Airways     Peripheral Intravenous Line                 Peripheral IV - Single Lumen 04/15/19 1850 20 G Right Antecubital 1 day                Significant Labs:    CBC/Anemia Profile:  Recent Labs   Lab 04/15/19  1415 04/16/19  0642 04/17/19  0624   WBC 4.15 4.77 6.84   HGB 8.6* 7.8* 7.1*   HCT 27.2* 24.4* 22.7*   * 107* 109*   MCV 95 92 95   RDW 16.2* 16.1* 16.3*        Chemistries:  Recent Labs   Lab 04/15/19  1415 04/16/19  0642 04/17/19  0624   * 134* 137   K 3.6 3.9 4.3   CL 93* 93* 97   CO2 27 33* 30*   BUN 13 16 19   CREATININE 0.8 0.8 0.8   CALCIUM 9.0 8.7 8.3*   ALBUMIN 2.5* 2.3* 2.3*   PROT 7.0 6.4 6.2   BILITOT 0.4 0.2 0.2   ALKPHOS 166* 137* 115   ALT 10 8* 9*   AST 18 12 18   MG  --  1.3* 2.2   PHOS  --  2.4* 2.6*           Significant Imaging:   I have reviewed all pertinent imaging results/findings within the past 24 hours.     No evidence of pulmonary thromboembolism up to the segmental arteries.    Interval development of diffuse patchy reticular and ground-glass opacities bilaterally, this can be seen with  lymphangitic carcinomatosis, atypical pulmonary edema, hypersensitive pneumonitis, atypical pneumonia, among other differential.  Consider pulmonology consultation.

## 2019-04-17 NOTE — PROGRESS NOTES
[facility]  Pulmonology  Progress Note    Patient Name: Nimesh Nunn  MRN: [id number]  Admission Date: 4/15/2019  Hospital Length of Stay: 2 days  Code Status: Full Code  Attending Provider: Silvia Mayorga MD  Primary Care Provider: Nishant Perez MD   Principal Problem: Hypoxia    Subjective:     Interval history - please see hospital course    Review of Systems   Constitutional: Positive for activity change, appetite change, chills and fatigue. Negative for fever.   HENT: Negative for sore throat.    Respiratory: Positive for cough, shortness of breath and wheezing.    Cardiovascular: Positive for chest pain. Negative for palpitations and leg swelling.   Gastrointestinal: Negative for abdominal pain, constipation and diarrhea.   Endocrine: Negative.    Genitourinary: Negative.    Musculoskeletal: Positive for back pain.   Skin: Negative for color change and pallor.   Allergic/Immunologic: Negative.    Neurological: Positive for tremors. Negative for seizures and numbness.   Psychiatric/Behavioral: Positive for sleep disturbance.     Objective:     Vital Signs (Most Recent):  Temp: 97.8 °F (36.6 °C) (04/16/19 1934)  Pulse: 91 (04/17/19 1111)  Resp: 18 (04/17/19 1020)  BP: 101/65 (04/17/19 1020)  SpO2: (!) 94 % (04/17/19 1020) Vital Signs (24h Range):  Temp:  [97.8 °F (36.6 °C)-98 °F (36.7 °C)] 97.8 °F (36.6 °C)  Pulse:  [] 91  Resp:  [10-22] 18  SpO2:  [90 %-97 %] 94 %  BP: (101-145)/(58-82) 101/65     Weight: 75.5 kg (166 lb 7.2 oz)  Body mass index is 22.57 kg/m².      Intake/Output Summary (Last 24 hours) at 4/17/2019 1137  Last data filed at 4/17/2019 0557  Gross per 24 hour   Intake 1700 ml   Output 2180 ml   Net -480 ml       Physical Exam   Constitutional: He is oriented to person, place, and time. He appears well-developed and well-nourished. He appears ill. No distress. Nasal cannula in place.   Thin male  Bruising to BL UE  On NC 2.5L - satting 94%   HENT:   Head: Normocephalic  and atraumatic.   edentulous   Oral ulcer RL gum line   Cardiovascular: Regular rhythm, S1 normal, S2 normal and intact distal pulses. Tachycardia present.   Murmur heard.   Systolic murmur is present with a grade of 3/6.  Pulses:       Radial pulses are 2+ on the right side, and 2+ on the left side.   Pulmonary/Chest: Effort normal. He has wheezes. He has rales.   Rales in middle lung fields  End exp wheeze R upper  Decreased breath sounds     Abdominal: Soft. There is tenderness in the left lower quadrant. There is no rigidity, no rebound and no guarding.   Musculoskeletal: Normal range of motion. He exhibits no edema or tenderness.   Lymphadenopathy:        Head (right side): No submental, no submandibular, no tonsillar and no preauricular adenopathy present.        Head (left side): No submental, no submandibular, no tonsillar and no preauricular adenopathy present.   Neurological: He is alert and oriented to person, place, and time. No cranial nerve deficit. GCS eye subscore is 4. GCS verbal subscore is 5. GCS motor subscore is 6.   Skin: Skin is warm and dry. There is pallor.   Nursing note and vitals reviewed.      Vents:  Oxygen Concentration (%): 32 (04/15/19 1736)    Lines/Drains/Airways     Peripheral Intravenous Line                 Peripheral IV - Single Lumen 04/15/19 1850 20 G Right Antecubital 1 day                Significant Labs:    CBC/Anemia Profile:  Recent Labs   Lab 04/15/19  1415 04/16/19  0642 04/17/19  0624   WBC 4.15 4.77 6.84   HGB 8.6* 7.8* 7.1*   HCT 27.2* 24.4* 22.7*   * 107* 109*   MCV 95 92 95   RDW 16.2* 16.1* 16.3*        Chemistries:  Recent Labs   Lab 04/15/19  1415 04/16/19  0642 04/17/19  0624   * 134* 137   K 3.6 3.9 4.3   CL 93* 93* 97   CO2 27 33* 30*   BUN 13 16 19   CREATININE 0.8 0.8 0.8   CALCIUM 9.0 8.7 8.3*   ALBUMIN 2.5* 2.3* 2.3*   PROT 7.0 6.4 6.2   BILITOT 0.4 0.2 0.2   ALKPHOS 166* 137* 115   ALT 10 8* 9*   AST 18 12 18   MG  --  1.3* 2.2   PHOS  --   2.4* 2.6*           Significant Imaging:   I have reviewed all pertinent imaging results/findings within the past 24 hours.     No evidence of pulmonary thromboembolism up to the segmental arteries.    Interval development of diffuse patchy reticular and ground-glass opacities bilaterally, this can be seen with lymphangitic carcinomatosis, atypical pulmonary edema, hypersensitive pneumonitis, atypical pneumonia, among other differential.  Consider pulmonology consultation.    Assessment/Plan:     Adenocarcinoma of left lung, stage 4  63 year old with metastatic NSCLC (ALK-/ROS-/high PDL1 expression) with progression of disease despite chemotherapy and check point inhibition who has been on a prolonged course of steroids for presumptive CPI induced pneumonitis. He presents following two recent admission for presumed COPD exacerbation/pneumonitis and following running out of steroids at home with  progression of disease on CTA and significant worsening of right lung field with development of diffuse patchy reticular and ground-glass opacities bilaterally. Due to these findings pulmonary medicine was consulted.     Recommendations:    · Bronchoscopy 4/17 - follow micro, labs and path  · BNP near baseline, prelim read per cards fellow on 2D echo, mildly reduced EF, no gross dysfunction to explain respiratory change   · Unsure of need for high dose prednisone at this time as patient has had progression/worsening of CT findings and clinical worsening on prednisone - will discuss taper for this patient              Alberto Hill MD  Pulmonology  [facility]

## 2019-04-17 NOTE — ASSESSMENT & PLAN NOTE
63 year old with metastatic NSCLC (ALK-/ROS-/high PDL1 expression) with progression of disease despite chemotherapy and check point inhibition who has been on a prolonged course of steroids for presumptive CPI induced pneumonitis. He presents following two recent admission for presumed COPD exacerbation/pneumonitis and following running out of steroids at home with  progression of disease on CTA and significant worsening of right lung field with development of diffuse patchy reticular and ground-glass opacities bilaterally. Due to these findings pulmonary medicine was consulted.     Recommendations:    · Bronchoscopy 4/17 - follow micro, labs and path  · BNP near baseline, prelim read per cards fellow on 2D echo, mildly reduced EF, no gross dysfunction to explain respiratory change   · Unsure of need for high dose prednisone at this time as patient has had progression/worsening of CT findings and clinical worsening on prednisone - will discuss taper for this patient

## 2019-04-17 NOTE — PLAN OF CARE
CM requested close follow-up for patient with Med Onc clinic (Dr. Infante). Appt pending scheduling by the clinic. Dr. Mayorga included on request.    Paola Kasper, RN, BSN, CM  [facility]  Nurse - Med Onc/Gyn Onc

## 2019-04-17 NOTE — CARE UPDATE
[facility]    HOME HEALTH ORDERS  FACE TO FACE ENCOUNTER    Patient Name: Nimesh Nunn  YOB: 1955    PCP: Nishant Perez MD   PCP Address: [address] [place], [place] [address]ALAN Aguilar / ELIAS BERUMEN [address]  PCP Phone Number: [phone]  PCP Fax: [phone]    Encounter Date: 04/17/2019    Admit to Home Health    Diagnoses:  Active Hospital Problems    Diagnosis  POA    *Hypoxia [R09.02]  Yes    Pulmonary infiltrates on CXR [R91.8]  Yes    Electrolyte abnormality [E87.8]  Yes    Thrombocytopenia [D69.6]  Yes    Acute blood loss anemia [D62]  Yes    CKD (chronic kidney disease) [N18.9]  Yes    Lung cancer metastatic to bone [C34.90, C79.51]  Yes    Adenocarcinoma of left lung, stage 4 [C34.92]  Yes     Last CT in February concerning for progression of the left hilum  Concerned that he has progression of disease leading to worsening hypoxia and dyspnea on exertion.  Follow up CT of chest.     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  Continue oxygen at 3L/nc continuously.        Benign non-nodular prostatic hyperplasia without lower urinary tract symptoms [N40.0]  Yes    PAD (peripheral artery disease) [I73.9]  Yes      Resolved Hospital Problems   No resolved problems to display.       Future Appointments   Date Time Provider Department Center   6/17/2019  1:30 PM Yady Oliveira MD [facility]     Follow-up Information     Demetrius Infante DO, FACP.    Specialty:  Hematology and Oncology  Why:  As scheduled by the clinic  Contact information:  [address]  [place] [address] [phone]                     I have seen and examined this patient face to face today. My clinical findings  that support the need for the home health skilled services and home bound status are the following:  Weakness/numbness causing balance and gait disturbance due to Weakness/Debility making it taxing to leave home.  Requiring assistive device to leave home due to unsteady gait caused by  Weakness/Debility, Anemia and Malignancy/Cancer.    Allergies:Review of patient's allergies indicates:  No Known Allergies    Diet: cardiac diet    Activities: activity as tolerated    Nursing:   SN to complete comprehensive assessment including routine vital signs. Instruct on disease process and s/s of complications to report to MD. Review/verify medication list sent home with the patient at time of discharge  and instruct patient/caregiver as needed. Frequency may be adjusted depending on start of care date.     Notify MD if SBP > 160 or < 90; DBP > 90 or < 50; HR > 120 or < 50; Temp > 101        CONSULTS:    Physical Therapy to evaluate and treat. Evaluate for home safety and equipment needs; Establish/upgrade home exercise program. Perform / instruct on therapeutic exercises, gait training, transfer training, and Range of Motion.  Occupational Therapy to evaluate and treat. Evaluate home environment for safety and equipment needs. Perform/Instruct on transfers, ADL training, ROM, and therapeutic exercises.  Aide to provide assistance with personal care, ADLs, and vital signs.     MISCELLANEOUS CARE:  Home Oxygen:  Oxygen at 3 L/min nasal canula to be used:  Continuously.  COPD: Teach patient MDI/HFA usage and guidelines  Please provide smoking education, cessation, and nicotine replacement options if patient is a current smoker or if anyone in the household is a smoker  Please ensure that patient has a pulse oximeter and is educated on his normal oxygen saturations: 88-92%       Medications: Review discharge medications with patient and family and provide education.      Current Discharge Medication List      CONTINUE these  medications which have CHANGED    Details   oxyCODONE-acetaminophen (PERCOCET)  mg per tablet Take 1 tablet by mouth every 4 (four) hours as needed for Pain.  Qty: 42 tablet, Refills: 0         CONTINUE these medications which have NOT CHANGED    Details   aspirin (ECOTRIN) 81 MG EC tablet Take 81 mg by mouth once daily.      atorvastatin (LIPITOR) 80 MG tablet Take 1 tablet (80 mg total) by mouth once daily.  Qty: 30 tablet, Refills: 11    Comments: Generic For:LIPITOR   80MG  07/12/2018 11:08:49 AM  Associated Diagnoses: Dyslipidemia      cilostazol (PLETAL) 100 MG Tab TAKE 1 TABLET BY MOUTH TWICE DAILY  Qty: 60 tablet, Refills: 11    Comments: Generic For:PLETAL    100MG  03/23/2018 12:02:45 PM  Associated Diagnoses: PVD (peripheral vascular disease)      clopidogrel (PLAVIX) 75 mg tablet TAKE 1 TABLET BY MOUTH EVERY DAY  Qty: 90 tablet, Refills: 4    Comments: Generic For:*PLAVIX    75MG 07/02/2018 10:09:50 AM  Associated Diagnoses: Coronary artery disease involving native coronary artery without angina pectoris, unspecified whether native or transplanted heart; PAD (peripheral artery disease); Right-sided carotid artery disease      finasteride (PROSCAR) 5 mg tablet Take 1 tablet (5 mg total) by mouth once daily.  Qty: 90 tablet, Refills: 3    Comments: Generic For:PROSCAR   5MG  12/20/2018 3:17:34 PM  Associated Diagnoses: Benign non-nodular prostatic hyperplasia without lower urinary tract symptoms      morphine (MS CONTIN) 30 MG 12 hr tablet Take 1 tablet (30 mg total) by mouth every 12 (twelve) hours.  Qty: 60 tablet, Refills: 0    Associated Diagnoses: Neoplasm related pain      PROAIR HFA 90 mcg/actuation inhaler Inhale 2 puffs into the lungs as needed.  Qty: 18 g, Refills: 6    Associated Diagnoses: Chronic obstructive pulmonary disease, unspecified COPD type      famotidine (PEPCID) 20 MG tablet Take 1 tablet (20 mg total) by mouth 2 (two) times daily. for 10 days  Qty: 20 tablet, Refills: 0       fluticasone-umeclidin-vilanter (TRELEGY ELLIPTA) 100-62.5-25 mcg DsDv Inhale 1 puff into the lungs once daily.  Qty: 60 each, Refills: 11    Associated Diagnoses: Chronic obstructive pulmonary disease with acute exacerbation      metoprolol succinate (TOPROL-XL) 50 MG 24 hr tablet Take 50 mg by mouth.      nitroGLYCERIN (NITROSTAT) 0.4 MG SL tablet Place 1 tablet (0.4 mg total) under the tongue every 5 (five) minutes as needed for Chest pain.  Qty: 90 tablet, Refills: 1      ondansetron (ZOFRAN-ODT) 8 MG TbDL Take 1 tablet (8 mg total) by mouth every 12 (twelve) hours as needed.  Qty: 30 tablet, Refills: 1    Associated Diagnoses: Primary malignant neoplasm of left lung         STOP taking these medications       umeclidinium brm/vilanterol tr (UMECLIDINIUM-VILANTEROL INHL) Comments:   Reason for Stopping:               I certify that this patient is confined to his home and needs intermittent skilled nursing care, physical therapy and speech therapy.    Aftab De La Rosa MD, MS  Medical Oncology  PGY-1  Pager: [phone]

## 2019-04-17 NOTE — SEDATION DOCUMENTATION
H and P updated-yes, patient placed on cardiac monitor, anesthesia Plan:  Conscious sedation, ASA verified-yes, Airway exam performed-yes, Personal or Family history of anesthesia complications-No  Consent signed and witnessed, Mojgan Roberto RN

## 2019-04-17 NOTE — PHYSICIAN QUERY
PT Name: Nimesh Nunn  MR #: [id number]     PHYSICIAN QUERY -  ELECTROLYTE CLARIFICATION      CDS: Quyen Aguirre RN   Contact information:[email or web address]    This form is a permanent document in the medical record.     Query Date: April 17, 2019    By submitting this query, we are merely seeking further clarification of documentation to reflect the severity of illness of your patient. Please utilize your independent clinical judgment when addressing the question(s) below.    The Medical record reflects the following:     Indicators   Supporting Clinical Findings Location in Medical Record   x Lab Value(s)  4/16   Mag 1.3       4/16 4/17   Phos 2.4  2.6     Lab Results   x Treatment                         Medication magnesium sulfate 3 g in dextrose 5 % 250 mL IVPB  Ordered Dose: 3 g   Route: Intravenous   Frequency: Once @ 62.5 mL/hr over 4 Hours    potassium, sodium phosphates 280-160-250 mg packet 1 packet  Ordered Dose: 1 packet   Route: Oral   Frequency: Once MAR- Given 4/16          MAR- Given 4/16    Other       Provider, please specify the diagnosis or diagnoses that correspond(s) to the above indicators. Neymar all that apply:    [ c  ] Hypomagnesemia   [  c ] Hypophosphatemia   [   ] Other electrolyte disturbance (please specify): _______   [   ]  Clinically Undetermined       Please document in your progress notes daily for the duration of treatment until resolved, and include in your discharge summary.

## 2019-04-17 NOTE — PHYSICIAN QUERY
PT Name: Nimesh Nunn  MR #: [id number]    Physician Query Form - Respiratory Condition Clarification      CDS: Quyen Aguirre RN    Contact information:[email or web address]     This form is a permanent document in the medical record.    Query Date: April 17, 2019    By submitting this query, we are merely seeking further clarification of documentation. Please utilize your independent clinical judgment when addressing the question(s) below.    The Medical record contains the following   Indicators   Supporting Clinical Findings Location in Medical Record   x SOB, POWER, Wheezing, Productive Cough, Use of Accessory Muscles, etc. Presents to the ED with shortness breath. On EMS arrival O2 sat was 80% at 32breaths/min.     Crackles and wheezing heard bilaterally ED Note 4/15   x Acute/Chronic Illness 63-year-old male with medical history of COPD, hypertension, hyperlipidemia, carcinoma of left lung ED Note 4/15   x Radiology Findings Increase in the patchy parenchymal opacities particularly on the right.    Interval development of diffuse patchy reticular and ground-glass opacities bilaterally, this can be seen with lymphangitic carcinomatosis, atypical pulmonary edema, hypersensitive pneumonitis, atypical pneumonia, among other differential.  Consider pulmonology consultation. Chest Xray 4/15      CTA Chest 4/15   x Respiratory Distress or Failure Acute respiratory failure with hypoxia ED Note 4/15   x Hypoxia or Hypercapnia Hypoxia  - Ddx immunotherapy pneumonitis (patient suddenly discontinued steroids, has been on prednisone prior to this for multiple weeks), COPD exacerbation (but no significant wheezing), PE, progression of NSCLC. Less likely PNA. No evidence of fluid overload. No evidence of ACS.  - Prednisone 70mg Qd starting today (on pepcid, monitor sugars)  - CTA chest to be done  - Gauri PRN H&P 4/15   x RR         ABGs         O2 sat  4/15   PH 7.254    PCO2 64.2   PO2 19    BE 1   HCO3 28.4     SATURATED O2 22    TCO2 30    FiO2 32   Flow 3     RR 22  O2 93%   Point of Care Testing                        Vital signs Flowsheet 4/15 @1832    BiPAP/Intubation     x Supplemental O2 Pt on 2-3 L NC RN POC 4/16   x Home O2, Oxygen Dependence Patient on 3L at home ED Note 4/15   x Treatment PRN inhaler used for SOB while walking RN POC 4/16    Other       Respiratory failure can be acute, chronic or both. It is generally further specified as hypoxic, hypercapnic or both. Lastly, it is important to identify an etiology, if known or suspected.   References::  https://www.acphospitalist.org/archives/2013/10/coding.htm http://[employer]/acute-respiratory-failure-know     The clinical guidelines noted below are only system guidelines, and do not replace the providers clinical judgment.    Provider, please specify diagnosis or diagnoses associated with above clinical findings.   [  x ] Acute Respiratory Failure with Hypoxia - ABG pO2 < 60 mmHg or O2 sat of 88% on RA and respiratory symptoms documented     [   ] Acute and (on) Chronic Respiratory Failure with Hypoxia - pO2 >10 mmHg below baseline OR SpO2 < 91% on usual home O2 OR O2 > 2L/min over baseline home O2      [   ] Acute Respiratory Insufficiency - Generally describes less severe respiratory symptoms and measurements (pO2, SpO2, pH, and pCO2) NOT meeting criteria for respiratory failure      [   ] Acute Respiratory Distress - Generally describes less severe respiratory symptoms (tachypnea, in respiratory distress, increased work of breathing, unable to speak in complete sentences, labored breathing, use of accessory muscles, RR> 24, cyanosis, dyspnea, wheezing, stridor, lethargy) without sufficient measurements (pO2, SpO2, pH, and pCO2) to meet criteria for respiratory failure      [   ] Hypoxia Only     [   ] Other Respiratory Diagnosis (please specify): _________________________________     [   ]  Clinically Undetermined       Please  document in your progress notes daily for the duration of treatment until resolved and include in your discharge summary.

## 2019-04-17 NOTE — SEDATION DOCUMENTATION
Moderate concious sedation was performed and cardiorespiratory functions were monitored the entire procedure by Mojgan Roberto RN.  Sedation began at 940am  and concluded at 1001am.

## 2019-04-17 NOTE — PLAN OF CARE
Problem: Adult Inpatient Plan of Care  Goal: Plan of Care Review  Outcome: Ongoing (interventions implemented as appropriate)    Pt AAOx4. No family at the bedside. Pt son, Curtis, updated on POC via telephone.  VS remain stable. Pt satting 88 % and above on 2.5L NC. NPO after midnight.  Pt has remained afebrile.  Pt on telemetry monitor running NSR with HR 80's- 90's.  Plan for bronch and TTE today.  Bed alarm on.- Pt with H/O falls at home and on Morphine and Oxy.  Instructed to call for assistance OOB.  Pt voiding per urinal. Pt has voided 990 mL. No BM.  Pt remained free from falls or injury thus far. Bed is in low/ locked position, side rails are up x 2, call light is in reach. Will continue to monitor.

## 2019-04-17 NOTE — PROGRESS NOTES
Met with patient bedside. Patient will discharge today needs transport home. [facility] transport request put in for wheelchair transport with oxygen for 6:00pm. Patient will need home health resumed orders faxed to [facility].

## 2019-04-17 NOTE — PLAN OF CARE
Problem: Adult Inpatient Plan of Care  Goal: Plan of Care Review  Outcome: Ongoing (interventions implemented as appropriate)  VSS. Call light and personal items in reach. Bronch wash out completed today. Echo completed today. A MRI of the entire spine was ordered. Pt may go home after. Pt complained of 7/10 pain. No issues today. WCTM.

## 2019-04-17 NOTE — NURSING
Pt d/c.Iv, tele and visi removed. Discharge papers reviewed. All questions answered about medications and appointments. Meds delivered to the room. Pt waiting on [facility] transport to bring him home.

## 2019-04-18 ENCOUNTER — PATIENT MESSAGE (OUTPATIENT)
Dept: HEMATOLOGY/ONCOLOGY | Facility: CLINIC | Age: 64
End: 2019-04-18

## 2019-04-18 DIAGNOSIS — R19.7 DIARRHEA, UNSPECIFIED TYPE: Primary | ICD-10-CM

## 2019-04-18 RX ORDER — DIPHENOXYLATE HYDROCHLORIDE AND ATROPINE SULFATE 2.5; .025 MG/1; MG/1
1 TABLET ORAL 4 TIMES DAILY PRN
Qty: 30 TABLET | Refills: 1 | Status: ON HOLD | OUTPATIENT
Start: 2019-04-18 | End: 2019-05-13 | Stop reason: HOSPADM

## 2019-04-18 NOTE — HOSPITAL COURSE
Ddx included immunotherapy pneumonitis (patient suddenly discontinued steroids, has been on prednisone prior to this for multiple weeks), radiation pneumonitis, COPD exacerbation, progression of NSCLC with possible lymphangetic spread. Less likely PNA. No PE on CTA chest. No evidence of fluid overload. Current radiographs show the recent development of extensive pulmonary opacities in all lung zones outside the area of chronic abnormality at the L base (location of his known lung malignancy).  Findings during this admit do not support acute cardiac dysfunction as an explanation.  Other potential causes would include infection (atypical or opportunistic organisms) or an acute lung injury (inflammatory process).  The latter would seem less likely given his recent therapy with corticosteroids.  Bronchoscopy for BAL performed, results pending.    Pt reported tates dyspnea improved, productive cough improved.  Remained without fever/chills, no edema, no chest pain. Good oral intake.  Pt became increasingly adamant that he was out of home narcotics and would require prescription refills.  The oncology team explained that they were able to provide a week's worth of refill on the percocet, but since the MS Contin was filled for 1 month a few days before admit, it was not possible to fill those.  Pt scheduled to fllow up with primary oncologist Dr. Infante in clinic the following morning after discharge.  PCP follow up within 1 week of discharge.      Meds delivered bedside upon discharge:  - Percocet q4hr 7 days (42 pills)  - Prednisone 70mg daily (continuing home regimen as had no medication remaining  - Bactrim 80-160mg PO daily as PCP ppx

## 2019-04-18 NOTE — PLAN OF CARE
On 4/18/19 at 1030: SW Beatris notified this CM that the patient needs to be scheduled for an MRI with and without contrast. Patient was scheduled by clinic to follow-up with Dr. Infante Monday 4/22/19. CM sent an additional note to Dr. Infante's clinic regarding MRI need.     Paola Kasper, RN, BSN, CM [facility]  Nurse - Med Onc/Gyn Onc

## 2019-04-18 NOTE — DISCHARGE SUMMARY
[facility]  Hematology/Oncology  Discharge Summary      Patient Name: Nimesh Nunn  MRN: [id number]  Admission Date: 4/15/2019  Hospital Length of Stay: 2 days  Discharge Date and Time: 4/17/2019  8:38 PM  Attending Physician: No att. providers found   Discharging Provider: Aftab De La Rosa MD  Primary Care Provider: Nishant Perez MD    HPI:   Nimesh Nunn is a 63-year-old male with COPD, hypertension, hyperlipidemia, CAD on  ASA/plavix, NSCLC (most recently on Durvalumab 1/18/19), who presented to the ED with shortness breath. Patient recently discharged home with oxygen. Patient reports he usually requires 2 L. This morning he had an appointment with Dr. Infante but felt too short of breath and fatigued to drive to the Oncology clinic. Endorses dyspnea for multiple weeks, with productive cough, sputum has been darkening. No fevers/chills. Decreased PO intake. Occasional chest pain. He was on prednisone 70mg Qd then weaned to 60mg with worsening symptoms, so was back on 70mg Qd, but states he ran out of steroids a few days ago. In the ER, patient hypoxic and tachypneic. No lower extremity edema. Patient was admitted to Medical Oncology for further management.    Oncologic History:       Oncologic History Non-small cell lung cancer, left diagnosed 06/2018  Metastatic disease to bone at presentation    Oncologic Treatment Stereotactic radiation to spinal lesion  Cisplatin/pemetrexed with concurrent radiation 08/08/2018 (completed 09/2018)  Durvalumab 10/2018  XRT L4-S3 30 Gy 12/2018    Pathology Poorly differentiated adenocarcinoma, EGFR wild type, No ALK, ROS-1 rearrangements, PD-L1 TPS 90%     Cancer hx dates to 06/2018 when he sought medical attention for chest pain.  A CT of the chest was performed showing a left lower lobe mass measuring 7.4 cm.  There were also enlarged left hilar lymph nodes. He underwent PET-CT which showed uptake in this mass and also in L1.  He underwent  bronchoscopy and biopsy with pathology showing poorly differentiated adenocarcinoma.  Molecular studies showed EGFR wild-type and he did not harbor any ALK or ROS-1 rearrangements.  PD L1 tumor proportions core was 90%.  He underwent radiation to the spinal lesion in 08/2018 and started on concurrent chemo and radiation with cisplatin and pemetrexed in 08/2018.  He completed treatment on 09/19/2018.  He was only able to receive 2 cycles of chemotherapy secondary to cytopenias.  He was initiated on durvalumab in 10/2018.  He received 3 cycles but then developed lower extremity weakness and was felt that this may be related to cauda equina syndrome related to bone metastasis.  He underwent radiation to his lumbar and sacral spine in 12/2018.      * No surgery found *     Hospital Course: Ddx included immunotherapy pneumonitis (patient suddenly discontinued steroids, has been on prednisone prior to this for multiple weeks), radiation pneumonitis, COPD exacerbation, progression of NSCLC with possible lymphangetic spread. Less likely PNA. No PE on CTA chest. No evidence of fluid overload. Current radiographs show the recent development of extensive pulmonary opacities in all lung zones outside the area of chronic abnormality at the L base (location of his known lung malignancy).  Findings during this admit do not support acute cardiac dysfunction as an explanation.  Other potential causes would include infection (atypical or opportunistic organisms) or an acute lung injury (inflammatory process).  The latter would seem less likely given his recent therapy with corticosteroids.   Bronchoscopy for BAL performed, results pending.    Pt reported tates dyspnea improved, productive cough improved.  Remained without fever/chills, no edema, no chest pain. Good oral intake.  Pt became increasingly adamant that he was out of home narcotics and would require prescription refills.  The oncology team explained that they were able to  provide a week's worth of refill on the percocet, but since the MS Contin was filled for 1 month a few days before admit, it was not possible to fill those.  Pt scheduled to fllow up with primary oncologist Dr. Infante in clinic the following morning after discharge.  PCP follow up within 1 week of discharge.      Meds delivered bedside upon discharge:  - Percocet q4hr 7 days (42 pills)  - Prednisone 70mg daily (continuing home regimen as had no medication remaining  - Bactrim 80-160mg PO daily as PCP ppx        Consults:   Consults (From admission, onward)        Status Ordering Provider     Inpatient consult to Hematology/Oncology  Once     Provider:  (Not yet assigned)    Completed ZAHIRA MATHIS     Inpatient consult to Pulmonology  Once     Provider:  (Not yet assigned)    Completed LUZ MARINA SILVA          Review of Systems   Constitutional: Positive for activity change, appetite change, chills and fatigue. Negative for fever.   HENT: Negative for sore throat.    Respiratory: Positive for cough, shortness of breath and wheezing.    Cardiovascular: Positive for chest pain. Negative for palpitations and leg swelling.   Gastrointestinal: Negative for abdominal pain, constipation and diarrhea.   Endocrine: Negative.    Genitourinary: Negative.    Musculoskeletal: Positive for back pain.   Skin: Negative for color change and pallor.    Vitals:    04/17/19 1553   BP:    Pulse:    Resp:    Temp: 97.3 °F (36.3 °C)     Physical Exam   Constitutional: He is oriented to person, place, and time. He appears well-developed and well-nourished. He appears ill. No distress. Nasal cannula in place.   Thin male  Bruising to BL UE  On NC 2.5L - satting 94%   HENT:   Head: Normocephalic and atraumatic.   edentulous   Oral ulcer RL gum line   Cardiovascular: Regular rhythm, S1 normal, S2 normal and intact distal pulses. Tachycardia present.   Murmur heard.   Systolic murmur is present with a grade of 3/6.  Pulses:        Radial pulses are 2+ on the right side, and 2+ on the left side.   Pulmonary/Chest: Effort normal. He has wheezes. He has rales.   Rales in middle lung fields  End exp wheeze R upper  Decreased breath sounds     Abdominal: Soft. There is tenderness in the left lower quadrant. There is no rigidity, no rebound and no guarding.   Musculoskeletal: Normal range of motion. He exhibits no edema or tenderness.   Lymphadenopathy:        Head (right side): No submental, no submandibular, no tonsillar and no preauricular adenopathy present.        Head (left side): No submental, no submandibular, no tonsillar and no preauricular adenopathy present.   Neurological: He is alert and oriented to person, place, and time. No cranial nerve deficit. GCS eye subscore is 4. GCS verbal subscore is 5. GCS motor subscore is 6.   Skin: Skin is warm and dry. There is pallor.   Nursing note and vitals reviewed.         Significant Diagnostic Studies: Labs:   BMP:   Recent Labs   Lab 04/16/19  0642 04/17/19 0624   * 101   * 137   K 3.9 4.3   CL 93* 97   CO2 33* 30*   BUN 16 19   CREATININE 0.8 0.8   CALCIUM 8.7 8.3*   MG 1.3* 2.2   , CMP   Recent Labs   Lab 04/16/19 0642 04/17/19 0624   * 137   K 3.9 4.3   CL 93* 97   CO2 33* 30*   * 101   BUN 16 19   CREATININE 0.8 0.8   CALCIUM 8.7 8.3*   PROT 6.4 6.2   ALBUMIN 2.3* 2.3*   BILITOT 0.2 0.2   ALKPHOS 137* 115   AST 12 18   ALT 8* 9*   ANIONGAP 8 10   ESTGFRAFRICA >60.0 >60.0   EGFRNONAA >60.0 >60.0   , CBC   Recent Labs   Lab 04/16/19 0642 04/17/19 0624   WBC 4.77 6.84   HGB 7.8* 7.1*   HCT 24.4* 22.7*   * 109*    and INR   Lab Results   Component Value Date    INR 0.9 03/28/2019    INR 0.9 08/15/2018    INR 1.0 06/09/2018     Imaging Results          CTA Chest Non-Coronary (PE Study) (Final result)  Result time 04/15/19 20:02:18    Final result by Nuno Valverde MD (04/15/19 20:02:18)                 Impression:      No evidence of pulmonary  thromboembolism up to the segmental arteries.    Interval development of diffuse patchy reticular and ground-glass opacities bilaterally, this can be seen with lymphangitic carcinomatosis, atypical pulmonary edema, hypersensitive pneumonitis, atypical pneumonia, among other differential.  Consider pulmonology consultation.    Electronically signed by resident: Abimael Bynum  Date:    04/15/2019  Time:    19:15    Electronically signed by: Nuno Valverde MD  Date:    04/15/2019  Time:    20:02             Narrative:    EXAMINATION:  CTA CHEST NON CORONARY    CLINICAL HISTORY:  dyspnea;    TECHNIQUE:  Low dose axial images, sagittal and coronal reformations were obtained from the thoracic inlet to the lung bases following the IV administration of 75 mL of Omnipaque 350.  Contrast timing was optimized to evaluate the pulmonary arteries.  MIP images were performed.    COMPARISON:  CT chest abdomen pelvis 02/11/2019.  CTA chest 12 42,018.    FINDINGS:  Neck base: Unremarkable.    Chest wall/axilla: Unremarkable.    Lungs/pleura: Diffuse reticular and ground-glass opacification bilaterally.  Previously identified soft tissue mass in the lower lobe of left lung is less marginated on today's exam.    Heart/pericardium/mediastinum: Heart is normal size.  No pericardial effusion.    Vasculature: 3 vessels left aortic arch.  Aortic atherosclerotic calcification.  Aorta is normal course and caliber without aneurysmal dilatation.  No evidence of pulmonary thromboembolism up to the segmental arteries.    Upper abdomen: Unremarkable.    Bones: Degenerative change of the spine.  No suspicious lytic or blastic lesion.                                X-Ray Chest AP Portable (SOB) (Final result)  Result time 04/15/19 15:48:27    Final result by Ancelmo Martinez Jr., MD (04/15/19 15:48:27)                 Impression:      Increase in the bilateral patchy parenchymal opacities particularly on the right.  Pneumonic infiltrate not  excluded.    This report was flagged in Epic as abnormal.      Electronically signed by: Ancelmo Martinez MD  Date:    04/15/2019  Time:    15:48             Narrative:    EXAMINATION:  XR CHEST AP PORTABLE    CLINICAL HISTORY:  SOB;    TECHNIQUE:  Single frontal view of the chest was performed.    COMPARISON:  March 28, 2019.    FINDINGS:  Monitoring leads in place.  Increase in the patchy parenchymal opacities particularly on the right.  No pneumothorax.  Heart size is similar.                              Microbiology Results (last 7 days)     Procedure Component Value Units Date/Time    Blood culture #2 [[id number]] Collected:  04/15/19 1735    Order Status:  Completed Specimen:  Blood from Peripheral, Antecubital, Right Updated:  04/17/19 2012     Blood Culture, Routine No Growth to date     Blood Culture, Routine No Growth to date     Blood Culture, Routine No Growth to date    Blood culture #1 [[id number]] Collected:  04/15/19 1438    Order Status:  Completed Specimen:  Blood from Peripheral, Antecubital, Right Updated:  04/17/19 1612     Blood Culture, Routine No Growth to date     Blood Culture, Routine No Growth to date     Blood Culture, Routine No Growth to date    Direct AFB stain [[id number]] Collected:  04/17/19 1004    Order Status:  Completed Specimen:  Respiratory from BAL, RUL Updated:  04/17/19 1525     Direct Acid Fast No acid fast bacilli seen.    Narrative:       Bronchial Wash    Culture, Respiratory [[id number]] Collected:  04/17/19 1004    Order Status:  Completed Specimen:  Respiratory from BAL, RUL Updated:  04/17/19 1245     Gram Stain (Respiratory) <10 epithelial cells per low power field.     Gram Stain (Respiratory) Few WBC's     Gram Stain (Respiratory) No organisms seen    Narrative:       Bronchial Wash    EMMY prep [[id number]] Collected:  04/17/19 1004    Order Status:  Completed Specimen:  Respiratory from BAL, RUL Updated:  04/17/19 1215     KOH Prep No yeast or fungal elements seen     Narrative:       Bronchial Wash    Gram stain [[id number]] Collected:  04/17/19 1004    Order Status:  Canceled Specimen:  Body Fluid from Lung, RUL Updated:  04/17/19 1047    AFB Culture & Smear [[id number]] Collected:  04/17/19 1004    Order Status:  Sent Specimen:  Respiratory from BAL, RUL Updated:  04/17/19 1047    Fungus culture [[id number]] Collected:  04/17/19 1004    Order Status:  Sent Specimen:  Respiratory from BAL, RUL Updated:  04/17/19 1046              Pending Diagnostic Studies:     Procedure Component Value Units Date/Time    Medical Cytology [[id number]] Collected:  04/17/19 1004    Order Status:  Sent Lab Status:  In process Updated:  04/17/19 1236    Specimen:  BRONCHIAL WASHINGS from Bronch wash         Final Active Diagnoses:    Diagnosis Date Noted POA    PRINCIPAL PROBLEM:  Hypoxia [R09.02] 04/15/2019 Yes    Pulmonary infiltrates on CXR [R91.8] 04/17/2019 Yes    Electrolyte abnormality [E87.8] 04/16/2019 Yes    Thrombocytopenia [D69.6] 09/19/2018 Yes    Acute blood loss anemia [D62] 08/16/2018 Yes    CKD (chronic kidney disease) [N18.9] 08/15/2018 Yes    Lung cancer metastatic to bone [C34.90, C79.51] 07/18/2018 Yes    Adenocarcinoma of left lung, stage 4 [C34.92] 07/06/2018 Yes    Bilateral carotid artery disease [I77.9] 11/16/2016 Yes    Essential hypertension [I10] 11/16/2016 Yes    Coronary artery 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    Specialty:  Hematology and Oncology  Why:  As scheduled by the clinic  Contact  information:  [address]  [facility] [address]  [phone]             Nishant Perez MD In 2 days.    Specialty:  Internal Medicine  Contact information:  [address] [facility]  [address]  [place] [address] [phone]             Simone Infante DO, RONALD In 2 days.    Specialty:  Hematology and Oncology  Contact information:  [address]  [facility] [address]  [phone]                 Patient Instructions:      MRI Spine Cervical-Thoracic-Lumbar W W/O Contrast (XPD)   Standing Status: Future Standing Exp. Date: 04/17/20     Order Specific Question Answer Comments   Does the patient have a pacemaker or a defibrilator? No    Does the patient have a cerebral aneurysm or surgical clip, pump, nerve or brain stimulator, middle or inner ear prosthesis, or other metal implant or  been injured by a metal object(i.e. bullet, bb, shrapnel)? No    Is the patient claustrophobic? No    Will the patient require sedation? No    Does the patient have any of the following conditions? Diabetes, History of Renal Disease or Hypertension requiring medical therapy? No    May the Radiologist modify the order per protocol to meet the clinical needs of the patient? Yes    Is this part of a Research Study? No    Does the patient have on a skin patch for medication with aluminized backing? No      Ambulatory Referral to Hematology / Oncology   Referral Priority: Routine Referral Type: Consultation   Referral Reason: Specialty Services Required   Referred to Provider: SIMONE INFANTE Requested Specialty: Hematology and Oncology   Number of Visits Requested: 1     Medications:  Reconciled Home Medications:      Medication List      START taking these medications    predniSONE 10 MG tablet  Commonly known as:  DELTASONE  Take 7 tablets (70 mg total) by mouth once daily. for 10 days  Start taking on:  4/18/2019     sulfamethoxazole-trimethoprim 400-80mg 400-80 mg per tablet  Commonly known as:  BACTRIM,SEPTRA  Take 1 tablet  by mouth once daily.        CONTINUE taking these medications    aspirin 81 MG EC tablet  Commonly known as:  ECOTRIN  Take 81 mg by mouth once daily.     atorvastatin 80 MG tablet  Commonly known as:  LIPITOR  Take 1 tablet (80 mg total) by mouth once daily.     cilostazol 100 MG Tab  Commonly known as:  PLETAL  TAKE 1 TABLET BY MOUTH TWICE DAILY     clopidogrel 75 mg tablet  Commonly known as:  PLAVIX  TAKE 1 TABLET BY MOUTH EVERY DAY     famotidine 20 MG tablet  Commonly known as:  PEPCID  Take 1 tablet (20 mg total) by mouth 2 (two) times daily. for 10 days     finasteride 5 mg tablet  Commonly known as:  PROSCAR  Take 1 tablet (5 mg total) by mouth once daily.     metoprolol succinate 50 MG 24 hr tablet  Commonly known as:  TOPROL-XL  Take 50 mg by mouth.     morphine 30 MG 12 hr tablet  Commonly known as:  MS CONTIN  Take 1 tablet (30 mg total) by mouth every 12 (twelve) hours.     nitroGLYCERIN 0.4 MG SL tablet  Commonly known as:  NITROSTAT  Place 1 tablet (0.4 mg total) under the tongue every 5 (five) minutes as needed for Chest pain.     ondansetron 8 MG Tbdl  Commonly known as:  ZOFRAN-ODT  Take 1 tablet (8 mg total) by mouth every 12 (twelve) hours as needed.     oxyCODONE-acetaminophen  mg per tablet  Commonly known as:  PERCOCET  Take 1 tablet by mouth every 4 (four) hours as needed for Pain.     PROAIR HFA 90 mcg/actuation inhaler  Generic drug:  albuterol  Inhale 2 puffs into the lungs as needed.     TRELEGY ELLIPTA 100-62.5-25 mcg Dsdv  Generic drug:  fluticasone-umeclidin-vilanter  Inhale 1 puff into the lungs once daily.        STOP taking these medications    UMECLIDINIUM-VILANTEROL INHL            Aftab De La Rosa MD  Hematology/Oncology  [facility]    STAFF NOTE:  I have personally taken the history and examined this patient and agree with residents Note as stated above

## 2019-04-18 NOTE — PLAN OF CARE
On 4/18/19 at 0630: CM noted that the patient discharged home on 4/17/19. Plans to resume current home health; see SW note for details. Follow-up appointment as listed below. Discharge and follow-up instructions  completed by the bedside nurse.    Future Appointments   Date Time Provider Department Center   4/18/2019  8:40 AM [facility]ante [facility] F[facility]   6/17/2019  1:30 PM Yady Oliveira MD [facility]      04/18/19 0716   Final Note   Assessment Type Final Discharge Note   Anticipated Discharge Disposition Home-Health   What phone number can be called within the next 1-3 days to see how you are doing after discharge?   ([phone])   Hospital Follow Up  Appt(s) scheduled? Yes   Discharge plans and expectations educations in teach back method with documentation complete? Yes  (per bedside nurse)

## 2019-04-18 NOTE — PROGRESS NOTES
Pt discharged via wheelchair accompanied by [facility].  Pt NAD. PIV removed and discharged by day RNChani.

## 2019-04-20 LAB
BACTERIA BLD CULT: NORMAL
BACTERIA BLD CULT: NORMAL

## 2019-04-21 LAB
BLD PROD TYP BPU: NORMAL
BLD PROD TYP BPU: NORMAL
BLOOD UNIT EXPIRATION DATE: NORMAL
BLOOD UNIT EXPIRATION DATE: NORMAL
BLOOD UNIT TYPE CODE: 6200
BLOOD UNIT TYPE CODE: 6200
BLOOD UNIT TYPE: NORMAL
BLOOD UNIT TYPE: NORMAL
CODING SYSTEM: NORMAL
CODING SYSTEM: NORMAL
DISPENSE STATUS: NORMAL
DISPENSE STATUS: NORMAL
TRANS ERYTHROCYTES VOL PATIENT: NORMAL ML
TRANS ERYTHROCYTES VOL PATIENT: NORMAL ML

## 2019-04-22 ENCOUNTER — OFFICE VISIT (OUTPATIENT)
Dept: HEMATOLOGY/ONCOLOGY | Facility: CLINIC | Age: 64
End: 2019-04-22
Payer: MEDICARE

## 2019-04-22 VITALS
TEMPERATURE: 98 F | DIASTOLIC BLOOD PRESSURE: 78 MMHG | BODY MASS INDEX: 22.43 KG/M2 | OXYGEN SATURATION: 97 % | SYSTOLIC BLOOD PRESSURE: 160 MMHG | HEART RATE: 90 BPM | HEIGHT: 72 IN | WEIGHT: 165.56 LBS

## 2019-04-22 DIAGNOSIS — C79.51 LUNG CANCER METASTATIC TO BONE: ICD-10-CM

## 2019-04-22 DIAGNOSIS — C34.90 LUNG CANCER METASTATIC TO BONE: ICD-10-CM

## 2019-04-22 DIAGNOSIS — G89.3 NEOPLASM RELATED PAIN: ICD-10-CM

## 2019-04-22 DIAGNOSIS — C34.92 PRIMARY MALIGNANT NEOPLASM OF LEFT LUNG: Primary | ICD-10-CM

## 2019-04-22 PROCEDURE — 99999 PR PBB SHADOW E&M-EST. PATIENT-LVL IV: ICD-10-PCS | Mod: PBBFAC,,, | Performed by: INTERNAL MEDICINE

## 2019-04-22 PROCEDURE — 3078F PR MOST RECENT DIASTOLIC BLOOD PRESSURE < 80 MM HG: ICD-10-PCS | Mod: CPTII,S$GLB,, | Performed by: INTERNAL MEDICINE

## 2019-04-22 PROCEDURE — 99214 PR OFFICE/OUTPT VISIT, EST, LEVL IV, 30-39 MIN: ICD-10-PCS | Mod: S$GLB,,, | Performed by: INTERNAL MEDICINE

## 2019-04-22 PROCEDURE — 99214 OFFICE O/P EST MOD 30 MIN: CPT | Mod: S$GLB,,, | Performed by: INTERNAL MEDICINE

## 2019-04-22 PROCEDURE — 3008F BODY MASS INDEX DOCD: CPT | Mod: CPTII,S$GLB,, | Performed by: INTERNAL MEDICINE

## 2019-04-22 PROCEDURE — 3008F PR BODY MASS INDEX (BMI) DOCUMENTED: ICD-10-PCS | Mod: CPTII,S$GLB,, | Performed by: INTERNAL MEDICINE

## 2019-04-22 PROCEDURE — 3077F SYST BP >= 140 MM HG: CPT | Mod: CPTII,S$GLB,, | Performed by: INTERNAL MEDICINE

## 2019-04-22 PROCEDURE — 1101F PT FALLS ASSESS-DOCD LE1/YR: CPT | Mod: CPTII,S$GLB,, | Performed by: INTERNAL MEDICINE

## 2019-04-22 PROCEDURE — 3078F DIAST BP <80 MM HG: CPT | Mod: CPTII,S$GLB,, | Performed by: INTERNAL MEDICINE

## 2019-04-22 PROCEDURE — 3077F PR MOST RECENT SYSTOLIC BLOOD PRESSURE >= 140 MM HG: ICD-10-PCS | Mod: CPTII,S$GLB,, | Performed by: INTERNAL MEDICINE

## 2019-04-22 PROCEDURE — 99999 PR PBB SHADOW E&M-EST. PATIENT-LVL IV: CPT | Mod: PBBFAC,,, | Performed by: INTERNAL MEDICINE

## 2019-04-22 PROCEDURE — 1101F PR PT FALLS ASSESS DOC 0-1 FALLS W/OUT INJ PAST YR: ICD-10-PCS | Mod: CPTII,S$GLB,, | Performed by: INTERNAL MEDICINE

## 2019-04-22 RX ORDER — MORPHINE SULFATE 30 MG/1
30 TABLET, FILM COATED, EXTENDED RELEASE ORAL EVERY 8 HOURS
Qty: 90 TABLET | Refills: 0 | Status: ON HOLD | OUTPATIENT
Start: 2019-04-22 | End: 2019-05-13 | Stop reason: SDUPTHER

## 2019-04-22 NOTE — LETTER
April 22, 2019      Aftab De La Rosa MD  [address]  [facility] [address]           [facility] Hematology Oncology  [address]  [facility] [address]  Phone: [phone]          Patient: Nimesh Nunn   MR Number: [id number]   YOB: 1955   Date of Visit: 4/22/2019       Dear Dr. Aftab De La Rosa:    Thank you for referring Nimesh Nunn to me for evaluation. Attached you will find relevant portions of my assessment and plan of care.    If you have questions, please do not hesitate to call me. I look forward to following Nimesh Nunn along with you.    Sincerely,    Demetrius Infante DO, FACP    Enclosure  CC:  No Recipients    If you would like to receive this communication electronically, please contact [email or web address] or [phone] to request more information on [employer] access.    For providers and/or their staff who would like to refer a patient to [facility], please contact us through our one-stop-shop provider referral line, [facility], at [phone].    If you feel you have received this communication in error or would no longer like to receive these types of communications, please e-mail [email or web address]
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
## 2019-04-23 ENCOUNTER — OFFICE VISIT (OUTPATIENT)
Dept: INTERNAL MEDICINE | Facility: CLINIC | Age: 64
End: 2019-04-23
Payer: MEDICARE

## 2019-04-23 VITALS
SYSTOLIC BLOOD PRESSURE: 130 MMHG | DIASTOLIC BLOOD PRESSURE: 80 MMHG | HEIGHT: 72 IN | WEIGHT: 168.19 LBS | HEART RATE: 92 BPM | BODY MASS INDEX: 22.78 KG/M2 | OXYGEN SATURATION: 89 %

## 2019-04-23 DIAGNOSIS — D69.2 SENILE PURPURA: ICD-10-CM

## 2019-04-23 DIAGNOSIS — I10 ESSENTIAL HYPERTENSION: ICD-10-CM

## 2019-04-23 DIAGNOSIS — Z00.00 ROUTINE GENERAL MEDICAL EXAMINATION AT A HEALTH CARE FACILITY: Primary | ICD-10-CM

## 2019-04-23 DIAGNOSIS — C34.92 ADENOCARCINOMA OF LEFT LUNG, STAGE 4: ICD-10-CM

## 2019-04-23 DIAGNOSIS — N40.0 BENIGN NON-NODULAR PROSTATIC HYPERPLASIA WITHOUT LOWER URINARY TRACT SYMPTOMS: ICD-10-CM

## 2019-04-23 DIAGNOSIS — K21.9 GASTROESOPHAGEAL REFLUX DISEASE, ESOPHAGITIS PRESENCE NOT SPECIFIED: ICD-10-CM

## 2019-04-23 DIAGNOSIS — I25.10 CORONARY ARTERY DISEASE INVOLVING NATIVE CORONARY ARTERY WITHOUT ANGINA PECTORIS, UNSPECIFIED WHETHER NATIVE OR TRANSPLANTED HEART: ICD-10-CM

## 2019-04-23 DIAGNOSIS — J44.9 CHRONIC OBSTRUCTIVE PULMONARY DISEASE, UNSPECIFIED COPD TYPE: ICD-10-CM

## 2019-04-23 LAB
BILIRUB SERPL-MCNC: NEGATIVE MG/DL
BLOOD URINE, POC: NEGATIVE
COLOR, POC UA: YELLOW
GLUCOSE UR QL STRIP: NEGATIVE
KETONES UR QL STRIP: NEGATIVE
LEUKOCYTE ESTERASE URINE, POC: NEGATIVE
NITRITE, POC UA: NEGATIVE
PH, POC UA: 6.5
PROTEIN, POC: NEGATIVE
SPECIFIC GRAVITY, POC UA: 1.01
UROBILINOGEN, POC UA: 0.2

## 2019-04-23 PROCEDURE — 3075F SYST BP GE 130 - 139MM HG: CPT | Mod: CPTII,S$GLB,, | Performed by: INTERNAL MEDICINE

## 2019-04-23 PROCEDURE — 3075F PR MOST RECENT SYSTOLIC BLOOD PRESS GE 130-139MM HG: ICD-10-PCS | Mod: CPTII,S$GLB,, | Performed by: INTERNAL MEDICINE

## 2019-04-23 PROCEDURE — 3008F BODY MASS INDEX DOCD: CPT | Mod: CPTII,S$GLB,, | Performed by: INTERNAL MEDICINE

## 2019-04-23 PROCEDURE — 99214 OFFICE O/P EST MOD 30 MIN: CPT | Mod: 25,S$GLB,, | Performed by: INTERNAL MEDICINE

## 2019-04-23 PROCEDURE — 99999 PR PBB SHADOW E&M-EST. PATIENT-LVL III: CPT | Mod: PBBFAC,,, | Performed by: INTERNAL MEDICINE

## 2019-04-23 PROCEDURE — 1101F PR PT FALLS ASSESS DOC 0-1 FALLS W/OUT INJ PAST YR: ICD-10-PCS | Mod: CPTII,S$GLB,, | Performed by: INTERNAL MEDICINE

## 2019-04-23 PROCEDURE — 81002 POCT URINE DIPSTICK WITHOUT MICROSCOPE: ICD-10-PCS | Mod: S$GLB,,, | Performed by: INTERNAL MEDICINE

## 2019-04-23 PROCEDURE — 1101F PT FALLS ASSESS-DOCD LE1/YR: CPT | Mod: CPTII,S$GLB,, | Performed by: INTERNAL MEDICINE

## 2019-04-23 PROCEDURE — 81002 URINALYSIS NONAUTO W/O SCOPE: CPT | Mod: S$GLB,,, | Performed by: INTERNAL MEDICINE

## 2019-04-23 PROCEDURE — 99214 PR OFFICE/OUTPT VISIT, EST, LEVL IV, 30-39 MIN: ICD-10-PCS | Mod: 25,S$GLB,, | Performed by: INTERNAL MEDICINE

## 2019-04-23 PROCEDURE — 3008F PR BODY MASS INDEX (BMI) DOCUMENTED: ICD-10-PCS | Mod: CPTII,S$GLB,, | Performed by: INTERNAL MEDICINE

## 2019-04-23 PROCEDURE — 3079F DIAST BP 80-89 MM HG: CPT | Mod: CPTII,S$GLB,, | Performed by: INTERNAL MEDICINE

## 2019-04-23 PROCEDURE — 99999 PR PBB SHADOW E&M-EST. PATIENT-LVL III: ICD-10-PCS | Mod: PBBFAC,,, | Performed by: INTERNAL MEDICINE

## 2019-04-23 PROCEDURE — 3079F PR MOST RECENT DIASTOLIC BLOOD PRESSURE 80-89 MM HG: ICD-10-PCS | Mod: CPTII,S$GLB,, | Performed by: INTERNAL MEDICINE

## 2019-04-23 RX ORDER — TAMSULOSIN HYDROCHLORIDE 0.4 MG/1
0.4 CAPSULE ORAL DAILY
Qty: 90 CAPSULE | Refills: 3 | Status: ON HOLD | OUTPATIENT
Start: 2019-04-23 | End: 2019-05-13 | Stop reason: SDUPTHER

## 2019-04-23 RX ORDER — OMEPRAZOLE 20 MG/1
20 CAPSULE, DELAYED RELEASE ORAL DAILY
Qty: 90 CAPSULE | Refills: 3 | Status: ON HOLD | OUTPATIENT
Start: 2019-04-23 | End: 2019-05-13 | Stop reason: HOSPADM

## 2019-04-23 NOTE — PROGRESS NOTES
Subjective:       Patient ID: Nimesh Nunn is a 63 y.o. male.    Chief Complaint: Hospital Follow Up    HPI  Wellness visit, hospital F/U.  Chart reviewed.  Still with POWER.  Having CP with cough, deep insp, LBP constant.  C/O urinary frequency, nocturia x 4.  Using a cane, on O2.  C/O GERD.  Review of Systems   Constitutional: Positive for activity change and unexpected weight change.   HENT: Negative for hearing loss, rhinorrhea and trouble swallowing.    Eyes: Negative for discharge and visual disturbance.   Respiratory: Positive for wheezing. Negative for chest tightness.    Cardiovascular: Positive for chest pain. Negative for palpitations.   Gastrointestinal: Negative for blood in stool, constipation, diarrhea and vomiting.   Endocrine: Positive for polyuria. Negative for polydipsia.   Genitourinary: Positive for difficulty urinating and urgency. Negative for hematuria.   Musculoskeletal: Positive for arthralgias. Negative for joint swelling and neck pain.   Neurological: Positive for weakness. Negative for headaches.   Psychiatric/Behavioral: Positive for confusion and dysphoric mood.   All other systems reviewed and are negative.      Objective:      Physical Exam   Constitutional: He appears well-developed. No distress.   HENT:   Head: Normocephalic.   Eyes: EOM are normal. No scleral icterus.   Neck: Normal range of motion. No tracheal deviation present.   Cardiovascular: Normal rate, regular rhythm, normal heart sounds and intact distal pulses.   Pulmonary/Chest: Effort normal. No respiratory distress.   Poor BSs   Abdominal: He exhibits no distension.   Musculoskeletal: Normal range of motion. He exhibits no edema.   Neurological: He is alert.   Skin: Skin is warm and dry. No rash noted. He is not diaphoretic. No erythema.   Purpura UEs   Psychiatric: He has a normal mood and affect. His behavior is normal.   Vitals reviewed.      Assessment:       1. Routine general medical examination at a [facility]  care facility    2. Essential hypertension    3. Coronary artery disease involving native coronary artery without angina pectoris, unspecified whether native or transplanted heart    4. Chronic obstructive pulmonary disease, unspecified COPD type    5. Benign non-nodular prostatic hyperplasia without lower urinary tract symptoms    6. Senile purpura    7. Adenocarcinoma of left lung, stage 4    8. Gastroesophageal reflux disease, esophagitis presence not specified        Plan:       Nimesh was seen today for hospital follow up.    Diagnoses and all orders for this visit:    Routine general medical examination at a health care facility    Essential hypertension   Well-cont    Coronary artery disease involving native coronary artery without angina pectoris, unspecified whether native or transplanted heart   Quiescent    Chronic obstructive pulmonary disease, unspecified COPD type   Cont O2  Benign non-nodular prostatic hyperplasia without lower urinary tract symptoms  -     POCT urine dipstick without microscope    Senile purpura   Monitor    Adenocarcinoma of left lung, stage 4   Per Oncology    Follow up if symptoms worsen or fail to improve.

## 2019-04-24 ENCOUNTER — PATIENT MESSAGE (OUTPATIENT)
Dept: CARDIOLOGY | Facility: CLINIC | Age: 64
End: 2019-04-24

## 2019-04-25 LAB
BACTERIA SPEC AEROBE CULT: NORMAL
BACTERIA SPEC AEROBE CULT: NORMAL
GRAM STN SPEC: NORMAL

## 2019-04-26 ENCOUNTER — PATIENT MESSAGE (OUTPATIENT)
Dept: PULMONOLOGY | Facility: HOSPITAL | Age: 64
End: 2019-04-26

## 2019-04-26 DIAGNOSIS — B44.9 ASPERGILLUS: Primary | ICD-10-CM

## 2019-04-26 NOTE — TELEPHONE ENCOUNTER
Patient with bronch revealing Aspergillus fumigatus.  Attempted to reach him on his listed number with no response.  Will refer to Infectious Disease.  Will attempt to reach him again.    Abraham Duvall MD  [employer] Fellow [facility]

## 2019-04-29 ENCOUNTER — OFFICE VISIT (OUTPATIENT)
Dept: HEMATOLOGY/ONCOLOGY | Facility: CLINIC | Age: 64
End: 2019-04-29
Payer: MEDICARE

## 2019-04-29 VITALS
WEIGHT: 178.81 LBS | SYSTOLIC BLOOD PRESSURE: 149 MMHG | OXYGEN SATURATION: 90 % | HEART RATE: 97 BPM | HEIGHT: 72 IN | DIASTOLIC BLOOD PRESSURE: 71 MMHG | TEMPERATURE: 98 F | RESPIRATION RATE: 24 BRPM | BODY MASS INDEX: 24.22 KG/M2

## 2019-04-29 DIAGNOSIS — D49.89 NEOPLASM OF ABDOMEN: ICD-10-CM

## 2019-04-29 DIAGNOSIS — C34.92 PRIMARY MALIGNANT NEOPLASM OF LEFT LUNG: Primary | ICD-10-CM

## 2019-04-29 DIAGNOSIS — C34.90 LUNG CANCER METASTATIC TO BONE: ICD-10-CM

## 2019-04-29 DIAGNOSIS — C79.51 LUNG CANCER METASTATIC TO BONE: ICD-10-CM

## 2019-04-29 PROCEDURE — 99214 OFFICE O/P EST MOD 30 MIN: CPT | Mod: S$GLB,,, | Performed by: INTERNAL MEDICINE

## 2019-04-29 PROCEDURE — 99999 PR PBB SHADOW E&M-EST. PATIENT-LVL V: ICD-10-PCS | Mod: PBBFAC,,, | Performed by: INTERNAL MEDICINE

## 2019-04-29 PROCEDURE — 99999 PR PBB SHADOW E&M-EST. PATIENT-LVL V: CPT | Mod: PBBFAC,,, | Performed by: INTERNAL MEDICINE

## 2019-04-29 PROCEDURE — 1101F PT FALLS ASSESS-DOCD LE1/YR: CPT | Mod: CPTII,S$GLB,, | Performed by: INTERNAL MEDICINE

## 2019-04-29 PROCEDURE — 3008F BODY MASS INDEX DOCD: CPT | Mod: CPTII,S$GLB,, | Performed by: INTERNAL MEDICINE

## 2019-04-29 PROCEDURE — 1101F PR PT FALLS ASSESS DOC 0-1 FALLS W/OUT INJ PAST YR: ICD-10-PCS | Mod: CPTII,S$GLB,, | Performed by: INTERNAL MEDICINE

## 2019-04-29 PROCEDURE — 3008F PR BODY MASS INDEX (BMI) DOCUMENTED: ICD-10-PCS | Mod: CPTII,S$GLB,, | Performed by: INTERNAL MEDICINE

## 2019-04-29 PROCEDURE — 99214 PR OFFICE/OUTPT VISIT, EST, LEVL IV, 30-39 MIN: ICD-10-PCS | Mod: S$GLB,,, | Performed by: INTERNAL MEDICINE

## 2019-04-29 PROCEDURE — 3078F PR MOST RECENT DIASTOLIC BLOOD PRESSURE < 80 MM HG: ICD-10-PCS | Mod: CPTII,S$GLB,, | Performed by: INTERNAL MEDICINE

## 2019-04-29 PROCEDURE — 3078F DIAST BP <80 MM HG: CPT | Mod: CPTII,S$GLB,, | Performed by: INTERNAL MEDICINE

## 2019-04-29 PROCEDURE — 3077F SYST BP >= 140 MM HG: CPT | Mod: CPTII,S$GLB,, | Performed by: INTERNAL MEDICINE

## 2019-04-29 PROCEDURE — 3077F PR MOST RECENT SYSTOLIC BLOOD PRESSURE >= 140 MM HG: ICD-10-PCS | Mod: CPTII,S$GLB,, | Performed by: INTERNAL MEDICINE

## 2019-04-29 RX ORDER — SODIUM CHLORIDE 0.9 % (FLUSH) 0.9 %
10 SYRINGE (ML) INJECTION
Status: CANCELLED | OUTPATIENT
Start: 2019-05-01

## 2019-04-29 RX ORDER — OXYCODONE AND ACETAMINOPHEN 10; 325 MG/1; MG/1
1 TABLET ORAL EVERY 4 HOURS PRN
Qty: 90 TABLET | Refills: 0 | Status: ON HOLD | OUTPATIENT
Start: 2019-04-29 | End: 2019-05-13 | Stop reason: SDUPTHER

## 2019-04-29 RX ORDER — HEPARIN 100 UNIT/ML
500 SYRINGE INTRAVENOUS
Status: CANCELLED | OUTPATIENT
Start: 2019-05-01

## 2019-04-29 RX ORDER — TAMSULOSIN HYDROCHLORIDE 0.4 MG/1
CAPSULE ORAL
Refills: 3 | COMMUNITY
Start: 2019-04-23 | End: 2019-04-29

## 2019-04-29 RX ORDER — LEVOTHYROXINE SODIUM 75 UG/1
75 TABLET ORAL DAILY
Qty: 30 TABLET | Refills: 11 | Status: ON HOLD | OUTPATIENT
Start: 2019-04-29 | End: 2019-05-13 | Stop reason: SDUPTHER

## 2019-04-29 NOTE — PATIENT INSTRUCTIONS
Docetaxel injection  What is this medicine?  DOCETAXEL (levy se TAX el) is a chemotherapy drug. It targets fast dividing cells, like cancer cells, and causes these cells to die. This medicine is used to treat many types of cancers like breast cancer, certain stomach cancers, head and neck cancer, lung cancer, and prostate cancer.  How should I use this medicine?  This drug is given as an infusion into a vein. It is administered in a hospital or clinic by a specially trained health care professional.  Talk to your pediatrician regarding the use of this medicine in children. Special care may be needed.  What side effects may I notice from receiving this medicine?  Side effects that you should report to your doctor or health care professional as soon as possible:  · allergic reactions like skin rash, itching or hives, swelling of the face, lips, or tongue  · low blood counts - This drug may decrease the number of white blood cells, red blood cells and platelets. You may be at increased risk for infections and bleeding.  · signs of infection - fever or chills, cough, sore throat, pain or difficulty passing urine  · signs of decreased platelets or bleeding - bruising, pinpoint red spots on the skin, black, tarry stools, nosebleeds  · signs of decreased red blood cells - unusually weak or tired, fainting spells, lightheadedness  · breathing problems  · fast or irregular heartbeat  · low blood pressure  · mouth sores  · nausea and vomiting  · pain, swelling, redness or irritation at the injection site  · pain, tingling, numbness in the hands or feet  · swelling of the ankle, feet, hands  · weight gain  Side effects that usually do not require medical attention (report to your prescriber or health care professional if they continue or are bothersome):  · bone pain  · complete hair loss including hair on your head, underarms, pubic hair, eyebrows, and eyelashes  · diarrhea  · excessive tearing  · changes in the color of  fingernails  · loosening of the fingernails  · nausea  · muscle pain  · red flush to skin  · sweating  · weak or tired  What may interact with this medicine?  · cyclosporine  · erythromycin  · ketoconazole  · medicines to increase blood counts like filgrastim, pegfilgrastim, sargramostim  · vaccines  Talk to your doctor or health care professional before taking any of these medicines:  · acetaminophen  · aspirin  · ibuprofen  · ketoprofen  · naproxen  What if I miss a dose?  It is important not to miss your dose. Call your doctor or health care professional if you are unable to keep an appointment.  Where should I keep my medicine?  This drug is given in a hospital or clinic and will not be stored at home.  What should I tell my health care provider before I take this medicine?  They need to know if you have any of these conditions:  · infection (especially a virus infection such as chickenpox, cold sores, or herpes)  · liver disease  · low blood counts, like low white cell, platelet, or red cell counts  · an unusual or allergic reaction to docetaxel, polysorbate 80, other chemotherapy agents, other medicines, foods, dyes, or preservatives  · pregnant or trying to get pregnant  · breast-feeding  What should I watch for while using this medicine?  Your condition will be monitored carefully while you are receiving this medicine. You will need important blood work done while you are taking this medicine.  This drug may make you feel generally unwell. This is not uncommon, as chemotherapy can affect healthy cells as well as cancer cells. Report any side effects. Continue your course of treatment even though you feel ill unless your doctor tells you to stop.  In some cases, you may be given additional medicines to help with side effects. Follow all directions for their use.  Call your doctor or health care professional for advice if you get a fever, chills or sore throat, or other symptoms of a cold or flu. Do not treat  yourself. This drug decreases your body's ability to fight infections. Try to avoid being around people who are sick.  This medicine may increase your risk to bruise or bleed. Call your doctor or health care professional if you notice any unusual bleeding.  This medicine may contain alcohol in the product. You may get drowsy or dizzy. Do not drive, use machinery, or do anything that needs mental alertness until you know how this medicine affects you. Do not stand or sit up quickly, especially if you are an older patient. This reduces the risk of dizzy or fainting spells. Avoid alcoholic drinks.  Do not become pregnant while taking this medicine. Women should inform their doctor if they wish to become pregnant or think they might be pregnant. There is a potential for serious side effects to an unborn child. Talk to your health care professional or pharmacist for more information. Do not breast-feed an infant while taking this medicine.  NOTE:This sheet is a summary. It may not cover all possible information. If you have questions about this medicine, talk to your doctor, pharmacist, or health care provider. Copyright© 2017 [employer]

## 2019-04-29 NOTE — LETTER
April 29, 2019      Aftab De La Rosa MD  [address]  [facility] [address]           [facility]ematology Oncology  [address]  [facility] [address]  Phone: [phone]          Patient: Nimesh Nunn   MR Number: [id number]   YOB: 1955   Date of Visit: 4/29/2019       Dear Dr. Aftab De La Rosa:    Thank you for referring Nimesh Nunn to me for evaluation. Attached you will find relevant portions of my assessment and plan of care.    If you have questions, please do not hesitate to call me. I look forward to following Nimesh Nunn along with you.    Sincerely,    Demetrius Infante DO, FACP    Enclosure  CC:  No Recipients    If you would like to receive this communication electronically, please contact [email or web address] or [phone] to request more information on [employer] access.    For providers and/or their staff who would like to refer a patient to [facility], please contact us through our one-stop-shop provider referral line, [facility], at [phone].    If you feel you have received this communication in error or would no longer like to receive these types of communications, please e-mail [email or web address]

## 2019-04-29 NOTE — PROGRESS NOTES
PATIENT: Nimesh Nunn  MRN: [id number]  DATE: 4/29/2019      Diagnosis:   1. Primary malignant neoplasm of left lung    2. Neoplasm of abdomen    3. Lung cancer metastatic to bone        Chief Complaint: Lung Cancer      Oncologic History:      Oncologic History Non-small cell lung cancer, left diagnosed 06/2018  Metastatic disease to bone at presentation    Oncologic Treatment Stereotactic radiation to spinal lesion  Cisplatin/pemetrexed with concurrent radiation 08/08/2018 (completed 09/2018)  Durvalumab 10/2018  XRT L4-S3 30 Gy 12/2018    Pathology Poorly differentiated adenocarcinoma, EGFR wild type, No ALK, ROS-1 rearrangements, PD-L1 TPS 90%          Subjective:    Interval History: Mr. Nunn is a 63 y.o. male who is seen in follow-up for lung cancer.  Since I had seen him last he states his breathing is relatively unchanged.  He has almost completed his course of antibiotics and remains on steroids with minimal benefit.  He has developed right upper quadrant abdominal pain.  No other new complaints.    His history dates to 06/2018 when he sought medical attention for chest pain.  A CT of the chest was performed showing a left lower lobe mass measuring 7.4 cm.  There were also enlarged left hilar lymph nodes. He underwent PET-CT which showed uptake in this mass and also in L1.  He underwent bronchoscopy and biopsy with pathology showing poorly differentiated adenocarcinoma.  Molecular studies showed EGFR wild-type and he did not harbor any ALK or ROS-1 rearrangements.  PD L1 tumor proportions core was 90%.  He underwent radiation to the spinal lesion in 08/2018 and started on concurrent chemo and radiation with cisplatin and pemetrexed in 08/2018.  He completed treatment on 09/19/2018.  He was only able to receive 2 cycles of chemotherapy secondary to cytopenias.  He was initiated on durvalumab in 10/2018.  He received 3 cycles but then developed lower extremity weakness and was felt that this may be  related to cauda equina syndrome related to bone metastasis.  He underwent radiation to his lumbar and sacral spine in 12/2018.    Past Medical History:   Past Medical History:   Diagnosis Date    Abdominal aortic aneurysm (AAA) without rupture 3/5/2018    Arthritis     Blood transfusion     COPD (chronic obstructive pulmonary disease)     COPD exacerbation 3/24/2019    Coronary artery disease     Dehydration 8/15/2018    Encounter for blood transfusion     Hyperlipidemia 11/7/2013    Hypertension     Lung cancer metastatic to bone 7/18/2018    Occlusion and stenosis of carotid artery without mention of cerebral infarction 1/12/2014    Pneumonitis 2/13/2019    Primary malignant neoplasm of left lung 7/18/2018    Screening for colon cancer 6/1/2015    Syncope 1/13/2014    Thrombocytopenia 9/19/2018    Weakness of both lower extremities 1/14/2019       Past Surgical HIstory:   Past Surgical History:   Procedure Laterality Date    ARCH & 4 VESSEL STUDY Bilateral 5/27/2014    [facility]    BACK SURGERY      BRONCHOSCOPY,FIBEROPTIC N/A 6/26/2018    Per[facility] at [facility] OR 2ND FLR    carotid endarectomy      CATHETERIZATION, HEART, LEFT Left 11/8/2013    Per[facility] CATH LAB    COLONOSCOPY N/A 6/1/2015    [facility] ENDO    CORONARY ANGIOPLASTY      CORONARY ARTERY BYPASS GRAFT      ESOPHAGOGASTRODUODENOSCOPY (EGD) N/A 8/16/2018    Performed by [facility]annes Osuna [facility] at [facility] ENDO (2ND FLR)    HEART CATH-LEFT N/A 5/27/2014    Performed by [facility] CATH LAB    INSERTION, STENT, CORONARY ARTERY N/A 12/20/2013    Performed by Mek[facility] at [facility] CATH LAB    PTA, PERIPHERAL BLOOD VESSEL Left 3/11/2014    [facility] CATH LAB    PTA, PERIPHERAL BLOOD VESSEL N/A 2/18/2014    Per[facility] CATH LAB     SKIN BIOPSY      ULTRASOUND, ENDOBRONCHIAL N/A 6/26/2018    Performed by L[facility]err[facility] MD at [facility] OR [address] [place] [address]       Family History:   Family History   Problem Relation Age of Onset    Heart disease Father     Hypertension Father     Heart attack Father     Heart failure Father     Cancer Mother     Breast cancer Mother     No Known Problems Sister     No Known Problems Brother     No Known Problems Maternal Grandmother     No Known Problems Maternal Grandfather     No Known Problems Paternal Grandmother     No Known Problems Paternal Grandfather     No Known Problems Maternal Aunt     No Known Problems Maternal Uncle     No Known Problems Paternal Aunt     No Known Problems Paternal Uncle     Anemia Neg Hx     Arrhythmia Neg Hx     Asthma Neg Hx     Clotting disorder Neg Hx     Fainting Neg Hx     Hyperlipidemia Neg Hx        Social History:  reports that he quit smoking about 7 years ago. He has a 60.00 pack-year smoking history. He has never used smokeless tobacco. He reports that he drinks about 1.8 oz of alcohol per week. He reports that he has current or past drug history. Drug: Marijuana.    Allergies:  Review of patient's allergies indicates:  No Known Allergies    Medications:  Current Outpatient Medications   Medication Sig Dispense Refill    aspirin (ECOTRIN) 81 MG EC tablet Take 81 mg by mouth once daily.      atorvastatin (LIPITOR) 80 MG tablet Take 1 tablet (80 mg total) by mouth once daily. 30 tablet 11    cilostazol (PLETAL) 100 MG Tab TAKE 1 TABLET BY MOUTH TWICE DAILY 60 tablet 11    clopidogrel (PLAVIX) 75 mg tablet TAKE 1 TABLET BY MOUTH EVERY DAY 90 tablet 4    diphenoxylate-atropine 2.5-0.025 mg (LOMOTIL) 2.5-0.025 mg per tablet Take 1 tablet by mouth 4 (four) times daily as needed for Diarrhea. 30 tablet 1    finasteride (PROSCAR) 5 mg tablet Take 1 tablet (5 mg total) by mouth once daily. 90 tablet 3    fluticasone-umeclidin-vilanter (TRELEGY ELLIPTA) 100-62.5-25  mcg DsDv Inhale 1 puff into the lungs once daily. 60 each 11    metoprolol succinate (TOPROL-XL) 50 MG 24 hr tablet Take 50 mg by mouth.      morphine (MS CONTIN) 30 MG 12 hr tablet Take 1 tablet (30 mg total) by mouth every 8 (eight) hours. 90 tablet 0    omeprazole (PRILOSEC) 20 MG capsule Take 1 capsule (20 mg total) by mouth once daily. 90 capsule 3    ondansetron (ZOFRAN-ODT) 8 MG TbDL Take 1 tablet (8 mg total) by mouth every 12 (twelve) hours as needed. 30 tablet 1    oxyCODONE-acetaminophen (PERCOCET)  mg per tablet Take 1 tablet by mouth every 4 (four) hours as needed for Pain. 90 tablet 0    PROAIR HFA 90 mcg/actuation inhaler Inhale 2 puffs into the lungs as needed. 18 g 6    sulfamethoxazole-trimethoprim 400-80mg (BACTRIM,SEPTRA) 400-80 mg per tablet Take 1 tablet by mouth once daily. 40 tablet 4    tamsulosin (FLOMAX) 0.4 mg Cap Take 1 capsule (0.4 mg total) by mouth once daily. 90 capsule 3    levothyroxine (SYNTHROID) 75 MCG tablet Take 1 tablet (75 mcg total) by mouth once daily. 30 tablet 11    nitroGLYCERIN (NITROSTAT) 0.4 MG SL tablet Place 1 tablet (0.4 mg total) under the tongue every 5 (five) minutes as needed for Chest pain. 90 tablet 1     No current facility-administered medications for this visit.        Review of Systems   Constitutional: Positive for activity change and fatigue. Negative for appetite change, chills, fever and unexpected weight change.   HENT: Negative for dental problem, sinus pressure and sneezing.    Eyes: Negative for visual disturbance.   Respiratory: Positive for cough, shortness of breath and wheezing. Negative for choking and chest tightness.    Cardiovascular: Negative for chest pain and leg swelling.   Gastrointestinal: Negative for abdominal pain, blood in stool, constipation, diarrhea and nausea.   Genitourinary: Negative for difficulty urinating, dysuria and frequency.   Musculoskeletal: Positive for back pain. Negative for arthralgias.   Skin:  Negative for rash and wound.   Neurological: Positive for weakness. Negative for dizziness, light-headedness and headaches.   Hematological: Negative for adenopathy. Does not bruise/bleed easily.   Psychiatric/Behavioral: Negative for sleep disturbance. The patient is not nervous/anxious.        ECOG Performance Status:   ECOG SCORE           Objective:      Vitals:   Vitals:    04/29/19 1111   BP: (!) 149/71   Pulse: 97   Resp: (!) 24   Temp: 97.9 °F (36.6 °C)   SpO2: (!) 90%   Weight: 81.1 kg (178 lb 12.7 oz)   Height: 6' (1.829 m)     BMI: Body mass index is 24.25 kg/m².    Physical Exam   Constitutional: He is oriented to person, place, and time. He appears well-developed and well-nourished. He appears ill.   O2 via nasal cannula   HENT:   Head: Normocephalic and atraumatic.   Eyes: Pupils are equal, round, and reactive to light.   Neck: Normal range of motion. Neck supple.   Cardiovascular: Normal rate and regular rhythm.   Pulmonary/Chest: Effort normal. No respiratory distress. He has no wheezes.   Abdominal: Soft. He exhibits no distension. There is no tenderness.   Musculoskeletal: He exhibits no edema or tenderness.   Lymphadenopathy:     He has no cervical adenopathy.   Neurological: He is alert and oriented to person, place, and time. No cranial nerve deficit or sensory deficit. He exhibits normal muscle tone. Coordination normal.   Skin: Skin is warm and dry.   Psychiatric: He has a normal mood and affect. His behavior is normal.       Laboratory Data:  Lab Visit on 04/29/2019   Component Date Value Ref Range Status    WBC 04/29/2019 12.50  3.90 - 12.70 K/uL Final    RBC 04/29/2019 2.76* 4.60 - 6.20 M/uL Final    Hemoglobin 04/29/2019 8.5* 14.0 - 18.0 g/dL Final    Hematocrit 04/29/2019 28.2* 40.0 - 54.0 % Final    Mean Corpuscular Volume 04/29/2019 102* 82 - 98 fL Final    Mean Corpuscular Hemoglobin 04/29/2019 30.8  27.0 - 31.0 pg Final    Mean Corpuscular Hemoglobin Conc 04/29/2019 30.1* 32.0  - 36.0 g/dL Final    RDW 04/29/2019 20.3* 11.5 - 14.5 % Final    Platelets 04/29/2019 155  150 - 350 K/uL Final    MPV 04/29/2019 9.1* 9.2 - 12.9 fL Final    Gran # (ANC) 04/29/2019 10.4* 1.8 - 7.7 K/uL Final    Comment: The ANC is based on a white cell differential from an   automated cell counter. It has not been microscopically   reviewed for the presence of abnormal cells. Clinical   correlation is required.      Immature Grans (Abs) 04/29/2019 0.50* 0.00 - 0.04 K/uL Final    Comment: Mild elevation in immature granulocytes is non specific and   can be seen in a variety of conditions including stress response,   acute inflammation, trauma and pregnancy. Correlation with other   laboratory and clinical findings is essential.      Magnesium 04/29/2019 1.6  1.6 - 2.6 mg/dL Final    Sodium 04/29/2019 138  136 - 145 mmol/L Final    Potassium 04/29/2019 3.7  3.5 - 5.1 mmol/L Final    Chloride 04/29/2019 96  95 - 110 mmol/L Final    CO2 04/29/2019 30* 23 - 29 mmol/L Final    Glucose 04/29/2019 89  70 - 110 mg/dL Final    BUN, Bld 04/29/2019 24* 8 - 23 mg/dL Final    Creatinine 04/29/2019 0.8  0.5 - 1.4 mg/dL Final    Calcium 04/29/2019 8.9  8.7 - 10.5 mg/dL Final    Total Protein 04/29/2019 6.6  6.0 - 8.4 g/dL Final    Albumin 04/29/2019 3.0* 3.5 - 5.2 g/dL Final    Total Bilirubin 04/29/2019 0.5  0.1 - 1.0 mg/dL Final    Comment: For infants and newborns, interpretation of results should be based  on gestational age, weight and in agreement with clinical  observations.  Premature Infant recommended reference ranges:  Up to 24 hours.............<8.0 mg/dL  Up to 48 hours............<12.0 mg/dL  3-5 days..................<15.0 mg/dL  6-29 days.................<15.0 mg/dL      Alkaline Phosphatase 04/29/2019 87  55 - 135 U/L Final    AST 04/29/2019 15  10 - 40 U/L Final    ALT 04/29/2019 18  10 - 44 U/L Final    Anion Gap 04/29/2019 12  8 - 16 mmol/L Final    eGFR if African American 04/29/2019  >60.0  >60 mL/min/1.73 m^2 Final    eGFR if non African American 04/29/2019 >60.0  >60 mL/min/1.73 m^2 Final    Comment: Calculation used to obtain the estimated glomerular filtration  rate (eGFR) is the CKD-EPI equation.       TSH 04/29/2019 4.179* 0.400 - 4.000 uIU/mL Final    Free T4 04/29/2019 0.59* 0.71 - 1.51 ng/dL Final   Office Visit on 04/23/2019   Component Date Value Ref Range Status    Color, UA 04/23/2019 yellow   Final    Spec Grav UA 04/23/2019 1.010   Final    pH, UA 04/23/2019 6.5   Final    WBC, UA 04/23/2019 negative   Final    Nitrite, UA 04/23/2019 negative   Final    Protein 04/23/2019 negative   Final    Glucose, UA 04/23/2019 negative   Final    Ketones, UA 04/23/2019 negative   Final    Urobilinogen, UA 04/23/2019 0.2   Final    Bilirubin 04/23/2019 negative   Final    Blood, UA 04/23/2019 negative   Final         Imaging:   CT 02/11/2019    FINDINGS:  Finding: Comparison was made to a prior CT examination of the thorax performed on 12/14/2018.  The size of the heart is within normal limits.  There has been interval worsening of the appearance of both lungs.  There is a moderate amount of alveolar consolidation in the medial aspect of the left lung.  There has been interval development of a mild amount of haziness in the medial aspect of the right lower lobe.  There is a persistent tiny left pleural effusion.  There is no pneumothorax.  There is a mild amount dextroconvex curvature of the thoracic spine.    There is a 5 mm stone in the dependent portion of the gallbladder.  The liver, pancreas, spleen, adrenals, and kidneys are normal in appearance. The ureters and the urinary bladder are normal in appearance. The appendix is normal in appearance.  There is a moderate amount of diverticulosis in the sigmoid portion of the colon.  There is no free fluid within the abdomen or pelvis. There is no pneumoperitoneum.  There is a moderate amount of atherosclerosis.  There is an  aneurysm in the distal aspect of the abdominal aorta.  It has an AP diameter of 3.5 cm.  There is grade 1 anterolisthesis of L3 on L4.  There are mild degenerative changes between L4 and S1.      Impression       1. There has been interval worsening of the appearance of the left lung.  There is a moderate amount of alveolar consolidation in the medial aspect of the left lung. This is consistent with the patient's history and characteristic of lung cancer.  2. There has been interval development of a mild amount of haziness in the medial aspect of the right lower lobe.  This is characteristic of atelectasis or scarring.  3. There is a persistent tiny left pleural effusion.  4. There is an aneurysm in the distal aspect of the abdominal aorta. It has an AP diameter of 3.5 cm.  5. There is a 5 mm stone in the dependent portion of the gallbladder.  6.  There is grade 1 anterolisthesis of L3 on L4. There are mild degenerative changes between L4 and S1.  7. There is a moderate amount of diverticulosis in the sigmoid portion of the colon.  All CT scans at this facility use dose modulation, iterative reconstruction, and/or weight base dosing when appropriate to reduce radiation dose when appropriate to reduce radiation dose to as low as reasonably achievable.                Assessment:       1. Primary malignant neoplasm of left lung    2. Neoplasm of abdomen    3. Lung cancer metastatic to bone           Plan:      I have reviewed the findings of his pathology from his bronchoscopy which did not show disease but given the fact that he has worsening symptoms in spite of antibiotics and also corticosteroids I do believe that this is possibly related to recurrent lung cancer.  At this point I would recommend a chemotherapy with docetaxel.  I have discussed the rationale, alternatives and potential side effects of this treatment with him.  I have given him written information on this medication.  He is agreeable to proceed and  has signed informed consent.  I will also plan to check a CT of the abdomen and pelvis given his abdominal pain now.  Follow back up me in another 3 weeks prior to cycle 2.  All questions were answered and he is agreeable with this plan.    Demetrius Infante DO, FACP  Hematology & Oncology  [facility] [place] [address]  ph. [phone]  Fax. [phone]    25 minutes were spent in coordination of patient's care, record review and counseling.  More than 50% of the time was face-to-face.

## 2019-05-03 ENCOUNTER — PATIENT MESSAGE (OUTPATIENT)
Dept: INTERNAL MEDICINE | Facility: CLINIC | Age: 64
End: 2019-05-03

## 2019-05-03 ENCOUNTER — PATIENT MESSAGE (OUTPATIENT)
Dept: HEMATOLOGY/ONCOLOGY | Facility: CLINIC | Age: 64
End: 2019-05-03

## 2019-05-03 DIAGNOSIS — R05.9 COUGH: Primary | ICD-10-CM

## 2019-05-03 RX ORDER — PROMETHAZINE HYDROCHLORIDE AND CODEINE PHOSPHATE 6.25; 1 MG/5ML; MG/5ML
5 SOLUTION ORAL EVERY 4 HOURS PRN
Qty: 175 ML | Refills: 0 | Status: ON HOLD | OUTPATIENT
Start: 2019-05-03 | End: 2019-05-13 | Stop reason: SDUPTHER

## 2019-05-06 ENCOUNTER — HOSPITAL ENCOUNTER (INPATIENT)
Facility: HOSPITAL | Age: 64
LOS: 7 days | Discharge: HOSPICE | DRG: 190 | End: 2019-05-13
Attending: EMERGENCY MEDICINE | Admitting: INTERNAL MEDICINE
Payer: MEDICARE

## 2019-05-06 DIAGNOSIS — I77.9 RIGHT-SIDED CAROTID ARTERY DISEASE: ICD-10-CM

## 2019-05-06 DIAGNOSIS — R00.0 TACHYCARDIA: ICD-10-CM

## 2019-05-06 DIAGNOSIS — J44.9 CHRONIC OBSTRUCTIVE PULMONARY DISEASE, UNSPECIFIED COPD TYPE: ICD-10-CM

## 2019-05-06 DIAGNOSIS — R53.81 DEBILITY: ICD-10-CM

## 2019-05-06 DIAGNOSIS — D49.89 NEOPLASM OF ABDOMEN: ICD-10-CM

## 2019-05-06 DIAGNOSIS — I73.9 PVD (PERIPHERAL VASCULAR DISEASE): ICD-10-CM

## 2019-05-06 DIAGNOSIS — G89.3 NEOPLASM RELATED PAIN: ICD-10-CM

## 2019-05-06 DIAGNOSIS — I73.9 PAD (PERIPHERAL ARTERY DISEASE): ICD-10-CM

## 2019-05-06 DIAGNOSIS — N40.0 BENIGN NON-NODULAR PROSTATIC HYPERPLASIA WITHOUT LOWER URINARY TRACT SYMPTOMS: ICD-10-CM

## 2019-05-06 DIAGNOSIS — C34.92 PRIMARY MALIGNANT NEOPLASM OF LEFT LUNG: ICD-10-CM

## 2019-05-06 DIAGNOSIS — I48.91 ATRIAL FIBRILLATION, UNSPECIFIED TYPE: ICD-10-CM

## 2019-05-06 DIAGNOSIS — C79.51 LUNG CANCER METASTATIC TO BONE: ICD-10-CM

## 2019-05-06 DIAGNOSIS — R05.9 COUGH: ICD-10-CM

## 2019-05-06 DIAGNOSIS — I25.10 CORONARY ARTERY DISEASE INVOLVING NATIVE CORONARY ARTERY WITHOUT ANGINA PECTORIS, UNSPECIFIED WHETHER NATIVE OR TRANSPLANTED HEART: ICD-10-CM

## 2019-05-06 DIAGNOSIS — C34.90 LUNG CANCER METASTATIC TO BONE: ICD-10-CM

## 2019-05-06 DIAGNOSIS — B44.89 ASPERGILLUS FUMIGATUS: ICD-10-CM

## 2019-05-06 DIAGNOSIS — I48.91 ATRIAL FIBRILLATION, NEW ONSET: ICD-10-CM

## 2019-05-06 DIAGNOSIS — J18.9 HCAP (HEALTHCARE-ASSOCIATED PNEUMONIA): Primary | ICD-10-CM

## 2019-05-06 DIAGNOSIS — R11.2 NAUSEA AND VOMITING, INTRACTABILITY OF VOMITING NOT SPECIFIED, UNSPECIFIED VOMITING TYPE: ICD-10-CM

## 2019-05-06 DIAGNOSIS — J44.1 CHRONIC OBSTRUCTIVE PULMONARY DISEASE WITH ACUTE EXACERBATION: ICD-10-CM

## 2019-05-06 DIAGNOSIS — R06.02 SHORTNESS OF BREATH: ICD-10-CM

## 2019-05-06 LAB
ALBUMIN SERPL BCP-MCNC: 2.4 G/DL (ref 3.5–5.2)
ALP SERPL-CCNC: 92 U/L (ref 55–135)
ALT SERPL W/O P-5'-P-CCNC: 14 U/L (ref 10–44)
ANION GAP SERPL CALC-SCNC: 13 MMOL/L (ref 8–16)
ANISOCYTOSIS BLD QL SMEAR: SLIGHT
AST SERPL-CCNC: 15 U/L (ref 10–40)
BASOPHILS # BLD AUTO: 0.01 K/UL (ref 0–0.2)
BASOPHILS NFR BLD: 0.2 % (ref 0–1.9)
BILIRUB SERPL-MCNC: 0.6 MG/DL (ref 0.1–1)
BILIRUB UR QL STRIP: NEGATIVE
BNP SERPL-MCNC: 171 PG/ML (ref 0–99)
BUN SERPL-MCNC: 14 MG/DL (ref 8–23)
CALCIUM SERPL-MCNC: 8.4 MG/DL (ref 8.7–10.5)
CHLORIDE SERPL-SCNC: 95 MMOL/L (ref 95–110)
CLARITY UR REFRACT.AUTO: CLEAR
CO2 SERPL-SCNC: 27 MMOL/L (ref 23–29)
COLOR UR AUTO: NORMAL
CREAT SERPL-MCNC: 0.7 MG/DL (ref 0.5–1.4)
DIFFERENTIAL METHOD: ABNORMAL
EOSINOPHIL # BLD AUTO: 0 K/UL (ref 0–0.5)
EOSINOPHIL NFR BLD: 0.5 % (ref 0–8)
ERYTHROCYTE [DISTWIDTH] IN BLOOD BY AUTOMATED COUNT: 18.8 % (ref 11.5–14.5)
EST. GFR  (AFRICAN AMERICAN): >60 ML/MIN/1.73 M^2
EST. GFR  (NON AFRICAN AMERICAN): >60 ML/MIN/1.73 M^2
GLUCOSE SERPL-MCNC: 64 MG/DL (ref 70–110)
GLUCOSE UR QL STRIP: NEGATIVE
HCT VFR BLD AUTO: 25.3 % (ref 40–54)
HGB BLD-MCNC: 7.9 G/DL (ref 14–18)
HGB UR QL STRIP: NEGATIVE
HYPOCHROMIA BLD QL SMEAR: ABNORMAL
IMM GRANULOCYTES # BLD AUTO: 0.04 K/UL (ref 0–0.04)
IMM GRANULOCYTES NFR BLD AUTO: 0.9 % (ref 0–0.5)
KETONES UR QL STRIP: NEGATIVE
LACTATE SERPL-SCNC: 1.8 MMOL/L (ref 0.5–2.2)
LEUKOCYTE ESTERASE UR QL STRIP: NEGATIVE
LIPASE SERPL-CCNC: 4 U/L (ref 4–60)
LYMPHOCYTES # BLD AUTO: 0.3 K/UL (ref 1–4.8)
LYMPHOCYTES NFR BLD: 7.5 % (ref 18–48)
MCH RBC QN AUTO: 31.1 PG (ref 27–31)
MCHC RBC AUTO-ENTMCNC: 31.2 G/DL (ref 32–36)
MCV RBC AUTO: 100 FL (ref 82–98)
MONOCYTES # BLD AUTO: 0.5 K/UL (ref 0.3–1)
MONOCYTES NFR BLD: 11.8 % (ref 4–15)
NEUTROPHILS # BLD AUTO: 3.5 K/UL (ref 1.8–7.7)
NEUTROPHILS NFR BLD: 79.1 % (ref 38–73)
NITRITE UR QL STRIP: NEGATIVE
NRBC BLD-RTO: 0 /100 WBC
OVALOCYTES BLD QL SMEAR: ABNORMAL
PH UR STRIP: 8 [PH] (ref 5–8)
PLATELET # BLD AUTO: 117 K/UL (ref 150–350)
PLATELET BLD QL SMEAR: ABNORMAL
PMV BLD AUTO: 9.2 FL (ref 9.2–12.9)
POIKILOCYTOSIS BLD QL SMEAR: SLIGHT
POLYCHROMASIA BLD QL SMEAR: ABNORMAL
POTASSIUM SERPL-SCNC: 3.8 MMOL/L (ref 3.5–5.1)
PROT SERPL-MCNC: 6.3 G/DL (ref 6–8.4)
PROT UR QL STRIP: NEGATIVE
RBC # BLD AUTO: 2.54 M/UL (ref 4.6–6.2)
SODIUM SERPL-SCNC: 135 MMOL/L (ref 136–145)
SP GR UR STRIP: 1.01 (ref 1–1.03)
TROPONIN I SERPL DL<=0.01 NG/ML-MCNC: 0.03 NG/ML (ref 0–0.03)
URN SPEC COLLECT METH UR: NORMAL
WBC # BLD AUTO: 4.42 K/UL (ref 3.9–12.7)

## 2019-05-06 PROCEDURE — 94761 N-INVAS EAR/PLS OXIMETRY MLT: CPT

## 2019-05-06 PROCEDURE — 93010 ELECTROCARDIOGRAM REPORT: CPT | Mod: ,,, | Performed by: INTERNAL MEDICINE

## 2019-05-06 PROCEDURE — 93010 EKG 12-LEAD: ICD-10-PCS | Mod: ,,, | Performed by: INTERNAL MEDICINE

## 2019-05-06 PROCEDURE — 99900035 HC TECH TIME PER 15 MIN (STAT)

## 2019-05-06 PROCEDURE — 93005 ELECTROCARDIOGRAM TRACING: CPT

## 2019-05-06 PROCEDURE — 25000003 PHARM REV CODE 250: Performed by: PEDIATRICS

## 2019-05-06 PROCEDURE — 96365 THER/PROPH/DIAG IV INF INIT: CPT

## 2019-05-06 PROCEDURE — 83880 ASSAY OF NATRIURETIC PEPTIDE: CPT

## 2019-05-06 PROCEDURE — 85025 COMPLETE CBC W/AUTO DIFF WBC: CPT

## 2019-05-06 PROCEDURE — 96375 TX/PRO/DX INJ NEW DRUG ADDON: CPT

## 2019-05-06 PROCEDURE — 99285 PR EMERGENCY DEPT VISIT,LEVEL V: ICD-10-PCS | Mod: ,,, | Performed by: PHYSICIAN ASSISTANT

## 2019-05-06 PROCEDURE — 94640 AIRWAY INHALATION TREATMENT: CPT

## 2019-05-06 PROCEDURE — 99285 EMERGENCY DEPT VISIT HI MDM: CPT | Mod: ,,, | Performed by: PHYSICIAN ASSISTANT

## 2019-05-06 PROCEDURE — 99223 1ST HOSP IP/OBS HIGH 75: CPT | Mod: AI,,, | Performed by: INTERNAL MEDICINE

## 2019-05-06 PROCEDURE — 87040 BLOOD CULTURE FOR BACTERIA: CPT | Mod: 59

## 2019-05-06 PROCEDURE — 99223 PR INITIAL HOSPITAL CARE,LEVL III: ICD-10-PCS | Mod: AI,,, | Performed by: INTERNAL MEDICINE

## 2019-05-06 PROCEDURE — 20600001 HC STEP DOWN PRIVATE ROOM

## 2019-05-06 PROCEDURE — 63600175 PHARM REV CODE 636 W HCPCS: Performed by: PHYSICIAN ASSISTANT

## 2019-05-06 PROCEDURE — 81003 URINALYSIS AUTO W/O SCOPE: CPT

## 2019-05-06 PROCEDURE — 63600175 PHARM REV CODE 636 W HCPCS: Performed by: STUDENT IN AN ORGANIZED HEALTH CARE EDUCATION/TRAINING PROGRAM

## 2019-05-06 PROCEDURE — 25000242 PHARM REV CODE 250 ALT 637 W/ HCPCS: Performed by: PHYSICIAN ASSISTANT

## 2019-05-06 PROCEDURE — 99285 EMERGENCY DEPT VISIT HI MDM: CPT | Mod: 25

## 2019-05-06 PROCEDURE — 83690 ASSAY OF LIPASE: CPT

## 2019-05-06 PROCEDURE — 25000003 PHARM REV CODE 250: Performed by: PHYSICIAN ASSISTANT

## 2019-05-06 PROCEDURE — 96366 THER/PROPH/DIAG IV INF ADDON: CPT

## 2019-05-06 PROCEDURE — 25000242 PHARM REV CODE 250 ALT 637 W/ HCPCS: Performed by: PEDIATRICS

## 2019-05-06 PROCEDURE — 83605 ASSAY OF LACTIC ACID: CPT

## 2019-05-06 PROCEDURE — 27000221 HC OXYGEN, UP TO 24 HOURS

## 2019-05-06 PROCEDURE — 84484 ASSAY OF TROPONIN QUANT: CPT

## 2019-05-06 PROCEDURE — 96361 HYDRATE IV INFUSION ADD-ON: CPT

## 2019-05-06 PROCEDURE — 25000003 PHARM REV CODE 250: Performed by: INTERNAL MEDICINE

## 2019-05-06 PROCEDURE — 80053 COMPREHEN METABOLIC PANEL: CPT

## 2019-05-06 PROCEDURE — 63600175 PHARM REV CODE 636 W HCPCS: Performed by: PEDIATRICS

## 2019-05-06 RX ORDER — MORPHINE SULFATE 15 MG/1
30 TABLET, FILM COATED, EXTENDED RELEASE ORAL EVERY 8 HOURS PRN
Status: DISCONTINUED | OUTPATIENT
Start: 2019-05-06 | End: 2019-05-06

## 2019-05-06 RX ORDER — ASPIRIN 81 MG/1
81 TABLET ORAL DAILY
Status: DISCONTINUED | OUTPATIENT
Start: 2019-05-06 | End: 2019-05-09

## 2019-05-06 RX ORDER — ATORVASTATIN CALCIUM 20 MG/1
80 TABLET, FILM COATED ORAL DAILY
Status: DISCONTINUED | OUTPATIENT
Start: 2019-05-06 | End: 2019-05-13

## 2019-05-06 RX ORDER — CILOSTAZOL 100 MG/1
100 TABLET ORAL 2 TIMES DAILY
Status: DISCONTINUED | OUTPATIENT
Start: 2019-05-06 | End: 2019-05-13 | Stop reason: HOSPADM

## 2019-05-06 RX ORDER — ONDANSETRON 2 MG/ML
4 INJECTION INTRAMUSCULAR; INTRAVENOUS
Status: COMPLETED | OUTPATIENT
Start: 2019-05-06 | End: 2019-05-06

## 2019-05-06 RX ORDER — PREDNISONE 20 MG/1
80 TABLET ORAL 2 TIMES DAILY
Status: DISCONTINUED | OUTPATIENT
Start: 2019-05-06 | End: 2019-05-09

## 2019-05-06 RX ORDER — CLOPIDOGREL BISULFATE 75 MG/1
75 TABLET ORAL DAILY
Status: DISCONTINUED | OUTPATIENT
Start: 2019-05-06 | End: 2019-05-13 | Stop reason: HOSPADM

## 2019-05-06 RX ORDER — IPRATROPIUM BROMIDE AND ALBUTEROL SULFATE 2.5; .5 MG/3ML; MG/3ML
3 SOLUTION RESPIRATORY (INHALATION) EVERY 6 HOURS
Status: DISCONTINUED | OUTPATIENT
Start: 2019-05-06 | End: 2019-05-10

## 2019-05-06 RX ORDER — ENOXAPARIN SODIUM 100 MG/ML
40 INJECTION SUBCUTANEOUS EVERY 24 HOURS
Status: DISCONTINUED | OUTPATIENT
Start: 2019-05-06 | End: 2019-05-06

## 2019-05-06 RX ORDER — OXYCODONE AND ACETAMINOPHEN 10; 325 MG/1; MG/1
1 TABLET ORAL EVERY 4 HOURS PRN
Status: DISCONTINUED | OUTPATIENT
Start: 2019-05-06 | End: 2019-05-08

## 2019-05-06 RX ORDER — TAMSULOSIN HYDROCHLORIDE 0.4 MG/1
0.4 CAPSULE ORAL DAILY
Status: DISCONTINUED | OUTPATIENT
Start: 2019-05-06 | End: 2019-05-13 | Stop reason: HOSPADM

## 2019-05-06 RX ORDER — MORPHINE SULFATE 15 MG/1
30 TABLET, FILM COATED, EXTENDED RELEASE ORAL EVERY 12 HOURS
Status: DISCONTINUED | OUTPATIENT
Start: 2019-05-06 | End: 2019-05-13 | Stop reason: HOSPADM

## 2019-05-06 RX ORDER — VANCOMYCIN HCL IN 5 % DEXTROSE 1.25 G/25
15 PLASTIC BAG, INJECTION (ML) INTRAVENOUS
Status: COMPLETED | OUTPATIENT
Start: 2019-05-06 | End: 2019-05-06

## 2019-05-06 RX ORDER — ENOXAPARIN SODIUM 100 MG/ML
40 INJECTION SUBCUTANEOUS EVERY 24 HOURS
Status: DISCONTINUED | OUTPATIENT
Start: 2019-05-06 | End: 2019-05-09

## 2019-05-06 RX ORDER — METOPROLOL SUCCINATE 50 MG/1
50 TABLET, EXTENDED RELEASE ORAL DAILY
Status: DISCONTINUED | OUTPATIENT
Start: 2019-05-06 | End: 2019-05-09

## 2019-05-06 RX ORDER — ACETAMINOPHEN 325 MG/1
650 TABLET ORAL EVERY 4 HOURS PRN
Status: DISCONTINUED | OUTPATIENT
Start: 2019-05-06 | End: 2019-05-13 | Stop reason: HOSPADM

## 2019-05-06 RX ORDER — IBUPROFEN 200 MG
16 TABLET ORAL
Status: DISCONTINUED | OUTPATIENT
Start: 2019-05-06 | End: 2019-05-13

## 2019-05-06 RX ORDER — SODIUM CHLORIDE 0.9 % (FLUSH) 0.9 %
10 SYRINGE (ML) INJECTION
Status: DISCONTINUED | OUTPATIENT
Start: 2019-05-06 | End: 2019-05-13 | Stop reason: HOSPADM

## 2019-05-06 RX ORDER — FINASTERIDE 5 MG/1
5 TABLET, FILM COATED ORAL DAILY
Status: DISCONTINUED | OUTPATIENT
Start: 2019-05-06 | End: 2019-05-13

## 2019-05-06 RX ORDER — MORPHINE SULFATE 15 MG/1
30 TABLET, FILM COATED, EXTENDED RELEASE ORAL EVERY 12 HOURS
Status: DISCONTINUED | OUTPATIENT
Start: 2019-05-06 | End: 2019-05-06

## 2019-05-06 RX ORDER — LEVOTHYROXINE SODIUM 75 UG/1
75 TABLET ORAL DAILY
Status: DISCONTINUED | OUTPATIENT
Start: 2019-05-06 | End: 2019-05-06

## 2019-05-06 RX ORDER — CEFEPIME HYDROCHLORIDE 2 G/1
2 INJECTION, POWDER, FOR SOLUTION INTRAVENOUS
Status: COMPLETED | OUTPATIENT
Start: 2019-05-06 | End: 2019-05-06

## 2019-05-06 RX ORDER — IPRATROPIUM BROMIDE AND ALBUTEROL SULFATE 2.5; .5 MG/3ML; MG/3ML
3 SOLUTION RESPIRATORY (INHALATION)
Status: COMPLETED | OUTPATIENT
Start: 2019-05-06 | End: 2019-05-06

## 2019-05-06 RX ORDER — PREDNISONE 20 MG/1
80 TABLET ORAL DAILY
Status: DISCONTINUED | OUTPATIENT
Start: 2019-05-06 | End: 2019-05-06

## 2019-05-06 RX ORDER — IBUPROFEN 200 MG
24 TABLET ORAL
Status: DISCONTINUED | OUTPATIENT
Start: 2019-05-06 | End: 2019-05-13

## 2019-05-06 RX ORDER — MORPHINE SULFATE 15 MG/1
30 TABLET, FILM COATED, EXTENDED RELEASE ORAL EVERY 8 HOURS
Status: DISCONTINUED | OUTPATIENT
Start: 2019-05-06 | End: 2019-05-06

## 2019-05-06 RX ORDER — ONDANSETRON 2 MG/ML
4 INJECTION INTRAMUSCULAR; INTRAVENOUS EVERY 8 HOURS PRN
Status: DISCONTINUED | OUTPATIENT
Start: 2019-05-06 | End: 2019-05-07

## 2019-05-06 RX ORDER — GLUCAGON 1 MG
1 KIT INJECTION
Status: DISCONTINUED | OUTPATIENT
Start: 2019-05-06 | End: 2019-05-13

## 2019-05-06 RX ADMIN — IPRATROPIUM BROMIDE AND ALBUTEROL SULFATE 3 ML: .5; 3 SOLUTION RESPIRATORY (INHALATION) at 05:05

## 2019-05-06 RX ADMIN — ONDANSETRON 4 MG: 2 INJECTION INTRAMUSCULAR; INTRAVENOUS at 05:05

## 2019-05-06 RX ADMIN — FINASTERIDE 5 MG: 5 TABLET, FILM COATED ORAL at 12:05

## 2019-05-06 RX ADMIN — METOPROLOL SUCCINATE 50 MG: 50 TABLET, EXTENDED RELEASE ORAL at 11:05

## 2019-05-06 RX ADMIN — IPRATROPIUM BROMIDE AND ALBUTEROL SULFATE 3 ML: .5; 3 SOLUTION RESPIRATORY (INHALATION) at 07:05

## 2019-05-06 RX ADMIN — PREDNISONE 80 MG: 20 TABLET ORAL at 09:05

## 2019-05-06 RX ADMIN — Medication 1250 MG: at 07:05

## 2019-05-06 RX ADMIN — CILOSTAZOL 100 MG: 100 TABLET ORAL at 11:05

## 2019-05-06 RX ADMIN — CEFEPIME 2 G: 2 INJECTION, POWDER, FOR SOLUTION INTRAVENOUS at 07:05

## 2019-05-06 RX ADMIN — ASPIRIN 81 MG: 81 TABLET, COATED ORAL at 11:05

## 2019-05-06 RX ADMIN — MORPHINE SULFATE 30 MG: 15 TABLET, EXTENDED RELEASE ORAL at 09:05

## 2019-05-06 RX ADMIN — TAMSULOSIN HYDROCHLORIDE 0.4 MG: 0.4 CAPSULE ORAL at 12:05

## 2019-05-06 RX ADMIN — IPRATROPIUM BROMIDE AND ALBUTEROL SULFATE 3 ML: .5; 3 SOLUTION RESPIRATORY (INHALATION) at 12:05

## 2019-05-06 RX ADMIN — LEVOFLOXACIN 750 MG: 750 TABLET, FILM COATED ORAL at 11:05

## 2019-05-06 RX ADMIN — ATORVASTATIN CALCIUM 80 MG: 20 TABLET, FILM COATED ORAL at 11:05

## 2019-05-06 RX ADMIN — SODIUM CHLORIDE 1000 ML: 0.9 INJECTION, SOLUTION INTRAVENOUS at 05:05

## 2019-05-06 RX ADMIN — CLOPIDOGREL 75 MG: 75 TABLET, FILM COATED ORAL at 11:05

## 2019-05-06 RX ADMIN — ACETAMINOPHEN 650 MG: 325 TABLET ORAL at 11:05

## 2019-05-06 RX ADMIN — PREDNISONE 80 MG: 20 TABLET ORAL at 11:05

## 2019-05-06 RX ADMIN — CILOSTAZOL 100 MG: 100 TABLET ORAL at 09:05

## 2019-05-06 NOTE — PROVIDER PROGRESS NOTES - EMERGENCY DEPT.
ED Physician Hand-off Note:    ED Course: I assumed care of patient from off-going ED physician team. Briefly, Patient is a 63-year-old male with lung cancer presenting for shortness of breath.    At the time of signout plan was pending admission to Heme-Onc [facility]    Medications given in the ED:    Medications   predniSONE tablet 80 mg (80 mg Oral Given 5/6/19 1115)   aspirin EC tablet 81 mg (has no administration in time range)   atorvastatin tablet 80 mg (has no administration in time range)   clopidogrel tablet 75 mg (has no administration in time range)   cilostazol tablet 100 mg (has no administration in time range)   finasteride tablet 5 mg (has no administration in time range)   levothyroxine tablet 75 mcg (has no administration in time range)   metoprolol succinate (TOPROL-XL) 24 hr tablet 50 mg (has no administration in time range)   morphine 12 hr tablet 30 mg (has no administration in time range)   oxyCODONE-acetaminophen  mg per tablet 1 tablet (has no administration in time range)   tamsulosin 24 hr capsule 0.4 mg (has no administration in time range)   sodium chloride 0.9% bolus 1,000 mL (0 mLs Intravenous Stopped 5/6/19 0622)   ondansetron injection 4 mg (4 mg Intravenous Given 5/6/19 0533)   albuterol-ipratropium 2.5 mg-0.5 mg/3 mL nebulizer solution 3 mL (3 mLs Nebulization Given 5/6/19 0531)   vancomycin (VANCOCIN) 1250 mg in 5 % dextrose 250 mL IVPB (0 mg/kg × 80.7 kg Intravenous Stopped 5/6/19 0942)   ceFEPIme injection 2 g (2 g Intravenous Given 5/6/19 0749)     Patient to be admitted Heme-Onc for Hcap.    Disposition:  Admitted    Patient comfortable with admission. Patient counseled regarding exam, results, diagnosis, treatment, and plan.    Impression:  Hospital associated pneumonia

## 2019-05-06 NOTE — PROGRESS NOTES
Notified pt he needs to be NPO until abdominal ultrasound completed later tonight. Verbalized understanding. US department aware patient already ate lunch and pushed study back to tonight.

## 2019-05-06 NOTE — SUBJECTIVE & OBJECTIVE
Oncologic History:       Oncologic History Non-small cell lung cancer, left diagnosed 06/2018  Metastatic disease to bone at presentation    Oncologic Treatment Stereotactic radiation to spinal lesion  Cisplatin/pemetrexed with concurrent radiation 08/08/2018 (completed 09/2018)  Durvalumab 10/2018  XRT L4-S3 30 Gy 12/2018    Pathology Poorly differentiated adenocarcinoma, EGFR wild type, No ALK, ROS-1 rearrangements, PD-L1 TPS 90%      Cancer hx dates to 06/2018 when he sought medical attention for chest pain.  A CT of the chest was performed showing a left lower lobe mass measuring 7.4 cm.  There were also enlarged left hilar lymph nodes. He underwent PET-CT which showed uptake in this mass and also in L1.  He underwent bronchoscopy and biopsy with pathology showing poorly differentiated adenocarcinoma.  Molecular studies showed EGFR wild-type and he did not harbor any ALK or ROS-1 rearrangements.  PD L1 tumor proportions core was 90%.  He underwent radiation to the spinal lesion in 08/2018 and started on concurrent chemo and radiation with cisplatin and pemetrexed in 08/2018.  He completed treatment on 09/19/2018.  He was only able to receive 2 cycles of chemotherapy secondary to cytopenias.  He was initiated on durvalumab in 10/2018.  He received 3 cycles but then developed lower extremity weakness and was felt that this may be related to cauda equina syndrome related to bone metastasis.  He underwent radiation to his lumbar and sacral spine in 12/2018.          Medications:  Continuous Infusions:  Scheduled Meds:   albuterol-ipratropium  3 mL Nebulization Q6H    aspirin  81 mg Oral Daily    atorvastatin  80 mg Oral Daily    cilostazol  100 mg Oral BID    clopidogrel  75 mg Oral Daily    enoxaparin  40 mg Subcutaneous Daily    finasteride  5 mg Oral Daily    levoFLOXacin  750 mg Oral Daily    [START ON 5/7/2019] levothyroxine  75 mcg Oral Before breakfast    metoprolol succinate  50 mg Oral Daily     predniSONE  80 mg Oral Daily    tamsulosin  0.4 mg Oral Daily     PRN Meds:acetaminophen, dextrose 50%, dextrose 50%, glucagon (human recombinant), glucose, glucose, morphine, ondansetron, oxyCODONE-acetaminophen, promethazine (PHENERGAN) IVPB, sodium chloride 0.9%     Review of patient's allergies indicates:  No Known Allergies     Past Medical History:   Diagnosis Date    Abdominal aortic aneurysm (AAA) without rupture 3/5/2018    Arthritis     Blood transfusion     COPD (chronic obstructive pulmonary disease)     COPD exacerbation 3/24/2019    Coronary artery disease     Dehydration 8/15/2018    Encounter for blood transfusion     Hyperlipidemia 11/7/2013    Hypertension     Lung cancer metastatic to bone 7/18/2018    Occlusion and stenosis of carotid artery without mention of cerebral infarction 1/12/2014    Pneumonitis 2/13/2019    Primary malignant neoplasm of left lung 7/18/2018    Screening for colon cancer 6/1/2015    Syncope 1/13/2014    Thrombocytopenia 9/19/2018    Weakness of both lower extremities 1/14/2019     Past Surgical History:   Procedure Laterality Date    ARCH & 4 VESSEL STUDY Bilateral 5/27/2014    [facility] at [facility] CATH LAB    BACK SURGERY      BRONCHOSCOPY,FIBEROPTIC N/A 6/26/2018    Performed by Liana Serrano [facility] at [facility] OR 2ND FLR    carotid endarectomy      CATHETERIZATION, HEART, LEFT Left 11/8/2013    Performed by [facility]hi Martin [facility] at [facility] CATH LAB    COLONOSCOPY N/A 6/1/2015    [facility] at [facility] ENDO    CORONARY ANGIOPLASTY      CORONARY ARTERY BYPASS GRAFT      ESOPHAGOGASTRODUODENOSCOPY (EGD) N/A 8/16/2018    Performed by Yohannes Osuna MD at [facility] ENDO (2ND FLR)    HEART CATH-LEFT N/A 5/27/2014    Performed by [facility] at [facility] CATH LAB    INSERTION, STENT, CORONARY ARTERY N/A 12/20/2013    Performed by Mekhi Martin [facility] at [facility] CATH LAB    PTA, PERIPHERAL BLOOD VESSEL Left 3/11/2014     [facility] at [facility] CATH LAB    PTA, PERIPHERAL BLOOD VESSEL N/A 2014    Performed by [facility]hi Martin [facility] at [facility] CATH LAB    SKIN BIOPSY      ULTRASOUND, ENDOBRONCHIAL N/A 2018    Performed by L[facility] at [facility] OR [facility] FLR     Family History     Problem Relation (Age of Onset)    Breast cancer Mother    Cancer Mother    Heart attack Father    Heart disease Father    Heart failure Father    Hypertension Father    No Known Problems Sister, Brother, Maternal Grandmother, Maternal Grandfather, Paternal Grandmother, Paternal Grandfather, Maternal Aunt, Maternal Uncle, Paternal Aunt, Paternal Uncle        Tobacco Use    Smoking status: Former Smoker     Packs/day: 1.50     Years: 40.00     Pack years: 60.00     Last attempt to quit: 10/11/2011     Years since quittin.5    Smokeless tobacco: Never Used   Substance and Sexual Activity    Alcohol use: Yes     Alcohol/week: 1.8 oz     Types: 3 Cans of beer per week     Comment: 3 cans beer every day or every other day    Drug use: Yes     Types: Marijuana    Sexual activity: Not Currently       Review of Systems   Constitutional: Positive for fatigue and fever. Negative for chills.   HENT: Negative for congestion, rhinorrhea and sore throat.    Eyes: Negative for pain.   Respiratory: Positive for cough, chest tightness, shortness of breath and wheezing.    Cardiovascular: Negative for chest pain, palpitations and leg swelling.   Gastrointestinal: Positive for abdominal pain and nausea. Negative for abdominal distention, diarrhea and vomiting.   Genitourinary: Negative for difficulty urinating and dysuria.   Musculoskeletal: Negative for arthralgias and myalgias.   Skin: Negative for color change.   Neurological: Negative for dizziness, weakness, light-headedness and headaches.   Psychiatric/Behavioral: Negative for agitation and confusion.     Objective:     Vital Signs (Most Recent):  Temp: 100.2 °F (37.9 °C) (19  1219)  Pulse: 95 (05/06/19 1241)  Resp: 18 (05/06/19 1241)  BP: (!) 162/78 (05/06/19 1041)  SpO2: (!) 91 % (05/06/19 1241) Vital Signs (24h Range):  Temp:  [99.4 °F (37.4 °C)-100.5 °F (38.1 °C)] 100.2 °F (37.9 °C)  Pulse:  [] 95  Resp:  [12-24] 18  SpO2:  [84 %-100 %] 91 %  BP: (125-164)/(57-97) 162/78     Weight: 83 kg (182 lb 15.7 oz)  Body mass index is 24.82 kg/m².  Body surface area is 2.05 meters squared.      Intake/Output Summary (Last 24 hours) at 5/6/2019 1427  Last data filed at 5/6/2019 0942  Gross per 24 hour   Intake 1250 ml   Output --   Net 1250 ml       Physical Exam   Constitutional: He is oriented to person, place, and time. He appears well-developed and well-nourished. No distress.   HENT:   Head: Normocephalic and atraumatic.   Mouth/Throat: Oropharynx is clear and moist.   Eyes: Pupils are equal, round, and reactive to light. EOM are normal.   Neck: Normal range of motion. Neck supple. No JVD present.   Cardiovascular: Normal rate, regular rhythm, normal heart sounds and intact distal pulses.   No murmur heard.  Pulmonary/Chest: Tachypnea noted. No respiratory distress. He has wheezes. He has rhonchi.   Scattered wheezes and rhonchi throughout b/l lung fields   Abdominal: Soft. Bowel sounds are normal. He exhibits no distension. There is no tenderness.   Musculoskeletal: Normal range of motion. He exhibits no edema.   Neurological: He is alert and oriented to person, place, and time. No cranial nerve deficit.   Skin: Skin is warm and dry. Capillary refill takes less than 2 seconds.   Psychiatric: He has a normal mood and affect.       Significant Labs:   Recent Labs   Lab 05/06/19  0528   WBC 4.42   RBC 2.54*   HGB 7.9*   HCT 25.3*   *   *   MCH 31.1*   MCHC 31.2*     Recent Labs   Lab 05/06/19  0528   CALCIUM 8.4*   PROT 6.3   *   K 3.8   CO2 27   CL 95   BUN 14   CREATININE 0.7   ALKPHOS 92   ALT 14   AST 15   BILITOT 0.6     Diagnostic Results:  I have reviewed and  interpreted all pertinent imaging results/findings within the past 24 hours.

## 2019-05-06 NOTE — HOSPITAL COURSE
Patient was admitted to medical oncology service for further management of likely COPD exacerbation. Patient received 1 L NS bolus and a dose of Vanc + Cefepime in the ED.  Started on steroids (2 mg/kg), nebulized breathing treatments QID, and PO abx to cover for possible underlying pneumonia. Will continue to monitor progress.   05/09/19: Patient has received 3 days of standard therapy and has showed no significant improvement in clinical status, Pulmonology consulted for further recommendations. Pt with new-onset atrial fibrillation with RVR during this admission, likely 2/2 to nebulized albuterol breathing treatments, cardiology consulted for further recs but for now obtaining 2D echo and switching anticoagulation from Lovenox to Eliquis. Discovered today that patient had a BAL culture positive for Aspergillus fumigatus during his last admission for which he was never treated, was started today on voriconazole 200 mg BID per ID recommendation. After goals of care discussion with patient and palliative care team, planning on getting patient to [facility] with home hospice. 05/13/19: Reconciled home medications, discharging patient today to home hospice.

## 2019-05-06 NOTE — ED TRIAGE NOTES
Patient reports to the ED via EMS with complaints of having fever, chills, and a productive cough, patient coughing up thick green sputum.  He is not being too cooperative with exam.  Nurse attempted to readjust and re-position patient for comfort.  Patient placed on 4l of O2 per NC. AAO x4.    · Patient Identifiers for Nicole Perez checked and correct  · LOC: The patient is awake, alert and aware of environment with an appropriate affect.  Alert to person, place, time and situation.    · APPEARANCE: Patient restless and uncomfortable. Patient is clean and well groomed, patient's clothing is properly fastened.  · SKIN: The skin is warm and dry, patient has normal skin turgor and moist mucus membranes,no rashes or lesions.  Skin is Intact , No Breakdown Noted  · Musculoskeletal:  Normal range of motion noted. Moves all extremeties well, No swelling or tenderness noted  · RESPIRATORY: Patient has productive cough, with thick green secretions present.  Restless. O2 via NC.  · CARDIAC: Patient has a normal rate and rhythm, no periphreal edema noted, capillary refill < 3 seconds.   · ABDOMEN: Soft and non tender with palpation.  No obvious distention noted.   · PULSES: 2+  And symmetrical in all extremeties  · NEUROLOGIC: Pupils PERRL & Reactive to light.

## 2019-05-06 NOTE — HPI
Mr. Nimesh Nunn is a 63-year-old male with a past medical history of COPD, hypertension, hyperlipidemia, CAD on  ASA/plavix, NSCLC (most recently on Durvalumab 1/18/19), who presented to the ED with worsening shortness of breath, wheezing,  and productive cough of brown-yellow sputum for the last 4 days. Patient has had 3 hospital admissions for a similar presentation since February, most recently from 04/17- 04/18 at which time he was treated with duo-nebs, steroids, and antibiotics for a COPD exacerbation. Patient reports that he is on home oxyegn ( usually requiring 3 L NC) but has recently felt his work of breathing increase. He reports that he was discharged from his last admission on prednisone 70mg qd and has been weaning down, currently on 50 mg qd. Patient also reports subjective fevers at home and a generalized abdominal pain with associated nausea, 10/10 in severity, worse in the RUQ that is not affected by eating food. He denies chills, headache, vomiting, CP, diarrhea, or dysuria. He is not currently on chemo. .Per EMS, he was 88% on RA and went to 96% on 4L  In the ED, the patient was found to be hypoxic, tachycardic, and hypertensive. On exam,  he appears comfortable, in no acute distress. Lungs have scattered wheezes and rhonchi throughout, abdomen is soft, non-tender with active bowel sounds. No lower extremity edema noted. His chest x ray is c/w pneumonitis versus disease progression. Patient was admitted to Medical Oncology for further management.     Oncologic History:       Oncologic History Non-small cell lung cancer, left diagnosed 06/2018  Metastatic disease to bone at presentation    Oncologic Treatment Stereotactic radiation to spinal lesion  Cisplatin/pemetrexed with concurrent radiation 08/08/2018 (completed 09/2018)  Durvalumab 10/2018  XRT L4-S3 30 Gy 12/2018    Pathology Poorly differentiated adenocarcinoma, EGFR wild type, No ALK, ROS-1 rearrangements, PD-L1 TPS 90%     Per  Dr. Infante's note on 04/29/19:     His history dates to 06/2018 when he sought medical attention for chest pain.  A CT of the chest was performed showing a left lower lobe mass measuring 7.4 cm.  There were also enlarged left hilar lymph nodes. He underwent PET-CT which showed uptake in this mass and also in L1.  He underwent bronchoscopy and biopsy with pathology showing poorly differentiated adenocarcinoma.  Molecular studies showed EGFR wild-type and he did not harbor any ALK or ROS-1 rearrangements.  PD L1 tumor proportions core was 90%.  He underwent radiation to the spinal lesion in 08/2018 and started on concurrent chemo and radiation with cisplatin and pemetrexed in 08/2018.  He completed treatment on 09/19/2018.  He was only able to receive 2 cycles of chemotherapy secondary to cytopenias.  He was initiated on durvalumab in 10/2018.  He received 3 cycles but then developed lower extremity weakness and was felt that this may be related to cauda equina syndrome related to bone metastasis.  He underwent radiation to his lumbar and sacral spine in 12/2018.

## 2019-05-06 NOTE — PLAN OF CARE
Problem: Adult Inpatient Plan of Care  Goal: Plan of Care Review  Outcome: Ongoing (interventions implemented as appropriate)  Plan of care reviewed with pt upon admission. Verbalized understanding. Did not have any questions or concerns at this time. Instructed pt to call for assistance out of bed since risk for falls. Verbalized understanding. Call light within reach. Bed locked and in the lowest position. Non-skid socks on patient. O2 weaned down to 3L NC - sats maintained to >88%. Pain controlled. NPO at this time until abdominal ultrasound tonight. Levaquin PO started. + cough.

## 2019-05-06 NOTE — ASSESSMENT & PLAN NOTE
63-year-old male with a past medical history of COPD, hypertension, hyperlipidemia, CAD on  ASA/plavix, NSCLC (most recently on Durvalumab 1/18/19), who presented to the ED with worsening shortness of breath, wheezing,  and productive cough of brown-yellow sputum for the last 4 days. Differential diagnosis includes but is not limited to COPD exacerbation (productive cough, wheezing on exam, CXR with increased diffuse bilateral interstitial and airspace opacities) vs hospital-acquired PNA (recent admission from 04/17 thru 04/18) vs progression of malignancy.     Plan:   - Levofloxacin 750 mg qd for 3 days (for possible underlying PNA)  - Patient was on a prednisone taper at home, being weaned katy from 70 mg qd for the treatment of suspected pneumonitis from the durvalumab that he had been on for a few months. After speaking with patient's oncologist, Dr. Infante w will give steroid dosage used for pneumonitis treatment, start prednisone 80 mg PO BID (2 mg/kg),  - Duonebs q6h while awake  - Titrate continuous O2 by nasal cannula trevor maintain sats > 88% as tolerated, (on 3 L @ home)

## 2019-05-06 NOTE — PROGRESS NOTES
ATTENDING NOTE, ONCOLOGY INPATIENT TEAM      Patient seen and examined, chart reviewed, radiologic studies reviewed.  Full note to follow by housestaff.  Briefly, this is a patient with metastatic NSCLC, recently discharged from the oncology inpatient unit, who presents to the ED complaining of worsening SOB, wheezing, and cough productive of greenish sputum.  His chest x ray is c/w pneumonitis versus disease progression.  On examination he appears comfortable, in NAD.  Lungs have scattered wheezes and ronchi  Abdomen is soft, nontender.  Labs reviewed.    PLAN  Admit for further management.  Treat with antibiotics, steroids, and nebulized treatments for a presumptive COPD exacerbation.  DVT prophylaxis with enoxaparin while he remains hospitalized.    We will follow.        Kevin Dwyer MD        1:57 p.m. ADDENDUM  I have reviewed her prior x rays with Dr. Infante.  The issue of pneumonitis has been discussed; we have decided to administer prednisone at 2 mg/kg for now.

## 2019-05-06 NOTE — PLAN OF CARE
MDRs completed with Dr. De Guzman and the team. Patient of Dr. Infante with a history of metastatic non-small cell lung cancer. Patient admitted to the ED on 5/6/19 c/o SOB, wheezing and productive cough. Patient reports yellow/brown sputum. /78, HR (tachy) 103-125, RR 12-24, O2 sats % on 2L O2 per nasal cannula, T. max 100.5. MD plans to admit patient for presumptive COPD exacerbation treatment. Discharge planning pending medical stability. CM to continue to follow with the team.    PCP: Nishant Perez MD    Pharmacy:   Contractually [facility] - [place], [place] - [address]  [address]  [place] [address]  Phone: [phone] Fax: [phone]    Payor: [employer] MANAGED MEDICARE / Plan: [employer] CHOICES 65 / Product Type: Medicare Advantage /     Paola Kasper RN, BSN, CM  [facility]  Nurse - Med Onc/Gyn Onc

## 2019-05-06 NOTE — CARE UPDATE
RAPID RESPONSE NURSE PROACTIVE ROUNDING NOTE     Time of Visit: 12:55    Admit Date: 2019  LOS: 0  Code Status: Full Code   Date of Visit: 2019  : 1955  Age: 63 y.o.  Sex: male  Race: White  Bed: 859/859A:   MRN: [id number]  Was the patient discharged from an ICU this admission? no   Was the patient discharged from a PACU within last 24 hours?  no  Did the patient receive conscious sedation/general anesthesia in last 24 hours?  no  Was the patient in the ED within the past 24 hours?  yes  Was the patient started on NIPPV within the past 24 hours?  no  Attending Physician: Kevin Dwyer MD  Primary Service: [facility]    ASSESSMENT     Abnormal Vital Signs: BP (!) 162/78   Pulse 95   Temp 100.2 °F (37.9 °C) (Oral)   Resp 18   Ht 6' (1.829 m)   Wt 83 kg (182 lb 15.7 oz)   SpO2 (!) 91%   BMI 24.82 kg/m²    Clinical Issues: Respiratory    Patient admitted for COPD exacerbation in setting of NSCLC.   On assessment, patient asleep in bed.   Sats on portable probe 90%, HR 92.   No concerns at this time, hemodynamically stable.      INTERVENTIONS/ RECOMMENDATIONS     None at this time. Sat goal in COPD patient ~ 88-92%     Discussed plan of care with Mary RODAS    PHYSICIAN ESCALATION     Yes/No  no    Orders received and case discussed with NA.    Disposition: Remain in room 859.    FOLLOW-UP     Call back the Rapid Response Nurse, Lis Choudhury RN at [phone] for additional questions or concerns.

## 2019-05-06 NOTE — H&P
[facility]  Hematology/Oncology  H&P    Patient Name: Nimesh Nunn  MRN: [id number]  Admission Date: 5/6/2019  Code Status: Full Code   Attending Provider: Kevin Dwyer MD  Primary Care Physician: Nishant Perez MD  Principal Problem:<principal problem not specified>    Subjective:     HPI: Mr. Nimesh Nunn is a 63-year-old male with a past medical history of COPD, hypertension, hyperlipidemia, CAD on  ASA/plavix, NSCLC (most recently on Durvalumab 1/18/19), who presented to the ED with worsening shortness of breath, wheezing,  and productive cough of brown-yellow sputum for the last 4 days. Patient has had 3 hospital admissions for a similar presentation since February, most recently from 04/17- 04/18 at which time he was treated with duo-nebs, steroids, and antibiotics for a COPD exacerbation. Patient reports that he is on home oxyegn ( usually requiring 3 L NC) but has recently felt his work of breathing increase. He reports that he was discharged from his last admission on prednisone 70mg qd and has been weaning down, currently on 50 mg qd. Patient also reports subjective fevers at home and a generalized abdominal pain with associated nausea, 10/10 in severity, worse in the RUQ that is not affected by eating food. He denies chills, headache, vomiting, CP, diarrhea, or dysuria. He is not currently on chemo. .Per EMS, he was 88% on RA and went to 96% on 4L  In the ED, the patient was found to be hypoxic, tachycardic, and hypertensive. On exam,  he appears comfortable, in no acute distress. Lungs have scattered wheezes and rhonchi throughout, abdomen is soft, non-tender with active bowel sounds. No lower extremity edema noted. His chest x ray is c/w pneumonitis versus disease progression. Patient was admitted to Medical Oncology for further management.               Oncologic History:       Oncologic History Non-small cell lung cancer, left diagnosed 06/2018  Metastatic disease to  bone at presentation    Oncologic Treatment Stereotactic radiation to spinal lesion  Cisplatin/pemetrexed with concurrent radiation 08/08/2018 (completed 09/2018)  Durvalumab 10/2018  XRT L4-S3 30 Gy 12/2018    Pathology Poorly differentiated adenocarcinoma, EGFR wild type, No ALK, ROS-1 rearrangements, PD-L1 TPS 90%      Cancer hx dates to 06/2018 when he sought medical attention for chest pain.  A CT of the chest was performed showing a left lower lobe mass measuring 7.4 cm.  There were also enlarged left hilar lymph nodes. He underwent PET-CT which showed uptake in this mass and also in L1.  He underwent bronchoscopy and biopsy with pathology showing poorly differentiated adenocarcinoma.  Molecular studies showed EGFR wild-type and he did not harbor any ALK or ROS-1 rearrangements.  PD L1 tumor proportions core was 90%.  He underwent radiation to the spinal lesion in 08/2018 and started on concurrent chemo and radiation with cisplatin and pemetrexed in 08/2018.  He completed treatment on 09/19/2018.  He was only able to receive 2 cycles of chemotherapy secondary to cytopenias.  He was initiated on durvalumab in 10/2018.  He received 3 cycles but then developed lower extremity weakness and was felt that this may be related to cauda equina syndrome related to bone metastasis.  He underwent radiation to his lumbar and sacral spine in 12/2018.          Medications:  Continuous Infusions:  Scheduled Meds:   albuterol-ipratropium  3 mL Nebulization Q6H    aspirin  81 mg Oral Daily    atorvastatin  80 mg Oral Daily    cilostazol  100 mg Oral BID    clopidogrel  75 mg Oral Daily    enoxaparin  40 mg Subcutaneous Daily    finasteride  5 mg Oral Daily    levoFLOXacin  750 mg Oral Daily    [START ON 5/7/2019] levothyroxine  75 mcg Oral Before breakfast    metoprolol succinate  50 mg Oral Daily    predniSONE  80 mg Oral Daily    tamsulosin  0.4 mg Oral Daily     PRN Meds:acetaminophen, dextrose 50%, dextrose  50%, glucagon (human recombinant), glucose, glucose, morphine, ondansetron, oxyCODONE-acetaminophen, promethazine (PHENERGAN) IVPB, sodium chloride 0.9%     Review of patient's allergies indicates:  No Known Allergies     Past Medical History:   Diagnosis Date    Abdominal aortic aneurysm (AAA) without rupture 3/5/2018    Arthritis     Blood transfusion     COPD (chronic obstructive pulmonary disease)     COPD exacerbation 3/24/2019    Coronary artery disease     Dehydration 8/15/2018    Encounter for blood transfusion     Hyperlipidemia 11/7/2013    Hypertension     Lung cancer metastatic to bone 7/18/2018    Occlusion and stenosis of carotid artery without mention of cerebral infarction 1/12/2014    Pneumonitis 2/13/2019    Primary malignant neoplasm of left lung 7/18/2018    Screening for colon cancer 6/1/2015    Syncope 1/13/2014    Thrombocytopenia 9/19/2018    Weakness of both lower extremities 1/14/2019     Past Surgical History:   Procedure Laterality Date    ARCH & 4 VESSEL STUDY Bilateral 5/27/2014    Performed by [facility]hi Martin [facility] at [facility] CATH LAB    BACK SURGERY      BRONCHOSCOPY,FIBEROPTIC N/A 6/26/2018    Performed by Liana Serrano MD at [facility] OR 2ND FLR    carotid endarectomy      CATHETERIZATION, HEART, LEFT Left 11/8/2013    Performed by Mekhi Martin MD at [facility] CATH LAB    COLONOSCOPY N/A 6/1/2015    Per[facility] at [facility] ENDO    CORONARY ANGIOPLASTY      CORONARY ARTERY BYPASS GRAFT      ESOPHAGOGASTRODUODENOSCOPY (EGD) N/A 8/16/2018    Performed by Yohannes Osuna MD at [facility] ENDO (2ND FLR)    HEART CATH-LEFT N/A 5/27/2014    Performed by Mekhi Martin MD at [facility] CATH LAB    INSERTION, STENT, CORONARY ARTERY N/A 12/20/2013    Performed by Mekhi Martin MD at [facility] CATH LAB    PTA, PERIPHERAL BLOOD VESSEL Left 3/11/2014    Performed by Mekhi Martin MD at [facility] CATH LAB    PTA, PERIPHERAL BLOOD VESSEL N/A 2/18/2014    Performed by  [facility] at [facility] CATH LAB    SKIN BIOPSY      ULTRASOUND, ENDOBRONCHIAL N/A 2018    Performed by [facility]err[facility] at [facility] OR [address] [place] [address]     Family History     Problem Relation (Age of Onset)    Breast cancer Mother    Cancer Mother    Heart attack Father    Heart disease Father    Heart failure Father    Hypertension Father    No Known Problems Sister, Brother, Maternal Grandmother, Maternal Grandfather, Paternal Grandmother, Paternal Grandfather, Maternal Aunt, Maternal Uncle, Paternal Aunt, Paternal Uncle        Tobacco Use    Smoking status: Former Smoker     Packs/day: 1.50     Years: 40.00     Pack years: 60.00     Last attempt to quit: 10/11/2011     Years since quittin.5    Smokeless tobacco: Never Used   Substance and Sexual Activity    Alcohol use: Yes     Alcohol/week: 1.8 oz     Types: 3 Cans of beer per week     Comment: 3 cans beer every day or every other day    Drug use: Yes     Types: Marijuana    Sexual activity: Not Currently       Review of Systems   Constitutional: Positive for fatigue and fever. Negative for chills.   HENT: Negative for congestion, rhinorrhea and sore throat.    Eyes: Negative for pain.   Respiratory: Positive for cough, chest tightness, shortness of breath and wheezing.    Cardiovascular: Negative for chest pain, palpitations and leg swelling.   Gastrointestinal: Positive for abdominal pain and nausea. Negative for abdominal distention, diarrhea and vomiting.   Genitourinary: Negative for difficulty urinating and dysuria.   Musculoskeletal: Negative for arthralgias and myalgias.   Skin: Negative for color change.   Neurological: Negative for dizziness, weakness, light-headedness and headaches.   Psychiatric/Behavioral: Negative for agitation and confusion.     Objective:     Vital Signs (Most Recent):  Temp: 100.2 °F (37.9 °C) (19 1219)  Pulse: 95 (19 1241)  Resp: 18 (19 1241)  BP: (!) 162/78 (19 1041)  SpO2: (!) 91 % (19  1241) Vital Signs (24h Range):  Temp:  [99.4 °F (37.4 °C)-100.5 °F (38.1 °C)] 100.2 °F (37.9 °C)  Pulse:  [] 95  Resp:  [12-24] 18  SpO2:  [84 %-100 %] 91 %  BP: (125-164)/(57-97) 162/78     Weight: 83 kg (182 lb 15.7 oz)  Body mass index is 24.82 kg/m².  Body surface area is 2.05 meters squared.      Intake/Output Summary (Last 24 hours) at 5/6/2019 1427  Last data filed at 5/6/2019 0942  Gross per 24 hour   Intake 1250 ml   Output --   Net 1250 ml       Physical Exam   Constitutional: He is oriented to person, place, and time. He appears well-developed and well-nourished. No distress.   HENT:   Head: Normocephalic and atraumatic.   Mouth/Throat: Oropharynx is clear and moist.   Eyes: Pupils are equal, round, and reactive to light. EOM are normal.   Neck: Normal range of motion. Neck supple. No JVD present.   Cardiovascular: Normal rate, regular rhythm, normal heart sounds and intact distal pulses.   No murmur heard.  Pulmonary/Chest: Tachypnea noted. No respiratory distress. He has wheezes. He has rhonchi.   Scattered wheezes and rhonchi throughout b/l lung fields   Abdominal: Soft. Bowel sounds are normal. He exhibits no distension. There is no tenderness.   Musculoskeletal: Normal range of motion. He exhibits no edema.   Neurological: He is alert and oriented to person, place, and time. No cranial nerve deficit.   Skin: Skin is warm and dry. Capillary refill takes less than 2 seconds.   Psychiatric: He has a normal mood and affect.       Significant Labs:   Recent Labs   Lab 05/06/19  0528   WBC 4.42   RBC 2.54*   HGB 7.9*   HCT 25.3*   *   *   MCH 31.1*   MCHC 31.2*     Recent Labs   Lab 05/06/19  0528   CALCIUM 8.4*   PROT 6.3   *   K 3.8   CO2 27   CL 95   BUN 14   CREATININE 0.7   ALKPHOS 92   ALT 14   AST 15   BILITOT 0.6     Diagnostic Results:  I have reviewed and interpreted all pertinent imaging results/findings within the past 24 hours.    Assessment/Plan:     COPD  exacerbation  63-year-old male with a past medical history of COPD, hypertension, hyperlipidemia, CAD on  ASA/plavix, NSCLC (most recently on Durvalumab 1/18/19), who presented to the ED with worsening shortness of breath, wheezing,  and productive cough of brown-yellow sputum for the last 4 days. Differential diagnosis includes but is not limited to COPD exacerbation (productive cough, wheezing on exam, CXR with increased diffuse bilateral interstitial and airspace opacities) vs hospital-acquired PNA (recent admission from 04/17 thru 04/18) vs progression of malignancy.     Plan:   - Levofloxacin 750 mg qd for 3 days (for possible underlying PNA)  - Patient was on a prednisone taper at home, being weaned katy from 70 mg qd for the treatment of suspected pneumonitis from the durvalumab that he had been on for a few months. After speaking with patient's oncologist, greyson Pathak will give steroid dosage used for pneumonitis treatment, start prednisone 80 mg PO BID (2 mg/kg),  - Duonebs q6h while awake  - Titrate continuous O2 by nasal cannula trevor maintain sats > 88% as tolerated, (on 3 L @ home)    Lung cancer metastatic to bone  Will continue home pain regimen:   - MS contin 30mg BID    - Percocet 10-325mg  q4h PRN    Adenocarcinoma of left lung, stage 4  Oncologic History:       Oncologic History Non-small cell lung cancer, left diagnosed 06/2018  Metastatic disease to bone at presentation    Oncologic Treatment Stereotactic radiation to spinal lesion  Cisplatin/pemetrexed with concurrent radiation 08/08/2018 (completed 09/2018)  Durvalumab 10/2018, last given on 1/18/19  XRT L4-S3 30 Gy 12/2018    Pathology Poorly differentiated adenocarcinoma, EGFR wild type, No ALK, ROS-1 rearrangements, PD-L1 TPS 90%          PAD (peripheral artery disease)  - On cilostazol    Hyperlipidemia  Continue home statin.    HTN (hypertension)  - Hypertensive in the ED  - Will continue home metoprolol         Migel Brambila,  MD  Hematology/Oncology  [facility]            ATTENDING NOTE, ONCOLOGY INPATIENT TEAM    Please refer to my note of same day for our assessment and plan    Kevin Dwyer MD

## 2019-05-07 ENCOUNTER — TELEPHONE (OUTPATIENT)
Dept: HEMATOLOGY/ONCOLOGY | Facility: CLINIC | Age: 64
End: 2019-05-07

## 2019-05-07 PROBLEM — J18.9 HCAP (HEALTHCARE-ASSOCIATED PNEUMONIA): Status: RESOLVED | Noted: 2019-05-06 | Resolved: 2019-05-07

## 2019-05-07 LAB
ALBUMIN SERPL BCP-MCNC: 2 G/DL (ref 3.5–5.2)
ALP SERPL-CCNC: 84 U/L (ref 55–135)
ALT SERPL W/O P-5'-P-CCNC: 10 U/L (ref 10–44)
ANION GAP SERPL CALC-SCNC: 11 MMOL/L (ref 8–16)
ANISOCYTOSIS BLD QL SMEAR: SLIGHT
AST SERPL-CCNC: 7 U/L (ref 10–40)
BASOPHILS # BLD AUTO: 0.01 K/UL (ref 0–0.2)
BASOPHILS NFR BLD: 0.2 % (ref 0–1.9)
BILIRUB SERPL-MCNC: 0.2 MG/DL (ref 0.1–1)
BUN SERPL-MCNC: 18 MG/DL (ref 8–23)
CALCIUM SERPL-MCNC: 8.4 MG/DL (ref 8.7–10.5)
CHLORIDE SERPL-SCNC: 92 MMOL/L (ref 95–110)
CO2 SERPL-SCNC: 31 MMOL/L (ref 23–29)
CREAT SERPL-MCNC: 0.9 MG/DL (ref 0.5–1.4)
DACRYOCYTES BLD QL SMEAR: ABNORMAL
DIFFERENTIAL METHOD: ABNORMAL
DOHLE BOD BLD QL SMEAR: PRESENT
EOSINOPHIL # BLD AUTO: 0 K/UL (ref 0–0.5)
EOSINOPHIL NFR BLD: 0 % (ref 0–8)
ERYTHROCYTE [DISTWIDTH] IN BLOOD BY AUTOMATED COUNT: 18.1 % (ref 11.5–14.5)
EST. GFR  (AFRICAN AMERICAN): >60 ML/MIN/1.73 M^2
EST. GFR  (NON AFRICAN AMERICAN): >60 ML/MIN/1.73 M^2
GLUCOSE SERPL-MCNC: 244 MG/DL (ref 70–110)
HCT VFR BLD AUTO: 23.5 % (ref 40–54)
HGB BLD-MCNC: 7.2 G/DL (ref 14–18)
IMM GRANULOCYTES # BLD AUTO: 0.03 K/UL (ref 0–0.04)
IMM GRANULOCYTES NFR BLD AUTO: 0.6 % (ref 0–0.5)
LYMPHOCYTES # BLD AUTO: 0.2 K/UL (ref 1–4.8)
LYMPHOCYTES NFR BLD: 3.6 % (ref 18–48)
MAGNESIUM SERPL-MCNC: 1.6 MG/DL (ref 1.6–2.6)
MCH RBC QN AUTO: 29.9 PG (ref 27–31)
MCHC RBC AUTO-ENTMCNC: 30.6 G/DL (ref 32–36)
MCV RBC AUTO: 98 FL (ref 82–98)
MONOCYTES # BLD AUTO: 0.1 K/UL (ref 0.3–1)
MONOCYTES NFR BLD: 2.8 % (ref 4–15)
NEUTROPHILS # BLD AUTO: 4.6 K/UL (ref 1.8–7.7)
NEUTROPHILS NFR BLD: 92.8 % (ref 38–73)
NRBC BLD-RTO: 0 /100 WBC
OVALOCYTES BLD QL SMEAR: ABNORMAL
PHOSPHATE SERPL-MCNC: 3.3 MG/DL (ref 2.7–4.5)
PLATELET # BLD AUTO: 131 K/UL (ref 150–350)
PLATELET BLD QL SMEAR: ABNORMAL
PMV BLD AUTO: 9.4 FL (ref 9.2–12.9)
POCT GLUCOSE: 187 MG/DL (ref 70–110)
POCT GLUCOSE: 196 MG/DL (ref 70–110)
POIKILOCYTOSIS BLD QL SMEAR: SLIGHT
POTASSIUM SERPL-SCNC: 3.7 MMOL/L (ref 3.5–5.1)
PROT SERPL-MCNC: 5.8 G/DL (ref 6–8.4)
RBC # BLD AUTO: 2.41 M/UL (ref 4.6–6.2)
SODIUM SERPL-SCNC: 134 MMOL/L (ref 136–145)
TOXIC GRANULES BLD QL SMEAR: PRESENT
WBC # BLD AUTO: 4.97 K/UL (ref 3.9–12.7)

## 2019-05-07 PROCEDURE — 99233 SBSQ HOSP IP/OBS HIGH 50: CPT | Mod: ,,, | Performed by: INTERNAL MEDICINE

## 2019-05-07 PROCEDURE — 63600175 PHARM REV CODE 636 W HCPCS: Performed by: PEDIATRICS

## 2019-05-07 PROCEDURE — 80053 COMPREHEN METABOLIC PANEL: CPT

## 2019-05-07 PROCEDURE — 25000003 PHARM REV CODE 250: Performed by: STUDENT IN AN ORGANIZED HEALTH CARE EDUCATION/TRAINING PROGRAM

## 2019-05-07 PROCEDURE — 85025 COMPLETE CBC W/AUTO DIFF WBC: CPT

## 2019-05-07 PROCEDURE — 84100 ASSAY OF PHOSPHORUS: CPT

## 2019-05-07 PROCEDURE — 83735 ASSAY OF MAGNESIUM: CPT

## 2019-05-07 PROCEDURE — 94761 N-INVAS EAR/PLS OXIMETRY MLT: CPT

## 2019-05-07 PROCEDURE — 25000242 PHARM REV CODE 250 ALT 637 W/ HCPCS: Performed by: PEDIATRICS

## 2019-05-07 PROCEDURE — 25000003 PHARM REV CODE 250: Performed by: PEDIATRICS

## 2019-05-07 PROCEDURE — 99233 PR SUBSEQUENT HOSPITAL CARE,LEVL III: ICD-10-PCS | Mod: ,,, | Performed by: INTERNAL MEDICINE

## 2019-05-07 PROCEDURE — 36415 COLL VENOUS BLD VENIPUNCTURE: CPT

## 2019-05-07 PROCEDURE — 20600001 HC STEP DOWN PRIVATE ROOM

## 2019-05-07 PROCEDURE — 25000003 PHARM REV CODE 250: Performed by: INTERNAL MEDICINE

## 2019-05-07 PROCEDURE — 94640 AIRWAY INHALATION TREATMENT: CPT

## 2019-05-07 PROCEDURE — 63600175 PHARM REV CODE 636 W HCPCS: Performed by: STUDENT IN AN ORGANIZED HEALTH CARE EDUCATION/TRAINING PROGRAM

## 2019-05-07 PROCEDURE — 27000221 HC OXYGEN, UP TO 24 HOURS

## 2019-05-07 RX ORDER — INSULIN ASPART 100 [IU]/ML
1-10 INJECTION, SOLUTION INTRAVENOUS; SUBCUTANEOUS
Status: DISCONTINUED | OUTPATIENT
Start: 2019-05-07 | End: 2019-05-13 | Stop reason: HOSPADM

## 2019-05-07 RX ORDER — AMOXICILLIN 250 MG
1 CAPSULE ORAL DAILY
Status: DISCONTINUED | OUTPATIENT
Start: 2019-05-07 | End: 2019-05-13 | Stop reason: HOSPADM

## 2019-05-07 RX ORDER — ONDANSETRON 8 MG/1
8 TABLET, ORALLY DISINTEGRATING ORAL EVERY 8 HOURS PRN
Status: DISCONTINUED | OUTPATIENT
Start: 2019-05-07 | End: 2019-05-13 | Stop reason: HOSPADM

## 2019-05-07 RX ORDER — FAMOTIDINE 20 MG/1
20 TABLET, FILM COATED ORAL 2 TIMES DAILY
Status: DISCONTINUED | OUTPATIENT
Start: 2019-05-07 | End: 2019-05-13 | Stop reason: HOSPADM

## 2019-05-07 RX ORDER — PROMETHAZINE HYDROCHLORIDE 12.5 MG/1
12.5 TABLET ORAL EVERY 6 HOURS PRN
Status: DISCONTINUED | OUTPATIENT
Start: 2019-05-07 | End: 2019-05-13 | Stop reason: HOSPADM

## 2019-05-07 RX ORDER — BENZONATATE 100 MG/1
100 CAPSULE ORAL 3 TIMES DAILY PRN
Status: DISCONTINUED | OUTPATIENT
Start: 2019-05-07 | End: 2019-05-13 | Stop reason: HOSPADM

## 2019-05-07 RX ADMIN — TAMSULOSIN HYDROCHLORIDE 0.4 MG: 0.4 CAPSULE ORAL at 09:05

## 2019-05-07 RX ADMIN — ATORVASTATIN CALCIUM 80 MG: 20 TABLET, FILM COATED ORAL at 08:05

## 2019-05-07 RX ADMIN — LEVOFLOXACIN 750 MG: 750 TABLET, FILM COATED ORAL at 09:05

## 2019-05-07 RX ADMIN — PREDNISONE 80 MG: 20 TABLET ORAL at 08:05

## 2019-05-07 RX ADMIN — OXYCODONE AND ACETAMINOPHEN 1 TABLET: 10; 325 TABLET ORAL at 04:05

## 2019-05-07 RX ADMIN — OXYCODONE AND ACETAMINOPHEN 1 TABLET: 10; 325 TABLET ORAL at 01:05

## 2019-05-07 RX ADMIN — BENZONATATE 100 MG: 100 CAPSULE ORAL at 09:05

## 2019-05-07 RX ADMIN — ENOXAPARIN SODIUM 40 MG: 100 INJECTION SUBCUTANEOUS at 04:05

## 2019-05-07 RX ADMIN — IPRATROPIUM BROMIDE AND ALBUTEROL SULFATE 3 ML: .5; 3 SOLUTION RESPIRATORY (INHALATION) at 08:05

## 2019-05-07 RX ADMIN — FAMOTIDINE 20 MG: 20 TABLET, FILM COATED ORAL at 10:05

## 2019-05-07 RX ADMIN — OXYCODONE AND ACETAMINOPHEN 1 TABLET: 10; 325 TABLET ORAL at 09:05

## 2019-05-07 RX ADMIN — ASPIRIN 81 MG: 81 TABLET, COATED ORAL at 09:05

## 2019-05-07 RX ADMIN — METOPROLOL SUCCINATE 50 MG: 50 TABLET, EXTENDED RELEASE ORAL at 09:05

## 2019-05-07 RX ADMIN — CLOPIDOGREL 75 MG: 75 TABLET, FILM COATED ORAL at 08:05

## 2019-05-07 RX ADMIN — FINASTERIDE 5 MG: 5 TABLET, FILM COATED ORAL at 09:05

## 2019-05-07 RX ADMIN — LEVOTHYROXINE SODIUM 75 MCG: 25 TABLET ORAL at 06:05

## 2019-05-07 RX ADMIN — MORPHINE SULFATE 30 MG: 15 TABLET, EXTENDED RELEASE ORAL at 08:05

## 2019-05-07 RX ADMIN — FAMOTIDINE 20 MG: 20 TABLET, FILM COATED ORAL at 08:05

## 2019-05-07 RX ADMIN — OXYCODONE AND ACETAMINOPHEN 1 TABLET: 10; 325 TABLET ORAL at 03:05

## 2019-05-07 RX ADMIN — GUAIFENESIN AND DEXTROMETHORPHAN HYDROBROMIDE 1 TABLET: 600; 30 TABLET, EXTENDED RELEASE ORAL at 04:05

## 2019-05-07 RX ADMIN — CILOSTAZOL 100 MG: 100 TABLET ORAL at 08:05

## 2019-05-07 RX ADMIN — IPRATROPIUM BROMIDE AND ALBUTEROL SULFATE 3 ML: .5; 3 SOLUTION RESPIRATORY (INHALATION) at 07:05

## 2019-05-07 RX ADMIN — IPRATROPIUM BROMIDE AND ALBUTEROL SULFATE 3 ML: .5; 3 SOLUTION RESPIRATORY (INHALATION) at 12:05

## 2019-05-07 RX ADMIN — CILOSTAZOL 100 MG: 100 TABLET ORAL at 09:05

## 2019-05-07 NOTE — PROGRESS NOTES
ATTENDING NOTE, ONCOLOGY INPATIENT TEAM    As above; events of last 24 hours noted.  Patient seen and examined, chart reviewed.  Appears comfortable, in NAD, states that his breathing has improved from yesterday..  Lungs still have scattered wheezes.  Abdomen is soft, nontender.  Labs reviewed.    PLAN  Repeat CBC in am to determine whether he will need blood transfusions.  Continue steroids at 2 mg of prednisone /kg.  Add H2 blockers.  Continue antibiotics and nebulized treatments.  O2.  DVT prophylaxis with enoxaparin for now.  We will follow.    Kevin Dwyer MD

## 2019-05-07 NOTE — ASSESSMENT & PLAN NOTE
63-year-old male with a past medical history of COPD, hypertension, hyperlipidemia, CAD on  ASA/plavix, NSCLC (most recently on Durvalumab 1/18/19), who presented to the ED with worsening shortness of breath, wheezing,  and productive cough of brown-yellow sputum for the last 4 days. Differential diagnosis includes but is not limited to COPD exacerbation (productive cough, wheezing on exam, CXR with increased diffuse bilateral interstitial and airspace opacities) vs hospital-acquired PNA (recent admission from 04/17 thru 04/18) vs progression of malignancy.     Plan:   - Levofloxacin 750 mg qd for 3-5 days (for possible underlying PNA)  - Patient was on a prednisone taper at home, being weaned katy from 70 mg qd for the treatment of suspected pneumonitis from the durvalumab that he had been on for a few months. After speaking with patient's oncologist, greyson Pathak will give steroid dosage used for pneumonitis treatment, start prednisone 80 mg PO BID (2 mg/kg),  - Duonebs q6h while awake  - Titrate continuous O2 by nasal cannula trevor maintain sats > 88% as tolerated, (on 3 L @ home)

## 2019-05-07 NOTE — PROGRESS NOTES
[facility]  Hematology/Oncology  Progress Note    Patient Name: Nimesh Nunn  Admission Date: 5/6/2019  Hospital Length of Stay: 1 days  Code Status: Full Code     Subjective:     HPI:  Mr. Nimesh Nunn is a 63-year-old male with a past medical history of COPD, hypertension, hyperlipidemia, CAD on  ASA/plavix, NSCLC (most recently on Durvalumab 1/18/19), who presented to the ED with worsening shortness of breath, wheezing,  and productive cough of brown-yellow sputum for the last 4 days. Patient has had 3 hospital admissions for a similar presentation since February, most recently from 04/17- 04/18 at which time he was treated with duo-nebs, steroids, and antibiotics for a COPD exacerbation. Patient reports that he is on home oxyegn ( usually requiring 3 L NC) but has recently felt his work of breathing increase. He reports that he was discharged from his last admission on prednisone 70mg qd and has been weaning down, currently on 50 mg qd. Patient also reports subjective fevers at home and a generalized abdominal pain with associated nausea, 10/10 in severity, worse in the RUQ that is not affected by eating food. He denies chills, headache, vomiting, CP, diarrhea, or dysuria. He is not currently on chemo. .Per EMS, he was 88% on RA and went to 96% on 4L  In the ED, the patient was found to be hypoxic, tachycardic, and hypertensive. On exam,  he appears comfortable, in no acute distress. Lungs have scattered wheezes and rhonchi throughout, abdomen is soft, non-tender with active bowel sounds. No lower extremity edema noted. His chest x ray is c/w pneumonitis versus disease progression. Patient was admitted to Medical Oncology for further management.              Interval History: No acute events overnight. Pt reports a good night of sleep and states that his breathing has improved from yesterday, now back on his home oxygen level of 2-3 , SpO2 89-94%. Remains afebrile, hemodynamically  stable, in no acute distress. Scattered wheezing still heard on pulmonary exam. Abdominal U?S showing cholelithiasis with evidence of acute cholecystitis  Hgb dropped from 7.9 -> 7.2, no active signs of bleeding. Will continue treating with nebulized breathing treatments, abx, and steroids for suspected pneumonitis..     Oncology Treatment Plan:   OP DOCETAXEL (75 MG/M2) Q3W    Medications:  Continuous Infusions:  Scheduled Meds:   albuterol-ipratropium  3 mL Nebulization Q6H    aspirin  81 mg Oral Daily    atorvastatin  80 mg Oral Daily    cilostazol  100 mg Oral BID    clopidogrel  75 mg Oral Daily    enoxaparin  40 mg Subcutaneous Daily    finasteride  5 mg Oral Daily    levoFLOXacin  750 mg Oral Daily    levothyroxine  75 mcg Oral Before breakfast    metoprolol succinate  50 mg Oral Daily    morphine  30 mg Oral Q12H    predniSONE  80 mg Oral BID    tamsulosin  0.4 mg Oral Daily     PRN Meds:acetaminophen, benzonatate, dextrose 50%, dextrose 50%, glucagon (human recombinant), glucose, glucose, ondansetron, oxyCODONE-acetaminophen, promethazine (PHENERGAN) IVPB, sodium chloride 0.9%     Review of Systems   Constitutional: Positive for fatigue and fever. Negative for chills.   HENT: Negative for congestion, rhinorrhea and sore throat.    Eyes: Negative for pain.   Respiratory: Positive for cough, chest tightness, shortness of breath and wheezing.    Cardiovascular: Negative for chest pain, palpitations and leg swelling.   Gastrointestinal: Positive for abdominal pain and nausea. Negative for abdominal distention, diarrhea and vomiting.   Genitourinary: Negative for difficulty urinating and dysuria.   Musculoskeletal: Negative for arthralgias and myalgias.   Skin: Negative for color change.   Neurological: Negative for dizziness, weakness, light-headedness and headaches.   Psychiatric/Behavioral: Negative for agitation and confusion.     Objective:     Vital Signs (Most Recent):  Temp: 97.8 °F (36.6  °C) (05/07/19 0735)  Pulse: 98 (05/07/19 0735)  Resp: 18 (05/07/19 0735)  BP: 117/62 (05/07/19 0735)  SpO2: (!) 92 % (05/07/19 0735) Vital Signs (24h Range):  Temp:  [97.8 °F (36.6 °C)-100.5 °F (38.1 °C)] 97.8 °F (36.6 °C)  Pulse:  [] 98  Resp:  [12-23] 18  SpO2:  [84 %-100 %] 92 %  BP: (109-162)/(57-78) 117/62     Weight: 83 kg (182 lb 15.7 oz)  Body mass index is 24.82 kg/m².  Body surface area is 2.05 meters squared.      Intake/Output Summary (Last 24 hours) at 5/7/2019 0851  Last data filed at 5/7/2019 0200  Gross per 24 hour   Intake 790 ml   Output 1200 ml   Net -410 ml       Physical Exam   Constitutional: He is oriented to person, place, and time. He appears well-developed and well-nourished. No distress.   HENT:   Head: Normocephalic and atraumatic.   Mouth/Throat: Oropharynx is clear and moist.   Eyes: Pupils are equal, round, and reactive to light. EOM are normal.   Neck: Normal range of motion. Neck supple. No JVD present.   Cardiovascular: Normal rate, regular rhythm, normal heart sounds and intact distal pulses.   No murmur heard.  Pulmonary/Chest: Tachypnea noted. No respiratory distress. He has wheezes. He has rhonchi.   Scattered wheezes throughout b/l lung fields   Abdominal: Soft. Bowel sounds are normal. He exhibits no distension. There is no tenderness.   Musculoskeletal: Normal range of motion. He exhibits no edema.   Neurological: He is alert and oriented to person, place, and time. No cranial nerve deficit.   Skin: Skin is warm and dry. Capillary refill takes less than 2 seconds.   Psychiatric: He has a normal mood and affect.       Significant Labs:   CBC:   Recent Labs   Lab 05/06/19  0528 05/07/19  0537   WBC 4.42 4.97   HGB 7.9* 7.2*   HCT 25.3* 23.5*   * 131*   , CMP:   Recent Labs   Lab 05/06/19  0528 05/07/19  0537   * 134*   K 3.8 3.7   CL 95 92*   CO2 27 31*   GLU 64* 244*   BUN 14 18   CREATININE 0.7 0.9   CALCIUM 8.4* 8.4*   PROT 6.3 5.8*   ALBUMIN 2.4* 2.0*    BILITOT 0.6 0.2   ALKPHOS 92 84   AST 15 7*   ALT 14 10   ANIONGAP 13 11   EGFRNONAA >60.0 >60.0   , LDH: No results for input(s): LDHCSF, BFSOURCE in the last 48 hours. and LFTs:   Recent Labs   Lab 05/06/19  0528 05/07/19  0537   ALT 14 10   AST 15 7*   ALKPHOS 92 84   BILITOT 0.6 0.2   PROT 6.3 5.8*   ALBUMIN 2.4* 2.0*       Diagnostic Results:  I have reviewed and interpreted all pertinent imaging results/findings within the past 24 hours.     Abdomen U/S  FINDINGS:  Pancreas: The visualized portions of pancreas appear normal.    Aorta: No aneurysm within the visualized portions of the aorta.  Mid and distal aorta are obscured by overlying bowel gas.    Liver: 13.9 cm, normal in size. Homogeneous parenchymal echotexture. No focal lesions.    Gallbladder: Single mobile stone measuring 0.8 cm.  No wall thickening or pericholecystic fluid.  Negative sonographic Ramsay's sign.    Biliary system: 7 mm common bile duct.  Mild intrahepatic ductal dilatation.    Inferior vena cava: Normal in appearance.    Right kidney: 9.2 cm. No hydronephrosis.    Left kidney: 10.4 cm. No hydronephrosis.    Spleen: 10.2 x 3.6 cm.  Normal in size with homogeneous echotexture.    Miscellaneous: No ascites.      Impression       Cholelithiasis without sonographic evidence of acute cholecystitis.    Mild intrahepatic biliary ductal dilatation with prominence of the common bile duct measuring up to 7 mm.         Assessment/Plan:     COPD exacerbation  63-year-old male with a past medical history of COPD, hypertension, hyperlipidemia, CAD on  ASA/plavix, NSCLC (most recently on Durvalumab 1/18/19), who presented to the ED with worsening shortness of breath, wheezing,  and productive cough of brown-yellow sputum for the last 4 days. Differential diagnosis includes but is not limited to COPD exacerbation (productive cough, wheezing on exam, CXR with increased diffuse bilateral interstitial and airspace opacities) vs hospital-acquired PNA  (recent admission from 04/17 thru 04/18) vs progression of malignancy.     Plan:   - Levofloxacin 750 mg qd for 3-5 days (for possible underlying PNA)  - Patient was on a prednisone taper at home, being weaned katy from 70 mg qd for the treatment of suspected pneumonitis from the durvalumab that he had been on for a few months. After speaking with patient's oncologist, greyson Pathak will give steroid dosage used for pneumonitis treatment, start prednisone 80 mg PO BID (2 mg/kg),  - Duonebs q6h while awake  - Titrate continuous O2 by nasal cannula trevor maintain sats > 88% as tolerated, (on 3 L @ home)    Anemia in neoplastic disease  - Drop in Hgb overnight (7.9 -> 7.2), baseline Hgb of ~ 9.0,   - Currently no active signs of bleeding  - Will continue to monitor with daily CBC, transfuse to keep Hgb > 7.0    Lung cancer metastatic to bone  Will continue home pain regimen:   - MS contin 30mg BID    - Percocet 10-325mg  q4h PRN    Adenocarcinoma of left lung, stage 4  Oncologic History:       Oncologic History Non-small cell lung cancer, left diagnosed 06/2018  Metastatic disease to bone at presentation    Oncologic Treatment Stereotactic radiation to spinal lesion  Cisplatin/pemetrexed with concurrent radiation 08/08/2018 (completed 09/2018)  Durvalumab 10/2018, last given on 1/18/19  XRT L4-S3 30 Gy 12/2018    Pathology Poorly differentiated adenocarcinoma, EGFR wild type, No ALK, ROS-1 rearrangements, PD-L1 TPS 90%          PAD (peripheral artery disease)  - On cilostazol    Hyperlipidemia  Continue home statin.    HTN (hypertension)  - Hypertensive in the ED  - Will continue home metoprolol              Migel Brambila MD  Hematology/Oncology  [facility]            ATTENDING NOTE, ONCOLOGY INPATIENT TEAM    As above; please refer to my note of same day for our assessment and plan    Kevin Dwyer MD

## 2019-05-07 NOTE — TELEPHONE ENCOUNTER
----- Message from Micki Lennon sent at 5/7/2019 11:47 AM CDT -----  Contact: pt daughter in law   pt daughter in law called to speak with nurse have some questions   Callback [phone]  Thank You  ERIKA Lennon

## 2019-05-07 NOTE — PLAN OF CARE
POC reviewed with patient at the start of the shift. Pt on 2-3 L NC. PRN Percocet given for pt's c/o pain. Afebrile. Vital signs stable. Instructed patient to call for assistance. Bed low and locked, call bell within reach, nonskid socks on, pt verbalized understanding. Infection, pressure ulcer, and fall risks precautions maintained. Will continue to monitor.

## 2019-05-07 NOTE — SUBJECTIVE & OBJECTIVE
Interval History: No acute events overnight. Pt reports a good night of sleep and states that his breathing has improved from yesterday, now back on his home oxygen level of 2-3 , SpO2 89-94%. Remains afebrile, hemodynamically stable, in no acute distress. Scattered wheezing still heard on pulmonary exam. Abdominal U?S showing cholelithiasis with evidence of acute cholecystitis  Hgb dropped from 7.9 -> 7.2, no active signs of bleeding. Will continue treating with nebulized breathing treatments, abx, and steroids for suspected pneumonitis..     Oncology Treatment Plan:   OP DOCETAXEL (75 MG/M2) Q3W    Medications:  Continuous Infusions:  Scheduled Meds:   albuterol-ipratropium  3 mL Nebulization Q6H    aspirin  81 mg Oral Daily    atorvastatin  80 mg Oral Daily    cilostazol  100 mg Oral BID    clopidogrel  75 mg Oral Daily    enoxaparin  40 mg Subcutaneous Daily    finasteride  5 mg Oral Daily    levoFLOXacin  750 mg Oral Daily    levothyroxine  75 mcg Oral Before breakfast    metoprolol succinate  50 mg Oral Daily    morphine  30 mg Oral Q12H    predniSONE  80 mg Oral BID    tamsulosin  0.4 mg Oral Daily     PRN Meds:acetaminophen, benzonatate, dextrose 50%, dextrose 50%, glucagon (human recombinant), glucose, glucose, ondansetron, oxyCODONE-acetaminophen, promethazine (PHENERGAN) IVPB, sodium chloride 0.9%     Review of Systems   Constitutional: Positive for fatigue and fever. Negative for chills.   HENT: Negative for congestion, rhinorrhea and sore throat.    Eyes: Negative for pain.   Respiratory: Positive for cough, chest tightness, shortness of breath and wheezing.    Cardiovascular: Negative for chest pain, palpitations and leg swelling.   Gastrointestinal: Positive for abdominal pain and nausea. Negative for abdominal distention, diarrhea and vomiting.   Genitourinary: Negative for difficulty urinating and dysuria.   Musculoskeletal: Negative for arthralgias and myalgias.   Skin: Negative for  color change.   Neurological: Negative for dizziness, weakness, light-headedness and headaches.   Psychiatric/Behavioral: Negative for agitation and confusion.     Objective:     Vital Signs (Most Recent):  Temp: 97.8 °F (36.6 °C) (05/07/19 0735)  Pulse: 98 (05/07/19 0735)  Resp: 18 (05/07/19 0735)  BP: 117/62 (05/07/19 0735)  SpO2: (!) 92 % (05/07/19 0735) Vital Signs (24h Range):  Temp:  [97.8 °F (36.6 °C)-100.5 °F (38.1 °C)] 97.8 °F (36.6 °C)  Pulse:  [] 98  Resp:  [12-23] 18  SpO2:  [84 %-100 %] 92 %  BP: (109-162)/(57-78) 117/62     Weight: 83 kg (182 lb 15.7 oz)  Body mass index is 24.82 kg/m².  Body surface area is 2.05 meters squared.      Intake/Output Summary (Last 24 hours) at 5/7/2019 0851  Last data filed at 5/7/2019 0200  Gross per 24 hour   Intake 790 ml   Output 1200 ml   Net -410 ml       Physical Exam   Constitutional: He is oriented to person, place, and time. He appears well-developed and well-nourished. No distress.   HENT:   Head: Normocephalic and atraumatic.   Mouth/Throat: Oropharynx is clear and moist.   Eyes: Pupils are equal, round, and reactive to light. EOM are normal.   Neck: Normal range of motion. Neck supple. No JVD present.   Cardiovascular: Normal rate, regular rhythm, normal heart sounds and intact distal pulses.   No murmur heard.  Pulmonary/Chest: Tachypnea noted. No respiratory distress. He has wheezes. He has rhonchi.   Scattered wheezes throughout b/l lung fields   Abdominal: Soft. Bowel sounds are normal. He exhibits no distension. There is no tenderness.   Musculoskeletal: Normal range of motion. He exhibits no edema.   Neurological: He is alert and oriented to person, place, and time. No cranial nerve deficit.   Skin: Skin is warm and dry. Capillary refill takes less than 2 seconds.   Psychiatric: He has a normal mood and affect.       Significant Labs:   CBC:   Recent Labs   Lab 05/06/19  0528 05/07/19  0537   WBC 4.42 4.97   HGB 7.9* 7.2*   HCT 25.3* 23.5*   PLT  117* 131*   , CMP:   Recent Labs   Lab 05/06/19  0528 05/07/19  0537   * 134*   K 3.8 3.7   CL 95 92*   CO2 27 31*   GLU 64* 244*   BUN 14 18   CREATININE 0.7 0.9   CALCIUM 8.4* 8.4*   PROT 6.3 5.8*   ALBUMIN 2.4* 2.0*   BILITOT 0.6 0.2   ALKPHOS 92 84   AST 15 7*   ALT 14 10   ANIONGAP 13 11   EGFRNONAA >60.0 >60.0   , LDH: No results for input(s): LDHCSF, BFSOURCE in the last 48 hours. and LFTs:   Recent Labs   Lab 05/06/19  0528 05/07/19  0537   ALT 14 10   AST 15 7*   ALKPHOS 92 84   BILITOT 0.6 0.2   PROT 6.3 5.8*   ALBUMIN 2.4* 2.0*       Diagnostic Results:  I have reviewed and interpreted all pertinent imaging results/findings within the past 24 hours.     Abdomen U/S  FINDINGS:  Pancreas: The visualized portions of pancreas appear normal.    Aorta: No aneurysm within the visualized portions of the aorta.  Mid and distal aorta are obscured by overlying bowel gas.    Liver: 13.9 cm, normal in size. Homogeneous parenchymal echotexture. No focal lesions.    Gallbladder: Single mobile stone measuring 0.8 cm.  No wall thickening or pericholecystic fluid.  Negative sonographic Ramsay's sign.    Biliary system: 7 mm common bile duct.  Mild intrahepatic ductal dilatation.    Inferior vena cava: Normal in appearance.    Right kidney: 9.2 cm. No hydronephrosis.    Left kidney: 10.4 cm. No hydronephrosis.    Spleen: 10.2 x 3.6 cm.  Normal in size with homogeneous echotexture.    Miscellaneous: No ascites.      Impression       Cholelithiasis without sonographic evidence of acute cholecystitis.    Mild intrahepatic biliary ductal dilatation with prominence of the common bile duct measuring up to 7 mm.

## 2019-05-07 NOTE — PLAN OF CARE
Problem: Adult Inpatient Plan of Care  Goal: Plan of Care Review  Outcome: Ongoing (interventions implemented as appropriate)  POC reviewed with patient at the start of the shift. Pt went down for abdominal ultrasound during the night. Pt on 2-3 L NC. PRN Percocet given for pt's c/o pain. Afebrile overnight. Vital signs stable. Instructed patient to call for assistance. Bed low and locked, call bell within reach, nonskid socks on, pt verbalized understanding. Infection, pressure ulcer, and fall risks precautions maintained. Will continue to monitor.

## 2019-05-07 NOTE — ASSESSMENT & PLAN NOTE
- Drop in Hgb overnight (7.9 -> 7.2), baseline Hgb of ~ 9.0,   - Currently no active signs of bleeding  - Will continue to monitor with daily CBC, transfuse to keep Hgb > 7.0

## 2019-05-08 LAB
ALBUMIN SERPL BCP-MCNC: 2.6 G/DL (ref 3.5–5.2)
ALP SERPL-CCNC: 85 U/L (ref 55–135)
ALT SERPL W/O P-5'-P-CCNC: 13 U/L (ref 10–44)
ANION GAP SERPL CALC-SCNC: 8 MMOL/L (ref 8–16)
ANISOCYTOSIS BLD QL SMEAR: SLIGHT
AST SERPL-CCNC: 12 U/L (ref 10–40)
BASOPHILS # BLD AUTO: 0.01 K/UL (ref 0–0.2)
BASOPHILS NFR BLD: 0.2 % (ref 0–1.9)
BILIRUB SERPL-MCNC: 0.2 MG/DL (ref 0.1–1)
BUN SERPL-MCNC: 19 MG/DL (ref 8–23)
CALCIUM SERPL-MCNC: 9.1 MG/DL (ref 8.7–10.5)
CHLORIDE SERPL-SCNC: 91 MMOL/L (ref 95–110)
CO2 SERPL-SCNC: 37 MMOL/L (ref 23–29)
CREAT SERPL-MCNC: 0.9 MG/DL (ref 0.5–1.4)
DIFFERENTIAL METHOD: ABNORMAL
EOSINOPHIL # BLD AUTO: 0 K/UL (ref 0–0.5)
EOSINOPHIL NFR BLD: 0 % (ref 0–8)
ERYTHROCYTE [DISTWIDTH] IN BLOOD BY AUTOMATED COUNT: 18.1 % (ref 11.5–14.5)
EST. GFR  (AFRICAN AMERICAN): >60 ML/MIN/1.73 M^2
EST. GFR  (NON AFRICAN AMERICAN): >60 ML/MIN/1.73 M^2
GLUCOSE SERPL-MCNC: 177 MG/DL (ref 70–110)
HCT VFR BLD AUTO: 25.6 % (ref 40–54)
HGB BLD-MCNC: 7.9 G/DL (ref 14–18)
IMM GRANULOCYTES # BLD AUTO: 0.08 K/UL (ref 0–0.04)
IMM GRANULOCYTES NFR BLD AUTO: 1.3 % (ref 0–0.5)
LYMPHOCYTES # BLD AUTO: 0.3 K/UL (ref 1–4.8)
LYMPHOCYTES NFR BLD: 4.1 % (ref 18–48)
MAGNESIUM SERPL-MCNC: 1.7 MG/DL (ref 1.6–2.6)
MCH RBC QN AUTO: 30.6 PG (ref 27–31)
MCHC RBC AUTO-ENTMCNC: 30.9 G/DL (ref 32–36)
MCV RBC AUTO: 99 FL (ref 82–98)
MONOCYTES # BLD AUTO: 0.2 K/UL (ref 0.3–1)
MONOCYTES NFR BLD: 3.5 % (ref 4–15)
NEUTROPHILS # BLD AUTO: 5.7 K/UL (ref 1.8–7.7)
NEUTROPHILS NFR BLD: 90.9 % (ref 38–73)
NRBC BLD-RTO: 0 /100 WBC
PHOSPHATE SERPL-MCNC: 2 MG/DL (ref 2.7–4.5)
PLATELET # BLD AUTO: 134 K/UL (ref 150–350)
PLATELET BLD QL SMEAR: ABNORMAL
PMV BLD AUTO: 9.2 FL (ref 9.2–12.9)
POCT GLUCOSE: 152 MG/DL (ref 70–110)
POLYCHROMASIA BLD QL SMEAR: ABNORMAL
POTASSIUM SERPL-SCNC: 3.5 MMOL/L (ref 3.5–5.1)
PROT SERPL-MCNC: 6.8 G/DL (ref 6–8.4)
RBC # BLD AUTO: 2.58 M/UL (ref 4.6–6.2)
SODIUM SERPL-SCNC: 136 MMOL/L (ref 136–145)
WBC # BLD AUTO: 6.29 K/UL (ref 3.9–12.7)

## 2019-05-08 PROCEDURE — 27000221 HC OXYGEN, UP TO 24 HOURS

## 2019-05-08 PROCEDURE — 83735 ASSAY OF MAGNESIUM: CPT

## 2019-05-08 PROCEDURE — 85025 COMPLETE CBC W/AUTO DIFF WBC: CPT

## 2019-05-08 PROCEDURE — 99233 SBSQ HOSP IP/OBS HIGH 50: CPT | Mod: ,,, | Performed by: INTERNAL MEDICINE

## 2019-05-08 PROCEDURE — 20600001 HC STEP DOWN PRIVATE ROOM

## 2019-05-08 PROCEDURE — 25000003 PHARM REV CODE 250: Performed by: INTERNAL MEDICINE

## 2019-05-08 PROCEDURE — 94640 AIRWAY INHALATION TREATMENT: CPT

## 2019-05-08 PROCEDURE — 36415 COLL VENOUS BLD VENIPUNCTURE: CPT

## 2019-05-08 PROCEDURE — 63600175 PHARM REV CODE 636 W HCPCS: Performed by: STUDENT IN AN ORGANIZED HEALTH CARE EDUCATION/TRAINING PROGRAM

## 2019-05-08 PROCEDURE — 99233 PR SUBSEQUENT HOSPITAL CARE,LEVL III: ICD-10-PCS | Mod: ,,, | Performed by: INTERNAL MEDICINE

## 2019-05-08 PROCEDURE — 63600175 PHARM REV CODE 636 W HCPCS: Performed by: PEDIATRICS

## 2019-05-08 PROCEDURE — 80053 COMPREHEN METABOLIC PANEL: CPT

## 2019-05-08 PROCEDURE — 25000003 PHARM REV CODE 250: Performed by: PEDIATRICS

## 2019-05-08 PROCEDURE — 25000003 PHARM REV CODE 250: Performed by: STUDENT IN AN ORGANIZED HEALTH CARE EDUCATION/TRAINING PROGRAM

## 2019-05-08 PROCEDURE — 84100 ASSAY OF PHOSPHORUS: CPT

## 2019-05-08 PROCEDURE — 94761 N-INVAS EAR/PLS OXIMETRY MLT: CPT

## 2019-05-08 PROCEDURE — 25000242 PHARM REV CODE 250 ALT 637 W/ HCPCS: Performed by: PEDIATRICS

## 2019-05-08 RX ORDER — GUAIFENESIN 100 MG/5ML
200 SOLUTION ORAL EVERY 8 HOURS PRN
Status: DISCONTINUED | OUTPATIENT
Start: 2019-05-08 | End: 2019-05-13 | Stop reason: HOSPADM

## 2019-05-08 RX ORDER — OXYCODONE AND ACETAMINOPHEN 10; 325 MG/1; MG/1
1 TABLET ORAL EVERY 6 HOURS PRN
Status: DISCONTINUED | OUTPATIENT
Start: 2019-05-08 | End: 2019-05-13 | Stop reason: HOSPADM

## 2019-05-08 RX ADMIN — PREDNISONE 80 MG: 20 TABLET ORAL at 09:05

## 2019-05-08 RX ADMIN — IPRATROPIUM BROMIDE AND ALBUTEROL SULFATE 3 ML: .5; 3 SOLUTION RESPIRATORY (INHALATION) at 12:05

## 2019-05-08 RX ADMIN — ATORVASTATIN CALCIUM 80 MG: 20 TABLET, FILM COATED ORAL at 09:05

## 2019-05-08 RX ADMIN — GUAIFENESIN 200 MG: 200 SOLUTION ORAL at 09:05

## 2019-05-08 RX ADMIN — CILOSTAZOL 100 MG: 100 TABLET ORAL at 09:05

## 2019-05-08 RX ADMIN — OXYCODONE AND ACETAMINOPHEN 1 TABLET: 10; 325 TABLET ORAL at 12:05

## 2019-05-08 RX ADMIN — CLOPIDOGREL 75 MG: 75 TABLET, FILM COATED ORAL at 09:05

## 2019-05-08 RX ADMIN — PREDNISONE 80 MG: 20 TABLET ORAL at 08:05

## 2019-05-08 RX ADMIN — MORPHINE SULFATE 30 MG: 15 TABLET, EXTENDED RELEASE ORAL at 09:05

## 2019-05-08 RX ADMIN — IPRATROPIUM BROMIDE AND ALBUTEROL SULFATE 3 ML: .5; 3 SOLUTION RESPIRATORY (INHALATION) at 07:05

## 2019-05-08 RX ADMIN — TAMSULOSIN HYDROCHLORIDE 0.4 MG: 0.4 CAPSULE ORAL at 09:05

## 2019-05-08 RX ADMIN — OXYCODONE AND ACETAMINOPHEN 1 TABLET: 10; 325 TABLET ORAL at 05:05

## 2019-05-08 RX ADMIN — OXYCODONE AND ACETAMINOPHEN 1 TABLET: 10; 325 TABLET ORAL at 09:05

## 2019-05-08 RX ADMIN — LEVOFLOXACIN 750 MG: 750 TABLET, FILM COATED ORAL at 09:05

## 2019-05-08 RX ADMIN — ASPIRIN 81 MG: 81 TABLET, COATED ORAL at 09:05

## 2019-05-08 RX ADMIN — OXYCODONE AND ACETAMINOPHEN 1 TABLET: 10; 325 TABLET ORAL at 01:05

## 2019-05-08 RX ADMIN — MORPHINE SULFATE 30 MG: 15 TABLET, EXTENDED RELEASE ORAL at 08:05

## 2019-05-08 RX ADMIN — CILOSTAZOL 100 MG: 100 TABLET ORAL at 08:05

## 2019-05-08 RX ADMIN — ENOXAPARIN SODIUM 40 MG: 100 INJECTION SUBCUTANEOUS at 04:05

## 2019-05-08 RX ADMIN — FAMOTIDINE 20 MG: 20 TABLET, FILM COATED ORAL at 08:05

## 2019-05-08 RX ADMIN — INSULIN ASPART 1 UNITS: 100 INJECTION, SOLUTION INTRAVENOUS; SUBCUTANEOUS at 12:05

## 2019-05-08 RX ADMIN — IPRATROPIUM BROMIDE AND ALBUTEROL SULFATE 3 ML: .5; 3 SOLUTION RESPIRATORY (INHALATION) at 06:05

## 2019-05-08 RX ADMIN — FINASTERIDE 5 MG: 5 TABLET, FILM COATED ORAL at 09:05

## 2019-05-08 RX ADMIN — METOPROLOL SUCCINATE 50 MG: 50 TABLET, EXTENDED RELEASE ORAL at 09:05

## 2019-05-08 RX ADMIN — FAMOTIDINE 20 MG: 20 TABLET, FILM COATED ORAL at 09:05

## 2019-05-08 RX ADMIN — LEVOTHYROXINE SODIUM 75 MCG: 25 TABLET ORAL at 05:05

## 2019-05-08 NOTE — SUBJECTIVE & OBJECTIVE
Interval History:   Pt reports that his breathing was a bit worse last night, now back on his home oxygen level of 2-3 , SpO2 89-94%.   Remains afebrile, hemodynamically stable, in no acute distress.   Scattered wheezing still heard on pulmonary exam.   Abdominal U/S showing cholelithiasis without evidence of acute cholecystitis    Hgb dropped from 7.9 today, no active signs of bleeding.   Will continue treating with nebulized breathing treatments, abx, and steroids for suspected pneumonitis..     Oncology Treatment Plan:   OP DOCETAXEL (75 MG/M2) Q3W    Medications:  Continuous Infusions:  Scheduled Meds:   albuterol-ipratropium  3 mL Nebulization Q6H    aspirin  81 mg Oral Daily    atorvastatin  80 mg Oral Daily    cilostazol  100 mg Oral BID    clopidogrel  75 mg Oral Daily    enoxaparin  40 mg Subcutaneous Daily    famotidine  20 mg Oral BID    finasteride  5 mg Oral Daily    levoFLOXacin  750 mg Oral Daily    levothyroxine  75 mcg Oral Before breakfast    metoprolol succinate  50 mg Oral Daily    morphine  30 mg Oral Q12H    predniSONE  80 mg Oral BID    senna-docusate 8.6-50 mg  1 tablet Oral Daily    tamsulosin  0.4 mg Oral Daily     PRN Meds:acetaminophen, benzonatate, dextrose 50%, dextrose 50%, glucagon (human recombinant), glucose, glucose, guaifenesin 100 mg/5 ml, insulin aspart U-100, ondansetron, oxyCODONE-acetaminophen, promethazine, sodium chloride 0.9%     Review of Systems   Constitutional: Positive for fatigue. Negative for chills and fever.   HENT: Negative for congestion, rhinorrhea and sore throat.    Eyes: Negative for pain.   Respiratory: Positive for cough, chest tightness, shortness of breath and wheezing.    Cardiovascular: Negative for chest pain, palpitations and leg swelling.   Gastrointestinal: Positive for abdominal pain and nausea. Negative for abdominal distention, diarrhea and vomiting.   Genitourinary: Negative for difficulty urinating and dysuria.    Musculoskeletal: Negative for arthralgias and myalgias.   Skin: Negative for color change.   Neurological: Negative for dizziness, weakness, light-headedness and headaches.   Psychiatric/Behavioral: Negative for agitation and confusion.     Objective:     Vital Signs (Most Recent):  Temp: 97.7 °F (36.5 °C) (05/08/19 0745)  Pulse: 101 (05/08/19 0746)  Resp: 20 (05/08/19 0745)  BP: 129/70 (05/08/19 0745)  SpO2: 100 % (05/08/19 0745) Vital Signs (24h Range):  Temp:  [97.4 °F (36.3 °C)-97.8 °F (36.6 °C)] 97.7 °F (36.5 °C)  Pulse:  [] 101  Resp:  [16-24] 20  SpO2:  [90 %-100 %] 100 %  BP: (118-169)/(63-82) 129/70     Weight: 83 kg (182 lb 15.7 oz)  Body mass index is 24.82 kg/m².  Body surface area is 2.05 meters squared.      Intake/Output Summary (Last 24 hours) at 5/8/2019 0836  Last data filed at 5/7/2019 1700  Gross per 24 hour   Intake 1240 ml   Output 400 ml   Net 840 ml       Physical Exam   Constitutional: He is oriented to person, place, and time. He appears well-developed and well-nourished. No distress.   HENT:   Head: Normocephalic and atraumatic.   Mouth/Throat: Oropharynx is clear and moist.   Eyes: Pupils are equal, round, and reactive to light. EOM are normal.   Neck: Normal range of motion. Neck supple. No JVD present.   Cardiovascular: Normal rate, regular rhythm, normal heart sounds and intact distal pulses.   No murmur heard.  Pulmonary/Chest: Tachypnea noted. No respiratory distress. He has wheezes. He has rhonchi.   Scattered wheezes throughout b/l lung fields   Abdominal: Soft. Bowel sounds are normal. He exhibits no distension. There is no tenderness.   Musculoskeletal: Normal range of motion. He exhibits no edema.   Neurological: He is alert and oriented to person, place, and time. No cranial nerve deficit.   Skin: Skin is warm and dry. Capillary refill takes less than 2 seconds.   Psychiatric: He has a normal mood and affect.       Significant Labs:   CBC:   Recent Labs   Lab  05/07/19  0537 05/08/19  0438   WBC 4.97 6.29   HGB 7.2* 7.9*   HCT 23.5* 25.6*   * 134*   , CMP:   Recent Labs   Lab 05/07/19 0537 05/08/19 0438   * 136   K 3.7 3.5   CL 92* 91*   CO2 31* 37*   * 177*   BUN 18 19   CREATININE 0.9 0.9   CALCIUM 8.4* 9.1   PROT 5.8* 6.8   ALBUMIN 2.0* 2.6*   BILITOT 0.2 0.2   ALKPHOS 84 85   AST 7* 12   ALT 10 13   ANIONGAP 11 8   EGFRNONAA >60.0 >60.0   , LDH: No results for input(s): LDHCSF, BFSOURCE in the last 48 hours. and LFTs:   Recent Labs   Lab 05/07/19 0537 05/08/19 0438   ALT 10 13   AST 7* 12   ALKPHOS 84 85   BILITOT 0.2 0.2   PROT 5.8* 6.8   ALBUMIN 2.0* 2.6*       Diagnostic Results:  I have reviewed and interpreted all pertinent imaging results/findings within the past 24 hours.     Abdomen U/S  FINDINGS:  Pancreas: The visualized portions of pancreas appear normal.    Aorta: No aneurysm within the visualized portions of the aorta.  Mid and distal aorta are obscured by overlying bowel gas.    Liver: 13.9 cm, normal in size. Homogeneous parenchymal echotexture. No focal lesions.    Gallbladder: Single mobile stone measuring 0.8 cm.  No wall thickening or pericholecystic fluid.  Negative sonographic Ramsay's sign.    Biliary system: 7 mm common bile duct.  Mild intrahepatic ductal dilatation.    Inferior vena cava: Normal in appearance.    Right kidney: 9.2 cm. No hydronephrosis.    Left kidney: 10.4 cm. No hydronephrosis.    Spleen: 10.2 x 3.6 cm.  Normal in size with homogeneous echotexture.    Miscellaneous: No ascites.      Impression       Cholelithiasis without sonographic evidence of acute cholecystitis.    Mild intrahepatic biliary ductal dilatation with prominence of the common bile duct measuring up to 7 mm.

## 2019-05-08 NOTE — PROGRESS NOTES
[facility]  Hematology/Oncology  Progress Note    Patient Name: Nimesh Nunn  Admission Date: 5/6/2019  Hospital Length of Stay: 2 days  Code Status: Full Code     Subjective:     HPI:  Mr. Nimesh Nunn is a 63-year-old male with a past medical history of COPD, hypertension, hyperlipidemia, CAD on  ASA/plavix, NSCLC (most recently on Durvalumab 1/18/19), who presented to the ED with worsening shortness of breath, wheezing,  and productive cough of brown-yellow sputum for the last 4 days. Patient has had 3 hospital admissions for a similar presentation since February, most recently from 04/17- 04/18 at which time he was treated with duo-nebs, steroids, and antibiotics for a COPD exacerbation. Patient reports that he is on home oxyegn ( usually requiring 3 L NC) but has recently felt his work of breathing increase. He reports that he was discharged from his last admission on prednisone 70mg qd and has been weaning down, currently on 50 mg qd. Patient also reports subjective fevers at home and a generalized abdominal pain with associated nausea, 10/10 in severity, worse in the RUQ that is not affected by eating food. He denies chills, headache, vomiting, CP, diarrhea, or dysuria. He is not currently on chemo. .Per EMS, he was 88% on RA and went to 96% on 4L  In the ED, the patient was found to be hypoxic, tachycardic, and hypertensive. On exam,  he appears comfortable, in no acute distress. Lungs have scattered wheezes and rhonchi throughout, abdomen is soft, non-tender with active bowel sounds. No lower extremity edema noted. His chest x ray is c/w pneumonitis versus disease progression. Patient was admitted to Medical Oncology for further management.              Interval History:   Pt reports that his breathing was a bit worse last night, now back on his home oxygen level of 2-3 , SpO2 89-94%.   Remains afebrile, hemodynamically stable, in no acute distress.   Scattered wheezing still  heard on pulmonary exam.   Abdominal U/S showing cholelithiasis without evidence of acute cholecystitis    Hgb dropped from 7.9 today, no active signs of bleeding.   Will continue treating with nebulized breathing treatments, abx, and steroids for suspected pneumonitis..     Oncology Treatment Plan:   OP DOCETAXEL (75 MG/M2) Q3W    Medications:  Continuous Infusions:  Scheduled Meds:   albuterol-ipratropium  3 mL Nebulization Q6H    aspirin  81 mg Oral Daily    atorvastatin  80 mg Oral Daily    cilostazol  100 mg Oral BID    clopidogrel  75 mg Oral Daily    enoxaparin  40 mg Subcutaneous Daily    famotidine  20 mg Oral BID    finasteride  5 mg Oral Daily    levoFLOXacin  750 mg Oral Daily    levothyroxine  75 mcg Oral Before breakfast    metoprolol succinate  50 mg Oral Daily    morphine  30 mg Oral Q12H    predniSONE  80 mg Oral BID    senna-docusate 8.6-50 mg  1 tablet Oral Daily    tamsulosin  0.4 mg Oral Daily     PRN Meds:acetaminophen, benzonatate, dextrose 50%, dextrose 50%, glucagon (human recombinant), glucose, glucose, guaifenesin 100 mg/5 ml, insulin aspart U-100, ondansetron, oxyCODONE-acetaminophen, promethazine, sodium chloride 0.9%     Review of Systems   Constitutional: Positive for fatigue. Negative for chills and fever.   HENT: Negative for congestion, rhinorrhea and sore throat.    Eyes: Negative for pain.   Respiratory: Positive for cough, chest tightness, shortness of breath and wheezing.    Cardiovascular: Negative for chest pain, palpitations and leg swelling.   Gastrointestinal: Positive for abdominal pain and nausea. Negative for abdominal distention, diarrhea and vomiting.   Genitourinary: Negative for difficulty urinating and dysuria.   Musculoskeletal: Negative for arthralgias and myalgias.   Skin: Negative for color change.   Neurological: Negative for dizziness, weakness, light-headedness and headaches.   Psychiatric/Behavioral: Negative for agitation and confusion.      Objective:     Vital Signs (Most Recent):  Temp: 97.7 °F (36.5 °C) (05/08/19 0745)  Pulse: 101 (05/08/19 0746)  Resp: 20 (05/08/19 0745)  BP: 129/70 (05/08/19 0745)  SpO2: 100 % (05/08/19 0745) Vital Signs (24h Range):  Temp:  [97.4 °F (36.3 °C)-97.8 °F (36.6 °C)] 97.7 °F (36.5 °C)  Pulse:  [] 101  Resp:  [16-24] 20  SpO2:  [90 %-100 %] 100 %  BP: (118-169)/(63-82) 129/70     Weight: 83 kg (182 lb 15.7 oz)  Body mass index is 24.82 kg/m².  Body surface area is 2.05 meters squared.      Intake/Output Summary (Last 24 hours) at 5/8/2019 0836  Last data filed at 5/7/2019 1700  Gross per 24 hour   Intake 1240 ml   Output 400 ml   Net 840 ml       Physical Exam   Constitutional: He is oriented to person, place, and time. He appears well-developed and well-nourished. No distress.   HENT:   Head: Normocephalic and atraumatic.   Mouth/Throat: Oropharynx is clear and moist.   Eyes: Pupils are equal, round, and reactive to light. EOM are normal.   Neck: Normal range of motion. Neck supple. No JVD present.   Cardiovascular: Normal rate, regular rhythm, normal heart sounds and intact distal pulses.   No murmur heard.  Pulmonary/Chest: Tachypnea noted. No respiratory distress. He has wheezes. He has rhonchi.   Scattered wheezes throughout b/l lung fields   Abdominal: Soft. Bowel sounds are normal. He exhibits no distension. There is no tenderness.   Musculoskeletal: Normal range of motion. He exhibits no edema.   Neurological: He is alert and oriented to person, place, and time. No cranial nerve deficit.   Skin: Skin is warm and dry. Capillary refill takes less than 2 seconds.   Psychiatric: He has a normal mood and affect.       Significant Labs:   CBC:   Recent Labs   Lab 05/07/19  0537 05/08/19  0438   WBC 4.97 6.29   HGB 7.2* 7.9*   HCT 23.5* 25.6*   * 134*   , CMP:   Recent Labs   Lab 05/07/19  0537 05/08/19  0438   * 136   K 3.7 3.5   CL 92* 91*   CO2 31* 37*   * 177*   BUN 18 19   CREATININE  0.9 0.9   CALCIUM 8.4* 9.1   PROT 5.8* 6.8   ALBUMIN 2.0* 2.6*   BILITOT 0.2 0.2   ALKPHOS 84 85   AST 7* 12   ALT 10 13   ANIONGAP 11 8   EGFRNONAA >60.0 >60.0   , LDH: No results for input(s): LDHCSF, BFSOURCE in the last 48 hours. and LFTs:   Recent Labs   Lab 05/07/19  0537 05/08/19  0438   ALT 10 13   AST 7* 12   ALKPHOS 84 85   BILITOT 0.2 0.2   PROT 5.8* 6.8   ALBUMIN 2.0* 2.6*       Diagnostic Results:  I have reviewed and interpreted all pertinent imaging results/findings within the past 24 hours.     Abdomen U/S  FINDINGS:  Pancreas: The visualized portions of pancreas appear normal.    Aorta: No aneurysm within the visualized portions of the aorta.  Mid and distal aorta are obscured by overlying bowel gas.    Liver: 13.9 cm, normal in size. Homogeneous parenchymal echotexture. No focal lesions.    Gallbladder: Single mobile stone measuring 0.8 cm.  No wall thickening or pericholecystic fluid.  Negative sonographic Ramsay's sign.    Biliary system: 7 mm common bile duct.  Mild intrahepatic ductal dilatation.    Inferior vena cava: Normal in appearance.    Right kidney: 9.2 cm. No hydronephrosis.    Left kidney: 10.4 cm. No hydronephrosis.    Spleen: 10.2 x 3.6 cm.  Normal in size with homogeneous echotexture.    Miscellaneous: No ascites.      Impression       Cholelithiasis without sonographic evidence of acute cholecystitis.    Mild intrahepatic biliary ductal dilatation with prominence of the common bile duct measuring up to 7 mm.         Assessment/Plan:     COPD exacerbation  63-year-old male with a past medical history of COPD, hypertension, hyperlipidemia, CAD on  ASA/plavix, NSCLC (most recently on Durvalumab 1/18/19), who presented to the ED with worsening shortness of breath, wheezing,  and productive cough of brown-yellow sputum for the last 4 days. Differential diagnosis includes but is not limited to COPD exacerbation (productive cough, wheezing on exam, CXR with increased diffuse bilateral  interstitial and airspace opacities) vs hospital-acquired PNA (recent admission from 04/17 thru 04/18) vs progression of malignancy.     Plan:   - Levofloxacin 750 mg qd for 3-5 days (for possible underlying PNA)  - Patient was on a prednisone taper at home, being weaned katy from 70 mg qd for the treatment of suspected pneumonitis from the durvalumab that he had been on for a few months. After speaking with patient's oncologist, greyson Pathak will give steroid dosage used for pneumonitis treatment, start prednisone 80 mg PO BID (2 mg/kg),  - Duonebs q6h while awake  - Titrate continuous O2 by nasal cannula trevor maintain sats > 88% as tolerated, (on 3 L @ home)    Anemia in neoplastic disease  -Hgb 7.9, stable baseline Hgb of ~ 9.0,   - Currently no active signs of bleeding  - Will continue to monitor with daily CBC, transfuse to keep Hgb > 7.0    Lung cancer metastatic to bone  Will continue home pain regimen:  - MS contin 30mg BID   - Percocet 10-325mg  q4h PRN    Adenocarcinoma of left lung, stage 4  Oncologic History:       Oncologic History Non-small cell lung cancer, left diagnosed 06/2018  Metastatic disease to bone at presentation    Oncologic Treatment Stereotactic radiation to spinal lesion  Cisplatin/pemetrexed with concurrent radiation 08/08/2018 (completed 09/2018)  Durvalumab 10/2018, last given on 1/18/19  XRT L4-S3 30 Gy 12/2018    Pathology Poorly differentiated adenocarcinoma, EGFR wild type, No ALK, ROS-1 rearrangements, PD-L1 TPS 90%          PAD (peripheral artery disease)  - On cilostazol    Hyperlipidemia  Continue home statin.    HTN (hypertension)  - Hypertensive in the ED  - BP last night was in the 160s but back to normal in AM  - Will continue home metoprolol for now             Cesar Flores MD  Hematology/Oncology  [facility]      ATTENDING NOTE, ONCOLOGY INPATIENT TEAM    As above; events of last 24 hours noted.  Patient seen and examined, chart  reviewed.  Appears comfortable, in NAD.  Lungs still have diffuse wheezes.  Abdomen is soft, nontender.  Labs reviewed.  Hg is 7.9 gr/dl    PLAN  Repeat chest x ray in am.  Repeat CBC QOD.  Continue prednisone at 2 mg/kg for now;  Will also continue the H2 blockers and the insulin sliding scale.  Continue levaquin.  Continue nebulized treatments Q 6 hours.  Change percocet to Q 6 hours prn.  Continue enoxaparin until he is more ambulatory.  We will follow.  His questions were answered to his satisfaction.      Kevin Dwyer MD

## 2019-05-08 NOTE — PROGRESS NOTES
Rapid Response Respiratory Therapy Proactive Rounding Note      Time of visit: 0745    Code Status: Full Code   : 1955  Age: 63 y.o.  Weight:   Wt Readings from Last 1 Encounters:   19 83 kg (182 lb 15.7 oz)     Sex: male  Race: White   Bed: Audie/859A:   MRN: [id number]    SITUATION     Called to evaluate patient for: Proactive rounding    BACKGROUND     Patient has a past medical history of Abdominal aortic aneurysm (AAA) without rupture, Arthritis, Blood transfusion, COPD (chronic obstructive pulmonary disease), COPD exacerbation, Coronary artery disease, Dehydration, Encounter for blood transfusion, Hyperlipidemia, Hypertension, Lung cancer metastatic to bone, Occlusion and stenosis of carotid artery without mention of cerebral infarction, Pneumonitis, Primary malignant neoplasm of left lung, Screening for colon cancer, Syncope, Thrombocytopenia, and Weakness of both lower extremities.  Pulmonary Hx: COPD      ASSESSMENT     Vital Signs: /70 (Patient Position: Lying)   Pulse 101   Temp 97.7 °F (36.5 °C) (Oral)   Resp 20   Ht 6' (1.829 m)   Wt 83 kg (182 lb 15.7 oz)   SpO2 97%   BMI 24.82 kg/m²   Level of Consciousness: Level of Consciousness (AVPU): alert  Respiratory Effort: Respiratory Effort: Normal  Expansion/Accessory Muscle Usage: Expansion/Accessory Muscles/Retractions: expansion symmetric, no retractions, no use of accessory muscles  All Lung Field Breath Sounds: All Lung Fields Breath Sounds: wheezes, expiratory  FARA Breath Sounds: diminished  LLL Breath Sounds: coarse  RUL Breath Sounds: wheezes, expiratory  RML Breath Sounds: wheezes, expiratory  RLL Breath Sounds: coarse  Cough Type: Cough Type: good, nonproductive  Mobility at time of assessment: General Mobility: generalized weakness  O2 Device/Concentration: O2 Device (Oxygen Therapy): nasal cannula   Flow (L/min): 3    Current Respiratory Care Orders:Oxygen continuous and Q6 aerosol txs    Ambu at bedside: Ambu bag  with the patient?: Yes, Adult Ambu    INTERVENTIONS/RECOMMENDATIONS   ?  Continue current respiratory interventions        FOLLOW-UP     Please call back the Rapid Response RT, Janet Pabon, CRT at x [phone] for any questions or concerns.

## 2019-05-08 NOTE — ASSESSMENT & PLAN NOTE
- Hypertensive in the ED  - BP last night was in the 160s but back to normal in AM  - Will continue home metoprolol for now

## 2019-05-08 NOTE — PLAN OF CARE
Problem: Adult Inpatient Plan of Care  Goal: Plan of Care Review  Outcome: Ongoing (interventions implemented as appropriate)  Plan of care reviewed with pt at the beginning of shift. The patient is complaining of pain. Requesting pain medication frequently. Pt asked about death and dying, pastoral care offered. Pt refused, stating he is not ready to speak to a matilde yet. Hypertensive during the morning. Resident contacted. Morning labs ordered stat. No other interventions performed. VSS otherwise. Consistently asking for food and milk. Reminded several times to advise nurse prior to eating for glucose check. 1 unit administered during the night. Pt began to complain of chest pain and shortness of breath near the end of shift. Dr. Mcdonough evaluated the PT. Cough syrup ordered and breathing treatment requested early. Contcted respiratory, stated they are coming up now to perform treatment. Bed in low locked position. Call bell within reach. Will continue to monitor.

## 2019-05-08 NOTE — ASSESSMENT & PLAN NOTE
-Hgb 7.9, stable baseline Hgb of ~ 9.0,   - Currently no active signs of bleeding  - Will continue to monitor with daily CBC, transfuse to keep Hgb > 7.0

## 2019-05-09 PROBLEM — I48.91 ATRIAL FIBRILLATION WITH RVR: Status: ACTIVE | Noted: 2019-05-09

## 2019-05-09 LAB
ALBUMIN SERPL BCP-MCNC: 2.7 G/DL (ref 3.5–5.2)
ALP SERPL-CCNC: 88 U/L (ref 55–135)
ALT SERPL W/O P-5'-P-CCNC: 15 U/L (ref 10–44)
ANION GAP SERPL CALC-SCNC: 8 MMOL/L (ref 8–16)
ASCENDING AORTA: 3.44 CM
AST SERPL-CCNC: 14 U/L (ref 10–40)
AV INDEX (PROSTH): 0.59
AV MEAN GRADIENT: 7 MMHG
AV PEAK GRADIENT: 13.69 MMHG
AV VALVE AREA: 2.46 CM2
AV VELOCITY RATIO: 0.72
BILIRUB SERPL-MCNC: 0.2 MG/DL (ref 0.1–1)
BSA FOR ECHO PROCEDURE: 2.05 M2
BUN SERPL-MCNC: 21 MG/DL (ref 8–23)
CALCIUM SERPL-MCNC: 9.2 MG/DL (ref 8.7–10.5)
CHLORIDE SERPL-SCNC: 90 MMOL/L (ref 95–110)
CO2 SERPL-SCNC: 39 MMOL/L (ref 23–29)
CREAT SERPL-MCNC: 0.9 MG/DL (ref 0.5–1.4)
CV ECHO LV RWT: 0.41 CM
DOP CALC AO PEAK VEL: 1.85 M/S
DOP CALC AO VTI: 37.17 CM
DOP CALC LVOT AREA: 4.19 CM2
DOP CALC LVOT DIAMETER: 2.31 CM
DOP CALC LVOT PEAK VEL: 1.34 M/S
DOP CALC LVOT STROKE VOLUME: 91.57 CM3
DOP CALCLVOT PEAK VEL VTI: 21.86 CM
E WAVE DECELERATION TIME: 180.39 MSEC
E/A RATIO: 0.85
E/E' RATIO: 10.4
ECHO LV POSTERIOR WALL: 0.96 CM (ref 0.6–1.1)
EST. GFR  (AFRICAN AMERICAN): >60 ML/MIN/1.73 M^2
EST. GFR  (NON AFRICAN AMERICAN): >60 ML/MIN/1.73 M^2
FRACTIONAL SHORTENING: 28 % (ref 28–44)
GLUCOSE SERPL-MCNC: 190 MG/DL (ref 70–110)
INTERVENTRICULAR SEPTUM: 0.99 CM (ref 0.6–1.1)
LA MAJOR: 5.23 CM
LA MINOR: 5.3 CM
LA WIDTH: 3.85 CM
LEFT ATRIUM SIZE: 3.53 CM
LEFT ATRIUM VOLUME INDEX: 29.6 ML/M2
LEFT ATRIUM VOLUME: 60.82 CM3
LEFT INTERNAL DIMENSION IN SYSTOLE: 3.37 CM (ref 2.1–4)
LEFT VENTRICLE DIASTOLIC VOLUME INDEX: 49.72 ML/M2
LEFT VENTRICLE DIASTOLIC VOLUME: 102 ML
LEFT VENTRICLE MASS INDEX: 77.2 G/M2
LEFT VENTRICLE SYSTOLIC VOLUME INDEX: 22.6 ML/M2
LEFT VENTRICLE SYSTOLIC VOLUME: 46.32 ML
LEFT VENTRICULAR INTERNAL DIMENSION IN DIASTOLE: 4.69 CM (ref 3.5–6)
LEFT VENTRICULAR MASS: 158.34 G
LV LATERAL E/E' RATIO: 10.4
LV SEPTAL E/E' RATIO: 10.4
MAGNESIUM SERPL-MCNC: 1.6 MG/DL (ref 1.6–2.6)
MV PEAK A VEL: 1.23 M/S
MV PEAK E VEL: 1.04 M/S
PHOSPHATE SERPL-MCNC: 2.4 MG/DL (ref 2.7–4.5)
PISA TR MAX VEL: 2.6 M/S
POCT GLUCOSE: 184 MG/DL (ref 70–110)
POCT GLUCOSE: 189 MG/DL (ref 70–110)
POCT GLUCOSE: 206 MG/DL (ref 70–110)
POTASSIUM SERPL-SCNC: 3.9 MMOL/L (ref 3.5–5.1)
PROT SERPL-MCNC: 6.9 G/DL (ref 6–8.4)
RA MAJOR: 4.49 CM
RA PRESSURE: 3 MMHG
RA WIDTH: 3.35 CM
RIGHT VENTRICULAR END-DIASTOLIC DIMENSION: 3.84 CM
RV TISSUE DOPPLER FREE WALL SYSTOLIC VELOCITY 1 (APICAL 4 CHAMBER VIEW): 11 M/S
SINUS: 3.86 CM
SODIUM SERPL-SCNC: 137 MMOL/L (ref 136–145)
STJ: 2.92 CM
TDI LATERAL: 0.1
TDI SEPTAL: 0.1
TDI: 0.1
TR MAX PG: 27.04 MMHG
TRICUSPID ANNULAR PLANE SYSTOLIC EXCURSION: 1.73 CM
TV REST PULMONARY ARTERY PRESSURE: 30 MMHG

## 2019-05-09 PROCEDURE — 93010 ELECTROCARDIOGRAM REPORT: CPT | Mod: ,,, | Performed by: INTERNAL MEDICINE

## 2019-05-09 PROCEDURE — 93010 EKG 12-LEAD: ICD-10-PCS | Mod: ,,, | Performed by: INTERNAL MEDICINE

## 2019-05-09 PROCEDURE — 25000242 PHARM REV CODE 250 ALT 637 W/ HCPCS: Performed by: PEDIATRICS

## 2019-05-09 PROCEDURE — 25000003 PHARM REV CODE 250: Performed by: STUDENT IN AN ORGANIZED HEALTH CARE EDUCATION/TRAINING PROGRAM

## 2019-05-09 PROCEDURE — 27000221 HC OXYGEN, UP TO 24 HOURS

## 2019-05-09 PROCEDURE — 99233 SBSQ HOSP IP/OBS HIGH 50: CPT | Mod: GC,,, | Performed by: INTERNAL MEDICINE

## 2019-05-09 PROCEDURE — 80053 COMPREHEN METABOLIC PANEL: CPT

## 2019-05-09 PROCEDURE — 20600001 HC STEP DOWN PRIVATE ROOM

## 2019-05-09 PROCEDURE — 93005 ELECTROCARDIOGRAM TRACING: CPT

## 2019-05-09 PROCEDURE — 25000003 PHARM REV CODE 250: Performed by: PEDIATRICS

## 2019-05-09 PROCEDURE — 99233 SBSQ HOSP IP/OBS HIGH 50: CPT | Mod: ,,, | Performed by: INTERNAL MEDICINE

## 2019-05-09 PROCEDURE — 94640 AIRWAY INHALATION TREATMENT: CPT

## 2019-05-09 PROCEDURE — 63600175 PHARM REV CODE 636 W HCPCS: Performed by: STUDENT IN AN ORGANIZED HEALTH CARE EDUCATION/TRAINING PROGRAM

## 2019-05-09 PROCEDURE — 99233 PR SUBSEQUENT HOSPITAL CARE,LEVL III: ICD-10-PCS | Mod: GC,,, | Performed by: INTERNAL MEDICINE

## 2019-05-09 PROCEDURE — 83735 ASSAY OF MAGNESIUM: CPT

## 2019-05-09 PROCEDURE — 99233 PR SUBSEQUENT HOSPITAL CARE,LEVL III: ICD-10-PCS | Mod: ,,, | Performed by: INTERNAL MEDICINE

## 2019-05-09 PROCEDURE — 25000003 PHARM REV CODE 250: Performed by: INTERNAL MEDICINE

## 2019-05-09 PROCEDURE — 84100 ASSAY OF PHOSPHORUS: CPT

## 2019-05-09 PROCEDURE — 36415 COLL VENOUS BLD VENIPUNCTURE: CPT

## 2019-05-09 PROCEDURE — 94761 N-INVAS EAR/PLS OXIMETRY MLT: CPT

## 2019-05-09 RX ORDER — METOPROLOL TARTRATE 1 MG/ML
5 INJECTION, SOLUTION INTRAVENOUS ONCE
Status: COMPLETED | OUTPATIENT
Start: 2019-05-09 | End: 2019-05-09

## 2019-05-09 RX ORDER — PREDNISONE 20 MG/1
60 TABLET ORAL DAILY
Status: DISCONTINUED | OUTPATIENT
Start: 2019-05-10 | End: 2019-05-13

## 2019-05-09 RX ORDER — METOPROLOL TARTRATE 1 MG/ML
5 INJECTION, SOLUTION INTRAVENOUS ONCE
Status: DISCONTINUED | OUTPATIENT
Start: 2019-05-09 | End: 2019-05-09

## 2019-05-09 RX ORDER — SODIUM,POTASSIUM PHOSPHATES 280-250MG
2 POWDER IN PACKET (EA) ORAL ONCE
Status: COMPLETED | OUTPATIENT
Start: 2019-05-09 | End: 2019-05-09

## 2019-05-09 RX ORDER — METOPROLOL TARTRATE 1 MG/ML
5 INJECTION, SOLUTION INTRAVENOUS ONCE AS NEEDED
Status: DISCONTINUED | OUTPATIENT
Start: 2019-05-09 | End: 2019-05-11

## 2019-05-09 RX ORDER — POTASSIUM CHLORIDE 20 MEQ/15ML
40 SOLUTION ORAL ONCE
Status: COMPLETED | OUTPATIENT
Start: 2019-05-09 | End: 2019-05-09

## 2019-05-09 RX ORDER — VERAPAMIL HYDROCHLORIDE 80 MG/1
80 TABLET ORAL 3 TIMES DAILY
Status: DISCONTINUED | OUTPATIENT
Start: 2019-05-09 | End: 2019-05-13 | Stop reason: HOSPADM

## 2019-05-09 RX ORDER — RAMELTEON 8 MG/1
8 TABLET ORAL NIGHTLY PRN
Status: DISCONTINUED | OUTPATIENT
Start: 2019-05-09 | End: 2019-05-13 | Stop reason: HOSPADM

## 2019-05-09 RX ORDER — MAGNESIUM SULFATE HEPTAHYDRATE 40 MG/ML
2 INJECTION, SOLUTION INTRAVENOUS ONCE
Status: COMPLETED | OUTPATIENT
Start: 2019-05-09 | End: 2019-05-09

## 2019-05-09 RX ORDER — TIOTROPIUM BROMIDE 18 UG/1
1 CAPSULE ORAL; RESPIRATORY (INHALATION) DAILY
Status: DISCONTINUED | OUTPATIENT
Start: 2019-05-10 | End: 2019-05-13

## 2019-05-09 RX ORDER — VORICONAZOLE 50 MG/1
200 TABLET, FILM COATED ORAL 2 TIMES DAILY
Status: DISCONTINUED | OUTPATIENT
Start: 2019-05-09 | End: 2019-05-09

## 2019-05-09 RX ORDER — IPRATROPIUM BROMIDE AND ALBUTEROL SULFATE 2.5; .5 MG/3ML; MG/3ML
SOLUTION RESPIRATORY (INHALATION)
Status: DISPENSED
Start: 2019-05-09 | End: 2019-05-09

## 2019-05-09 RX ADMIN — IPRATROPIUM BROMIDE AND ALBUTEROL SULFATE 3 ML: .5; 3 SOLUTION RESPIRATORY (INHALATION) at 07:05

## 2019-05-09 RX ADMIN — SENNOSIDES,DOCUSATE SODIUM 1 TABLET: 8.6; 5 TABLET, FILM COATED ORAL at 08:05

## 2019-05-09 RX ADMIN — ASPIRIN 81 MG: 81 TABLET, COATED ORAL at 08:05

## 2019-05-09 RX ADMIN — INSULIN ASPART 4 UNITS: 100 INJECTION, SOLUTION INTRAVENOUS; SUBCUTANEOUS at 05:05

## 2019-05-09 RX ADMIN — CLOPIDOGREL 75 MG: 75 TABLET, FILM COATED ORAL at 08:05

## 2019-05-09 RX ADMIN — METOPROLOL TARTRATE 5 MG: 1 INJECTION, SOLUTION INTRAVENOUS at 06:05

## 2019-05-09 RX ADMIN — MAGNESIUM SULFATE IN WATER 2 G: 40 INJECTION, SOLUTION INTRAVENOUS at 05:05

## 2019-05-09 RX ADMIN — ATORVASTATIN CALCIUM 80 MG: 20 TABLET, FILM COATED ORAL at 08:05

## 2019-05-09 RX ADMIN — CILOSTAZOL 100 MG: 100 TABLET ORAL at 09:05

## 2019-05-09 RX ADMIN — OXYCODONE AND ACETAMINOPHEN 1 TABLET: 10; 325 TABLET ORAL at 09:05

## 2019-05-09 RX ADMIN — OXYCODONE AND ACETAMINOPHEN 1 TABLET: 10; 325 TABLET ORAL at 07:05

## 2019-05-09 RX ADMIN — APIXABAN 5 MG: 2.5 TABLET, FILM COATED ORAL at 09:05

## 2019-05-09 RX ADMIN — APIXABAN 5 MG: 2.5 TABLET, FILM COATED ORAL at 10:05

## 2019-05-09 RX ADMIN — VERAPAMIL HYDROCHLORIDE 80 MG: 80 TABLET, FILM COATED ORAL at 11:05

## 2019-05-09 RX ADMIN — MORPHINE SULFATE 30 MG: 15 TABLET, EXTENDED RELEASE ORAL at 09:05

## 2019-05-09 RX ADMIN — LEVOTHYROXINE SODIUM 75 MCG: 25 TABLET ORAL at 05:05

## 2019-05-09 RX ADMIN — METOPROLOL TARTRATE 5 MG: 1 INJECTION, SOLUTION INTRAVENOUS at 05:05

## 2019-05-09 RX ADMIN — OXYCODONE AND ACETAMINOPHEN 1 TABLET: 10; 325 TABLET ORAL at 03:05

## 2019-05-09 RX ADMIN — PREDNISONE 80 MG: 20 TABLET ORAL at 08:05

## 2019-05-09 RX ADMIN — IPRATROPIUM BROMIDE AND ALBUTEROL SULFATE 3 ML: .5; 3 SOLUTION RESPIRATORY (INHALATION) at 02:05

## 2019-05-09 RX ADMIN — FAMOTIDINE 20 MG: 20 TABLET, FILM COATED ORAL at 09:05

## 2019-05-09 RX ADMIN — TAMSULOSIN HYDROCHLORIDE 0.4 MG: 0.4 CAPSULE ORAL at 08:05

## 2019-05-09 RX ADMIN — MORPHINE SULFATE 30 MG: 15 TABLET, EXTENDED RELEASE ORAL at 08:05

## 2019-05-09 RX ADMIN — DEXTROSE 500 MG: 50 INJECTION, SOLUTION INTRAVENOUS at 06:05

## 2019-05-09 RX ADMIN — LEVOFLOXACIN 750 MG: 750 TABLET, FILM COATED ORAL at 08:05

## 2019-05-09 RX ADMIN — RAMELTEON 8 MG: 8 TABLET, FILM COATED ORAL at 09:05

## 2019-05-09 RX ADMIN — INSULIN ASPART 1 UNITS: 100 INJECTION, SOLUTION INTRAVENOUS; SUBCUTANEOUS at 12:05

## 2019-05-09 RX ADMIN — POTASSIUM CHLORIDE 40 MEQ: 20 SOLUTION ORAL at 05:05

## 2019-05-09 RX ADMIN — POTASSIUM & SODIUM PHOSPHATES POWDER PACK 280-160-250 MG 2 PACKET: 280-160-250 PACK at 05:05

## 2019-05-09 RX ADMIN — IPRATROPIUM BROMIDE AND ALBUTEROL SULFATE 3 ML: .5; 3 SOLUTION RESPIRATORY (INHALATION) at 12:05

## 2019-05-09 RX ADMIN — FAMOTIDINE 20 MG: 20 TABLET, FILM COATED ORAL at 08:05

## 2019-05-09 RX ADMIN — FINASTERIDE 5 MG: 5 TABLET, FILM COATED ORAL at 08:05

## 2019-05-09 RX ADMIN — METOPROLOL SUCCINATE 50 MG: 50 TABLET, EXTENDED RELEASE ORAL at 06:05

## 2019-05-09 NOTE — PLAN OF CARE
Problem: Adult Inpatient Plan of Care  Goal: Plan of Care Review  Outcome: Ongoing (interventions implemented as appropriate)  Plan of care reviewed with the patient at the beginning of shift. The patient denied complaints throughout the night. Pt did not sleep during the night, watched tv. The patient remained stable until 0427 when he went into afib with rvr. Dr. Hill was contacted. The patient was administered electrolyte replacements. Potassium, mag, and phos. IV lopressor ordered. 5mg x2. Rate decreased from sustained 170's to 120-130s. PO lopressor administered early. Pt is denying SOB and CP at this time. All other vitals are stable. Bed in low locked position. Call bell within reach. Will continue to monitor.

## 2019-05-09 NOTE — SUBJECTIVE & OBJECTIVE
Past Medical History:   Diagnosis Date    Abdominal aortic aneurysm (AAA) without rupture 3/5/2018    Arthritis     Blood transfusion     COPD (chronic obstructive pulmonary disease)     COPD exacerbation 3/24/2019    Coronary artery disease     Dehydration 8/15/2018    Encounter for blood transfusion     Hyperlipidemia 11/7/2013    Hypertension     Lung cancer metastatic to bone 7/18/2018    Occlusion and stenosis of carotid artery without mention of cerebral infarction 1/12/2014    Pneumonitis 2/13/2019    Primary malignant neoplasm of left lung 7/18/2018    Screening for colon cancer 6/1/2015    Syncope 1/13/2014    Thrombocytopenia 9/19/2018    Weakness of both lower extremities 1/14/2019       Past Surgical History:   Procedure Laterality Date    ARCH & 4 VESSEL STUDY Bilateral 5/27/2014    Performed by [facility] at [facility] CATH LAB    BACK SURGERY      BRONCHOSCOPY,FIBEROPTIC N/A 6/26/2018    Performed by Liana Serrano [facility] at [facility] OR 2ND FLR    carotid endarectomy      CATHETERIZATION, HEART, LEFT Left 11/8/2013    Performed by [facility] CATH LAB    COLONOSCOPY N/A 6/1/2015    [facility] at [facility] ENDO    CORONARY ANGIOPLASTY      CORONARY ARTERY BYPASS GRAFT      ESOPHAGOGASTRODUODENOSCOPY (EGD) N/A 8/16/2018    Performed by Yohannes Osuna MD at [facility] ENDO (2ND FLR)    HEART CATH-LEFT N/A 5/27/2014    Performed by Mekhi Martin [facility] CATH LAB    INSERTION, STENT, CORONARY ARTERY N/A 12/20/2013    Performed by Mekhi Martin [facility] at [facility] CATH LAB    PTA, PERIPHERAL BLOOD VESSEL Left 3/11/2014    Performed by Mekhi Martin [facility] CATH LAB    PTA, PERIPHERAL BLOOD VESSEL N/A 2/18/2014    Performed by Mekhi Martin [facility] at [facility] CATH LAB    SKIN BIOPSY      ULTRASOUND, ENDOBRONCHIAL N/A 6/26/2018    Performed by Liana Serrano MD at [facility] OR 2ND FLR       Review of patient's allergies indicates:  No Known  Allergies    Family History     Problem Relation (Age of Onset)    Breast cancer Mother    Cancer Mother    Heart attack Father    Heart disease Father    Heart failure Father    Hypertension Father    No Known Problems Sister, Brother, Maternal Grandmother, Maternal Grandfather, Paternal Grandmother, Paternal Grandfather, Maternal Aunt, Maternal Uncle, Paternal Aunt, Paternal Uncle        Tobacco Use    Smoking status: Former Smoker     Packs/day: 1.50     Years: 40.00     Pack years: 60.00     Last attempt to quit: 10/11/2011     Years since quittin.5    Smokeless tobacco: Never Used   Substance and Sexual Activity    Alcohol use: Yes     Alcohol/week: 1.8 oz     Types: 3 Cans of beer per week     Comment: 3 cans beer every day or every other day    Drug use: Yes     Types: Marijuana    Sexual activity: Not Currently         Review of Systems   Constitutional: Negative for fever.   HENT: Negative for trouble swallowing.    Eyes: Negative for visual disturbance.   Respiratory: Positive for cough, shortness of breath and wheezing.    Cardiovascular: Negative for chest pain.   Gastrointestinal: Negative for abdominal pain and vomiting.   Genitourinary: Negative for difficulty urinating.   Musculoskeletal: Negative for arthralgias.   Skin: Negative for rash.   Allergic/Immunologic: Positive for immunocompromised state.   Neurological: Negative for syncope.   Hematological: Does not bruise/bleed easily.     Objective:     Vital Signs (Most Recent):  Temp: 97.5 °F (36.4 °C) (19 1252)  Pulse: 94 (19 1256)  Resp: 16 (19 1256)  BP: 119/81 (19 1252)  SpO2: 95 % (19 1256) Vital Signs (24h Range):  Temp:  [97.1 °F (36.2 °C)-98.1 °F (36.7 °C)] 97.5 °F (36.4 °C)  Pulse:  [] 94  Resp:  [16-22] 16  SpO2:  [92 %-99 %] 95 %  BP: ()/(59-95) 119/81     Weight: 83 kg (182 lb 15.7 oz)  Body mass index is 24.82 kg/m².      Intake/Output Summary (Last 24 hours) at 2019 1416  Last  data filed at 5/9/2019 0936  Gross per 24 hour   Intake 470 ml   Output 400 ml   Net 70 ml       Physical Exam   Constitutional: He is oriented to person, place, and time. No distress.   HENT:   Head: Atraumatic.   Eyes: Pupils are equal, round, and reactive to light. EOM are normal.   Neck: No JVD present.   Cardiovascular: Normal rate and regular rhythm.   No murmur heard.  Pulmonary/Chest: Effort normal. He has wheezes. He has rales.   Abdominal: Soft. Bowel sounds are normal. There is no tenderness.   Musculoskeletal: Normal range of motion. He exhibits no edema.   Neurological: He is alert and oriented to person, place, and time. No cranial nerve deficit. Coordination normal.   Skin: Skin is warm and dry. No rash noted.   Psychiatric: He has a normal mood and affect.   Nursing note and vitals reviewed.      Vents:  Oxygen Concentration (%): 32 (05/08/19 1959)    Lines/Drains/Airways     Peripheral Intravenous Line                 Peripheral IV - Single Lumen 04/15/19 1850 20 G Right Antecubital 23 days                Significant Labs:    CBC/Anemia Profile:  Recent Labs   Lab 05/08/19 0438   WBC 6.29   HGB 7.9*   HCT 25.6*   *   MCV 99*   RDW 18.1*        Chemistries:  Recent Labs   Lab 05/08/19 0438 05/09/19  0521    137   K 3.5 3.9   CL 91* 90*   CO2 37* 39*   BUN 19 21   CREATININE 0.9 0.9   CALCIUM 9.1 9.2   ALBUMIN 2.6* 2.7*   PROT 6.8 6.9   BILITOT 0.2 0.2   ALKPHOS 85 88   ALT 13 15   AST 12 14   MG 1.7 1.6   PHOS 2.0* 2.4*       BMP:   Recent Labs   Lab 05/09/19  0521   *      K 3.9   CL 90*   CO2 39*   BUN 21   CREATININE 0.9   CALCIUM 9.2   MG 1.6     CMP:   Recent Labs   Lab 05/08/19 0438 05/09/19  0521    137   K 3.5 3.9   CL 91* 90*   CO2 37* 39*   * 190*   BUN 19 21   CREATININE 0.9 0.9   CALCIUM 9.1 9.2   PROT 6.8 6.9   ALBUMIN 2.6* 2.7*   BILITOT 0.2 0.2   ALKPHOS 85 88   AST 12 14   ALT 13 15   ANIONGAP 8 8   EGFRNONAA >60.0 >60.0     All pertinent labs  within the past 24 hours have been reviewed.    Significant Imaging:   I have reviewed and interpreted all pertinent imaging results/findings within the past 24 hours.

## 2019-05-09 NOTE — HPI
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 Pulmonology consulted for optimization of COPD management.    He was initially diagnosed with poorly differentiated adenocarcinoma with spine metastasis on 6/2018, by Dr. Serrano with diagnostic bronchoscopy with EBUS 6/20/2018. He received radiation of the spine in addition to chemotherapy w/ cisplatan/pemetrexed 8/2018. Then received Darvolumab x 5 cycles started 10/2018, last dose 1/2019.     Following that, he was admitted 2 times for respiratory failure presumed to be COPD exacerbation vs pneumonitis. He was started on prednisone 80mg 2/3/2019 and was tapered to 60mg daily. He was seen by Dr. Serrano on 4/1/2019 after being referred by Dr. Anton. At this point, both Woody Serrano and Fabian suspected that he was not worsening from pneumonitis, but likely from progression of the cancer itself. There was a CTA done in clinic that was negative for CTEPH, further supporting that hypothesis. [facility].     On 4/15/19 he was once again admitted for respiratory failure. He received a bronchoscopy 4/20/19 and was discharged with pending pathology/cultures. On 4/20/19, he was noted to have cultures positive for Aspergillus fumigatus. There is documentation that the patient was unable to be contacted. It is unclear if the patient received treatment or followed up with ID as pulmonology was requesting.     Since his current hospitalization, the patient reports his breathing has remained mostly unchanged, is on his baseline O2 requirements 2-3L. He reports that he always is SOB, and it doesn't take much (standing up, walking to the bathrom) to cause severe SOB. He is on 2-3 L of oxygen at home.He initially presented with O2 sats of 88%. He was treated with duo-nebs and prednisone 80 mg BID, and  leveofloxacin x 5 days. Overnight 5/8/19 he develoepd Afib with RVR to the 170's (without symptoms or hemodynamic instability) and improved to 120-130's with IV metoprolol. He is on scheduled lopressor currently.     Pulmonary Function Tests:  4/1/2019: FEV1 48%, FEV1/FVC 53%, DLCO 37%  6/2018: FEV 51%, FEV1/FVC 60%, DLCO 100%.

## 2019-05-09 NOTE — ASSESSMENT & PLAN NOTE
- New onset as of this morning, pt does not recall having irregular rhythm any time prior, no complaints of CP or palpitations during episode.  - Likely etiology is 2/2 albuterol from nebulized breathing treatments, achieved rate control with metoprolol 5 mg IV x2  - Switched patient from Lovenox to Eliquis 5 mg BID  - Obtaining 2D echo  - Cardiology consulted, appreciate their recs

## 2019-05-09 NOTE — SUBJECTIVE & OBJECTIVE
Interval History:  Patient observed to have new-onset atrial fibrillation with RVR this AM with HR in 140's, was given metoprolol 5 mg IV x2 to attempt rate control. Seem to respond after a few hours with a return of HR into 's.  Pt reports no improvement in breathing, actually felt somewhat worse this AM with ambulation. Remains on home oxygen level of 2-3 L , SpO2 89-94%.   Remains afebrile, hemodynamically stable, in no acute distress.   Scattered wheezing still heard on pulmonary exam.   Will continue treating with nebulized breathing treatments, abx, and steroids @ 2 mg/kg for suspected pneumonitis..     Oncology Treatment Plan:   OP DOCETAXEL (75 MG/M2) Q3W    Medications:  Continuous Infusions:  Scheduled Meds:   albuterol-ipratropium  3 mL Nebulization Q6H    apixaban  5 mg Oral BID    aspirin  81 mg Oral Daily    atorvastatin  80 mg Oral Daily    cilostazol  100 mg Oral BID    clopidogrel  75 mg Oral Daily    famotidine  20 mg Oral BID    finasteride  5 mg Oral Daily    fluticasone-umeclidin-vilanter  1 puff Inhalation Daily    levoFLOXacin  750 mg Oral Daily    levothyroxine  75 mcg Oral Before breakfast    metoprolol succinate  50 mg Oral Daily    morphine  30 mg Oral Q12H    predniSONE  80 mg Oral BID    senna-docusate 8.6-50 mg  1 tablet Oral Daily    tamsulosin  0.4 mg Oral Daily     PRN Meds:acetaminophen, benzonatate, dextrose 50%, dextrose 50%, glucagon (human recombinant), glucose, glucose, guaifenesin 100 mg/5 ml, insulin aspart U-100, metoprolol, ondansetron, oxyCODONE-acetaminophen, promethazine, sodium chloride 0.9%     Review of Systems   Constitutional: Positive for fatigue. Negative for chills and fever.   HENT: Negative for congestion, rhinorrhea and sore throat.    Eyes: Negative for pain.   Respiratory: Positive for cough, chest tightness, shortness of breath and wheezing.    Cardiovascular: Negative for chest pain, palpitations and leg swelling.   Gastrointestinal:  Positive for abdominal pain and nausea. Negative for abdominal distention, diarrhea and vomiting.   Genitourinary: Negative for difficulty urinating and dysuria.   Musculoskeletal: Negative for arthralgias and myalgias.   Skin: Negative for color change.   Neurological: Negative for dizziness, weakness, light-headedness and headaches.   Psychiatric/Behavioral: Negative for agitation and confusion.     Objective:     Vital Signs (Most Recent):  Temp: 97.5 °F (36.4 °C) (05/09/19 1252)  Pulse: 94 (05/09/19 1256)  Resp: 16 (05/09/19 1256)  BP: 119/81 (05/09/19 1252)  SpO2: 95 % (05/09/19 1256) Vital Signs (24h Range):  Temp:  [97.1 °F (36.2 °C)-98.1 °F (36.7 °C)] 97.5 °F (36.4 °C)  Pulse:  [] 94  Resp:  [16-22] 16  SpO2:  [92 %-99 %] 95 %  BP: ()/(59-95) 119/81     Weight: 83 kg (182 lb 15.7 oz)  Body mass index is 24.82 kg/m².  Body surface area is 2.05 meters squared.      Intake/Output Summary (Last 24 hours) at 5/9/2019 1448  Last data filed at 5/9/2019 0936  Gross per 24 hour   Intake 470 ml   Output 400 ml   Net 70 ml       Physical Exam   Constitutional: He is oriented to person, place, and time. He appears well-developed and well-nourished. No distress.   HENT:   Head: Normocephalic and atraumatic.   Mouth/Throat: Oropharynx is clear and moist.   Eyes: Pupils are equal, round, and reactive to light. EOM are normal.   Neck: Normal range of motion. Neck supple. No JVD present.   Cardiovascular: Normal rate, regular rhythm, normal heart sounds and intact distal pulses.   No murmur heard.  Pulmonary/Chest: Tachypnea noted. No respiratory distress. He has wheezes. He has rhonchi.   Scattered wheezes throughout b/l lung fields   Abdominal: Soft. Bowel sounds are normal. He exhibits no distension. There is no tenderness.   Musculoskeletal: Normal range of motion. He exhibits no edema.   Neurological: He is alert and oriented to person, place, and time. No cranial nerve deficit.   Skin: Skin is warm and dry.  Capillary refill takes less than 2 seconds.   Psychiatric: He has a normal mood and affect.       Significant Labs:   CBC:   Recent Labs   Lab 05/08/19 0438   WBC 6.29   HGB 7.9*   HCT 25.6*   *   , CMP:   Recent Labs   Lab 05/08/19 0438 05/09/19 0521    137   K 3.5 3.9   CL 91* 90*   CO2 37* 39*   * 190*   BUN 19 21   CREATININE 0.9 0.9   CALCIUM 9.1 9.2   PROT 6.8 6.9   ALBUMIN 2.6* 2.7*   BILITOT 0.2 0.2   ALKPHOS 85 88   AST 12 14   ALT 13 15   ANIONGAP 8 8   EGFRNONAA >60.0 >60.0   , LDH: No results for input(s): LDHCSF, BFSOURCE in the last 48 hours. and LFTs:   Recent Labs   Lab 05/08/19 0438 05/09/19 0521   ALT 13 15   AST 12 14   ALKPHOS 85 88   BILITOT 0.2 0.2   PROT 6.8 6.9   ALBUMIN 2.6* 2.7*       Diagnostic Results:  I have reviewed and interpreted all pertinent imaging results/findings within the past 24 hours.     Abdomen U/S  FINDINGS:  Pancreas: The visualized portions of pancreas appear normal.    Aorta: No aneurysm within the visualized portions of the aorta.  Mid and distal aorta are obscured by overlying bowel gas.    Liver: 13.9 cm, normal in size. Homogeneous parenchymal echotexture. No focal lesions.    Gallbladder: Single mobile stone measuring 0.8 cm.  No wall thickening or pericholecystic fluid.  Negative sonographic Ramsay's sign.    Biliary system: 7 mm common bile duct.  Mild intrahepatic ductal dilatation.    Inferior vena cava: Normal in appearance.    Right kidney: 9.2 cm. No hydronephrosis.    Left kidney: 10.4 cm. No hydronephrosis.    Spleen: 10.2 x 3.6 cm.  Normal in size with homogeneous echotexture.    Miscellaneous: No ascites.      Impression       Cholelithiasis without sonographic evidence of acute cholecystitis.    Mild intrahepatic biliary ductal dilatation with prominence of the common bile duct measuring up to 7 mm.

## 2019-05-09 NOTE — CARE UPDATE
Rapid Response Respiratory Therapy Proactive Rounding Note      Time of visit: 905    Code Status: Full Code   : 1955  Age: 63 y.o.  Weight:   Wt Readings from Last 1 Encounters:   19 83 kg (182 lb 15.7 oz)     Sex: male  Race: White   Bed: 339/859A:   MRN: [id number]    SITUATION     Called to evaluate patient for: AI alert for increase RR and HR and decreased SpO2.  Pt SOB upon enterin groom and on @L.  I increased to 3L since he is on that at home.  Has a very productive cough and inspiratory and expiratory wheezing.  Pt in a lot of pain which is contributing to his SOB.  Has Q6H Duonebs and daily MDI.      BACKGROUND     Patient has a past medical history of Abdominal aortic aneurysm (AAA) without rupture, Arthritis, Blood transfusion, COPD (chronic obstructive pulmonary disease), COPD exacerbation, Coronary artery disease, Dehydration, Encounter for blood transfusion, Hyperlipidemia, Hypertension, Lung cancer metastatic to bone, Occlusion and stenosis of carotid artery without mention of cerebral infarction, Pneumonitis, Primary malignant neoplasm of left lung, Screening for colon cancer, Syncope, Thrombocytopenia, and Weakness of both lower extremities.  Pulmonary Hx: COPD lung cancer      ASSESSMENT     Vital Signs: /89 (BP Location: Left arm, Patient Position: Lying)   Pulse 93   Temp 98.1 °F (36.7 °C)   Resp (!) 22   Ht 6' (1.829 m)   Wt 83 kg (182 lb 15.7 oz)   SpO2 (!) 92%   BMI 24.82 kg/m²   Level of Consciousness: Level of Consciousness (AVPU): alert  Respiratory Effort: Respiratory Effort: On exertion, Short of breath  Expansion/Accessory Muscle Usage: Expansion/Accessory Muscles/Retractions: expansion symmetric, no retractions, no use of accessory muscles  All Lung Field Breath Sounds: All Lung Fields Breath Sounds: wheezes, expiratory, wheezes, inspiratory  FARA Breath Sounds: diminished  LLL Breath Sounds: diminished  RUL Breath Sounds: clear, diminished  RML Breath  Sounds: clear, diminished  RLL Breath Sounds: clear, diminished  Cough Type: Cough Type: good, productive  Mobility at time of assessment: General Mobility: generalized weakness  O2 Device/Concentration: O2 Device (Oxygen Therapy): nasal cannula   Flow (L/min): 3 Oxygen Concentration (%): 32  Most recent blood gas: No results for input(s): PH, PCO2, PO2, HCO3, POCSATURATED, BE in the last 72 hours.   Current Respiratory Orders: O2 and Q6H breathing treatments  Ambu at bedside: Ambu bag with the patient?: Yes, Adult Ambu    INTERVENTIONS/RECOMMENDATIONS   ?will continue to monitor        FOLLOW-UP     Please call back the Rapid Response RT, Svetlana Curran, RRT at x [phone] for any questions or concerns.

## 2019-05-09 NOTE — PROGRESS NOTES
[facility]  Hematology/Oncology  Progress Note    Patient Name: Nimesh Nunn  Admission Date: 5/6/2019  Hospital Length of Stay: 3 days  Code Status: Full Code     Subjective:     HPI:  Mr. Nimesh Nunn is a 63-year-old male with a past medical history of COPD, hypertension, hyperlipidemia, CAD on  ASA/plavix, NSCLC (most recently on Durvalumab 1/18/19), who presented to the ED with worsening shortness of breath, wheezing,  and productive cough of brown-yellow sputum for the last 4 days. Patient has had 3 hospital admissions for a similar presentation since February, most recently from 04/17- 04/18 at which time he was treated with duo-nebs, steroids, and antibiotics for a COPD exacerbation. Patient reports that he is on home oxyegn ( usually requiring 3 L NC) but has recently felt his work of breathing increase. He reports that he was discharged from his last admission on prednisone 70mg qd and has been weaning down, currently on 50 mg qd. Patient also reports subjective fevers at home and a generalized abdominal pain with associated nausea, 10/10 in severity, worse in the RUQ that is not affected by eating food. He denies chills, headache, vomiting, CP, diarrhea, or dysuria. He is not currently on chemo. .Per EMS, he was 88% on RA and went to 96% on 4L  In the ED, the patient was found to be hypoxic, tachycardic, and hypertensive. On exam,  he appears comfortable, in no acute distress. Lungs have scattered wheezes and rhonchi throughout, abdomen is soft, non-tender with active bowel sounds. No lower extremity edema noted. His chest x ray is c/w pneumonitis versus disease progression. Patient was admitted to Medical Oncology for further management.              Interval History:  Patient observed to have new-onset atrial fibrillation with RVR this AM with HR in 140's, was given metoprolol 5 mg IV x2 to attempt rate control. Seem to respond after a few hours with a return of HR into  's.  Pt reports no improvement in breathing, actually felt somewhat worse this AM with ambulation. Remains on home oxygen level of 2-3 L , SpO2 89-94%.   Remains afebrile, hemodynamically stable, in no acute distress.   Scattered wheezing still heard on pulmonary exam.   Will continue treating with nebulized breathing treatments, abx, and steroids @ 2 mg/kg for suspected pneumonitis..     Oncology Treatment Plan:   OP DOCETAXEL (75 MG/M2) Q3W    Medications:  Continuous Infusions:  Scheduled Meds:   albuterol-ipratropium  3 mL Nebulization Q6H    apixaban  5 mg Oral BID    aspirin  81 mg Oral Daily    atorvastatin  80 mg Oral Daily    cilostazol  100 mg Oral BID    clopidogrel  75 mg Oral Daily    famotidine  20 mg Oral BID    finasteride  5 mg Oral Daily    fluticasone-umeclidin-vilanter  1 puff Inhalation Daily    levoFLOXacin  750 mg Oral Daily    levothyroxine  75 mcg Oral Before breakfast    metoprolol succinate  50 mg Oral Daily    morphine  30 mg Oral Q12H    predniSONE  80 mg Oral BID    senna-docusate 8.6-50 mg  1 tablet Oral Daily    tamsulosin  0.4 mg Oral Daily     PRN Meds:acetaminophen, benzonatate, dextrose 50%, dextrose 50%, glucagon (human recombinant), glucose, glucose, guaifenesin 100 mg/5 ml, insulin aspart U-100, metoprolol, ondansetron, oxyCODONE-acetaminophen, promethazine, sodium chloride 0.9%     Review of Systems   Constitutional: Positive for fatigue. Negative for chills and fever.   HENT: Negative for congestion, rhinorrhea and sore throat.    Eyes: Negative for pain.   Respiratory: Positive for cough, chest tightness, shortness of breath and wheezing.    Cardiovascular: Negative for chest pain, palpitations and leg swelling.   Gastrointestinal: Positive for abdominal pain and nausea. Negative for abdominal distention, diarrhea and vomiting.   Genitourinary: Negative for difficulty urinating and dysuria.   Musculoskeletal: Negative for arthralgias and myalgias.    Skin: Negative for color change.   Neurological: Negative for dizziness, weakness, light-headedness and headaches.   Psychiatric/Behavioral: Negative for agitation and confusion.     Objective:     Vital Signs (Most Recent):  Temp: 97.5 °F (36.4 °C) (05/09/19 1252)  Pulse: 94 (05/09/19 1256)  Resp: 16 (05/09/19 1256)  BP: 119/81 (05/09/19 1252)  SpO2: 95 % (05/09/19 1256) Vital Signs (24h Range):  Temp:  [97.1 °F (36.2 °C)-98.1 °F (36.7 °C)] 97.5 °F (36.4 °C)  Pulse:  [] 94  Resp:  [16-22] 16  SpO2:  [92 %-99 %] 95 %  BP: ()/(59-95) 119/81     Weight: 83 kg (182 lb 15.7 oz)  Body mass index is 24.82 kg/m².  Body surface area is 2.05 meters squared.      Intake/Output Summary (Last 24 hours) at 5/9/2019 1448  Last data filed at 5/9/2019 0936  Gross per 24 hour   Intake 470 ml   Output 400 ml   Net 70 ml       Physical Exam   Constitutional: He is oriented to person, place, and time. He appears well-developed and well-nourished. No distress.   HENT:   Head: Normocephalic and atraumatic.   Mouth/Throat: Oropharynx is clear and moist.   Eyes: Pupils are equal, round, and reactive to light. EOM are normal.   Neck: Normal range of motion. Neck supple. No JVD present.   Cardiovascular: Normal rate, regular rhythm, normal heart sounds and intact distal pulses.   No murmur heard.  Pulmonary/Chest: Tachypnea noted. No respiratory distress. He has wheezes. He has rhonchi.   Scattered wheezes throughout b/l lung fields   Abdominal: Soft. Bowel sounds are normal. He exhibits no distension. There is no tenderness.   Musculoskeletal: Normal range of motion. He exhibits no edema.   Neurological: He is alert and oriented to person, place, and time. No cranial nerve deficit.   Skin: Skin is warm and dry. Capillary refill takes less than 2 seconds.   Psychiatric: He has a normal mood and affect.       Significant Labs:   CBC:   Recent Labs   Lab 05/08/19  0438   WBC 6.29   HGB 7.9*   HCT 25.6*   *   , CMP:    Recent Labs   Lab 05/08/19  0438 05/09/19  0521    137   K 3.5 3.9   CL 91* 90*   CO2 37* 39*   * 190*   BUN 19 21   CREATININE 0.9 0.9   CALCIUM 9.1 9.2   PROT 6.8 6.9   ALBUMIN 2.6* 2.7*   BILITOT 0.2 0.2   ALKPHOS 85 88   AST 12 14   ALT 13 15   ANIONGAP 8 8   EGFRNONAA >60.0 >60.0   , LDH: No results for input(s): LDHCSF, BFSOURCE in the last 48 hours. and LFTs:   Recent Labs   Lab 05/08/19  0438 05/09/19  0521   ALT 13 15   AST 12 14   ALKPHOS 85 88   BILITOT 0.2 0.2   PROT 6.8 6.9   ALBUMIN 2.6* 2.7*       Diagnostic Results:  I have reviewed and interpreted all pertinent imaging results/findings within the past 24 hours.     Abdomen U/S  FINDINGS:  Pancreas: The visualized portions of pancreas appear normal.    Aorta: No aneurysm within the visualized portions of the aorta.  Mid and distal aorta are obscured by overlying bowel gas.    Liver: 13.9 cm, normal in size. Homogeneous parenchymal echotexture. No focal lesions.    Gallbladder: Single mobile stone measuring 0.8 cm.  No wall thickening or pericholecystic fluid.  Negative sonographic Ramsay's sign.    Biliary system: 7 mm common bile duct.  Mild intrahepatic ductal dilatation.    Inferior vena cava: Normal in appearance.    Right kidney: 9.2 cm. No hydronephrosis.    Left kidney: 10.4 cm. No hydronephrosis.    Spleen: 10.2 x 3.6 cm.  Normal in size with homogeneous echotexture.    Miscellaneous: No ascites.      Impression       Cholelithiasis without sonographic evidence of acute cholecystitis.    Mild intrahepatic biliary ductal dilatation with prominence of the common bile duct measuring up to 7 mm.         Assessment/Plan:     Atrial fibrillation with RVR  - New onset as of this morning, pt does not recall having irregular rhythm any time prior, no complaints of CP or palpitations during episode.  - Likely etiology is 2/2 albuterol from nebulized breathing treatments, achieved rate control with metoprolol 5 mg IV x2  - Switched  patient from Lovenox to Eliquis 5 mg BID  - Obtaining 2D echo  - Cardiology consulted, appreciate their recs    COPD exacerbation  63-year-old male with a past medical history of COPD, hypertension, hyperlipidemia, CAD on  ASA/plavix, NSCLC (most recently on Durvalumab 1/18/19), who presented to the ED with worsening shortness of breath, wheezing,  and productive cough of brown-yellow sputum for the last 4 days. Differential diagnosis includes but is not limited to COPD exacerbation (productive cough, wheezing on exam, CXR with increased diffuse bilateral interstitial and airspace opacities) vs hospital-acquired PNA (recent admission from 04/17 thru 04/18) vs progression of malignancy.     Plan:   - Levofloxacin 750 mg qd for 3-5 days (for possible underlying PNA)  - Patient was on a prednisone taper at home, being weaned katy from 70 mg qd for the treatment of suspected pneumonitis from the durvalumab that he had been on for a few months. After speaking with patient's oncologist, greyson Pathak will give steroid dosage used for pneumonitis treatment, decreasing prednisone to 60 mg PO qd in light of likely fungal infection  - Duonebs q6h while awake  - Titrate continuous O2 by nasal cannula trevor maintain sats > 88% as tolerated, (on 3 L @ home)  - Consulting Pulmonology as patient is not improving despite three days of standard therapy, appreciate their recs  - it's been discovered (by Pulmonology) that during pt's last admission for which he had a similar presentation, the patient underwent bronchoscopy and had tissue cultures grow positive for Aspergillus fumigatus. Does not appear to have been treated at that time. Spoke with ID about this and they recommended beginning treatiment with voriconazole, started on 200 mg PO BID. Pt will be evaluated in the morning by ID.     Anemia in neoplastic disease  -Hgb 7.9, stable baseline Hgb of ~ 9.0,   - Currently no active signs of bleeding  - Will continue to monitor with  daily CBC, transfuse to keep Hgb > 7.0    Lung cancer metastatic to bone  Will continue home pain regimen:  - MS contin 30mg BID   - Percocet 10-325mg  q4h PRN    Adenocarcinoma of left lung, stage 4  Oncologic History:       Oncologic History Non-small cell lung cancer, left diagnosed 06/2018  Metastatic disease to bone at presentation    Oncologic Treatment Stereotactic radiation to spinal lesion  Cisplatin/pemetrexed with concurrent radiation 08/08/2018 (completed 09/2018)  Durvalumab 10/2018, last given on 1/18/19  XRT L4-S3 30 Gy 12/2018    Pathology Poorly differentiated adenocarcinoma, EGFR wild type, No ALK, ROS-1 rearrangements, PD-L1 TPS 90%          PAD (peripheral artery disease)  - On cilostazol    Hyperlipidemia  Continue home statin.    HTN (hypertension)  - Hypertensive in the ED  - BP last night was in the 160s but back to normal in AM  - Will continue home metoprolol for now             Migel Brambila MD  Hematology/Oncology  [facility]      ATTENDING NOTE, ONCOLOGY INPATIENT TEAM     Eevents of last 24 hours noted.  Patient seen and examined, chart reviewed.  Full note to follow by housestaff.  Apparently he developed atrial fibrillation with RVR overnight.   He received two dose of metoprolol for rate control.  Appears comfortable, in NAD.  Lungs have diffuse wheezes bilaterally.  Abdomen is soft, nontender.  Labs reviewed.  Chest x ray reviewed.       PLAN  Cardiology consult for his new onset atrial fibrillation.  Echocardiogram.  Will also ask the Pulmonary Service to assess him.  Follow daily labs.  Continue levofloxacin, H2 blockers, and prednisone at 2 mg/kg.  D/C enoxaparin and start rivaroxaban 5 mg Q 12 hours..  Even though his breathing treatments may have triggered the atrial fibrillation, he will need to remain on Atrovent for now given his diffuse wheezing.  His multiple questions were answered to his satisfaction.           Kevin Dwyer,  MD           3:20 p.m. ADDENDUM     It appears that on the culture from respiratory material obtained on April 17th during a bronchoscopy, aspergillus had been reported.  Patient has not been treated for that.  We will start voreconazole, decrease his steroids, obtain a noncontast of his chest, and ask the ID Service to evaluate.

## 2019-05-09 NOTE — PROGRESS NOTES
ATTENDING NOTE, ONCOLOGY INPATIENT TEAM    Eevents of last 24 hours noted.  Patient seen and examined, chart reviewed.  Full note to follow by housestaff.  Apparently he developed atrial fibrillation with RVR overnight.   He received two dose of metoprolol for rate control.  Appears comfortable, in NAD.  Lungs have diffuse wheezes bilaterally.  Abdomen is soft, nontender.  Labs reviewed.  Chest x ray reviewed.      PLAN  Cardiology consult for his new onset atrial fibrillation.  Echocardiogram.  Will also ask the Pulmonary Service to assess him.  Follow daily labs.  Continue levofloxacin, H2 blockers, and prednisone at 2 mg/kg.  D/C enoxaparin and start rivaroxaban 5 mg Q 12 hours..  Even though his breathing treatments may have triggered the atrial fibrillation, he will need to remain on Atrovent for now given his diffuse wheezing.  His multiple questions were answered to his satisfaction.        Kevin Dwyer MD        3:20 p.m. ADDENDUM    It appears that on the culture from respiratory material obtained on April 17th during a bronchoscopy aspergillus had been reported.  Patient has not been treated for that.  We will obtain a galactomannan antigen, start voreconazole, decrease his steroids, obtain a noncontrast CT and ask the ID Service to evaluate.

## 2019-05-09 NOTE — CARE UPDATE
RAPID RESPONSE NURSE PROACTIVE ROUNDING NOTE     Time of Visit: 09    Admit Date: 2019  LOS: 3  Code Status: Full Code   Date of Visit: 2019  : 1955  Age: 63 y.o.  Sex: male  Race: White  Bed: 859/859A:   MRN: [id number]  Was the patient discharged from an ICU this admission? no   Was the patient discharged from a PACU within last 24 hours?  no  Did the patient receive conscious sedation/general anesthesia in last 24 hours?  no  Was the patient in the ED within the past 24 hours?  no  Was the patient started on NIPPV within the past 24 hours?  no  Attending Physician: Kevin Dwyer MD  Primary Service: [facility] [place]ICAL ONCOLOGY    ASSESSMENT     Abnormal Vital Signs: /89 (BP Location: Left arm, Patient Position: Lying)   Pulse 99   Temp 98.1 °F (36.7 °C)   Resp 20   Ht 6' (1.829 m)   Wt 83 kg (182 lb 15.7 oz)   SpO2 96%   BMI 24.82 kg/m²    Clinical Issues: Respiratory    Pt AAOx4 Complaining of SOB on 2L NC SpO2 90%. Pt placed on 3L. Pt complaining of increased pain while breathing. VSS at this time.      INTERVENTIONS/ RECOMMENDATIONS   IS order to be placed by RT. Continue pain management and monitor Resp status.     Discussed plan of care with RNAurbie.    PHYSICIAN ESCALATION     Yes/No  no    Orders received and case discussed with    Disposition: Remain in room 859A.    FOLLOW-UP     Call back the Rapid Response Nurse, Alea Hampton RN at [phone] for additional questions or concerns.

## 2019-05-09 NOTE — SUBJECTIVE & OBJECTIVE
Past Medical History:   Diagnosis Date    Abdominal aortic aneurysm (AAA) without rupture 3/5/2018    Arthritis     Atrial fibrillation with RVR 5/9/2019    Blood transfusion     COPD (chronic obstructive pulmonary disease)     COPD exacerbation 3/24/2019    Coronary artery disease     Dehydration 8/15/2018    Encounter for blood transfusion     Hyperlipidemia 11/7/2013    Hypertension     Lung cancer metastatic to bone 7/18/2018    Occlusion and stenosis of carotid artery without mention of cerebral infarction 1/12/2014    Pneumonitis 2/13/2019    Primary malignant neoplasm of left lung 7/18/2018    Screening for colon cancer 6/1/2015    Syncope 1/13/2014    Thrombocytopenia 9/19/2018    Weakness of both lower extremities 1/14/2019       Past Surgical History:   Procedure Laterality Date    ARCH & 4 VESSEL STUDY Bilateral 5/27/2014    Performed by [facility]hi Martin MD at [facility] CATH LAB    BACK SURGERY      BRONCHOSCOPY,FIBEROPTIC N/A 6/26/2018    Performed by Liana Serrano MD at [facility] OR 2ND FLR    carotid endarectomy      CATHETERIZATION, HEART, LEFT Left 11/8/2013    Performed by Mekhi Martin MD at [facility] CATH LAB    COLONOSCOPY N/A 6/1/2015    Per[facility] at [facility] ENDO    CORONARY ANGIOPLASTY      CORONARY ARTERY BYPASS GRAFT      ESOPHAGOGASTRODUODENOSCOPY (EGD) N/A 8/16/2018    Performed by Yohannes Osuna MD at [facility] ENDO (2ND FLR)    HEART CATH-LEFT N/A 5/27/2014    Performed by Mekhi Martin MD at [facility] CATH LAB    INSERTION, STENT, CORONARY ARTERY N/A 12/20/2013    Performed by Mekhi Martin MD at [facility] CATH LAB    PTA, PERIPHERAL BLOOD VESSEL Left 3/11/2014    Performed by Mekhi Martin MD at [facility] CATH LAB    PTA, PERIPHERAL BLOOD VESSEL N/A 2/18/2014    Performed by Mekhi Martin MD at [facility] CATH LAB    SKIN BIOPSY      ULTRASOUND, ENDOBRONCHIAL N/A 6/26/2018    Performed by Liana Serrano MD at [facility] OR 2ND FLR       Review of patient's  allergies indicates:  No Known Allergies    No current facility-administered medications on file prior to encounter.      Current Outpatient Medications on File Prior to Encounter   Medication Sig    aspirin (ECOTRIN) 81 MG EC tablet Take 81 mg by mouth once daily.    atorvastatin (LIPITOR) 80 MG tablet Take 1 tablet (80 mg total) by mouth once daily.    cilostazol (PLETAL) 100 MG Tab TAKE 1 TABLET BY MOUTH TWICE DAILY    clopidogrel (PLAVIX) 75 mg tablet TAKE 1 TABLET BY MOUTH EVERY DAY    diphenoxylate-atropine 2.5-0.025 mg (LOMOTIL) 2.5-0.025 mg per tablet Take 1 tablet by mouth 4 (four) times daily as needed for Diarrhea.    finasteride (PROSCAR) 5 mg tablet Take 1 tablet (5 mg total) by mouth once daily.    fluticasone-umeclidin-vilanter (TRELEGY ELLIPTA) 100-62.5-25 mcg DsDv Inhale 1 puff into the lungs once daily.    levothyroxine (SYNTHROID) 75 MCG tablet Take 1 tablet (75 mcg total) by mouth once daily.    metoprolol succinate (TOPROL-XL) 50 MG 24 hr tablet Take 50 mg by mouth.    morphine (MS CONTIN) 30 MG 12 hr tablet Take 1 tablet (30 mg total) by mouth every 8 (eight) hours.    omeprazole (PRILOSEC) 20 MG capsule Take 1 capsule (20 mg total) by mouth once daily.    ondansetron (ZOFRAN-ODT) 8 MG TbDL Take 1 tablet (8 mg total) by mouth every 12 (twelve) hours as needed.    oxyCODONE-acetaminophen (PERCOCET)  mg per tablet Take 1 tablet by mouth every 4 (four) hours as needed for Pain.    PROAIR HFA 90 mcg/actuation inhaler Inhale 2 puffs into the lungs as needed.    tamsulosin (FLOMAX) 0.4 mg Cap Take 1 capsule (0.4 mg total) by mouth once daily.    promethazine-codeine 6.25-10 mg/5 ml (PHENERGAN WITH CODEINE) 6.25-10 mg/5 mL syrup Take 5 mLs by mouth every 4 (four) hours as needed for Cough.     Family History     Problem Relation (Age of Onset)    Breast cancer Mother    Cancer Mother    Heart attack Father    Heart disease Father    Heart failure Father    Hypertension Father     No Known Problems Sister, Brother, Maternal Grandmother, Maternal Grandfather, Paternal Grandmother, Paternal Grandfather, Maternal Aunt, Maternal Uncle, Paternal Aunt, Paternal Uncle        Tobacco Use    Smoking status: Former Smoker     Packs/day: 1.50     Years: 40.00     Pack years: 60.00     Last attempt to quit: 10/11/2011     Years since quittin.5    Smokeless tobacco: Never Used   Substance and Sexual Activity    Alcohol use: Yes     Alcohol/week: 1.8 oz     Types: 3 Cans of beer per week     Comment: 3 cans beer every day or every other day    Drug use: Yes     Types: Marijuana    Sexual activity: Not Currently     ROS   Constitution: Negative for fever, chills, weight loss or gain.   HENT: Negative for sore throat, rhinorrhea, or headache.  Eyes: Negative for blurred or double vision.   Cardiovascular: See above  Pulmonary: Positive for SOB   Gastrointestinal: Negative for abdominal pain, nausea, vomiting, or diarrhea.   : Negative for dysuria.   Neurological: Negative for focal weakness or sensory changes.    Objective:     Vital Signs (Most Recent):  Temp: 97.5 °F (36.4 °C) (19 1252)  Pulse: 108 (19 1505)  Resp: 16 (19 1256)  BP: 119/81 (19 1252)  SpO2: 95 % (19 1256) Vital Signs (24h Range):  Temp:  [97.1 °F (36.2 °C)-98.1 °F (36.7 °C)] 97.5 °F (36.4 °C)  Pulse:  [] 108  Resp:  [16-22] 16  SpO2:  [92 %-99 %] 95 %  BP: ()/(59-95) 119/81     Weight: 83 kg (182 lb 15.7 oz)  Body mass index is 24.82 kg/m².    SpO2: 95 %  O2 Device (Oxygen Therapy): nasal cannula      Intake/Output Summary (Last 24 hours) at 2019 1542  Last data filed at 2019 0936  Gross per 24 hour   Intake 470 ml   Output 400 ml   Net 70 ml       Lines/Drains/Airways     Peripheral Intravenous Line                 Peripheral IV - Single Lumen 04/15/19 1850 20 G Right Antecubital 23 days                Physical Exam  Constitutional: NAD, conversant  HEENT: Sclera anicteric,  PERRLA, EOMI  Neck: No JVD, no carotid bruits  CV: Regular rhythm, Tachycardic, 2/6 systolic murmur in aortic region, normal S1/S2, No Pericardial rub  Pulm: rales and wheeze notable  GI: Abdomen soft, NTND, +BS  Extremities: No LE edema, warm and well perfused, No cyanosis, No clubbing  Skin: No ecchymosis, erythema, or ulcers      Significant Labs:   CMP   Recent Labs   Lab 05/08/19  0438 05/09/19  0521    137   K 3.5 3.9   CL 91* 90*   CO2 37* 39*   * 190*   BUN 19 21   CREATININE 0.9 0.9   CALCIUM 9.1 9.2   PROT 6.8 6.9   ALBUMIN 2.6* 2.7*   BILITOT 0.2 0.2   ALKPHOS 85 88   AST 12 14   ALT 13 15   ANIONGAP 8 8   ESTGFRAFRICA >60.0 >60.0   EGFRNONAA >60.0 >60.0   , CBC   Recent Labs   Lab 05/08/19  0438   WBC 6.29   HGB 7.9*   HCT 25.6*   *   , INR No results for input(s): INR, PROTIME in the last 48 hours., Lipid Panel No results for input(s): CHOL, HDL, LDLCALC, TRIG, CHOLHDL in the last 48 hours., Troponin No results for input(s): TROPONINI in the last 48 hours. and All pertinent lab results from the last 24 hours have been reviewed.    Significant Imaging: Echocardiogram:   2D echo with color flow doppler:   Results for orders placed or performed during the hospital encounter of 01/12/14   Echo doppler color flow velocity map   Result Value Ref Range    QEF 65     Diastolic Dysfunction No     and Transthoracic echo (TTE) complete (Cupid Only):   Results for orders placed or performed during the hospital encounter of 05/06/19   Transthoracic echo (TTE) 2D with Color Flow   Result Value Ref Range    Ascending aorta 3.44 cm    STJ 2.92 cm    AV mean gradient 7.00 mmHg    Ao peak christopher 1.85 m/s    Ao VTI 37.17 cm    IVS 0.99 0.6 - 1.1 cm    LA size 3.53 cm    Left Atrium Major Axis 5.23 cm    Left Atrium Minor Axis 5.30 cm    LVIDD 4.69 3.5 - 6.0 cm    LVIDS 3.37 2.1 - 4.0 cm    LVOT diameter 2.31 cm    LVOT peak VTI 21.86 cm    PW 0.96 0.6 - 1.1 cm    MV Peak A Christopher 1.23 m/s    E wave  decelartion time 180.39 msec    MV Peak E Christopher 1.04 m/s    RA Major Axis 4.49 cm    RA Width 3.35 cm    RVDD 3.84 cm    Sinus 3.86 cm    TAPSE 1.73 cm    TR Max Christopher 2.60 m/s    TDI LATERAL 0.10     TDI SEPTAL 0.10     LA WIDTH 3.85 cm    LV Diastolic Volume 102.00 mL    LV Systolic Volume 46.32 mL    LVOT peak christopher [id number] m/s    LV LATERAL E/E' RATIO 10.40     LV SEPTAL E/E' RATIO 10.40     FS 28 %    LA volume 60.82 cm3    LV mass 158.34 g    Left Ventricle Relative Wall Thickness 0.41 cm    AV valve area 2.46 cm2    AV Velocity Ratio 0.72     AV index (prosthetic) 0.59     E/A ratio 0.85     Mean e' 0.10     LVOT area 4.19 cm2    LVOT stroke volume 91.57 cm3    AV peak gradient 13.69 mmHg    E/E' ratio 10.40     LV Systolic Volume Index 22.6 mL/m2    LV Diastolic Volume Index 49.72 mL/m2    LA Volume Index 29.6 mL/m2    LV Mass Index 77.2 g/m2    Triscuspid Valve Regurgitation Peak Gradient 27.04 mmHg    BSA 2.05 m2    Right Atrial Pressure (from IVC) 3 mmHg    RV S' 11.00 m/s    TV rest pulmonary artery pressure 30 mmHg

## 2019-05-09 NOTE — CONSULTS
[facility]  Cardiology  Consult Note    Patient Name: Nimesh Nunn  MRN: [id number]  Admission Date: 5/6/2019  Hospital Length of Stay: 3 days  Code Status: Full Code   Attending Provider: Kevin Dwyer MD   Consulting Provider: Sneha Austin MD  Primary Care Physician: Nishant Perez MD  Principal Problem:<principal problem not specified>    Patient information was obtained from patient and past medical records.     Inpatient consult to Cardiology  Consult performed by: Sneha Austin MD  Consult ordered by: Migel Brambila MD  Reason for consult: atrial fibrillation        Subjective:     Chief Complaint:  Atrial fibrillation     HPI:   Mr. Nimesh Nunn is a 63-year-old male with a past medical history of COPD on home O2 (3LNC), hypertension, hyperlipidemia, CAD s/p PCI of RCA and LAD (2013), TIA, PAD, left ICA occlusion, prior R ICA stent, Stage 4 NSCLC (poorly differentiated adenocarcinoma), s/p XRT to lumbar/sacral spine and chemo with multiple agents who presented to the ED with worsening shortness of breath, wheezing,  and productive cough of brown-yellow sputum. He was admitted to the hematology/oncology service on 5/6/19 for COPD exacerbation and treated with duoneb treatments, steroids, and levofloxacin. His course in the hospital was complicated by new-onset atrial fibrillation with RVR that occurred early this morning. Cardiology was consulted for management of atrial fibrillation.    The patient reports he was sleeping and possibly dreaming. Approximately around 4:25 am this morning, he developed atrial fibrillation with RVR in 130-160s. He denies experiencing any worsening SOB, NV, diaphoresis, LH, LOC, palpitations, fever or chills during this time. He reports that if people did not tell him, his HR was fast, he would not have noticed it. He was given lopressor 5 mg IVP X 2 and metoprolol succinate 50 mg PO and his HR improved to the 110-120s. Around 7:25  am this morning, he converted back to sinus rhythm and has maintained HRs in 90-100s. He reports chronic POWER with walking and prior to his MI which ultimately lead him to his PCI, he never experienced any CP but only POWER. His POWER has been more prominent over the last 1 month. Quit smoking 8 years ago (47 Py smoking hx). Currently on ASA, plavix, and cilostazol for his vascular disease. Denies any active bleeding with these medications but has contusions.    Past Medical History:   Diagnosis Date    Abdominal aortic aneurysm (AAA) without rupture 3/5/2018    Arthritis     Atrial fibrillation with RVR 5/9/2019    Blood transfusion     COPD (chronic obstructive pulmonary disease)     COPD exacerbation 3/24/2019    Coronary artery disease     Dehydration 8/15/2018    Encounter for blood transfusion     Hyperlipidemia 11/7/2013    Hypertension     Lung cancer metastatic to bone 7/18/2018    Occlusion and stenosis of carotid artery without mention of cerebral infarction 1/12/2014    Pneumonitis 2/13/2019    Primary malignant neoplasm of left lung 7/18/2018    Screening for colon cancer 6/1/2015    Syncope 1/13/2014    Thrombocytopenia 9/19/2018    Weakness of both lower extremities 1/14/2019       Past Surgical History:   Procedure Laterality Date    ARCH & 4 VESSEL STUDY Bilateral 5/27/2014    Performed by [facility] at [facility] CATH LAB    BACK SURGERY      BRONCHOSCOPY,FIBEROPTIC N/A 6/26/2018    Performed by Liana Serrano MD at [facility] OR 2ND FLR    carotid endarectomy      CATHETERIZATION, HEART, LEFT Left 11/8/2013    Performed by [facility]ekhi Martin MD at [facility] CATH LAB    COLONOSCOPY N/A 6/1/2015    Per[facility] at [facility] ENDO    CORONARY ANGIOPLASTY      CORONARY ARTERY BYPASS GRAFT      ESOPHAGOGASTRODUODENOSCOPY (EGD) N/A 8/16/2018    Performed by Yohannes Osuna MD at [facility] ENDO (2ND FLR)    HEART CATH-LEFT N/A 5/27/2014    Performed by [facility] Martin MD at  [facility] CATH LAB    INSERTION, STENT, CORONARY ARTERY N/A 12/20/2013    Performed by Mekhi Martin MD at [facility] CATH LAB    PTA, PERIPHERAL BLOOD VESSEL Left 3/11/2014    Performed by Mekhi Martin MD at [facility] CATH LAB    PTA, PERIPHERAL BLOOD VESSEL N/A 2/18/2014    Performed by Mekhi Martin MD at [facility] CATH LAB    SKIN BIOPSY      ULTRASOUND, ENDOBRONCHIAL N/A 6/26/2018    Performed by Liana Serrano MD at [facility] OR [address] [place] [address]       Review of patient's allergies indicates:  No Known Allergies    No current facility-administered medications on file prior to encounter.      Current Outpatient Medications on File Prior to Encounter   Medication Sig    aspirin (ECOTRIN) 81 MG EC tablet Take 81 mg by mouth once daily.    atorvastatin (LIPITOR) 80 MG tablet Take 1 tablet (80 mg total) by mouth once daily.    cilostazol (PLETAL) 100 MG Tab TAKE 1 TABLET BY MOUTH TWICE DAILY    clopidogrel (PLAVIX) 75 mg tablet TAKE 1 TABLET BY MOUTH EVERY DAY    diphenoxylate-atropine 2.5-0.025 mg (LOMOTIL) 2.5-0.025 mg per tablet Take 1 tablet by mouth 4 (four) times daily as needed for Diarrhea.    finasteride (PROSCAR) 5 mg tablet Take 1 tablet (5 mg total) by mouth once daily.    fluticasone-umeclidin-vilanter (TRELEGY ELLIPTA) 100-62.5-25 mcg DsDv Inhale 1 puff into the lungs once daily.    levothyroxine (SYNTHROID) 75 MCG tablet Take 1 tablet (75 mcg total) by mouth once daily.    metoprolol succinate (TOPROL-XL) 50 MG 24 hr tablet Take 50 mg by mouth.    morphine (MS CONTIN) 30 MG 12 hr tablet Take 1 tablet (30 mg total) by mouth every 8 (eight) hours.    omeprazole (PRILOSEC) 20 MG capsule Take 1 capsule (20 mg total) by mouth once daily.    ondansetron (ZOFRAN-ODT) 8 MG TbDL Take 1 tablet (8 mg total) by mouth every 12 (twelve) hours as needed.    oxyCODONE-acetaminophen (PERCOCET)  mg per tablet Take 1 tablet by mouth every 4 (four) hours as needed for Pain.    PROAIR HFA 90 mcg/actuation inhaler  Inhale 2 puffs into the lungs as needed.    tamsulosin (FLOMAX) 0.4 mg Cap Take 1 capsule (0.4 mg total) by mouth once daily.    promethazine-codeine 6.25-10 mg/5 ml (PHENERGAN WITH CODEINE) 6.25-10 mg/5 mL syrup Take 5 mLs by mouth every 4 (four) hours as needed for Cough.     Family History     Problem Relation (Age of Onset)    Breast cancer Mother    Cancer Mother    Heart attack Father    Heart disease Father    Heart failure Father    Hypertension Father    No Known Problems Sister, Brother, Maternal Grandmother, Maternal Grandfather, Paternal Grandmother, Paternal Grandfather, Maternal Aunt, Maternal Uncle, Paternal Aunt, Paternal Uncle        Tobacco Use    Smoking status: Former Smoker     Packs/day: 1.50     Years: 40.00     Pack years: 60.00     Last attempt to quit: 10/11/2011     Years since quittin.5    Smokeless tobacco: Never Used   Substance and Sexual Activity    Alcohol use: Yes     Alcohol/week: 1.8 oz     Types: 3 Cans of beer per week     Comment: 3 cans beer every day or every other day    Drug use: Yes     Types: Marijuana    Sexual activity: Not Currently     ROS   Constitution: Negative for fever, chills, weight loss or gain.   HENT: Negative for sore throat, rhinorrhea, or headache.  Eyes: Negative for blurred or double vision.   Cardiovascular: See above  Pulmonary: Positive for SOB   Gastrointestinal: Negative for abdominal pain, nausea, vomiting, or diarrhea.   : Negative for dysuria.   Neurological: Negative for focal weakness or sensory changes.    Objective:     Vital Signs (Most Recent):  Temp: 97.5 °F (36.4 °C) (19 1252)  Pulse: 108 (19 1505)  Resp: 16 (19 1256)  BP: 119/81 (19 1252)  SpO2: 95 % (19 1256) Vital Signs (24h Range):  Temp:  [97.1 °F (36.2 °C)-98.1 °F (36.7 °C)] 97.5 °F (36.4 °C)  Pulse:  [] 108  Resp:  [16-22] 16  SpO2:  [92 %-99 %] 95 %  BP: ()/(59-95) 119/81     Weight: 83 kg (182 lb 15.7 oz)  Body mass index is  24.82 kg/m².    SpO2: 95 %  O2 Device (Oxygen Therapy): nasal cannula      Intake/Output Summary (Last 24 hours) at 5/9/2019 1542  Last data filed at 5/9/2019 0936  Gross per 24 hour   Intake 470 ml   Output 400 ml   Net 70 ml       Lines/Drains/Airways     Peripheral Intravenous Line                 Peripheral IV - Single Lumen 04/15/19 1850 20 G Right Antecubital 23 days                Physical Exam  Constitutional: NAD, conversant  HEENT: Sclera anicteric, PERRLA, EOMI  Neck: No JVD, no carotid bruits  CV: Regular rhythm, Tachycardic, 2/6 systolic murmur in aortic region, normal S1/S2, No Pericardial rub  Pulm: rales and wheeze notable  GI: Abdomen soft, NTND, +BS  Extremities: No LE edema, warm and well perfused, No cyanosis, No clubbing  Skin: No ecchymosis, erythema, or ulcers      Significant Labs:   CMP   Recent Labs   Lab 05/08/19  0438 05/09/19  0521    137   K 3.5 3.9   CL 91* 90*   CO2 37* 39*   * 190*   BUN 19 21   CREATININE 0.9 0.9   CALCIUM 9.1 9.2   PROT 6.8 6.9   ALBUMIN 2.6* 2.7*   BILITOT 0.2 0.2   ALKPHOS 85 88   AST 12 14   ALT 13 15   ANIONGAP 8 8   ESTGFRAFRICA >60.0 >60.0   EGFRNONAA >60.0 >60.0   , CBC   Recent Labs   Lab 05/08/19  0438   WBC 6.29   HGB 7.9*   HCT 25.6*   *   , INR No results for input(s): INR, PROTIME in the last 48 hours., Lipid Panel No results for input(s): CHOL, HDL, LDLCALC, TRIG, CHOLHDL in the last 48 hours., Troponin No results for input(s): TROPONINI in the last 48 hours. and All pertinent lab results from the last 24 hours have been reviewed.    Significant Imaging: Echocardiogram:   2D echo with color flow doppler:   Results for orders placed or performed during the hospital encounter of 01/12/14   Echo doppler color flow velocity map   Result Value Ref Range    QEF 65     Diastolic Dysfunction No     and Transthoracic echo (TTE) complete (Cupid Only):   Results for orders placed or performed during the hospital encounter of 05/06/19    Transthoracic echo (TTE) 2D with Color Flow   Result Value Ref Range    Ascending aorta 3.44 cm    STJ 2.92 cm    AV mean gradient 7.00 mmHg    Ao peak christopher 1.85 m/s    Ao VTI 37.17 cm    IVS 0.99 0.6 - 1.1 cm    LA size 3.53 cm    Left Atrium Major Axis 5.23 cm    Left Atrium Minor Axis 5.30 cm    LVIDD 4.69 3.5 - 6.0 cm    LVIDS 3.37 2.1 - 4.0 cm    LVOT diameter 2.31 cm    LVOT peak VTI 21.86 cm    PW 0.96 0.6 - 1.1 cm    MV Peak A Christopher 1.23 m/s    E wave decelartion time 180.39 msec    MV Peak E Christopher 1.04 m/s    RA Major Axis 4.49 cm    RA Width 3.35 cm    RVDD 3.84 cm    Sinus 3.86 cm    TAPSE 1.73 cm    TR Max Christopher 2.60 m/s    TDI LATERAL 0.10     TDI SEPTAL 0.10     LA WIDTH 3.85 cm    LV Diastolic Volume 102.00 mL    LV Systolic Volume 46.32 mL    LVOT peak christopher [id number] m/s    LV LATERAL E/E' RATIO 10.40     LV SEPTAL E/E' RATIO 10.40     FS 28 %    LA volume 60.82 cm3    LV mass 158.34 g    Left Ventricle Relative Wall Thickness 0.41 cm    AV valve area 2.46 cm2    AV Velocity Ratio 0.72     AV index (prosthetic) 0.59     E/A ratio 0.85     Mean e' 0.10     LVOT area 4.19 cm2    LVOT stroke volume 91.57 cm3    AV peak gradient 13.69 mmHg    E/E' ratio 10.40     LV Systolic Volume Index 22.6 mL/m2    LV Diastolic Volume Index 49.72 mL/m2    LA Volume Index 29.6 mL/m2    LV Mass Index 77.2 g/m2    Triscuspid Valve Regurgitation Peak Gradient 27.04 mmHg    BSA 2.05 m2    Right Atrial Pressure (from IVC) 3 mmHg    RV S' 11.00 m/s    TV rest pulmonary artery pressure 30 mmHg     Assessment and Plan:     Atrial fibrillation with RVR  Mr. Nimesh Nunn is a 63-year-old male with a past medical history of COPD on home O2 (3LNC), hypertension, hyperlipidemia, CAD s/p PCI of RCA and LAD (2013), TIA, PAD, left ICA occlusion, prior R ICA stent, Stage 4 NSCLC (poorly differentiated adenocarcinoma), s/p XRT to lumbar/sacral spine and chemo with multiple agents who presented to the ED with worsening shortness of  breath, wheezing,  and productive cough of brown-yellow sputum. He was admitted to the hematology/oncology service on 5/6/19 for COPD exacerbation and treated with duoneb treatments, steroids, and levofloxacin. His course in the hospital was complicated by new-onset atrial fibrillation with RVR that occurred early this morning. Cardiology was consulted for management of atrial fibrillation. Denied any symptoms during the episode which lasted 3 hours and now converted back to sinus rhythm. Atrial fibrillation driven likely by underlying infection and COPD exacerbation with frequent use of beta agonists.     - recommend to discontinue beta blocker therapy given recurrent wheezing/COPD exacerbations  - TTE reviewed today with normal LV function  - start verapamil 80 mg PO TID - titrate up as tolerated  - [id number] - 5 - needs CVA PPx - recommend eliquis 5 mg BID  - discontinue aspirin as triple therapy will increase risk of bleeding  - once active respiratory issues improve - recommend getting exercise stress echo (if agreeable to run on treadmill) vs. Dobutamine stress echo to r/o worsening ischemic heart disease as a concomitant etiology of his POWER        VTE Risk Mitigation (From admission, onward)        Ordered     apixaban tablet 5 mg  2 times daily      05/09/19 1016     IP VTE HIGH RISK PATIENT  Once      05/06/19 1126          Thank you for your consult. I will follow-up with patient. Please contact us if you have any additional questions.    Sneha Austin MD  Cardiology   [facility]

## 2019-05-09 NOTE — ASSESSMENT & PLAN NOTE
Mr. Nimesh Nunn is a 63-year-old male with a past medical history of COPD on home O2 (3LNC), hypertension, hyperlipidemia, CAD s/p PCI of RCA and LAD (2013), TIA, PAD, left ICA occlusion, prior R ICA stent, Stage 4 NSCLC (poorly differentiated adenocarcinoma), s/p XRT to lumbar/sacral spine and chemo with multiple agents who presented to the ED with worsening shortness of breath, wheezing,  and productive cough of brown-yellow sputum. He was admitted to the hematology/oncology service on 5/6/19 for COPD exacerbation and treated with duoneb treatments, steroids, and levofloxacin. His course in the hospital was complicated by new-onset atrial fibrillation with RVR that occurred early this morning. Cardiology was consulted for management of atrial fibrillation. Denied any symptoms during the episode which lasted 3 hours and now converted back to sinus rhythm. Atrial fibrillation driven likely by underlying infection and COPD exacerbation with frequent use of beta agonists.     - recommend to discontinue beta blocker therapy given recurrent wheezing/COPD exacerbations  - TTE reviewed today with normal LV function  - start verapamil 80 mg PO TID - titrate up as tolerated  - [id number] - 5 - needs CVA PPx - recommend eliquis 5 mg BID  - discontinue aspirin as triple therapy will increase risk of bleeding  - once active respiratory issues improve - recommend getting exercise stress echo (if agreeable to run on treadmill) vs. Dobutamine stress echo to r/o worsening ischemic heart disease as a concomitant etiology of his POWER

## 2019-05-09 NOTE — ASSESSMENT & PLAN NOTE
63-year-old male with a past medical history of COPD, hypertension, hyperlipidemia, CAD on  ASA/plavix, NSCLC (most recently on Durvalumab 1/18/19), who presented to the ED with worsening shortness of breath, wheezing,  and productive cough of brown-yellow sputum for the last 4 days. Differential diagnosis includes but is not limited to COPD exacerbation (productive cough, wheezing on exam, CXR with increased diffuse bilateral interstitial and airspace opacities) vs hospital-acquired PNA (recent admission from 04/17 thru 04/18) vs progression of malignancy.     Plan:   - Levofloxacin 750 mg qd for 3-5 days (for possible underlying PNA)  - Patient was on a prednisone taper at home, being weaned katy from 70 mg qd for the treatment of suspected pneumonitis from the durvalumab that he had been on for a few months. After speaking with patient's oncologist, greyson Pathak will give steroid dosage used for pneumonitis treatment, decreasing prednisone to 60 mg PO qd in light of likely fungal infection  - Duonebs q6h while awake  - Titrate continuous O2 by nasal cannula trevor maintain sats > 88% as tolerated, (on 3 L @ home)  - Consulting Pulmonology as patient is not improving despite three days of standard therapy, appreciate their recs  - it's been discovered (by Pulmonology) that during pt's last admission for which he had a similar presentation, the patient underwent bronchoscopy and had tissue cultures grow positive for Aspergillus fumigatus. Does not appear to have been treated at that time. Spoke with ID about this and they recommended beginning treatiment with voriconazole, started on 200 mg PO BID. Pt will be evaluated in the morning by ID.

## 2019-05-09 NOTE — ASSESSMENT & PLAN NOTE
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     Plan:  - Most recent PFTs outpatient show that obstructive disease itself seems stable, although with decreased DLCO. He is currently appears to be resting at baseline respiratory status on 2L of oxygen at the time of evaluation. Think that progression of overall respiratory status is due to lung malignancy.   - Fungus Culture from BAL 4/17/19 +Aspergillus Fumigatus. Would recommend ID consult to see if this needs to be treated or if colonization  - Recommend to maximize LAMA with Spirvia for severe COPD. If needs to be treated for fungal colonization, would recommend holding Trelegy and inhaled steroids. Can continue this for now until discussion with ID. Continue Spiriva with current therapy.    - Suggest Bipap in case any increased work of breathing.   - Previous documentation of him having sleep apnea in his history. He would benefit from sleep evaluation and possibly cpap at night which might contribute to his resp failure and afib.  - Maintain Mg> 2, K >4

## 2019-05-09 NOTE — PLAN OF CARE
Problem: Adult Inpatient Plan of Care  Goal: Plan of Care Review  Outcome: Ongoing (interventions implemented as appropriate)  Patient is AAOx4, up independently, fall precautions maintained. Plan of care reviewed with patient at the beginning of the shift. CXR and Transthoracic echo completed today. Patient c/o pain, PRN pain medication given. Patient denies nausea or diarrhea. Patient has been tachycardiac throughout the shift. Oxygen 3 Liters via NC .Patient denies SOB or chest pain. Bed locked in lowest position. Side rails X2. Call light and personal belongings within reach. Patient stable. Will continue to monitor.

## 2019-05-10 PROBLEM — Z51.5 PALLIATIVE CARE ENCOUNTER: Status: ACTIVE | Noted: 2019-05-10

## 2019-05-10 LAB
POCT GLUCOSE: 182 MG/DL (ref 70–110)
POCT GLUCOSE: 264 MG/DL (ref 70–110)
POCT GLUCOSE: 307 MG/DL (ref 70–110)
POCT GLUCOSE: 90 MG/DL (ref 70–110)

## 2019-05-10 PROCEDURE — 87305 ASPERGILLUS AG IA: CPT

## 2019-05-10 PROCEDURE — 99900035 HC TECH TIME PER 15 MIN (STAT)

## 2019-05-10 PROCEDURE — 99233 SBSQ HOSP IP/OBS HIGH 50: CPT | Mod: ,,, | Performed by: EMERGENCY MEDICINE

## 2019-05-10 PROCEDURE — 63600175 PHARM REV CODE 636 W HCPCS: Performed by: STUDENT IN AN ORGANIZED HEALTH CARE EDUCATION/TRAINING PROGRAM

## 2019-05-10 PROCEDURE — 25000003 PHARM REV CODE 250: Performed by: STUDENT IN AN ORGANIZED HEALTH CARE EDUCATION/TRAINING PROGRAM

## 2019-05-10 PROCEDURE — 99233 PR SUBSEQUENT HOSPITAL CARE,LEVL III: ICD-10-PCS | Mod: GC,,, | Performed by: INTERNAL MEDICINE

## 2019-05-10 PROCEDURE — 36415 COLL VENOUS BLD VENIPUNCTURE: CPT

## 2019-05-10 PROCEDURE — 99223 PR INITIAL HOSPITAL CARE,LEVL III: ICD-10-PCS | Mod: ,,, | Performed by: INTERNAL MEDICINE

## 2019-05-10 PROCEDURE — 25000003 PHARM REV CODE 250: Performed by: PEDIATRICS

## 2019-05-10 PROCEDURE — 20600001 HC STEP DOWN PRIVATE ROOM

## 2019-05-10 PROCEDURE — 99223 1ST HOSP IP/OBS HIGH 75: CPT | Mod: ,,, | Performed by: INTERNAL MEDICINE

## 2019-05-10 PROCEDURE — 99233 PR SUBSEQUENT HOSPITAL CARE,LEVL III: ICD-10-PCS | Mod: ,,, | Performed by: EMERGENCY MEDICINE

## 2019-05-10 PROCEDURE — 87449 NOS EACH ORGANISM AG IA: CPT

## 2019-05-10 PROCEDURE — 25000003 PHARM REV CODE 250: Performed by: INTERNAL MEDICINE

## 2019-05-10 PROCEDURE — 99233 SBSQ HOSP IP/OBS HIGH 50: CPT | Mod: GC,,, | Performed by: INTERNAL MEDICINE

## 2019-05-10 RX ORDER — TIOTROPIUM BROMIDE 18 UG/1
1 CAPSULE ORAL; RESPIRATORY (INHALATION) DAILY
Qty: 360 CAPSULE | Refills: 0 | Status: SHIPPED | OUTPATIENT
Start: 2019-05-11 | End: 2019-05-10 | Stop reason: HOSPADM

## 2019-05-10 RX ORDER — GUAIFENESIN 100 MG/5ML
200 SOLUTION ORAL EVERY 8 HOURS PRN
Qty: 355 ML | Refills: 1 | COMMUNITY
Start: 2019-05-10 | End: 2019-05-20

## 2019-05-10 RX ORDER — PREDNISONE 20 MG/1
60 TABLET ORAL DAILY
Qty: 30 TABLET | Refills: 0 | Status: SHIPPED | OUTPATIENT
Start: 2019-05-11 | End: 2019-05-21

## 2019-05-10 RX ORDER — VERAPAMIL HYDROCHLORIDE 80 MG/1
80 TABLET ORAL 3 TIMES DAILY
Qty: 90 TABLET | Refills: 11 | Status: SHIPPED | OUTPATIENT
Start: 2019-05-10 | End: 2019-05-13

## 2019-05-10 RX ORDER — RAMELTEON 8 MG/1
8 TABLET ORAL NIGHTLY PRN
Qty: 30 TABLET | Refills: 3 | Status: SHIPPED | OUTPATIENT
Start: 2019-05-10

## 2019-05-10 RX ORDER — IPRATROPIUM BROMIDE AND ALBUTEROL SULFATE 2.5; .5 MG/3ML; MG/3ML
3 SOLUTION RESPIRATORY (INHALATION) EVERY 6 HOURS PRN
Status: DISCONTINUED | OUTPATIENT
Start: 2019-05-10 | End: 2019-05-13 | Stop reason: HOSPADM

## 2019-05-10 RX ADMIN — VERAPAMIL HYDROCHLORIDE 80 MG: 80 TABLET, FILM COATED ORAL at 03:05

## 2019-05-10 RX ADMIN — PREDNISONE 60 MG: 20 TABLET ORAL at 09:05

## 2019-05-10 RX ADMIN — INSULIN ASPART 3 UNITS: 100 INJECTION, SOLUTION INTRAVENOUS; SUBCUTANEOUS at 12:05

## 2019-05-10 RX ADMIN — MORPHINE SULFATE 30 MG: 15 TABLET, EXTENDED RELEASE ORAL at 09:05

## 2019-05-10 RX ADMIN — APIXABAN 5 MG: 2.5 TABLET, FILM COATED ORAL at 09:05

## 2019-05-10 RX ADMIN — SENNOSIDES,DOCUSATE SODIUM 1 TABLET: 8.6; 5 TABLET, FILM COATED ORAL at 09:05

## 2019-05-10 RX ADMIN — OXYCODONE AND ACETAMINOPHEN 1 TABLET: 10; 325 TABLET ORAL at 12:05

## 2019-05-10 RX ADMIN — FAMOTIDINE 20 MG: 20 TABLET, FILM COATED ORAL at 09:05

## 2019-05-10 RX ADMIN — LEVOTHYROXINE SODIUM 75 MCG: 25 TABLET ORAL at 05:05

## 2019-05-10 RX ADMIN — CILOSTAZOL 100 MG: 100 TABLET ORAL at 09:05

## 2019-05-10 RX ADMIN — CLOPIDOGREL 75 MG: 75 TABLET, FILM COATED ORAL at 09:05

## 2019-05-10 RX ADMIN — FINASTERIDE 5 MG: 5 TABLET, FILM COATED ORAL at 09:05

## 2019-05-10 RX ADMIN — OXYCODONE AND ACETAMINOPHEN 1 TABLET: 10; 325 TABLET ORAL at 06:05

## 2019-05-10 RX ADMIN — INSULIN ASPART 2 UNITS: 100 INJECTION, SOLUTION INTRAVENOUS; SUBCUTANEOUS at 12:05

## 2019-05-10 RX ADMIN — VERAPAMIL HYDROCHLORIDE 80 MG: 80 TABLET, FILM COATED ORAL at 09:05

## 2019-05-10 RX ADMIN — LEVOFLOXACIN 750 MG: 750 TABLET, FILM COATED ORAL at 09:05

## 2019-05-10 RX ADMIN — OXYCODONE AND ACETAMINOPHEN 1 TABLET: 10; 325 TABLET ORAL at 07:05

## 2019-05-10 RX ADMIN — INSULIN ASPART 4 UNITS: 100 INJECTION, SOLUTION INTRAVENOUS; SUBCUTANEOUS at 09:05

## 2019-05-10 RX ADMIN — TAMSULOSIN HYDROCHLORIDE 0.4 MG: 0.4 CAPSULE ORAL at 09:05

## 2019-05-10 RX ADMIN — DEXTROSE 500 MG: 50 INJECTION, SOLUTION INTRAVENOUS at 05:05

## 2019-05-10 RX ADMIN — DEXTROSE 330 MG: 50 INJECTION, SOLUTION INTRAVENOUS at 04:05

## 2019-05-10 RX ADMIN — ATORVASTATIN CALCIUM 80 MG: 20 TABLET, FILM COATED ORAL at 09:05

## 2019-05-10 NOTE — ASSESSMENT & PLAN NOTE
Will continue home pain regimen:  - MS contin 30mg BID   - Percocet 10-325mg  q4h PRN  Goals of care discussion started today, getting palliative care involved as patient is amenable to hearing about the options he has in maximizing the time he has left.

## 2019-05-10 NOTE — PLAN OF CARE
Problem: Adult Inpatient Plan of Care  Goal: Plan of Care Review  Outcome: Ongoing (interventions implemented as appropriate)  Patient is AAOx4, up independently, fall precautions maintained. Plan of care reviewed with patient at beginning of shift. Telemetry continued. Accuchecks AC/HS completed today. Patient c/o pain, PRN pain medication given. Patient denies nausea or diarrhea. Patient has been tachycardiac throughout the shift. Oxygen 3 Liters via NC .Patient denies SOB or chest pain. Bed locked in lowest position. Side rails X2. Call light and personal belongings within reach. Patient stable. Will continue to monitor.

## 2019-05-10 NOTE — PHYSICIAN QUERY
PT Name: Nimesh Nunn  MR #: [id number]    Physician Query Form - Atrial Fibrillation Specificity     CDS/: Jessica Mireles RN        Contact information:[email or web address]     This form is a permanent document in the medical record.     Query Date: May 10, 2019    By submitting this query, we are merely seeking further clarification of documentation. Please utilize your independent clinical judgment when addressing the question(s) below.    The medical record contains the following:   Indicators     Supporting Clinical Findings Location in Medical Record   X Atrial Fibrillation Patient observed to have new-onset atrial fibrillation with RVR this AM with HR in 140's Heme/onc 5/9   X EKG results Vent. Rate : 144 BPM     Atrial Rate : 153 BPM     P-R Int : 000 ms          QRS Dur : 086 ms      QT Int : 292 ms       P-R-T Axes : 000 074 011 degrees     QTc Int : 452 ms    Atrial fibrillation with rapid ventricular response  Nonspecific ST and/or T wave abnormalities  When compared with ECG of 06-MAY-2019 04:58,  Atrial fibrillation has replaced Sinus rhythm EKG 5/9    Medication     X Treatment was given metoprolol 5 mg IV x2 to attempt rate control. Seem to respond after a few hours with a return of HR into 's. Heme/onc 5/9   X Other New onset as of this morning, pt does not recall having irregular rhythm any time prior, no complaints of CP or palpitations during episode.  - Likely etiology is 2/2 albuterol from nebulized breathing treatments, achieved rate control with metoprolol 5 mg IV x2  - Switched patient from Lovenox to Eliquis 5 mg BID  - Obtaining 2D echo  - Cardiology consulted, appreciate their recs   Heme/onc 5/9       Provider, please further specify the Atrial Fibrillation diagnosis.    [  ] Paroxysmal   [ + ] Other (please specify):new onset   [  ] Clinically Undetermined       Please document in your progress notes daily for the duration of treatment until resolved, and include  in your discharge summary.

## 2019-05-10 NOTE — SUBJECTIVE & OBJECTIVE
Interval History: Pt states he feels better today.  States dyspnea has improved though he continues to have significant back and hiop pain.  Opioids help but he doesn't feel it as effective as it once was.    Past Medical History:   Diagnosis Date    Abdominal aortic aneurysm (AAA) without rupture 3/5/2018    Arthritis     Atrial fibrillation with RVR 5/9/2019    Blood transfusion     COPD (chronic obstructive pulmonary disease)     COPD exacerbation 3/24/2019    Coronary artery disease     Dehydration 8/15/2018    Encounter for blood transfusion     Hyperlipidemia 11/7/2013    Hypertension     Lung cancer metastatic to bone 7/18/2018    Occlusion and stenosis of carotid artery without mention of cerebral infarction 1/12/2014    Pneumonitis 2/13/2019    Primary malignant neoplasm of left lung 7/18/2018    Screening for colon cancer 6/1/2015    Syncope 1/13/2014    Thrombocytopenia 9/19/2018    Weakness of both lower extremities 1/14/2019       Past Surgical History:   Procedure Laterality Date    ARCH & 4 VESSEL STUDY Bilateral 5/27/2014    [facility] at [facility] CATH LAB    BACK SURGERY      BRONCHOSCOPY,FIBEROPTIC N/A 6/26/2018    Performed by [facility]iana Serrano [facility] at [facility] OR 2ND FLR    carotid endarectomy      CATHETERIZATION, HEART, LEFT Left 11/8/2013    Performed by [facility] at [facility] CATH LAB    COLONOSCOPY N/A 6/1/2015    [facility] ENDO    CORONARY ANGIOPLASTY      CORONARY ARTERY BYPASS GRAFT      ESOPHAGOGASTRODUODENOSCOPY (EGD) N/A 8/16/2018    Performed by Yohannes Osuna MD at [facility] ENDO (2ND FLR)    HEART CATH-LEFT N/A 5/27/2014    Per[facility] by [facility]hi [facility] CATH LAB    INSERTION, STENT, CORONARY ARTERY N/A 12/20/2013    Per[facility] by [facility]hi Martin [facility] CATH LAB    PTA, PERIPHERAL BLOOD VESSEL Left 3/11/2014    Performed by [facility]hi Martin [facility] CATH LAB    PTA, PERIPHERAL BLOOD VESSEL  N/A 2014    Performed by [facility] Martin MD at [facility] CATH LAB    SKIN BIOPSY      ULTRASOUND, ENDOBRONCHIAL N/A 2018    Performed by Liana Serrano MD at [facility] OR [address] [place] [address]       Review of patient's allergies indicates:  No Known Allergies    Medications:  Continuous Infusions:  Scheduled Meds:   apixaban  5 mg Oral BID    atorvastatin  80 mg Oral Daily    cilostazol  100 mg Oral BID    clopidogrel  75 mg Oral Daily    famotidine  20 mg Oral BID    finasteride  5 mg Oral Daily    fluticasone-umeclidin-vilanter  1 puff Inhalation Daily    levoFLOXacin  750 mg Oral Daily    levothyroxine  75 mcg Oral Before breakfast    morphine  30 mg Oral Q12H    predniSONE  60 mg Oral Daily    senna-docusate 8.6-50 mg  1 tablet Oral Daily    tamsulosin  0.4 mg Oral Daily    tiotropium  1 capsule Inhalation Daily    verapamil  80 mg Oral TID    vorconazole (VFEND) IVPB  4 mg/kg Intravenous Q12H     PRN Meds:acetaminophen, albuterol-ipratropium, benzonatate, dextrose 50%, dextrose 50%, glucagon (human recombinant), glucose, glucose, guaifenesin 100 mg/5 ml, insulin aspart U-100, metoprolol, ondansetron, oxyCODONE-acetaminophen, promethazine, ramelteon, sodium chloride 0.9%    Family History     Problem Relation (Age of Onset)    Breast cancer Mother    Cancer Mother    Heart attack Father    Heart disease Father    Heart failure Father    Hypertension Father    No Known Problems Sister, Brother, Maternal Grandmother, Maternal Grandfather, Paternal Grandmother, Paternal Grandfather, Maternal Aunt, Maternal Uncle, Paternal Aunt, Paternal Uncle        Tobacco Use    Smoking status: Former Smoker     Packs/day: 1.50     Years: 40.00     Pack years: 60.00     Last attempt to quit: 10/11/2011     Years since quittin.5    Smokeless tobacco: Never Used   Substance and Sexual Activity    Alcohol use: Yes     Alcohol/week: 1.8 oz     Types: 3 Cans of beer per week     Comment: 3 cans beer every day or every  other day    Drug use: Yes     Types: Marijuana    Sexual activity: Not Currently       Review of Systems   Review of Systems   Constitutional: Positive for fatigue. Negative for chills and fever.   HENT: Negative for congestion, rhinorrhea and sore throat.    Eyes: Negative for pain.   Respiratory: Positive for cough, chest tightness, shortness of breath and wheezing.    Cardiovascular: Negative for chest pain, palpitations and leg swelling.   Gastrointestinal: Positive for abdominal pain and nausea. Negative for abdominal distention, diarrhea and vomiting.   Genitourinary: Negative for difficulty urinating and dysuria.   Musculoskeletal: Negative for arthralgias and myalgias.   Skin: Negative for color change.   Neurological: Negative for dizziness, weakness, light-headedness and headaches.   Psychiatric/Behavioral: Negative for agitation and confusion.       Objective:     Vital Signs (Most Recent):  Temp: 97.8 °F (36.6 °C) (05/10/19 1658)  Pulse: (!) 114 (05/10/19 1546)  Resp: 18 (05/10/19 1658)  BP: (!) 113/59 (05/10/19 1658)  SpO2: (!) 94 % (05/10/19 1658) Vital Signs (24h Range):  Temp:  [97.3 °F (36.3 °C)-98.7 °F (37.1 °C)] 97.8 °F (36.6 °C)  Pulse:  [] 114  Resp:  [16-18] 18  SpO2:  [92 %-98 %] 94 %  BP: ()/(59-86) 113/59     Weight: 83 kg (182 lb 15.7 oz)  Body mass index is 24.82 kg/m².    Review of Symptoms  Symptom Assessment (ESAS 0-10 scale)   ESAS 0 1 2 3 4 5 6 7 8 9 10   Pain       x       Dyspnea     x         Anxiety    x          Nausea x             Depression   x            Anorexia x             Fatigue      x        Insomnia x             Restlessness     x          Agitation    x            Bowel Management Plan (BMP): Yes    Comments: no issues    Pain Assessment: as above    Performance Status: 70    ECOG Performance Status Grade: 2 - Ambulates, capable of self care only    Physical Exam  Constitutional: He is oriented to person, place, and time. He appears well-developed and  well-nourished. No distress.   HENT:   Head: Normocephalic and atraumatic.   Mouth/Throat: Oropharynx is clear and moist.   Eyes: Pupils are equal, round, and reactive to light. EOM are normal.   Neck: Normal range of motion. Neck supple. No JVD present.   Cardiovascular: Normal rate, regular rhythm, normal heart sounds and intact distal pulses.   No murmur heard.  Pulmonary/Chest: Tachypnea noted. No respiratory distress. He has wheezes. He has rhonchi.   Scattered wheezes throughout b/l lung fields   Abdominal: Soft. Bowel sounds are normal. He exhibits no distension. There is no tenderness.   Musculoskeletal: Normal range of motion. He exhibits no edema.   Neurological: He is alert and oriented to person, place, and time. No cranial nerve deficit.   Skin: Skin is warm and dry. Capillary refill takes less than 2 seconds.   Psychiatric: He has a normal mood and affect.       Significant Labs:   CBC: No results for input(s): WBC, HGB, HCT, PLT in the last 48 hours.  CMP:   Recent Labs   Lab 05/09/19  0521      K 3.9   CL 90*   CO2 39*   *   BUN 21   CREATININE 0.9   CALCIUM 9.2   PROT 6.9   ALBUMIN 2.7*   BILITOT 0.2   ALKPHOS 88   AST 14   ALT 15   ANIONGAP 8   EGFRNONAA >60.0     CBC:   Recent Labs   Lab 05/08/19  0438   WBC 6.29   HGB 7.9*   HCT 25.6*   MCV 99*   *     BMP:  No results for input(s): GLU, NA, K, CL, CO2, BUN, CREATININE, CALCIUM, MG in the last 24 hours.  LFT:  Lab Results   Component Value Date    AST 14 05/09/2019    GGT 81 (H) 03/25/2015    ALKPHOS 88 05/09/2019    BILITOT 0.2 05/09/2019     Albumin:   Albumin   Date Value Ref Range Status   05/09/2019 2.7 (L) 3.5 - 5.2 g/dL Final     Protein:   Total Protein   Date Value Ref Range Status   05/09/2019 6.9 6.0 - 8.4 g/dL Final     Lactic acid:   Lab Results   Component Value Date    LACTATE 1.8 05/06/2019    LACTATE 0.9 04/15/2019       Significant Imaging: CT: I have reviewed all pertinent results/findings within the past 24  hours:  Chest   1. Bilateral upper lobe predominant, patchy subsegmental areas of ground-glass attenuation and consolidation involving the peripheral and peribronchovascular regions of the lungs with scattered areas of bronchial wall thickening and small bilateral dependent pleural effusions, progressed when compared to the previous CT.  Findings are overall nonspecific and may relate to sequela of infectious (multifocal pneumonia) non infectious inflammatory (aspiration, post radiation) or less likely metastatic disease.  Specialty consultation recommended.  2. Left lower lobe mass corresponding with the patient's primary neoplasm and difficult to accurately measure secondary to surrounding parenchymal disease and lack of intravenous contrast.  3. Patient's known lytic lesion within the L1 vertebral body is not well evaluated on today's exam.  4. Severe dense coronary artery atherosclerosis.  This report was flagged in Epic as abnormal.  CXR: I have reviewed all pertinent results/findings within the past 24 hours:    FINDINGS:  Heart size is normal.  There are diffuse patchy perihilar and peripheral infiltrates throughout both lungs.  There is aortic plaque and DJD.    Advance Care Planning   Advanced Directives::  Living Will: No  LaPOST: No  Do Not Resuscitate Status: No  Medical Power of : No    Decision-Making Capacity: Patient answered questions       Living Arrangements: Lives alone    Psychosocial/Cultural:  Patient's most important priorities:  Spending time with son and grandchildren    Patient's biggest concerns/fears:  Missing an opportunity to be with family    Previous death/end of life care history:  Ex wife  from cancer in an inpt hospice; father  a typical ICU death with a prolonged illness and never left the hosptial    Patient's goals/hopes:  Hopes for remaining time to be spent outside of hospital    Spiritual:   Did not discuss this visit but will ask  to visit.

## 2019-05-10 NOTE — HPI
Mr. Nimesh Nunn is a 63-year-old male with a past medical history of COPD, hypertension, hyperlipidemia, CAD on  ASA/plavix, NSCLC (most recently on Durvalumab 1/18/19), who presented to the ED with worsening shortness of breath, wheezing,  and productive cough of brown-yellow sputum for the last 4 days. Patient has had 3 hospital admissions for a similar presentation since February, most recently from 04/17- 04/18 at which time he was treated with duo-nebs, steroids, and antibiotics for a COPD exacerbation. Patient reports that he is on home oxyegn ( usually requiring 3 L NC) but has recently felt his work of breathing increase. He reports that he was discharged from his last admission on prednisone 70mg qd and has been weaning down, currently on 50 mg qd. Patient also reports subjective fevers at home and a generalized abdominal pain with associated nausea, 10/10 in severity, worse in the RUQ that is not affected by eating food. He denies chills, headache, vomiting, CP, diarrhea, or dysuria. He is not currently on chemo. .Per EMS, he was 88% on RA and went to 96% on 4L  In the ED, the patient was found to be hypoxic, tachycardic, and hypertensive. On exam,  he appears comfortable, in no acute distress. Lungs have scattered wheezes and rhonchi throughout, abdomen is soft, non-tender with active bowel sounds. No lower extremity edema noted. His chest x ray is c/w pneumonitis versus disease progression. Patient was admitted to Medical Oncology for further management.                  Interval History:  Patient observed to have new-onset atrial fibrillation with RVR this AM with HR in 140's, was given metoprolol 5 mg IV x2 to attempt rate control. Seem to respond after a few hours with a return of HR into 's.  Pt reports no improvement in breathing, actually felt somewhat worse this AM with ambulation. Remains on home oxygen level of 2-3 L , SpO2 89-94%.   Remains afebrile, hemodynamically stable, in no  acute distress.   Scattered wheezing still heard on pulmonary exam.   The pt was treated for suspected pneumonitis with high dose steroids, but it was also discovered that he also had an aspergillosis infection that had not been treated after a bronch in April.      In this setting palliative medicine was consulted to help aid in medical decision making and goals of care where appropriate.

## 2019-05-10 NOTE — ASSESSMENT & PLAN NOTE
63-year-old male with a past medical history of COPD, hypertension, hyperlipidemia, CAD on  ASA/plavix, NSCLC (most recently on Durvalumab 1/18/19), who presented to the ED with worsening shortness of breath, wheezing,  and productive cough of brown-yellow sputum for the last 4 days. Differential diagnosis includes but is not limited to COPD exacerbation (productive cough, wheezing on exam, CXR with increased diffuse bilateral interstitial and airspace opacities) vs hospital-acquired PNA (recent admission from 04/17 thru 04/18) vs progression of malignancy.     Plan:   - Levofloxacin 750 mg qd for 3-5 days (for possible underlying PNA)  - Patient was on a prednisone taper at home, being weaned katy from 70 mg qd for the treatment of suspected pneumonitis from the durvalumab that he had been on for a few months. After speaking with patient's oncologist, greyson Pathak will give steroid dosage used for pneumonitis treatment, decreasing prednisone to 60 mg PO qd in light of possible fungal infection  - Duonebs q6h while awake  - Titrate continuous O2 by nasal cannula trevor maintain sats > 88% as tolerated, (on 3 L @ home)  - Consulting Pulmonology as patient is not improving despite three days of standard therapy, appreciate their recs  - it's been discovered that during pt's last admission for which he had a similar presentation, the patient underwent bronchoscopy and had tissue cultures grow positive for Aspergillus fumigatus. Does not appear to have been treated at that time. Spoke with ID about this and they recommended beginning treatiment with voriconazole, started on 200 mg PO BID. Pt will be evaluated in the morning by ID.   - Continuing ant-fungal therapy with PO voriconazole, pt can be discharged on this medication.

## 2019-05-10 NOTE — ASSESSMENT & PLAN NOTE
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     Plan:  - Most recent PFTs outpatient show that obstructive disease itself seems stable, although with decreased DLCO. He is currently appears to be resting at baseline respiratory status on 2L of oxygen at the time of evaluation. Think that progression of overall respiratory status is due to lung malignancy.   - Fungus Culture from BAL 4/17/19 +Aspergillus Fumigatus. Although seems unlikely to have an active infection at this time, would recommend ID consult to see if this needs to be treated or if colonization  - Recommend to maximize LAMA with Spirvia for severe COPD. If needs to be treated for fungal colonization, would recommend holding Trelegy and inhaled steroids. Can continue this for now until discussion with ID. Continue Spiriva with current therapy.    - Suggest Bipap in case any increased work of breathing.   - Previous documentation of him having sleep apnea in his history. He would benefit from sleep evaluation and possibly cpap at night which might contribute to his resp failure and afib.  - Maintain Mg> 2, K >4

## 2019-05-10 NOTE — PROGRESS NOTES
Admit Assessment    Patient Identification  Nimesh Nunn   :  1955  Admit Date:  2019  Attending Provider:  Kevin Dwyer MD              Referral:   Pt was admitted to  with a diagnosis of <principal problem not specified>, and was admitted this hospital stay due to Shortness of breath [R06.02]  Chronic obstructive pulmonary disease with acute exacerbation [J44.1]  HCAP (healthcare-associated pneumonia) [J18.9].       is involved was referred to the Social Work Department via routine referral.  Patient presents as a 63 y.o. year old  male.    Persons interviewed : Patient    Living Situation:      Resides at [address] [place], [place] [id number] [facility] [address], phone: [phone] (home).  Patient lives alone. He told me that he plans on moving to [place] soon. He says his son is renting the house next door to him. He says he thinks it will be within a month that he will move. I provided him my direct number so that as an outpatient we can continue to coordinate his care and transition care to other providers if necessary.         Current or Past Agencies and Description of Services/Supplies    DME  Agency Name: [facility]  Equipment Currently Used at Home: walker, rolling, home oxygen     Home Health  Agency Name: [facility]   Agency Phone Number: [phone]  Services: skilled nursing, pt and ot    IV Infusion: N/A      Nutrition: Oral     Outpatient Pharmacy:     [facility] [address] [address]  Phone: [phone] Fax: [phone]      Patient Preference of agencies include None expressed    Patient/Caregiver informed of right to choose providers or agencies.  Patient provides permission to release any necessary information to [facility] and to Non[facility] agencies as needed to facilitate patient care, treatment planning, and patient discharge planning.  Written and verbal resources provided.      Coping:  Patient talked a lot about moving to be closer to family. He realizes that he cannot be on his own for much longer.           Adjustment to Diagnosis and Treatment: Appropriate    Emotional/Behavioral/Cognitive Issues: None observed or noted            History/Current Symptoms of Anxiety/Depression: No:  History/Current Substance Use:   Social History     Tobacco Use    Smoking status: Former Smoker     Packs/day: 1.50     Years: 40.00     Pack years: 60.00     Last attempt to quit: 10/11/2011     Years since quittin.5    Smokeless tobacco: Never Used   Substance and Sexual Activity    Alcohol use: Yes     Alcohol/week: 1.8 oz     Types: 3 Cans of beer per week     Comment: 3 cans beer every day or every other day    Drug use: Yes     Types: Marijuana    Sexual activity: Not Currently       Indications of Abuse/Neglect: No:   Abuse Screen (yes response referral indicated)  Feels Unsafe at Home or Work/School: no    Financial:  Payor/Plan Subscr  Sex Relation Sub. Ins. ID Effective Group Num   1. 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     providing psychosocial and supportive counseling, resources, education, assistance and discharge planning as appropriate.  Patient/caregiver state understanding of  available resources,  following, remains available.

## 2019-05-10 NOTE — PLAN OF CARE
Problem: Adult Inpatient Plan of Care  Goal: Plan of Care Review  Outcome: Ongoing (interventions implemented as appropriate)  Plan of care reviewed with the patient at the beginning of shift. The patient is complaining of pain. Oxy administered. The patient stated that he has not slept since the previous day. Ramelteon administered. Pt did get some rest during the night. The patient is denying all other complaints. VSS. Sinus rhythm. verapamil and eliquis added to medication regimen. Bed in low locked position. Call bell within reach. Will continue to monitor.

## 2019-05-10 NOTE — ASSESSMENT & PLAN NOTE
Mr Nunn is a 64 yo male with NSCLC to bone (L1), chronic resp failure on 3 L HOT, and confirmed aspergillosis infection vs pneumonitis admitted for the 4th time in the past few months with a declining funtional status.     Symptoms:   Nociceptive bony pain from mets: can consider increased the frequency of percocet 10/325 to q 3 hours, heat pads, and PT as tolerated.  Steroids not good option currently due to aspergillosis infection.  Dyspnea: bedside fan and discussed using his prn analgesics for dyspnea    Spiritual: asked  to visit.     Code status: currently a FC; discussed our recommendation of DNR based on his goals and hopes to avoid dying in the hospital; he wants to read over information given and discuss with his son    Medicolegal: Doesn't have an ACP or LW document but his son is surrogate and the pt desires for him to be primary MPOA.  Nimesh Hunterfritz [phone]    GOC: After assessing the pt's understanding of his illness and asking permission of sharing up to date information with a prognosis of weeks to months, he was open to discussing options post discharge.  While exploring his values of wanting to be with family with the time he has left, I described the support that hospice can provide to both he and his family.  He admitted he was overwhelmed by the information and feels this has happened quickly.  He also appreciated the approach of open and honest information by the team involved.  He worries about being a burden to his family, but his son is supportive in wanting to have him at home.     Dispo:   Likely home with son in [place].  Hospice discussions took place today and information left to read.  Discussed with son by phone who also agrees with hospice in the home setting.     Discussed with oncology and oncology LCSW

## 2019-05-10 NOTE — CONSULTS
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 Pulmonology consulted for optimization of COPD management.    He was initially diagnosed with poorly differentiated adenocarcinoma with spine metastasis on 6/2018, by Dr. Serrano with diagnostic bronchoscopy with EBUS 6/20/2018. He received radiation of the spine in addition to chemotherapy w/ cisplatan/pemetrexed 8/2018. Then received Darvolumab x 5 cycles started 10/2018, last dose 1/2019.     Following that, he was admitted 2 times for respiratory failure presumed to be COPD exacerbation vs pneumonitis. He was started on prednisone 80mg 2/3/2019 and was tapered to 60mg daily. He was seen by Dr. Serrano on 4/1/2019 after being referred by Dr. Anton. At this point, both Woody Serrano and Fabian suspected that he was not worsening from pneumonitis, but likely from progression of the cancer itself. There was a CTA done in clinic that was negative for CTEPH, further supporting that hypothesis. He was switched from Anora to Trilogy for home inhalers.     On 4/15/19 he was once again admitted for respiratory failure. He received a bronchoscopy 4/20/19 and was discharged with pending pathology/cultures. On 4/20/19, he was noted to have cultures positive for Aspergillus fumigatus. There is documentation that the patient was unable to be contacted. It is unclear if the patient received treatment or followed up with ID as pulmonology was requesting.     Since his current hospitalization,  the patient reports his breathing has remained mostly unchanged, is on his baseline O2 requirements 2-3L. He reports that he always is SOB, and it doesn't take much (standing up, walking to the bathrom) to cause severe SOB. He is on 2-3 L of oxygen at home.He initially presented with O2 sats of 88%. He was treated with duo-nebs and prednisone 80 mg BID, and leveofloxacin x 5 days. Overnight 5/8/19 he develoepd Afib with RVR to the 170's (without symptoms or hemodynamic instability) and improved to 120-130's with IV metoprolol. He is on scheduled lopressor currently.     Pulmonary Function Tests:  4/1/2019: FEV1 48%, FEV1/FVC 53%, DLCO 37%  6/2018: FEV 51%, FEV1/FVC 60%, DLCO 100%.       Past Medical History:   Diagnosis Date    Abdominal aortic aneurysm (AAA) without rupture 3/5/2018    Arthritis     Blood transfusion     COPD (chronic obstructive pulmonary disease)     COPD exacerbation 3/24/2019    Coronary artery disease     Dehydration 8/15/2018    Encounter for blood transfusion     Hyperlipidemia 11/7/2013    Hypertension     Lung cancer metastatic to bone 7/18/2018    Occlusion and stenosis of carotid artery without mention of cerebral infarction 1/12/2014    Pneumonitis 2/13/2019    Primary malignant neoplasm of left lung 7/18/2018    Screening for colon cancer 6/1/2015    Syncope 1/13/2014    Thrombocytopenia 9/19/2018    Weakness of both lower extremities 1/14/2019       Past Surgical History:   Procedure Laterality Date    ARCH & 4 VESSEL STUDY Bilateral 5/27/2014    Performed by [facility] at [facility] CATH LAB    BACK SURGERY      BRONCHOSCOPY,FIBEROPTIC N/A 6/26/2018    Performed by [facility]ano [facility] at [facility] OR 2ND FLR    carotid endarectomy      CATHETERIZATION, HEART, LEFT Left 11/8/2013    Performed by [facility] Martin [facility] at [facility] CATH LAB    COLONOSCOPY N/A 6/1/2015    [facility] at [facility] ENDO    CORONARY ANGIOPLASTY      CORONARY ARTERY BYPASS  GRAFT      ESOPHAGOGASTRODUODENOSCOPY (EGD) N/A 2018    Performed by [facility]una [facility] at [facility] ENDO (2ND FLR)    HEART CATH-LEFT N/A 2014    Performed by [facility]hi Martin [facility] at [facility] CATH LAB    INSERTION, STENT, CORONARY ARTERY N/A 2013    Performed by Mekhi Martin [facility] at [facility] CATH LAB    PTA, PERIPHERAL BLOOD VESSEL Left 3/11/2014    Performed by Mekhi Martin [facility] at [facility] CATH LAB    PTA, PERIPHERAL BLOOD VESSEL N/A 2014    Performed by Mekhi Martin MD at [facility] CATH LAB    SKIN BIOPSY      ULTRASOUND, ENDOBRONCHIAL N/A 2018    Performed by Liana Serrano MD at [facility] OR 2ND FLR       Review of patient's allergies indicates:  No Known Allergies    Family History     Problem Relation (Age of Onset)    Breast cancer Mother    Cancer Mother    Heart attack Father    Heart disease Father    Heart failure Father    Hypertension Father    No Known Problems Sister, Brother, Maternal Grandmother, Maternal Grandfather, Paternal Grandmother, Paternal Grandfather, Maternal Aunt, Maternal Uncle, Paternal Aunt, Paternal Uncle        Tobacco Use    Smoking status: Former Smoker     Packs/day: 1.50     Years: 40.00     Pack years: 60.00     Last attempt to quit: 10/11/2011     Years since quittin.5    Smokeless tobacco: Never Used   Substance and Sexual Activity    Alcohol use: Yes     Alcohol/week: 1.8 oz     Types: 3 Cans of beer per week     Comment: 3 cans beer every day or every other day    Drug use: Yes     Types: Marijuana    Sexual activity: Not Currently         Review of Systems   Constitutional: Negative for fever.   HENT: Negative for trouble swallowing.    Eyes: Negative for visual disturbance.   Respiratory: Positive for cough, shortness of breath and wheezing.    Cardiovascular: Negative for chest pain.   Gastrointestinal: Negative for abdominal pain and vomiting.   Genitourinary: Negative for difficulty urinating.   Musculoskeletal: Negative for arthralgias.    Skin: Negative for rash.   Allergic/Immunologic: Positive for immunocompromised state.   Neurological: Negative for syncope.   Hematological: Does not bruise/bleed easily.     Objective:     Vital Signs (Most Recent):  Temp: 97.5 °F (36.4 °C) (05/09/19 1252)  Pulse: 94 (05/09/19 1256)  Resp: 16 (05/09/19 1256)  BP: 119/81 (05/09/19 1252)  SpO2: 95 % (05/09/19 1256) Vital Signs (24h Range):  Temp:  [97.1 °F (36.2 °C)-98.1 °F (36.7 °C)] 97.5 °F (36.4 °C)  Pulse:  [] 94  Resp:  [16-22] 16  SpO2:  [92 %-99 %] 95 %  BP: ()/(59-95) 119/81     Weight: 83 kg (182 lb 15.7 oz)  Body mass index is 24.82 kg/m².      Intake/Output Summary (Last 24 hours) at 5/9/2019 1416  Last data filed at 5/9/2019 0936  Gross per 24 hour   Intake 470 ml   Output 400 ml   Net 70 ml       Physical Exam   Constitutional: He is oriented to person, place, and time. No distress.   HENT:   Head: Atraumatic.   Eyes: Pupils are equal, round, and reactive to light. EOM are normal.   Neck: No JVD present.   Cardiovascular: Normal rate and regular rhythm.   No murmur heard.  Pulmonary/Chest: Effort normal. He has wheezes. He has rales.   Abdominal: Soft. Bowel sounds are normal. There is no tenderness.   Musculoskeletal: Normal range of motion. He exhibits no edema.   Neurological: He is alert and oriented to person, place, and time. No cranial nerve deficit. Coordination normal.   Skin: Skin is warm and dry. No rash noted.   Psychiatric: He has a normal mood and affect.   Nursing note and vitals reviewed.      Vents:  Oxygen Concentration (%): 32 (05/08/19 1959)    Lines/Drains/Airways     Peripheral Intravenous Line                 Peripheral IV - Single Lumen 04/15/19 1850 20 G Right Antecubital 23 days                Significant Labs:    CBC/Anemia Profile:  Recent Labs   Lab 05/08/19  0438   WBC 6.29   HGB 7.9*   HCT 25.6*   *   MCV 99*   RDW 18.1*        Chemistries:  Recent Labs   Lab 05/08/19  0438 05/09/19  0521    137    K 3.5 3.9   CL 91* 90*   CO2 37* 39*   BUN 19 21   CREATININE 0.9 0.9   CALCIUM 9.1 9.2   ALBUMIN 2.6* 2.7*   PROT 6.8 6.9   BILITOT 0.2 0.2   ALKPHOS 85 88   ALT 13 15   AST 12 14   MG 1.7 1.6   PHOS 2.0* 2.4*       BMP:   Recent Labs   Lab 05/09/19  0521   *      K 3.9   CL 90*   CO2 39*   BUN 21   CREATININE 0.9   CALCIUM 9.2   MG 1.6     CMP:   Recent Labs   Lab 05/08/19  0438 05/09/19  0521    137   K 3.5 3.9   CL 91* 90*   CO2 37* 39*   * 190*   BUN 19 21   CREATININE 0.9 0.9   CALCIUM 9.1 9.2   PROT 6.8 6.9   ALBUMIN 2.6* 2.7*   BILITOT 0.2 0.2   ALKPHOS 85 88   AST 12 14   ALT 13 15   ANIONGAP 8 8   EGFRNONAA >60.0 >60.0     All pertinent labs within the past 24 hours have been reviewed.    Significant Imaging:   I have reviewed and interpreted all pertinent imaging results/findings within the past 24 hours.    Assessment/Plan:     COPD exacerbation  Mr Nunn is a 62 yo man with severe COPD on home oxygen and Trelogy and NSCLC with metastasis to spine, who presents with recurrent respiratory failure.     Plan:  - Most recent PFTs outpatient show that obstructive disease itself seems stable, although with decreased DLCO. He is currently appears to be resting at baseline respiratory status on 2L of oxygen at the time of evaluation. Think that progression of overall respiratory status is due to lung malignancy.   - Fungus Culture from BAL 4/17/19 +Aspergillus Fumigatus. Although seems unlikely to have an active infection at this time, would recommend ID consult to see if this needs to be treated or if colonization.   - Agree with levofloxacin in addition to antifungal treatment with voriconazole.  - Recommend sputum cultures if possible.   - Recommend to maximize LAMA with Spirvia for severe COPD. If needs to be treated for fungal colonization, would recommend holding Trelegy and inhaled steroids. Can continue this for now until discussion with ID. Continue Spiriva with current  therapy.    - Suggest palliative care consult in patient with Stage 4 lung cancer.   - Suggest Bipap in case any increased work of breathing.   - Previous documentation of him having sleep apnea in his history. He would benefit from sleep evaluation and possibly cpap at night which might contribute to his resp failure and afib.  - Maintain Mg> 2, K >4      Continue broad antimicrobial treatment. If not better by Monday, will reevaluate.         Thank you for your consult. I will sign off. Please contact us if you have any additional questions.     John Hay MD  Pulmonology  [facility]

## 2019-05-10 NOTE — CONSULTS
Consult acknowledged and case discussed with Dr. Dwyer yesterday. Patient loaded on voriconazole for invasive aspergillosis, formal note to follow.    Addendum: informed by team that palliative care/hospice is to be pursued. Will cancel formal consult.    Viridiana Chao MD  Transplant ID Attending  [phone]

## 2019-05-10 NOTE — PLAN OF CARE
[facility]    HOME HEALTH ORDERS  FACE TO FACE ENCOUNTER    Patient Name: Nimesh Nunn  YOB: 1955    PCP: Nishant Perez MD   PCP Address: [address] [place] [address] / [place] [address]  PCP Phone Number: [phone]  PCP Fax: [phone]    Encounter Date: 05/10/2019    Admit to Home Health (Pending)    Diagnoses:  Active Hospital Problems    Diagnosis  POA    Atrial fibrillation with RVR [I48.91]  Unknown    COPD exacerbation [J44.1]  Yes    Anemia in neoplastic disease [D63.0]  Yes    Lung cancer metastatic to bone [C34.90, C79.51]  Yes    Adenocarcinoma of left lung, stage 4 [C34.92]  Yes     Last CT in February concerning for progression of the left hilum  Concerned that he has progression of disease leading to worsening hypoxia and dyspnea on exertion.  Follow up CT of chest.      PAD (peripheral artery disease) [I73.9]  Yes    HTN (hypertension) [I10]  Yes    Hyperlipidemia [E78.5]  Yes      Resolved Hospital Problems    Diagnosis Date Resolved POA    HCAP (healthcare-associated pneumonia) [J18.9] 05/07/2019 Yes       No future appointments.  Follow-up Information     Demetrius Infante DO, FACP.    Specialty:  Hematology and Oncology  Why:  As scheduled by the clinic  Contact information:  [address]  [facility] [address] [phone]                     I have seen and examined this patient face to face today. My clinical findings that support the need for the home health skilled services and home bound status are the following:  Weakness/numbness causing balance and gait disturbance due to COPD Exacerbation and Malignancy/Cancer making it taxing to leave home.  Medical restrictions requiring assistance of another human to leave home due to  Dyspnea on exertion (SOB) and Home oxygen requirement.    Allergies:Review of patient's allergies indicates:  No Known Allergies    Diet: regular diet    Activities: activity as tolerated    Nursing:   SN to  complete comprehensive assessment including routine vital signs. Instruct on disease process and s/s of complications to report to MD. Review/verify medication list sent home with the patient at time of discharge  and instruct patient/caregiver as needed. Frequency may be adjusted depending on start of care date.    Notify MD if SBP > 160 or < 90; DBP > 90 or < 50; HR > 120 or < 50; Temp > 101;       CONSULTS:    Physical Therapy to evaluate and treat. Evaluate for home safety and equipment needs; Establish/upgrade home exercise program. Perform / instruct on therapeutic exercises, gait training, transfer training, and Range of Motion.  Occupational Therapy to evaluate and treat. Evaluate home environment for safety and equipment needs. Perform/Instruct on transfers, ADL training, ROM, and therapeutic exercises.    MISCELLANEOUS CARE:  Home Oxygen:  Oxygen at 2-3 L/min nasal canula to be used:  Continuously.  COPD: Please ensure that patient has a pulse oximeter and is educated on his normal oxygen saturations: 88-92%  Please ensure patient has a functioning nebulizer and provide education on its usage.  If patient has increased cough or symptoms, please initiate COPD protocol including  80 mg of Prednisone for 3 days  levofloxacin 750 mg PO qdantibiotic frequency for 3 days  supplemental oxygen at 2-3 LPM for O2 > 88%    WOUND CARE ORDERS  n/a      Medications: Review discharge medications with patient and family and provide education.      Current Discharge Medication List      START taking these medications    Details   apixaban (ELIQUIS) 5 mg Tab Take 1 tablet (5 mg total) by mouth 2 (two) times daily.  Qty: 90 tablet, Refills: 3      guaifenesin 100 mg/5 ml (ROBITUSSIN) 100 mg/5 mL syrup Take 10 mLs (200 mg total) by mouth every 8 (eight) hours as needed for Cough or Congestion.  Qty: 355 mL, Refills: 1      predniSONE (DELTASONE) 20 MG tablet Take 3 tablets (60 mg total) by mouth once daily. for 10 days  Qty:  30 tablet, Refills: 0    Associated Diagnoses: Chronic obstructive pulmonary disease with acute exacerbation      ramelteon (ROZEREM) 8 mg tablet Take 1 tablet (8 mg total) by mouth nightly as needed for Insomnia.  Qty: 30 tablet, Refills: 3      verapamil (CALAN) 80 MG tablet Take 1 tablet (80 mg total) by mouth 3 (three) times daily.  Qty: 90 tablet, Refills: 11         CONTINUE these medications which have NOT CHANGED    Details   atorvastatin (LIPITOR) 80 MG tablet Take 1 tablet (80 mg total) by mouth once daily.  Qty: 30 tablet, Refills: 11    Comments: Generic For:LIPITOR   80MG  07/12/2018 11:08:49 AM  Associated Diagnoses: Dyslipidemia      cilostazol (PLETAL) 100 MG Tab TAKE 1 TABLET BY MOUTH TWICE DAILY  Qty: 60 tablet, Refills: 11    Comments: Generic For:PLETAL    100MG  03/23/2018 12:02:45 PM  Associated Diagnoses: PVD (peripheral vascular disease)      clopidogrel (PLAVIX) 75 mg tablet TAKE 1 TABLET BY MOUTH EVERY DAY  Qty: 90 tablet, Refills: 4    Comments: Generic For:*PLAVIX    75MG 07/02/2018 10:09:50 AM  Associated Diagnoses: Coronary artery disease involving native coronary artery without angina pectoris, unspecified whether native or transplanted heart; PAD (peripheral artery disease); Right-sided carotid artery disease      diphenoxylate-atropine 2.5-0.025 mg (LOMOTIL) 2.5-0.025 mg per tablet Take 1 tablet by mouth 4 (four) times daily as needed for Diarrhea.  Qty: 30 tablet, Refills: 1    Associated Diagnoses: Diarrhea, unspecified type      finasteride (PROSCAR) 5 mg tablet Take 1 tablet (5 mg total) by mouth once daily.  Qty: 90 tablet, Refills: 3    Comments: Generic For:PROSCAR   5MG  12/20/2018 3:17:34 PM  Associated Diagnoses: Benign non-nodular prostatic hyperplasia without lower urinary tract symptoms      fluticasone-umeclidin-vilanter (TRELEGY ELLIPTA) 100-62.5-25 mcg DsDv Inhale 1 puff into the lungs once daily.  Qty: 60 each, Refills: 11    Associated Diagnoses: Chronic obstructive  pulmonary disease with acute exacerbation      levothyroxine (SYNTHROID) 75 MCG tablet Take 1 tablet (75 mcg total) by mouth once daily.  Qty: 30 tablet, Refills: 11    Associated Diagnoses: Primary malignant neoplasm of left lung; Neoplasm of abdomen; Lung cancer metastatic to bone      morphine (MS CONTIN) 30 MG 12 hr tablet Take 1 tablet (30 mg total) by mouth every 8 (eight) hours.  Qty: 90 tablet, Refills: 0    Associated Diagnoses: Neoplasm related pain      omeprazole (PRILOSEC) 20 MG capsule Take 1 capsule (20 mg total) by mouth once daily.  Qty: 90 capsule, Refills: 3    Associated Diagnoses: Gastroesophageal reflux disease, esophagitis presence not specified      ondansetron (ZOFRAN-ODT) 8 MG TbDL Take 1 tablet (8 mg total) by mouth every 12 (twelve) hours as needed.  Qty: 30 tablet, Refills: 1    Associated Diagnoses: Primary malignant neoplasm of left lung      oxyCODONE-acetaminophen (PERCOCET)  mg per tablet Take 1 tablet by mouth every 4 (four) hours as needed for Pain.  Qty: 90 tablet, Refills: 0    Associated Diagnoses: Neoplasm of abdomen      PROAIR HFA 90 mcg/actuation inhaler Inhale 2 puffs into the lungs as needed.  Qty: 18 g, Refills: 6    Associated Diagnoses: Chronic obstructive pulmonary disease, unspecified COPD type      tamsulosin (FLOMAX) 0.4 mg Cap Take 1 capsule (0.4 mg total) by mouth once daily.  Qty: 90 capsule, Refills: 3    Associated Diagnoses: Benign non-nodular prostatic hyperplasia without lower urinary tract symptoms      promethazine-codeine 6.25-10 mg/5 ml (PHENERGAN WITH CODEINE) 6.25-10 mg/5 mL syrup Take 5 mLs by mouth every 4 (four) hours as needed for Cough.  Qty: 175 mL, Refills: 0    Associated Diagnoses: Cough         STOP taking these medications       aspirin (ECOTRIN) 81 MG EC tablet Comments:   Reason for Stopping:         metoprolol succinate (TOPROL-XL) 50 MG 24 hr tablet Comments:   Reason for Stopping:               I certify that this patient is  confined to his home and needs intermittent skilled nursing care, physical therapy and occupational therapy.    Migel Brambila MD  Hematology/Oncology  [facility]

## 2019-05-10 NOTE — ASSESSMENT & PLAN NOTE
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     Plan:  - Most recent PFTs outpatient show that obstructive disease itself seems stable, although with decreased DLCO. He is currently appears to be resting at baseline respiratory status on 2L of oxygen at the time of evaluation. Think that progression of overall respiratory status is due to lung malignancy.   - Fungus Culture from BAL 4/17/19 +Aspergillus Fumigatus. Although seems unlikely to have an active infection at this time, would recommend ID consult to see if this needs to be treated or if colonization.   - Agree with levofloxacin in addition to antifungal treatment with voriconazole.  - Recommend sputum cultures if possible.   - Recommend to maximize LAMA with Spirvia for severe COPD. If needs to be treated for fungal colonization, would recommend holding Trelegy and inhaled steroids. Can continue this for now until discussion with ID. Continue Spiriva with current therapy.    - Suggest palliative care consult in patient with Stage 4 lung cancer.   - Suggest Bipap in case any increased work of breathing.   - Previous documentation of him having sleep apnea in his history. He would benefit from sleep evaluation and possibly cpap at night which might contribute to his resp failure and afib.  - Maintain Mg> 2, K >4      Continue broad antimicrobial treatment. If not better by Monday, will reevaluate.

## 2019-05-10 NOTE — PROGRESS NOTES
Patient will be a potential discharge for the weekend. Requested home health orders so to get authorization set up for the discharge. Still waiting for orders. Patient will need transportation home will notify on call  for the weekend of the need.

## 2019-05-10 NOTE — ASSESSMENT & PLAN NOTE
- New onset as of this morning, pt does not recall having irregular rhythm any time prior, no complaints of CP or palpitations during episode.  - Likely etiology is 2/2 albuterol from nebulized breathing treatments, achieved rate control with metoprolol 5 mg IV x2  - Switched patient from Lovenox to Eliquis 5 mg BID  - Obtaining 2D echo  - Cardiology consulted, appreciate their recs  - Discontinued ASA (due to increased bleeding risk)  - Switched from Lovenox to Eliquis 5 mg BID  - Discontinued metoprolol and started on verapamil 80 mg TID

## 2019-05-10 NOTE — SUBJECTIVE & OBJECTIVE
Interval History: No acute events overnight. Discovered yesterday that patient had a positive culture from recent bronchoscopy (04/17/19) growing Aspergillus fumigatus, consulted with ID and decided to begin treatment with anti-fungal therapy. Pt reports some improvement in his breathing this morning, remains at home O2 level (2-3 L/min), otherwise hemodynamically stable, in NSR. Began discussion about goals of care today with staff, pt amenable to speaking with palliative care services.       Oncology Treatment Plan:   OP DOCETAXEL (75 MG/M2) Q3W    Medications:  Continuous Infusions:  Scheduled Meds:   apixaban  5 mg Oral BID    atorvastatin  80 mg Oral Daily    cilostazol  100 mg Oral BID    clopidogrel  75 mg Oral Daily    famotidine  20 mg Oral BID    finasteride  5 mg Oral Daily    fluticasone-umeclidin-vilanter  1 puff Inhalation Daily    levoFLOXacin  750 mg Oral Daily    levothyroxine  75 mcg Oral Before breakfast    morphine  30 mg Oral Q12H    predniSONE  60 mg Oral Daily    senna-docusate 8.6-50 mg  1 tablet Oral Daily    tamsulosin  0.4 mg Oral Daily    tiotropium  1 capsule Inhalation Daily    verapamil  80 mg Oral TID    vorconazole (VFEND) IVPB  4 mg/kg Intravenous Q12H     PRN Meds:acetaminophen, albuterol-ipratropium, benzonatate, dextrose 50%, dextrose 50%, glucagon (human recombinant), glucose, glucose, guaifenesin 100 mg/5 ml, insulin aspart U-100, metoprolol, ondansetron, oxyCODONE-acetaminophen, promethazine, ramelteon, sodium chloride 0.9%     Review of Systems   Constitutional: Positive for fatigue. Negative for chills and fever.   HENT: Negative for congestion, rhinorrhea and sore throat.    Eyes: Negative for pain.   Respiratory: Positive for cough, chest tightness, shortness of breath and wheezing.    Cardiovascular: Negative for chest pain, palpitations and leg swelling.   Gastrointestinal: Positive for abdominal pain and nausea. Negative for abdominal distention,  diarrhea and vomiting.   Genitourinary: Negative for difficulty urinating and dysuria.   Musculoskeletal: Negative for arthralgias and myalgias.   Skin: Negative for color change.   Neurological: Negative for dizziness, weakness, light-headedness and headaches.   Psychiatric/Behavioral: Negative for agitation and confusion.     Objective:     Vital Signs (Most Recent):  Temp: 97.3 °F (36.3 °C) (05/10/19 0745)  Pulse: 99 (05/10/19 1150)  Resp: 18 (05/10/19 0745)  BP: 112/65 (05/10/19 0745)  SpO2: (!) 93 % (05/10/19 0745) Vital Signs (24h Range):  Temp:  [97.3 °F (36.3 °C)-98.7 °F (37.1 °C)] 97.3 °F (36.3 °C)  Pulse:  [] 99  Resp:  [16-18] 18  SpO2:  [92 %-98 %] 93 %  BP: ()/(65-89) 112/65     Weight: 83 kg (182 lb 15.7 oz)  Body mass index is 24.82 kg/m².  Body surface area is 2.05 meters squared.    No intake or output data in the 24 hours ending 05/10/19 1534    Physical Exam   Constitutional: He is oriented to person, place, and time. He appears well-developed and well-nourished. No distress.   HENT:   Head: Normocephalic and atraumatic.   Mouth/Throat: Oropharynx is clear and moist.   Eyes: Pupils are equal, round, and reactive to light. EOM are normal.   Neck: Normal range of motion. Neck supple. No JVD present.   Cardiovascular: Normal rate, regular rhythm, normal heart sounds and intact distal pulses.   No murmur heard.  Pulmonary/Chest: Tachypnea noted. No respiratory distress. He has wheezes. He has rhonchi.   Scattered wheezes throughout b/l lung fields   Abdominal: Soft. Bowel sounds are normal. He exhibits no distension. There is no tenderness.   Musculoskeletal: Normal range of motion. He exhibits no edema.   Neurological: He is alert and oriented to person, place, and time. No cranial nerve deficit.   Skin: Skin is warm and dry. Capillary refill takes less than 2 seconds.   Psychiatric: He has a normal mood and affect.       Significant Labs:   CBC:   No results for input(s): WBC, HGB, HCT,  PLT in the last 48 hours., CMP:   Recent Labs   Lab 05/09/19  0521      K 3.9   CL 90*   CO2 39*   *   BUN 21   CREATININE 0.9   CALCIUM 9.2   PROT 6.9   ALBUMIN 2.7*   BILITOT 0.2   ALKPHOS 88   AST 14   ALT 15   ANIONGAP 8   EGFRNONAA >60.0   , LDH: No results for input(s): LDHCSF, BFSOURCE in the last 48 hours. and LFTs:   Recent Labs   Lab 05/09/19  0521   ALT 15   AST 14   ALKPHOS 88   BILITOT 0.2   PROT 6.9   ALBUMIN 2.7*       Diagnostic Results:  I have reviewed and interpreted all pertinent imaging results/findings within the past 24 hours.     Abdomen U/S  FINDINGS:  Pancreas: The visualized portions of pancreas appear normal.    Aorta: No aneurysm within the visualized portions of the aorta.  Mid and distal aorta are obscured by overlying bowel gas.    Liver: 13.9 cm, normal in size. Homogeneous parenchymal echotexture. No focal lesions.    Gallbladder: Single mobile stone measuring 0.8 cm.  No wall thickening or pericholecystic fluid.  Negative sonographic Ramsay's sign.    Biliary system: 7 mm common bile duct.  Mild intrahepatic ductal dilatation.    Inferior vena cava: Normal in appearance.    Right kidney: 9.2 cm. No hydronephrosis.    Left kidney: 10.4 cm. No hydronephrosis.    Spleen: 10.2 x 3.6 cm.  Normal in size with homogeneous echotexture.    Miscellaneous: No ascites.      Impression       Cholelithiasis without sonographic evidence of acute cholecystitis.    Mild intrahepatic biliary ductal dilatation with prominence of the common bile duct measuring up to 7 mm.

## 2019-05-10 NOTE — PROGRESS NOTES
[facility]  Hematology/Oncology  Progress Note    Patient Name: Nimesh Nunn  Admission Date: 5/6/2019  Hospital Length of Stay: 4 days  Code Status: Full Code     Subjective:     HPI:  Mr. Nimesh Nunn is a 63-year-old male with a past medical history of COPD, hypertension, hyperlipidemia, CAD on  ASA/plavix, NSCLC (most recently on Durvalumab 1/18/19), who presented to the ED with worsening shortness of breath, wheezing,  and productive cough of brown-yellow sputum for the last 4 days. Patient has had 3 hospital admissions for a similar presentation since February, most recently from 04/17- 04/18 at which time he was treated with duo-nebs, steroids, and antibiotics for a COPD exacerbation. Patient reports that he is on home oxyegn ( usually requiring 3 L NC) but has recently felt his work of breathing increase. He reports that he was discharged from his last admission on prednisone 70mg qd and has been weaning down, currently on 50 mg qd. Patient also reports subjective fevers at home and a generalized abdominal pain with associated nausea, 10/10 in severity, worse in the RUQ that is not affected by eating food. He denies chills, headache, vomiting, CP, diarrhea, or dysuria. He is not currently on chemo. .Per EMS, he was 88% on RA and went to 96% on 4L  In the ED, the patient was found to be hypoxic, tachycardic, and hypertensive. On exam,  he appears comfortable, in no acute distress. Lungs have scattered wheezes and rhonchi throughout, abdomen is soft, non-tender with active bowel sounds. No lower extremity edema noted. His chest x ray is c/w pneumonitis versus disease progression. Patient was admitted to Medical Oncology for further management.              Interval History: No acute events overnight. Discovered yesterday that patient had a positive culture from recent bronchoscopy (04/17/19) growing Aspergillus fumigatus, consulted with ID and decided to begin treatment with  anti-fungal therapy. Pt reports some improvement in his breathing this morning, remains at home O2 level (2-3 L/min), otherwise hemodynamically stable, in NSR. Began discussion about goals of care today with staff, pt amenable to speaking with palliative care services.       Oncology Treatment Plan:   OP DOCETAXEL (75 MG/M2) Q3W    Medications:  Continuous Infusions:  Scheduled Meds:   apixaban  5 mg Oral BID    atorvastatin  80 mg Oral Daily    cilostazol  100 mg Oral BID    clopidogrel  75 mg Oral Daily    famotidine  20 mg Oral BID    finasteride  5 mg Oral Daily    fluticasone-umeclidin-vilanter  1 puff Inhalation Daily    levoFLOXacin  750 mg Oral Daily    levothyroxine  75 mcg Oral Before breakfast    morphine  30 mg Oral Q12H    predniSONE  60 mg Oral Daily    senna-docusate 8.6-50 mg  1 tablet Oral Daily    tamsulosin  0.4 mg Oral Daily    tiotropium  1 capsule Inhalation Daily    verapamil  80 mg Oral TID    vorconazole (VFEND) IVPB  4 mg/kg Intravenous Q12H     PRN Meds:acetaminophen, albuterol-ipratropium, benzonatate, dextrose 50%, dextrose 50%, glucagon (human recombinant), glucose, glucose, guaifenesin 100 mg/5 ml, insulin aspart U-100, metoprolol, ondansetron, oxyCODONE-acetaminophen, promethazine, ramelteon, sodium chloride 0.9%     Review of Systems   Constitutional: Positive for fatigue. Negative for chills and fever.   HENT: Negative for congestion, rhinorrhea and sore throat.    Eyes: Negative for pain.   Respiratory: Positive for cough, chest tightness, shortness of breath and wheezing.    Cardiovascular: Negative for chest pain, palpitations and leg swelling.   Gastrointestinal: Positive for abdominal pain and nausea. Negative for abdominal distention, diarrhea and vomiting.   Genitourinary: Negative for difficulty urinating and dysuria.   Musculoskeletal: Negative for arthralgias and myalgias.   Skin: Negative for color change.   Neurological: Negative for dizziness, weakness,  light-headedness and headaches.   Psychiatric/Behavioral: Negative for agitation and confusion.     Objective:     Vital Signs (Most Recent):  Temp: 97.3 °F (36.3 °C) (05/10/19 0745)  Pulse: 99 (05/10/19 1150)  Resp: 18 (05/10/19 0745)  BP: 112/65 (05/10/19 0745)  SpO2: (!) 93 % (05/10/19 0745) Vital Signs (24h Range):  Temp:  [97.3 °F (36.3 °C)-98.7 °F (37.1 °C)] 97.3 °F (36.3 °C)  Pulse:  [] 99  Resp:  [16-18] 18  SpO2:  [92 %-98 %] 93 %  BP: ()/(65-89) 112/65     Weight: 83 kg (182 lb 15.7 oz)  Body mass index is 24.82 kg/m².  Body surface area is 2.05 meters squared.    No intake or output data in the 24 hours ending 05/10/19 1534    Physical Exam   Constitutional: He is oriented to person, place, and time. He appears well-developed and well-nourished. No distress.   HENT:   Head: Normocephalic and atraumatic.   Mouth/Throat: Oropharynx is clear and moist.   Eyes: Pupils are equal, round, and reactive to light. EOM are normal.   Neck: Normal range of motion. Neck supple. No JVD present.   Cardiovascular: Normal rate, regular rhythm, normal heart sounds and intact distal pulses.   No murmur heard.  Pulmonary/Chest: Tachypnea noted. No respiratory distress. He has wheezes. He has rhonchi.   Scattered wheezes throughout b/l lung fields   Abdominal: Soft. Bowel sounds are normal. He exhibits no distension. There is no tenderness.   Musculoskeletal: Normal range of motion. He exhibits no edema.   Neurological: He is alert and oriented to person, place, and time. No cranial nerve deficit.   Skin: Skin is warm and dry. Capillary refill takes less than 2 seconds.   Psychiatric: He has a normal mood and affect.       Significant Labs:   CBC:   No results for input(s): WBC, HGB, HCT, PLT in the last 48 hours., CMP:   Recent Labs   Lab 05/09/19  0521      K 3.9   CL 90*   CO2 39*   *   BUN 21   CREATININE 0.9   CALCIUM 9.2   PROT 6.9   ALBUMIN 2.7*   BILITOT 0.2   ALKPHOS 88   AST 14   ALT 15    ANIONGAP 8   EGFRNONAA >60.0   , LDH: No results for input(s): LDHCSF, BFSOURCE in the last 48 hours. and LFTs:   Recent Labs   Lab 05/09/19  0521   ALT 15   AST 14   ALKPHOS 88   BILITOT 0.2   PROT 6.9   ALBUMIN 2.7*       Diagnostic Results:  I have reviewed and interpreted all pertinent imaging results/findings within the past 24 hours.     Abdomen U/S  FINDINGS:  Pancreas: The visualized portions of pancreas appear normal.    Aorta: No aneurysm within the visualized portions of the aorta.  Mid and distal aorta are obscured by overlying bowel gas.    Liver: 13.9 cm, normal in size. Homogeneous parenchymal echotexture. No focal lesions.    Gallbladder: Single mobile stone measuring 0.8 cm.  No wall thickening or pericholecystic fluid.  Negative sonographic Ramsay's sign.    Biliary system: 7 mm common bile duct.  Mild intrahepatic ductal dilatation.    Inferior vena cava: Normal in appearance.    Right kidney: 9.2 cm. No hydronephrosis.    Left kidney: 10.4 cm. No hydronephrosis.    Spleen: 10.2 x 3.6 cm.  Normal in size with homogeneous echotexture.    Miscellaneous: No ascites.      Impression       Cholelithiasis without sonographic evidence of acute cholecystitis.    Mild intrahepatic biliary ductal dilatation with prominence of the common bile duct measuring up to 7 mm.         Assessment/Plan:     Atrial fibrillation with RVR  - New onset as of this morning, pt does not recall having irregular rhythm any time prior, no complaints of CP or palpitations during episode.  - Likely etiology is 2/2 albuterol from nebulized breathing treatments, achieved rate control with metoprolol 5 mg IV x2  - Switched patient from Lovenox to Eliquis 5 mg BID  - Obtaining 2D echo  - Cardiology consulted, appreciate their recs  - Discontinued ASA (due to increased bleeding risk)  - Switched from Lovenox to Eliquis 5 mg BID  - Discontinued metoprolol and started on verapamil 80 mg TID    COPD exacerbation  63-year-old male with  a past medical history of COPD, hypertension, hyperlipidemia, CAD on  ASA/plavix, NSCLC (most recently on Durvalumab 1/18/19), who presented to the ED with worsening shortness of breath, wheezing,  and productive cough of brown-yellow sputum for the last 4 days. Differential diagnosis includes but is not limited to COPD exacerbation (productive cough, wheezing on exam, CXR with increased diffuse bilateral interstitial and airspace opacities) vs hospital-acquired PNA (recent admission from 04/17 thru 04/18) vs progression of malignancy.     Plan:   - Levofloxacin 750 mg qd for 3-5 days (for possible underlying PNA)  - Patient was on a prednisone taper at home, being weaned katy from 70 mg qd for the treatment of suspected pneumonitis from the durvalumab that he had been on for a few months. After speaking with patient's oncologist, greyson Pathak will give steroid dosage used for pneumonitis treatment, decreasing prednisone to 60 mg PO qd in light of possible fungal infection  - Duonebs q6h while awake  - Titrate continuous O2 by nasal cannula trevor maintain sats > 88% as tolerated, (on 3 L @ home)  - Consulting Pulmonology as patient is not improving despite three days of standard therapy, appreciate their recs  - it's been discovered that during pt's last admission for which he had a similar presentation, the patient underwent bronchoscopy and had tissue cultures grow positive for Aspergillus fumigatus. Does not appear to have been treated at that time. Spoke with ID about this and they recommended beginning treatiment with voriconazole, started on 200 mg PO BID. Pt will be evaluated in the morning by ID.   - Continuing ant-fungal therapy with PO voriconazole, pt can be discharged on this medication.    Anemia in neoplastic disease  -Hgb 7.9, stable baseline Hgb of ~ 9.0,   - Currently no active signs of bleeding  - Will continue to monitor with daily CBC, transfuse to keep Hgb > 7.0    Lung cancer metastatic to  bone  Will continue home pain regimen:  - MS contin 30mg BID   - Percocet 10-325mg  q4h PRN  Goals of care discussion started today, getting palliative care involved as patient is amenable to hearing about the options he has in maximizing the time he has left.     Adenocarcinoma of left lung, stage 4  Oncologic History:       Oncologic History Non-small cell lung cancer, left diagnosed 06/2018  Metastatic disease to bone at presentation    Oncologic Treatment Stereotactic radiation to spinal lesion  Cisplatin/pemetrexed with concurrent radiation 08/08/2018 (completed 09/2018)  Durvalumab 10/2018, last given on 1/18/19  XRT L4-S3 30 Gy 12/2018    Pathology Poorly differentiated adenocarcinoma, EGFR wild type, No ALK, ROS-1 rearrangements, PD-L1 TPS 90%          PAD (peripheral artery disease)  - On cilostazol  - Discontinued aspirin per Cardiology recs    Hyperlipidemia  Continue home statin.    HTN (hypertension)  - Hypertensive in the ED  - BP last night was in the 160s but back to normal in AM  - Will continue home metoprolol for now             Migel Brambila MD  Hematology/Oncology  [facility]    Attending Note  I have personally taken the history and examined this patient and agree with the resident's note as stated above.  We discussed prognosis, limited ability to treat safely, and patient goals  He is interested in a palliative discussion

## 2019-05-11 LAB
ALBUMIN SERPL BCP-MCNC: 2.6 G/DL (ref 3.5–5.2)
ALP SERPL-CCNC: 72 U/L (ref 55–135)
ALT SERPL W/O P-5'-P-CCNC: 14 U/L (ref 10–44)
ANION GAP SERPL CALC-SCNC: 12 MMOL/L (ref 8–16)
AST SERPL-CCNC: 16 U/L (ref 10–40)
BACTERIA BLD CULT: NORMAL
BACTERIA BLD CULT: NORMAL
BASOPHILS # BLD AUTO: 0.01 K/UL (ref 0–0.2)
BASOPHILS NFR BLD: 0.1 % (ref 0–1.9)
BILIRUB SERPL-MCNC: 0.3 MG/DL (ref 0.1–1)
BUN SERPL-MCNC: 21 MG/DL (ref 8–23)
CALCIUM SERPL-MCNC: 9 MG/DL (ref 8.7–10.5)
CHLORIDE SERPL-SCNC: 90 MMOL/L (ref 95–110)
CO2 SERPL-SCNC: 34 MMOL/L (ref 23–29)
CREAT SERPL-MCNC: 0.8 MG/DL (ref 0.5–1.4)
DIFFERENTIAL METHOD: ABNORMAL
EOSINOPHIL # BLD AUTO: 0 K/UL (ref 0–0.5)
EOSINOPHIL NFR BLD: 0 % (ref 0–8)
ERYTHROCYTE [DISTWIDTH] IN BLOOD BY AUTOMATED COUNT: 18.4 % (ref 11.5–14.5)
EST. GFR  (AFRICAN AMERICAN): >60 ML/MIN/1.73 M^2
EST. GFR  (NON AFRICAN AMERICAN): >60 ML/MIN/1.73 M^2
GLUCOSE SERPL-MCNC: 117 MG/DL (ref 70–110)
HCT VFR BLD AUTO: 23.7 % (ref 40–54)
HGB BLD-MCNC: 7.3 G/DL (ref 14–18)
IMM GRANULOCYTES # BLD AUTO: 0.3 K/UL (ref 0–0.04)
IMM GRANULOCYTES NFR BLD AUTO: 4 % (ref 0–0.5)
LYMPHOCYTES # BLD AUTO: 0.5 K/UL (ref 1–4.8)
LYMPHOCYTES NFR BLD: 6.3 % (ref 18–48)
MAGNESIUM SERPL-MCNC: 1.5 MG/DL (ref 1.6–2.6)
MCH RBC QN AUTO: 30.2 PG (ref 27–31)
MCHC RBC AUTO-ENTMCNC: 30.8 G/DL (ref 32–36)
MCV RBC AUTO: 98 FL (ref 82–98)
MONOCYTES # BLD AUTO: 0.4 K/UL (ref 0.3–1)
MONOCYTES NFR BLD: 5.2 % (ref 4–15)
NEUTROPHILS # BLD AUTO: 6.3 K/UL (ref 1.8–7.7)
NEUTROPHILS NFR BLD: 84.4 % (ref 38–73)
NRBC BLD-RTO: 1 /100 WBC
PHOSPHATE SERPL-MCNC: 2.9 MG/DL (ref 2.7–4.5)
PLATELET # BLD AUTO: 152 K/UL (ref 150–350)
PMV BLD AUTO: 9.5 FL (ref 9.2–12.9)
POTASSIUM SERPL-SCNC: 3.7 MMOL/L (ref 3.5–5.1)
PROT SERPL-MCNC: 6 G/DL (ref 6–8.4)
RBC # BLD AUTO: 2.42 M/UL (ref 4.6–6.2)
SODIUM SERPL-SCNC: 136 MMOL/L (ref 136–145)
WBC # BLD AUTO: 7.45 K/UL (ref 3.9–12.7)

## 2019-05-11 PROCEDURE — 25000242 PHARM REV CODE 250 ALT 637 W/ HCPCS: Performed by: STUDENT IN AN ORGANIZED HEALTH CARE EDUCATION/TRAINING PROGRAM

## 2019-05-11 PROCEDURE — 84100 ASSAY OF PHOSPHORUS: CPT

## 2019-05-11 PROCEDURE — 63600175 PHARM REV CODE 636 W HCPCS: Performed by: STUDENT IN AN ORGANIZED HEALTH CARE EDUCATION/TRAINING PROGRAM

## 2019-05-11 PROCEDURE — 99233 PR SUBSEQUENT HOSPITAL CARE,LEVL III: ICD-10-PCS | Mod: GC,,, | Performed by: INTERNAL MEDICINE

## 2019-05-11 PROCEDURE — 20600001 HC STEP DOWN PRIVATE ROOM

## 2019-05-11 PROCEDURE — 99233 SBSQ HOSP IP/OBS HIGH 50: CPT | Mod: GC,,, | Performed by: INTERNAL MEDICINE

## 2019-05-11 PROCEDURE — 25000003 PHARM REV CODE 250: Performed by: PEDIATRICS

## 2019-05-11 PROCEDURE — 25000003 PHARM REV CODE 250: Performed by: STUDENT IN AN ORGANIZED HEALTH CARE EDUCATION/TRAINING PROGRAM

## 2019-05-11 PROCEDURE — 85025 COMPLETE CBC W/AUTO DIFF WBC: CPT

## 2019-05-11 PROCEDURE — 25000003 PHARM REV CODE 250: Performed by: INTERNAL MEDICINE

## 2019-05-11 PROCEDURE — 36415 COLL VENOUS BLD VENIPUNCTURE: CPT

## 2019-05-11 PROCEDURE — 83735 ASSAY OF MAGNESIUM: CPT

## 2019-05-11 PROCEDURE — 80053 COMPREHEN METABOLIC PANEL: CPT

## 2019-05-11 RX ADMIN — RAMELTEON 8 MG: 8 TABLET, FILM COATED ORAL at 09:05

## 2019-05-11 RX ADMIN — OXYCODONE AND ACETAMINOPHEN 1 TABLET: 10; 325 TABLET ORAL at 02:05

## 2019-05-11 RX ADMIN — TIOTROPIUM BROMIDE 18 MCG: 18 CAPSULE ORAL; RESPIRATORY (INHALATION) at 10:05

## 2019-05-11 RX ADMIN — APIXABAN 5 MG: 2.5 TABLET, FILM COATED ORAL at 08:05

## 2019-05-11 RX ADMIN — VERAPAMIL HYDROCHLORIDE 80 MG: 80 TABLET, FILM COATED ORAL at 09:05

## 2019-05-11 RX ADMIN — OXYCODONE AND ACETAMINOPHEN 1 TABLET: 10; 325 TABLET ORAL at 01:05

## 2019-05-11 RX ADMIN — VERAPAMIL HYDROCHLORIDE 80 MG: 80 TABLET, FILM COATED ORAL at 03:05

## 2019-05-11 RX ADMIN — FAMOTIDINE 20 MG: 20 TABLET, FILM COATED ORAL at 08:05

## 2019-05-11 RX ADMIN — PREDNISONE 60 MG: 20 TABLET ORAL at 08:05

## 2019-05-11 RX ADMIN — CLOPIDOGREL 75 MG: 75 TABLET, FILM COATED ORAL at 08:05

## 2019-05-11 RX ADMIN — ATORVASTATIN CALCIUM 80 MG: 20 TABLET, FILM COATED ORAL at 08:05

## 2019-05-11 RX ADMIN — LEVOFLOXACIN 750 MG: 750 TABLET, FILM COATED ORAL at 08:05

## 2019-05-11 RX ADMIN — FAMOTIDINE 20 MG: 20 TABLET, FILM COATED ORAL at 09:05

## 2019-05-11 RX ADMIN — SENNOSIDES,DOCUSATE SODIUM 1 TABLET: 8.6; 5 TABLET, FILM COATED ORAL at 08:05

## 2019-05-11 RX ADMIN — MORPHINE SULFATE 30 MG: 15 TABLET, EXTENDED RELEASE ORAL at 08:05

## 2019-05-11 RX ADMIN — TAMSULOSIN HYDROCHLORIDE 0.4 MG: 0.4 CAPSULE ORAL at 08:05

## 2019-05-11 RX ADMIN — DEXTROSE 330 MG: 50 INJECTION, SOLUTION INTRAVENOUS at 04:05

## 2019-05-11 RX ADMIN — FINASTERIDE 5 MG: 5 TABLET, FILM COATED ORAL at 08:05

## 2019-05-11 RX ADMIN — APIXABAN 5 MG: 2.5 TABLET, FILM COATED ORAL at 09:05

## 2019-05-11 RX ADMIN — LEVOTHYROXINE SODIUM 75 MCG: 25 TABLET ORAL at 06:05

## 2019-05-11 RX ADMIN — CILOSTAZOL 100 MG: 100 TABLET ORAL at 08:05

## 2019-05-11 RX ADMIN — MORPHINE SULFATE 30 MG: 15 TABLET, EXTENDED RELEASE ORAL at 09:05

## 2019-05-11 RX ADMIN — VERAPAMIL HYDROCHLORIDE 80 MG: 80 TABLET, FILM COATED ORAL at 08:05

## 2019-05-11 RX ADMIN — OXYCODONE AND ACETAMINOPHEN 1 TABLET: 10; 325 TABLET ORAL at 08:05

## 2019-05-11 RX ADMIN — CILOSTAZOL 100 MG: 100 TABLET ORAL at 09:05

## 2019-05-11 RX ADMIN — DEXTROSE 330 MG: 50 INJECTION, SOLUTION INTRAVENOUS at 05:05

## 2019-05-11 NOTE — PROGRESS NOTES
[facility]  Hematology/Oncology  Progress Note    Patient Name: Nimesh Nunn  Admission Date: 5/6/2019  Hospital Length of Stay: 5 days  Code Status: Full Code     Subjective:     HPI:  Mr. Nimesh Nunn is a 63-year-old male with a past medical history of COPD, hypertension, hyperlipidemia, CAD on  ASA/plavix, NSCLC (most recently on Durvalumab 1/18/19), who presented to the ED with worsening shortness of breath, wheezing,  and productive cough of brown-yellow sputum for the last 4 days. Patient has had 3 hospital admissions for a similar presentation since February, most recently from 04/17- 04/18 at which time he was treated with duo-nebs, steroids, and antibiotics for a COPD exacerbation. Patient reports that he is on home oxyegn ( usually requiring 3 L NC) but has recently felt his work of breathing increase. He reports that he was discharged from his last admission on prednisone 70mg qd and has been weaning down, currently on 50 mg qd. Patient also reports subjective fevers at home and a generalized abdominal pain with associated nausea, 10/10 in severity, worse in the RUQ that is not affected by eating food. He denies chills, headache, vomiting, CP, diarrhea, or dysuria. He is not currently on chemo. .Per EMS, he was 88% on RA and went to 96% on 4L  In the ED, the patient was found to be hypoxic, tachycardic, and hypertensive. On exam,  he appears comfortable, in no acute distress. Lungs have scattered wheezes and rhonchi throughout, abdomen is soft, non-tender with active bowel sounds. No lower extremity edema noted. His chest x ray is c/w pneumonitis versus disease progression. Patient was admitted to Medical Oncology for further management.              Interval History: No acute events overnight. Pt remains afebrile, hemodynamically stable. Pt reports no imp[rovement in breathing, remains at home O2 level of 2-3 lpm via NC. Continuing discussion about goals of care today, will  work on plan to get patient to [facility] with home hospice.      Oncology Treatment Plan:   OP DOCETAXEL (75 MG/M2) Q3W    Medications:  Continuous Infusions:  Scheduled Meds:   apixaban  5 mg Oral BID    atorvastatin  80 mg Oral Daily    cilostazol  100 mg Oral BID    clopidogrel  75 mg Oral Daily    famotidine  20 mg Oral BID    finasteride  5 mg Oral Daily    fluticasone-umeclidin-vilanter  1 puff Inhalation Daily    levoFLOXacin  750 mg Oral Daily    levothyroxine  75 mcg Oral Before breakfast    morphine  30 mg Oral Q12H    predniSONE  60 mg Oral Daily    senna-docusate 8.6-50 mg  1 tablet Oral Daily    tamsulosin  0.4 mg Oral Daily    tiotropium  1 capsule Inhalation Daily    verapamil  80 mg Oral TID    vorconazole (VFEND) IVPB  4 mg/kg Intravenous Q12H     PRN Meds:acetaminophen, albuterol-ipratropium, benzonatate, dextrose 50%, dextrose 50%, glucagon (human recombinant), glucose, glucose, guaifenesin 100 mg/5 ml, insulin aspart U-100, ondansetron, oxyCODONE-acetaminophen, promethazine, ramelteon, sodium chloride 0.9%     Review of Systems   Constitutional: Positive for fatigue. Negative for chills and fever.   HENT: Negative for congestion, rhinorrhea and sore throat.    Eyes: Negative for pain.   Respiratory: Positive for cough, chest tightness, shortness of breath and wheezing.    Cardiovascular: Negative for chest pain, palpitations and leg swelling.   Gastrointestinal: Positive for abdominal pain and nausea. Negative for abdominal distention, diarrhea and vomiting.   Genitourinary: Negative for difficulty urinating and dysuria.   Musculoskeletal: Negative for arthralgias and myalgias.   Skin: Negative for color change.   Neurological: Negative for dizziness, weakness, light-headedness and headaches.   Psychiatric/Behavioral: Negative for agitation and confusion.     Objective:     Vital Signs (Most Recent):  Temp: 97.6 °F (36.4 °C) (05/11/19 0739)  Pulse: 101 (05/11/19 1005)  Resp:  20 (05/11/19 1005)  BP: (!) 174/78 (05/11/19 0739)  SpO2: 98 % (05/11/19 0739) Vital Signs (24h Range):  Temp:  [97.6 °F (36.4 °C)-98.3 °F (36.8 °C)] 97.6 °F (36.4 °C)  Pulse:  [] 101  Resp:  [16-20] 20  SpO2:  [94 %-98 %] 98 %  BP: ()/(59-83) 174/78     Weight: 83 kg (182 lb 15.7 oz)  Body mass index is 24.82 kg/m².  Body surface area is 2.05 meters squared.      Intake/Output Summary (Last 24 hours) at 5/11/2019 1020  Last data filed at 5/11/2019 0858  Gross per 24 hour   Intake 800 ml   Output 1375 ml   Net -575 ml       Physical Exam   Constitutional: He is oriented to person, place, and time. He appears well-developed and well-nourished. No distress.   HENT:   Head: Normocephalic and atraumatic.   Mouth/Throat: Oropharynx is clear and moist.   Eyes: Pupils are equal, round, and reactive to light. EOM are normal.   Neck: Normal range of motion. Neck supple. No JVD present.   Cardiovascular: Normal rate, regular rhythm, normal heart sounds and intact distal pulses.   No murmur heard.  Pulmonary/Chest: Tachypnea noted. No respiratory distress. He has wheezes. He has rhonchi.   Scattered wheezes throughout b/l lung fields   Abdominal: Soft. Bowel sounds are normal. He exhibits no distension. There is no tenderness.   Musculoskeletal: Normal range of motion. He exhibits no edema.   Neurological: He is alert and oriented to person, place, and time. No cranial nerve deficit.   Skin: Skin is warm and dry. Capillary refill takes less than 2 seconds.   Psychiatric: He has a normal mood and affect.       Significant Labs:   CBC:   Recent Labs   Lab 05/11/19  0847   WBC 7.45   HGB 7.3*   HCT 23.7*      , CMP:   Recent Labs   Lab 05/11/19  0847      K 3.7   CL 90*   CO2 34*   *   BUN 21   CREATININE 0.8   CALCIUM 9.0   PROT 6.0   ALBUMIN 2.6*   BILITOT 0.3   ALKPHOS 72   AST 16   ALT 14   ANIONGAP 12   EGFRNONAA >60.0   , LDH: No results for input(s): LDHCSF, BFSOURCE in the last 48 hours.  and LFTs:   Recent Labs   Lab 05/11/19  0847   ALT 14   AST 16   ALKPHOS 72   BILITOT 0.3   PROT 6.0   ALBUMIN 2.6*       Diagnostic Results:  I have reviewed and interpreted all pertinent imaging results/findings within the past 24 hours.             Assessment/Plan:     * Adenocarcinoma of left lung, stage 4  Oncologic History:       Oncologic History Non-small cell lung cancer, left diagnosed 06/2018  Metastatic disease to bone at presentation    Oncologic Treatment Stereotactic radiation to spinal lesion  Cisplatin/pemetrexed with concurrent radiation 08/08/2018 (completed 09/2018)  Durvalumab 10/2018, last given on 1/18/19  XRT L4-S3 30 Gy 12/2018    Pathology Poorly differentiated adenocarcinoma, EGFR wild type, No ALK, ROS-1 rearrangements, PD-L1 TPS 90%      - Goals of care discussion started, getting palliative care involved as patient is amenable to hearing about the options he has in maximizing the time he has left. Continue talks with palliative care.  - Patient seems willing to move towards home hospice and move to the [place] area to be close to his son and grandchildren. We will work on coordinating things to make this possible early this week.     Atrial fibrillation with RVR  - New onset as of this morning, pt does not recall having irregular rhythm any time prior, no complaints of CP or palpitations during episode.  - Likely etiology is 2/2 albuterol from nebulized breathing treatments, achieved rate control with metoprolol 5 mg IV x2  - Switched patient from Lovenox to Eliquis 5 mg BID  - Obtaining 2D echo  - Cardiology consulted, appreciate their recs  - Discontinued ASA (due to increased bleeding risk)  - Switched from Lovenox to Eliquis 5 mg BID  - Discontinued metoprolol and started on verapamil 80 mg TID    COPD exacerbation  63-year-old male with a past medical history of COPD, hypertension, hyperlipidemia, CAD on  ASA/plavix, NSCLC (most recently on Durvalumab 1/18/19), who presented  to the ED with worsening shortness of breath, wheezing,  and productive cough of brown-yellow sputum for the last 4 days. Differential diagnosis includes but is not limited to COPD exacerbation (productive cough, wheezing on exam, CXR with increased diffuse bilateral interstitial and airspace opacities) vs hospital-acquired PNA (recent admission from 04/17 thru 04/18) vs progression of malignancy.     Plan:   - Levofloxacin 750 mg qd for 3-5 days (for possible underlying PNA)  - Patient was on a prednisone taper at home, being weaned katy from 70 mg qd for the treatment of suspected pneumonitis from the durvalumab that he had been on for a few months. After speaking with patient's oncologist, greyson Pathak will give steroid dosage used for pneumonitis treatment, decreasing prednisone to 60 mg PO qd in light of possible fungal infection  - Duonebs q6h while awake  - Titrate continuous O2 by nasal cannula trevor maintain sats > 88% as tolerated, (on 3 L @ home)  - Consulting Pulmonology as patient is not improving despite three days of standard therapy, appreciate their recs  - it's been discovered that during pt's last admission for which he had a similar presentation, the patient underwent bronchoscopy and had tissue cultures grow positive for Aspergillus fumigatus. Does not appear to have been treated at that time. Spoke with ID about this and they recommended beginning treatiment with voriconazole, started on 200 mg PO BID. Pt will be evaluated in the morning by ID.   - Continuing ant-fungal therapy with PO voriconazole, pt can be discharged on this medication.    Anemia in neoplastic disease  -Hgb 7.9, stable baseline Hgb of ~ 9.0,   - Currently no active signs of bleeding  - Will continue to monitor with daily CBC, transfuse to keep Hgb > 7.0    Lung cancer metastatic to bone  Will continue home pain regimen:  - MS contin 30mg BID   - Percocet 10-325mg  q4h PRN      PAD (peripheral artery disease)  - On  cilostazol  - Discontinued aspirin per Cardiology recs    Hyperlipidemia  Continue home statin.    HTN (hypertension)  - Hypertensive in the ED  - BP last night was in the 160s but back to normal in AM  - Will continue home metoprolol for now      Migel Brambila MD  Hematology/Oncology  [facility]    Attending Note  I have personally taken the history and examined this patient and agree with the resident's note as stated above.  Plan for home with palliative services

## 2019-05-11 NOTE — PLAN OF CARE
Problem: Adult Inpatient Plan of Care  Goal: Plan of Care Review  Outcome: Ongoing (interventions implemented as appropriate)  POC reviewed with patient at the start of the shift. Pt continued on IVPB Voriconazole. PRN Percocet given for pt's c/o pain. BP low at the start of the shift, Verapamil held per MD order. Telemetry monitoring continued. Pt on 3 L NC. Afebrile overnight. Pt is stable. Instructed patient to call for assistance. Bed low and locked, call bell within reach, nonskid socks on, pt verbalized understanding. Infection, pressure ulcer, and fall risks precautions maintained. Will continue to monitor.

## 2019-05-11 NOTE — PLAN OF CARE
Problem: Adult Inpatient Plan of Care  Goal: Plan of Care Review  Outcome: Ongoing (interventions implemented as appropriate)  POC reviewed with patient; understanding verbalized. Pt AAOx4. 3 L via NC continued. Tele maintained, ST, heart rate raining from 100-125 this shift. PRN Percoset given Q6. Regular diet, good appetite. Voiding in urinal. Pt. with nonskid footwear on, bed in lowest position, and locked with bed rails up x2. Pt. has call light and personal items within reach. Patient ambulates in room independently. VSS and afebrile this shift. All questions and concerns addressed at this time. Will continue to monitor.

## 2019-05-11 NOTE — ASSESSMENT & PLAN NOTE
Oncologic History:       Oncologic History Non-small cell lung cancer, left diagnosed 06/2018  Metastatic disease to bone at presentation    Oncologic Treatment Stereotactic radiation to spinal lesion  Cisplatin/pemetrexed with concurrent radiation 08/08/2018 (completed 09/2018)  Durvalumab 10/2018, last given on 1/18/19  XRT L4-S3 30 Gy 12/2018    Pathology Poorly differentiated adenocarcinoma, EGFR wild type, No ALK, ROS-1 rearrangements, PD-L1 TPS 90%      - Goals of care discussion started, getting palliative care involved as patient is amenable to hearing about the options he has in maximizing the time he has left. Continue talks with palliative care.  - Patient seems willing to move towards home hospice and move to the [place] area to be close to his son and grandchildren. We will work on coordinating things to make this possible early this week.

## 2019-05-12 PROCEDURE — 20600001 HC STEP DOWN PRIVATE ROOM

## 2019-05-12 PROCEDURE — 63600175 PHARM REV CODE 636 W HCPCS: Performed by: STUDENT IN AN ORGANIZED HEALTH CARE EDUCATION/TRAINING PROGRAM

## 2019-05-12 PROCEDURE — 94761 N-INVAS EAR/PLS OXIMETRY MLT: CPT

## 2019-05-12 PROCEDURE — 99233 PR SUBSEQUENT HOSPITAL CARE,LEVL III: ICD-10-PCS | Mod: GC,,, | Performed by: INTERNAL MEDICINE

## 2019-05-12 PROCEDURE — 99233 SBSQ HOSP IP/OBS HIGH 50: CPT | Mod: GC,,, | Performed by: INTERNAL MEDICINE

## 2019-05-12 PROCEDURE — 25000003 PHARM REV CODE 250: Performed by: STUDENT IN AN ORGANIZED HEALTH CARE EDUCATION/TRAINING PROGRAM

## 2019-05-12 PROCEDURE — 27000221 HC OXYGEN, UP TO 24 HOURS

## 2019-05-12 PROCEDURE — 25000242 PHARM REV CODE 250 ALT 637 W/ HCPCS: Performed by: STUDENT IN AN ORGANIZED HEALTH CARE EDUCATION/TRAINING PROGRAM

## 2019-05-12 PROCEDURE — 25000003 PHARM REV CODE 250: Performed by: PEDIATRICS

## 2019-05-12 PROCEDURE — 25000003 PHARM REV CODE 250: Performed by: INTERNAL MEDICINE

## 2019-05-12 RX ORDER — IPRATROPIUM BROMIDE AND ALBUTEROL SULFATE 2.5; .5 MG/3ML; MG/3ML
3 SOLUTION RESPIRATORY (INHALATION) ONCE
Status: COMPLETED | OUTPATIENT
Start: 2019-05-12 | End: 2019-05-12

## 2019-05-12 RX ORDER — VORICONAZOLE 200 MG/1
200 TABLET, FILM COATED ORAL 2 TIMES DAILY
Status: DISCONTINUED | OUTPATIENT
Start: 2019-05-12 | End: 2019-05-13 | Stop reason: HOSPADM

## 2019-05-12 RX ORDER — ALPRAZOLAM 0.25 MG/1
0.5 TABLET ORAL 2 TIMES DAILY PRN
Status: DISCONTINUED | OUTPATIENT
Start: 2019-05-12 | End: 2019-05-13 | Stop reason: HOSPADM

## 2019-05-12 RX ORDER — ALPRAZOLAM 0.25 MG/1
0.5 TABLET ORAL ONCE
Status: COMPLETED | OUTPATIENT
Start: 2019-05-12 | End: 2019-05-12

## 2019-05-12 RX ADMIN — TAMSULOSIN HYDROCHLORIDE 0.4 MG: 0.4 CAPSULE ORAL at 08:05

## 2019-05-12 RX ADMIN — MORPHINE SULFATE 30 MG: 15 TABLET, EXTENDED RELEASE ORAL at 09:05

## 2019-05-12 RX ADMIN — CILOSTAZOL 100 MG: 100 TABLET ORAL at 09:05

## 2019-05-12 RX ADMIN — VERAPAMIL HYDROCHLORIDE 80 MG: 80 TABLET, FILM COATED ORAL at 02:05

## 2019-05-12 RX ADMIN — MORPHINE SULFATE 30 MG: 15 TABLET, EXTENDED RELEASE ORAL at 08:05

## 2019-05-12 RX ADMIN — APIXABAN 5 MG: 2.5 TABLET, FILM COATED ORAL at 08:05

## 2019-05-12 RX ADMIN — FAMOTIDINE 20 MG: 20 TABLET, FILM COATED ORAL at 09:05

## 2019-05-12 RX ADMIN — LEVOTHYROXINE SODIUM 75 MCG: 25 TABLET ORAL at 05:05

## 2019-05-12 RX ADMIN — TIOTROPIUM BROMIDE 18 MCG: 18 CAPSULE ORAL; RESPIRATORY (INHALATION) at 08:05

## 2019-05-12 RX ADMIN — ALPRAZOLAM 0.5 MG: 0.25 TABLET ORAL at 02:05

## 2019-05-12 RX ADMIN — DEXTROSE 330 MG: 50 INJECTION, SOLUTION INTRAVENOUS at 05:05

## 2019-05-12 RX ADMIN — VORICONAZOLE 200 MG: 200 TABLET, FILM COATED ORAL at 09:05

## 2019-05-12 RX ADMIN — OXYCODONE AND ACETAMINOPHEN 1 TABLET: 10; 325 TABLET ORAL at 05:05

## 2019-05-12 RX ADMIN — FINASTERIDE 5 MG: 5 TABLET, FILM COATED ORAL at 08:05

## 2019-05-12 RX ADMIN — FAMOTIDINE 20 MG: 20 TABLET, FILM COATED ORAL at 08:05

## 2019-05-12 RX ADMIN — IPRATROPIUM BROMIDE AND ALBUTEROL SULFATE 3 ML: .5; 3 SOLUTION RESPIRATORY (INHALATION) at 03:05

## 2019-05-12 RX ADMIN — CLOPIDOGREL 75 MG: 75 TABLET, FILM COATED ORAL at 08:05

## 2019-05-12 RX ADMIN — APIXABAN 5 MG: 2.5 TABLET, FILM COATED ORAL at 09:05

## 2019-05-12 RX ADMIN — ATORVASTATIN CALCIUM 80 MG: 20 TABLET, FILM COATED ORAL at 08:05

## 2019-05-12 RX ADMIN — OXYCODONE AND ACETAMINOPHEN 1 TABLET: 10; 325 TABLET ORAL at 11:05

## 2019-05-12 RX ADMIN — SENNOSIDES,DOCUSATE SODIUM 1 TABLET: 8.6; 5 TABLET, FILM COATED ORAL at 08:05

## 2019-05-12 RX ADMIN — PREDNISONE 60 MG: 20 TABLET ORAL at 08:05

## 2019-05-12 RX ADMIN — LEVOFLOXACIN 750 MG: 750 TABLET, FILM COATED ORAL at 08:05

## 2019-05-12 RX ADMIN — VERAPAMIL HYDROCHLORIDE 80 MG: 80 TABLET, FILM COATED ORAL at 09:05

## 2019-05-12 RX ADMIN — OXYCODONE AND ACETAMINOPHEN 1 TABLET: 10; 325 TABLET ORAL at 04:05

## 2019-05-12 RX ADMIN — ALPRAZOLAM 0.5 MG: 0.25 TABLET ORAL at 03:05

## 2019-05-12 RX ADMIN — VERAPAMIL HYDROCHLORIDE 80 MG: 80 TABLET, FILM COATED ORAL at 08:05

## 2019-05-12 RX ADMIN — CILOSTAZOL 100 MG: 100 TABLET ORAL at 08:05

## 2019-05-12 RX ADMIN — GUAIFENESIN 200 MG: 200 SOLUTION ORAL at 04:05

## 2019-05-12 NOTE — SUBJECTIVE & OBJECTIVE
Interval History: Pt intermittently tachycardic throughout the night, reports having an anxiety attack, was given 1 Xanax that provided relief.. Pt remains afebrile, remains at home O2 level of 2-3 lpm via NC. Working on plan to get patient to [facility] with home hospice. May need further discussion about the meaning of hospice however, as the patient is still asking about what facility he will be going to to continue his chemotherapy, will talk to palliative care team tomorrow.     Oncology Treatment Plan:   OP DOCETAXEL (75 MG/M2) Q3W    Medications:  Continuous Infusions:  Scheduled Meds:   apixaban  5 mg Oral BID    atorvastatin  80 mg Oral Daily    cilostazol  100 mg Oral BID    clopidogrel  75 mg Oral Daily    famotidine  20 mg Oral BID    finasteride  5 mg Oral Daily    fluticasone-umeclidin-vilanter  1 puff Inhalation Daily    levoFLOXacin  750 mg Oral Daily    levothyroxine  75 mcg Oral Before breakfast    morphine  30 mg Oral Q12H    predniSONE  60 mg Oral Daily    senna-docusate 8.6-50 mg  1 tablet Oral Daily    tamsulosin  0.4 mg Oral Daily    tiotropium  1 capsule Inhalation Daily    verapamil  80 mg Oral TID    voriconazole  200 mg Oral BID     PRN Meds:acetaminophen, albuterol-ipratropium, benzonatate, dextrose 50%, dextrose 50%, glucagon (human recombinant), glucose, glucose, guaifenesin 100 mg/5 ml, insulin aspart U-100, ondansetron, oxyCODONE-acetaminophen, promethazine, ramelteon, sodium chloride 0.9%     Review of Systems   Constitutional: Positive for fatigue. Negative for chills and fever.   HENT: Negative for congestion, rhinorrhea and sore throat.    Eyes: Negative for pain.   Respiratory: Positive for cough, chest tightness, shortness of breath and wheezing.    Cardiovascular: Negative for chest pain, palpitations and leg swelling.   Gastrointestinal: Positive for abdominal pain and nausea. Negative for abdominal distention, diarrhea and vomiting.   Genitourinary:  Negative for difficulty urinating and dysuria.   Musculoskeletal: Negative for arthralgias and myalgias.   Skin: Negative for color change.   Neurological: Negative for dizziness, weakness, light-headedness and headaches.   Psychiatric/Behavioral: Negative for agitation and confusion.     Objective:     Vital Signs (Most Recent):  Temp: 97.7 °F (36.5 °C) (05/12/19 0810)  Pulse: 104 (05/12/19 0851)  Resp: 20 (05/12/19 0851)  BP: (!) 140/74 (05/12/19 0810)  SpO2: (!) 91 % (05/12/19 0810) Vital Signs (24h Range):  Temp:  [97.4 °F (36.3 °C)-98 °F (36.7 °C)] 97.7 °F (36.5 °C)  Pulse:  [] 104  Resp:  [17-20] 20  SpO2:  [91 %-96 %] 91 %  BP: (113-163)/(58-79) 140/74     Weight: 83 kg (182 lb 15.7 oz)  Body mass index is 24.82 kg/m².  Body surface area is 2.05 meters squared.      Intake/Output Summary (Last 24 hours) at 5/12/2019 1126  Last data filed at 5/12/2019 0800  Gross per 24 hour   Intake 440 ml   Output 1485 ml   Net -1045 ml       Physical Exam   Constitutional: He is oriented to person, place, and time. He appears well-developed and well-nourished. No distress.   HENT:   Head: Normocephalic and atraumatic.   Mouth/Throat: Oropharynx is clear and moist.   Eyes: Pupils are equal, round, and reactive to light. EOM are normal.   Neck: Normal range of motion. Neck supple. No JVD present.   Cardiovascular: Normal rate, regular rhythm, normal heart sounds and intact distal pulses.   No murmur heard.  Pulmonary/Chest: Tachypnea noted. No respiratory distress. He has wheezes. He has rhonchi.   Scattered wheezes throughout b/l lung fields   Abdominal: Soft. Bowel sounds are normal. He exhibits no distension. There is no tenderness.   Musculoskeletal: Normal range of motion. He exhibits no edema.   Neurological: He is alert and oriented to person, place, and time. No cranial nerve deficit.   Skin: Skin is warm and dry. Capillary refill takes less than 2 seconds.   Psychiatric: He has a normal mood and affect.        Significant Labs:   CBC:   Recent Labs   Lab 05/11/19  0847   WBC 7.45   HGB 7.3*   HCT 23.7*      , CMP:   Recent Labs   Lab 05/11/19  0847      K 3.7   CL 90*   CO2 34*   *   BUN 21   CREATININE 0.8   CALCIUM 9.0   PROT 6.0   ALBUMIN 2.6*   BILITOT 0.3   ALKPHOS 72   AST 16   ALT 14   ANIONGAP 12   EGFRNONAA >60.0   , LDH: No results for input(s): LDHCSF, BFSOURCE in the last 48 hours. and LFTs:   Recent Labs   Lab 05/11/19  0847   ALT 14   AST 16   ALKPHOS 72   BILITOT 0.3   PROT 6.0   ALBUMIN 2.6*       Diagnostic Results:  I have reviewed and interpreted all pertinent imaging results/findings within the past 24 hours.

## 2019-05-12 NOTE — ASSESSMENT & PLAN NOTE
Oncologic History:       Oncologic History Non-small cell lung cancer, left diagnosed 06/2018  Metastatic disease to bone at presentation    Oncologic Treatment Stereotactic radiation to spinal lesion  Cisplatin/pemetrexed with concurrent radiation 08/08/2018 (completed 09/2018)  Durvalumab 10/2018, last given on 1/18/19  XRT L4-S3 30 Gy 12/2018    Pathology Poorly differentiated adenocarcinoma, EGFR wild type, No ALK, ROS-1 rearrangements, PD-L1 TPS 90%      - Goals of care discussion started, getting palliative care involved as patient is amenable to hearing about the options he has in maximizing the time he has left. Continue talks with palliative care.  - Patient seems willing to move towards home hospice and move to the [place] area to be close to his son and grandchildren. We will work on coordinating things to make this possible early this week.

## 2019-05-12 NOTE — PLAN OF CARE
Problem: Adult Inpatient Plan of Care  Goal: Plan of Care Review  Outcome: Ongoing (interventions implemented as appropriate)  POC reviewed with patient; understanding verbalized. Pt AAOx4. PRN Percoset administered Q6 hours. Tele maintained, ST-NSR. PRN ativan administered for anxiety. Regular diet, good appetite. Pt voids in urinal and toilet, one BM. Pt. with nonskid footwear on, bed in lowest position, and locked with bed rails up x2.  Pt. has call light and personal items within reach. Patient ambulates in room independently. VSS and afebrile this shift. All questions and concerns addressed at this time. Will continue to monitor.

## 2019-05-12 NOTE — ASSESSMENT & PLAN NOTE
63-year-old male with a past medical history of COPD, hypertension, hyperlipidemia, CAD on  ASA/plavix, NSCLC (most recently on Durvalumab 1/18/19), who presented to the ED with worsening shortness of breath, wheezing,  and productive cough of brown-yellow sputum for the last 4 days. Differential diagnosis includes but is not limited to COPD exacerbation (productive cough, wheezing on exam, CXR with increased diffuse bilateral interstitial and airspace opacities) vs hospital-acquired PNA (recent admission from 04/17 thru 04/18) vs progression of malignancy.     Plan:   - Levofloxacin 750 mg qd for 3-5 days (for possible underlying PNA)  - Patient was on a prednisone taper at home, being weaned katy from 70 mg qd for the treatment of suspected pneumonitis from the durvalumab that he had been on for a few months. After speaking with patient's oncologist, greyson Pathak will give steroid dosage used for pneumonitis treatment, decreasing prednisone to 60 mg PO qd in light of possible fungal infection  - Duonebs q6h while awake  - Titrate continuous O2 by nasal cannula trevor maintain sats > 88% as tolerated, (on 3 L @ home)  - Consulting Pulmonology as patient is not improving despite three days of standard therapy, appreciate their recs  - it's been discovered that during pt's last admission for which he had a similar presentation, the patient underwent bronchoscopy and had tissue cultures grow positive for Aspergillus fumigatus. Does not appear to have been treated at that time. Spoke with ID about this and they recommended beginning treatiment with voriconazole, started on 200 mg PO BID. Pt will be evaluated in the morning by ID.   - Continuing ant-fungal therapy with PO voriconazole, pt can be discharged on this medication. However, will need to discuss with patient if he wishes to continue treatment as he would likely need an indefinite course of this medication (generally need negative cultures from bronchoscopy in  order to stop therapy)

## 2019-05-12 NOTE — PLAN OF CARE
Problem: Adult Inpatient Plan of Care  Goal: Plan of Care Review  Outcome: Ongoing (interventions implemented as appropriate)  POC reviewed with patient. Pt continued on IVPB Voriconazole. PRN Percocet given for pt's c/o pain. PRN Ativan given for pt c/o anxiety attack. Respiratory to bedside for neb treatment overnight. Telemetry monitoring continued, tachycardic during the night, MD aware. Pt on 3 L NC. Afebrile overnight. Pt is stable. Instructed patient to call for assistance. Bed low and locked, call bell within reach, nonskid socks on, pt verbalized understanding. Infection, pressure ulcer, and fall risks precautions maintained. Will continue to monitor.

## 2019-05-12 NOTE — PROGRESS NOTES
[facility]  Hematology/Oncology  Progress Note    Patient Name: Nimesh Nunn  Admission Date: 5/6/2019  Hospital Length of Stay: 6 days  Code Status: Full Code     Subjective:     HPI:  Mr. Nimesh Nunn is a 63-year-old male with a past medical history of COPD, hypertension, hyperlipidemia, CAD on  ASA/plavix, NSCLC (most recently on Durvalumab 1/18/19), who presented to the ED with worsening shortness of breath, wheezing,  and productive cough of brown-yellow sputum for the last 4 days. Patient has had 3 hospital admissions for a similar presentation since February, most recently from 04/17- 04/18 at which time he was treated with duo-nebs, steroids, and antibiotics for a COPD exacerbation. Patient reports that he is on home oxyegn ( usually requiring 3 L NC) but has recently felt his work of breathing increase. He reports that he was discharged from his last admission on prednisone 70mg qd and has been weaning down, currently on 50 mg qd. Patient also reports subjective fevers at home and a generalized abdominal pain with associated nausea, 10/10 in severity, worse in the RUQ that is not affected by eating food. He denies chills, headache, vomiting, CP, diarrhea, or dysuria. He is not currently on chemo. .Per EMS, he was 88% on RA and went to 96% on 4L  In the ED, the patient was found to be hypoxic, tachycardic, and hypertensive. On exam,  he appears comfortable, in no acute distress. Lungs have scattered wheezes and rhonchi throughout, abdomen is soft, non-tender with active bowel sounds. No lower extremity edema noted. His chest x ray is c/w pneumonitis versus disease progression. Patient was admitted to Medical Oncology for further management.              Interval History: Pt intermittently tachycardic throughout the night, reports having an anxiety attack, was given 1 Xanax that provided relief.. Pt remains afebrile, remains at home O2 level of 2-3 lpm via NC. Working on plan to  get patient to [facility] with home hospice. May need further discussion about the meaning of hospice however, as the patient is still asking about what facility he will be going to to continue his chemotherapy, will talk to palliative care team tomorrow.     Oncology Treatment Plan:   OP DOCETAXEL (75 MG/M2) Q3W    Medications:  Continuous Infusions:  Scheduled Meds:   apixaban  5 mg Oral BID    atorvastatin  80 mg Oral Daily    cilostazol  100 mg Oral BID    clopidogrel  75 mg Oral Daily    famotidine  20 mg Oral BID    finasteride  5 mg Oral Daily    fluticasone-umeclidin-vilanter  1 puff Inhalation Daily    levoFLOXacin  750 mg Oral Daily    levothyroxine  75 mcg Oral Before breakfast    morphine  30 mg Oral Q12H    predniSONE  60 mg Oral Daily    senna-docusate 8.6-50 mg  1 tablet Oral Daily    tamsulosin  0.4 mg Oral Daily    tiotropium  1 capsule Inhalation Daily    verapamil  80 mg Oral TID    voriconazole  200 mg Oral BID     PRN Meds:acetaminophen, albuterol-ipratropium, benzonatate, dextrose 50%, dextrose 50%, glucagon (human recombinant), glucose, glucose, guaifenesin 100 mg/5 ml, insulin aspart U-100, ondansetron, oxyCODONE-acetaminophen, promethazine, ramelteon, sodium chloride 0.9%     Review of Systems   Constitutional: Positive for fatigue. Negative for chills and fever.   HENT: Negative for congestion, rhinorrhea and sore throat.    Eyes: Negative for pain.   Respiratory: Positive for cough, chest tightness, shortness of breath and wheezing.    Cardiovascular: Negative for chest pain, palpitations and leg swelling.   Gastrointestinal: Positive for abdominal pain and nausea. Negative for abdominal distention, diarrhea and vomiting.   Genitourinary: Negative for difficulty urinating and dysuria.   Musculoskeletal: Negative for arthralgias and myalgias.   Skin: Negative for color change.   Neurological: Negative for dizziness, weakness, light-headedness and headaches.    Psychiatric/Behavioral: Negative for agitation and confusion.     Objective:     Vital Signs (Most Recent):  Temp: 97.7 °F (36.5 °C) (05/12/19 0810)  Pulse: 104 (05/12/19 0851)  Resp: 20 (05/12/19 0851)  BP: (!) 140/74 (05/12/19 0810)  SpO2: (!) 91 % (05/12/19 0810) Vital Signs (24h Range):  Temp:  [97.4 °F (36.3 °C)-98 °F (36.7 °C)] 97.7 °F (36.5 °C)  Pulse:  [] 104  Resp:  [17-20] 20  SpO2:  [91 %-96 %] 91 %  BP: (113-163)/(58-79) 140/74     Weight: 83 kg (182 lb 15.7 oz)  Body mass index is 24.82 kg/m².  Body surface area is 2.05 meters squared.      Intake/Output Summary (Last 24 hours) at 5/12/2019 1126  Last data filed at 5/12/2019 0800  Gross per 24 hour   Intake 440 ml   Output 1485 ml   Net -1045 ml       Physical Exam   Constitutional: He is oriented to person, place, and time. He appears well-developed and well-nourished. No distress.   HENT:   Head: Normocephalic and atraumatic.   Mouth/Throat: Oropharynx is clear and moist.   Eyes: Pupils are equal, round, and reactive to light. EOM are normal.   Neck: Normal range of motion. Neck supple. No JVD present.   Cardiovascular: Normal rate, regular rhythm, normal heart sounds and intact distal pulses.   No murmur heard.  Pulmonary/Chest: Tachypnea noted. No respiratory distress. He has wheezes. He has rhonchi.   Scattered wheezes throughout b/l lung fields   Abdominal: Soft. Bowel sounds are normal. He exhibits no distension. There is no tenderness.   Musculoskeletal: Normal range of motion. He exhibits no edema.   Neurological: He is alert and oriented to person, place, and time. No cranial nerve deficit.   Skin: Skin is warm and dry. Capillary refill takes less than 2 seconds.   Psychiatric: He has a normal mood and affect.       Significant Labs:   CBC:   Recent Labs   Lab 05/11/19  0847   WBC 7.45   HGB 7.3*   HCT 23.7*      , CMP:   Recent Labs   Lab 05/11/19  0847      K 3.7   CL 90*   CO2 34*   *   BUN 21   CREATININE 0.8    CALCIUM 9.0   PROT 6.0   ALBUMIN 2.6*   BILITOT 0.3   ALKPHOS 72   AST 16   ALT 14   ANIONGAP 12   EGFRNONAA >60.0   , LDH: No results for input(s): LDHCSF, BFSOURCE in the last 48 hours. and LFTs:   Recent Labs   Lab 05/11/19  0847   ALT 14   AST 16   ALKPHOS 72   BILITOT 0.3   PROT 6.0   ALBUMIN 2.6*       Diagnostic Results:  I have reviewed and interpreted all pertinent imaging results/findings within the past 24 hours.             Assessment/Plan:     * Adenocarcinoma of left lung, stage 4  Oncologic History:       Oncologic History Non-small cell lung cancer, left diagnosed 06/2018  Metastatic disease to bone at presentation    Oncologic Treatment Stereotactic radiation to spinal lesion  Cisplatin/pemetrexed with concurrent radiation 08/08/2018 (completed 09/2018)  Durvalumab 10/2018, last given on 1/18/19  XRT L4-S3 30 Gy 12/2018    Pathology Poorly differentiated adenocarcinoma, EGFR wild type, No ALK, ROS-1 rearrangements, PD-L1 TPS 90%      - Goals of care discussion started, getting palliative care involved as patient is amenable to hearing about the options he has in maximizing the time he has left. Continue talks with palliative care.  - Patient seems willing to move towards home hospice and move to the [place] area to be close to his son and grandchildren. We will work on coordinating things to make this possible early this week.     Atrial fibrillation with RVR  - New onset as of this morning, pt does not recall having irregular rhythm any time prior, no complaints of CP or palpitations during episode.  - Likely etiology is 2/2 albuterol from nebulized breathing treatments, achieved rate control with metoprolol 5 mg IV x2  - Switched patient from Lovenox to Eliquis 5 mg BID  - Obtaining 2D echo  - Cardiology consulted, appreciate their recs  - Discontinued ASA (due to increased bleeding risk)  - Switched from Lovenox to Eliquis 5 mg BID  - Discontinued metoprolol and started on verapamil 80 mg  TID    COPD exacerbation  63-year-old male with a past medical history of COPD, hypertension, hyperlipidemia, CAD on  ASA/plavix, NSCLC (most recently on Durvalumab 1/18/19), who presented to the ED with worsening shortness of breath, wheezing,  and productive cough of brown-yellow sputum for the last 4 days. Differential diagnosis includes but is not limited to COPD exacerbation (productive cough, wheezing on exam, CXR with increased diffuse bilateral interstitial and airspace opacities) vs hospital-acquired PNA (recent admission from 04/17 thru 04/18) vs progression of malignancy.     Plan:   - Levofloxacin 750 mg qd for 3-5 days (for possible underlying PNA)  - Patient was on a prednisone taper at home, being weaned katy from 70 mg qd for the treatment of suspected pneumonitis from the durvalumab that he had been on for a few months. After speaking with patient's oncologist, greyson Pathak will give steroid dosage used for pneumonitis treatment, decreasing prednisone to 60 mg PO qd in light of possible fungal infection  - Duonebs q6h while awake  - Titrate continuous O2 by nasal cannula trevor maintain sats > 88% as tolerated, (on 3 L @ home)  - Consulting Pulmonology as patient is not improving despite three days of standard therapy, appreciate their recs  - it's been discovered that during pt's last admission for which he had a similar presentation, the patient underwent bronchoscopy and had tissue cultures grow positive for Aspergillus fumigatus. Does not appear to have been treated at that time. Spoke with ID about this and they recommended beginning treatiment with voriconazole, started on 200 mg PO BID. Pt will be evaluated in the morning by ID.   - Continuing ant-fungal therapy with PO voriconazole, pt can be discharged on this medication. However, will need to discuss with patient if he wishes to continue treatment as he would likely need an indefinite course of this medication (generally need negative  cultures from bronchoscopy in order to stop therapy)    Anemia in neoplastic disease  -Hgb 7.9, stable baseline Hgb of ~ 9.0,   - Currently no active signs of bleeding  - Will continue to monitor with daily CBC, transfuse to keep Hgb > 7.0    Lung cancer metastatic to bone  Will continue home pain regimen:  - MS contin 30mg BID   - Percocet 10-325mg  q4h PRN      PAD (peripheral artery disease)  - On cilostazol  - Discontinued aspirin per Cardiology recs    Hyperlipidemia  Continue home statin.    HTN (hypertension)  - Hypertensive in the ED  - BP last night was in the 160s but back to normal in AM  - Will continue home metoprolol for now             Migel Brambila MD  Hematology/Oncology  [facility]

## 2019-05-13 VITALS
SYSTOLIC BLOOD PRESSURE: 127 MMHG | TEMPERATURE: 98 F | OXYGEN SATURATION: 92 % | RESPIRATION RATE: 18 BRPM | BODY MASS INDEX: 24.79 KG/M2 | WEIGHT: 183 LBS | HEART RATE: 122 BPM | HEIGHT: 72 IN | DIASTOLIC BLOOD PRESSURE: 58 MMHG

## 2019-05-13 LAB
1,3 BETA GLUCAN SPEC-MCNC: 242 PG/ML
GALACTOMANNAN AG SERPL IA-ACNC: <0.5 INDEX

## 2019-05-13 PROCEDURE — 94761 N-INVAS EAR/PLS OXIMETRY MLT: CPT

## 2019-05-13 PROCEDURE — 99239 HOSP IP/OBS DSCHRG MGMT >30: CPT | Mod: GC,,, | Performed by: INTERNAL MEDICINE

## 2019-05-13 PROCEDURE — 63600175 PHARM REV CODE 636 W HCPCS: Performed by: STUDENT IN AN ORGANIZED HEALTH CARE EDUCATION/TRAINING PROGRAM

## 2019-05-13 PROCEDURE — 25000003 PHARM REV CODE 250: Performed by: PEDIATRICS

## 2019-05-13 PROCEDURE — 25000003 PHARM REV CODE 250: Performed by: STUDENT IN AN ORGANIZED HEALTH CARE EDUCATION/TRAINING PROGRAM

## 2019-05-13 PROCEDURE — 99239 PR HOSPITAL DISCHARGE DAY,>30 MIN: ICD-10-PCS | Mod: GC,,, | Performed by: INTERNAL MEDICINE

## 2019-05-13 PROCEDURE — 25000003 PHARM REV CODE 250: Performed by: INTERNAL MEDICINE

## 2019-05-13 PROCEDURE — 25000242 PHARM REV CODE 250 ALT 637 W/ HCPCS: Performed by: STUDENT IN AN ORGANIZED HEALTH CARE EDUCATION/TRAINING PROGRAM

## 2019-05-13 PROCEDURE — 27000221 HC OXYGEN, UP TO 24 HOURS

## 2019-05-13 RX ORDER — VERAPAMIL HYDROCHLORIDE 80 MG/1
80 TABLET ORAL 3 TIMES DAILY
Qty: 90 TABLET | Refills: 11
Start: 2019-05-13 | End: 2020-05-12

## 2019-05-13 RX ORDER — PREDNISONE 10 MG/1
50 TABLET ORAL DAILY
Qty: 50 TABLET | Refills: 0
Start: 2019-05-14

## 2019-05-13 RX ORDER — CILOSTAZOL 100 MG/1
100 TABLET ORAL 2 TIMES DAILY
Qty: 60 TABLET | Refills: 11
Start: 2019-05-13

## 2019-05-13 RX ORDER — CLOPIDOGREL BISULFATE 75 MG/1
75 TABLET ORAL DAILY
Qty: 90 TABLET | Refills: 4
Start: 2019-05-13

## 2019-05-13 RX ORDER — ALBUTEROL SULFATE 90 UG/1
2 AEROSOL, METERED RESPIRATORY (INHALATION)
Qty: 18 G | Refills: 6
Start: 2019-05-13

## 2019-05-13 RX ORDER — ONDANSETRON 8 MG/1
8 TABLET, ORALLY DISINTEGRATING ORAL EVERY 12 HOURS PRN
Qty: 30 TABLET | Refills: 1
Start: 2019-05-13 | End: 2020-05-12

## 2019-05-13 RX ORDER — VORICONAZOLE 200 MG/1
200 TABLET, FILM COATED ORAL 2 TIMES DAILY
Qty: 60 TABLET | Refills: 1
Start: 2019-05-13

## 2019-05-13 RX ORDER — ALPRAZOLAM 0.5 MG/1
0.5 TABLET ORAL 2 TIMES DAILY PRN
Start: 2019-05-13 | End: 2019-06-12

## 2019-05-13 RX ORDER — MORPHINE SULFATE 30 MG/1
30 TABLET, FILM COATED, EXTENDED RELEASE ORAL EVERY 8 HOURS
Qty: 90 TABLET | Refills: 0
Start: 2019-05-13

## 2019-05-13 RX ORDER — PROMETHAZINE HYDROCHLORIDE AND CODEINE PHOSPHATE 6.25; 1 MG/5ML; MG/5ML
5 SOLUTION ORAL EVERY 4 HOURS PRN
Qty: 175 ML | Refills: 0
Start: 2019-05-13 | End: 2019-05-23

## 2019-05-13 RX ORDER — TAMSULOSIN HYDROCHLORIDE 0.4 MG/1
0.4 CAPSULE ORAL DAILY
Qty: 90 CAPSULE | Refills: 3
Start: 2019-05-13 | End: 2020-05-12

## 2019-05-13 RX ORDER — OXYCODONE AND ACETAMINOPHEN 10; 325 MG/1; MG/1
1 TABLET ORAL EVERY 4 HOURS PRN
Qty: 90 TABLET | Refills: 0
Start: 2019-05-13

## 2019-05-13 RX ORDER — LEVOTHYROXINE SODIUM 75 UG/1
75 TABLET ORAL DAILY
Qty: 30 TABLET | Refills: 11
Start: 2019-05-13 | End: 2020-05-12

## 2019-05-13 RX ORDER — OXYCODONE AND ACETAMINOPHEN 10; 325 MG/1; MG/1
1 TABLET ORAL ONCE
Status: COMPLETED | OUTPATIENT
Start: 2019-05-13 | End: 2019-05-13

## 2019-05-13 RX ADMIN — SENNOSIDES,DOCUSATE SODIUM 1 TABLET: 8.6; 5 TABLET, FILM COATED ORAL at 09:05

## 2019-05-13 RX ADMIN — CLOPIDOGREL 75 MG: 75 TABLET, FILM COATED ORAL at 09:05

## 2019-05-13 RX ADMIN — ALPRAZOLAM 0.5 MG: 0.25 TABLET ORAL at 03:05

## 2019-05-13 RX ADMIN — VERAPAMIL HYDROCHLORIDE 80 MG: 80 TABLET, FILM COATED ORAL at 09:05

## 2019-05-13 RX ADMIN — APIXABAN 5 MG: 2.5 TABLET, FILM COATED ORAL at 09:05

## 2019-05-13 RX ADMIN — OXYCODONE AND ACETAMINOPHEN 1 TABLET: 10; 325 TABLET ORAL at 04:05

## 2019-05-13 RX ADMIN — MORPHINE SULFATE 30 MG: 15 TABLET, EXTENDED RELEASE ORAL at 09:05

## 2019-05-13 RX ADMIN — FAMOTIDINE 20 MG: 20 TABLET, FILM COATED ORAL at 09:05

## 2019-05-13 RX ADMIN — CILOSTAZOL 100 MG: 100 TABLET ORAL at 09:05

## 2019-05-13 RX ADMIN — OXYCODONE AND ACETAMINOPHEN 1 TABLET: 10; 325 TABLET ORAL at 11:05

## 2019-05-13 RX ADMIN — FINASTERIDE 5 MG: 5 TABLET, FILM COATED ORAL at 09:05

## 2019-05-13 RX ADMIN — PREDNISONE 60 MG: 20 TABLET ORAL at 09:05

## 2019-05-13 RX ADMIN — ALPRAZOLAM 0.5 MG: 0.25 TABLET ORAL at 12:05

## 2019-05-13 RX ADMIN — VERAPAMIL HYDROCHLORIDE 80 MG: 80 TABLET, FILM COATED ORAL at 03:05

## 2019-05-13 RX ADMIN — LEVOTHYROXINE SODIUM 75 MCG: 25 TABLET ORAL at 05:05

## 2019-05-13 RX ADMIN — ATORVASTATIN CALCIUM 80 MG: 20 TABLET, FILM COATED ORAL at 09:05

## 2019-05-13 RX ADMIN — TAMSULOSIN HYDROCHLORIDE 0.4 MG: 0.4 CAPSULE ORAL at 09:05

## 2019-05-13 RX ADMIN — TIOTROPIUM BROMIDE 18 MCG: 18 CAPSULE ORAL; RESPIRATORY (INHALATION) at 09:05

## 2019-05-13 RX ADMIN — OXYCODONE AND ACETAMINOPHEN 1 TABLET: 10; 325 TABLET ORAL at 05:05

## 2019-05-13 RX ADMIN — VORICONAZOLE 200 MG: 200 TABLET, FILM COATED ORAL at 09:05

## 2019-05-13 NOTE — PROGRESS NOTES
Rapid Response Respiratory Therapy Proactive Rounding Note      Time of visit: 0748  Code Status: DNR   : 1955  Age: 63 y.o.  Weight:   Wt Readings from Last 1 Encounters:   19 83 kg (182 lb 15.7 oz)     Sex: male  Race: White   Bed: 859/859A:   MRN: [id number]    SITUATION     Evaluated patient for: Increased RR and HR and decrease in SpO2. Pt has home oxygen.    BACKGROUND     Patient has a past medical history of Abdominal aortic aneurysm (AAA) without rupture, Arthritis, Atrial fibrillation with RVR, Blood transfusion, COPD (chronic obstructive pulmonary disease), COPD exacerbation, Coronary artery disease, Dehydration, Encounter for blood transfusion, Hyperlipidemia, Hypertension, Lung cancer metastatic to bone, Occlusion and stenosis of carotid artery without mention of cerebral infarction, Pneumonitis, Primary malignant neoplasm of left lung, Screening for colon cancer, Syncope, Thrombocytopenia, and Weakness of both lower extremities.  Pulmonary Hx: COPD       ASSESSMENT     Pulse: Pulse: 107 Respiratory rate: Resp: (!) 22 Temperature: Temp: 97.8 °F (36.6 °C) BP: BP: 136/71 SpO2:SpO2: (!) 92 %   Level of Consciousness: Level of Consciousness (AVPU): alert  Respiratory Effort: Respiratory Effort: Unlabored  Expansion/Accessory Muscle Usage: Expansion/Accessory Muscles/Retractions: no use of accessory muscles  All Lung Field Breath Sounds: All Lung Fields Breath Sounds: Anterior:, Lateral:  FARA Breath Sounds: clear  LLL Breath Sounds: wheezes, expiratory, diminished  RUL Breath Sounds: clear  RML Breath Sounds: diminished  RLL Breath Sounds: wheezes, expiratory, diminished  Cough Type: Cough Type: nonproductive  Mobility at time of assessment: General Mobility: generalized weakness  O2 Device/Concentration: O2 Device (Oxygen Therapy): nasal cannula   Flow (L/min): 3 Oxygen Concentration (%): 3  Current Respiratory Care Orders: Oxygen, IS, PRN aerosol txs and Daily MDI  Ambu at bedside: Ambu  bag with the patient?: Yes, Adult Ambu    INTERVENTIONS/RECOMMENDATIONS   ?  Continue to monitor. Pt has home oxygen.     Discussed plan of care with  primary RT, Katalina    FOLLOW-UP     Please call back the Rapid Response RT, Janet Pabon, CRT at x [phone] for any questions or concerns.

## 2019-05-13 NOTE — ASSESSMENT & PLAN NOTE
63-year-old male with a past medical history of COPD, hypertension, hyperlipidemia, CAD on  ASA/plavix, NSCLC (most recently on Durvalumab 1/18/19), who presented to the ED with worsening shortness of breath, wheezing,  and productive cough of brown-yellow sputum for the last 4 days. Differential diagnosis includes but is not limited to COPD exacerbation (productive cough, wheezing on exam, CXR with increased diffuse bilateral interstitial and airspace opacities) vs hospital-acquired PNA (recent admission from 04/17 thru 04/18) vs progression of malignancy.     Plan:   - Levofloxacin 750 mg qd for 3-5 days (for possible underlying PNA)  - Patient was on a prednisone taper at home, being weaned katy from 70 mg qd for the treatment of suspected pneumonitis from the durvalumab that he had been on for a few months. After speaking with patient's oncologist, Dr. Infante, we will give steroid dosage used for pneumonitis treatment, decreasing prednisone to 60 mg PO qd in light of possible fungal infection  - Duonebs q6h while awake  - Titrate continuous O2 by nasal cannula trevor maintain sats > 88% as tolerated, (on 3 L @ home)  - Consulting Pulmonology as patient is not improving despite three days of standard therapy, appreciate their recs  - it's been discovered that during pt's last admission for which he had a similar presentation, the patient underwent bronchoscopy and had tissue cultures grow positive for Aspergillus fumigatus. Does not appear to have been treated at that time. Spoke with ID about this and they recommended beginning treatiment with voriconazole, started on 200 mg PO BID. Pt will be evaluated in the morning by ID.   - Continuing ant-fungal therapy with PO voriconazole, pt can be discharged on this medication. However, will need to discuss with patient if he wishes to continue treatment as he would likely need an indefinite course of this medication (generally need negative cultures from bronchoscopy in  order to stop therapy)    Plan upon discharge:  - Will not send patient out on antibiotics as he has completed a 7-day course  - Will begin to wean down steroids, begin PO steroid taper  - Continue home inhaler treatment for COPD  - Pt will continue taking PO voriconazole 200 mg BID for treatment of aspergillosis as their potential benefit that this will help patient symptomatically   - Continue supplemental home oxygen therapy, titrate to comfort

## 2019-05-13 NOTE — PHYSICIAN QUERY
"PT Name: iNmesh Nunn  MR #: [id number]    Physician Query Form - Pneumonia Clarification     CDS/: Jessica Mireles RN         Contact information:[email or web address]  This form is a permanent document in the medical record.     Query Date:  May 13, 2019     By submitting this query, we are merely seeking further clarification of documentation. Please utilize your independent clinical judgment when addressing the question(s) below.     The Medical record contains the following:    Indicators                         Supporting Clinical Findings Location in Medical Record   X "Pneumonia" documented Levofloxacin 750 mg qd for 3 days (for possible underlying PNA) H & P   X Chest X-Ray: Interstitial edema and pneumonia aspiration sepsis or pneumonitis.     Increased diffuse bilateral interstitial and airspace opacities, particularly in the bilateral middle lung zones.    New trace right pleural effusion.  No left effusion.    No pneumothorax.    No acute bone abnormality. CXR  5/9        CXR 5/6   X PaO2    PaCO2     O2 sat O2 sat 91%  H & P vitals     Cultures        X Treatment  Levofloxacin 750 mg qd for 3-5 days (for possible underlying PNA)  - Patient was on a prednisone taper at home, being weaned katy from 70 mg qd for the treatment of suspected pneumonitis from the durvalumab that he had been on for a few months. After speaking with patient's oncologist, Dr. Infante w will give steroid dosage used for pneumonitis treatment, decreasing prednisone to 60 mg PO qd in light of possible fungal infection  - Duonebs q6h while awake  - it's been discovered that during pt's last admission for which he had a similar presentation, the patient underwent bronchoscopy and had tissue cultures grow positive for Aspergillus fumigatus. Does not appear to have been treated at that time. Spoke with ID about this and they recommended beginning treatiment with voriconazole, started on 200 mg PO BID. Pt will be " evaluated in the morning by ID.   - Continuing ant-fungal therapy with PO voriconazole, pt can be discharged on this medication. However, will need to discuss with patient if he wishes to continue treatment as he would likely need an indefinite course of this medication (generally need negative cultures from bronchoscopy in order to stop therapy)    Heme/onc 5/12   X Supplemental O2 Titrate continuous O2 by nasal cannula trevor maintain sats > 88% as tolerated, (on 3 L @ home)       H & P   X Other Lung cancer metastatic to bone           Provider, please specify type of pneumonia.     [ xx  ] Fungal Pneumonia (Specify organism):   [   ] Other type of pneumonia (please specify):   [  ] Clinically undetermined            Please document in your progress notes daily for the duration of treatment, until resolved, and include in your discharge summary.     .                                                                                                         .

## 2019-05-13 NOTE — PLAN OF CARE
[facility]  Department of Hospital Medicine  [address] [place] [address]  [phone] after hours  [phone] fax    HOSPICE  ORDERS    05/13/2019    Admit to Hospice:  Home Service     Diagnoses:   Active Hospital Problems    Diagnosis  POA    *Adenocarcinoma of left lung, stage 4 [C34.92]  Yes     Last CT in February concerning for progression of the left hilum  Concerned that he has progression of disease leading to worsening hypoxia and dyspnea on exertion.  Follow up CT of chest.      Palliative care encounter [Z51.5]  Not Applicable    Debility [R53.81]  Unknown    Atrial fibrillation with RVR [I48.91]  Unknown    COPD exacerbation [J44.1]  Yes    Anemia in neoplastic disease [D63.0]  Yes    Lung cancer metastatic to bone [C34.90, C79.51]  Yes    PAD (peripheral artery disease) [I73.9]  Yes    HTN (hypertension) [I10]  Yes    Hyperlipidemia [E78.5]  Yes      Resolved Hospital Problems    Diagnosis Date Resolved POA    HCAP (healthcare-associated pneumonia) [J18.9] 05/07/2019 Yes       Hospice Qualifying Diagnoses:        Patient has a life expectancy < 6 months due to:  1) Primary Hospice Diagnosis: adenocarcinoma of left lung, stage 3:with metastasis to bone  2) Comorbid Conditions Contributing to Decline: COPD, pneumonitis, atrial fibrillation with rapid ventricular response, aspergillosis, peripheral artery disease,     Vital Signs: Routine per Hospice Protocol.    Code Status: DNR    Allergies: Review of patient's allergies indicates:  No Known Allergies    Diet: Regular diet     Activities: As tolerated    Nursing: Per Hospice Routine.      Oxygen: Home oxygen, 2-3 lpm via nasal cannula, titrate to patient's comfort    Medications:      Nimesh Nunn   Home Medication Instructions DAVID:[id number]    Printed on:05/13/19 6080   Medication Information                      ALPRAZolam (XANAX) 0.5 MG tablet  Take 1 tablet (0.5 mg total) by  mouth 2 (two) times daily as needed for Anxiety.             cilostazol (PLETAL) 100 MG Tab  Take 1 tablet (100 mg total) by mouth 2 (two) times daily.             clopidogrel (PLAVIX) 75 mg tablet  Take 1 tablet (75 mg total) by mouth once daily.             fluticasone-umeclidin-vilanter (TRELEGY ELLIPTA) 100-62.5-25 mcg DsDv  Inhale 1 puff into the lungs once daily.             guaifenesin 100 mg/5 ml (ROBITUSSIN) 100 mg/5 mL syrup  Take 10 mLs (200 mg total) by mouth every 8 (eight) hours as needed for Cough or Congestion.             levothyroxine (SYNTHROID) 75 MCG tablet  Take 1 tablet (75 mcg total) by mouth once daily.             morphine (MS CONTIN) 30 MG 12 hr tablet  Take 1 tablet (30 mg total) by mouth every 8 (eight) hours.             ondansetron (ZOFRAN-ODT) 8 MG TbDL  Take 1 tablet (8 mg total) by mouth every 12 (twelve) hours as needed.             oxyCODONE-acetaminophen (PERCOCET)  mg per tablet  Take 1 tablet by mouth every 4 (four) hours as needed for Pain.             predniSONE (DELTASONE) 10 MG tablet  Take 5 tablets (50 mg total) by mouth once daily. For 3 days, then 40 for 3 days, then 20 for 3 days, then 10 for 3 days.                        PROAIR HFA 90 mcg/actuation inhaler  Inhale 2 puffs into the lungs as needed.             promethazine-codeine 6.25-10 mg/5 ml (PHENERGAN WITH CODEINE) 6.25-10 mg/5 mL syrup  Take 5 mLs by mouth every 4 (four) hours as needed for Cough.             ramelteon (ROZEREM) 8 mg tablet  Take 1 tablet (8 mg total) by mouth nightly as needed for Insomnia.             tamsulosin (FLOMAX) 0.4 mg Cap  Take 1 capsule (0.4 mg total) by mouth once daily.             verapamil (CALAN) 80 MG tablet  Take 1 tablet (80 mg total) by mouth 3 (three) times daily.             voriconazole (VFEND) 200 MG Tab  Take 1 tablet (200 mg total) by mouth 2 (two) times daily.               Future Orders:  Hospice Medical Director may dictate new orders for comfortable care  measures & sign death certificate.        _________________________________  Migel rBambila MD  05/13/2019  Migel Brambila MD  Medical Oncology  [facility]

## 2019-05-13 NOTE — PHYSICIAN QUERY
"PT Name: Nimesh Nunn  MR #: [id number]    Physician Query Form - Pneumonia Clarification     CDS/: Jessica Mireles RN         Contact information:[email or web address]  This form is a permanent document in the medical record.    Query Date:  May 13, 2019    By submitting this query, we are merely seeking further clarification of documentation. Please utilize your independent clinical judgment when addressing the question(s) below.    The Medical record contains the following:   Indicators   Supporting Clinical Findings Location in Medical Record   X "Pneumonia" documented Levofloxacin 750 mg qd for 3 days (for possible underlying PNA) H & P   X Chest X-Ray: Interstitial edema and pneumonia aspiration sepsis or pneumonitis.    Increased diffuse bilateral interstitial and airspace opacities, particularly in the bilateral middle lung zones.    New trace right pleural effusion.  No left effusion.    No pneumothorax.    No acute bone abnormality. CXR  5/9      CXR 5/6   X PaO2    PaCO2     O2 sat O2 sat 91%  H & P vitals    Cultures      X Treatment  Levofloxacin 750 mg qd for 3-5 days (for possible underlying PNA)  - Patient was on a prednisone taper at home, being weaned katy from 70 mg qd for the treatment of suspected pneumonitis from the durvalumab that he had been on for a few months. After speaking with patient's oncologist, Dr. Infante w will give steroid dosage used for pneumonitis treatment, decreasing prednisone to 60 mg PO qd in light of possible fungal infection  - Duonebs q6h while awake  - it's been discovered that during pt's last admission for which he had a similar presentation, the patient underwent bronchoscopy and had tissue cultures grow positive for Aspergillus fumigatus. Does not appear to have been treated at that time. Spoke with ID about this and they recommended beginning treatiment with voriconazole, started on 200 mg PO BID. Pt will be evaluated in the morning by ID.   - " Continuing ant-fungal therapy with PO voriconazole, pt can be discharged on this medication. However, will need to discuss with patient if he wishes to continue treatment as he would likely need an indefinite course of this medication (generally need negative cultures from bronchoscopy in order to stop therapy)   Heme/onc 5/12   X Supplemental O2 Titrate continuous O2 by nasal cannula trevor maintain sats > 88% as tolerated, (on 3 L @ home)     H & P   X Other Lung cancer metastatic to bone        Provider, please specify type of pneumonia.    [   ] Bacterial Pneumonia (Specify organism):   [   ] Bacterial, Gram Negative organism Pneumonia   [   ] Fungal Pneumonia (Specify organism):   [   ] Other type of pneumonia (please specify):   [  ] Clinically undetermined         Please document in your progress notes daily for the duration of treatment, until resolved, and include in your discharge summary.    .

## 2019-05-13 NOTE — PLAN OF CARE
Problem: Adult Inpatient Plan of Care  Goal: Plan of Care Review  Outcome: Ongoing (interventions implemented as appropriate)  POC reviewed with patient; understanding verbalized. Code status changed to DNR. IV saline locked. Remains on 3L of oxygen. NSR on tele. Pt on regular diet with good appetite. Patient given PRN percocet for pain and PRN xanax to rest. Pt with nonskid footwear on, bed in lowest position, and locked with bed rails up x2. Pt instructed to call prior to getting OOB. Pt has call light and person items within reach. Patient ambulates in room independently. Urinal at bedside. VSS and afebrile this shift. All questions and concerns addressed at this time. Will continue to monitor.

## 2019-05-13 NOTE — NURSING
ROADTEST  O2- Pt on 3L O2 sating 90-92%  Activity-Pt ambulates independently  Devices- Pt not being sent home on any devices.   Tolerating-Pt tolerating PO diet and medication.  Elimination-Pt voiding and having bowel movements independently.  Self Care- Pt able to do personal hygiene independently  Teaching- Pt instructed on when to take home meds.     Pt's peripheral IV removed. Cath tip intact. Pt tolerated well. AVS given to pt. All questions answered. Pt verbalized understanding. Pt transported off floor on O2 with [place] transportation. Personal belongings with pt.

## 2019-05-13 NOTE — PLAN OF CARE
MDRs completed with Dr. Spencer and the team. Plans for patient to discharge home to [place] (close to his family and son). Patient plans to discharge home with [facility] home hospice. SW assisting patient with home hospice arrangements and oxygen for transportation home. Hospice to supply oxygen. Medications to be provided by hospice once patient is home. MD discussed plans with the patient; patient verbalized understanding. Discharge instructions to be completed by the bedside nurse. No follow-up appointment needed secondary to discharge disposition.       05/13/19 1057   Final Note   Assessment Type Final Discharge Note   Anticipated Discharge Disposition [facility]  ([facility]- home hospice)   What phone number can be called within the next 1-3 days to see how you are doing after discharge?   ([phone])   Hospital Follow Up  Appt(s) scheduled?   (n/a)   Discharge plans and expectations educations in teach back method with documentation complete?   (n/a)

## 2019-05-13 NOTE — DISCHARGE SUMMARY
[facility]  Hematology/Oncology  Discharge Summary      Patient Name: Nimesh Nunn  MRN: [id number]  Admission Date: 5/6/2019  Hospital Length of Stay: 7 days  Discharge Date and Time:  05/13/2019 2:01 PM  Attending Physician: Radha Spencer MD   Discharging Provider: Migel Brambila MD  Primary Care Provider: Nishant Perez MD    HPI:   Mr. Nimesh Nunn is a 63-year-old male with a past medical history of COPD, hypertension, hyperlipidemia, CAD on  ASA/plavix, NSCLC (most recently on Durvalumab 1/18/19), who presented to the ED with worsening shortness of breath, wheezing,  and productive cough of brown-yellow sputum for the last 4 days. Patient has had 3 hospital admissions for a similar presentation since February, most recently from 04/17- 04/18 at which time he was treated with duo-nebs, steroids, and antibiotics for a COPD exacerbation. Patient reports that he is on home oxyegn ( usually requiring 3 L NC) but has recently felt his work of breathing increase. He reports that he was discharged from his last admission on prednisone 70mg qd and has been weaning down, currently on 50 mg qd. Patient also reports subjective fevers at home and a generalized abdominal pain with associated nausea, 10/10 in severity, worse in the RUQ that is not affected by eating food. He denies chills, headache, vomiting, CP, diarrhea, or dysuria. He is not currently on chemo. .Per EMS, he was 88% on RA and went to 96% on 4L  In the ED, the patient was found to be hypoxic, tachycardic, and hypertensive. On exam,  he appears comfortable, in no acute distress. Lungs have scattered wheezes and rhonchi throughout, abdomen is soft, non-tender with active bowel sounds. No lower extremity edema noted. His chest x ray is c/w pneumonitis versus disease progression. Patient was admitted to Medical Oncology for further management.     Oncologic History:       Oncologic History Non-small cell lung cancer, left  diagnosed 06/2018  Metastatic disease to bone at presentation    Oncologic Treatment Stereotactic radiation to spinal lesion  Cisplatin/pemetrexed with concurrent radiation 08/08/2018 (completed 09/2018)  Durvalumab 10/2018  XRT L4-S3 30 Gy 12/2018    Pathology Poorly differentiated adenocarcinoma, EGFR wild type, No ALK, ROS-1 rearrangements, PD-L1 TPS 90%     Per Dr. Infante's note on 04/29/19:     His history dates to 06/2018 when he sought medical attention for chest pain.  A CT of the chest was performed showing a left lower lobe mass measuring 7.4 cm.  There were also enlarged left hilar lymph nodes. He underwent PET-CT which showed uptake in this mass and also in L1.  He underwent bronchoscopy and biopsy with pathology showing poorly differentiated adenocarcinoma.  Molecular studies showed EGFR wild-type and he did not harbor any ALK or ROS-1 rearrangements.  PD L1 tumor proportions core was 90%.  He underwent radiation to the spinal lesion in 08/2018 and started on concurrent chemo and radiation with cisplatin and pemetrexed in 08/2018.  He completed treatment on 09/19/2018.  He was only able to receive 2 cycles of chemotherapy secondary to cytopenias.  He was initiated on durvalumab in 10/2018.  He received 3 cycles but then developed lower extremity weakness and was felt that this may be related to cauda equina syndrome related to bone metastasis.  He underwent radiation to his lumbar and sacral spine in 12/2018.          * No surgery found *     Hospital Course: Patient was admitted to medical oncology service for further management of likely COPD exacerbation. Patient received 1 L NS bolus and a dose of Vanc + Cefepime in the ED.  Started on steroids (2 mg/kg), nebulized breathing treatments QID, and PO abx to cover for possible underlying pneumonia. Will continue to monitor progress.   05/09/19: Patient has received 3 days of standard therapy and has showed no significant improvement in clinical  status, Pulmonology consulted for further recommendations. Pt with new-onset atrial fibrillation with RVR during this admission, likely 2/2 to nebulized albuterol breathing treatments, cardiology consulted for further recs but for now obtaining 2D echo and switching anticoagulation from Lovenox to Eliquis. Discovered today that patient had a BAL culture positive for Aspergillus fumigatus during his last admission for which he was never treated, was started today on voriconazole 200 mg BID per ID recommendation. After goals of care discussion with patient and palliative care team, planning on getting patient to [facility] with home hospice. 05/13/19: Reconciled home medications, discharging patient today to home hospice.     Consults:   Consults (From admission, onward)        Status Ordering Provider     Inpatient consult to Cardiology  Once     Provider:  (Not yet assigned)    Completed MARY GRANT     Inpatient consult to Infectious Diseases  Once     Provider:  (Not yet assigned)    Completed MARY GRANT     Inpatient consult to Palliative Care  Once     Provider:  (Not yet assigned)    Completed KATERINA MORAES     Inpatient consult to Pulmonology  Once     Provider:  (Not yet assigned)    Completed MARY GRANT     Inpatient consult to Social Work  Once     Provider:  (Not yet assigned)    Completed BINDU GARCIA     Inpatient consult to Spiritual Care  Once     Provider:  (Not yet assigned)    Completed BINDU GARCIA          Significant Diagnostic Studies: Labs: CMP No results for input(s): NA, K, CL, CO2, GLU, BUN, CREATININE, CALCIUM, PROT, ALBUMIN, BILITOT, ALKPHOS, AST, ALT, ANIONGAP, ESTGFRAFRICA, EGFRNONAA in the last 48 hours. and CBC No results for input(s): WBC, HGB, HCT, PLT in the last 48 hours.  Microbiology: BAL culture -> aspergillosis from bronchoscopy on 04/17/19  Radiology:     CT Chest W/O Contrast (05/09/19):  1. Bilateral upper lobe predominant,  patchy subsegmental areas of ground-glass attenuation and consolidation involving the peripheral and peribronchovascular regions of the lungs with scattered areas of bronchial wall thickening and small bilateral dependent pleural effusions, progressed when compared to the previous CT.  Findings are overall nonspecific and may relate to sequela of infectious (multifocal pneumonia) non infectious inflammatory (aspiration, post radiation) or less likely metastatic disease.  Specialty consultation recommended.  2. Left lower lobe mass corresponding with the patient's primary neoplasm and difficult to accurately measure secondary to surrounding parenchymal disease and lack of intravenous contrast.  3. Patient's known lytic lesion within the L1 vertebral body is not well evaluated on today's exam.  4. Severe dense coronary artery atherosclerosis.     Pending Diagnostic Studies:     Procedure Component Value Units Date/Time    Aspergillus antigen [[id number]] Collected:  05/10/19 0712    Order Status:  Sent Lab Status:  In process Updated:  05/10/19 075    Specimen:  Blood     Narrative:       Collection has been rescheduled by CRA at 05/10/2019 03:57 Reason:   unable to collect Patient     Fungitell Assay For (1.3)-B-D-Glucans [[id number]] Collected:  05/10/19 0712    Order Status:  Sent Lab Status:  In process Updated:  05/10/19 0751    Specimen:  Blood     Narrative:       Collection has been rescheduled by CRA at 05/10/2019 03:57 Reason:   unable to collect Patient         Final Active Diagnoses:    Diagnosis Date Noted POA    PRINCIPAL PROBLEM:  Adenocarcinoma of left lung, stage 4 [C34.92] 2018 Yes    Palliative care encounter [Z51.5] 05/10/2019 Not Applicable    Debility [R53.81]  Unknown    Atrial fibrillation with RVR [I48.91] 2019 Unknown    COPD exacerbation [J44.1] 2019 Yes    Anemia in neoplastic disease [D63.0] 2018 Yes    Lung cancer metastatic to bone [C34.90,  C79.51] 07/18/2018 Yes    PAD (peripheral artery disease) [I73.9] 02/18/2014 Yes    HTN (hypertension) [I10] 11/07/2013 Yes    Hyperlipidemia [E78.5] 11/07/2013 Yes      Problems Resolved During this Admission:    Diagnosis Date Noted Date Resolved POA    HCAP (healthcare-associated pneumonia) [J18.9] 05/06/2019 05/07/2019 Yes      Discharged Condition: stable    Disposition: Hospice/Home    Follow Up:  Follow-up Information     Call [facility].    Specialties:  Hospice and Palliative Medicine, Hospice Services  Why:  As needed  Contact information:  [address]  [address]  [place] [address]  [phone]                 Patient Instructions:   No discharge procedures on file.  Medications:  Reconciled Home Medications:      Medication List      START taking these medications    ALPRAZolam 0.5 MG tablet  Commonly known as:  XANAX  Take 1 tablet (0.5 mg total) by mouth 2 (two) times daily as needed for Anxiety.     guaifenesin 100 mg/5 ml 100 mg/5 mL syrup  Commonly known as:  ROBITUSSIN  Take 10 mLs (200 mg total) by mouth every 8 (eight) hours as needed for Cough or Congestion.     * predniSONE 20 MG tablet  Commonly known as:  DELTASONE  Take 3 tablets (60 mg total) by mouth once daily. for 10 days     * predniSONE 10 MG tablet  Commonly known as:  DELTASONE  Take 5 tablets (50 mg total) by mouth once daily. For 3 days, then 40 for 3 days, then 20 for 3 days, then 10 for 3 days.  Start taking on:  5/14/2019     ramelteon 8 mg tablet  Commonly known as:  ROZEREM  Take 1 tablet (8 mg total) by mouth nightly as needed for Insomnia.     verapamil 80 MG tablet  Commonly known as:  CALAN  Take 1 tablet (80 mg total) by mouth 3 (three) times daily.     voriconazole 200 MG Tab  Commonly known as:  VFEND  Take 1 tablet (200 mg total) by mouth 2 (two) times daily.         * This list has 2 medication(s) that are the same as other medications prescribed for you. Read the directions carefully, and  ask your doctor or other care provider to review them with you.            CONTINUE taking these medications    cilostazol 100 MG Tab  Commonly known as:  PLETAL  Take 1 tablet (100 mg total) by mouth 2 (two) times daily.     clopidogrel 75 mg tablet  Commonly known as:  PLAVIX  Take 1 tablet (75 mg total) by mouth once daily.     fluticasone-umeclidin-vilanter 100-62.5-25 mcg Dsdv  Commonly known as:  TRELEGY ELLIPTA  Inhale 1 puff into the lungs once daily.     levothyroxine 75 MCG tablet  Commonly known as:  SYNTHROID  Take 1 tablet (75 mcg total) by mouth once daily.     morphine 30 MG 12 hr tablet  Commonly known as:  MS CONTIN  Take 1 tablet (30 mg total) by mouth every 8 (eight) hours.     ondansetron 8 MG Tbdl  Commonly known as:  ZOFRAN-ODT  Take 1 tablet (8 mg total) by mouth every 12 (twelve) hours as needed.     oxyCODONE-acetaminophen  mg per tablet  Commonly known as:  PERCOCET  Take 1 tablet by mouth every 4 (four) hours as needed for Pain.     PROAIR HFA 90 mcg/actuation inhaler  Generic drug:  albuterol  Inhale 2 puffs into the lungs as needed.     promethazine-codeine 6.25-10 mg/5 ml 6.25-10 mg/5 mL syrup  Commonly known as:  PHENERGAN with CODEINE  Take 5 mLs by mouth every 4 (four) hours as needed for Cough.     tamsulosin 0.4 mg Cap  Commonly known as:  FLOMAX  Take 1 capsule (0.4 mg total) by mouth once daily.        STOP taking these medications    aspirin 81 MG EC tablet  Commonly known as:  ECOTRIN     atorvastatin 80 MG tablet  Commonly known as:  LIPITOR     diphenoxylate-atropine 2.5-0.025 mg 2.5-0.025 mg per tablet  Commonly known as:  LOMOTIL     finasteride 5 mg tablet  Commonly known as:  PROSCAR     metoprolol succinate 50 MG 24 hr tablet  Commonly known as:  TOPROL-XL     omeprazole 20 MG capsule  Commonly known as:  PRILOSEC            Migel Brambila MD  Hematology/Oncology  [facility]

## 2019-05-13 NOTE — PROGRESS NOTES
Spoke with Josie who was in the room with the patient. He just finished signing consents. 2 portable tanks will be delivered to his home. Daughter in law at his house to accept. Transport request through [facility] set up for 4:00pm.

## 2019-05-13 NOTE — PROGRESS NOTES
Spoke with patient and his daughter in law via the telephone. Lakshmi can be reached at [phone]. Reviewed the plan of discharging home with hospice care today. Patient will go to [address] [place] [address]. They do not have a preference on hospice agency. Son and daughter-in-law are packing his things and will transport him to [place]. Patient is requesting transport home from the hospital. Will reach out to [facility] about process to return home oxygen.

## 2019-05-13 NOTE — ASSESSMENT & PLAN NOTE
- New onset as of this morning, pt does not recall having irregular rhythm any time prior, no complaints of CP or palpitations during episode.  - Likely etiology is 2/2 albuterol from nebulized breathing treatments, achieved rate control with metoprolol 5 mg IV x2  - Switched patient from Lovenox to Eliquis 5 mg BID  - Obtaining 2D echo  - Cardiology consulted, appreciate their recs  - Discontinued ASA (due to increased bleeding risk)  - Switched from Lovenox to Eliquis 5 mg BID  - Discontinued metoprolol and started on verapamil 80 mg TID    Plan upon discharge:  - Discontinued metoprolol, Eliquis,  - Continue verapamil 80 mg TID

## 2019-05-13 NOTE — CHAPLAIN
Facts Patient is going to [place] on hospice, to be with family - son, 2 grandchildren, dtr in law    Feelings Patient is feeling stressed because so many people have been asking him how he feels about hospice. His feeling is about 30 people have asked the same question. He said he was probably going to have a panic attack now. I apologized and asked if there was anything I could do to make him feel better besides leave. That got a little smile. Asked about his family and [place]. He thinks the food will be too spicy because they make tabasco there. I think we averted a panic attack.    He's looking forward to having some peace and quiet so he can think.  And he's looking forward to spending time with his family.  And maybe he will surprise his doctors and live longer than a few weeks.    I asked if it would be OK if I kept him in my prayers. He said yes and shook my hand and thanked me for coming.

## 2019-05-13 NOTE — CONSULTS
Palliative Care Acknowledgement of Consult - .date    Consult received. Palliative Care Provider: Dr. Whitley will touch base with team prior to seeing patient. Full consult to follow.    Thank you for allowing us to be a part of the care of this patient.          Polly Borja, ANGIE, ACHP-SW

## 2019-05-13 NOTE — ASSESSMENT & PLAN NOTE
Will continue home pain regimen:  - MS contin 30mg BID   - Percocet 10-325mg  q4h PRN  Home hospice able to make medication adjustments to maximize comfort

## 2019-05-13 NOTE — PROGRESS NOTES
Spoke with Josie from [facility]. Discussed the equipment requirements. Provided her with the daughter in law's phone number to coordinate equipment requirements. She will contact me back to let me know when I can set transport for the patient.

## 2019-05-13 NOTE — PROGRESS NOTES
[facility]  Hematology/Oncology  Progress Note    Patient Name: Nimesh Nunn  Admission Date: 5/6/2019  Hospital Length of Stay: 7 days  Code Status: DNR     Subjective:     HPI:  Mr. Nimesh Nunn is a 63-year-old male with a past medical history of COPD, hypertension, hyperlipidemia, CAD on  ASA/plavix, NSCLC (most recently on Durvalumab 1/18/19), who presented to the ED with worsening shortness of breath, wheezing,  and productive cough of brown-yellow sputum for the last 4 days. Patient has had 3 hospital admissions for a similar presentation since February, most recently from 04/17- 04/18 at which time he was treated with duo-nebs, steroids, and antibiotics for a COPD exacerbation. Patient reports that he is on home oxyegn ( usually requiring 3 L NC) but has recently felt his work of breathing increase. He reports that he was discharged from his last admission on prednisone 70mg qd and has been weaning down, currently on 50 mg qd. Patient also reports subjective fevers at home and a generalized abdominal pain with associated nausea, 10/10 in severity, worse in the RUQ that is not affected by eating food. He denies chills, headache, vomiting, CP, diarrhea, or dysuria. He is not currently on chemo. .Per EMS, he was 88% on RA and went to 96% on 4L  In the ED, the patient was found to be hypoxic, tachycardic, and hypertensive. On exam,  he appears comfortable, in no acute distress. Lungs have scattered wheezes and rhonchi throughout, abdomen is soft, non-tender with active bowel sounds. No lower extremity edema noted. His chest x ray is c/w pneumonitis versus disease progression. Patient was admitted to Medical Oncology for further management.     Oncologic History:       Oncologic History Non-small cell lung cancer, left diagnosed 06/2018  Metastatic disease to bone at presentation    Oncologic Treatment Stereotactic radiation to spinal lesion  Cisplatin/pemetrexed with concurrent  radiation 08/08/2018 (completed 09/2018)  Durvalumab 10/2018  XRT L4-S3 30 Gy 12/2018    Pathology Poorly differentiated adenocarcinoma, EGFR wild type, No ALK, ROS-1 rearrangements, PD-L1 TPS 90%     Per Dr. Infante's note on 04/29/19:     His history dates to 06/2018 when he sought medical attention for chest pain.  A CT of the chest was performed showing a left lower lobe mass measuring 7.4 cm.  There were also enlarged left hilar lymph nodes. He underwent PET-CT which showed uptake in this mass and also in L1.  He underwent bronchoscopy and biopsy with pathology showing poorly differentiated adenocarcinoma.  Molecular studies showed EGFR wild-type and he did not harbor any ALK or ROS-1 rearrangements.  PD L1 tumor proportions core was 90%.  He underwent radiation to the spinal lesion in 08/2018 and started on concurrent chemo and radiation with cisplatin and pemetrexed in 08/2018.  He completed treatment on 09/19/2018.  He was only able to receive 2 cycles of chemotherapy secondary to cytopenias.  He was initiated on durvalumab in 10/2018.  He received 3 cycles but then developed lower extremity weakness and was felt that this may be related to cauda equina syndrome related to bone metastasis.  He underwent radiation to his lumbar and sacral spine in 12/2018.          Interval History: Pt intermittently tachycardic throughout the night, complains of having another anxiety attack, was given 1 Xanax that provided relief.. Pt remains afebrile, remains at home O2 level of 2-3 lpm via NC. Discussed more with patient about the decision to move towards home hospice, the patient says he is ready to spend time with his family. Plan is for discharge to home hospice so patient can be near his son and grandchildren.     Oncology Treatment Plan:   OP DOCETAXEL (75 MG/M2) Q3W    Medications:  Continuous Infusions:  Scheduled Meds:   cilostazol  100 mg Oral BID    clopidogrel  75 mg Oral Daily    famotidine  20 mg Oral BID     fluticasone-umeclidin-vilanter  1 puff Inhalation Daily    levothyroxine  75 mcg Oral Before breakfast    morphine  30 mg Oral Q12H    [START ON 5/14/2019] predniSONE  50 mg Oral Daily    senna-docusate 8.6-50 mg  1 tablet Oral Daily    tamsulosin  0.4 mg Oral Daily    verapamil  80 mg Oral TID    voriconazole  200 mg Oral BID     PRN Meds:acetaminophen, albuterol-ipratropium, ALPRAZolam, benzonatate, guaifenesin 100 mg/5 ml, insulin aspart U-100, ondansetron, oxyCODONE-acetaminophen, promethazine, ramelteon, sodium chloride 0.9%     Review of Systems   Constitutional: Positive for fatigue. Negative for chills and fever.   HENT: Negative for congestion, rhinorrhea and sore throat.    Eyes: Negative for pain.   Respiratory: Positive for cough, chest tightness, shortness of breath and wheezing.    Cardiovascular: Negative for chest pain, palpitations and leg swelling.   Gastrointestinal: Positive for abdominal pain and nausea. Negative for abdominal distention, diarrhea and vomiting.   Genitourinary: Negative for difficulty urinating and dysuria.   Musculoskeletal: Negative for arthralgias and myalgias.   Skin: Negative for color change.   Neurological: Negative for dizziness, weakness, light-headedness and headaches.   Psychiatric/Behavioral: Negative for agitation and confusion.     Objective:     Vital Signs (Most Recent):  Temp: 97.6 °F (36.4 °C) (05/13/19 1222)  Pulse: 108 (05/13/19 1222)  Resp: 18 (05/13/19 1222)  BP: 131/72 (05/13/19 1222)  SpO2: (!) 92 % (05/13/19 1222) Vital Signs (24h Range):  Temp:  [96.3 °F (35.7 °C)-97.8 °F (36.6 °C)] 97.6 °F (36.4 °C)  Pulse:  [] 108  Resp:  [18-22] 18  SpO2:  [89 %-94 %] 92 %  BP: (100-136)/(58-72) 131/72     Weight: 83 kg (182 lb 15.7 oz)  Body mass index is 24.82 kg/m².  Body surface area is 2.05 meters squared.      Intake/Output Summary (Last 24 hours) at 5/13/2019 1333  Last data filed at 5/13/2019 0433  Gross per 24 hour   Intake 120 ml   Output  665 ml   Net -545 ml       Physical Exam   Constitutional: He is oriented to person, place, and time. He appears well-developed and well-nourished. No distress.   HENT:   Head: Normocephalic and atraumatic.   Mouth/Throat: Oropharynx is clear and moist.   Eyes: Pupils are equal, round, and reactive to light. EOM are normal.   Neck: Normal range of motion. Neck supple. No JVD present.   Cardiovascular: Normal rate, regular rhythm, normal heart sounds and intact distal pulses.   No murmur heard.  Pulmonary/Chest: Tachypnea noted. No respiratory distress. He has wheezes. He has rhonchi.   Scattered wheezes throughout b/l lung fields   Abdominal: Soft. Bowel sounds are normal. He exhibits no distension. There is no tenderness.   Musculoskeletal: Normal range of motion. He exhibits no edema.   Neurological: He is alert and oriented to person, place, and time. No cranial nerve deficit.   Skin: Skin is warm and dry. Capillary refill takes less than 2 seconds.   Psychiatric: He has a normal mood and affect.       Significant Labs:   CBC:   No results for input(s): WBC, HGB, HCT, PLT in the last 48 hours., CMP:   No results for input(s): NA, K, CL, CO2, GLU, BUN, CREATININE, CALCIUM, PROT, ALBUMIN, BILITOT, ALKPHOS, AST, ALT, ANIONGAP, EGFRNONAA in the last 48 hours.    Invalid input(s): ESTGFAFRICA, LDH: No results for input(s): LDHCSF, BFSOURCE in the last 48 hours. and LFTs:   No results for input(s): ALT, AST, ALKPHOS, BILITOT, PROT, ALBUMIN in the last 48 hours.    Diagnostic Results:  I have reviewed and interpreted all pertinent imaging results/findings within the past 24 hours.             Assessment/Plan:     * Adenocarcinoma of left lung, stage 4  Oncologic History:       Oncologic History Non-small cell lung cancer, left diagnosed 06/2018  Metastatic disease to bone at presentation    Oncologic Treatment Stereotactic radiation to spinal lesion  Cisplatin/pemetrexed with concurrent radiation 08/08/2018 (completed  09/2018)  Durvalumab 10/2018, last given on 1/18/19  XRT L4-S3 30 Gy 12/2018    Pathology Poorly differentiated adenocarcinoma, EGFR wild type, No ALK, ROS-1 rearrangements, PD-L1 TPS 90%     - In light of new diagnosis of aspergillosis, the patient is not a candidate to undergo any further chemotherapy treatment until completion and resolution of infection (includes negative cultures w/ repeat bronchoscopy)  - Goals of care discussion started, getting palliative care involved as patient is amenable to hearing about the options he has in maximizing the time he has left. Continue talks with palliative care.  - Patient seems willing to move towards home hospice and move to the [place] area to be close to his son and grandchildren. We will work on coordinating things to make this possible early this week. Discharge to home hospice today (05/13/19)    Palliative care encounter  Discharge to home hospice in [place] today    Atrial fibrillation with RVR  - New onset as of this morning, pt does not recall having irregular rhythm any time prior, no complaints of CP or palpitations during episode.  - Likely etiology is 2/2 albuterol from nebulized breathing treatments, achieved rate control with metoprolol 5 mg IV x2  - Switched patient from Lovenox to Eliquis 5 mg BID  - Obtaining 2D echo  - Cardiology consulted, appreciate their recs  - Discontinued ASA (due to increased bleeding risk)  - Switched from Lovenox to Eliquis 5 mg BID  - Discontinued metoprolol and started on verapamil 80 mg TID    Plan upon discharge:  - Discontinued metoprolol, Eliquis,  - Continue verapamil 80 mg TID    COPD exacerbation  63-year-old male with a past medical history of COPD, hypertension, hyperlipidemia, CAD on  ASA/plavix, NSCLC (most recently on Durvalumab 1/18/19), who presented to the ED with worsening shortness of breath, wheezing,  and productive cough of brown-yellow sputum for the last 4 days. Differential diagnosis includes  but is not limited to COPD exacerbation (productive cough, wheezing on exam, CXR with increased diffuse bilateral interstitial and airspace opacities) vs hospital-acquired PNA (recent admission from 04/17 thru 04/18) vs progression of malignancy.     Plan:   - Levofloxacin 750 mg qd for 3-5 days (for possible underlying PNA)  - Patient was on a prednisone taper at home, being weaned katy from 70 mg qd for the treatment of suspected pneumonitis from the durvalumab that he had been on for a few months. After speaking with patient's oncologist, Dr. Infante, we will give steroid dosage used for pneumonitis treatment, decreasing prednisone to 60 mg PO qd in light of possible fungal infection  - Duonebs q6h while awake  - Titrate continuous O2 by nasal cannula trevor maintain sats > 88% as tolerated, (on 3 L @ home)  - Consulting Pulmonology as patient is not improving despite three days of standard therapy, appreciate their recs  - it's been discovered that during pt's last admission for which he had a similar presentation, the patient underwent bronchoscopy and had tissue cultures grow positive for Aspergillus fumigatus. Does not appear to have been treated at that time. Spoke with ID about this and they recommended beginning treatiment with voriconazole, started on 200 mg PO BID. Pt will be evaluated in the morning by ID.   - Continuing ant-fungal therapy with PO voriconazole, pt can be discharged on this medication. However, will need to discuss with patient if he wishes to continue treatment as he would likely need an indefinite course of this medication (generally need negative cultures from bronchoscopy in order to stop therapy)    Plan upon discharge:  - Will not send patient out on antibiotics as he has completed a 7-day course  - Will begin to wean down steroids, begin PO steroid taper  - Continue home inhaler treatment for COPD  - Pt will continue taking PO voriconazole 200 mg BID for treatment of aspergillosis as  their potential benefit that this will help patient symptomatically   - Continue supplemental home oxygen therapy, titrate to comfort           Anemia in neoplastic disease  -Hgb 7.9, stable baseline Hgb of ~ 9.0,   - Currently no active signs of bleeding  - Will continue to monitor with daily CBC, transfuse to keep Hgb > 7.0    Lung cancer metastatic to bone  Will continue home pain regimen:  - MS contin 30mg BID   - Percocet 10-325mg  q4h PRN  Home hospice able to make medication adjustments to maximize comfort      PAD (peripheral artery disease)  - Discontinued aspirin per Cardiology recs  - Continue cilostazol     Hyperlipidemia  Discontinued statin    HTN (hypertension)  - Discontinued metoprolol per cardiology recommendations, started verapamil 80 mg TID  - Continue verapamil 80 mg TID             Migel Brambila MD  Hematology/Oncology  [facility]

## 2019-05-13 NOTE — ASSESSMENT & PLAN NOTE
- Discontinued metoprolol per cardiology recommendations, started verapamil 80 mg TID  - Continue verapamil 80 mg TID

## 2019-05-13 NOTE — ASSESSMENT & PLAN NOTE
Oncologic History:       Oncologic History Non-small cell lung cancer, left diagnosed 06/2018  Metastatic disease to bone at presentation    Oncologic Treatment Stereotactic radiation to spinal lesion  Cisplatin/pemetrexed with concurrent radiation 08/08/2018 (completed 09/2018)  Durvalumab 10/2018, last given on 1/18/19  XRT L4-S3 30 Gy 12/2018    Pathology Poorly differentiated adenocarcinoma, EGFR wild type, No ALK, ROS-1 rearrangements, PD-L1 TPS 90%     - In light of new diagnosis of aspergillosis, the patient is not a candidate to undergo any further chemotherapy treatment until completion and resolution of infection (includes negative cultures w/ repeat bronchoscopy)  - Goals of care discussion started, getting palliative care involved as patient is amenable to hearing about the options he has in maximizing the time he has left. Continue talks with palliative care.  - Patient seems willing to move towards home hospice and move to the [place] area to be close to his son and grandchildren. We will work on coordinating things to make this possible early this week. Discharge to home hospice today (05/13/19)

## 2019-05-13 NOTE — PROGRESS NOTES
Brief palliative care note:     Pt briefly seen along with bedside RN.  Pt planning on returning home to day in [facility] followed by moving to [place] with his son with hospice.  Pt now DNR.     Pt states they are working on everything he needs.  I followed up with phone discussion with his sons.  No new questions other than d/c planning issues.  Briefly discussed with ANGIE Spear.     Will sign off for now.  Please call if we can be of any additional help.     Bassem Whitley MD  Palliative Medicine  [phone]

## 2019-05-13 NOTE — CARE UPDATE
Discussed code status with Mr. Nimesh Nunn and his son, Nimesh Dia and daughter-in-law Lakshmi via the phone. Resuscitation discussed in detail and with the information provided they decided on making Mr. Nimesh Nunn DNR. Form signed in chart - attending to sign 5/13. DNR order placed.     Alberto Hill M.D. PGY-2  [facility]  7:26 PM  5/12/2019  Pager [phone]

## 2019-05-13 NOTE — SUBJECTIVE & OBJECTIVE
Interval History: Pt intermittently tachycardic throughout the night, complains of having another anxiety attack, was given 1 Xanax that provided relief.. Pt remains afebrile, remains at home O2 level of 2-3 lpm via NC. Discussed more with patient about the decision to move towards home hospice, the patient says he is ready to spend time with his family. Plan is for discharge to home hospice so patient can be near his son and grandchildren.     Oncology Treatment Plan:   OP DOCETAXEL (75 MG/M2) Q3W    Medications:  Continuous Infusions:  Scheduled Meds:   cilostazol  100 mg Oral BID    clopidogrel  75 mg Oral Daily    famotidine  20 mg Oral BID    fluticasone-umeclidin-vilanter  1 puff Inhalation Daily    levothyroxine  75 mcg Oral Before breakfast    morphine  30 mg Oral Q12H    [START ON 5/14/2019] predniSONE  50 mg Oral Daily    senna-docusate 8.6-50 mg  1 tablet Oral Daily    tamsulosin  0.4 mg Oral Daily    verapamil  80 mg Oral TID    voriconazole  200 mg Oral BID     PRN Meds:acetaminophen, albuterol-ipratropium, ALPRAZolam, benzonatate, guaifenesin 100 mg/5 ml, insulin aspart U-100, ondansetron, oxyCODONE-acetaminophen, promethazine, ramelteon, sodium chloride 0.9%     Review of Systems   Constitutional: Positive for fatigue. Negative for chills and fever.   HENT: Negative for congestion, rhinorrhea and sore throat.    Eyes: Negative for pain.   Respiratory: Positive for cough, chest tightness, shortness of breath and wheezing.    Cardiovascular: Negative for chest pain, palpitations and leg swelling.   Gastrointestinal: Positive for abdominal pain and nausea. Negative for abdominal distention, diarrhea and vomiting.   Genitourinary: Negative for difficulty urinating and dysuria.   Musculoskeletal: Negative for arthralgias and myalgias.   Skin: Negative for color change.   Neurological: Negative for dizziness, weakness, light-headedness and headaches.   Psychiatric/Behavioral: Negative for  agitation and confusion.     Objective:     Vital Signs (Most Recent):  Temp: 97.6 °F (36.4 °C) (05/13/19 1222)  Pulse: 108 (05/13/19 1222)  Resp: 18 (05/13/19 1222)  BP: 131/72 (05/13/19 1222)  SpO2: (!) 92 % (05/13/19 1222) Vital Signs (24h Range):  Temp:  [96.3 °F (35.7 °C)-97.8 °F (36.6 °C)] 97.6 °F (36.4 °C)  Pulse:  [] 108  Resp:  [18-22] 18  SpO2:  [89 %-94 %] 92 %  BP: (100-136)/(58-72) 131/72     Weight: 83 kg (182 lb 15.7 oz)  Body mass index is 24.82 kg/m².  Body surface area is 2.05 meters squared.      Intake/Output Summary (Last 24 hours) at 5/13/2019 1333  Last data filed at 5/13/2019 0433  Gross per 24 hour   Intake 120 ml   Output 665 ml   Net -545 ml       Physical Exam   Constitutional: He is oriented to person, place, and time. He appears well-developed and well-nourished. No distress.   HENT:   Head: Normocephalic and atraumatic.   Mouth/Throat: Oropharynx is clear and moist.   Eyes: Pupils are equal, round, and reactive to light. EOM are normal.   Neck: Normal range of motion. Neck supple. No JVD present.   Cardiovascular: Normal rate, regular rhythm, normal heart sounds and intact distal pulses.   No murmur heard.  Pulmonary/Chest: Tachypnea noted. No respiratory distress. He has wheezes. He has rhonchi.   Scattered wheezes throughout b/l lung fields   Abdominal: Soft. Bowel sounds are normal. He exhibits no distension. There is no tenderness.   Musculoskeletal: Normal range of motion. He exhibits no edema.   Neurological: He is alert and oriented to person, place, and time. No cranial nerve deficit.   Skin: Skin is warm and dry. Capillary refill takes less than 2 seconds.   Psychiatric: He has a normal mood and affect.       Significant Labs:   CBC:   No results for input(s): WBC, HGB, HCT, PLT in the last 48 hours., CMP:   No results for input(s): NA, K, CL, CO2, GLU, BUN, CREATININE, CALCIUM, PROT, ALBUMIN, BILITOT, ALKPHOS, AST, ALT, ANIONGAP, EGFRNONAA in the last 48  hours.    Invalid input(s): ESTGFAFRICA, LDH: No results for input(s): LDHCSF, BFSOURCE in the last 48 hours. and LFTs:   No results for input(s): ALT, AST, ALKPHOS, BILITOT, PROT, ALBUMIN in the last 48 hours.    Diagnostic Results:  I have reviewed and interpreted all pertinent imaging results/findings within the past 24 hours.

## 2019-05-14 ENCOUNTER — TELEPHONE (OUTPATIENT)
Dept: PHARMACY | Facility: CLINIC | Age: 64
End: 2019-05-14

## 2019-05-17 NOTE — CONSULTS
[facility]  Palliative Medicine  Consult Note    Patient Name: Nimesh Nunn  MRN: [id number]  Admission Date: 5/6/2019  Hospital Length of Stay: 7 days  Code Status: Prior   Attending Provider: No att. providers found  Consulting Provider: Bassem Whitley MD  Primary Care Physician: Nishant Perez MD  Principal Problem:Adenocarcinoma of left lung, stage 4    Patient information was obtained from patient and past medical records.      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     Symptoms:   Nociceptive bony pain from mets: can consider increased the frequency of percocet 10/325 to q 3 hours, heat pads, and PT as tolerated.  Steroids not good option currently due to aspergillosis infection.  Dyspnea: bedside fan and discussed using his prn analgesics for dyspnea    Spiritual: asked  to visit.     Code status: currently a FC; discussed our recommendation of DNR based on his goals and hopes to avoid dying in the hospital; he wants to read over information given and discuss with his son    Medicolegal: Doesn't have an ACP or LW document but his son is surrogate and the pt desires for him to be primary MPOA.  Nimesh Gustavo [phone]    [facility]: After assessing the pt's understanding of his illness and asking permission of sharing up to date information with a prognosis of weeks to months, he was open to discussing options post discharge.  While exploring his values of wanting to be with family with the time he has left, I described the support that hospice can provide to both he and his family.  He admitted he was overwhelmed by the information and feels this has happened quickly.  He also appreciated the approach of open and honest information by the  team involved.  He worries about being a burden to his family, but his son is supportive in wanting to have him at home.     Dispo:   Likely home with son in [place].  Hospice discussions took place today and information left to read.  Discussed with son by phone who also agrees with hospice in the home setting.     Discussed with oncology and oncology LCSW        Thank you for your consult. I will follow-up with patient. Please contact us if you have any additional questions.    Subjective:     HPI:   Mr. Nimesh Nunn is a 63-year-old male with a past medical history of COPD, hypertension, hyperlipidemia, CAD on  ASA/plavix, NSCLC (most recently on Durvalumab 1/18/19), who presented to the ED with worsening shortness of breath, wheezing,  and productive cough of brown-yellow sputum for the last 4 days. Patient has had 3 hospital admissions for a similar presentation since February, most recently from 04/17- 04/18 at which time he was treated with duo-nebs, steroids, and antibiotics for a COPD exacerbation. Patient reports that he is on home oxyegn ( usually requiring 3 L NC) but has recently felt his work of breathing increase. He reports that he was discharged from his last admission on prednisone 70mg qd and has been weaning down, currently on 50 mg qd. Patient also reports subjective fevers at home and a generalized abdominal pain with associated nausea, 10/10 in severity, worse in the RUQ that is not affected by eating food. He denies chills, headache, vomiting, CP, diarrhea, or dysuria. He is not currently on chemo. .Per EMS, he was 88% on RA and went to 96% on 4L  In the ED, the patient was found to be hypoxic, tachycardic, and hypertensive. On exam,  he appears comfortable, in no acute distress. Lungs have scattered wheezes and rhonchi throughout, abdomen is soft, non-tender with active bowel sounds. No lower extremity edema noted. His chest x ray is c/w pneumonitis versus disease progression. Patient  was admitted to Medical Oncology for further management.                  Interval History:  Patient observed to have new-onset atrial fibrillation with RVR this AM with HR in 140's, was given metoprolol 5 mg IV x2 to attempt rate control. Seem to respond after a few hours with a return of HR into 's.  Pt reports no improvement in breathing, actually felt somewhat worse this AM with ambulation. Remains on home oxygen level of 2-3 L , SpO2 89-94%.   Remains afebrile, hemodynamically stable, in no acute distress.   Scattered wheezing still heard on pulmonary exam.   The pt was treated for suspected pneumonitis with high dose steroids, but it was also discovered that he also had an aspergillosis infection that had not been treated after a bronch in April.      In this setting palliative medicine was consulted to help aid in medical decision making and goals of care where appropriate.        Hospital Course:  No notes on file    Interval History: Pt states he feels better today.  States dyspnea has improved though he continues to have significant back and hiop pain.  Opioids help but he doesn't feel it as effective as it once was.    Past Medical History:   Diagnosis Date    Abdominal aortic aneurysm (AAA) without rupture 3/5/2018    Arthritis     Atrial fibrillation with RVR 5/9/2019    Blood transfusion     COPD (chronic obstructive pulmonary disease)     COPD exacerbation 3/24/2019    Coronary artery disease     Dehydration 8/15/2018    Encounter for blood transfusion     Hyperlipidemia 11/7/2013    Hypertension     Lung cancer metastatic to bone 7/18/2018    Occlusion and stenosis of carotid artery without mention of cerebral infarction 1/12/2014    Pneumonitis 2/13/2019    Primary malignant neoplasm of left lung 7/18/2018    Screening for colon cancer 6/1/2015    Syncope 1/13/2014    Thrombocytopenia 9/19/2018    Weakness of both lower extremities 1/14/2019       Past Surgical History:    Procedure Laterality Date    ARCH & 4 VESSEL STUDY Bilateral 5/27/2014    Performed by [facility] at [facility] CATH LAB    BACK SURGERY      BRONCHOSCOPY,FIBEROPTIC N/A 6/26/2018    Performed by Liana Serrano [facility] at [facility] OR 2ND FLR    carotid endarectomy      CATHETERIZATION, HEART, LEFT Left 11/8/2013    Performed by Mekhi Martin [facility] at [facility] CATH LAB    COLONOSCOPY N/A 6/1/2015    [facility] at [facility] ENDO    CORONARY ANGIOPLASTY      CORONARY ARTERY BYPASS GRAFT      ESOPHAGOGASTRODUODENOSCOPY (EGD) N/A 8/16/2018    Performed by Yohannes Osuna MD at [facility] ENDO (2ND FLR)    HEART CATH-LEFT N/A 5/27/2014    Performed by Mekhi Martin [facility] at [facility] CATH LAB    INSERTION, STENT, CORONARY ARTERY N/A 12/20/2013    Performed by Mekhi Martin MD at [facility] CATH LAB    PTA, PERIPHERAL BLOOD VESSEL Left 3/11/2014    Performed by Mekhi Martin MD at [facility] CATH LAB    PTA, PERIPHERAL BLOOD VESSEL N/A 2/18/2014    Performed by Mekhi Martin MD at [facility] CATH LAB    SKIN BIOPSY      ULTRASOUND, ENDOBRONCHIAL N/A 6/26/2018    Performed by Liana Serrano MD at [facility] OR 2ND FLR       Review of patient's allergies indicates:  No Known Allergies    Medications:  Continuous Infusions:  Scheduled Meds:   apixaban  5 mg Oral BID    atorvastatin  80 mg Oral Daily    cilostazol  100 mg Oral BID    clopidogrel  75 mg Oral Daily    famotidine  20 mg Oral BID    finasteride  5 mg Oral Daily    fluticasone-umeclidin-vilanter  1 puff Inhalation Daily    levoFLOXacin  750 mg Oral Daily    levothyroxine  75 mcg Oral Before breakfast    morphine  30 mg Oral Q12H    predniSONE  60 mg Oral Daily    senna-docusate 8.6-50 mg  1 tablet Oral Daily    tamsulosin  0.4 mg Oral Daily    tiotropium  1 capsule Inhalation Daily    verapamil  80 mg Oral TID    vorconazole (VFEND) IVPB  4 mg/kg Intravenous Q12H     PRN Meds:acetaminophen, albuterol-ipratropium, benzonatate, dextrose 50%,  dextrose 50%, glucagon (human recombinant), glucose, glucose, guaifenesin 100 mg/5 ml, insulin aspart U-100, metoprolol, ondansetron, oxyCODONE-acetaminophen, promethazine, ramelteon, sodium chloride 0.9%    Family History     Problem Relation (Age of Onset)    Breast cancer Mother    Cancer Mother    Heart attack Father    Heart disease Father    Heart failure Father    Hypertension Father    No Known Problems Sister, Brother, Maternal Grandmother, Maternal Grandfather, Paternal Grandmother, Paternal Grandfather, Maternal Aunt, Maternal Uncle, Paternal Aunt, Paternal Uncle        Tobacco Use    Smoking status: Former Smoker     Packs/day: 1.50     Years: 40.00     Pack years: 60.00     Last attempt to quit: 10/11/2011     Years since quittin.5    Smokeless tobacco: Never Used   Substance and Sexual Activity    Alcohol use: Yes     Alcohol/week: 1.8 oz     Types: 3 Cans of beer per week     Comment: 3 cans beer every day or every other day    Drug use: Yes     Types: Marijuana    Sexual activity: Not Currently       Review of Systems   Review of Systems   Constitutional: Positive for fatigue. Negative for chills and fever.   HENT: Negative for congestion, rhinorrhea and sore throat.    Eyes: Negative for pain.   Respiratory: Positive for cough, chest tightness, shortness of breath and wheezing.    Cardiovascular: Negative for chest pain, palpitations and leg swelling.   Gastrointestinal: Positive for abdominal pain and nausea. Negative for abdominal distention, diarrhea and vomiting.   Genitourinary: Negative for difficulty urinating and dysuria.   Musculoskeletal: Negative for arthralgias and myalgias.   Skin: Negative for color change.   Neurological: Negative for dizziness, weakness, light-headedness and headaches.   Psychiatric/Behavioral: Negative for agitation and confusion.       Objective:     Vital Signs (Most Recent):  Temp: 97.8 °F (36.6 °C) (05/10/19 1658)  Pulse: (!) 114 (05/10/19 1546)  Resp:  18 (05/10/19 1658)  BP: (!) 113/59 (05/10/19 1658)  SpO2: (!) 94 % (05/10/19 1658) Vital Signs (24h Range):  Temp:  [97.3 °F (36.3 °C)-98.7 °F (37.1 °C)] 97.8 °F (36.6 °C)  Pulse:  [] 114  Resp:  [16-18] 18  SpO2:  [92 %-98 %] 94 %  BP: ()/(59-86) 113/59     Weight: 83 kg (182 lb 15.7 oz)  Body mass index is 24.82 kg/m².    Review of Symptoms  Symptom Assessment (ESAS 0-10 scale)   ESAS 0 1 2 3 4 5 6 7 8 9 10   Pain       x       Dyspnea     x         Anxiety    x          Nausea x             Depression   x            Anorexia x             Fatigue      x        Insomnia x             Restlessness     x          Agitation    x            Bowel Management Plan (BMP): Yes    Comments: no issues    Pain Assessment: as above    Performance Status: 70    ECOG Performance Status Grade: 2 - Ambulates, capable of self care only    Physical Exam  Constitutional: He is oriented to person, place, and time. He appears well-developed and well-nourished. No distress.   HENT:   Head: Normocephalic and atraumatic.   Mouth/Throat: Oropharynx is clear and moist.   Eyes: Pupils are equal, round, and reactive to light. EOM are normal.   Neck: Normal range of motion. Neck supple. No JVD present.   Cardiovascular: Normal rate, regular rhythm, normal heart sounds and intact distal pulses.   No murmur heard.  Pulmonary/Chest: Tachypnea noted. No respiratory distress. He has wheezes. He has rhonchi.   Scattered wheezes throughout b/l lung fields   Abdominal: Soft. Bowel sounds are normal. He exhibits no distension. There is no tenderness.   Musculoskeletal: Normal range of motion. He exhibits no edema.   Neurological: He is alert and oriented to person, place, and time. No cranial nerve deficit.   Skin: Skin is warm and dry. Capillary refill takes less than 2 seconds.   Psychiatric: He has a normal mood and affect.       Significant Labs:   CBC: No results for input(s): WBC, HGB, HCT, PLT in the last 48 hours.  CMP:   Recent  Labs   Lab 05/09/19  0521      K 3.9   CL 90*   CO2 39*   *   BUN 21   CREATININE 0.9   CALCIUM 9.2   PROT 6.9   ALBUMIN 2.7*   BILITOT 0.2   ALKPHOS 88   AST 14   ALT 15   ANIONGAP 8   EGFRNONAA >60.0     CBC:   Recent Labs   Lab 05/08/19  0438   WBC 6.29   HGB 7.9*   HCT 25.6*   MCV 99*   *     BMP:  No results for input(s): GLU, NA, K, CL, CO2, BUN, CREATININE, CALCIUM, MG in the last 24 hours.  LFT:  Lab Results   Component Value Date    AST 14 05/09/2019    GGT 81 (H) 03/25/2015    ALKPHOS 88 05/09/2019    BILITOT 0.2 05/09/2019     Albumin:   Albumin   Date Value Ref Range Status   05/09/2019 2.7 (L) 3.5 - 5.2 g/dL Final     Protein:   Total Protein   Date Value Ref Range Status   05/09/2019 6.9 6.0 - 8.4 g/dL Final     Lactic acid:   Lab Results   Component Value Date    LACTATE 1.8 05/06/2019    LACTATE 0.9 04/15/2019       Significant Imaging: CT: I have reviewed all pertinent results/findings within the past 24 hours:  Chest   1. Bilateral upper lobe predominant, patchy subsegmental areas of ground-glass attenuation and consolidation involving the peripheral and peribronchovascular regions of the lungs with scattered areas of bronchial wall thickening and small bilateral dependent pleural effusions, progressed when compared to the previous CT.  Findings are overall nonspecific and may relate to sequela of infectious (multifocal pneumonia) non infectious inflammatory (aspiration, post radiation) or less likely metastatic disease.  Specialty consultation recommended.  2. Left lower lobe mass corresponding with the patient's primary neoplasm and difficult to accurately measure secondary to surrounding parenchymal disease and lack of intravenous contrast.  3. Patient's known lytic lesion within the L1 vertebral body is not well evaluated on today's exam.  4. Severe dense coronary artery atherosclerosis.  This report was flagged in Epic as abnormal.  CXR: I have reviewed all pertinent  results/findings within the past 24 hours:    FINDINGS:  Heart size is normal.  There are diffuse patchy perihilar and peripheral infiltrates throughout both lungs.  There is aortic plaque and DJD.    Advance Care Planning   Advanced Directives::  Living Will: No  LaPOST: No  Do Not Resuscitate Status: No  Medical Power of : No    Decision-Making Capacity: Patient answered questions       Living Arrangements: Lives alone    Psychosocial/Cultural:  Patient's most important priorities:  Spending time with son and grandchildren    Patient's biggest concerns/fears:  Missing an opportunity to be with family    Previous death/end of life care history:  Ex wife  from cancer in an inpt hospice; father  a typical ICU death with a prolonged illness and never left the hosptial    Patient's goals/hopes:  Hopes for remaining time to be spent outside of hospital    Spiritual:   Did not discuss this visit but will ask  to visit.      > 50% of 72 min visit spent in chart review, face to face discussion of goals of care,  symptom assessment, coordination of care and emotional support.    Bassem Whitley MD  Palliative Medicine  [facility]

## 2019-05-20 LAB — FUNGUS SPEC CULT: NORMAL

## 2019-06-19 LAB
ACID FAST MOD KINY STN SPEC: NORMAL
MYCOBACTERIUM SPEC QL CULT: NORMAL

## 2020-08-14 NOTE — NURSING
Juan F called and RN to RN report given to JOSE Ferreira.  [facility] called for transport.  VSS.     Pt c/o not eating for 4 days due to pain in the esophagus. Weight down 9.1lbs in 1 week. /70, , T97.6, R18, O2 96% on room air. Transported via wheelchair to Urgent Care at [facility] and pt checked in for bloodwork.

## 2020-10-05 ENCOUNTER — PATIENT MESSAGE (OUTPATIENT)
Dept: INTERNAL MEDICINE | Facility: CLINIC | Age: 65
End: 2020-10-05

## 2021-01-04 ENCOUNTER — PATIENT MESSAGE (OUTPATIENT)
Dept: ADMINISTRATIVE | Facility: HOSPITAL | Age: 66
End: 2021-01-04

## 2021-04-09 NOTE — ASSESSMENT & PLAN NOTE
Diagnostic and staging bronchoscopy today  I have explained the risks, benefits and alternatives of the procedure in detail.  The patient voices understanding and all questions have been answered.  The patient agrees to proceed as planned.  
Alert-The patient is alert, awake and responds to voice. The patient is oriented to time, place, and person. The triage nurse is able to obtain subjective information.

## 2022-07-18 NOTE — CONSULTS
Patient received info and forms on Advanced Directives and said he would look them over.   offered to help go over the forms and patient declined.   said he could come back at any time if patient requested.  Patient stated he had cancer diagnosis.     Detail Level: Detailed Detail Level: Generalized Detail Level: Zone

## 2022-07-24 NOTE — PLAN OF CARE
Problem: Patient Care Overview  Goal: Plan of Care Review  Outcome: Ongoing (interventions implemented as appropriate)  Day 6 of XRT to the lung. C/o nausea, stomach cramps, SOB, tire and weak.  Getting chemo next Thursday. BP 94/51, HR 95, O2 99% room air. Weight down. Pt actively vomiting during after treatment visit. Dr. Perea informed and pt sent to ER.        Strep swab collected and sent to lab as per order

## 2024-05-21 NOTE — SUBJECTIVE & OBJECTIVE
Interval History: No acute events overnight. Pt remains afebrile, hemodynamically stable. Pt reports no imp[rovement in breathing, remains at home O2 level of 2-3 lpm via NC. Continuing discussion about goals of care today, will work on plan to get patient to [facility] with home hospice.      Oncology Treatment Plan:   OP DOCETAXEL (75 MG/M2) Q3W    Medications:  Continuous Infusions:  Scheduled Meds:   apixaban  5 mg Oral BID    atorvastatin  80 mg Oral Daily    cilostazol  100 mg Oral BID    clopidogrel  75 mg Oral Daily    famotidine  20 mg Oral BID    finasteride  5 mg Oral Daily    fluticasone-umeclidin-vilanter  1 puff Inhalation Daily    levoFLOXacin  750 mg Oral Daily    levothyroxine  75 mcg Oral Before breakfast    morphine  30 mg Oral Q12H    predniSONE  60 mg Oral Daily    senna-docusate 8.6-50 mg  1 tablet Oral Daily    tamsulosin  0.4 mg Oral Daily    tiotropium  1 capsule Inhalation Daily    verapamil  80 mg Oral TID    vorconazole (VFEND) IVPB  4 mg/kg Intravenous Q12H     PRN Meds:acetaminophen, albuterol-ipratropium, benzonatate, dextrose 50%, dextrose 50%, glucagon (human recombinant), glucose, glucose, guaifenesin 100 mg/5 ml, insulin aspart U-100, ondansetron, oxyCODONE-acetaminophen, promethazine, ramelteon, sodium chloride 0.9%     Review of Systems   Constitutional: Positive for fatigue. Negative for chills and fever.   HENT: Negative for congestion, rhinorrhea and sore throat.    Eyes: Negative for pain.   Respiratory: Positive for cough, chest tightness, shortness of breath and wheezing.    Cardiovascular: Negative for chest pain, palpitations and leg swelling.   Gastrointestinal: Positive for abdominal pain and nausea. Negative for abdominal distention, diarrhea and vomiting.   Genitourinary: Negative for difficulty urinating and dysuria.   Musculoskeletal: Negative for arthralgias and myalgias.   Skin: Negative for color change.   Neurological: Negative for  dizziness, weakness, light-headedness and headaches.   Psychiatric/Behavioral: Negative for agitation and confusion.     Objective:     Vital Signs (Most Recent):  Temp: 97.6 °F (36.4 °C) (05/11/19 0739)  Pulse: 101 (05/11/19 1005)  Resp: 20 (05/11/19 1005)  BP: (!) 174/78 (05/11/19 0739)  SpO2: 98 % (05/11/19 0739) Vital Signs (24h Range):  Temp:  [97.6 °F (36.4 °C)-98.3 °F (36.8 °C)] 97.6 °F (36.4 °C)  Pulse:  [] 101  Resp:  [16-20] 20  SpO2:  [94 %-98 %] 98 %  BP: ()/(59-83) 174/78     Weight: 83 kg (182 lb 15.7 oz)  Body mass index is 24.82 kg/m².  Body surface area is 2.05 meters squared.      Intake/Output Summary (Last 24 hours) at 5/11/2019 1020  Last data filed at 5/11/2019 0858  Gross per 24 hour   Intake 800 ml   Output 1375 ml   Net -575 ml       Physical Exam   Constitutional: He is oriented to person, place, and time. He appears well-developed and well-nourished. No distress.   HENT:   Head: Normocephalic and atraumatic.   Mouth/Throat: Oropharynx is clear and moist.   Eyes: Pupils are equal, round, and reactive to light. EOM are normal.   Neck: Normal range of motion. Neck supple. No JVD present.   Cardiovascular: Normal rate, regular rhythm, normal heart sounds and intact distal pulses.   No murmur heard.  Pulmonary/Chest: Tachypnea noted. No respiratory distress. He has wheezes. He has rhonchi.   Scattered wheezes throughout b/l lung fields   Abdominal: Soft. Bowel sounds are normal. He exhibits no distension. There is no tenderness.   Musculoskeletal: Normal range of motion. He exhibits no edema.   Neurological: He is alert and oriented to person, place, and time. No cranial nerve deficit.   Skin: Skin is warm and dry. Capillary refill takes less than 2 seconds.   Psychiatric: He has a normal mood and affect.       Significant Labs:   CBC:   Recent Labs   Lab 05/11/19  0847   WBC 7.45   HGB 7.3*   HCT 23.7*      , CMP:   Recent Labs   Lab 05/11/19  0847      K 3.7   CL 90*    CO2 34*   *   BUN 21   CREATININE 0.8   CALCIUM 9.0   PROT 6.0   ALBUMIN 2.6*   BILITOT 0.3   ALKPHOS 72   AST 16   ALT 14   ANIONGAP 12   EGFRNONAA >60.0   , LDH: No results for input(s): LDHCSF, BFSOURCE in the last 48 hours. and LFTs:   Recent Labs   Lab 05/11/19  0847   ALT 14   AST 16   ALKPHOS 72   BILITOT 0.3   PROT 6.0   ALBUMIN 2.6*       Diagnostic Results:  I have reviewed and interpreted all pertinent imaging results/findings within the past 24 hours.            [No Acute Distress] : no acute distress [Obese] : obese [Normal Conjunctiva] : normal conjunctiva [Normal Venous Pressure] : normal venous pressure [Carotid Bruit] : carotid bruit [Normal S1, S2] : normal S1, S2 [No Rub] : no rub [No Gallop] : no gallop [Murmur] : murmur [Clear Lung Fields] : clear lung fields [Good Air Entry] : good air entry [No Respiratory Distress] : no respiratory distress  [Soft] : abdomen soft [Non Tender] : non-tender [No Masses/organomegaly] : no masses/organomegaly [Normal Gait] : normal gait [No Edema] : no edema [No Cyanosis] : no cyanosis [No Clubbing] : no clubbing [No Varicosities] : no varicosities [No Rash] : no rash [No Skin Lesions] : no skin lesions [Moves all extremities] : moves all extremities [No Focal Deficits] : no focal deficits [Normal Speech] : normal speech [Alert and Oriented] : alert and oriented [Normal memory] : normal memory [de-identified] : heard on left [de-identified] : Grade I/VI systolic murmur

## 2024-07-22 NOTE — PROGRESS NOTES
Subjective:    Patient ID:  Nimesh Nunn is a 62 y.o. male who presents for follow-up of No chief complaint on file.      HPI    Patient is a 61 year old male with PMH of cerebrovascular disease, hypertension, hyperlipidemia, former smoker, hepatitis C, CAD s/p PCI (RCA and LAD), PAD (failed revascularization of left EFFIE x 2 ()), and carotid artery disease with known  of left CCA.  We are following him for his carotid artery disease, he has known  of left CCA, and on US 20-39% MAARA stenosis, unchanged from previous years.  Denies any recent TIA, or stroke like symptoms.  Denies dizziness.  Denies arm claudication.  Denies claudication. He currently is not exercising. He walks regularly and reports no symptoms. Reports no foot ulcer or pain at rest.  No chest pain.  Stable SOB with moderate activity which he attributes to his COPD.  On asp/plavix.  He was recently diagnosed with stage IV SCC of the left lower lobe with mets to right lung and L1 lumbar spine. He will start chemotherapy tomorrow.        Review of Systems   Constitution: Negative.   HENT: Negative.    Eyes: Negative.    Cardiovascular: Positive for dyspnea on exertion and leg swelling. Negative for chest pain, claudication, cyanosis, irregular heartbeat, near-syncope, orthopnea, palpitations, paroxysmal nocturnal dyspnea and syncope.   Respiratory: Negative.    Endocrine: Negative.    Gastrointestinal: Negative.    Genitourinary: Negative.    Neurological: Negative.         Objective:    Physical Exam   Constitutional: He is oriented to person, place, and time. He appears well-developed and well-nourished.   HENT:   Head: Normocephalic and atraumatic.   Eyes: Conjunctivae are normal.   Neck: Neck supple. No JVD present. No thyromegaly present.   Cardiovascular: Normal rate, regular rhythm, normal heart sounds and intact distal pulses.  Exam reveals no gallop.    No murmur heard.  Pulses:       Carotid pulses are 2+ on the right side, and  2+ on the left side.       Radial pulses are 2+ on the right side, and 2+ on the left side.        Femoral pulses are 1+ on the right side, and 2+ on the left side.       Popliteal pulses are 1+ on the right side, and 2+ on the left side.        Dorsalis pedis pulses are 1+ on the right side, and 1+ on the left side.        Posterior tibial pulses are 1+ on the right side, and 1+ on the left side.   Pulmonary/Chest: Effort normal and breath sounds normal. No respiratory distress. He has no wheezes.   Abdominal: Soft. Bowel sounds are normal. He exhibits no distension. There is no tenderness. There is no rebound.   Neurological: He is oriented to person, place, and time.   Nursing note and vitals reviewed.        Assessment:       1. Abdominal aortic aneurysm (AAA) without rupture    2. Bilateral carotid artery disease    3. Coronary artery disease involving native coronary artery without angina pectoris, unspecified whether native or transplanted heart    4. Essential hypertension    5. Hyperlipidemia, unspecified hyperlipidemia type    6. PAD (peripheral artery disease)         Plan:       HTN (hypertension)  -Controlled today in the office  -Should continue current medical therapy  -Instructed to keep BP log at home and bring it for the next appointment  -Discussed about healthy life style modification including daily exercise 30 min/5 x week, diet (MESH and Mediterranean)improvement according to AHA guidelines. Questions and concerns were properly answered.         Coronary artery disease involving native coronary artery without angina pectoris  -Stable   -continue ASA and plavix  -continue high potency statin   -continue exercise       Bilateral carotid artery disease  -No new neurological events  -continue DAPT and high potency statin     Abdominal aortic aneurysm (AAA) without rupture  -continue optimal blood pressure control     Ancelmo Marcelo MD  Interventional Cardiovascular Fellow, PGY VII  Pager: [phone]  8/7/2018 9:25 AM          I have personally taken the history and examined this patient. I have discussed and agree with the resident's findings and plan as documented in the resident's note.  Mekhi Martin       None

## 2025-02-17 NOTE — HPI
Mr. Nimesh Nunn is a 63-year-old male with a past medical history of COPD on home O2 (3LNC), hypertension, hyperlipidemia, CAD s/p PCI of RCA and LAD (2013), TIA, PAD, left ICA occlusion, prior R ICA stent, Stage 4 NSCLC (poorly differentiated adenocarcinoma), s/p XRT to lumbar/sacral spine and chemo with multiple agents who presented to the ED with worsening shortness of breath, wheezing,  and productive cough of brown-yellow sputum. He was admitted to the hematology/oncology service on 5/6/19 for COPD exacerbation and treated with duoneb treatments, steroids, and levofloxacin. His course in the hospital was complicated by new-onset atrial fibrillation with RVR that occurred early this morning. Cardiology was consulted for management of atrial fibrillation.    The patient reports he was sleeping and possibly dreaming. Approximately around 4:25 am this morning, he developed atrial fibrillation with RVR in 130-160s. He denies experiencing any worsening SOB, NV, diaphoresis, LH, LOC, palpitations, fever or chills during this time. He reports that if people did not tell him, his HR was fast, he would not have noticed it. He was given lopressor 5 mg IVP X 2 and metoprolol succinate 50 mg PO and his HR improved to the 110-120s. Around 7:25 am this morning, he converted back to sinus rhythm and has maintained HRs in 90-100s. He reports chronic POWER with walking and prior to his MI which ultimately lead him to his PCI, he never experienced any CP but only POWER. His POWER has been more prominent over the last 1 month. Quit smoking 8 years ago (47 Py smoking hx). Currently on ASA, plavix, and cilostazol for his vascular disease. Denies any active bleeding with these medications but has contusions.   done

## 2025-03-09 NOTE — ED PROVIDER NOTES
Encounter Date: 5/6/2019       History     Chief Complaint   Patient presents with    Fever     Fever, chills and body actes. Hx of lung CA w/ mets to spine.     63-year-old white male with history of hypertension, CAD, COPD, metastatic lung cancer presents to the ED complaining of generalized myalgias, chills, subjective fever, cough.  He endorses generalized abdominal pain that is 10/10 and is relieved with Percocet.  He is not currently on any antibiotics.  He endorses cough, nausea without vomiting, shortness of breath (he is on home O2 at 3 L), lightheadedness. Per EMS, he was 88% on RA and went to 96% on 4L. He denies diarrhea, dysuria, headache. He is not currently on chemo.     The history is provided by the patient.     Review of patient's allergies indicates:  No Known Allergies  Past Medical History:   Diagnosis Date    Abdominal aortic aneurysm (AAA) without rupture 3/5/2018    Arthritis     Blood transfusion     COPD (chronic obstructive pulmonary disease)     COPD exacerbation 3/24/2019    Coronary artery disease     Dehydration 8/15/2018    Encounter for blood transfusion     Hyperlipidemia 11/7/2013    Hypertension     Lung cancer metastatic to bone 7/18/2018    Occlusion and stenosis of carotid artery without mention of cerebral infarction 1/12/2014    Pneumonitis 2/13/2019    Primary malignant neoplasm of left lung 7/18/2018    Screening for colon cancer 6/1/2015    Syncope 1/13/2014    Thrombocytopenia 9/19/2018    Weakness of both lower extremities 1/14/2019     Past Surgical History:   Procedure Laterality Date    ARCH & 4 VESSEL STUDY Bilateral 5/27/2014    Performed [facility] at [facility] CATH LAB    BACK SURGERY      BRONCHOSCOPY,FIBEROPTIC N/A 6/26/2018    Performed by [facility]errano [facility] at [facility] OR 2ND FLR    carotid endarectomy      CATHETERIZATION, HEART, LEFT Left 11/8/2013    Performed by [facility] at [facility] CATH LAB    COLONOSCOPY N/A 6/1/2015     [facility] at [facility] ENDO    CORONARY ANGIOPLASTY      CORONARY ARTERY BYPASS GRAFT      ESOPHAGOGASTRODUODENOSCOPY (EGD) N/A 2018    Performed by [facility]annes [facility]una [facility] at [facility] ENDO (2ND FLR)    HEART CATH-LEFT N/A 2014    Per[facility] at [facility] CATH LAB    INSERTION, STENT, CORONARY ARTERY N/A 2013    Performed by [facility] Martin [facility] at [facility] CATH LAB    PTA, PERIPHERAL BLOOD VESSEL Left 3/11/2014    Performed by [facility] Martin [facility] at [facility] CATH LAB    PTA, PERIPHERAL BLOOD VESSEL N/A 2014    Performed by [facility] Martin [facility] at [facility] CATH LAB    SKIN BIOPSY      ULTRASOUND, ENDOBRONCHIAL N/A 2018    Performed by [facility]ano [facility] at [facility] OR 2ND FLR     Family History   Problem Relation Age of Onset    Heart disease Father     Hypertension Father     Heart attack Father     Heart failure Father     Cancer Mother     Breast cancer Mother     No Known Problems Sister     No Known Problems Brother     No Known Problems Maternal Grandmother     No Known Problems Maternal Grandfather     No Known Problems Paternal Grandmother     No Known Problems Paternal Grandfather     No Known Problems Maternal Aunt     No Known Problems Maternal Uncle     No Known Problems Paternal Aunt     No Known Problems Paternal Uncle     Anemia Neg Hx     Arrhythmia Neg Hx     Asthma Neg Hx     Clotting disorder Neg Hx     Fainting Neg Hx     Hyperlipidemia Neg Hx      Social History     Tobacco Use    Smoking status: Former Smoker     Packs/day: 1.50     Years: 40.00     Pack years: 60.00     Last attempt to quit: 10/11/2011     Years since quittin.5    Smokeless tobacco: Never Used   Substance Use Topics    Alcohol use: Yes     Alcohol/week: 1.8 oz     Types: 3 Cans of beer per week     Comment: 3 cans beer every day or every other day    Drug use: Yes     Types: Marijuana     Review of Systems   Constitutional: Positive for chills and  fever (subjective).   HENT: Positive for congestion. Negative for rhinorrhea and sore throat.    Eyes: Negative for photophobia and visual disturbance.   Respiratory: Positive for cough and shortness of breath.    Cardiovascular: Positive for chest pain (with cough).   Gastrointestinal: Positive for abdominal pain and nausea. Negative for constipation, diarrhea and vomiting.   Genitourinary: Negative for dysuria and hematuria.   Musculoskeletal: Negative for neck pain and neck stiffness.   Skin: Negative for rash and wound.   Neurological: Negative for numbness and headaches.   Psychiatric/Behavioral: Negative for confusion.       Physical Exam     Initial Vitals [05/06/19 0443]   BP Pulse Resp Temp SpO2   (!) 164/97 (!) 125 18 99.4 °F (37.4 °C) 98 %      MAP       --         Physical Exam    Nursing note and vitals reviewed.  Constitutional: He appears well-developed and well-nourished. He is not diaphoretic. He appears distressed.   HENT:   Head: Normocephalic and atraumatic.   Neck: Normal range of motion. Neck supple.   Cardiovascular: Normal rate and regular rhythm. Exam reveals no gallop and no friction rub.    Murmur heard.  Trace LE edema   Pulmonary/Chest: Tachypnea noted. He has wheezes. He has no rhonchi. He has no rales.   Abdominal: Soft. Bowel sounds are normal. There is no tenderness. There is no rebound and no guarding.   Musculoskeletal: Normal range of motion.   Neurological: He is alert and oriented to person, place, and time.   Skin: Skin is warm and dry. No rash noted. No erythema.   Psychiatric: He has a normal mood and affect.         ED Course   Procedures  Labs Reviewed   CBC W/ AUTO DIFFERENTIAL - Abnormal; Notable for the following components:       Result Value    RBC 2.54 (*)     Hemoglobin 7.9 (*)     Hematocrit 25.3 (*)     Mean Corpuscular Volume 100 (*)     Mean Corpuscular Hemoglobin 31.1 (*)     Mean Corpuscular Hemoglobin Conc 31.2 (*)     RDW 18.8 (*)     Platelets 117 (*)      Immature Granulocytes 0.9 (*)     Lymph # 0.3 (*)     Gran% 79.1 (*)     Lymph% 7.5 (*)     Platelet Estimate Decreased (*)     All other components within normal limits   COMPREHENSIVE METABOLIC PANEL - Abnormal; Notable for the following components:    Sodium 135 (*)     Glucose 64 (*)     Calcium 8.4 (*)     Albumin 2.4 (*)     All other components within normal limits   TROPONIN I - Abnormal; Notable for the following components:    Troponin I 0.027 (*)     All other components within normal limits   B-TYPE NATRIURETIC PEPTIDE - Abnormal; Notable for the following components:     (*)     All other components within normal limits   CULTURE, BLOOD   CULTURE, BLOOD   LIPASE   URINALYSIS, REFLEX TO URINE CULTURE    Narrative:     Preferred Collection Type->Urine, Clean Catch   LACTIC ACID, PLASMA          Imaging Results          X-Ray Chest 1 View (Final result)  Result time 05/06/19 06:33:52    Final result by Darrell Diaz MD (05/06/19 06:33:52)                 Impression:      Since April 15, 2019, increased diffuse bilateral interstitial and airspace opacities, most prominent in the bilateral middle lung zones.  Findings are suspicious for increasing malignancy, or superimposed increasing edema or infectious/inflammatory disease.      Electronically signed by: Darrell Diaz MD  Date:    05/06/2019  Time:    06:33             Narrative:    EXAMINATION:  XR CHEST 1 VIEW    CLINICAL HISTORY:  Shortness of breath    TECHNIQUE:  Single frontal view of the chest was performed.    COMPARISON:  April 15, 2019    FINDINGS:  Increased diffuse bilateral interstitial and airspace opacities, particularly in the bilateral middle lung zones.    New trace right pleural effusion.  No left effusion.    No pneumothorax.    No acute bone abnormality.                                 Medical Decision Making:   History:   Old Medical Records: I decided to obtain old medical records.  Clinical Tests:   Lab Tests: Ordered  and Reviewed  Radiological Study: Ordered and Reviewed  Medical Tests: Reviewed and Ordered  Other:   I have discussed this case with another health care provider.       APC / Resident Notes:   63-year-old white male with history of hypertension, CAD, COPD, metastatic lung cancer presents to the ED complaining of generalized myalgias, chills, subjective fever, cough.  Tachycardic. Distressed. Abdomen soft and nontender. Diffuse wheezing. Trace LE edema. DDx includes but is not limited to pneumonia, COPD exacerbation, cancer pain, pulmonary edema. Will get labs, CXR.    No leukocytosis.  CMP unremarkable. Lipase normal. UA with no infection.  Lactic acid normal. Troponin and BNP minimally elevated.     Chest x-ray shows diffuse opacities, mostly in the bilateral middle lungs that is worsened since prior chest x-ray.  Patient reports infectious symptoms and I am concerned for Hcap.  Will give IV vancomycin and cefepime.  He reports improvement of symptoms after DuoNeb.    7:22 AM  Discussed with Heme/Onc - they will review chart and call back.                  Clinical Impression:       ICD-10-CM ICD-9-CM   1. HCAP (healthcare-associated pneumonia) J18.9 486   2. Shortness of breath R06.02 786.05         Disposition:   Disposition: Admitted                        Iwona Pastor PA-C  05/06/19 0801     [facility]

## (undated) DEVICE — CATH BRONCHOSCOPE F/BF

## (undated) DEVICE — ADAPTER SWIVEL

## (undated) DEVICE — SEE MEDLINE ITEM 146313

## (undated) DEVICE — PACK SET UP CONVERTORS

## (undated) DEVICE — SEE MEDLINE ITEM 157131

## (undated) DEVICE — NDL ASPIRATING VIZISHOT 20-40M

## (undated) DEVICE — SYR SLIP TIP 10ML SHIELD

## (undated) DEVICE — LUBRICANT SURGILUBE 2 OZ

## (undated) DEVICE — SYR SLIP TIP 20CC

## (undated) DEVICE — CONTAINER SPECIMEN STRL 4OZ

## (undated) DEVICE — GAUZE SPONGE 4X4 12PLY